# Patient Record
Sex: FEMALE | Race: WHITE | Employment: OTHER | URBAN - METROPOLITAN AREA
[De-identification: names, ages, dates, MRNs, and addresses within clinical notes are randomized per-mention and may not be internally consistent; named-entity substitution may affect disease eponyms.]

---

## 2017-06-03 PROBLEM — R77.8 TROPONIN I ABOVE REFERENCE RANGE: Status: ACTIVE | Noted: 2017-06-03

## 2017-06-03 PROBLEM — D72.829 LEUKOCYTOSIS: Status: ACTIVE | Noted: 2017-06-03

## 2017-06-03 PROBLEM — R55 SYNCOPE: Status: ACTIVE | Noted: 2017-06-03

## 2017-06-03 PROBLEM — R79.89 ELEVATED BRAIN NATRIURETIC PEPTIDE (BNP) LEVEL: Status: ACTIVE | Noted: 2017-06-03

## 2017-06-03 PROBLEM — R79.89 TROPONIN I ABOVE REFERENCE RANGE: Status: ACTIVE | Noted: 2017-06-03

## 2017-06-05 PROBLEM — I44.7 LBBB (LEFT BUNDLE BRANCH BLOCK): Chronic | Status: ACTIVE | Noted: 2017-06-05

## 2017-07-19 PROBLEM — I27.82 CHRONIC PULMONARY EMBOLISM (HCC): Status: ACTIVE | Noted: 2017-07-19

## 2020-10-19 ENCOUNTER — HOSPITAL ENCOUNTER (INPATIENT)
Age: 80
LOS: 3 days | Discharge: HOME | DRG: 690 | End: 2020-10-22
Attending: EMERGENCY MEDICINE | Admitting: INTERNAL MEDICINE
Payer: MEDICARE

## 2020-10-19 PROBLEM — N30.01 ACUTE CYSTITIS WITH HEMATURIA: Status: ACTIVE | Noted: 2020-10-19

## 2020-10-19 LAB
ABO/RH: NORMAL
ANION GAP SERPL CALCULATED.3IONS-SCNC: 17 MMOL/L (ref 7–16)
ANISOCYTOSIS: ABNORMAL
ANTIBODY SCREEN: NORMAL
BACTERIA: ABNORMAL /HPF
BASOPHILS ABSOLUTE: 0 E9/L (ref 0–0.2)
BASOPHILS RELATIVE PERCENT: 0 % (ref 0–2)
BILIRUBIN URINE: NEGATIVE
BLOOD BANK DISPENSE STATUS: NORMAL
BLOOD BANK PRODUCT CODE: NORMAL
BLOOD, URINE: ABNORMAL
BPU ID: NORMAL
BUN BLDV-MCNC: 35 MG/DL (ref 8–23)
CALCIUM SERPL-MCNC: 8.7 MG/DL (ref 8.6–10.2)
CHLORIDE BLD-SCNC: 94 MMOL/L (ref 98–107)
CLARITY: ABNORMAL
CO2: 24 MMOL/L (ref 22–29)
COLOR: ABNORMAL
CREAT SERPL-MCNC: 1.6 MG/DL (ref 0.5–1)
DESCRIPTION BLOOD BANK: NORMAL
EOSINOPHILS ABSOLUTE: 0 E9/L (ref 0.05–0.5)
EOSINOPHILS RELATIVE PERCENT: 0 % (ref 0–6)
EPITHELIAL CELLS, UA: ABNORMAL /HPF
GFR AFRICAN AMERICAN: 37
GFR NON-AFRICAN AMERICAN: 31 ML/MIN/1.73
GLUCOSE BLD-MCNC: 107 MG/DL (ref 74–99)
GLUCOSE URINE: 100 MG/DL
HCT VFR BLD CALC: 39 % (ref 34–48)
HCT VFR BLD CALC: 39.4 % (ref 34–48)
HEMOGLOBIN: 12.2 G/DL (ref 11.5–15.5)
HEMOGLOBIN: 12.3 G/DL (ref 11.5–15.5)
INR BLD: 5.8
KETONES, URINE: 15 MG/DL
LEUKOCYTE ESTERASE, URINE: ABNORMAL
LYMPHOCYTES ABSOLUTE: 7.84 E9/L (ref 1.5–4)
LYMPHOCYTES RELATIVE PERCENT: 9 % (ref 20–42)
MCH RBC QN AUTO: 26.6 PG (ref 26–35)
MCHC RBC AUTO-ENTMCNC: 31.2 % (ref 32–34.5)
MCV RBC AUTO: 85.3 FL (ref 80–99.9)
METAMYELOCYTES RELATIVE PERCENT: 1 % (ref 0–1)
MONOCYTES ABSOLUTE: 2.61 E9/L (ref 0.1–0.95)
MONOCYTES RELATIVE PERCENT: 3 % (ref 2–12)
MYELOCYTE PERCENT: 3 % (ref 0–0)
NEUTROPHILS ABSOLUTE: 76.65 E9/L (ref 1.8–7.3)
NEUTROPHILS RELATIVE PERCENT: 84 % (ref 43–80)
NITRITE, URINE: POSITIVE
OVALOCYTES: ABNORMAL
PDW BLD-RTO: 21.9 FL (ref 11.5–15)
PH UA: 6.5 (ref 5–9)
PLATELET # BLD: 240 E9/L (ref 130–450)
PMV BLD AUTO: 10.5 FL (ref 7–12)
POIKILOCYTES: ABNORMAL
POLYCHROMASIA: ABNORMAL
POTASSIUM SERPL-SCNC: 3.3 MMOL/L (ref 3.5–5)
PROTEIN UA: >=300 MG/DL
PROTHROMBIN TIME: 66.4 SEC (ref 9.3–12.4)
RBC # BLD: 4.62 E12/L (ref 3.5–5.5)
RBC UA: >20 /HPF (ref 0–2)
SODIUM BLD-SCNC: 135 MMOL/L (ref 132–146)
SPECIFIC GRAVITY UA: 1.01 (ref 1–1.03)
TEAR DROP CELLS: ABNORMAL
UROBILINOGEN, URINE: 2 E.U./DL
WBC # BLD: 87.1 E9/L (ref 4.5–11.5)
WBC UA: ABNORMAL /HPF (ref 0–5)

## 2020-10-19 PROCEDURE — G0378 HOSPITAL OBSERVATION PER HR: HCPCS

## 2020-10-19 PROCEDURE — 85018 HEMOGLOBIN: CPT

## 2020-10-19 PROCEDURE — 6360000002 HC RX W HCPCS: Performed by: EMERGENCY MEDICINE

## 2020-10-19 PROCEDURE — 80048 BASIC METABOLIC PNL TOTAL CA: CPT

## 2020-10-19 PROCEDURE — 2580000003 HC RX 258: Performed by: EMERGENCY MEDICINE

## 2020-10-19 PROCEDURE — 96365 THER/PROPH/DIAG IV INF INIT: CPT

## 2020-10-19 PROCEDURE — 36430 TRANSFUSION BLD/BLD COMPNT: CPT

## 2020-10-19 PROCEDURE — 99284 EMERGENCY DEPT VISIT MOD MDM: CPT

## 2020-10-19 PROCEDURE — 6370000000 HC RX 637 (ALT 250 FOR IP): Performed by: INTERNAL MEDICINE

## 2020-10-19 PROCEDURE — 85610 PROTHROMBIN TIME: CPT

## 2020-10-19 PROCEDURE — 87040 BLOOD CULTURE FOR BACTERIA: CPT

## 2020-10-19 PROCEDURE — 81001 URINALYSIS AUTO W/SCOPE: CPT

## 2020-10-19 PROCEDURE — 96367 TX/PROPH/DG ADDL SEQ IV INF: CPT

## 2020-10-19 PROCEDURE — 36415 COLL VENOUS BLD VENIPUNCTURE: CPT

## 2020-10-19 PROCEDURE — 96374 THER/PROPH/DIAG INJ IV PUSH: CPT

## 2020-10-19 PROCEDURE — 85014 HEMATOCRIT: CPT

## 2020-10-19 PROCEDURE — 2580000003 HC RX 258: Performed by: INTERNAL MEDICINE

## 2020-10-19 PROCEDURE — 85025 COMPLETE CBC W/AUTO DIFF WBC: CPT

## 2020-10-19 PROCEDURE — 1200000000 HC SEMI PRIVATE

## 2020-10-19 PROCEDURE — 86900 BLOOD TYPING SEROLOGIC ABO: CPT

## 2020-10-19 PROCEDURE — 86850 RBC ANTIBODY SCREEN: CPT

## 2020-10-19 PROCEDURE — P9059 PLASMA, FRZ BETWEEN 8-24HOUR: HCPCS

## 2020-10-19 PROCEDURE — 86901 BLOOD TYPING SEROLOGIC RH(D): CPT

## 2020-10-19 RX ORDER — LEVOTHYROXINE SODIUM 88 UG/1
88 TABLET ORAL DAILY
Status: DISCONTINUED | OUTPATIENT
Start: 2020-10-20 | End: 2020-10-22 | Stop reason: HOSPADM

## 2020-10-19 RX ORDER — MONTELUKAST SODIUM 10 MG/1
10 TABLET ORAL NIGHTLY
Status: DISCONTINUED | OUTPATIENT
Start: 2020-10-19 | End: 2020-10-19

## 2020-10-19 RX ORDER — ACETAMINOPHEN 325 MG/1
650 TABLET ORAL EVERY 6 HOURS PRN
Status: DISCONTINUED | OUTPATIENT
Start: 2020-10-19 | End: 2020-10-22 | Stop reason: HOSPADM

## 2020-10-19 RX ORDER — 0.9 % SODIUM CHLORIDE 0.9 %
20 INTRAVENOUS SOLUTION INTRAVENOUS ONCE
Status: COMPLETED | OUTPATIENT
Start: 2020-10-19 | End: 2020-10-19

## 2020-10-19 RX ORDER — SODIUM CHLORIDE 9 MG/ML
INJECTION, SOLUTION INTRAVENOUS CONTINUOUS
Status: DISCONTINUED | OUTPATIENT
Start: 2020-10-19 | End: 2020-10-20

## 2020-10-19 RX ORDER — ACETAMINOPHEN 650 MG/1
650 SUPPOSITORY RECTAL EVERY 6 HOURS PRN
Status: DISCONTINUED | OUTPATIENT
Start: 2020-10-19 | End: 2020-10-22 | Stop reason: HOSPADM

## 2020-10-19 RX ORDER — PANTOPRAZOLE SODIUM 40 MG/1
40 TABLET, DELAYED RELEASE ORAL
Status: DISCONTINUED | OUTPATIENT
Start: 2020-10-20 | End: 2020-10-22 | Stop reason: HOSPADM

## 2020-10-19 RX ORDER — FLUTICASONE PROPIONATE 50 MCG
1 SPRAY, SUSPENSION (ML) NASAL DAILY
Status: DISCONTINUED | OUTPATIENT
Start: 2020-10-20 | End: 2020-10-19

## 2020-10-19 RX ORDER — DEXTROSE MONOHYDRATE 25 G/50ML
12.5 INJECTION, SOLUTION INTRAVENOUS PRN
Status: DISCONTINUED | OUTPATIENT
Start: 2020-10-19 | End: 2020-10-22 | Stop reason: HOSPADM

## 2020-10-19 RX ORDER — SODIUM CHLORIDE 0.9 % (FLUSH) 0.9 %
10 SYRINGE (ML) INJECTION PRN
Status: DISCONTINUED | OUTPATIENT
Start: 2020-10-19 | End: 2020-10-22 | Stop reason: HOSPADM

## 2020-10-19 RX ORDER — WARFARIN SODIUM 2 MG/1
2 TABLET ORAL DAILY
COMMUNITY
End: 2021-01-19 | Stop reason: ALTCHOICE

## 2020-10-19 RX ORDER — HYDROXYUREA 500 MG/1
1000 CAPSULE ORAL DAILY
Status: DISCONTINUED | OUTPATIENT
Start: 2020-10-20 | End: 2020-10-21 | Stop reason: CLARIF

## 2020-10-19 RX ORDER — NICOTINE POLACRILEX 4 MG
15 LOZENGE BUCCAL PRN
Status: DISCONTINUED | OUTPATIENT
Start: 2020-10-19 | End: 2020-10-22 | Stop reason: HOSPADM

## 2020-10-19 RX ORDER — LANOLIN ALCOHOL/MO/W.PET/CERES
6 CREAM (GRAM) TOPICAL NIGHTLY PRN
Status: DISCONTINUED | OUTPATIENT
Start: 2020-10-19 | End: 2020-10-22 | Stop reason: HOSPADM

## 2020-10-19 RX ORDER — DEXTROSE MONOHYDRATE 50 MG/ML
100 INJECTION, SOLUTION INTRAVENOUS PRN
Status: DISCONTINUED | OUTPATIENT
Start: 2020-10-19 | End: 2020-10-22 | Stop reason: HOSPADM

## 2020-10-19 RX ORDER — METOPROLOL TARTRATE 50 MG/1
50 TABLET, FILM COATED ORAL 2 TIMES DAILY
Status: DISCONTINUED | OUTPATIENT
Start: 2020-10-19 | End: 2020-10-22 | Stop reason: HOSPADM

## 2020-10-19 RX ORDER — SODIUM CHLORIDE 0.9 % (FLUSH) 0.9 %
10 SYRINGE (ML) INJECTION EVERY 12 HOURS SCHEDULED
Status: DISCONTINUED | OUTPATIENT
Start: 2020-10-19 | End: 2020-10-22 | Stop reason: HOSPADM

## 2020-10-19 RX ORDER — CETIRIZINE HYDROCHLORIDE 10 MG/1
5 TABLET ORAL DAILY
Status: DISCONTINUED | OUTPATIENT
Start: 2020-10-20 | End: 2020-10-19

## 2020-10-19 RX ORDER — LORAZEPAM 0.5 MG/1
0.5 TABLET ORAL ONCE
Status: COMPLETED | OUTPATIENT
Start: 2020-10-19 | End: 2020-10-19

## 2020-10-19 RX ORDER — POLYETHYLENE GLYCOL 3350 17 G/17G
17 POWDER, FOR SOLUTION ORAL DAILY PRN
Status: DISCONTINUED | OUTPATIENT
Start: 2020-10-19 | End: 2020-10-22 | Stop reason: HOSPADM

## 2020-10-19 RX ADMIN — LORAZEPAM 0.5 MG: 0.5 TABLET ORAL at 22:19

## 2020-10-19 RX ADMIN — SODIUM CHLORIDE, PRESERVATIVE FREE 10 ML: 5 INJECTION INTRAVENOUS at 22:19

## 2020-10-19 RX ADMIN — SODIUM CHLORIDE 20 ML: 9 INJECTION, SOLUTION INTRAVENOUS at 23:11

## 2020-10-19 RX ADMIN — SODIUM CHLORIDE: 9 INJECTION, SOLUTION INTRAVENOUS at 22:20

## 2020-10-19 RX ADMIN — WATER 1 G: 1 INJECTION INTRAMUSCULAR; INTRAVENOUS; SUBCUTANEOUS at 18:00

## 2020-10-19 RX ADMIN — Medication 6 MG: at 22:19

## 2020-10-19 RX ADMIN — METOPROLOL TARTRATE 50 MG: 50 TABLET, FILM COATED ORAL at 22:19

## 2020-10-19 RX ADMIN — PHYTONADIONE 5 MG: 10 INJECTION, EMULSION INTRAMUSCULAR; INTRAVENOUS; SUBCUTANEOUS at 18:37

## 2020-10-19 ASSESSMENT — ENCOUNTER SYMPTOMS
VOMITING: 0
SHORTNESS OF BREATH: 0
ABDOMINAL PAIN: 0
BLOOD IN STOOL: 0
NAUSEA: 0

## 2020-10-19 NOTE — ED NOTES
PT straight cathed for urine specimen, Dark Red urine returned, DRs aware specimen sent to lab      Evelyn Landon, SHELLY  10/19/20 [id number]

## 2020-10-19 NOTE — ED PROVIDER NOTES
Patient states gross blood in her urine that began earlier morning 10/18/20. She is on warfarin for history of b/l PE. She states she has not had a good appetite since increasing hydroxyurea last week for polycythemia vera. She states she called Dr Alicja Jones today and was instructed to report to ED for elevated INR. The history is provided by the patient. Hematuria   This is a new problem. The current episode started yesterday. The problem is unchanged. She describes the hematuria as gross hematuria. The hematuria occurs throughout her entire urinary stream. She reports no clotting in her urine stream. She is experiencing no pain. She describes her urine color as dark red. Irritative symptoms do not include frequency. Pertinent negatives include no abdominal pain, dysuria, fever, nausea or vomiting. Review of Systems   Constitutional: Positive for activity change and appetite change. Negative for fever. HENT: Negative. Respiratory: Negative for shortness of breath. Cardiovascular: Negative for chest pain. Gastrointestinal: Negative for abdominal pain, blood in stool, nausea and vomiting. Genitourinary: Positive for hematuria. Negative for decreased urine volume, dysuria, frequency and vaginal bleeding. Musculoskeletal: Negative. Skin: Negative for pallor. Neurological: Negative for weakness and light-headedness. Physical Exam  Vitals signs and nursing note reviewed. Constitutional:       General: She is not in acute distress. Appearance: Normal appearance. She is well-developed and normal weight. She is not ill-appearing or toxic-appearing. HENT:      Head: Normocephalic and atraumatic. Nose: Nose normal.      Mouth/Throat:      Mouth: Mucous membranes are moist.      Pharynx: Oropharynx is clear. Eyes:      Extraocular Movements: Extraocular movements intact.       Conjunctiva/sclera: Conjunctivae normal.      Pupils: Pupils are equal, round, and reactive to light.   Neck:      Musculoskeletal: Normal range of motion and neck supple. Cardiovascular:      Rate and Rhythm: Normal rate and regular rhythm. Heart sounds: Normal heart sounds. No murmur. Pulmonary:      Effort: Pulmonary effort is normal. No respiratory distress. Breath sounds: Normal breath sounds. No wheezing or rales. Abdominal:      General: Bowel sounds are normal.      Palpations: Abdomen is soft. Tenderness: There is no abdominal tenderness. There is no guarding or rebound. Genitourinary:     Comments: Gross hematuria upon straight cath. Musculoskeletal: Normal range of motion. Skin:     General: Skin is warm and dry. Capillary Refill: Capillary refill takes less than 2 seconds. Coloration: Skin is not pale. Neurological:      General: No focal deficit present. Mental Status: She is alert and oriented to person, place, and time. Mental status is at baseline. Cranial Nerves: No cranial nerve deficit. Coordination: Coordination normal.   Psychiatric:         Mood and Affect: Mood normal.         Behavior: Behavior normal.         Thought Content: Thought content normal.         Judgment: Judgment normal.         Procedures    MDM            --------------------------------------------- PAST HISTORY ---------------------------------------------  Past Medical History:  has a past medical history of Back injury, Gallstones, H/O echocardiogram, Hypertension, LBBB (left bundle branch block), Pneumonia, Syncope and collapse, and Thyroid disease. Past Surgical History:  has a past surgical history that includes Tonsillectomy; Appendectomy; Nerve Block (06/03/15); Nerve Block (6 22 15); Nerve Block (N/A, 7/6/15); and Diagnostic Cardiac Cath Lab Procedure (Bilateral, 06/05/2017). Social History:  reports that she has never smoked. She does not have any smokeless tobacco history on file. She reports that she does not drink alcohol or use drugs.     Family Epithelial Cells, UA RARE /HPF    Bacteria, UA FEW (A) None Seen /HPF       Radiology  No orders to display       EKG: This EKG is signed and interpreted by me.          ------------------------- NURSING NOTES AND VITALS REVIEWED ---------------------------  Date / Time Roomed:  10/19/2020  3:48 PM  ED Bed Assignment:  05/05    The nursing notes within the ED encounter and vital signs as below have been reviewed. Patient Vitals for the past 24 hrs:   BP Temp Temp src Pulse Resp SpO2   10/19/20 1549 (!) 137/92 -- -- 106 19 98 %   10/19/20 1535 -- 96.8 °F (36 °C) Temporal -- -- --       Oxygen Saturation Interpretation: Normal      ------------------------------------------ PROGRESS NOTES ------------------------------------------  Re-evaluation(s):  Time: 17:15. Patients symptoms show no change  Repeat physical examination is not changed    Spoke to Dr Dewayne Rivers. She advised giving FFP-4units and Vitamin K 5 mg. I have spoken with the patient and discussed todays results, in addition to providing specific details for the plan of care and counseling regarding the diagnosis and prognosis. Their questions are answered at this time and they are agreeable with the plan.      --------------------------------- ADDITIONAL PROVIDER NOTES ---------------------------------  Consultations:  Spoke with Dr. Dewayne Rivers and Lenin Contreras for Dr Charla Severance (covering for Dr Avery Michelle),  They will admit this patient. This patient's ED course included: a personal history and physicial examination and multiple bedside re-evaluations    This patient has remained hemodynamically stable during their ED course. Please note that the withdrawal or failure to initiate urgent interventions for this patient would likely result in a life threatening deterioration or permanent disability. Accordingly this patient received 30 minutes of critical care time, excluding separately billable procedures. Clinical Impression  1.  Gross hematuria 2. Elevated INR          Disposition  Patient's disposition: Admit to telemetry  Patient's condition is stable.        Jose Elias Esqueda,   10/19/20 1746

## 2020-10-19 NOTE — H&P
Department of Internal Medicine  History and Physical    PCP: Dr. Jonathan López   Admitting Physician: Dr. Wilkerson  Consultants: Dr. Bettie Hoffman:  hematuria    HISTORY OF PRESENT ILLNESS:    Patient is 61-year-old female who presented to the ED from home due to hematuria which started yesterday. Describes a bright red color of urine. Denies previous occurrence. Denies pain with urination. About a week ago she was started on a increased dose of hydroxyurea. Since then she has been feeling well and appetite has decreased. She did admit to right abdominal and flank pain however said that this resolved. Denies any chest pain, shortness of breath, lightheadedness, fever, chills. 6/5/2017 admission   Active Discharge Diagnoses:  Primary Problem  Syncope  Hospital Problems       Active Hospital Problems     Diagnosis Date Noted    Syncope [R55] 06/03/2017    Leukocytosis [D72.829] 06/03/2017    Troponin I above reference range [R79.89] 06/03/2017    Elevated brain natriuretic peptide (BNP) level [R79.89] 06/03/2017 6/4/2017 echocardiogram   1. Left ventricle: The cavity size is normal. Wall thickness is mildly   increased. Systolic function is normal by the biplane method of disks. The   estimated ejection fraction is 69%. There are no regional wall motion   abnormalities. Doppler parameters are consistent with abnormal left   ventricular relaxation (grade 1 diastolic dysfunction). 2. Right ventricle: The cavity size is normal. Wall thickness is normal.   Systolic function is normal. Right ventricular systolic pressure is   mildly   increased. RV systolic pressure (S, est): 40 mm Hg. 3. Mitral valve: There is trivial, less than 1+ regurgitation. 4. Aortic valve: Structurally normal valve. Trileaflet. There is no   regurgitation. 5. Tricuspid valve: There is trivial, less than 1+ regurgitation. 6. Pulmonary arteries: PA peak pressure: 40 mm Hg (S).          PAST MEDICAL Hx:  Past Medical History:   Diagnosis Date    Back injury     Gallstones     H/O echocardiogram 06/04/2017    EF 69%    Hypertension     LBBB (left bundle branch block)     Pneumonia     Syncope and collapse     Thyroid disease        PAST SURGICAL Hx:   Past Surgical History:   Procedure Laterality Date    APPENDECTOMY      DIAGNOSTIC CARDIAC CATH LAB PROCEDURE Bilateral 06/05/2017    NERVE BLOCK  06/03/15    lumbar epidural #1    NERVE BLOCK  6 22 15    lumbar epidural #2    NERVE BLOCK N/A 7/6/15    lumbar epidural #3    TONSILLECTOMY         FAMILY Hx:  History reviewed. No pertinent family history. HOME MEDICATIONS:  Prior to Admission medications    Medication Sig Start Date End Date Taking? Authorizing Provider   warfarin (COUMADIN) 2 MG tablet Take 2 mg by mouth daily   Yes Historical Provider, MD   levothyroxine (SYNTHROID) 88 MCG tablet Take 88 mcg by mouth Daily    Historical Provider, MD   metoprolol tartrate (LOPRESSOR) 50 MG tablet Take 1 tablet by mouth 2 times daily 6/7/17   CRISTIAN Plata CNP   cetirizine (ZYRTEC) 5 MG tablet Take 1 tablet by mouth daily 6/5/17   Edson Vargas MD   montelukast (SINGULAIR) 10 MG tablet Take 10 mg by mouth nightly    Historical Provider, MD   fluticasone (FLONASE) 50 MCG/ACT nasal spray 1 spray by Nasal route daily    Historical Provider, MD   LEVOTHYROXINE SODIUM PO Take 88 mcg by mouth daily     Historical Provider, MD   olmesartan-hydrochlorothiazide (BENICAR HCT) 40-25 MG per tablet Take 1 tablet by mouth daily    Historical Provider, MD   hydroxyurea (HYDREA) 500 MG chemo capsule Take 1,000 mg by mouth daily    Historical Provider, MD   hydroxyurea (HYDREA) 500 MG chemo capsule Take 2 capsules by mouth daily  Patient taking differently: Take 1,000 mg by mouth daily 500 mg qod 10/14/16   Christina Weaver MD   omeprazole (PRILOSEC) 20 MG capsule Take 20 mg by mouth daily.       Historical Provider, MD   celecoxib (CELEBREX) 200 MG capsule Take 200 mg by mouth daily. Historical Provider, MD   Vitamin D (CHOLECALCIFEROL) 1000 UNITS CAPS capsule Take 1,000 Units by mouth daily. Historical Provider, MD       ALLERGIES:  Clonidine derivatives; Atarax [hydroxyzine hcl]; Clonidine derivatives; Medrol [methylprednisolone]; Medrol [methylprednisolone]; Naproxen; Naproxen sodium; Other; Other; and Atarax [hydroxyzine]    SOCIAL Hx:  Social History     Socioeconomic History    Marital status:       Spouse name: Not on file    Number of children: Not on file    Years of education: Not on file    Highest education level: Not on file   Occupational History    Not on file   Social Needs    Financial resource strain: Not on file    Food insecurity     Worry: Not on file     Inability: Not on file    Transportation needs     Medical: Not on file     Non-medical: Not on file   Tobacco Use    Smoking status: Never Smoker   Substance and Sexual Activity    Alcohol use: No    Drug use: No    Sexual activity: Never   Lifestyle    Physical activity     Days per week: Not on file     Minutes per session: Not on file    Stress: Not on file   Relationships    Social connections     Talks on phone: Not on file     Gets together: Not on file     Attends Jain service: Not on file     Active member of club or organization: Not on file     Attends meetings of clubs or organizations: Not on file     Relationship status: Not on file    Intimate partner violence     Fear of current or ex partner: Not on file     Emotionally abused: Not on file     Physically abused: Not on file     Forced sexual activity: Not on file   Other Topics Concern    Not on file   Social History Narrative    ** Merged History Encounter **            ROS: Positive in bold  General:   Denies chills, fatigue, fever, malaise, night sweats or weight loss    Psychological:   Denies anxiety, disorientation or hallucinations    ENT:    Denies epistaxis, headaches, vertigo or visual changes    Cardiovascular:   Denies any chest pain, irregular heartbeats, or palpitations. No paroxysmal nocturnal dyspnea. Respiratory:   Denies shortness of breath, coughing, sputum production, hemoptysis, or wheezing. No orthopnea. Gastrointestinal:   Denies nausea, vomiting, diarrhea, or constipation. Denies any abdominal pain. Denies change in bowel habits or stools. Genito-Urinary:    Denies any urgency, frequency, hematuria. Voiding without difficulty. Musculoskeletal:   Denies joint pain, joint stiffness, joint swelling or muscle pain    Neurology:    Denies any headache or focal neurological deficits. No weakness or paresthesia. Derm:    Denies any rashes, ulcers, or excoriations. Denies bruising. Extremities:   Denies any lower extremity swelling or edema. PHYSICAL EXAM:  VITALS:  Vitals:    10/19/20 1549   BP: (!) 137/92   Pulse: 106   Resp: 19   Temp:    SpO2: 98%         CONSTITUTIONAL:    Awake, alert, cooperative, no apparent distress, and appears stated age    EYES:    PERRL, EOMI, sclera clear, conjunctiva normal    ENT:    Normocephalic, atraumatic, sinuses nontender on palpation. External ears without lesions. Oral pharynx with moist mucus membranes. Tonsils without erythema or exudates. NECK:    Supple, symmetrical, trachea midline, no adenopathy, thyroid symmetric, not enlarged and no tenderness, skin normal, no bruits, no JVD    HEMATOLOGIC/LYMPHATICS:    No cervical lymphadenopathy and no supraclavicular lymphadenopathy    LUNGS:    Symmetric.  No increased work of breathing, good air exchange, clear to auscultation bilaterally, no wheezes, rhonchi, or rales,     CARDIOVASCULAR:    Normal apical impulse, regular rate and rhythm, normal S1 and S2, no S3 or S4, and no murmur noted    ABDOMEN:    No scars, normal bowel sounds, soft, non-distended, non-tender, no masses palpated, no hepatosplenomegaly, no rebound or guarding elicited on palpation MUSCULOSKELETAL:    There is no redness, warmth, or swelling of the joints. Full range of motion noted. Motor strength is 5 out of 5 all extremities bilaterally. Tone is normal.    NEUROLOGIC:    Awake, alert, oriented to name, place and time. Cranial nerves II-XII are grossly intact. Motor is 5 out of 5 bilaterally. SKIN:    No bruising or bleeding. No redness, warmth, or swelling. Pale    EXTREMITIES:    Peripheral pulses present. No edema, cyanosis, or swelling. Cold extremities to palpation    OSTEOPATHIC:    Examined in seated and supine positions. Normal thoracic kyphosis and lumbar lordosis. No acute somatic dysfunction. LABORATORY DATA:  CBC with Differential:    Lab Results   Component Value Date    WBC 87.1 10/19/2020    RBC 4.62 10/19/2020    RBC 4.90 06/04/2017    HGB 12.3 10/19/2020    HCT 39.4 10/19/2020     10/19/2020    MCV 85.3 10/19/2020    MCH 26.6 10/19/2020    MCHC 31.2 10/19/2020    RDW 21.9 10/19/2020    METASPCT 1.0 10/19/2020    LYMPHOPCT 9.0 10/19/2020    LYMPHOPCT 3.3 06/03/2017    MONOPCT 3.0 10/19/2020    MYELOPCT 3.0 10/19/2020    BASOPCT 0.0 10/19/2020    MONOSABS 2.61 10/19/2020    LYMPHSABS 7.84 10/19/2020    EOSABS 0.00 10/19/2020    BASOSABS 0.00 10/19/2020     CMP:    Lab Results   Component Value Date     10/19/2020    K 3.3 10/19/2020    CL 94 10/19/2020    CO2 24 10/19/2020    BUN 35 10/19/2020    CREATININE 1.6 10/19/2020    GFRAA 37 10/19/2020    LABGLOM 31 10/19/2020    GLUCOSE 107 10/19/2020    PROT 6.4 06/03/2017    LABALBU 3.7 06/03/2017    LABALBU 3.7 10/11/2016    CALCIUM 8.7 10/19/2020    BILITOT 1.2 06/03/2017    ALKPHOS 82 06/03/2017    AST 28 06/03/2017    ALT 19 06/03/2017       ASSESSMENT:  1. Hematuria   2. UTI  3. Supratherapeutic INR   4. BRAYAN on CKD stage 3  5. Polycythemia vera  6. Grade 1 diastolic dysfunction  7. Hypothyroidism  8. Hypertension  9.  History of syncope      PLAN:  Hematology and oncology consulted from the

## 2020-10-20 LAB
ALBUMIN SERPL-MCNC: 3.2 G/DL (ref 3.5–5.2)
ALP BLD-CCNC: 105 U/L (ref 35–104)
ALT SERPL-CCNC: 6 U/L (ref 0–32)
ANION GAP SERPL CALCULATED.3IONS-SCNC: 13 MMOL/L (ref 7–16)
ANISOCYTOSIS: ABNORMAL
AST SERPL-CCNC: 16 U/L (ref 0–31)
BASOPHILS ABSOLUTE: 0.55 E9/L (ref 0–0.2)
BASOPHILS RELATIVE PERCENT: 0.9 % (ref 0–2)
BILIRUB SERPL-MCNC: 0.8 MG/DL (ref 0–1.2)
BUN BLDV-MCNC: 33 MG/DL (ref 8–23)
CALCIUM SERPL-MCNC: 8.2 MG/DL (ref 8.6–10.2)
CHLORIDE BLD-SCNC: 98 MMOL/L (ref 98–107)
CO2: 23 MMOL/L (ref 22–29)
CREAT SERPL-MCNC: 1.7 MG/DL (ref 0.5–1)
EOSINOPHILS ABSOLUTE: 0.55 E9/L (ref 0.05–0.5)
EOSINOPHILS RELATIVE PERCENT: 0.9 % (ref 0–6)
GFR AFRICAN AMERICAN: 35
GFR NON-AFRICAN AMERICAN: 29 ML/MIN/1.73
GLUCOSE BLD-MCNC: 109 MG/DL (ref 74–99)
HCT VFR BLD CALC: 33.7 % (ref 34–48)
HCT VFR BLD CALC: 34.3 % (ref 34–48)
HCT VFR BLD CALC: 37.1 % (ref 34–48)
HCT VFR BLD CALC: 37.2 % (ref 34–48)
HEMOGLOBIN: 10.2 G/DL (ref 11.5–15.5)
HEMOGLOBIN: 10.3 G/DL (ref 11.5–15.5)
HEMOGLOBIN: 10.9 G/DL (ref 11.5–15.5)
HEMOGLOBIN: 11.5 G/DL (ref 11.5–15.5)
INR BLD: 1.5
INR BLD: 2
LYMPHOCYTES ABSOLUTE: 6.08 E9/L (ref 1.5–4)
LYMPHOCYTES RELATIVE PERCENT: 9.5 % (ref 20–42)
MAGNESIUM: 1.9 MG/DL (ref 1.6–2.6)
MCH RBC QN AUTO: 26.3 PG (ref 26–35)
MCHC RBC AUTO-ENTMCNC: 30.6 % (ref 32–34.5)
MCV RBC AUTO: 86 FL (ref 80–99.9)
METAMYELOCYTES RELATIVE PERCENT: 0.9 % (ref 0–1)
MONOCYTES ABSOLUTE: 2.43 E9/L (ref 0.1–0.95)
MONOCYTES RELATIVE PERCENT: 4.3 % (ref 2–12)
MYELOCYTE PERCENT: 0.9 % (ref 0–0)
NEUTROPHILS ABSOLUTE: 51.68 E9/L (ref 1.8–7.3)
NEUTROPHILS RELATIVE PERCENT: 82.8 % (ref 43–80)
NUCLEATED RED BLOOD CELLS: 1.7 /100 WBC
OVALOCYTES: ABNORMAL
PDW BLD-RTO: 21.8 FL (ref 11.5–15)
PHOSPHORUS: 3.9 MG/DL (ref 2.5–4.5)
PLATELET # BLD: 157 E9/L (ref 130–450)
PMV BLD AUTO: 10.7 FL (ref 7–12)
POIKILOCYTES: ABNORMAL
POLYCHROMASIA: ABNORMAL
POTASSIUM SERPL-SCNC: 3.3 MMOL/L (ref 3.5–5)
PROTHROMBIN TIME: 17.2 SEC (ref 9.3–12.4)
PROTHROMBIN TIME: 22.2 SEC (ref 9.3–12.4)
RBC # BLD: 3.92 E12/L (ref 3.5–5.5)
SODIUM BLD-SCNC: 134 MMOL/L (ref 132–146)
TEAR DROP CELLS: ABNORMAL
TOTAL PROTEIN: 4.8 G/DL (ref 6.4–8.3)
TSH SERPL DL<=0.05 MIU/L-ACNC: 8.6 UIU/ML (ref 0.27–4.2)
WBC # BLD: 60.8 E9/L (ref 4.5–11.5)

## 2020-10-20 PROCEDURE — 6370000000 HC RX 637 (ALT 250 FOR IP): Performed by: INTERNAL MEDICINE

## 2020-10-20 PROCEDURE — 84443 ASSAY THYROID STIM HORMONE: CPT

## 2020-10-20 PROCEDURE — 84100 ASSAY OF PHOSPHORUS: CPT

## 2020-10-20 PROCEDURE — 36415 COLL VENOUS BLD VENIPUNCTURE: CPT

## 2020-10-20 PROCEDURE — 85025 COMPLETE CBC W/AUTO DIFF WBC: CPT

## 2020-10-20 PROCEDURE — 87449 NOS EACH ORGANISM AG IA: CPT

## 2020-10-20 PROCEDURE — 6360000002 HC RX W HCPCS: Performed by: INTERNAL MEDICINE

## 2020-10-20 PROCEDURE — 96376 TX/PRO/DX INJ SAME DRUG ADON: CPT

## 2020-10-20 PROCEDURE — 85014 HEMATOCRIT: CPT

## 2020-10-20 PROCEDURE — 85610 PROTHROMBIN TIME: CPT

## 2020-10-20 PROCEDURE — 85018 HEMOGLOBIN: CPT

## 2020-10-20 PROCEDURE — 87324 CLOSTRIDIUM AG IA: CPT

## 2020-10-20 PROCEDURE — 83735 ASSAY OF MAGNESIUM: CPT

## 2020-10-20 PROCEDURE — 2580000003 HC RX 258: Performed by: INTERNAL MEDICINE

## 2020-10-20 PROCEDURE — 80053 COMPREHEN METABOLIC PANEL: CPT

## 2020-10-20 PROCEDURE — G0378 HOSPITAL OBSERVATION PER HR: HCPCS

## 2020-10-20 PROCEDURE — 1200000000 HC SEMI PRIVATE

## 2020-10-20 RX ORDER — SODIUM CHLORIDE AND POTASSIUM CHLORIDE .9; .15 G/100ML; G/100ML
SOLUTION INTRAVENOUS CONTINUOUS
Status: DISCONTINUED | OUTPATIENT
Start: 2020-10-20 | End: 2020-10-22 | Stop reason: HOSPADM

## 2020-10-20 RX ORDER — MAGNESIUM SULFATE 1 G/100ML
1 INJECTION INTRAVENOUS PRN
Status: DISCONTINUED | OUTPATIENT
Start: 2020-10-20 | End: 2020-10-22 | Stop reason: HOSPADM

## 2020-10-20 RX ORDER — POTASSIUM CHLORIDE 20 MEQ/1
40 TABLET, EXTENDED RELEASE ORAL PRN
Status: DISCONTINUED | OUTPATIENT
Start: 2020-10-20 | End: 2020-10-22 | Stop reason: HOSPADM

## 2020-10-20 RX ORDER — WARFARIN SODIUM 2 MG/1
2 TABLET ORAL DAILY
Status: DISCONTINUED | OUTPATIENT
Start: 2020-10-20 | End: 2020-10-22 | Stop reason: HOSPADM

## 2020-10-20 RX ORDER — POTASSIUM CHLORIDE 7.45 MG/ML
10 INJECTION INTRAVENOUS PRN
Status: DISCONTINUED | OUTPATIENT
Start: 2020-10-20 | End: 2020-10-22 | Stop reason: HOSPADM

## 2020-10-20 RX ADMIN — SODIUM CHLORIDE: 9 INJECTION, SOLUTION INTRAVENOUS at 10:38

## 2020-10-20 RX ADMIN — WARFARIN SODIUM 2 MG: 2 TABLET ORAL at 10:38

## 2020-10-20 RX ADMIN — METOPROLOL TARTRATE 50 MG: 50 TABLET, FILM COATED ORAL at 10:38

## 2020-10-20 RX ADMIN — SODIUM CHLORIDE, PRESERVATIVE FREE 10 ML: 5 INJECTION INTRAVENOUS at 21:22

## 2020-10-20 RX ADMIN — LEVOTHYROXINE SODIUM 88 MCG: 88 TABLET ORAL at 05:28

## 2020-10-20 RX ADMIN — METOPROLOL TARTRATE 50 MG: 50 TABLET, FILM COATED ORAL at 21:22

## 2020-10-20 RX ADMIN — WATER 1 G: 1 INJECTION INTRAMUSCULAR; INTRAVENOUS; SUBCUTANEOUS at 17:35

## 2020-10-20 RX ADMIN — POTASSIUM CHLORIDE AND SODIUM CHLORIDE: 900; 150 INJECTION, SOLUTION INTRAVENOUS at 14:31

## 2020-10-20 RX ADMIN — PANTOPRAZOLE SODIUM 40 MG: 40 TABLET, DELAYED RELEASE ORAL at 05:28

## 2020-10-20 ASSESSMENT — PAIN SCALES - GENERAL
PAINLEVEL_OUTOF10: 0

## 2020-10-20 NOTE — CARE COORDINATION
10-20-Cm note: spoke to pt for transition of care , pt's son lives with her ,he takes care of her needs. Pt is independent and denies any dc needs. Plan is to return home with no needs.  Electronically signed by Ernesto Cui RN on 10/20/2020 at 10:58 AM

## 2020-10-20 NOTE — PROGRESS NOTES
S: patient states hematuria resolved but did see a little blood after wiping this morning post urination, she had diarrhea with incontinence last night could not make it to the bathroom in time. She has had unexplained yellow diarrhea for about 1 and 1/2 weeks tends to be about once a day. She had poor appetite with it and wasn't eating  and likely led to elevated INR on warfarin. O: Temp 98.3 P-79 R-16 /57  GEn- alert, talking, non distressed  HEENT- mmm  Lungs- clear  Cardiac- RRR  abd- soft/nt/nd +BS  Ext- warm and well perfused    Labs:  Wbc=60.8 hb=10.3 mhz=132 with 83% creatinine 1.7 albumin=3.2  INR=1.5 today  Last night INR=3   A/P: 78year old female well known to me with myleoproliferative disorder orginaly polycythemia very. Her WBC recently increased and hydrea was increased 1500 mg alternating with 1000 mg and is responding nicely. She has been ill with a GI illness with diarrhea and poor appetite and poor po intake the past 10 days and likely caused elevated INR of 6 yesterday and gross hematuria yesterday  UA suspicious for UTI likely from recent diarrhea. INR improved. 1. Antibiotics and f/u urine culture  2. Continue hydrea  3. C. Diff stool  4.  Restart coumadin for history of PE, BERTHA-1 gene mutation and hyperhomocysteimia    Dakota Mclain [facility]

## 2020-10-20 NOTE — PROGRESS NOTES
Internal Medicine Progress Note    [facility]    Balaji Coleman. Clare Dumont., KYLERONIESHA. Franklin Taveras D.O., DIANE. Crystal Meyer D.O. Paige Fernandes, MSN, APRN, NP-C  Junaid Velasquez. Joel Lieberman, MSN, APRN-CNP     Primary Care Physician: Griselda Pate MD   Admitting Physician:  Rickey Ariza DO  Admission date and time: 10/19/2020  3:48 PM    Room:  [facility]  Admitting diagnosis: Acute cystitis with hematuria [N30.01]    Patient Name: Margi Wheeler  MRN: [id number]    Date of Service: 10/20/2020     Subjective:  Mono Vargas is a 78 y.o. female who was seen and examined today,10/20/2020, at the bedside. She is feeling better today with treatment administered. She admits to the sensation of dysuria and frequency suggesting urinary tract infection. States that she has had these in the past without hematuria. States that she has been taking her warfarin 2 mg daily as directed without any change in the dosage. States that her diet has changed however as her appetite has been poor and she has not been eating her diet consistently as previously well maintained on the same dose for the last 2 months. She is urinating without difficulty as far as hesitance or the need to push with urination and she has no obstructive symptoms. No family present during my examination. Review of System:   Constitutional:   Denies fever or chills, weight loss or gain, admits malaise and fatigue. HEENT:   Denies ear pain, sore throat, sinus or eye problems. Cardiovascular:   Denies any chest pain, irregular heartbeats, or palpitations. Respiratory:   Denies shortness of breath, coughing, sputum production, hemoptysis, or wheezing. Gastrointestinal:   Admits to poor appetite and nausea. Denies vomiting, diarrhea, or constipation. Denies any abdominal pain. Genitourinary:    Admits to less dysuria and frequency. Admits to ongoing but improved hematuria.   Denies flank or suprapubic pain.  Extremities:   Denies lower extremity swelling, edema or cyanosis. Neurology:    Denies any headache or focal neurological deficits, to generalized weakness and deconditioning. Psch:   Denies being anxious or depressed. Musculoskeletal:    Denies  myalgias, joint complaints or back pain. Integumentary:   Denies any rashes, ulcers, or excoriations. Denies bruising. Hematologic/Lymphatic:  Denies bruising or bleeding. Physical Exam:  No intake/output data recorded. Intake/Output Summary (Last 24 hours) at 10/20/2020 1047  Last data filed at 10/20/2020 0041  Gross per 24 hour   Intake 240 ml   Output --   Net 240 ml   I/O last 3 completed shifts: In: 240 [P.O.:240]  Out: -   Patient Vitals for the past 96 hrs (Last 3 readings):   Weight   10/19/20 1941 151 lb (68.5 kg)     Vital Signs:   Blood pressure (!) 119/57, pulse 79, temperature 98.3 °F (36.8 °C), temperature source Oral, resp. rate 16, height 5' 5\" (1.651 m), weight 151 lb (68.5 kg), SpO2 97 %. General appearance:  Alert, responsive, oriented to person, place, and time. Only mildly ill and without distress  Head:  Normocephalic. No masses, lesions or tenderness. Eyes:  PERRLA. EOMI. Sclera clear. Buccal mucosa moist.  ENT:  Ears normal. Mucosa normal.  Neck:    Supple. Trachea midline. No thyromegaly. No JVD. No bruits. Heart:    Rhythm regular. Rate controlled. Systolic murmur. Lungs:    Symmetrical. Clear to auscultation bilaterally. Bibasilar air exchange. No wheezes. No rhonchi. No rales. Abdomen:   Soft. Non-tender. Non-distended. Bowel sounds positive. No organomegaly or masses. No pain on palpation. Extremities:    Peripheral pulses present. No peripheral edema. No ulcers. No cyanosis. No clubbing. Neurologic:    Alert x 3. No focal deficit. Cranial nerves grossly intact. General weakness without focal weakness.   Psych:   Behavior is normal. Mood appears normal. Speech is not rapid and/or pressured. Musculoskeletal:   Spine ROM normal. Muscular strength intact. Gait not assessed. Integumentary:  No rashes  Skin normal color and texture. Genitalia/Breast:  Deferred    Medication:  Scheduled Meds:   warfarin  2 mg Oral Daily    pantoprazole  40 mg Oral QAM AC    levothyroxine  88 mcg Oral Daily    sodium chloride flush  10 mL Intravenous 2 times per day    cefTRIAXone (ROCEPHIN) IV  1 g Intravenous Q24H    hydroxyurea  1,000 mg Oral Daily    metoprolol tartrate  50 mg Oral BID     Continuous Infusions:   dextrose      sodium chloride 75 mL/hr at 10/20/20 1038       Objective Data:  CBC with Differential:    Lab Results   Component Value Date    WBC 60.8 10/20/2020    RBC 3.92 10/20/2020    RBC 4.90 06/04/2017    HGB 10.3 10/20/2020    HGB 10.2 10/20/2020    HCT 33.7 10/20/2020    HCT 34.3 10/20/2020     10/20/2020    MCV 86.0 10/20/2020    MCH 26.3 10/20/2020    MCHC 30.6 10/20/2020    RDW 21.8 10/20/2020    NRBC 1.7 10/20/2020    METASPCT 0.9 10/20/2020    LYMPHOPCT 9.5 10/20/2020    LYMPHOPCT 3.3 06/03/2017    MONOPCT 4.3 10/20/2020    MYELOPCT 0.9 10/20/2020    BASOPCT 0.9 10/20/2020    MONOSABS 2.43 10/20/2020    LYMPHSABS 6.08 10/20/2020    EOSABS 0.55 10/20/2020    BASOSABS 0.55 10/20/2020     BMP:    Lab Results   Component Value Date     10/20/2020    K 3.3 10/20/2020    CL 98 10/20/2020    CO2 23 10/20/2020    BUN 33 10/20/2020    LABALBU 3.2 10/20/2020    CREATININE 1.7 10/20/2020    CALCIUM 8.2 10/20/2020    GFRAA 35 10/20/2020    LABGLOM 29 10/20/2020    GLUCOSE 109 10/20/2020     PT/INR:    Lab Results   Component Value Date    PROTIME 17.2 10/20/2020    INR 1.5 10/20/2020       Wound Documentation:        Assessment:  1. Acute cystitis with hematuria   2. Supratherapeutic INR  chronic warfarin 2 mg daily  3. BRAYAN on CKD stage 3  4. Normocytic normochromic anemia due to blood loss   5. Hypokalemia   6. Polycythemia vera followed by the [facility]  7.  Unstated chronic diastolic congestive heart failure  8. Hypothyroidism  9. Hypertension  10. History of syncope    Plan:   Chuck Casillas was admitted due to acute cystitis with gross hematuria. This was complicated further by her supratherapeutic INR. She was given FFP and vitamin K last evening and INR has trended downward, presently 1.5. We will resume chronic anticoagulation once okay by hematology. Blood counts have remained stable with no significant bleeding reported. Renal functions have trended downward as well on present treatment, electrolyte abnormalities will be addressed and fluids will be adjusted. She appears to be compensated from heart failure standpoint we will monitor fluid volume status closely moving forward. Therapy will be consulted as well. In the setting of poor appetite, dietary nutritional supplements will be started also. Continue current therapy. See orders for further plan of care. More than 50% of my  time was spent at the bedside counseling/coordinating care with the patient and/or family with face to face contact. This time was spent reviewing notes and laboratory data as well as instructing and counseling the patient. Time I spent with the family or surrogate(s) is included only if the patient was incapable of providing the necessary information or participating in medical decisions. I also discussed the differential diagnosis and all of the proposed management plans with the patient and individuals accompanying the patient. Gurinder Codding requires this high level of physician care and nursing on the IMC/Telemetry unit due the complexity of decision management and chance of rapid decline or death. Continued cardiac monitoring and higher level of nursing are required. I am readily available for any further decision-making and intervention.      CRISTIAN Owens CNP, APRN-CNP  10/20/2020  10:47 AM

## 2020-10-20 NOTE — PLAN OF CARE
Problem: Skin Integrity:  Goal: Will show no infection signs and symptoms  Description: Will show no infection signs and symptoms  10/20/2020 1222 by Tori Magdaleno RN  Outcome: Met This Shift     Problem: Skin Integrity:  Goal: Absence of new skin breakdown  Description: Absence of new skin breakdown  10/20/2020 1222 by Tori Magdaleno RN  Outcome: Met This Shift

## 2020-10-20 NOTE — PLAN OF CARE
Problem: Skin Integrity:  Goal: Will show no infection signs and symptoms  Description: Will show no infection signs and symptoms  10/20/2020 0649 by Chula Urias RN  Outcome: Met This Shift  58/00/3700 [id number] by Jeanette Rutherford RN  Outcome: Met This Shift  Goal: Absence of new skin breakdown  Description: Absence of new skin breakdown  10/20/2020 0649 by Chula Urias RN  Outcome: Met This Shift  05/72/4422 1310 by Jeanette Rutherford, RN  Outcome: Met This Shift

## 2020-10-21 LAB
ALBUMIN SERPL-MCNC: 3.4 G/DL (ref 3.5–5.2)
ALP BLD-CCNC: 101 U/L (ref 35–104)
ALT SERPL-CCNC: 7 U/L (ref 0–32)
ANION GAP SERPL CALCULATED.3IONS-SCNC: 10 MMOL/L (ref 7–16)
ANISOCYTOSIS: ABNORMAL
AST SERPL-CCNC: 14 U/L (ref 0–31)
BASOPHILIC STIPPLING: ABNORMAL
BASOPHILS ABSOLUTE: 1.96 E9/L (ref 0–0.2)
BASOPHILS RELATIVE PERCENT: 3 % (ref 0–2)
BILIRUB SERPL-MCNC: 0.6 MG/DL (ref 0–1.2)
BUN BLDV-MCNC: 29 MG/DL (ref 8–23)
C DIFF TOXIN/ANTIGEN: NORMAL
CALCIUM SERPL-MCNC: 8.1 MG/DL (ref 8.6–10.2)
CHLORIDE BLD-SCNC: 103 MMOL/L (ref 98–107)
CO2: 24 MMOL/L (ref 22–29)
CREAT SERPL-MCNC: 1.8 MG/DL (ref 0.5–1)
EOSINOPHILS ABSOLUTE: 0 E9/L (ref 0.05–0.5)
EOSINOPHILS RELATIVE PERCENT: 0 % (ref 0–6)
GFR AFRICAN AMERICAN: 33
GFR NON-AFRICAN AMERICAN: 27 ML/MIN/1.73
GLUCOSE BLD-MCNC: 108 MG/DL (ref 74–99)
HCT VFR BLD CALC: 33.5 % (ref 34–48)
HCT VFR BLD CALC: 33.5 % (ref 34–48)
HCT VFR BLD CALC: 35.5 % (ref 34–48)
HEMOGLOBIN: 10.2 G/DL (ref 11.5–15.5)
HEMOGLOBIN: 10.2 G/DL (ref 11.5–15.5)
HEMOGLOBIN: 10.8 G/DL (ref 11.5–15.5)
INR BLD: 1.3
LYMPHOCYTES ABSOLUTE: 5.22 E9/L (ref 1.5–4)
LYMPHOCYTES RELATIVE PERCENT: 8 % (ref 20–42)
MAGNESIUM: 1.7 MG/DL (ref 1.6–2.6)
MCH RBC QN AUTO: 26.4 PG (ref 26–35)
MCHC RBC AUTO-ENTMCNC: 30.4 % (ref 32–34.5)
MCV RBC AUTO: 86.8 FL (ref 80–99.9)
MONOCYTES ABSOLUTE: 2.61 E9/L (ref 0.1–0.95)
MONOCYTES RELATIVE PERCENT: 4 % (ref 2–12)
MYELOCYTE PERCENT: 3 % (ref 0–0)
NEUTROPHILS ABSOLUTE: 55.51 E9/L (ref 1.8–7.3)
NEUTROPHILS RELATIVE PERCENT: 82 % (ref 43–80)
OVALOCYTES: ABNORMAL
PDW BLD-RTO: 22 FL (ref 11.5–15)
PHOSPHORUS: 3.2 MG/DL (ref 2.5–4.5)
PLATELET # BLD: 145 E9/L (ref 130–450)
PMV BLD AUTO: 11 FL (ref 7–12)
POIKILOCYTES: ABNORMAL
POLYCHROMASIA: ABNORMAL
POTASSIUM SERPL-SCNC: 3.7 MMOL/L (ref 3.5–5)
PROTHROMBIN TIME: 14.8 SEC (ref 9.3–12.4)
RBC # BLD: 3.86 E12/L (ref 3.5–5.5)
SODIUM BLD-SCNC: 137 MMOL/L (ref 132–146)
TEAR DROP CELLS: ABNORMAL
TOTAL PROTEIN: 4.8 G/DL (ref 6.4–8.3)
WBC # BLD: 65.3 E9/L (ref 4.5–11.5)

## 2020-10-21 PROCEDURE — 85610 PROTHROMBIN TIME: CPT

## 2020-10-21 PROCEDURE — 83735 ASSAY OF MAGNESIUM: CPT

## 2020-10-21 PROCEDURE — 6370000000 HC RX 637 (ALT 250 FOR IP): Performed by: INTERNAL MEDICINE

## 2020-10-21 PROCEDURE — 80053 COMPREHEN METABOLIC PANEL: CPT

## 2020-10-21 PROCEDURE — 85025 COMPLETE CBC W/AUTO DIFF WBC: CPT

## 2020-10-21 PROCEDURE — 96376 TX/PRO/DX INJ SAME DRUG ADON: CPT

## 2020-10-21 PROCEDURE — 36415 COLL VENOUS BLD VENIPUNCTURE: CPT

## 2020-10-21 PROCEDURE — 2580000003 HC RX 258: Performed by: INTERNAL MEDICINE

## 2020-10-21 PROCEDURE — 81270 JAK2 GENE: CPT

## 2020-10-21 PROCEDURE — 85014 HEMATOCRIT: CPT

## 2020-10-21 PROCEDURE — 1200000000 HC SEMI PRIVATE

## 2020-10-21 PROCEDURE — 85018 HEMOGLOBIN: CPT

## 2020-10-21 PROCEDURE — 6360000002 HC RX W HCPCS: Performed by: INTERNAL MEDICINE

## 2020-10-21 PROCEDURE — 84100 ASSAY OF PHOSPHORUS: CPT

## 2020-10-21 PROCEDURE — G0378 HOSPITAL OBSERVATION PER HR: HCPCS

## 2020-10-21 RX ORDER — HYDROXYUREA 500 MG/1
1500 CAPSULE ORAL EVERY OTHER DAY
Status: DISCONTINUED | OUTPATIENT
Start: 2020-10-21 | End: 2020-10-22

## 2020-10-21 RX ORDER — HYDROXYUREA 500 MG/1
1000 CAPSULE ORAL EVERY OTHER DAY
Status: DISCONTINUED | OUTPATIENT
Start: 2020-10-22 | End: 2020-10-22

## 2020-10-21 RX ORDER — HYDROXYUREA 500 MG/1
500 CAPSULE ORAL EVERY OTHER DAY
Status: DISCONTINUED | OUTPATIENT
Start: 2020-10-21 | End: 2020-10-21 | Stop reason: CLARIF

## 2020-10-21 RX ADMIN — METOPROLOL TARTRATE 50 MG: 50 TABLET, FILM COATED ORAL at 20:55

## 2020-10-21 RX ADMIN — PANTOPRAZOLE SODIUM 40 MG: 40 TABLET, DELAYED RELEASE ORAL at 06:16

## 2020-10-21 RX ADMIN — HYDROXYUREA 1500 MG: 500 CAPSULE ORAL at 09:57

## 2020-10-21 RX ADMIN — LEVOTHYROXINE SODIUM 88 MCG: 88 TABLET ORAL at 06:16

## 2020-10-21 RX ADMIN — WATER 1 G: 1 INJECTION INTRAMUSCULAR; INTRAVENOUS; SUBCUTANEOUS at 18:22

## 2020-10-21 RX ADMIN — WARFARIN SODIUM 2 MG: 2 TABLET ORAL at 18:22

## 2020-10-21 RX ADMIN — METOPROLOL TARTRATE 50 MG: 50 TABLET, FILM COATED ORAL at 10:00

## 2020-10-21 RX ADMIN — POTASSIUM CHLORIDE AND SODIUM CHLORIDE: 900; 150 INJECTION, SOLUTION INTRAVENOUS at 03:02

## 2020-10-21 ASSESSMENT — PAIN SCALES - GENERAL
PAINLEVEL_OUTOF10: 0
PAINLEVEL_OUTOF10: 0

## 2020-10-21 NOTE — PROGRESS NOTES
Subjective: The patient is awake and alert sitting on side of bed. She states her stools have been more formed the last two days. She denies any hematuria and says there has been no blood on her pad today. No problems overnight. No  or GI blood loss. No fevers. Denies chest pain, angina, dyspnea or abdominal discomfort. No nausea or vomiting. Tolerating diet. Objective:    /60   Pulse 84   Temp 98.1 °F (36.7 °C) (Oral)   Resp 16   Ht 5' 5\" (1.651 m)   Wt 153 lb 1.6 oz (69.4 kg)   SpO2 96%   BMI 25.48 kg/m²     General: NAD  HEENT: No thrush or mucositis, EOMI, PERRLA  Heart:  RRR, no murmurs, gallops, or rubs.   Lungs:  CTA bilaterally, no wheeze, rales or rhonchi  Abd: bowel sounds present, nontender, nondistended, no masses  Extrem:  No clubbing, cyanosis, or edema  Lymphatics: No palpable adenopathy in cervical and supraclavicular regions  Skin: Intact, no petechia or purpura    CBC with Differential:    Lab Results   Component Value Date    WBC 65.3 10/21/2020    RBC 3.86 10/21/2020    RBC 4.90 06/04/2017    HGB 10.2 10/21/2020    HCT 33.5 10/21/2020     10/21/2020    MCV 86.8 10/21/2020    MCH 26.4 10/21/2020    MCHC 30.4 10/21/2020    RDW 22.0 10/21/2020    NRBC 1.7 10/20/2020    METASPCT 0.9 10/20/2020    LYMPHOPCT 8.0 10/21/2020    LYMPHOPCT 3.3 06/03/2017    MONOPCT 4.0 10/21/2020    MYELOPCT 3.0 10/21/2020    BASOPCT 3.0 10/21/2020    MONOSABS 2.61 10/21/2020    LYMPHSABS 5.22 10/21/2020    EOSABS 0.00 10/21/2020    BASOSABS 1.96 10/21/2020     CMP:    Lab Results   Component Value Date     10/21/2020    K 3.7 10/21/2020     10/21/2020    CO2 24 10/21/2020    BUN 29 10/21/2020    CREATININE 1.8 10/21/2020    GFRAA 33 10/21/2020    LABGLOM 27 10/21/2020    GLUCOSE 108 10/21/2020    PROT 4.8 10/21/2020    LABALBU 3.4 10/21/2020    CALCIUM 8.1 10/21/2020    BILITOT 0.6 10/21/2020    ALKPHOS 101 10/21/2020    AST 14 10/21/2020    ALT 7 10/21/2020        Current Facility-Administered Medications:     hydroxyurea (HYDREA) chemo capsule 1,500 mg, 1,500 mg, Oral, Every Other Day **AND** [START ON 10/22/2020] hydroxyurea (HYDREA) chemo capsule 1,000 mg, 1,000 mg, Oral, Every Other Day, Екатерина Amor MD    warfarin (COUMADIN) tablet 2 mg, 2 mg, Oral, Daily, Mer Anderson MD, 2 mg at 10/20/20 1038    magnesium sulfate 1 g in dextrose 5% 100 mL IVPB, 1 g, Intravenous, PRN, Michelle Troy, APRN - CNP    sodium phosphate 10.95 mmol in dextrose 5 % 250 mL IVPB, 0.16 mmol/kg, Intravenous, PRN **OR** sodium phosphate 21.93 mmol in dextrose 5 % 500 mL IVPB, 0.32 mmol/kg, Intravenous, PRN, Michelle Troy, APRN - CNP    potassium chloride (KLOR-CON M) extended release tablet 40 mEq, 40 mEq, Oral, PRN **OR** potassium bicarb-citric acid (EFFER-K) effervescent tablet 40 mEq, 40 mEq, Oral, PRN **OR** potassium chloride 10 mEq/100 mL IVPB (Peripheral Line), 10 mEq, Intravenous, PRN, Michelle Simmons, APRN - CNP    0.9% NaCl with KCl 20 mEq infusion, , Intravenous, Continuous, Cinthya Lezama DO, Last Rate: 60 mL/hr at 10/21/20 0302    pantoprazole (PROTONIX) tablet 40 mg, 40 mg, Oral, QAM AC, Ismail U Diana, DO, 40 mg at 10/21/20 6551    levothyroxine (SYNTHROID) tablet 88 mcg, 88 mcg, Oral, Daily, Darcella Spring, DO, 88 mcg at 10/21/20 0616    sodium chloride flush 0.9 % injection 10 mL, 10 mL, Intravenous, 2 times per day, Darcella Spring, DO, 10 mL at 10/20/20 2122    sodium chloride flush 0.9 % injection 10 mL, 10 mL, Intravenous, PRN, Darcella Spring, DO    acetaminophen (TYLENOL) tablet 650 mg, 650 mg, Oral, Q6H PRN **OR** acetaminophen (TYLENOL) suppository 650 mg, 650 mg, Rectal, Q6H PRN, Darcella Spring, DO    polyethylene glycol (GLYCOLAX) packet 17 g, 17 g, Oral, Daily PRN, Darcella Spring, DO    glucose (GLUTOSE) 40 % oral gel 15 g, 15 g, Oral, PRN, Ismail U Diana, DO    dextrose 50 % IV solution, 12.5 g, Intravenous, PRN, Darcella Spring, DO    glucagon (rDNA) injection 1 mg, 1 mg, Intramuscular, PRN, Kezia Car, DO    dextrose 5 % solution, 100 mL/hr, Intravenous, PRN, Kezia Car, DO    cefTRIAXone (ROCEPHIN) 1 g in sterile water 10 mL IV syringe, 1 g, Intravenous, Q24H, Ismail U Diana, DO, 1 g at 10/20/20 1735    melatonin tablet 6 mg, 6 mg, Oral, Nightly PRN, Kezia Car, DO, 6 mg at 10/19/20 2219    metoprolol tartrate (LOPRESSOR) tablet 50 mg, 50 mg, Oral, BID, Kezia Car, DO, 50 mg at 10/20/20 2122    Assessment:    Principal Problem:    Acute cystitis with hematuria  Resolved Problems:    * No resolved hospital problems. *    78 year old female who is a patient of Dr. Jatin Headley at the [facility] with myleoproliferative disorder orginaly polycythemia vera. Her WBC recently increased and hydrea was increased 1500 mg alternating with 1000 mg and is responding nicely. She has been ill with a GI illness with diarrhea and poor appetite and poor po intake the past 10 days and likely caused elevated INR of 6 yesterday and gross hematuria yesterday  UA suspicious for UTI likely from recent diarrhea. Plan:  Monitor CBC daily   F/U urine culture-pending  [id number]  Continue hydrea  Ok to resume coumadin for her hx of PE, BERTHA-1 gene mutation and hyperhomocysteimia   Full supportive care     Electronically signed by CRISTIAN French NP on 10/21/2020 at 9:34 AM     Patient seen , examined and agree with above. Continue hydrea, resume coumadin, patient states diarrhea improved but C. Diff sent f/u results.     Orin Anderson

## 2020-10-21 NOTE — PLAN OF CARE
Problem: Skin Integrity:  Goal: Will show no infection signs and symptoms  Description: Will show no infection signs and symptoms  10/21/2020 0149 by Bre Virgen RN  Outcome: Met This Shift     Problem: Skin Integrity:  Goal: Absence of new skin breakdown  Description: Absence of new skin breakdown  10/21/2020 0149 by Bre Virgen RN  Outcome: Met This Shift

## 2020-10-21 NOTE — CARE COORDINATION
10-21-Cm note: met with pt today at the bedside, her son Leana Navarro was there, he takes care of pt, they decline any needs for dc, Leana Navarro will provide transportation home.  Electronically signed by Jana Hewitt RN on 10/21/2020 at 3:19 PM

## 2020-10-21 NOTE — PROGRESS NOTES
Subjective: The patient is awake and alert. No problems overnight. Denies chest pain, angina, and dyspnea. Denies abdominal pain. Tolerating diet. No nausea or vomiting.  Hematuria improved    Objective:    /61   Pulse 87   Temp 98.3 °F (36.8 °C) (Oral)   Resp 18   Ht 5' 5\" (1.651 m)   Wt 153 lb 1.6 oz (69.4 kg)   SpO2 98%   BMI 25.48 kg/m²   Head and Neck: normal atraumatic, no neck masses, normal thyroid, no jvd  Heart:  regular rate and rhythm, S1, S2 normal, no murmur, click, rub or gallop  Lungs:  chest clear, no wheezing, rales, normal symmetric air entry, pulse oximetry on room air is 98 %, no chest wall deformities or tenderness  Abd: soft, non-tender, without masses or organomegaly  Extrem:  No clubbing, cyanosis, or edema  Neuro:Normal, no focal neurological deficit     CBC with Differential:    Lab Results   Component Value Date    WBC 65.3 10/21/2020    RBC 3.86 10/21/2020    RBC 4.90 06/04/2017    HGB 10.8 10/21/2020    HCT 35.5 10/21/2020     10/21/2020    MCV 86.8 10/21/2020    MCH 26.4 10/21/2020    MCHC 30.4 10/21/2020    RDW 22.0 10/21/2020    NRBC 1.7 10/20/2020    METASPCT 0.9 10/20/2020    LYMPHOPCT 8.0 10/21/2020    LYMPHOPCT 3.3 06/03/2017    MONOPCT 4.0 10/21/2020    MYELOPCT 3.0 10/21/2020    BASOPCT 3.0 10/21/2020    MONOSABS 2.61 10/21/2020    LYMPHSABS 5.22 10/21/2020    EOSABS 0.00 10/21/2020    BASOSABS 1.96 10/21/2020     CMP:    Lab Results   Component Value Date     10/21/2020    K 3.7 10/21/2020     10/21/2020    CO2 24 10/21/2020    BUN 29 10/21/2020    CREATININE 1.8 10/21/2020    GFRAA 33 10/21/2020    LABGLOM 27 10/21/2020    GLUCOSE 108 10/21/2020    PROT 4.8 10/21/2020    LABALBU 3.4 10/21/2020    CALCIUM 8.1 10/21/2020    BILITOT 0.6 10/21/2020    ALKPHOS 101 10/21/2020    AST 14 10/21/2020    ALT 7 10/21/2020     PT/INR:    Lab Results   Component Value Date    PROTIME 14.8 10/21/2020    INR 1.3 10/21/2020        Assessment:    Patient Active Problem List   Diagnosis Code    Syncope R55    Leukocytosis D72.829    Troponin I above reference range R77.8    Elevated brain natriuretic peptide (BNP) level R79.89    Left bundle branch block I44.7    Lumbar radiculopathy M54.16    Low back pain M54.5    Osteoarthritis of lumbar spine M47.816    Facet syndrome, lumbar M47.816    Compression fracture of L1 lumbar vertebra Old S32.010A    DDD (degenerative disc disease), lumbar M51.36    Rotoscoliosis M41.80    Lumbar sprain S33. 5XXA    Lumbar strain S39.012A    Protruded lumbar disc M51.26    Chronic pulmonary embolism ([facility] [address] I27.82    Acute cystitis with hematuria N30.01         Plan:  Resume Coumadin advance activity possible discharge tomorrow    Columba Kenney  6:50 PM  10/21/2020

## 2020-10-22 VITALS
RESPIRATION RATE: 18 BRPM | WEIGHT: 152.9 LBS | DIASTOLIC BLOOD PRESSURE: 68 MMHG | HEART RATE: 89 BPM | HEIGHT: 65 IN | SYSTOLIC BLOOD PRESSURE: 150 MMHG | OXYGEN SATURATION: 93 % | BODY MASS INDEX: 25.47 KG/M2 | TEMPERATURE: 99.9 F

## 2020-10-22 LAB
ALBUMIN SERPL-MCNC: 3.2 G/DL (ref 3.5–5.2)
ALP BLD-CCNC: 106 U/L (ref 35–104)
ALT SERPL-CCNC: 5 U/L (ref 0–32)
ANION GAP SERPL CALCULATED.3IONS-SCNC: 11 MMOL/L (ref 7–16)
ANISOCYTOSIS: ABNORMAL
AST SERPL-CCNC: 13 U/L (ref 0–31)
BASOPHILS ABSOLUTE: 2 E9/L (ref 0–0.2)
BASOPHILS RELATIVE PERCENT: 5 % (ref 0–2)
BILIRUB SERPL-MCNC: 0.4 MG/DL (ref 0–1.2)
BUN BLDV-MCNC: 25 MG/DL (ref 8–23)
CALCIUM SERPL-MCNC: 7.9 MG/DL (ref 8.6–10.2)
CHLORIDE BLD-SCNC: 106 MMOL/L (ref 98–107)
CO2: 21 MMOL/L (ref 22–29)
CREAT SERPL-MCNC: 1.6 MG/DL (ref 0.5–1)
EOSINOPHILS ABSOLUTE: 0 E9/L (ref 0.05–0.5)
EOSINOPHILS RELATIVE PERCENT: 0 % (ref 0–6)
GFR AFRICAN AMERICAN: 37
GFR NON-AFRICAN AMERICAN: 31 ML/MIN/1.73
GLUCOSE BLD-MCNC: 98 MG/DL (ref 74–99)
HCT VFR BLD CALC: 32.6 % (ref 34–48)
HCT VFR BLD CALC: 35.9 % (ref 34–48)
HEMOGLOBIN: 10.5 G/DL (ref 11.5–15.5)
HEMOGLOBIN: 9.5 G/DL (ref 11.5–15.5)
INR BLD: 1.5
LYMPHOCYTES ABSOLUTE: 1.6 E9/L (ref 1.5–4)
LYMPHOCYTES RELATIVE PERCENT: 4 % (ref 20–42)
MAGNESIUM: 1.6 MG/DL (ref 1.6–2.6)
MCH RBC QN AUTO: 26.1 PG (ref 26–35)
MCHC RBC AUTO-ENTMCNC: 29.1 % (ref 32–34.5)
MCV RBC AUTO: 89.6 FL (ref 80–99.9)
MONOCYTES ABSOLUTE: 0.4 E9/L (ref 0.1–0.95)
MONOCYTES RELATIVE PERCENT: 1 % (ref 2–12)
MYELOCYTE PERCENT: 4 % (ref 0–0)
NEUTROPHILS ABSOLUTE: 35.91 E9/L (ref 1.8–7.3)
NEUTROPHILS RELATIVE PERCENT: 86 % (ref 43–80)
OVALOCYTES: ABNORMAL
PDW BLD-RTO: 22.3 FL (ref 11.5–15)
PHOSPHORUS: 3.3 MG/DL (ref 2.5–4.5)
PLATELET # BLD: 112 E9/L (ref 130–450)
PMV BLD AUTO: ABNORMAL FL (ref 7–12)
POIKILOCYTES: ABNORMAL
POLYCHROMASIA: ABNORMAL
POTASSIUM SERPL-SCNC: 3.8 MMOL/L (ref 3.5–5)
PROTHROMBIN TIME: 16.9 SEC (ref 9.3–12.4)
RBC # BLD: 3.64 E12/L (ref 3.5–5.5)
SODIUM BLD-SCNC: 138 MMOL/L (ref 132–146)
TEAR DROP CELLS: ABNORMAL
TOTAL PROTEIN: 4.5 G/DL (ref 6.4–8.3)
WBC # BLD: 39.9 E9/L (ref 4.5–11.5)

## 2020-10-22 PROCEDURE — 85610 PROTHROMBIN TIME: CPT

## 2020-10-22 PROCEDURE — 80053 COMPREHEN METABOLIC PANEL: CPT

## 2020-10-22 PROCEDURE — 6360000002 HC RX W HCPCS: Performed by: INTERNAL MEDICINE

## 2020-10-22 PROCEDURE — 6370000000 HC RX 637 (ALT 250 FOR IP): Performed by: INTERNAL MEDICINE

## 2020-10-22 PROCEDURE — 84100 ASSAY OF PHOSPHORUS: CPT

## 2020-10-22 PROCEDURE — 83735 ASSAY OF MAGNESIUM: CPT

## 2020-10-22 PROCEDURE — 85014 HEMATOCRIT: CPT

## 2020-10-22 PROCEDURE — 85025 COMPLETE CBC W/AUTO DIFF WBC: CPT

## 2020-10-22 PROCEDURE — 85018 HEMOGLOBIN: CPT

## 2020-10-22 PROCEDURE — 36415 COLL VENOUS BLD VENIPUNCTURE: CPT

## 2020-10-22 PROCEDURE — G0378 HOSPITAL OBSERVATION PER HR: HCPCS

## 2020-10-22 PROCEDURE — 96372 THER/PROPH/DIAG INJ SC/IM: CPT

## 2020-10-22 RX ORDER — SULFAMETHOXAZOLE AND TRIMETHOPRIM 800; 160 MG/1; MG/1
1 TABLET ORAL 2 TIMES DAILY
Qty: 14 TABLET | Refills: 0 | Status: SHIPPED | OUTPATIENT
Start: 2020-10-22 | End: 2020-10-29

## 2020-10-22 RX ORDER — LIDOCAINE HYDROCHLORIDE 10 MG/ML
5 INJECTION, SOLUTION INFILTRATION; PERINEURAL ONCE
Status: DISCONTINUED | OUTPATIENT
Start: 2020-10-22 | End: 2020-10-22 | Stop reason: HOSPADM

## 2020-10-22 RX ORDER — HYDROXYUREA 500 MG/1
1000 CAPSULE ORAL DAILY
Status: DISCONTINUED | OUTPATIENT
Start: 2020-10-22 | End: 2020-10-22

## 2020-10-22 RX ORDER — HEPARIN SODIUM 5000 [USP'U]/ML
5000 INJECTION, SOLUTION INTRAVENOUS; SUBCUTANEOUS 2 TIMES DAILY
Status: DISCONTINUED | OUTPATIENT
Start: 2020-10-22 | End: 2020-10-22 | Stop reason: HOSPADM

## 2020-10-22 RX ORDER — HYDROXYUREA 500 MG/1
1000 CAPSULE ORAL DAILY
Status: DISCONTINUED | OUTPATIENT
Start: 2020-10-23 | End: 2020-10-22 | Stop reason: HOSPADM

## 2020-10-22 RX ORDER — SULFAMETHOXAZOLE AND TRIMETHOPRIM 800; 160 MG/1; MG/1
1 TABLET ORAL 2 TIMES DAILY
Qty: 14 TABLET | Refills: 0 | Status: SHIPPED | OUTPATIENT
Start: 2020-10-22 | End: 2020-10-22 | Stop reason: SDUPTHER

## 2020-10-22 RX ADMIN — METOPROLOL TARTRATE 50 MG: 50 TABLET, FILM COATED ORAL at 07:59

## 2020-10-22 RX ADMIN — LEVOTHYROXINE SODIUM 88 MCG: 88 TABLET ORAL at 05:56

## 2020-10-22 RX ADMIN — WARFARIN SODIUM 2 MG: 2 TABLET ORAL at 18:15

## 2020-10-22 RX ADMIN — HEPARIN SODIUM 5000 UNITS: 5000 INJECTION INTRAVENOUS; SUBCUTANEOUS at 07:59

## 2020-10-22 RX ADMIN — POTASSIUM CHLORIDE AND SODIUM CHLORIDE: 900; 150 INJECTION, SOLUTION INTRAVENOUS at 01:00

## 2020-10-22 ASSESSMENT — PAIN SCALES - GENERAL: PAINLEVEL_OUTOF10: 0

## 2020-10-22 NOTE — PLAN OF CARE
Problem: Skin Integrity:  Goal: Will show no infection signs and symptoms  Description: Will show no infection signs and symptoms  10/22/2020 0337 by Ranulfo Freeman RN  Outcome: Met This Shift  10/21/2020 2244 by Kamila Morales RN  Outcome: Met This Shift     Problem: Skin Integrity:  Goal: Absence of new skin breakdown  Description: Absence of new skin breakdown  Outcome: Met This Shift     Problem: Falls - Risk of:  Goal: Will remain free from falls  Description: Will remain free from falls  10/22/2020 0337 by Ranulfo Freeman RN  Outcome: Met This Shift  10/21/2020 2244 by Kamila Morales RN  Outcome: Met This Shift     Problem: Falls - Risk of:  Goal: Absence of physical injury  Description: Absence of physical injury  Outcome: Met This Shift

## 2020-10-22 NOTE — PLAN OF CARE
Problem: Falls - Risk of:  Goal: Will remain free from falls  Description: Will remain free from falls  10/22/2020 1642 by Jacquelin Rizzo RN  Outcome: Met This Shift     Problem: Falls - Risk of:  Goal: Absence of physical injury  Description: Absence of physical injury  10/22/2020 1642 by Jacquelin Rizzo RN  Outcome: Met This Shift

## 2020-10-23 NOTE — DISCHARGE SUMMARY
Physician Discharge Summary     Patient ID:  Hemalatha Mckinley  [id number]  17 y.o.  1940    Admit date: 10/19/2020    Discharge date and time:  10/22/2020    Admission Diagnoses: Principal Problem:    Acute cystitis with hematuria  Resolved Problems:    * No resolved hospital problems. *      Discharge Diagnoses:   for reconciliation    Lumbar radiculopathy    Protruded lumbar disc    Low back pain    Osteoarthritis of lumbar spine    Facet syndrome, lumbar    Compression fracture of L1 lumbar vertebra Old    DDD (degenerative disc disease), lumbar    Rotoscoliosis    Lumbar sprain    Lumbar strain    Left bundle branch block     Syncope     Leukocytosis     Troponin I above reference range     Elevated brain natriuretic peptide (BNP) level     Chronic pulmonary embolism ([facility] [address]     Acute cystitis with hematuria               Hospital Course: patient was admitted with acute cystitis and supratherapeutic INR. Patient is known to have history of CML. She has been doing well from that point of view. Urine cultures were obtained. Antibiotics as started. Coumadin has been held. INR came subtherapeutic. Anticoagulants has continued to be held because of the recent cystitis and hematuria until patient is more stable. Patient with you and IV busulfan. Upon discharge that was switching to oral Bactrim.   And we started the Coumadin at half the dose patient will follow up with the PCP soon to get INR checked    BP (!) 150/68   Pulse 89   Temp 99.9 °F (37.7 °C) (Oral)   Resp 18   Ht 5' 5\" (1.651 m)   Wt 152 lb 14.4 oz (69.4 kg)   SpO2 93%   BMI 25.44 kg/m²   Head and Neck: normal atraumatic, no neck masses, normal thyroid, no jvd  Heart:  regular rate and rhythm, S1, S2 normal, no murmur, click, rub or gallop  Lungs:  chest clear, no wheezing, rales, normal symmetric air entry, pulse oximetry on room air is 93%, no chest wall deformities or tenderness  Abd: soft, non-tender, without masses or organomegaly  Extrem: No clubbing, cyanosis, or edema  Neuro:Normal, no focal neurological deficit     Condition on discharge: stable    Disposition: home    Patient Instructions:   Discharge Medication List as of 10/22/2020  6:32 PM      CONTINUE these medications which have CHANGED    Details   sulfamethoxazole-trimethoprim (BACTRIM DS;SEPTRA DS) 800-160 MG per tablet Take 1 tablet by mouth 2 times daily for 7 days, Disp-14 tablet,R-0Normal         CONTINUE these medications which have NOT CHANGED    Details   warfarin (COUMADIN) 2 MG tablet Take 2 mg by mouth dailyHistorical Med      levothyroxine (SYNTHROID) 88 MCG tablet Take 88 mcg by mouth DailyHistorical Med      metoprolol tartrate (LOPRESSOR) 50 MG tablet Take 1 tablet by mouth 2 times daily, Disp-60 tablet, R-3Normal      olmesartan-hydrochlorothiazide (BENICAR HCT) 40-25 MG per tablet Take 1 tablet by mouth dailyHistorical Med      hydroxyurea (HYDREA) 500 MG chemo capsule Take 2 capsules by mouth daily, Disp-60 capsule, R-0      omeprazole (PRILOSEC) 20 MG capsule Take 20 mg by mouth daily. Vitamin D (CHOLECALCIFEROL) 1000 UNITS CAPS capsule Take 1,000 Units by mouth daily. STOP taking these medications       cetirizine (ZYRTEC) 5 MG tablet Comments:   Reason for Stopping:         montelukast (SINGULAIR) 10 MG tablet Comments:   Reason for Stopping:         fluticasone (FLONASE) 50 MCG/ACT nasal spray Comments:   Reason for Stopping:         celecoxib (CELEBREX) 200 MG capsule Comments:   Reason for Stopping:             Activity: activity as tolerated  Diet: regular diet    Follow-up with in 5-7  day.     Note that over 30 minutes was spent in preparing discharge papers, discussing discharge with patient, medication review, etc.      Electronically signed by Jasmyn Napoles MD on 10/22/2020 at 8:42 PM

## 2020-10-24 LAB — JAK2 GENE MUTATION QUAL: POSITIVE

## 2020-10-25 LAB
BLOOD CULTURE, ROUTINE: NORMAL
CULTURE, BLOOD 2: NORMAL

## 2021-01-19 ENCOUNTER — OFFICE VISIT (OUTPATIENT)
Dept: FAMILY MEDICINE CLINIC | Age: 81
End: 2021-01-19
Payer: MEDICARE

## 2021-01-19 VITALS
HEART RATE: 63 BPM | HEIGHT: 65 IN | DIASTOLIC BLOOD PRESSURE: 65 MMHG | TEMPERATURE: 97.1 F | WEIGHT: 150 LBS | SYSTOLIC BLOOD PRESSURE: 145 MMHG | BODY MASS INDEX: 24.99 KG/M2

## 2021-01-19 DIAGNOSIS — E78.5 HYPERLIPIDEMIA, UNSPECIFIED HYPERLIPIDEMIA TYPE: ICD-10-CM

## 2021-01-19 DIAGNOSIS — I10 HYPERTENSION, UNSPECIFIED TYPE: ICD-10-CM

## 2021-01-19 DIAGNOSIS — Z76.89 ESTABLISHING CARE WITH NEW DOCTOR, ENCOUNTER FOR: Primary | ICD-10-CM

## 2021-01-19 DIAGNOSIS — E03.8 OTHER SPECIFIED HYPOTHYROIDISM: ICD-10-CM

## 2021-01-19 DIAGNOSIS — E55.9 VITAMIN D DEFICIENCY: ICD-10-CM

## 2021-01-19 PROCEDURE — 99204 OFFICE O/P NEW MOD 45 MIN: CPT | Performed by: FAMILY MEDICINE

## 2021-01-19 RX ORDER — RUXOLITINIB 10 MG/1
TABLET ORAL 3 TIMES DAILY
COMMUNITY
Start: 2020-12-28

## 2021-01-19 RX ORDER — WARFARIN SODIUM 3 MG/1
TABLET ORAL
COMMUNITY
Start: 2021-01-11 | End: 2022-07-13

## 2021-01-19 RX ORDER — WARFARIN SODIUM 1 MG/1
TABLET ORAL
COMMUNITY
Start: 2020-12-08 | End: 2022-08-12

## 2021-01-19 RX ORDER — FOLIC ACID 1 MG/1
TABLET ORAL
COMMUNITY
Start: 2020-12-30

## 2021-01-19 ASSESSMENT — ENCOUNTER SYMPTOMS
BACK PAIN: 1
ALLERGIC/IMMUNOLOGIC NEGATIVE: 1

## 2021-01-19 ASSESSMENT — PATIENT HEALTH QUESTIONNAIRE - PHQ9
SUM OF ALL RESPONSES TO PHQ9 QUESTIONS 1 & 2: 0
2. FEELING DOWN, DEPRESSED OR HOPELESS: 0
SUM OF ALL RESPONSES TO PHQ QUESTIONS 1-9: 0
SUM OF ALL RESPONSES TO PHQ QUESTIONS 1-9: 0

## 2021-01-19 NOTE — PROGRESS NOTES
2021    Mari Pierce (:  1940) is a 78 y.o. female, here for evaluation of the following medical concerns:  Chief Complaint   Patient presents with    New Patient     preview pt of dr Pj Giron       Past Medical History:   Diagnosis Date    Back injury     Gallstones     H/O echocardiogram 2017    EF 69%    Hypertension     LBBB (left bundle branch block)     Pneumonia     Polycythemia vera ([address]     Syncope and collapse     Thyroid disease        HPI    Allergies   Allergen Reactions    Clonidine Derivatives Anaphylaxis    Medrol [Methylprednisolone] Other (See Comments)     unknown    Naproxen Nausea Only    Other      oruvall    Atarax [Hydroxyzine] Palpitations       Prior to Visit Medications    Medication Sig Taking? Authorizing Provider   folic acid (FOLVITE) 1 MG tablet take 1 tablet by mouth once daily Yes Historical Provider, MD   JAKAFI 10 MG tablet  Yes Historical Provider, MD   warfarin (COUMADIN) 1 MG tablet take 1 AND 1/2 tablets by mouth once daily for 2 days and 1 table. ..  (REFER TO PRESCRIPTION NOTES). Yes Historical Provider, MD   warfarin (COUMADIN) 3 MG tablet take 1 tablet by mouth daily Yes Historical Provider, MD   levothyroxine (SYNTHROID) 88 MCG tablet Take 88 mcg by mouth Daily Yes Historical Provider, MD   metoprolol tartrate (LOPRESSOR) 50 MG tablet Take 1 tablet by mouth 2 times daily Yes Zuri Bush APRN - CNP   olmesartan-hydrochlorothiazide (BENICAR HCT) 40-25 MG per tablet Take 1 tablet by mouth daily Yes Historical Provider, MD   hydroxyurea (HYDREA) 500 MG chemo capsule Take 2 capsules by mouth daily  Patient taking differently: Take 1,000 mg by mouth daily 500 mg qod Yes Danita Holter, MD   omeprazole (PRILOSEC) 20 MG capsule Take 20 mg by mouth daily.    Yes Historical Provider, MD        Immunization History   Administered Date(s) Administered    Influenza Vaccine, unspecified formulation 2016    Pneumococcal Conjugate Vaccine 01/01/2012        Past Surgical History:   Procedure Laterality Date    APPENDECTOMY      CHOLECYSTECTOMY      DIAGNOSTIC CARDIAC CATH LAB PROCEDURE Bilateral 06/05/2017    HYSTERECTOMY, TOTAL ABDOMINAL      NERVE BLOCK  06/03/15    lumbar epidural #1    NERVE BLOCK  6 22 15    lumbar epidural #2    NERVE BLOCK N/A 7/6/15    lumbar epidural #3    TONSILLECTOMY         Social History     Socioeconomic History    Marital status:      Spouse name: Not on file    Number of children: Not on file    Years of education: Not on file    Highest education level: Not on file   Occupational History    Not on file   Social Needs    Financial resource strain: Not on file    Food insecurity     Worry: Not on file     Inability: Not on file    Transportation needs     Medical: Not on file     Non-medical: Not on file   Tobacco Use    Smoking status: Never Smoker    Smokeless tobacco: Never Used   Substance and Sexual Activity    Alcohol use: No    Drug use: No    Sexual activity: Never   Lifestyle    Physical activity     Days per week: Not on file     Minutes per session: Not on file    Stress: Not on file   Relationships    Social connections     Talks on phone: Not on file     Gets together: Not on file     Attends Shinto service: Not on file     Active member of club or organization: Not on file     Attends meetings of clubs or organizations: Not on file     Relationship status: Not on file    Intimate partner violence     Fear of current or ex partner: Not on file     Emotionally abused: Not on file     Physically abused: Not on file     Forced sexual activity: Not on file   Other Topics Concern    Not on file   Social History Narrative    ** Merged History Encounter **             Family History   Problem Relation Age of Onset    Asthma Father     Hypertension Brother     Cancer Maternal Grandmother        Review of Systems   Constitutional: Negative. HENT: Negative.     Eyes: Cataract surgery 7- by Dr Lisandra Vaca. Respiratory:        Bilateral PE 2017, on warfarin, monitored by Dr Elroy Ansari. Cardiovascular:        LBBB found in 2017. Gastrointestinal:        GB 1982. GERD managed with TUMS. Hiatal hernia. Endocrine: Negative. Genitourinary: Positive for frequency. Musculoskeletal: Positive for back pain. Skin: Negative. Allergic/Immunologic: Negative. Neurological: Negative. Hematological: Bruises/bleeds easily. Psychiatric/Behavioral: Negative. Vitals:    01/19/21 1320   BP: (!) 145/65   Site: Left Upper Arm   Position: Sitting   Cuff Size: Medium Adult   Pulse: 63   Temp: 97.1 °F (36.2 °C)   TempSrc: Temporal   Weight: 150 lb (68 kg)   Height: 5' 5\" (1.651 m)       Estimated body mass index is 24.96 kg/m² as calculated from the following:    Height as of this encounter: 5' 5\" (1.651 m). Weight as of this encounter: 150 lb (68 kg). BP Readings from Last 3 Encounters:   01/19/21 (!) 145/65   10/22/20 (!) 150/68   07/19/17 122/74        Physical Exam  Constitutional:       Appearance: Normal appearance. HENT:      Head: Normocephalic and atraumatic. Right Ear: Tympanic membrane, ear canal and external ear normal.      Left Ear: Tympanic membrane, ear canal and external ear normal.      Nose: Nose normal.      Mouth/Throat:      Mouth: Mucous membranes are moist.      Pharynx: Oropharynx is clear. Eyes:      Extraocular Movements: Extraocular movements intact. Pupils: Pupils are equal, round, and reactive to light. Cardiovascular:      Rate and Rhythm: Normal rate and regular rhythm. Pulses: Normal pulses. Heart sounds: Normal heart sounds. Pulmonary:      Effort: Pulmonary effort is normal.      Breath sounds: Normal breath sounds. Abdominal:      General: Bowel sounds are normal.      Palpations: Abdomen is soft. Musculoskeletal:      Comments: Increased kyphosis.    Skin:     General: Skin is warm and dry.      Capillary Refill: Capillary refill takes less than 2 seconds. Neurological:      Mental Status: She is alert and oriented to person, place, and time. Psychiatric:         Mood and Affect: Mood normal.         Behavior: Behavior normal.         ASSESSMENT/PLAN:  Pepe Martinez was seen today for new patient. Diagnoses and all orders for this visit:    Establishing care with new doctor, encounter for         Return in about 6 months (around 7/19/2021). An  electronic signature was used to authenticate this note.     --Lali Salmeron DO on 1/19/2021 at 3:01 PM

## 2021-01-25 ENCOUNTER — IMMUNIZATION (OUTPATIENT)
Dept: PRIMARY CARE CLINIC | Age: 81
End: 2021-01-25
Payer: MEDICARE

## 2021-01-25 DIAGNOSIS — Z23 NEED FOR VACCINATION: Primary | ICD-10-CM

## 2021-01-25 PROCEDURE — 91301 COVID-19, MODERNA VACCINE 100MCG/0.5ML DOSE: CPT | Performed by: NURSE PRACTITIONER

## 2021-01-25 PROCEDURE — 0011A COVID-19, MODERNA VACCINE 100MCG/0.5ML DOSE: CPT | Performed by: NURSE PRACTITIONER

## 2021-02-03 RX ORDER — METOPROLOL TARTRATE 50 MG/1
50 TABLET, FILM COATED ORAL 2 TIMES DAILY
Qty: 60 TABLET | Refills: 3 | Status: CANCELLED | OUTPATIENT
Start: 2021-02-03

## 2021-02-04 DIAGNOSIS — E03.8 OTHER SPECIFIED HYPOTHYROIDISM: ICD-10-CM

## 2021-02-04 DIAGNOSIS — I10 ESSENTIAL HYPERTENSION: Primary | ICD-10-CM

## 2021-02-04 DIAGNOSIS — E55.9 VITAMIN D DEFICIENCY: ICD-10-CM

## 2021-02-04 DIAGNOSIS — I10 HYPERTENSION, UNSPECIFIED TYPE: ICD-10-CM

## 2021-02-04 DIAGNOSIS — E78.5 HYPERLIPIDEMIA, UNSPECIFIED HYPERLIPIDEMIA TYPE: ICD-10-CM

## 2021-02-04 DIAGNOSIS — Z76.89 ESTABLISHING CARE WITH NEW DOCTOR, ENCOUNTER FOR: ICD-10-CM

## 2021-02-04 LAB
ALBUMIN SERPL-MCNC: 4.5 G/DL (ref 3.5–5.2)
ALP BLD-CCNC: 54 U/L (ref 35–104)
ALT SERPL-CCNC: 12 U/L (ref 0–32)
ANION GAP SERPL CALCULATED.3IONS-SCNC: 10 MMOL/L (ref 7–16)
AST SERPL-CCNC: 29 U/L (ref 0–31)
BILIRUB SERPL-MCNC: 0.8 MG/DL (ref 0–1.2)
BUN BLDV-MCNC: 26 MG/DL (ref 8–23)
CALCIUM SERPL-MCNC: 9.2 MG/DL (ref 8.6–10.2)
CHLORIDE BLD-SCNC: 99 MMOL/L (ref 98–107)
CHOLESTEROL, FASTING: 160 MG/DL (ref 0–199)
CO2: 24 MMOL/L (ref 22–29)
CREAT SERPL-MCNC: 1.1 MG/DL (ref 0.5–1)
GFR AFRICAN AMERICAN: 58
GFR NON-AFRICAN AMERICAN: 48 ML/MIN/1.73
GLUCOSE FASTING: 92 MG/DL (ref 74–99)
HDLC SERPL-MCNC: 33 MG/DL
LDL CHOLESTEROL CALCULATED: 93 MG/DL (ref 0–99)
POTASSIUM SERPL-SCNC: 5 MMOL/L (ref 3.5–5)
SODIUM BLD-SCNC: 133 MMOL/L (ref 132–146)
TOTAL PROTEIN: 5.9 G/DL (ref 6.4–8.3)
TRIGLYCERIDE, FASTING: 170 MG/DL (ref 0–149)
TSH SERPL DL<=0.05 MIU/L-ACNC: 4.22 UIU/ML (ref 0.27–4.2)
VITAMIN D 25-HYDROXY: 10 NG/ML (ref 30–100)
VLDLC SERPL CALC-MCNC: 34 MG/DL

## 2021-02-04 RX ORDER — METOPROLOL TARTRATE 50 MG/1
50 TABLET, FILM COATED ORAL 2 TIMES DAILY
Qty: 60 TABLET | Refills: 3 | Status: SHIPPED
Start: 2021-02-04 | End: 2021-09-17 | Stop reason: SDUPTHER

## 2021-02-10 ENCOUNTER — TELEPHONE (OUTPATIENT)
Dept: FAMILY MEDICINE CLINIC | Age: 81
End: 2021-02-10

## 2021-02-10 NOTE — TELEPHONE ENCOUNTER
Patient called and wanted to know the results of her lab work. Please contact the patient with results.

## 2021-02-15 ENCOUNTER — TELEPHONE (OUTPATIENT)
Dept: FAMILY MEDICINE CLINIC | Age: 81
End: 2021-02-15

## 2021-02-16 ENCOUNTER — TELEPHONE (OUTPATIENT)
Dept: ADMINISTRATIVE | Age: 81
End: 2021-02-16

## 2021-02-16 DIAGNOSIS — Z20.822 COVID-19 VIRUS TEST RESULT UNKNOWN: ICD-10-CM

## 2021-02-16 DIAGNOSIS — E77.8 HYPOPROTEINEMIA (HCC): ICD-10-CM

## 2021-02-16 DIAGNOSIS — R79.89 AZOTEMIA: Primary | ICD-10-CM

## 2021-02-16 DIAGNOSIS — E55.9 VITAMIN D DEFICIENCY: ICD-10-CM

## 2021-02-16 NOTE — TELEPHONE ENCOUNTER
Patient is getting her 2nd Covid Vaccine on 02/22. She stated she was to get labs after this vaccine, She has some questions when she is to get them and if she is to be seen after this Vaccin. Please contact patient.

## 2021-02-22 ENCOUNTER — IMMUNIZATION (OUTPATIENT)
Dept: PRIMARY CARE CLINIC | Age: 81
End: 2021-02-22
Payer: MEDICARE

## 2021-02-22 PROCEDURE — 0012A COVID-19, MODERNA VACCINE 100MCG/0.5ML DOSE: CPT | Performed by: NURSE PRACTITIONER

## 2021-02-22 PROCEDURE — 91301 COVID-19, MODERNA VACCINE 100MCG/0.5ML DOSE: CPT | Performed by: NURSE PRACTITIONER

## 2021-03-03 LAB
BASOPHILS ABSOLUTE: 2 /ΜL
BASOPHILS RELATIVE PERCENT: 414 %
EOSINOPHILS ABSOLUTE: 1 /ΜL
EOSINOPHILS RELATIVE PERCENT: 207 %
HCT VFR BLD CALC: 25.7 % (ref 36–46)
HEMOGLOBIN: 8.3 G/DL (ref 12–16)
LYMPHOCYTES ABSOLUTE: 13 /ΜL
LYMPHOCYTES RELATIVE PERCENT: 2691 %
MCH RBC QN AUTO: 34.4 PG
MCHC RBC AUTO-ENTMCNC: 32.3 G/DL
MCV RBC AUTO: 106.6 FL
MONOCYTES ABSOLUTE: 7 /ΜL
MONOCYTES RELATIVE PERCENT: 1449 %
NEUTROPHILS ABSOLUTE: 60 /ΜL
NEUTROPHILS RELATIVE PERCENT: ABNORMAL %
PDW BLD-RTO: 20.7 %
PLATELET # BLD: 196 K/ΜL
PMV BLD AUTO: 12 FL
RBC # BLD: 2.41 10^6/ΜL
WBC # BLD: 20.7 10^3/ML

## 2021-03-16 DIAGNOSIS — E55.9 VITAMIN D DEFICIENCY: ICD-10-CM

## 2021-03-16 DIAGNOSIS — E77.8 HYPOPROTEINEMIA (HCC): ICD-10-CM

## 2021-03-16 DIAGNOSIS — R79.89 AZOTEMIA: ICD-10-CM

## 2021-03-16 DIAGNOSIS — Z20.822 COVID-19 VIRUS TEST RESULT UNKNOWN: ICD-10-CM

## 2021-03-16 LAB
ALBUMIN SERPL-MCNC: 4.6 G/DL (ref 3.5–5.2)
ALP BLD-CCNC: 47 U/L (ref 35–104)
ALT SERPL-CCNC: 11 U/L (ref 0–32)
ANION GAP SERPL CALCULATED.3IONS-SCNC: 18 MMOL/L (ref 7–16)
AST SERPL-CCNC: 42 U/L (ref 0–31)
BILIRUB SERPL-MCNC: 0.5 MG/DL (ref 0–1.2)
BUN BLDV-MCNC: 30 MG/DL (ref 8–23)
CALCIUM SERPL-MCNC: 9 MG/DL (ref 8.6–10.2)
CHLORIDE BLD-SCNC: 100 MMOL/L (ref 98–107)
CO2: 18 MMOL/L (ref 22–29)
CREAT SERPL-MCNC: 1.2 MG/DL (ref 0.5–1)
GFR AFRICAN AMERICAN: 52
GFR NON-AFRICAN AMERICAN: 43 ML/MIN/1.73
GLUCOSE FASTING: 76 MG/DL (ref 74–99)
POTASSIUM SERPL-SCNC: 4.9 MMOL/L (ref 3.5–5)
SARS-COV-2 ANTIBODY, TOTAL: NORMAL
SODIUM BLD-SCNC: 136 MMOL/L (ref 132–146)
TOTAL PROTEIN: 6 G/DL (ref 6.4–8.3)
VITAMIN D 25-HYDROXY: 17 NG/ML (ref 30–100)

## 2021-03-17 ENCOUNTER — TELEPHONE (OUTPATIENT)
Dept: FAMILY MEDICINE CLINIC | Age: 81
End: 2021-03-17

## 2021-03-17 DIAGNOSIS — R74.01 TRANSAMINITIS: Primary | ICD-10-CM

## 2021-03-17 NOTE — TELEPHONE ENCOUNTER
Patient called for lab results she was concerned about the covid antibodies she had both vaccines the first one in January and the second one in February And it says non-reactive.  Please advise     Last seen 1/19/2021  Next appt 7/20/2021

## 2021-04-07 DIAGNOSIS — Z20.822 COVID-19 VIRUS TEST RESULT UNKNOWN: Primary | ICD-10-CM

## 2021-04-07 NOTE — TELEPHONE ENCOUNTER
Patient called to ask when she is supposed to have further lab work done relating to her COVID Antibodies. Patient stated she thought Dr. Horne Hopes wanted her to have labs done again in one month, but she had not heard back from the ofc. Informed patient the only lab order I see is for Hepatic Function Panel. Patient is asking why this was ordered. Please advise.      Last seen 1/19/2021  Next appt 7/20/2021

## 2021-05-24 RX ORDER — LEVOTHYROXINE SODIUM 88 UG/1
88 TABLET ORAL DAILY
Qty: 90 TABLET | Refills: 1 | Status: SHIPPED
Start: 2021-05-24 | End: 2021-07-20 | Stop reason: SDUPTHER

## 2021-07-01 RX ORDER — OMEPRAZOLE 20 MG/1
20 CAPSULE, DELAYED RELEASE ORAL DAILY
Qty: 90 CAPSULE | Refills: 0 | Status: SHIPPED
Start: 2021-07-01 | End: 2021-07-20 | Stop reason: SDUPTHER

## 2021-07-20 ENCOUNTER — OFFICE VISIT (OUTPATIENT)
Dept: FAMILY MEDICINE CLINIC | Age: 81
End: 2021-07-20
Payer: MEDICARE

## 2021-07-20 VITALS
HEART RATE: 81 BPM | WEIGHT: 168 LBS | BODY MASS INDEX: 27.99 KG/M2 | SYSTOLIC BLOOD PRESSURE: 138 MMHG | HEIGHT: 65 IN | RESPIRATION RATE: 20 BRPM | TEMPERATURE: 96.8 F | OXYGEN SATURATION: 98 % | DIASTOLIC BLOOD PRESSURE: 80 MMHG

## 2021-07-20 DIAGNOSIS — E55.9 VITAMIN D DEFICIENCY: ICD-10-CM

## 2021-07-20 DIAGNOSIS — E03.9 HYPOTHYROIDISM, UNSPECIFIED TYPE: ICD-10-CM

## 2021-07-20 DIAGNOSIS — E78.5 HYPERLIPIDEMIA, UNSPECIFIED HYPERLIPIDEMIA TYPE: ICD-10-CM

## 2021-07-20 DIAGNOSIS — K21.00 GASTROESOPHAGEAL REFLUX DISEASE WITH ESOPHAGITIS WITHOUT HEMORRHAGE: Primary | ICD-10-CM

## 2021-07-20 DIAGNOSIS — R74.01 TRANSAMINITIS: ICD-10-CM

## 2021-07-20 DIAGNOSIS — E03.8 OTHER SPECIFIED HYPOTHYROIDISM: ICD-10-CM

## 2021-07-20 PROCEDURE — 99213 OFFICE O/P EST LOW 20 MIN: CPT | Performed by: FAMILY MEDICINE

## 2021-07-20 RX ORDER — LEVOTHYROXINE SODIUM 88 UG/1
88 TABLET ORAL DAILY
Qty: 90 TABLET | Refills: 1 | Status: SHIPPED
Start: 2021-07-20 | End: 2021-11-04

## 2021-07-20 RX ORDER — OMEPRAZOLE 20 MG/1
20 CAPSULE, DELAYED RELEASE ORAL DAILY
Qty: 90 CAPSULE | Refills: 0 | Status: SHIPPED
Start: 2021-07-20 | End: 2021-12-08

## 2021-07-20 SDOH — ECONOMIC STABILITY: FOOD INSECURITY: WITHIN THE PAST 12 MONTHS, THE FOOD YOU BOUGHT JUST DIDN'T LAST AND YOU DIDN'T HAVE MONEY TO GET MORE.: NEVER TRUE

## 2021-07-20 SDOH — ECONOMIC STABILITY: FOOD INSECURITY: WITHIN THE PAST 12 MONTHS, YOU WORRIED THAT YOUR FOOD WOULD RUN OUT BEFORE YOU GOT MONEY TO BUY MORE.: NEVER TRUE

## 2021-07-20 ASSESSMENT — ENCOUNTER SYMPTOMS
BACK PAIN: 1
ALLERGIC/IMMUNOLOGIC NEGATIVE: 1

## 2021-07-20 ASSESSMENT — SOCIAL DETERMINANTS OF HEALTH (SDOH): HOW HARD IS IT FOR YOU TO PAY FOR THE VERY BASICS LIKE FOOD, HOUSING, MEDICAL CARE, AND HEATING?: NOT HARD AT ALL

## 2021-07-20 NOTE — PROGRESS NOTES
2021    Cezar Ward (:  1940) is a 78 y.o. female, here for evaluation of the following medical concerns:  Chief Complaint   Patient presents with    6 Month Follow-Up       HPI    1. Gastroesophageal reflux disease with esophagitis without hemorrhage  -     omeprazole (PRILOSEC) 20 MG delayed release capsule; Take 1 capsule by mouth daily, Disp-90 capsule, R-0Normal  2. Other specified hypothyroidism  -     levothyroxine (SYNTHROID) 88 MCG tablet; Take 1 tablet by mouth Daily, Disp-90 tablet, R-1Normal        Allergies   Allergen Reactions    Clonidine Derivatives Anaphylaxis    Medrol [Methylprednisolone] Other (See Comments)     unknown    Naproxen Nausea Only    Other      oruvall    Atarax [Hydroxyzine] Palpitations       Prior to Visit Medications    Medication Sig Taking? Authorizing Provider   levothyroxine (SYNTHROID) 88 MCG tablet Take 1 tablet by mouth Daily  Brunilda Thao DO   JAKAFI 10 MG tablet 3 times daily  Yes Historical Provider, MD   omeprazole (PRILOSEC) 20 MG delayed release capsule Take 1 capsule by mouth daily  Milagros Tesfaye DO   metoprolol tartrate (LOPRESSOR) 50 MG tablet Take 1 tablet by mouth 2 times daily  Milagros Tesfaye DO   folic acid (FOLVITE) 1 MG tablet take 1 tablet by mouth once daily  Historical Provider, MD   warfarin (COUMADIN) 1 MG tablet take 1 AND 1/2 tablets by mouth once daily for 2 days and 1 table. ..  (REFER TO PRESCRIPTION NOTES).   Historical Provider, MD   warfarin (COUMADIN) 3 MG tablet take 1 tablet by mouth daily  Historical Provider, MD   olmesartan-hydrochlorothiazide (BENICAR HCT) 40-25 MG per tablet Take 1 tablet by mouth daily  Historical Provider, MD   hydroxyurea (HYDREA) 500 MG chemo capsule Take 2 capsules by mouth daily  Patient taking differently: Take 1,000 mg by mouth daily 500 mg qod  Eliezer Chen MD        Immunization History   Administered Date(s) Administered    COVID-19, Moderna, PF, 100mcg/0.5mL 01/25/2021, 02/22/2021    Influenza Vaccine, unspecified formulation 09/01/2016    Pneumococcal Conjugate Vaccine 01/01/2012        Past Surgical History:   Procedure Laterality Date    APPENDECTOMY      CHOLECYSTECTOMY      DIAGNOSTIC CARDIAC CATH LAB PROCEDURE Bilateral 06/05/2017    HYSTERECTOMY, TOTAL ABDOMINAL      NERVE BLOCK  06/03/15    lumbar epidural #1    NERVE BLOCK  6 22 15    lumbar epidural #2    NERVE BLOCK N/A 7/6/15    lumbar epidural #3    TONSILLECTOMY         Social History     Socioeconomic History    Marital status:      Spouse name: Not on file    Number of children: Not on file    Years of education: Not on file    Highest education level: Not on file   Occupational History    Not on file   Tobacco Use    Smoking status: Never Smoker    Smokeless tobacco: Never Used   Vaping Use    Vaping Use: Never used   Substance and Sexual Activity    Alcohol use: No    Drug use: No    Sexual activity: Never   Other Topics Concern    Not on file   Social History Narrative    ** Merged History Encounter **          Social Determinants of Health     Financial Resource Strain: Low Risk     Difficulty of Paying Living Expenses: Not hard at all   Food Insecurity: No Food Insecurity    Worried About Running Out of Food in the Last Year: Never true    [address] in the Last Year: Never true   Transportation Needs:     Lack of Transportation (Medical):      Lack of Transportation (Non-Medical):    Physical Activity:     Days of Exercise per Week:     Minutes of Exercise per Session:    Stress:     Feeling of Stress :    Social Connections:     Frequency of Communication with Friends and Family:     Frequency of Social Gatherings with Friends and Family:     Attends Mu-ism Services:     Active Member of Clubs or Organizations:     Attends Club or Organization Meetings:     Marital Status:    Intimate Partner Violence:     Fear of Current or Ex-Partner:     Emotionally Abused:     Physically Abused:     Sexually Abused:         Family History   Problem Relation Age of Onset    Asthma Father     Hypertension Brother     Cancer Maternal Grandmother        Review of Systems   Constitutional: Negative. HENT: Positive for hearing loss. Eyes:        Cataract surgery 7- by Dr Jatin Junior. Respiratory:        Bilateral PE 2017, on warfarin, monitored by Dr Nina Yates. Cardiovascular:        LBBB found in 2017. Gastrointestinal:        GB 1982. GERD managed with TUMS. Hiatal hernia. Last colonoscopy: 10 years clear. Endocrine: Negative. Genitourinary: Positive for frequency. Last PAP smear: age 51-62  Last mammo: 2 years ago. Musculoskeletal: Positive for back pain. Skin: Negative. Allergic/Immunologic: Negative. Neurological: Negative. Hematological: Bruises/bleeds easily. Psychiatric/Behavioral: Negative. Vitals:    07/20/21 1230   BP: 138/80   Pulse: 81   Resp: 20   Temp: 96.8 °F (36 °C)   SpO2: 98%   Weight: 168 lb (76.2 kg)   Height: 5' 5\" (1.651 m)       Estimated body mass index is 27.96 kg/m² as calculated from the following:    Height as of this encounter: 5' 5\" (1.651 m). Weight as of this encounter: 168 lb (76.2 kg). BP Readings from Last 3 Encounters:   07/20/21 138/80   01/19/21 (!) 145/65   10/22/20 (!) 150/68        Physical Exam  Vitals and nursing note reviewed. Constitutional:       General: She is not in acute distress. Appearance: Normal appearance. She is obese. She is not ill-appearing, toxic-appearing or diaphoretic. HENT:      Head: Normocephalic and atraumatic. Right Ear: Tympanic membrane, ear canal and external ear normal. There is no impacted cerumen. Left Ear: Tympanic membrane, ear canal and external ear normal. There is no impacted cerumen. Nose: Nose normal. No congestion or rhinorrhea.       Mouth/Throat:      Mouth: Mucous membranes are moist.      Pharynx: Oropharynx is clear. No oropharyngeal exudate or posterior oropharyngeal erythema. Eyes:      General: No scleral icterus. Right eye: No discharge. Left eye: No discharge. Extraocular Movements: Extraocular movements intact. Conjunctiva/sclera: Conjunctivae normal.      Pupils: Pupils are equal, round, and reactive to light. Neck:      Vascular: No carotid bruit. Cardiovascular:      Rate and Rhythm: Normal rate and regular rhythm. Pulses: Normal pulses. Heart sounds: Normal heart sounds. No murmur heard. No friction rub. No gallop. Pulmonary:      Effort: Pulmonary effort is normal. No respiratory distress. Breath sounds: Normal breath sounds. No stridor. No wheezing, rhonchi or rales. Abdominal:      General: Bowel sounds are normal. There is no distension. Palpations: Abdomen is soft. There is no mass. Tenderness: There is no abdominal tenderness. There is no right CVA tenderness, left CVA tenderness, guarding or rebound. Musculoskeletal:         General: No swelling, tenderness, deformity or signs of injury. Normal range of motion. Cervical back: Normal range of motion and neck supple. No rigidity. No muscular tenderness. Right lower leg: No edema. Left lower leg: No edema. Comments: Increased kyphosis. Lymphadenopathy:      Cervical: No cervical adenopathy. Skin:     General: Skin is warm and dry. Capillary Refill: Capillary refill takes less than 2 seconds. Coloration: Skin is not jaundiced. Findings: No lesion or rash. Neurological:      General: No focal deficit present. Mental Status: She is alert and oriented to person, place, and time. Cranial Nerves: No cranial nerve deficit. Sensory: No sensory deficit. Motor: No weakness.       Coordination: Coordination normal.      Deep Tendon Reflexes: Reflexes normal.   Psychiatric:         Mood and Affect: Mood normal.         Behavior: Behavior normal.         ASSESSMENT/PLAN:  Ruddy Perrin was seen today for 6 month follow-up. Diagnoses and all orders for this visit:    Gastroesophageal reflux disease with esophagitis without hemorrhage    Other specified hypothyroidism         Return in about 6 months (around 1/20/2022). An  electronic signature was used to authenticate this note.     --Arlette Nowak DO on 7/20/2021 at 12:52 PM

## 2021-08-18 ENCOUNTER — TELEPHONE (OUTPATIENT)
Dept: FAMILY MEDICINE CLINIC | Age: 81
End: 2021-08-18

## 2021-08-18 NOTE — TELEPHONE ENCOUNTER
----- Message from Karin Mcgowan sent at 8/18/2021 12:09 PM EDT -----  Subject: Message to Provider    QUESTIONS  Information for Provider? Patient called stating that Dr. Francis Masters agrees   that she can have the covid booster shot, Dr. Genet Thakkar did recommend that   she get it   ---------------------------------------------------------------------------  --------------  CALL BACK INFO  What is the best way for the office to contact you? OK to leave message on   voicemail  Preferred Call Back Phone Number? [phone]  ---------------------------------------------------------------------------  --------------  SCRIPT ANSWERS  Relationship to Patient?  Self

## 2021-09-17 DIAGNOSIS — I10 ESSENTIAL HYPERTENSION: ICD-10-CM

## 2021-09-20 RX ORDER — METOPROLOL TARTRATE 50 MG/1
50 TABLET, FILM COATED ORAL 2 TIMES DAILY
Qty: 60 TABLET | Refills: 1 | Status: SHIPPED
Start: 2021-09-20 | End: 2021-10-11 | Stop reason: DRUGHIGH

## 2021-09-20 NOTE — TELEPHONE ENCOUNTER
I will prescribe a 30 day supply of this prescription. Patient needs to schedule an office visit prior to refills for this or any other medications. Visit can be face to face or virtual (video).   Patient may need lab work prior to visit

## 2021-10-02 ENCOUNTER — APPOINTMENT (OUTPATIENT)
Dept: CT IMAGING | Age: 81
End: 2021-10-02
Payer: MEDICARE

## 2021-10-02 ENCOUNTER — HOSPITAL ENCOUNTER (EMERGENCY)
Age: 81
Discharge: HOME | End: 2021-10-02
Attending: EMERGENCY MEDICINE
Payer: MEDICARE

## 2021-10-02 VITALS
WEIGHT: 156 LBS | SYSTOLIC BLOOD PRESSURE: 201 MMHG | TEMPERATURE: 98.4 F | BODY MASS INDEX: 25.96 KG/M2 | OXYGEN SATURATION: 95 % | RESPIRATION RATE: 16 BRPM | HEART RATE: 64 BPM | DIASTOLIC BLOOD PRESSURE: 81 MMHG

## 2021-10-02 DIAGNOSIS — S01.511A LIP LACERATION, INITIAL ENCOUNTER: Primary | ICD-10-CM

## 2021-10-02 DIAGNOSIS — K08.89 LOOSE TOOTH DUE TO TRAUMA: ICD-10-CM

## 2021-10-02 LAB
APTT: 32.7 SEC (ref 24.5–35.1)
INR BLD: 1.5
PROTHROMBIN TIME: 17.5 SEC (ref 9.3–12.4)

## 2021-10-02 PROCEDURE — 85730 THROMBOPLASTIN TIME PARTIAL: CPT

## 2021-10-02 PROCEDURE — 72125 CT NECK SPINE W/O DYE: CPT

## 2021-10-02 PROCEDURE — 90471 IMMUNIZATION ADMIN: CPT | Performed by: EMERGENCY MEDICINE

## 2021-10-02 PROCEDURE — 70450 CT HEAD/BRAIN W/O DYE: CPT

## 2021-10-02 PROCEDURE — 99283 EMERGENCY DEPT VISIT LOW MDM: CPT

## 2021-10-02 PROCEDURE — 85610 PROTHROMBIN TIME: CPT

## 2021-10-02 PROCEDURE — 12013 RPR F/E/E/N/L/M 2.6-5.0 CM: CPT

## 2021-10-02 PROCEDURE — 6360000002 HC RX W HCPCS: Performed by: EMERGENCY MEDICINE

## 2021-10-02 PROCEDURE — 90715 TDAP VACCINE 7 YRS/> IM: CPT | Performed by: EMERGENCY MEDICINE

## 2021-10-02 RX ORDER — CEPHALEXIN 500 MG/1
500 CAPSULE ORAL 4 TIMES DAILY
Qty: 28 CAPSULE | Refills: 0 | Status: SHIPPED | OUTPATIENT
Start: 2021-10-02 | End: 2021-10-09

## 2021-10-02 RX ADMIN — TETANUS TOXOID, REDUCED DIPHTHERIA TOXOID AND ACELLULAR PERTUSSIS VACCINE, ADSORBED 0.5 ML: 5; 2.5; 8; 8; 2.5 SUSPENSION INTRAMUSCULAR at 12:18

## 2021-10-02 ASSESSMENT — PAIN DESCRIPTION - PAIN TYPE: TYPE: ACUTE PAIN

## 2021-10-02 ASSESSMENT — PAIN DESCRIPTION - PROGRESSION: CLINICAL_PROGRESSION: NOT CHANGED

## 2021-10-02 ASSESSMENT — PAIN DESCRIPTION - LOCATION: LOCATION: TEETH;MOUTH

## 2021-10-02 ASSESSMENT — PAIN DESCRIPTION - FREQUENCY: FREQUENCY: CONTINUOUS

## 2021-10-02 ASSESSMENT — PAIN DESCRIPTION - ONSET: ONSET: ON-GOING

## 2021-10-02 ASSESSMENT — PAIN DESCRIPTION - DESCRIPTORS: DESCRIPTORS: BURNING;CONSTANT;DISCOMFORT

## 2021-10-02 ASSESSMENT — PAIN DESCRIPTION - ORIENTATION: ORIENTATION: RIGHT;LEFT;LOWER;UPPER

## 2021-10-02 NOTE — ED NOTES
Up and ambulated to BR with stand by assist. Denied any dizziness, steady gait.       Rohan Guerrier RN  10/02/21 1117

## 2021-10-02 NOTE — ED NOTES
Sutures completed to upper lip/mouth. Discharge instructions with Rx reviewed,patient verbalized understanding.      Ashley Villatoro, RN  10/02/21 7728

## 2021-10-02 NOTE — ED PROVIDER NOTES
HPI:  10/2/21, Time: 10:51 AM EDT         Stiven Raygoza is a 78 y.o. female presenting to the ED for fall, beginning prior to arrival. The complaint has been intermittent, mild in severity, and worsened by nothing. Patient states that she was walking into the bank when she tripped due to a divot in the sidewalk. She states that she fell face forward. No loss of consciousness. Does have a laceration to the upper lip. Patient says that she is on Coumadin. She denies any vision changes, speech changes, numbness, tingling, weakness, chest pain, shortness of breath, abdominal pain. Patient does not know when her last tetanus was. Denies feeling lightheaded prior to the fall. Says that she tripped on the sidewalk. ROS:   Pertinent positives and negatives are stated within HPI, all other systems reviewed and are negative.  --------------------------------------------- PAST HISTORY ---------------------------------------------  Past Medical History:  has a past medical history of Back injury, Cervical spondylolysis, Chronic lymphoid leukemia ([facility] [address] Gallstones, H/O echocardiogram, History of bone density study, Hyperlipidemia, Hypertension, LBBB (left bundle branch block), Lumbar spondylosis with myelopathy, Pneumonia, Polycythemia vera ([facility] [address] Syncope and collapse, and Thyroid disease. Past Surgical History:  has a past surgical history that includes Tonsillectomy; Appendectomy; Nerve Block (06/03/15); Nerve Block (6 22 15); Nerve Block (N/A, 7/6/15); Diagnostic Cardiac Cath Lab Procedure (Bilateral, 06/05/2017); Cholecystectomy; and Hysterectomy, total abdominal.    Social History:  reports that she has never smoked. She has never used smokeless tobacco. She reports that she does not drink alcohol and does not use drugs. Family History: family history includes Asthma in her father; Cancer in her maternal grandmother; Hypertension in her brother.      The patients home medications have been reviewed. Allergies: Clonidine derivatives, Medrol [methylprednisolone], Naproxen, Other, and Atarax [hydroxyzine]    ---------------------------------------------------PHYSICAL EXAM--------------------------------------    Constitutional/General: Alert and oriented x3, well appearing, non toxic in NAD  Head: Normocephalic and atraumatic  Eyes: PERRL, EOMI  Mouth: Poor dentition. Dental caries. Oropharynx clear, handling secretions, no trismus. 2.5cm laceration above the upper lip. 2cm laceration to the top lip intraorally. Teeth number 8 and 9 are loose but intact. Neck: Supple, full ROM, non tender to palpation in the midline, no stridor, no crepitus, no meningeal signs  Pulmonary: Lungs clear to auscultation bilaterally, no wheezes, rales, or rhonchi. Not in respiratory distress  Cardiovascular:  Regular rate. Regular rhythm. No murmurs, gallops, or rubs. 2+ distal pulses  Chest: no chest wall tenderness  Abdomen: Soft. Non tender. Non distended. +BS. No rebound, guarding, or rigidity. No pulsatile masses appreciated. Musculoskeletal: Moves all extremities x 4. Warm and well perfused, no clubbing, cyanosis, or edema. Capillary refill <3 seconds  Skin: warm and dry. No rashes. Neurologic: GCS 15, CN 2-12 grossly intact, no focal deficits, symmetric strength 5/5 in the upper and lower extremities bilaterally  Psych: Normal Affect    -------------------------------------------------- RESULTS -------------------------------------------------  I have personally reviewed all laboratory and imaging results for this patient. Results are listed below. LABS:  Results for orders placed or performed during the hospital encounter of 10/02/21   Protime-INR   Result Value Ref Range    Protime 17.5 (H) 9.3 - 12.4 sec    INR 1.5    APTT   Result Value Ref Range    aPTT 32.7 24.5 - 35.1 sec       RADIOLOGY:  Interpreted by Radiologist.  CT HEAD WO CONTRAST   Final Result   1.  There is no acute intracranial abnormality. Specifically, there is no   intracranial hemorrhage. 2. Atrophy and periventricular leukomalacia,         CT CERVICAL SPINE WO CONTRAST   Final Result   1. There is no acute compression fracture or subluxation of the cervical   spine. 2. Significant multilevel degenerative disc and degenerative joint disease. EKG Interpretation  Interpreted by emergency department physician    Rhythm: normal sinus   Rate: normal  Axis: left  Conduction: left bundle branch block (complete)  ST Segments: nonspecific changes  T Waves: non specific changes    Clinical Impression: non-specific EKG  Comparison to prior EKG: stable as compared to patient's most recent EKG      ------------------------- NURSING NOTES AND VITALS REVIEWED ---------------------------   The nursing notes within the ED encounter and vital signs as below have been reviewed by myself. BP (!) 201/81   Pulse 64   Temp 98.4 °F (36.9 °C)   Resp 16   Wt 156 lb (70.8 kg)   SpO2 95%   BMI 25.96 kg/m²   Oxygen Saturation Interpretation: Normal    The patients available past medical records and past encounters were reviewed. ------------------------------ ED COURSE/MEDICAL DECISION MAKING----------------------  Medications   Tetanus-Diphth-Acell Pertussis (BOOSTRIX) injection 0.5 mL (0.5 mLs IntraMUSCular Given 10/2/21 1218)             Medical Decision Making:   CT of the head and cervical spine were obtained. No acute process identified. Patient's INR is 1.5. Unknown tetanus status. She was given a tetanus injection while in the emergency department. Patient's lacerations were closed. Intraoral laceration was large and gapping. This was closed using absorbable sutures. I started patient on Keflex prophylactically.   I told her to follow-up with a dentist regarding her loose teeth due to the trauma and to follow-up with her primary care physician regarding suture removal.  I instructed the patient to continue taking her medication as directed and to return to emergency department for any worsening symptoms. She is agreeable with plan of care. LACERATION REPAIR  PROCEDURE NOTE:  Unless otherwise indicated, this procedure was done or directly supervised by the ED attending. Laceration #: 1. Location: upper lip  Length: 2.5cm. The wound area was irrigated with sterile saline, cleansend with shur-clens and draped in a sterile fashion. The wound area was anesthetized with Lidocaine 1% without epinephrine. WOUND COMPLEXITY:    Debridement: partial thickness and None. Undermining: partial thickness and None. Wound Margins Revised: yes. Flaps Aligned: yes. The wound was explored with the following results No foreign bodies found, no foreign body or tendon injury seen. The wound was closed with 5-0 Prolene using interrupted sutures. Dressing:  bacitracin was placed. Total number suture: 6      LACERATION REPAIR  PROCEDURE NOTE:  Unless otherwise indicated, this procedure was done or directly supervised by the ED attending. Laceration #: 2. Location: wet mucosal surface of upper lip  Length: 2cm. The wound area was irrigated with sterile saline, cleansend with shur-clens and draped in a sterile fashion. The wound area was anesthetized with Lidocaine 1% without epinephrine. WOUND COMPLEXITY:    Debridement: partial thickness and None. Undermining: partial thickness and None. Wound Margins Revised: no.  Flaps Aligned: yes. The wound was explored with the following results No foreign bodies found, no foreign body or tendon injury seen. The wound was closed with 5-0 absorbable using interrupted sutures. Dressing:  none    Total number suture: 5    Re-Evaluations:             Re-evaluation.   Patients symptoms are improving        This patient's ED course included: a personal history and physicial examination, re-evaluation prior to disposition and a personal history and physicial eaxmination    This patient has remained hemodynamically stable during their ED course. Counseling: The emergency provider has spoken with the patient and discussed todays results, in addition to providing specific details for the plan of care and counseling regarding the diagnosis and prognosis. Questions are answered at this time and they are agreeable with the plan.       --------------------------------- IMPRESSION AND DISPOSITION ---------------------------------    IMPRESSION  1. Lip laceration, initial encounter    2. Loose tooth due to trauma        DISPOSITION  Disposition: Discharge to home  Patient condition is stable        NOTE: This report was transcribed using voice recognition software.  Every effort was made to ensure accuracy; however, inadvertent computerized transcription errors may be present          Luke Frank MD  10/02/21 [id number]

## 2021-10-02 NOTE — ED NOTES
Approx. 45 min ago patient was at bank and tripped on cement sidewalk, witnesses told son who was parking the car that she fell face first and hit head. Noted small laceration to upper lip with minimal amount of bleeding. Offered ice and refused. Did give guaze to put pressure to lip. She denies loosing any teeth but stated \"there loose\". Checked b/l knees arms/elbows and no skin alterations noted. No deformities of extremities, able to move arms and leg w/o difficulty. No hematoma or bruising noted to head. No laceration to head or face. No cervical step-off noted when palpated lightly. No complaints of cervical/spinal pain. Son at bedside.       Javier Delaney RN  10/02/21 [id number]

## 2021-10-05 ENCOUNTER — OFFICE VISIT (OUTPATIENT)
Dept: FAMILY MEDICINE CLINIC | Age: 81
End: 2021-10-05
Payer: MEDICARE

## 2021-10-05 VITALS
RESPIRATION RATE: 16 BRPM | DIASTOLIC BLOOD PRESSURE: 80 MMHG | HEART RATE: 73 BPM | TEMPERATURE: 98 F | SYSTOLIC BLOOD PRESSURE: 164 MMHG | BODY MASS INDEX: 27.32 KG/M2 | HEIGHT: 65 IN | WEIGHT: 164 LBS | OXYGEN SATURATION: 95 %

## 2021-10-05 DIAGNOSIS — I10 ESSENTIAL HYPERTENSION: ICD-10-CM

## 2021-10-05 DIAGNOSIS — S09.93XA DENTAL INJURY, INITIAL ENCOUNTER: ICD-10-CM

## 2021-10-05 DIAGNOSIS — E03.9 HYPOTHYROIDISM, UNSPECIFIED TYPE: ICD-10-CM

## 2021-10-05 DIAGNOSIS — Z23 FLU VACCINE NEED: ICD-10-CM

## 2021-10-05 DIAGNOSIS — S01.511A LACERATION OF FRENUM OF UPPER LIP, INITIAL ENCOUNTER: ICD-10-CM

## 2021-10-05 DIAGNOSIS — E78.5 HYPERLIPIDEMIA, UNSPECIFIED HYPERLIPIDEMIA TYPE: ICD-10-CM

## 2021-10-05 DIAGNOSIS — Z76.89 ENCOUNTER TO ESTABLISH CARE: Primary | ICD-10-CM
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
## 2021-10-05 PROCEDURE — G0008 ADMIN INFLUENZA VIRUS VAC: HCPCS | Performed by: FAMILY MEDICINE

## 2021-10-05 PROCEDURE — 90694 VACC AIIV4 NO PRSRV 0.5ML IM: CPT | Performed by: FAMILY MEDICINE

## 2021-10-05 PROCEDURE — 99213 OFFICE O/P EST LOW 20 MIN: CPT | Performed by: FAMILY MEDICINE

## 2021-10-05 SDOH — ECONOMIC STABILITY: INCOME INSECURITY: IN THE LAST 12 MONTHS, WAS THERE A TIME WHEN YOU WERE NOT ABLE TO PAY THE MORTGAGE OR RENT ON TIME?: NO

## 2021-10-05 SDOH — ECONOMIC STABILITY: HOUSING INSECURITY: IN THE LAST 12 MONTHS, HOW MANY PLACES HAVE YOU LIVED?: 1

## 2021-10-05 SDOH — ECONOMIC STABILITY: HOUSING INSECURITY
IN THE LAST 12 MONTHS, WAS THERE A TIME WHEN YOU DID NOT HAVE A STEADY PLACE TO SLEEP OR SLEPT IN A SHELTER (INCLUDING NOW)?: NO

## 2021-10-05 SDOH — ECONOMIC STABILITY: TRANSPORTATION INSECURITY
IN THE PAST 12 MONTHS, HAS THE LACK OF TRANSPORTATION KEPT YOU FROM MEDICAL APPOINTMENTS OR FROM GETTING MEDICATIONS?: NO

## 2021-10-05 SDOH — ECONOMIC STABILITY: TRANSPORTATION INSECURITY
IN THE PAST 12 MONTHS, HAS LACK OF TRANSPORTATION KEPT YOU FROM MEETINGS, WORK, OR FROM GETTING THINGS NEEDED FOR DAILY LIVING?: NO

## 2021-10-05 ASSESSMENT — LIFESTYLE VARIABLES
HOW OFTEN DO YOU HAVE A DRINK CONTAINING ALCOHOL: NEVER
HOW MANY STANDARD DRINKS CONTAINING ALCOHOL DO YOU HAVE ON A TYPICAL DAY: 1 OR 2

## 2021-10-05 NOTE — PATIENT INSTRUCTIONS
LOW SALT FOR BLOOD PRESSURE CONTROL. TAKE BENICAR/HCT 40/25 1 TAB. DAILY , AND LOPRESSOR 50 MG.1 TAB. 2 TIMES A DAY FOR BLOOD PRESSURE CONTROL. TAKE SYNTHROID 88 MCG. DAILY FOR THYROID DEFICIENCY. TAKE COUMADIN AS PER DR. PATRICIO RECOMMENDATION. REGULAR WALKING ADVISED. ADVISED WEIGHT REDUCTION. SEE ORAL SURGEON AS SCHEDULED. NEXT APPOINTMENT IN 1 WEEK FOR SUTURE REMOVAL.

## 2021-10-05 NOTE — PROGRESS NOTES
OFFICE PROGRESS NOTE      SUBJECTIVE:        Patient ID:   Darshan Johnson is a 78 y.o. female who presents for   Chief Complaint   Patient presents with   Camilo Solano Doctor     due to dr Evelyn Montes and has mouth damage    Dental Problem           HPI:     H/O FALL 3 DAYS AGO. SEEN AT [facility] ER. HAD  UPPER LIP LACERATION SUTURED. SOME TEETH LSO HAVE BECOME LOOSE. NEEDS TO SEE ORAL SURGEON. SEES  [place]  ABNORMAL BLOOD COUNT AND PE IN 2019. SHE MANAGES HER COUMADIN INTAKE. WATCHING DIET GOOD. WALKING SOME IN HOUSE FOR EXERCISE. TAKING MEDICATIONS REGULARLY. Prior to Admission medications    Medication Sig Start Date End Date Taking? Authorizing Provider   cephALEXin (KEFLEX) 500 MG capsule Take 1 capsule by mouth 4 times daily for 7 days 10/2/21 10/9/21 Yes Geneva Krueger MD   metoprolol tartrate (LOPRESSOR) 50 MG tablet Take 1 tablet by mouth 2 times daily 9/20/21 10/20/21 Yes Leonela Henao MD   omeprazole (PRILOSEC) 20 MG delayed release capsule Take 1 capsule by mouth daily 7/20/21  Yes Jarret Schmid DO   levothyroxine (SYNTHROID) 88 MCG tablet Take 1 tablet by mouth Daily 7/20/21  Yes Jarret Schmid DO   folic acid (FOLVITE) 1 MG tablet take 1 tablet by mouth once daily 12/30/20  Yes Historical Provider, MD LIU 10 MG tablet 3 times daily  12/28/20  Yes Historical Provider, MD   warfarin (COUMADIN) 1 MG tablet take 1 AND 1/2 tablets by mouth once daily for 2 days and 1 table. ..  (REFER TO PRESCRIPTION NOTES). 12/8/20  Yes Historical Provider, MD   warfarin (COUMADIN) 3 MG tablet take 1 tablet by mouth daily 1/11/21  Yes Historical Provider, MD   olmesartan-hydrochlorothiazide (BENICAR HCT) 40-25 MG per tablet Take 1 tablet by mouth daily   Yes Historical Provider, MD     Social History     Socioeconomic History    Marital status:       Spouse name: None    Number of children: None    Years of education: None    Highest education level: None   Occupational History    None   Tobacco Use    Smoking status: Never Smoker    Smokeless tobacco: Never Used   Vaping Use    Vaping Use: Never used   Substance and Sexual Activity    Alcohol use: No    Drug use: No    Sexual activity: Never   Other Topics Concern    None   Social History Narrative    ** Merged History Encounter **          Social Determinants of Health     Financial Resource Strain: Low Risk     Difficulty of Paying Living Expenses: Not hard at all   Food Insecurity: No Food Insecurity    Worried About Running Out of Food in the Last Year: Never true    Justin of Food in the Last Year: Never true   Transportation Needs: No Transportation Needs    Lack of Transportation (Medical): No    Lack of Transportation (Non-Medical):  No   Physical Activity:     Days of Exercise per Week:     Minutes of Exercise per Session:    Stress:     Feeling of Stress :    Social Connections:     Frequency of Communication with Friends and Family:     Frequency of Social Gatherings with Friends and Family:     Attends [employer] Services:     Active Member of Clubs or Organizations:     Attends Club or Organization Meetings:     Marital Status:    Intimate Partner Violence:     Fear of Current or Ex-Partner:     Emotionally Abused:     Physically Abused:     Sexually Abused:        I have reviewed Mecca's allergies, medications, problem list, medical, social and family history and have updated as needed in the electronic medical record  Review Of Systems:    Skin: no abnormal pigmentation, rash, scaling, itching, masses, hair or nail changes  Eyes: no blurring, diplopia, or eye pain  Ears/Nose/Throat: no hearing loss, tinnitus, vertigo, nosebleed, nasal congestion, rhinorrhea, sore throat  Respiratory: no cough, pleuritic chest pain, dyspnea, or wheezing  Cardiovascular: no chest pain, angina, dyspnea on exertion, intact. No joint effusion, tenderness, swelling or warmth  Neuro:  No focal motor or sensory deficits        ASSESSMENT     Patient Active Problem List    Diagnosis Date Noted    Protruded lumbar disc 06/03/2015    Lumbar radiculopathy 05/13/2015    Acute cystitis with hematuria 10/19/2020    Chronic pulmonary embolism ([facility] [address] 07/19/2017    Left bundle branch block 06/05/2017    Syncope 06/03/2017    Leukocytosis 06/03/2017    Troponin I above reference range 06/03/2017    Elevated brain natriuretic peptide (BNP) level 06/03/2017    Low back pain 05/13/2015    Facet syndrome, lumbar 05/13/2015    Compression fracture of L1 lumbar vertebra Old 05/13/2015    DDD (degenerative disc disease), lumbar 05/13/2015    Rotoscoliosis 05/13/2015    Lumbar sprain 05/13/2015    Lumbar strain 05/13/2015        Diagnosis:     ICD-10-CM    1. Encounter to establish care  Z76.89    2. Essential hypertension  I10    3. Hyperlipidemia, unspecified hyperlipidemia type  E78.5    4. Hypothyroidism, unspecified type  E03.9    5. Fall, initial encounter  [address] XXXA    6. Dental injury, initial encounter  S09. [address]XA MARY - Eda Ray DDS, Oral and Maxillofacial Surgery, [place]   7. Flu vaccine need  Z23 INFLUENZA, QUADV, ADJUVANTED, 65 YRS =, IM, PF, PREFILL SYR, 0.5ML (FLUAD)   8. Laceration of frenum of upper lip, initial encounter  S01.511A     SUTURED       PLAN:           Patient Instructions   LOW SALT FOR BLOOD PRESSURE CONTROL. TAKE BENICAR/HCT 40/25 1 TAB. DAILY , AND LOPRESSOR 50 MG.1 TAB. 2 TIMES A DAY FOR BLOOD PRESSURE CONTROL. TAKE SYNTHROID 88 MCG. DAILY FOR THYROID DEFICIENCY. TAKE COUMADIN AS PER DR. PATRICIO RECOMMENDATION. REGULAR WALKING ADVISED. ADVISED WEIGHT REDUCTION. SEE ORAL SURGEON AS SCHEDULED. NEXT APPOINTMENT IN 1 WEEK FOR SUTURE REMOVAL. Return in about 1 week (around 10/12/2021) for FOLLOW UP VISIT.          I have reviewed my findings and recommendations with Mecca ARMENTA Rosendo Shoemaker    Electronically signed by Kourtney Butler MD on 10/5/21 at 1:55 PM EDT

## 2021-10-11 ENCOUNTER — OFFICE VISIT (OUTPATIENT)
Dept: FAMILY MEDICINE CLINIC | Age: 81
End: 2021-10-11
Payer: MEDICARE

## 2021-10-11 VITALS
SYSTOLIC BLOOD PRESSURE: 172 MMHG | DIASTOLIC BLOOD PRESSURE: 80 MMHG | TEMPERATURE: 97.7 F | RESPIRATION RATE: 16 BRPM | HEIGHT: 65 IN | HEART RATE: 88 BPM | WEIGHT: 161 LBS | OXYGEN SATURATION: 93 % | BODY MASS INDEX: 26.82 KG/M2

## 2021-10-11 DIAGNOSIS — S01.511D: ICD-10-CM

## 2021-10-11 DIAGNOSIS — E78.5 HYPERLIPIDEMIA, UNSPECIFIED HYPERLIPIDEMIA TYPE: ICD-10-CM

## 2021-10-11 DIAGNOSIS — E03.9 HYPOTHYROIDISM, UNSPECIFIED TYPE: ICD-10-CM

## 2021-10-11 DIAGNOSIS — I10 ESSENTIAL HYPERTENSION: Primary | ICD-10-CM

## 2021-10-11 PROCEDURE — 99214 OFFICE O/P EST MOD 30 MIN: CPT | Performed by: FAMILY MEDICINE

## 2021-10-11 RX ORDER — METOPROLOL TARTRATE 100 MG/1
100 TABLET ORAL 2 TIMES DAILY
Qty: 60 TABLET | Refills: 3 | Status: SHIPPED
Start: 2021-10-11 | End: 2021-10-11 | Stop reason: SDUPTHER

## 2021-10-11 RX ORDER — METOPROLOL TARTRATE 100 MG/1
100 TABLET ORAL 2 TIMES DAILY
Qty: 60 TABLET | Refills: 0 | Status: SHIPPED
Start: 2021-10-11 | End: 2022-02-07 | Stop reason: SDUPTHER

## 2021-10-11 NOTE — PATIENT INSTRUCTIONS
LOW SALT FOR BLOOD PRESSURE CONTROL. TAKE BENICAR/HCT 40/25 1 TAB. DAILY  AND INCREASE LOPRESSOR  MG. TAB. 2 TIMES A DAY FOR BLOOD PRESSURE CONTROL. TAKE SYNTHROID 88 MCG. DAILY FOR THYROID DEFICIENCY. TAKE COUMADIN AS PER DR. PATRICIO RECOMMENDATION. REGULAR WALKING ADVISED. FACIAL SUTURES REMOVED. KEEP AREA DRY AND CLEAN. APPLY TRIPLE ANTIBIOTIC AFTER SHOWER AND NIGHTLY TILL HEALED. FASTING FOR LAB WORK ONE MORNING. NEXT APPOINTMENT IN 2 WEEKS.

## 2021-11-03 ENCOUNTER — OFFICE VISIT (OUTPATIENT)
Dept: FAMILY MEDICINE CLINIC | Age: 81
End: 2021-11-03
Payer: MEDICARE

## 2021-11-03 VITALS
OXYGEN SATURATION: 96 % | TEMPERATURE: 98 F | BODY MASS INDEX: 27.49 KG/M2 | HEART RATE: 84 BPM | HEIGHT: 65 IN | DIASTOLIC BLOOD PRESSURE: 80 MMHG | RESPIRATION RATE: 16 BRPM | WEIGHT: 165 LBS | SYSTOLIC BLOOD PRESSURE: 160 MMHG

## 2021-11-03 DIAGNOSIS — E55.9 VITAMIN D DEFICIENCY: ICD-10-CM

## 2021-11-03 DIAGNOSIS — E78.5 HYPERLIPIDEMIA, UNSPECIFIED HYPERLIPIDEMIA TYPE: ICD-10-CM

## 2021-11-03 DIAGNOSIS — Z00.00 ROUTINE GENERAL MEDICAL EXAMINATION AT A HEALTH CARE FACILITY: Primary | ICD-10-CM

## 2021-11-03 DIAGNOSIS — E03.9 HYPOTHYROIDISM, UNSPECIFIED TYPE: ICD-10-CM

## 2021-11-03 DIAGNOSIS — D75.1 POLYCYTHEMIA: ICD-10-CM

## 2021-11-03 DIAGNOSIS — I10 ESSENTIAL HYPERTENSION: ICD-10-CM

## 2021-11-03 PROCEDURE — G0438 PPPS, INITIAL VISIT: HCPCS | Performed by: FAMILY MEDICINE

## 2021-11-03 RX ORDER — WARFARIN SODIUM 5 MG/1
TABLET ORAL
COMMUNITY
Start: 2021-10-28 | End: 2022-07-13

## 2021-11-03 ASSESSMENT — PATIENT HEALTH QUESTIONNAIRE - PHQ9
SUM OF ALL RESPONSES TO PHQ QUESTIONS 1-9: 2
1. LITTLE INTEREST OR PLEASURE IN DOING THINGS: 1
SUM OF ALL RESPONSES TO PHQ QUESTIONS 1-9: 2
2. FEELING DOWN, DEPRESSED OR HOPELESS: 1
SUM OF ALL RESPONSES TO PHQ9 QUESTIONS 1 & 2: 2
SUM OF ALL RESPONSES TO PHQ QUESTIONS 1-9: 2

## 2021-11-03 ASSESSMENT — LIFESTYLE VARIABLES
AUDIT TOTAL SCORE: INCOMPLETE
AUDIT-C TOTAL SCORE: INCOMPLETE
HOW OFTEN DO YOU HAVE A DRINK CONTAINING ALCOHOL: NEVER
HOW OFTEN DO YOU HAVE A DRINK CONTAINING ALCOHOL: 0

## 2021-11-03 NOTE — PATIENT INSTRUCTIONS
LOW SALT FOR BLOOD PRESSURE CONTROL. TAKE BENICAR/HCT 40/25 1 TAB. DAILY  AND INCREASE LOPRESSOR  MG. TAB. 2 TIMES A DAY FOR BLOOD PRESSURE CONTROL. TAKE SYNTHROID 88 MCG. DAILY FOR THYROID DEFICIENCY. TAKE COUMADIN AS PER DR. PATRICIO RECOMMENDATION. REGULAR WALKING ADVISED. FACIAL SUTURES REMOVED. KEEP AREA DRY AND CLEAN. APPLY TRIPLE ANTIBIOTIC AFTER SHOWER AND NIGHTLY TILL HEALED. FASTING FOR LAB WORK ONE MORNING. NEXT APPOINTMENT IN 2 MONTHS. Personalized Preventive Plan for Marv Delaney - 11/3/2021  Medicare offers a range of preventive health benefits. Some of the tests and screenings are paid in full while other may be subject to a deductible, co-insurance, and/or copay. Some of these benefits include a comprehensive review of your medical history including lifestyle, illnesses that may run in your family, and various assessments and screenings as appropriate. After reviewing your medical record and screening and assessments performed today your provider may have ordered immunizations, labs, imaging, and/or referrals for you. A list of these orders (if applicable) as well as your Preventive Care list are included within your After Visit Summary for your review. Other Preventive Recommendations:    · A preventive eye exam performed by an eye specialist is recommended every 1-2 years to screen for glaucoma; cataracts, macular degeneration, and other eye disorders. · A preventive dental visit is recommended every 6 months. · Try to get at least 150 minutes of exercise per week or 10,000 steps per day on a pedometer . · Order or download the FREE \"Exercise & Physical Activity: Your Everyday Guide\" from [employer]. Call 5-763.788.2845 or search [employer] online. · You need 7393-1919 mg of calcium and 7386-3756 IU of vitamin D per day.  It is possible to meet your calcium requirement with diet alone, but a vitamin D supplement is usually necessary to meet this goal.  · When exposed to the sun, use a sunscreen that protects against both UVA and UVB radiation with an SPF of 30 or greater. Reapply every 2 to 3 hours or after sweating, drying off with a towel, or swimming. · Always wear a seat belt when traveling in a car. Always wear a helmet when riding a bicycle or motorcycle.

## 2021-11-03 NOTE — PROGRESS NOTES
Medicare Annual Wellness Visit  Name: Timmy Wells Date: 11/3/2021   MRN: <[id number]> Sex: Female   Age: 78 y.o. Ethnicity: Non- / Non    : 1940 Race: White (non-)      Marv Delaney is here for Medicare AWV and Health Maintenance (due for bone dexa screening )    Screenings for behavioral, psychosocial and functional/safety risks, and cognitive dysfunction are all negative except as indicated below. These results, as well as other patient data from the [address] form, are documented in Flowsheets linked to this Encounter. Allergies   Allergen Reactions    Clonidine Derivatives Anaphylaxis    Medrol [Methylprednisolone] Other (See Comments)     unknown    Naproxen Nausea Only    Other      oruvall    Atarax [Hydroxyzine] Palpitations         Prior to Visit Medications    Medication Sig Taking? Authorizing Provider   warfarin (COUMADIN) 5 MG tablet take 1 tablet by mouth once daily  Historical Provider, MD   metoprolol (LOPRESSOR) 100 MG tablet Take 1 tablet by mouth 2 times daily  Nicole Claudio MD   omeprazole (PRILOSEC) 20 MG delayed release capsule Take 1 capsule by mouth daily  Alexandre Hicks, DO   levothyroxine (SYNTHROID) 88 MCG tablet Take 1 tablet by mouth Daily  Alexandre Hicks, DO   folic acid (FOLVITE) 1 MG tablet take 1 tablet by mouth once daily  Historical Provider, MD   JAKAFI 10 MG tablet 3 times daily   Historical Provider, MD   warfarin (COUMADIN) 1 MG tablet take 1 AND 1/2 tablets by mouth once daily for 2 days and 1 table. ..  (REFER TO PRESCRIPTION NOTES).   Historical Provider, MD   warfarin (COUMADIN) 3 MG tablet take 1 tablet by mouth daily  Historical Provider, MD   olmesartan-hydrochlorothiazide (BENICAR HCT) 40-25 MG per tablet Take 1 tablet by mouth daily  Historical Provider, MD         Past Medical History:   Diagnosis Date    Back injury     Cervical spondylolysis     [facility] lymphoid leukemia ([facility] [address]     Gallstones  H/O echocardiogram 06/04/2017    EF 69%    History of bone density study     Dr Elida Alexandre [facility], [address]?    Hyperlipidemia     Hypertension     LBBB (left bundle branch block)     Lumbar spondylosis with myelopathy     Pneumonia     Polycythemia vera ([address]     JAK2    Syncope and collapse     Thyroid disease        Past Surgical History:   Procedure Laterality Date    APPENDECTOMY      CHOLECYSTECTOMY      DIAGNOSTIC CARDIAC CATH LAB PROCEDURE Bilateral 06/05/2017    HYSTERECTOMY, TOTAL ABDOMINAL      NERVE BLOCK  06/03/15    lumbar epidural #1    NERVE BLOCK  6 22 15    lumbar epidural #2    NERVE BLOCK N/A 7/6/15    lumbar epidural #3    TONSILLECTOMY           Family History   Problem Relation Age of Onset    Asthma Father     Hypertension Brother     Cancer Maternal Grandmother        CareTeam (Including outside providers/suppliers regularly involved in providing care):   Patient Care Team:  Reyes Leopard, MD as PCP - General (Family Medicine)    Wt Readings from Last 3 Encounters:   11/03/21 165 lb (74.8 kg)   10/11/21 161 lb (73 kg)   10/05/21 164 lb (74.4 kg)     Vitals:    11/03/21 0952   BP: (!) 180/90   Pulse: 84   Resp: 16   Temp: 98 °F (36.7 °C)   SpO2: 96%   Weight: 165 lb (74.8 kg)   Height: 5' 5\" (1.651 m)     Body mass index is 27.46 kg/m². Based upon direct observation of the patient, evaluation of cognition reveals recent and remote memory intact.     General Appearance: alert and oriented to person, place and time, well developed and well- nourished, in no acute distress  Skin: warm and dry, no rash or erythema  Head: normocephalic and atraumatic  Eyes: pupils equal, round, and reactive to light, extraocular eye movements intact, conjunctivae normal  ENT: tympanic membrane, external ear and ear canal normal bilaterally, nose without deformity, nasal mucosa and turbinates normal without polyps  Neck: supple and non-tender without mass, no thyromegaly or thyroid nodules, no cervical lymphadenopathy  Pulmonary/Chest: clear to auscultation bilaterally- no wheezes, rales or rhonchi, normal air movement, no respiratory distress  Cardiovascular: normal rate, regular rhythm, normal S1 and S2, no murmurs, rubs, clicks, or gallops, distal pulses intact, no carotid bruits  Abdomen: soft, non-tender, non-distended, normal bowel sounds, no masses or organomegaly  Extremities: no cyanosis, clubbing or edema  Musculoskeletal: normal range of motion, no joint swelling, deformity or tenderness  Neurologic: reflexes normal and symmetric, no cranial nerve deficit, gait, coordination and speech normal    Patient's complete Health Risk Assessment and screening values have been reviewed and are found in Flowsheets. The following problems were reviewed today and where indicated follow up appointments were made and/or referrals ordered. Positive Risk Factor Screenings with Interventions:     Fall Risk:  2 or more falls in past year?: no  Fall with injury in past year?: (!) yes  Fall Risk Interventions:    · Home safety tips provided  · Home exercises provided to promote strength and balance    Cognitive: Words recalled: 2 Words Recalled  Clock Drawing Test (CDT) Score: (!) Abnormal  Total Score Interpretation: Positive Mini-Cog  Cognitive Impairment Interventions:  · Patient declines any further evaluation/treatment for cognitive impairment         General Health and ACP:  General  In general, how would you say your health is?: (!) Poor  In the past 7 days, have you experienced any of the following?  New or Increased Pain, New or Increased Fatigue, Loneliness, Social Isolation, Stress or Anger?: (!) Stress, Anger  Do you get the social and emotional support that you need?: Yes  Do you have a Living Will?: Yes  Advance Directives     Power of  Living Will ACP-Advance Directive ACP-Power of     Not on File Filed on 09/15/12 Filed Not on [address] [facility] Interventions:  · Pain issues: home exercises provided, patient declines any further evaluation/treatment for this issue  · Stress: regular exercise recommended- 3-5 times per week, 30-45 minutes per session    Health Habits/Nutrition:  Health Habits/Nutrition  Do you exercise for at least 20 minutes 2-3 times per week?: (!) No  Have you lost any weight without trying in the past 3 months?: (!) Yes  Do you eat only one meal per day?: No  Have you seen the dentist within the past year?: Yes  Body mass index: (!) 27.45  Health Habits/Nutrition Interventions:  · Nutritional issues:  NOT EATING WELL AT [address] [place] [address]. SEEING DENTIST TOMORROW. · Dental exam overdue:  patient encouraged to make appointment with his/her dentist    Hearing/Vision:  No exam data present  Hearing/Vision  Do you or your family notice any trouble with your hearing that hasn't been managed with hearing aids?: (!) Yes  Do you have difficulty driving, watching TV, or doing any of your daily activities because of your eyesight?: (!) Yes  Have you had an eye exam within the past year?: (!) No  Hearing/Vision Interventions:  · Hearing concerns:  audiology referral provided    Safety:  Safety  Do you have working smoke detectors?: Yes  Have all throw rugs been removed or fastened?: (!) No  Do you have non-slip mats or surfaces in all bathtubs/showers?: Yes  Do all of your stairways have a railing or banister?: Yes  Are your doorways, halls and stairs free of clutter?: Yes  Do you always fasten your seatbelt when you are in a car?: Yes  Safety Interventions:  · Home safety tips provided    ADL:  ADLs  In the past 7 days, did you need help from others to perform any of the following everyday activities? Eating, dressing, grooming, bathing, toileting, or walking/balance?: (!) Walking/Balance  In the past 7 days, did you need help from others to take care of any of the following?  Laundry, housekeeping, banking/finances, shopping, telephone use, food preparation, transportation, or taking medications?: [employer], Shopping, Food Preparation, Transportation, Taking Medications  ADL Interventions:  · [address] Personalized Preventive Plan   Current Health Maintenance Status  Immunization History   Administered Date(s) Administered    COVID-19, Veronica Coleman, Primary or Immunocompromised, PF, 100mcg/0.5mL 01/25/2021, 02/22/2021    Influenza Vaccine, unspecified formulation 09/01/2016    Influenza Virus Vaccine 10/15/2015, 09/01/2016    Influenza, High Dose (Fluzone 65 yrs and older) 09/07/2017, 11/20/2019    Influenza, Quadv, IM, PF (6 mo and older Fluzone, Flulaval, Fluarix, and 3 yrs and older Afluria) 01/13/2017    Influenza, Quadv, adjuvanted, 65 yrs +, IM, PF (Fluad) 10/12/2020, 10/05/2021    Pneumococcal Conjugate 13-valent ([id number]) 12/09/2014    Pneumococcal Conjugate Vaccine 01/01/2012, 01/01/2012    Pneumococcal Polysaccharide ([id number]) 10/24/2017    Tdap (Boostrix, Adacel) 12/09/2014, 10/02/2021        Health Maintenance   Topic Date Due    DEXA (modify frequency per FRAX score)  Never done    Annual Wellness Visit (AWV)  Never done    Shingles Vaccine (1 of 2) 01/19/2022 (Originally [birth year]18/1990)    Potassium monitoring  03/16/2022    Creatinine monitoring  03/16/2022    DTaP/Tdap/Td vaccine (3 - Td or Tdap) 10/02/2031    Flu vaccine  Completed    Pneumococcal 65+ years Vaccine  Completed    COVID-19 Vaccine  Completed    Hepatitis A vaccine  Aged Out    Hepatitis B vaccine  Aged Out    Hib vaccine  Aged Out    Meningococcal (ACWY) vaccine  Aged Out     Recommendations for [employer] Due: see orders and patient instructions/AVS.  . Recommended screening schedule for the next 5-10 years is provided to the patient in written form: see Patient Instructions/AVS.    Татьяна Tolliver was seen today for medicare awv and health maintenance.     Diagnoses and all orders for this visit:    Routine general medical examination at a health care facility    Hyperlipidemia, unspecified hyperlipidemia type    Essential hypertension    Hypothyroidism, unspecified type    Vitamin D deficiency    Polycythemia

## 2021-11-04 DIAGNOSIS — E03.8 OTHER SPECIFIED HYPOTHYROIDISM: ICD-10-CM

## 2021-11-04 RX ORDER — LEVOTHYROXINE SODIUM 88 UG/1
TABLET ORAL
Qty: 90 TABLET | Refills: 0 | Status: SHIPPED
Start: 2021-11-04 | End: 2022-02-07

## 2021-11-09 ENCOUNTER — IMMUNIZATION (OUTPATIENT)
Dept: PRIMARY CARE CLINIC | Age: 81
End: 2021-11-09
Payer: MEDICARE

## 2021-11-09 PROCEDURE — 91306 COVID-19, MODERNA BOOSTER VACCINE 0.25ML DOSE: CPT | Performed by: NURSE PRACTITIONER

## 2021-11-09 PROCEDURE — 0064A COVID-19, MODERNA BOOSTER VACCINE 0.25ML DOSE: CPT | Performed by: NURSE PRACTITIONER

## 2021-12-07 DIAGNOSIS — K21.00 GASTROESOPHAGEAL REFLUX DISEASE WITH ESOPHAGITIS WITHOUT HEMORRHAGE: ICD-10-CM

## 2021-12-08 RX ORDER — OMEPRAZOLE 20 MG/1
CAPSULE, DELAYED RELEASE ORAL
Qty: 90 CAPSULE | Refills: 0 | Status: SHIPPED
Start: 2021-12-08 | End: 2022-02-18

## 2022-02-02 DIAGNOSIS — E03.8 OTHER SPECIFIED HYPOTHYROIDISM: ICD-10-CM

## 2022-02-07 RX ORDER — METOPROLOL TARTRATE 100 MG/1
100 TABLET ORAL 2 TIMES DAILY
Qty: 60 TABLET | Refills: 0 | Status: SHIPPED
Start: 2022-02-07 | End: 2022-04-04 | Stop reason: SDUPTHER

## 2022-02-07 RX ORDER — LEVOTHYROXINE SODIUM 88 UG/1
TABLET ORAL
Qty: 90 TABLET | Refills: 0 | Status: SHIPPED | OUTPATIENT
Start: 2022-02-07

## 2022-02-07 NOTE — TELEPHONE ENCOUNTER
Patient is requesting refill   Pt was last seen 11/3/2021  Next appt  3/21/2022  Last refill 10/11/2021  (pt has been out of medication for two days.  Last bp reading two days ago 140/70)

## 2022-02-16 ENCOUNTER — HOSPITAL ENCOUNTER (EMERGENCY)
Age: 82
Discharge: HOME | End: 2022-02-16
Attending: EMERGENCY MEDICINE
Payer: MEDICARE

## 2022-02-16 ENCOUNTER — APPOINTMENT (OUTPATIENT)
Dept: CT IMAGING | Age: 82
End: 2022-02-16
Payer: MEDICARE

## 2022-02-16 VITALS
SYSTOLIC BLOOD PRESSURE: 208 MMHG | DIASTOLIC BLOOD PRESSURE: 111 MMHG | HEART RATE: 72 BPM | OXYGEN SATURATION: 98 % | RESPIRATION RATE: 16 BRPM

## 2022-02-16 DIAGNOSIS — S50.01XA CONTUSION OF RIGHT ELBOW, INITIAL ENCOUNTER: ICD-10-CM

## 2022-02-16 DIAGNOSIS — S80.01XA CONTUSION OF RIGHT KNEE, INITIAL ENCOUNTER: ICD-10-CM

## 2022-02-16 DIAGNOSIS — S01.81XA FACIAL LACERATION, INITIAL ENCOUNTER: Primary | ICD-10-CM

## 2022-02-16 PROCEDURE — 72125 CT NECK SPINE W/O DYE: CPT

## 2022-02-16 PROCEDURE — 6370000000 HC RX 637 (ALT 250 FOR IP): Performed by: EMERGENCY MEDICINE

## 2022-02-16 PROCEDURE — 99283 EMERGENCY DEPT VISIT LOW MDM: CPT

## 2022-02-16 PROCEDURE — 12011 RPR F/E/E/N/L/M 2.5 CM/<: CPT

## 2022-02-16 PROCEDURE — 70450 CT HEAD/BRAIN W/O DYE: CPT

## 2022-02-16 RX ORDER — ALLOPURINOL 100 MG/1
100 TABLET ORAL DAILY
COMMUNITY
End: 2022-07-13

## 2022-02-16 RX ORDER — HYDROXYUREA 500 MG/1
CAPSULE ORAL DAILY
COMMUNITY
End: 2022-07-13

## 2022-02-16 RX ORDER — PAROXETINE 10 MG/1
10 TABLET, FILM COATED ORAL EVERY MORNING
COMMUNITY
End: 2022-04-04

## 2022-02-16 RX ORDER — LINEZOLID 600 MG/1
600 TABLET, FILM COATED ORAL 2 TIMES DAILY
COMMUNITY
End: 2022-07-13

## 2022-02-16 RX ADMIN — Medication: at 13:03

## 2022-02-16 ASSESSMENT — ENCOUNTER SYMPTOMS
VOMITING: 0
ABDOMINAL PAIN: 0
NAUSEA: 0
BACK PAIN: 0
SHORTNESS OF BREATH: 0

## 2022-02-16 ASSESSMENT — PAIN DESCRIPTION - DESCRIPTORS: DESCRIPTORS: HEADACHE

## 2022-02-18 DIAGNOSIS — K21.00 GASTROESOPHAGEAL REFLUX DISEASE WITH ESOPHAGITIS WITHOUT HEMORRHAGE: ICD-10-CM

## 2022-02-18 RX ORDER — OMEPRAZOLE 20 MG/1
CAPSULE, DELAYED RELEASE ORAL
Qty: 90 CAPSULE | Refills: 1 | Status: SHIPPED
Start: 2022-02-18 | End: 2022-04-04

## 2022-04-04 ENCOUNTER — OFFICE VISIT (OUTPATIENT)
Dept: FAMILY MEDICINE CLINIC | Age: 82
End: 2022-04-04
Payer: MEDICARE

## 2022-04-04 ENCOUNTER — TELEPHONE (OUTPATIENT)
Dept: FAMILY MEDICINE CLINIC | Age: 82
End: 2022-04-04

## 2022-04-04 VITALS
RESPIRATION RATE: 16 BRPM | DIASTOLIC BLOOD PRESSURE: 86 MMHG | SYSTOLIC BLOOD PRESSURE: 138 MMHG | OXYGEN SATURATION: 96 % | BODY MASS INDEX: 27.29 KG/M2 | HEIGHT: 65 IN | HEART RATE: 79 BPM | WEIGHT: 163.8 LBS | TEMPERATURE: 97.2 F

## 2022-04-04 DIAGNOSIS — H91.13 PRESBYCUSIS, BILATERAL: Primary | ICD-10-CM

## 2022-04-04 DIAGNOSIS — I10 ESSENTIAL HYPERTENSION: ICD-10-CM

## 2022-04-04 PROCEDURE — 99214 OFFICE O/P EST MOD 30 MIN: CPT | Performed by: SURGERY

## 2022-04-04 RX ORDER — PREDNISOLONE ACETATE 10 MG/ML
SUSPENSION/ DROPS OPHTHALMIC
COMMUNITY
Start: 2022-03-23 | End: 2022-06-22 | Stop reason: ALTCHOICE

## 2022-04-04 RX ORDER — METOPROLOL TARTRATE 100 MG/1
100 TABLET ORAL 2 TIMES DAILY
Qty: 180 TABLET | Refills: 1 | Status: SHIPPED | OUTPATIENT
Start: 2022-04-04 | End: 2022-10-01

## 2022-04-04 NOTE — TELEPHONE ENCOUNTER
Patient is requesting to see Dr. Simi Ibrahim as her PCP. Dr. Vitor Live was the PCP and she was seeing Dr. Shraddha Dent the last several visits. Is this okay?

## 2022-04-04 NOTE — PROGRESS NOTES
Diego Lim (:  1940) is a 78 y.o. female,Established patient, here for evaluation of the following chief complaint(s):  Discuss Medications (Med refills)         ASSESSMENT/PLAN:  1. Presbycusis, bilateral  -     BREONNA Prieto, Audiology, [place]  2. Essential hypertension  -     metoprolol (LOPRESSOR) 100 MG tablet; Take 1 tablet by mouth 2 times daily, Disp-180 tablet, R-1Normal    -No change in medication. Toward effects of medication. Refill metoprolol  -Referral to audiology  -Safety tips for in the home provided physical examination today is reassuring against fall risk due to debility this was more likely due to her sudden loss of senses when her IOLs opacified -which should not be a problem any longer that she is fixed as noted in HPI. Return for keep apts with Dr. Leah Khan. .         Subjective   SUBJECTIVE/OBJECTIVE:  HPI:    PCV:  Scheduled q2w for phlebotomy. Follows at [facility] with hematology. Michael Saavedra and franklyn. GERD:   No signs or symptoms. Wants to come off omeprazole. HTN:  Metoprolol 100mg twice per day. No untoward effect. No headache, dizziness, syncope, chest discomfort, chest pain, palpitations, shortness of breath, ALEGRE. Fall:  Had complication from cataract surgery where the lens clouded again, had last weeks laser photocoagulation with INSTANT improvement. She has the next eye done in a week. The fall happened due to inability to see object on ground, caught her toe and fell forward (two months prior).   Counseled on balance training   Preventative:  Health Maintenance   Topic Date Due    Shingles Vaccine (1 of 2) Never done    DEXA (modify frequency per FRAX score)  Never done    TSH testing  2022    Potassium monitoring  2022    Creatinine monitoring  2022    Depression Screen  2022    Annual Wellness Visit (AWV)  2022    DTaP/Tdap/Td vaccine (3 - Td or Tdap) 10/02/2031    Flu vaccine  Completed    Pneumococcal 65+ years Vaccine  Completed    COVID-19 Vaccine  Completed    Hepatitis A vaccine  Aged Out    Hepatitis B vaccine  Aged Out    Hib vaccine  Aged Out    Meningococcal (ACWY) vaccine  Aged Out           ROS:    Denies 10pt ROS other than noted in HPI. Objective       PHYSICAL EXAM:    /86   Pulse 79   Temp 97.2 °F (36.2 °C) (Temporal)   Resp 16   Ht 5' 5\" (1.651 m)   Wt 163 lb 12.8 oz (74.3 kg)   SpO2 96%   BMI 27.26 kg/m²     AVSS    GA: Well-groomed, appears well, no acute distress. HEENT: Atraumatic normocephalic. Extraocular muscles are grossly intact. Pupils are equal round reactive to light. Conjunctiva pink and moist.  Hearing is grossly intact. Nares patent without external drainage. Buccal mucosa pink and moist.  Posterior oropharynx clear without lesion or exudate. NECK: Trachea is midline, supple, nontender, no lymphadenopathy. CARDIO: Regular rate and rhythm without murmur rub or gallop. Cap refill 2+. Radial pulses 2+ bilaterally. RESPIRATORY: Clear to auscultation bilaterally without wheezes rales or rhonchi. Normal inspiratory and expiratory effort. Normoxic on room air    ABD: Rounded, normoactive bowel sounds. Soft, nontender, no organomegaly. MSK: Structurally appropriate for age. No gross deficit. Timed get up and go less than 20 seconds. NEURO: Alert, no gross deficit. Romberg is negative. PSYCH:  Mood is normal and congruent with affect. No signs of psychomotor retardation or agitation. Thought content seems normal, speech is fluent and non-pressured. SKIN: Generally warm pink and dry. An electronic signature was used to authenticate this note.     --Chinmay Guzman DO

## 2022-04-14 ENCOUNTER — TELEPHONE (OUTPATIENT)
Dept: AUDIOLOGY | Age: 82
End: 2022-04-14

## 2022-04-14 NOTE — TELEPHONE ENCOUNTER
Spoke to patient's son. They would like to wait until June/July to schedule appointment. They will call to schedule appointment. Contact for [facility] Audio given to son.

## 2022-06-22 ENCOUNTER — OFFICE VISIT (OUTPATIENT)
Dept: FAMILY MEDICINE CLINIC | Age: 82
End: 2022-06-22
Payer: MEDICARE

## 2022-06-22 VITALS
TEMPERATURE: 97.8 F | HEART RATE: 57 BPM | WEIGHT: 162.5 LBS | RESPIRATION RATE: 18 BRPM | HEIGHT: 65 IN | SYSTOLIC BLOOD PRESSURE: 130 MMHG | DIASTOLIC BLOOD PRESSURE: 84 MMHG | BODY MASS INDEX: 27.07 KG/M2 | OXYGEN SATURATION: 98 %

## 2022-06-22 DIAGNOSIS — Z71.82 EXERCISE COUNSELING: ICD-10-CM

## 2022-06-22 DIAGNOSIS — Z91.81 AT HIGH RISK FOR FALLS: Primary | ICD-10-CM

## 2022-06-22 DIAGNOSIS — S32.010S CLOSED WEDGE COMPRESSION FRACTURE OF L1 VERTEBRA, SEQUELA: ICD-10-CM

## 2022-06-22 DIAGNOSIS — E78.5 HYPERLIPIDEMIA, UNSPECIFIED HYPERLIPIDEMIA TYPE: ICD-10-CM

## 2022-06-22 DIAGNOSIS — M48.061 LUMBAR FORAMINAL STENOSIS: ICD-10-CM

## 2022-06-22 DIAGNOSIS — Z71.3 DIETARY COUNSELING: ICD-10-CM

## 2022-06-22 DIAGNOSIS — M84.58XA PATHOLOGICAL FRACTURE IN NEOPLASTIC DISEASE, OTHER SPECIFIED SITE, INITIAL ENCOUNTER FOR FRACTURE: ICD-10-CM

## 2022-06-22 DIAGNOSIS — I10 ESSENTIAL HYPERTENSION: ICD-10-CM

## 2022-06-22 DIAGNOSIS — D75.1 POLYCYTHEMIA: ICD-10-CM

## 2022-06-22 DIAGNOSIS — E03.9 HYPOTHYROIDISM, UNSPECIFIED TYPE: ICD-10-CM

## 2022-06-22 DIAGNOSIS — G89.29 CHRONIC BILATERAL LOW BACK PAIN WITHOUT SCIATICA: ICD-10-CM

## 2022-06-22 DIAGNOSIS — K21.00 GASTROESOPHAGEAL REFLUX DISEASE WITH ESOPHAGITIS WITHOUT HEMORRHAGE: ICD-10-CM

## 2022-06-22 DIAGNOSIS — I27.82 CHRONIC PULMONARY EMBOLISM, UNSPECIFIED PULMONARY EMBOLISM TYPE, UNSPECIFIED WHETHER ACUTE COR PULMONALE PRESENT (HCC): ICD-10-CM

## 2022-06-22 DIAGNOSIS — M54.50 CHRONIC BILATERAL LOW BACK PAIN WITHOUT SCIATICA: ICD-10-CM

## 2022-06-22 DIAGNOSIS — Z76.89 ESTABLISHING CARE WITH NEW DOCTOR, ENCOUNTER FOR: ICD-10-CM

## 2022-06-22 PROCEDURE — 1123F ACP DISCUSS/DSCN MKR DOCD: CPT | Performed by: SURGERY

## 2022-06-22 PROCEDURE — 99214 OFFICE O/P EST MOD 30 MIN: CPT | Performed by: SURGERY

## 2022-06-22 RX ORDER — OMEPRAZOLE 20 MG/1
CAPSULE, DELAYED RELEASE ORAL
COMMUNITY
Start: 2022-05-20 | End: 2022-06-22

## 2022-06-22 ASSESSMENT — PATIENT HEALTH QUESTIONNAIRE - PHQ9
SUM OF ALL RESPONSES TO PHQ QUESTIONS 1-9: 0
SUM OF ALL RESPONSES TO PHQ9 QUESTIONS 1 & 2: 0
SUM OF ALL RESPONSES TO PHQ QUESTIONS 1-9: 0
2. FEELING DOWN, DEPRESSED OR HOPELESS: 0
1. LITTLE INTEREST OR PLEASURE IN DOING THINGS: 0
SUM OF ALL RESPONSES TO PHQ QUESTIONS 1-9: 0
SUM OF ALL RESPONSES TO PHQ QUESTIONS 1-9: 0

## 2022-06-22 NOTE — PROGRESS NOTES
Nilton Hare (:  1940) is a 78 y.o. female,Established patient, here for evaluation of the following chief complaint(s):  Establish Care (Former Dionicio Bellaks patient), Back Pain, and Health Maintenance (Due for Shingles, Dexa Scan)         ASSESSMENT/PLAN:  1. At high risk for falls  2. Chronic bilateral low back pain without sciatica  -     XR LUMBAR SPINE (2-3 VIEWS); Future  -     [facility], [address] [facility] [place]  3. Chronic pulmonary embolism, unspecified pulmonary embolism type, unspecified whether acute cor pulmonale present (HCC)  Stable  No change  INR in range  Will talk with hemeonc about possible NOAC (unsure if this is okay with PAI1 hyperhomocysteimia. 4. Lumbar foraminal stenosis  -     [facility], [address] [facility]  - Was resistant to revisit PT and pain management  - I see no contraindication to PT. Goal is to improve strength, functional status. 5. Essential hypertension  Stable  Normotensive  No change  6. Gastroesophageal reflux disease with esophagitis without hemorrhage  Through history she says EGD revealed hiatal hernia. Reason to stay on ppi.  7. Hypothyroidism, unspecified type  stable  8. Hyperlipidemia, unspecified hyperlipidemia type  stable  9. Polycythemia  Continue to appreciate hematology's recs  Reviewed recent labs  stable  10. Dietary counseling  Counseled the patient on diet, continue to make positive choices. It is important to focus on meeting food group needs with nutrient dense foods and stay within caloric limits. Nutrient dense foods will provide you with vitamins, minerals, and other health promoting nutrients. You should avoid added sugars, saturated fats, hydrogenated oils, and limit sodium intake (this isn't just table salt. .. turn the package over, read the label, stay under 2000 mg or 2g of total sodium daily).     Track your calories, this will help you get an understanding of what a proper portion size is. Every day you should eat these:  -Veggies of all types  -Fruits  -Grains (strive for at least half of these to be whole-grain)  -Lean protein (poultry, fish, legumes, nuts)    Stick to the plate diet.    -19% of your dinner plate should be leafy green vegetables, dark green, red and orange vegetables. Do not use high-calorie dressing is a ranch or dressings that are filled with added sugars. 25% of your dinner plate should be whole grains. The remaining 25% of your dinner plate should be a lean protein. Limit your red meat intake to once per week and no more than 4 ounces in any serving.  -No need for second helpings. Limit or avoid these foods:  -Added sugars (less than 10% of your total calories in any given day should be from sugars)  -Saturated fats and hydrogenated oils (less than 10% of your total calories in any given day should be from these)  -Sodium (less than 2000 mg/day)  -Alcoholic beverages (even in moderation, alcohol can cause long-term health issues involving hypertension, electrolyte abnormalities, liver disease and even cancers of the mouth and throat)    Stay hydrated. Unless instructed otherwise by your physician, you should strive to drink at least eight 8 ounce glasses of water daily, some of these being fortified with electrolytes (such as a Pedialyte, or low calorie sports drink). Again, avoid added sugars. 11. Exercise counseling  Counseled the patient on importance of cardiovascular and resistance training. Make every attempt to engage in a level of activity, sustained for at least 30 minutes, where you saturate your undergarments with sweat. This should be done, at a minimum, three times per week. 12. Establishing care with new doctor, encounter for  All personal, family, social, surgical, medical history is reviewed and updated with patient. Allergies, medications updated.   List of specialists follows with updated. DM/HM updated. Return in about 5 months (around 11/7/2022) for AWV . Subjective   SUBJECTIVE/OBJECTIVE:  HPI:    Falls:  -On the basis of positive falls risk screening, assessment and plan is as follows: home safety tips provided, referral to physical therapy provided for strength and balance training.  -she did have issue with presbyopia and since had vision taken care of  -admittedly has loss of strength, particularly in lower extremities       Multilevel degenerative disease:  1. There is no acute compression fracture or subluxation of the cervical   spine. 2. Multilevel degenerative disc and degenerative joint disease.   -Dr. Elo Worthington used to follow    MRI (2008):  1. At T11-12, there is mild central canal and mild biforaminal   stenosis. 2. At L3-4, there is mild biforaminal stenosis. 3. At L5-S1, there is mild to moderate biforaminal stenosis. polycythemia vera/Chronic Pulmonary Embolism  - hx of PE, BERTHA-1 gene mutation and hyperhomocysteimia   -Jakafi  -hydrea  -folic acid  -allopurinol  -gets eye exams annual at least      GERD:  -omeprazole 20mg daily  -no EGD on file   -stopped taking omeprazole and is doing well (one or two episodes of heartburn)      HTN:  -benicar  -metoprolol 100mg BID      Hypothyroid:  -synthroid 88mcg daily  -TSH     Preventative:  Health Maintenance   Topic Date Due    Shingles vaccine (1 of 2) Never done    DEXA (modify frequency per FRAX score)  Never done    Depression Screen  11/03/2022    Annual Wellness Visit (AWV)  11/04/2022    DTaP/Tdap/Td vaccine (3 - Td or Tdap) 10/02/2031    Flu vaccine  Completed    Pneumococcal 65+ years Vaccine  Completed    COVID-19 Vaccine  Completed    Hepatitis A vaccine  Aged Out    Hepatitis B vaccine  Aged Out    Hib vaccine  Aged Out    Meningococcal (ACWY) vaccine  Aged Out           ROS:    Denies 10pt ROS other than noted in HPI.          Objective       PHYSICAL EXAM:    /84   Pulse 57 Temp 97.8 °F (36.6 °C) (Temporal)   Resp 18   Ht 5' 5\" (1.651 m)   Wt 162 lb 8 oz (73.7 kg)   SpO2 98%   BMI 27.04 kg/m²     AVSS    GA: Well-groomed, appears well, no acute distress. HEENT: Atraumatic normocephalic. Extraocular muscles are grossly intact. Pupils are equal round reactive to light. Conjunctiva pink and moist.  Hearing is grossly intact. Nares patent without external drainage. Buccal mucosa pink and moist.  Posterior oropharynx clear without lesion or exudate. NECK: Trachea is midline, supple, nontender, no lymphadenopathy. CARDIO: Regular rate and rhythm without murmur rub or gallop. Cap refill 2+. Radial pulses 2+ bilaterally. RESPIRATORY: Clear to auscultation bilaterally without wheezes rales or rhonchi. Normal inspiratory and expiratory effort. Normoxic on room air    ABD: Rounded, normoactive bowel sounds. Soft, nontender, no organomegaly. MSK: Structurally appropriate for age. No gross deficit. NEURO: Alert, no gross deficit. PSYCH:  Mood is normal and congruent with affect. No signs of psychomotor retardation or agitation. Thought content seems normal, speech is fluent and non-pressured. SKIN: Generally warm pink and dry. On this date 6/22/2022 I have spent 32 minutes reviewing previous notes, test results and face to face with the patient discussing the diagnosis and importance of compliance with the treatment plan as well as documenting on the day of the visit. An electronic signature was used to authenticate this note.     --Demi Bryan DO

## 2022-06-22 NOTE — PATIENT INSTRUCTIONS
Ask the hematologist about the latest data regarding NOAC in treatment of chronic PE/DVT in setting of BERTHA-1 and hyperhomocysteinemia.

## 2022-06-24 RX ORDER — CALCITONIN SALMON 200 [IU]/.09ML
1 SPRAY, METERED NASAL DAILY
Qty: 3.7 ML | Refills: 3 | Status: SHIPPED
Start: 2022-06-24 | End: 2022-07-13

## 2022-07-13 ENCOUNTER — OFFICE VISIT (OUTPATIENT)
Dept: PAIN MANAGEMENT | Age: 82
End: 2022-07-13
Payer: MEDICARE

## 2022-07-13 VITALS
WEIGHT: 162 LBS | OXYGEN SATURATION: 95 % | HEART RATE: 56 BPM | BODY MASS INDEX: 26.03 KG/M2 | SYSTOLIC BLOOD PRESSURE: 132 MMHG | RESPIRATION RATE: 16 BRPM | TEMPERATURE: 98.8 F | DIASTOLIC BLOOD PRESSURE: 90 MMHG | HEIGHT: 66 IN

## 2022-07-13 DIAGNOSIS — M51.9 LUMBAR DISC DISORDER: Primary | ICD-10-CM

## 2022-07-13 DIAGNOSIS — S32.010S CLOSED COMPRESSION FRACTURE OF L1 LUMBAR VERTEBRA, SEQUELA: ICD-10-CM

## 2022-07-13 DIAGNOSIS — M47.816 LUMBAR SPONDYLOSIS: ICD-10-CM

## 2022-07-13 DIAGNOSIS — M47.816 LUMBAR FACET ARTHROPATHY: ICD-10-CM

## 2022-07-13 DIAGNOSIS — G89.4 CHRONIC PAIN SYNDROME: ICD-10-CM

## 2022-07-13 PROCEDURE — 99204 OFFICE O/P NEW MOD 45 MIN: CPT | Performed by: PAIN MEDICINE

## 2022-07-13 PROCEDURE — 1123F ACP DISCUSS/DSCN MKR DOCD: CPT | Performed by: PAIN MEDICINE

## 2022-07-13 RX ORDER — ACETAMINOPHEN 500 MG
500 TABLET ORAL EVERY 6 HOURS PRN
COMMUNITY

## 2022-07-13 NOTE — PROGRESS NOTES
Do you currently have any of the following:    Fever: No  Headache:  No  Cough: No  Shortness of breath: No  Exposed to anyone with these symptoms: No                                                                                                                Stiven Raygoza presents to the [address] [address] on 7/13/2022. Dahpne Olsen is complaining of pain in lower back. . The pain is constant. The pain is described as aching, throbbing, shooting, stabbing and sharp. Pain is rated on her best day at a 5, on her worst day at a 10, and on average at a 7 on the VAS scale. She took her last dose of Tylenol . Daphne Olsen does not have issues with constipation. Any procedures since your last visit: No,       She is not on NSAIDS and  is  on anticoagulation medications to include Coumadin and is managed by DO Dr. Abimael Valerio. Pacemaker or defibrillator: No Physician managing device is NA. Medication Contract and Consent for Opioid Use Documents Filed      No documents found                   BP (!) 132/90   Pulse 56   Temp 98.8 °F (37.1 °C) (Infrared)   Resp 16   Ht 5' 5.5\" (1.664 m)   Wt 162 lb (73.5 kg)   SpO2 95%   BMI 26.55 kg/m²      No LMP recorded. Patient has had a hysterectomy.

## 2022-07-13 NOTE — PROGRESS NOTES
[address]        [address] [facility], [address] [facility]      [phone]          Consult Note      Patient:  EMILY Helton 1940    Date of Service:  22    Requesting Physician:  Alex Banks DO    Reason for Consult:      Patient presents with complaints of low back pain that started a long time ago and has been progressively getting worse. Pain is described as stabbing/shooting/aching/intermittent. Pain is aggravated by sitting/standing/walking. Pain is relieved by pain medications. HISTORY OF PRESENT ILLNESS:      Pain does radiate to bilateral buttocks and thighs. She  does not have numbness, tingling and does not have bladder or bowel dysfunction. She has been on anticoagulation medications to include warfarin. The patient  has not been on herbal supplements. The patient is not diabetic. Imaging:   Lumbar spine Xray : Anterior wedging compression injury at L1 is of indeterminate acuity.    There is multilevel degenerative disc disease with moderate findings at L3-4 and  L5-S1.    There is moderate to severe multilevel degenerative facet arthropathy. Previous treatments: Physical Therapy, Epidural Steroid Injection and medications. .      Opioid Agreement:  Renewal date:N/A    Past Medical History:   Diagnosis Date    Back injury     Cervical spondylolysis     Chronic lymphoid leukemia ([facility]     Gallstones     H/O echocardiogram 2017    EF 69%    History of bone density study     Dr [facility], [address]?    Hyperlipidemia     Hypertension     LBBB (left bundle branch block)     Lumbar spondylosis with myelopathy     Pneumonia     Polycythemia vera ([facility]     JAK2    Syncope and collapse     Thyroid disease      Past Surgical History:   Procedure Laterality Date    APPENDECTOMY      CHOLECYSTECTOMY      DIAGNOSTIC CARDIAC CATH LAB PROCEDURE Bilateral 2017    HYSTERECTOMY, TOTAL ABDOMINAL (CERVIX REMOVED)      NERVE BLOCK  06/03/15    lumbar epidural #1    NERVE BLOCK  6 22 15    lumbar epidural #2    NERVE BLOCK N/A 7/6/15    lumbar epidural #3    TONSILLECTOMY       Prior to Admission medications    Medication Sig Start Date End Date Taking? Authorizing Provider   acetaminophen (TYLENOL) 500 MG tablet Take 500 mg by mouth every 6 hours as needed for Pain   Yes Historical Provider, MD   Calcium Carbonate-Vit D-Min (CALCIUM 600+D3 PLUS MINERALS) 600-800 MG-UNIT CHEW Take 1 each by mouth 2 times daily (with meals) 6/24/22  Yes Arlington DO Baudilio   Ferrous Sulfate (IRON PO) Take by mouth   Yes Historical Provider, MD   Ascorbic Acid (VITAMIN C PO) Take by mouth   Yes Historical Provider, MD   metoprolol (LOPRESSOR) 100 MG tablet Take 1 tablet by mouth 2 times daily 4/4/22 10/1/22 Yes Arlington DO Baudilio   levothyroxine (SYNTHROID) 88 MCG tablet TAKE 1 TABLET DAILY 2/7/22  Yes Orlando Stephens MD   folic acid (FOLVITE) 1 MG tablet take 1 tablet by mouth once daily 12/30/20  Yes Historical Provider, MD   JAKAFI 10 MG tablet 3 times daily  12/28/20  Yes Historical Provider, MD   warfarin (COUMADIN) 1 MG tablet take 1 AND 1/2 tablets by mouth once daily for 2 days and 1 table. ..  (REFER TO PRESCRIPTION NOTES). 12/8/20  Yes Historical Provider, MD   olmesartan-hydrochlorothiazide (BENICAR HCT) 40-25 MG per tablet Take 1 tablet by mouth daily   Yes Historical Provider, MD     Allergies   Allergen Reactions    Clonidine Derivatives Anaphylaxis    Medrol [Methylprednisolone] Other (See Comments)     unknown    Naproxen Nausea Only    Other      oruvall    Atarax [Hydroxyzine] Palpitations     Social History     Socioeconomic History    Marital status:       Spouse name: Not on file    Number of children: Not on file    Years of education: Not on file    Highest education level: Not on file   Occupational History    Not on file   Tobacco Use    Smoking status: Never Smoker  Smokeless tobacco: Never Used   Vaping Use    Vaping Use: Never used   Substance and Sexual Activity    Alcohol use: No    Drug use: No    Sexual activity: Never   Other Topics Concern    Not on file   Social History Narrative    ** Merged History Encounter **          Social Determinants of Health     Financial Resource Strain: Low Risk     Difficulty of Paying Living Expenses: Not hard at all   Food Insecurity: No Food Insecurity    Worried About Running Out of Food in the Last Year: Never true    Justin of Food in the Last Year: Never true   Transportation Needs: No Transportation Needs    Lack of Transportation (Medical): No    Lack of Transportation (Non-Medical): No   Physical Activity:     Days of Exercise per Week: Not on file    Minutes of Exercise per Session: Not on file   Stress:     Feeling of Stress : Not on file   Social Connections:     Frequency of Communication with Friends and Family: Not on file    Frequency of Social Gatherings with Friends and Family: Not on file    Attends Orthodox Services: Not on file    Active Member of [address] [place] [address] or Organizations: Not on file    Attends Club or Organization Meetings: Not on file    Marital Status: Not on file   Intimate Partner Violence:     Fear of Current or Ex-Partner: Not on file    Emotionally Abused: Not on file    Physically Abused: Not on file    Sexually Abused: Not on file   Housing Stability: [address] Unable to Pay for Housing in the Last Year: No    Number of Places Lived in the Last Year: 1    Unstable Housing in the Last Year: No     Family History   Problem Relation Age of Onset    Asthma Father     Hypertension Brother     Cancer Maternal Grandmother      REVIEW OF SYSTEMS:     Patient specifically denies fever/chills, chest pain, shortness of breath, new bowel or bladder complaints. All other review of systems was negative.     PHYSICAL EXAMINATION:      BP (!) 132/90   Pulse 56   Temp 98.8 °F (37.1 °C) (Infrared)   Resp 16   Ht 5' 5.5\" (1.664 m)   Wt 162 lb (73.5 kg)   SpO2 95%   BMI 26.55 kg/m²     General:      General appearance:   elderly, pleasant and well-hydrated. , in moderate discomfort and A & O x3  Build:Overweight    HEENT:    Head:normocephalic and atraumatic  Sclera: icterus absent,     Lungs:    Breathing:Breathing Pattern: regular, no distress    Abdomen:    Shape:non-distended and normal  Tenderness:none    Lumbar spine:    Spine inspection:normal /thoracic scoliosis   CVA tenderness:No   Palpation:tenderness paravertebral muscles, facet loading, left, right, positive and tenderness. Range of motion:abnormal moderately Lateral bending, flexion, extension rotation bilateral and is  painful. Musculoskeletal:    Trigger points in Paraveteral:absent bilaterally  SI joint tenderness:negative right, negative left              ALYSIA test:not done right, not done             left  Piriformis tenderness:negative right, negative left  Trochanteric bursa tenderness:negative right, negative left  SLR:negative right, negative left, sitting     Extremities:    Tremors:None bilaterally upper and lower  Range of motion:pain with internal rotation of hips negative. Intact:Yes  Edema:Normal    Neurological:    Sensory:normal to light touch bilateral lower extremities. Motor:                           Right Quadriceps4+/5          Left Quadriceps4+/5           Right Gastrocnemius4+/5    Left Gastrocnemius4+/5  Right Ant Tibialis4+/5  Left Ant Tibialis4+/5  Reflexes:    Right Quadriceps reflex2+  Left Quadriceps reflex2+  Right Achilles reflex1+  Left Achilles reflex1+  Gait:antalgic    Dermatology:    Skin:no unusual rashes and no skin lesions    Impression:  Low back pain  Lumbar spine Xray Anterior wedging compression injury at L1. There is multilevel degenerative disc disease with moderate findings at L3-4 and  L5-S1 with multilevel degenerative facet arthropathy  Plan:  Reviewed imaging studies and discussed with the patient. Will schedule patient for lumbar spine MRI. Will refer patient to physical therapy. Cancel urine screen. OARRS report reviewed 07/2022. Patient encouraged to stay active and to lose weight. Treatment plan discussed with the patient. We discussed with the patient that combining opioids, benzodiazepines, alcohol, illicit drugs or sleep aids increases the risk of respiratory depression including death. We discussed that these medications may cause drowsiness, sedation or dizziness and have counseled the patient not to drive or operate machinery. We have discussed that these medications will be prescribed only by one provider. We have discussed with the patient about age related risk factors and have thoroughly discussed the importance of taking these medications as prescribed. The patient verbalizes understanding. landen Evans M.D.

## 2022-07-18 ENCOUNTER — TELEPHONE (OUTPATIENT)
Dept: PAIN MANAGEMENT | Age: 82
End: 2022-07-18

## 2022-07-18 NOTE — TELEPHONE ENCOUNTER
Called to schedule patient for mri of lumbar spine. Patient's son stated needs open mri and would like to go to [employer] [facility] facility?

## 2022-07-21 ENCOUNTER — TELEPHONE (OUTPATIENT)
Dept: FAMILY MEDICINE CLINIC | Age: 82
End: 2022-07-21

## 2022-07-21 NOTE — TELEPHONE ENCOUNTER
----- Message from Martin Rios sent at 7/21/2022  1:32 PM EDT -----  Subject: Message to Provider    QUESTIONS  Information for Provider? Pts son Rodríguez Grijalva is calling in states they need   orders for [address] and demographics sent to [facility] in [place] in order for them to schedule. Please advise.   ---------------------------------------------------------------------------  --------------  Russell HORN  [phone]; Do not leave any message, patient will call back for answer  ---------------------------------------------------------------------------  --------------  SCRIPT ANSWERS  Relationship to Patient? Other  Representative Name? Debbie Powell  Is the Representative on the appropriate HIPAA document in Epic?  Yes

## 2022-08-11 DIAGNOSIS — M47.816 LUMBAR FACET ARTHROPATHY: ICD-10-CM

## 2022-08-11 DIAGNOSIS — M51.9 LUMBAR DISC DISORDER: ICD-10-CM

## 2022-08-11 DIAGNOSIS — S32.010S CLOSED COMPRESSION FRACTURE OF L1 LUMBAR VERTEBRA, SEQUELA: ICD-10-CM

## 2022-08-12 ENCOUNTER — OFFICE VISIT (OUTPATIENT)
Dept: PAIN MANAGEMENT | Age: 82
End: 2022-08-12
Payer: MEDICARE

## 2022-08-12 VITALS
HEART RATE: 67 BPM | WEIGHT: 162 LBS | HEIGHT: 66 IN | SYSTOLIC BLOOD PRESSURE: 138 MMHG | DIASTOLIC BLOOD PRESSURE: 88 MMHG | BODY MASS INDEX: 26.03 KG/M2 | RESPIRATION RATE: 16 BRPM | TEMPERATURE: 98.5 F | OXYGEN SATURATION: 96 %

## 2022-08-12 DIAGNOSIS — M47.816 LUMBAR SPONDYLOSIS: ICD-10-CM

## 2022-08-12 DIAGNOSIS — S32.010S CLOSED COMPRESSION FRACTURE OF L1 LUMBAR VERTEBRA, SEQUELA: ICD-10-CM

## 2022-08-12 DIAGNOSIS — G89.4 CHRONIC PAIN SYNDROME: Primary | ICD-10-CM

## 2022-08-12 DIAGNOSIS — M51.9 LUMBAR DISC DISORDER: ICD-10-CM

## 2022-08-12 DIAGNOSIS — M47.816 LUMBAR FACET ARTHROPATHY: ICD-10-CM

## 2022-08-12 PROCEDURE — 99213 OFFICE O/P EST LOW 20 MIN: CPT | Performed by: PAIN MEDICINE

## 2022-08-12 PROCEDURE — 99214 OFFICE O/P EST MOD 30 MIN: CPT | Performed by: PAIN MEDICINE

## 2022-08-12 PROCEDURE — 1123F ACP DISCUSS/DSCN MKR DOCD: CPT | Performed by: PAIN MEDICINE

## 2022-08-12 NOTE — PROGRESS NOTES
[facility], [address] [place] [address] Andrew  [phone]    Follow up Note      Paola Coloners     Date of Visit:  8/12/2022    CC:  Patient presents for follow up   Chief Complaint   Patient presents with    Lower Back Pain     Follow up     HPI:    Pain is unchanged. Appropriate analgesia with current medications regimen:N/A. Change in quality of symptoms:no. Medication side effects:not applicable . Recent diagnostic testing:Lumbar spine MRI. Recent interventional procedures:none. .    She has been on anticoagulation medications to include warfarin. The patient  has not been on herbal supplements. The patient is not diabetic. Imaging:   Lumbar spine Xray 2022: Anterior wedging compression injury at L1 is of indeterminate acuity. There is multilevel degenerative disc disease with moderate findings at L3-4 and  L5-S1. There is moderate to severe multilevel degenerative facet arthropathy    Lumbar spine MRI 2022:  Diffusely heterogenous marrow signal intensity may represent a marrow   replacing process, including lymphoproliferative disorder, red marrow   reconversion, neoplasm, smoking, anemia, amongst other etiologies. Laboratory correlation is recommended. 2.  Moderate chronic compression fracture of L1 vertebral body with   bulging of the posterior inferior cortex. 3.  Splenomegaly. Previous treatments: Physical Therapy, Epidural Steroid Injection and medications. .       Potential Aberrant Drug-Related Behavior:    No    Urine Drug Screening:  None     OARRS report:  08/2022 consistent     Opioid Agreement:  Renewal date:N/A    Past Medical History:   Diagnosis Date    Back injury     Cervical spondylolysis     Chronic lymphoid leukemia ([facility] [address]     Gallstones     H/O echocardiogram 06/04/2017    EF 69%    History of bone density study     Dr Vicky Angela [facility], [address]?    Hyperlipidemia     Hypertension     LBBB (left bundle Socioeconomic History    Marital status:      Spouse name: Not on file    Number of children: Not on file    Years of education: Not on file    Highest education level: Not on file   Occupational History    Not on file   Tobacco Use    Smoking status: Never    Smokeless tobacco: Never   Vaping Use    Vaping Use: Never used   Substance and Sexual Activity    Alcohol use: No    Drug use: No    Sexual activity: Never   Other Topics Concern    Not on file   Social History Narrative    ** Merged History Encounter **          Social Determinants of Health     Financial Resource Strain: Not on file   Food Insecurity: Not on file   Transportation Needs: No Transportation Needs    Lack of Transportation (Medical): No    Lack of Transportation (Non-Medical): No   Physical Activity: Not on file   Stress: Not on file   Social Connections: Not on file   Intimate Partner Violence: Not on file   Housing Stability: Low Risk     Unable to Pay for Housing in the Last Year: No    Number of Places Lived in the Last Year: 1    Unstable Housing in the Last Year: No     Family History   Problem Relation Age of Onset    Asthma Father     Hypertension Brother     Cancer Maternal Grandmother      REVIEW OF SYSTEMS:     Deatra Bowels denies fever/chills, chest pain, shortness of breath, new bowel or bladder complaints. All other review of systems was negative. PHYSICAL EXAMINATION:      Resp 16   Ht 5' 5.5\" (1.664 m)   Wt 162 lb (73.5 kg)   BMI 26.55 kg/m²     General:       General appearance:   elderly, pleasant and well-hydrated.   , in moderate discomfort and A & O x3  Build:Overweight     HEENT:     Head:normocephalic and atraumatic  Sclera: icterus absent,     Lungs:     Breathing:Breathing Pattern: regular, no distress     Abdomen:     Shape:non-distended and normal  Tenderness:none     Lumbar spine:     Spine inspection:normal /thoracic scoliosis   CVA tenderness:No   Palpation:tenderness paravertebral muscles, facet loading, left, right, positive and tenderness. Range of motion:abnormal moderately Lateral bending, flexion, extension rotation bilateral and is  painful. Musculoskeletal:     Trigger points in Paraveteral:absent bilaterally  SI joint tenderness:negative right, negative left              ALYSIA test:not done right, not done             left  Piriformis tenderness:negative right, negative left  Trochanteric bursa tenderness:negative right, negative left  SLR:negative right, negative left, sitting     Extremities:     Tremors:None bilaterally upper and lower  Range of motion:pain with internal rotation of hips negative. Intact:Yes  Edema:Normal     Neurological:     Sensory:normal to light touch bilateral lower extremities. Motor:                           Right Quadriceps4+/5          Left Quadriceps4+/5           Right Gastrocnemius4+/5    Left Gastrocnemius4+/5  Right Ant Tibialis4+/5  Left Ant Tibialis4+/5  Reflexes:    Right Quadriceps reflex2+  Left Quadriceps reflex2+  Right Achilles reflex1+  Left Achilles reflex1+  Gait:antalgic     Dermatology:     Skin:no unusual rashes and no skin lesions     Impression:  Low back pain  Lumbar spine Xray Anterior wedging compression injury at L1. There is multilevel degenerative disc disease with moderate findings at L3-4 and  L5-S1 with multilevel degenerative facet arthropathy  Plan:  Follow up on her low back pain with no acute issues. Reviewed lumbar spine and discussed with the patient and her son. Discussed treatment options including lumbar facet MBB and referral to physical therapy. Will refer patient to physical therapy. OARRS report reviewed 08/2022. Patient encouraged to stay active and to lose weight. Treatment plan discussed with the patient. We discussed with the patient that combining opioids, benzodiazepines, alcohol, illicit drugs or sleep aids increases the risk of respiratory depression including death.  We discussed that these medications may cause drowsiness, sedation or dizziness and have counseled the patient not to drive or operate machinery. We have discussed that these medications will be prescribed only by one provider. We have discussed with the patient about age related risk factors and have thoroughly discussed the importance of taking these medications as prescribed. The patient verbalizes understanding. landen Pinzon M.D.

## 2022-08-12 NOTE — PROGRESS NOTES
Do you currently have any of the following:    Fever: No  Headache:  No  Cough: No  Shortness of breath: No  Exposed to anyone with these symptoms: No                                                                                                                Yojana Dao presents to the [address] [address] on 8/12/2022. Kun Thompson is complaining of pain in lower back. . The pain is constant. The pain is described as aching, throbbing, shooting, stabbing, and sharp. Pain is rated on her best day at a 2, on her worst day at a 8, and on average at a 5 on the VAS scale. She took her last dose of Tylenol   . Kun Thompson does not have issues with constipation. Any procedures since your last visit: No,    She is not on NSAIDS and  is  on anticoagulation medications to include Eliquis and is managed by NA. Pacemaker or defibrillator: No Physician managing device is NA. Medication Contract and Consent for Opioid Use Documents Filed        No documents found                       /88   Pulse 67   Temp 98.5 °F (36.9 °C) (Oral)   Resp 16   Ht 5' 5.5\" (1.664 m)   Wt 162 lb (73.5 kg)   SpO2 96%   BMI 26.55 kg/m²      No LMP recorded. Patient has had a hysterectomy.

## 2022-08-17 DIAGNOSIS — K21.00 GASTROESOPHAGEAL REFLUX DISEASE WITH ESOPHAGITIS WITHOUT HEMORRHAGE: ICD-10-CM

## 2022-08-17 RX ORDER — OMEPRAZOLE 20 MG/1
CAPSULE, DELAYED RELEASE ORAL
Qty: 90 CAPSULE | Refills: 3 | OUTPATIENT
Start: 2022-08-17

## 2022-11-22 ENCOUNTER — OFFICE VISIT (OUTPATIENT)
Dept: FAMILY MEDICINE CLINIC | Age: 82
End: 2022-11-22
Payer: MEDICARE

## 2022-11-22 VITALS
WEIGHT: 169.9 LBS | HEART RATE: 67 BPM | DIASTOLIC BLOOD PRESSURE: 78 MMHG | OXYGEN SATURATION: 94 % | TEMPERATURE: 97.9 F | RESPIRATION RATE: 16 BRPM | SYSTOLIC BLOOD PRESSURE: 126 MMHG | BODY MASS INDEX: 27.31 KG/M2 | HEIGHT: 66 IN

## 2022-11-22 DIAGNOSIS — N18.31 STAGE 3A CHRONIC KIDNEY DISEASE (HCC): ICD-10-CM

## 2022-11-22 DIAGNOSIS — M54.50 CHRONIC BILATERAL LOW BACK PAIN WITHOUT SCIATICA: ICD-10-CM

## 2022-11-22 DIAGNOSIS — E03.9 HYPOTHYROIDISM, UNSPECIFIED TYPE: ICD-10-CM

## 2022-11-22 DIAGNOSIS — D75.1 POLYCYTHEMIA: ICD-10-CM

## 2022-11-22 DIAGNOSIS — Z13.6 SCREENING FOR CARDIOVASCULAR CONDITION: ICD-10-CM

## 2022-11-22 DIAGNOSIS — M48.061 LUMBAR FORAMINAL STENOSIS: ICD-10-CM

## 2022-11-22 DIAGNOSIS — Z00.00 MEDICARE ANNUAL WELLNESS VISIT, SUBSEQUENT: Primary | ICD-10-CM

## 2022-11-22 DIAGNOSIS — M84.58XA PATHOLOGICAL FRACTURE IN NEOPLASTIC DISEASE, OTHER SPECIFIED SITE, INITIAL ENCOUNTER FOR FRACTURE: ICD-10-CM

## 2022-11-22 DIAGNOSIS — E03.8 OTHER SPECIFIED HYPOTHYROIDISM: ICD-10-CM

## 2022-11-22 DIAGNOSIS — E78.5 HYPERLIPIDEMIA, UNSPECIFIED HYPERLIPIDEMIA TYPE: ICD-10-CM

## 2022-11-22 DIAGNOSIS — M89.9 DISORDER OF BONE, UNSPECIFIED: ICD-10-CM

## 2022-11-22 DIAGNOSIS — K21.00 GASTROESOPHAGEAL REFLUX DISEASE WITH ESOPHAGITIS WITHOUT HEMORRHAGE: ICD-10-CM

## 2022-11-22 DIAGNOSIS — H91.13 PRESBYCUSIS, BILATERAL: ICD-10-CM

## 2022-11-22 DIAGNOSIS — S32.000S COMPRESSION FRACTURE OF LUMBAR VERTEBRA, UNSPECIFIED LUMBAR VERTEBRAL LEVEL, SEQUELA: ICD-10-CM

## 2022-11-22 DIAGNOSIS — I10 ESSENTIAL HYPERTENSION: ICD-10-CM

## 2022-11-22 DIAGNOSIS — G89.29 CHRONIC BILATERAL LOW BACK PAIN WITHOUT SCIATICA: ICD-10-CM

## 2022-11-22 PROBLEM — N18.30 CHRONIC RENAL DISEASE, STAGE III (HCC): Status: ACTIVE | Noted: 2022-11-22

## 2022-11-22 PROCEDURE — G0439 PPPS, SUBSEQ VISIT: HCPCS | Performed by: SURGERY

## 2022-11-22 PROCEDURE — 1123F ACP DISCUSS/DSCN MKR DOCD: CPT | Performed by: SURGERY

## 2022-11-22 PROCEDURE — 3078F DIAST BP <80 MM HG: CPT | Performed by: SURGERY

## 2022-11-22 PROCEDURE — 3074F SYST BP LT 130 MM HG: CPT | Performed by: SURGERY

## 2022-11-22 RX ORDER — LEVOTHYROXINE SODIUM 88 UG/1
TABLET ORAL
Qty: 90 TABLET | Refills: 0 | Status: SHIPPED | OUTPATIENT
Start: 2022-11-22

## 2022-11-22 RX ORDER — METOPROLOL TARTRATE 100 MG/1
100 TABLET ORAL 2 TIMES DAILY
Qty: 180 TABLET | Refills: 1 | Status: SHIPPED | OUTPATIENT
Start: 2022-11-22 | End: 2023-05-21

## 2022-11-22 SDOH — ECONOMIC STABILITY: FOOD INSECURITY: WITHIN THE PAST 12 MONTHS, THE FOOD YOU BOUGHT JUST DIDN'T LAST AND YOU DIDN'T HAVE MONEY TO GET MORE.: NEVER TRUE

## 2022-11-22 SDOH — ECONOMIC STABILITY: FOOD INSECURITY: WITHIN THE PAST 12 MONTHS, YOU WORRIED THAT YOUR FOOD WOULD RUN OUT BEFORE YOU GOT MONEY TO BUY MORE.: NEVER TRUE

## 2022-11-22 ASSESSMENT — LIFESTYLE VARIABLES
HOW OFTEN DO YOU HAVE A DRINK CONTAINING ALCOHOL: NEVER
HOW MANY STANDARD DRINKS CONTAINING ALCOHOL DO YOU HAVE ON A TYPICAL DAY: PATIENT DOES NOT DRINK

## 2022-11-22 ASSESSMENT — PATIENT HEALTH QUESTIONNAIRE - PHQ9
1. LITTLE INTEREST OR PLEASURE IN DOING THINGS: 1
SUM OF ALL RESPONSES TO PHQ QUESTIONS 1-9: 2
SUM OF ALL RESPONSES TO PHQ QUESTIONS 1-9: 2
2. FEELING DOWN, DEPRESSED OR HOPELESS: 1
SUM OF ALL RESPONSES TO PHQ QUESTIONS 1-9: 2
SUM OF ALL RESPONSES TO PHQ9 QUESTIONS 1 & 2: 2
SUM OF ALL RESPONSES TO PHQ QUESTIONS 1-9: 2

## 2022-11-22 ASSESSMENT — SOCIAL DETERMINANTS OF HEALTH (SDOH): HOW HARD IS IT FOR YOU TO PAY FOR THE VERY BASICS LIKE FOOD, HOUSING, MEDICAL CARE, AND HEATING?: NOT VERY HARD

## 2022-11-22 NOTE — PATIENT INSTRUCTIONS
Advance Directives: Care Instructions  Overview  An advance directive is a legal way to state your wishes at the end of your life. It tells your family and your doctor what to do if you can't say what you want. There are two main types of advance directives. You can change them any time your wishes change. Living will. This form tells your family and your doctor your wishes about life support and other treatment. The form is also called a declaration. Medical power of . This form lets you name a person to make treatment decisions for you when you can't speak for yourself. This person is called a health care agent (health care proxy, health care surrogate). The form is also called a durable power of  for health care. If you do not have an advance directive, decisions about your medical care may be made by a family member, or by a doctor or a  who doesn't know you. It may help to think of an advance directive as a gift to the people who care for you. If you have one, they won't have to make tough decisions by themselves. Follow-up care is a key part of your treatment and safety. Be sure to make and go to all appointments, and call your doctor if you are having problems. It's also a good idea to know your test results and keep a list of the medicines you take. What should you include in an advance directive? Many states have a unique advance directive form. (It may ask you to address specific issues.) Or you might use a universal form that's approved by many states. If your form doesn't tell you what to address, it may be hard to know what to include in your advance directive. Use the questions below to help you get started. Who do you want to make decisions about your medical care if you are not able to? What life-support measures do you want if you have a serious illness that gets worse over time or can't be cured? What are you most afraid of that might happen?  (Maybe you're afraid of having pain, losing your independence, or being kept alive by machines.)  Where would you prefer to die? (Your home? A hospital? A nursing home?)  Do you want to donate your organs when you die? Do you want certain Adventist practices performed before you die? When should you call for help? Be sure to contact your doctor if you have any questions. Where can you learn more? Go to [email or web address] and sign in to your [employer] account. Enter R264 in the [employer] box to learn more about \"Advance Directives: Care Instructions. \"     If you do not have an account, please click on the \"Sign Up Now\" link. Current as of: June 16, 2022               Content Version: 13.4  © 5630-2553 [employer]. Care instructions adapted under license by [facility] ([facility]). If you have questions about a medical condition or this instruction, always ask your healthcare professional. [place] [address] any warranty or liability for your use of this information. Learning About Medical Power of   What is a medical power of ? A medical power of , also called a durable power of  for health care, is one type of the legal forms called advance directives. It lets you name the person you want to make treatment decisions for you if you can't speak or decide for yourself. The person you choose is called your health care agent. This person is also called a health care proxy or health care surrogate. A medical power of  may be called something else in your state. How do you choose a health care agent? Choose your health care agent carefully. This person may or may not be a family member. Talk to the person before you make your final decision. Make sure he or she is comfortable with this responsibility. It's a good idea to choose someone who:  Is at least 25years old.   Knows you well and understands what makes life meaningful for you. Understands your Rastafari and moral values. Will do what you want, not what he or she wants. Will be able to make difficult choices at a stressful time. Will be able to refuse or stop treatment, if that is what you would want, even if you could die. Will be firm and confident with health professionals if needed. Will ask questions to get needed information. Lives near you or agrees to travel to you if needed. Your family may help you make medical decisions while you can still be part of that process. But it's important to choose one person to be your health care agent in case you aren't able to make decisions for yourself. If you don't fill out the legal form and name a health care agent, the decisions your family can make may be limited. A health care agent may be called something else in your state. Who will make decisions for you if you don't have a health care agent? If you don't have a health care agent or a living will, you may not get the care you want. Decisions may be made by family members who disagree about your medical care. Or decisions may be made by a medical professional who doesn't know you well. In some cases, a  makes the decisions. When you name a health care agent, it is very clear who has the power to make health decisions for you. How do you name a health care agent? You name your health care agent on a legal form. This form is usually called a medical power of . Ask your hospital, state bar association, or office on aging where to find these forms. You must sign the form to make it legal. Some states require you to get the form notarized. This means that a person called a  watches you sign the form and then he or she signs the form. Some states also require that two or more witnesses sign the form. Be sure to tell your family members and doctors who your health care agent is. Where can you learn more?   Go to [email or web address] and sign in to your [employer] account. Enter [id number] in the [facility] box to learn more about \"Learning About [address]. \"     If you do not have an account, please click on the \"Sign Up Now\" link. Current as of: October 6, 2021               Content Version: 13.4  © 2006-2022 Healthwise, [employer]. Care instructions adapted under license by [facility] ([facility]). If you have questions about a medical condition or this instruction, always ask your healthcare professional. [address] any warranty or liability for your use of this information. Learning About Living Genny Guillen  What is a living will? A living will, also called a declaration, is a legal form. It tells your family and your doctor your wishes when you can't speak for yourself. It's used by the health professionals who will treat you as you near the end of your life or if you get seriously hurt or ill. If you put your wishes in writing, your loved ones and others will know what kind of care you want. They won't need to guess. This can ease your mind and be helpful to others. And you can change or cancel your living will at any time. A living will is not the same as an estate or property will. An estate will explains what you want to happen with your money and property after you die. How do you use it? Keep these facts in mind about how a living will is used. Your living will is used only if you can't speak or make decisions for yourself. Most often, one or more doctors must certify that you can't speak or decide for yourself before your living will takes effect. If you get better and can speak for yourself again, you can accept or refuse any treatment. It doesn't matter what you said in your living will. Some states may limit your right to refuse treatment in certain cases.  For example, you may need to clearly state in your living will that you don't want artificial hydration and nutrition, such as being fed through a tube. Is a living will a legal document? A living will is a legal document. Each state has its own laws about living [place]. And a living will may be called something else in your state. Here are some things to know about living pre[place]. You don't need an  to complete a living will. But legal advice can be helpful if your state's laws are unclear. It can also help if your health history is complicated or your family can't agree on what should be in your living will. You can change your living will at any time. Some people find that their wishes about end-of-life care change as their health changes. If you make big changes to your living will, complete a new form. If you move to another state, make sure that your living will is legal in the state where you now live. In most cases, doctors will respect your wishes even if you have a form from a different state. You might use a universal form that has been approved by many states. This kind of form can sometimes be filled out and stored online. Your digital copy will then be available wherever you have a connection to the internet. The doctors and nurses who need to treat you can find it right away. Your state may offer an online registry. This is another place where you can store your living will online. It's a good idea to get your living will notarized. This means using a person called a  to watch two people sign, or witness, your living will. What should you know when you create a living will? Here are some questions to ask yourself as you make your living will. Do you know enough about life support methods that might be used? If not, talk to your doctor so you know what might be done if you can't breathe on your own, your heart stops, or you can't swallow. What things would you still want to be able to do after you receive life-support methods?  Would you want to be able to walk? To speak? To eat on your own? To live without the help of machines? Do you want certain [place] practices performed if you become very ill? If you have a choice, where do you want to be cared for? In your home? At a hospital or nursing home? If you have a choice at the end of your life, where would you prefer to die? At home? In a hospital or nursing home? Somewhere else? Would you prefer to be buried or cremated? Do you want your organs to be donated after you die? What should you do with your living will? Make sure that your family members and your health care agent have copies of your living will (also called a declaration). Give your doctor a copy of your living will. Ask to have it kept as part of your medical record. If you have more than one doctor, make sure that each one has a copy. Put a copy of your living will where it can be easily found. For example, some people may put a copy on their refrigerator door. If you are using a digital copy, be sure your doctor, family members, and health care agent know how to find and access it. Where can you learn more? Go to [email or web address] and sign in to your [email or web address] account. Enter P181 in the [employer] box to learn more about \"Learning About Living [place]. \"     If you do not have an account, please click on the \"Sign Up Now\" link. Current as of: June 16, 2022               Content Version: 13.4  © 2006-2022 Healthwise, Incorporated. Care instructions adapted under license by [facility] ([facility]). If you have questions about a medical condition or this instruction, always ask your healthcare professional. [place] [address] any warranty or liability for your use of this information. Personalized Preventive Plan for Madina Martins - 11/22/2022  Medicare offers a range of preventive health benefits.  Some of the tests and screenings are paid in full while other may be subject to a deductible, co-insurance, and/or copay. Some of these benefits include a comprehensive review of your medical history including lifestyle, illnesses that may run in your family, and various assessments and screenings as appropriate. After reviewing your medical record and screening and assessments performed today your provider may have ordered immunizations, labs, imaging, and/or referrals for you. A list of these orders (if applicable) as well as your Preventive Care list are included within your After Visit Summary for your review. Other Preventive Recommendations:    A preventive eye exam performed by an eye specialist is recommended every 1-2 years to screen for glaucoma; cataracts, macular degeneration, and other eye disorders. A preventive dental visit is recommended every 6 months. Try to get at least 150 minutes of exercise per week or 10,000 steps per day on a pedometer . Order or download the FREE \"Exercise & Physical Activity: Your Everyday Guide\" from [employer]. Call 6-716.301.9063 or search [employer] online. You need 1232-7563 mg of calcium and 4396-3478 IU of vitamin D per day. It is possible to meet your calcium requirement with diet alone, but a vitamin D supplement is usually necessary to meet this goal.  When exposed to the sun, use a sunscreen that protects against both UVA and UVB radiation with an SPF of 30 or greater. Reapply every 2 to 3 hours or after sweating, drying off with a towel, or swimming. Always wear a seat belt when traveling in a car. Always wear a helmet when riding a bicycle or motorcycle.

## 2022-11-22 NOTE — PROGRESS NOTES
Medicare Annual Wellness Visit    Marilyn Fuller is here for Medicare AWV and Health Maintenance (Due for Covid Booster, Shingles Vaccine)    Assessment & Plan   Medicare annual wellness visit, subsequent  All personal, family, social, surgical, medical history is reviewed and updated with patient. Allergies, medications updated. List of specialists follows with updated. DM/HM updated. Screening for cardiovascular condition  -     Lipid Panel; Future  ASCVD calc  Hyperlipidemia, unspecified hyperlipidemia type  -     Comprehensive Metabolic Panel; Future  Compression fracture of lumbar vertebra, unspecified lumbar vertebral level, sequela  -     Vitamin D 25 Hydroxy; Future  Stage 3a chronic kidney disease ([facility] [address]  -     Comprehensive Metabolic Panel; Future  No nsaids  Stay hydrated. Unless instructed otherwise by your physician, you should strive to drink at least eight 8 ounce glasses of water daily (64oz total), some of these being fortified with electrolytes (such as a Pedialyte, low calorie sports drink, or at mealtime). Avoid added sugars. Gastroesophageal reflux disease with esophagitis without hemorrhage  Stable no change  Reinforce lifestyle modifications:  -No food or drink within 3 hours of bed  -Elevate the head of the bed by 30 degrees (1/2 heigh cinder blocks from [employer] under the bed posts at the head)  -Track diet, know triggers and avoid them    Chronic bilateral low back pain without sciatica  Lumbar foraminal stenosis  Hypothyroidism, unspecified type  -     TSH; Future  -     T4, Free; Future  Polycythemia  Stable  With specialist, they manage labs  jakify  Presbycusis, bilateral  Pathological fracture  Going to get DEXA done  BID supplement for now  Essential hypertension  -     metoprolol (LOPRESSOR) 100 MG tablet;  Take 1 tablet by mouth 2 times daily, Disp-180 tablet, R-1Normal  Stable  normotensive  Other specified hypothyroidism  -     levothyroxine (SYNTHROID) 88 MCG tablet; TAKE 1 TABLET DAILY, Disp-90 tablet, R-0Normal  Stable  No chnage  Disorder of bone, unspecified   -     Vitamin D 25 Hydroxy; Future  Pathologic vertebral fx  Needs DEXA, ordered previously  PT is important will get in as ordered      Recommendations for Preventive Services Due: see orders and patient instructions/AVS.  Recommended screening schedule for the next 5-10 years is provided to the patient in written form: see Patient Instructions/AVS.     Return in 6 months (on 5/22/2023) for Medicare Annual Wellness Visit in 1 year, chronic conditions 30 min . Subjective       Patient's complete Health Risk Assessment and screening values have been reviewed and are found in Flowsheets. The following problems were reviewed today and where indicated follow up appointments were made and/or referrals ordered. Positive Risk Factor Screenings with Interventions:    Fall Risk:  Do you feel unsteady or are you worried about falling? : (!) yes  2 or more falls in past year?: (!) yes  Fall with injury in past year?: no   Fall Risk Interventions:    Home safety tips provided  Home exercises provided to promote strength and balance  Getting into PT            General Health and ACP:  General  In general, how would you say your health is?: Fair  In the past 7 days, have you experienced any of the following: New or Increased Pain, New or Increased Fatigue, Loneliness, Social Isolation, Stress or Anger?: (!) Yes  Select all that apply: (!) New or Increased Fatigue  Do you get the social and emotional support that you need?: Yes  Do you have a Living Will?: Yes    Advance Directives       Power of  Living Will ACP-Advance Directive ACP-Power of     Not on File Filed on 09/15/12 Filed Not on File          General Health Risk Interventions:  Fatigue: regular exercise recommended- 3-5 times per week, 30-45 minutes per session, jakify causing this. .. getting into PT  No Living Will: Advance Care Planning addressed with patient today    Health Habits/Nutrition:  Physical Activity: Inactive    Days of Exercise per Week: 0 days    Minutes of Exercise per Session: 0 min     Have you lost any weight without trying in the past 3 months?: No  Body mass index: (!) 27.84  Have you seen the dentist within the past year?: Yes  Health Habits/Nutrition Interventions:  Nutritional issues:  educational materials for healthy, well-balanced diet provided  Will get new dentist.    Hearing/Vision:  Do you or your family notice any trouble with your hearing that hasn't been managed with hearing aids?: (!) Yes  Do you have difficulty driving, watching TV, or doing any of your daily activities because of your eyesight?: (!) Yes  Have you had an eye exam within the past year?: Yes  No results found. Hearing/Vision Interventions:  Hearing concerns:  patient declines any further evaluation/treatment for hearing issues  Vision concerns:  patient encouraged to make appointment with his/her eye specialist     ADLs:  In the past 7 days, did you need help from others to perform any of the following everyday activities: Eating, dressing, grooming, bathing, toileting, or walking/balance?: No  In the past 7 days, did you need help from others to take care of any of the following: Laundry, housekeeping, banking/finances, shopping, telephone use, food preparation, transportation, or taking medications?: (!) Yes  Select all that apply: [employer], Housekeeping, Banking/Finances, Shopping, Telephone Use, Food Preparation, Transportation  ADL Interventions:  She has extensive help from her son - easier for her but she is certainly able to handle this. She will do household cleaning chores on her own. Does not feel she needs additional help.            Objective   Vitals:    11/22/22 1258   BP: 126/78   Pulse: 67   Resp: 16   Temp: 97.9 °F (36.6 °C)   TempSrc: Temporal   SpO2: 94%   Weight: 169 lb 14.4 oz (77.1 kg)   Height: 5' 5.5\" (1.664 m)      Body mass index is 27.84 kg/m². Allergies   Allergen Reactions    Clonidine Derivatives Anaphylaxis    Medrol [Methylprednisolone] Other (See Comments)     unknown    Naproxen Nausea Only    Other      oruvall    Atarax [Hydroxyzine] Palpitations     Prior to Visit Medications    Medication Sig Taking? Authorizing Provider   VITAMIN D PO Take by mouth Yes Historical Provider, MD   metoprolol (LOPRESSOR) 100 MG tablet Take 1 tablet by mouth 2 times daily Yes Mac Rock DO   levothyroxine (SYNTHROID) 88 MCG tablet TAKE 1 TABLET DAILY Yes Mac Rock DO   apixaban (ELIQUIS) 5 MG TABS tablet Take by mouth 2 times daily Yes Historical Provider, MD   acetaminophen (TYLENOL) 500 MG tablet Take 500 mg by mouth every 6 hours as needed for Pain Yes Historical Provider, MD   Calcium Carbonate-Vit D-Min (CALCIUM 600+D3 PLUS MINERALS) 600-800 MG-UNIT CHEW Take 1 each by mouth 2 times daily (with meals) Yes Mac Rock DO   Ferrous Sulfate (IRON PO) Take by mouth Yes Historical Provider, MD   Ascorbic Acid (VITAMIN C PO) Take by mouth Yes Historical Provider, MD   folic acid (FOLVITE) 1 MG tablet take 1 tablet by mouth once daily Yes Historical Provider, MD   JAKAFI 10 MG tablet 3 times daily  Yes Historical Provider, MD [facility] (Including outside providers/suppliers regularly involved in providing care):   Patient Care Team:  Mac Rock DO as PCP - General (Family Medicine)  Mac Rock DO [facility] - [facility] Empaneled Provider  Dr. Christiana Méndez ([facility])  Dr. Radha Moreland (pain)  Needs new dentist  Dr. Calos Chaparro (ophthalmology)    Reviewed and updated this visit:  Tobacco  Allergies  Meds  Problems  Med Hx  Surg Hx  Soc Hx  Fam Hx                 Advance Care Planning   Advanced Care Planning: Discussed the patients choices for care and treatment in case of a health event that adversely affects decision-making abilities. Also discussed the patients long-term treatment options.  Reviewed with the patient the appropriate state-specific advance directive documents. Reviewed the process of designating a competent adult as an Agent (or -in-fact) that could take make health care decisions for the patient if incompetent. Patient was asked to complete the declaration forms, either acknowledge the forms by a public notary or an eligible witness and provide a signed copy to the practice office. Time spent (minutes): 5       Cardiovascular Disease Risk Counseling: Assessed the patient's risk to develop cardiovascular disease and reviewed main risk factors. Reviewed steps to reduce disease risk including:   Quitting tobacco use, reducing amount smoked, or not starting the habit  Making healthy food choices  Being physically active and gradualy increasing activity levels   Reduce weight and determine a healthy BMI goal  Monitor blood pressure and treat if higher than 140/90 mmHg  Maintain blood total cholesterol levels under 5 mmol/l or 190 mg/dl  Maintain LDL cholesterol levels under 3.0 mmol/l or 115 mg/dl   Control blood glucose levels  Consider taking aspirin (75 mg daily), once blood pressure is controlled   Provided a follow up plan.     Lipid panel ordered    Time spent (minutes): 10

## 2022-12-21 ENCOUNTER — TELEPHONE (OUTPATIENT)
Dept: FAMILY MEDICINE CLINIC | Age: 82
End: 2022-12-21

## 2022-12-21 ENCOUNTER — HOSPITAL ENCOUNTER (INPATIENT)
Age: 82
LOS: 8 days | Discharge: NURSING, REHAB OR LONG-TERM CARE | DRG: 683 | End: 2022-12-29
Attending: EMERGENCY MEDICINE | Admitting: INTERNAL MEDICINE
Payer: MEDICARE

## 2022-12-21 ENCOUNTER — APPOINTMENT (OUTPATIENT)
Dept: GENERAL RADIOLOGY | Age: 82
DRG: 683 | End: 2022-12-21
Payer: MEDICARE

## 2022-12-21 DIAGNOSIS — N18.31 STAGE 3A CHRONIC KIDNEY DISEASE (HCC): ICD-10-CM

## 2022-12-21 DIAGNOSIS — E87.6 HYPOKALEMIA: ICD-10-CM

## 2022-12-21 DIAGNOSIS — B02.8 HERPES ZOSTER WITH COMPLICATION: ICD-10-CM

## 2022-12-21 DIAGNOSIS — R77.8 ELEVATED TROPONIN: ICD-10-CM

## 2022-12-21 DIAGNOSIS — R62.7 FAILURE TO THRIVE IN ADULT: Primary | ICD-10-CM

## 2022-12-21 PROBLEM — R79.89 ELEVATED TROPONIN: Status: ACTIVE | Noted: 2022-12-21

## 2022-12-21 LAB
ALBUMIN SERPL-MCNC: 4 G/DL (ref 3.5–5.2)
ALP BLD-CCNC: 29 U/L (ref 35–104)
ALT SERPL-CCNC: 9 U/L (ref 0–32)
ANION GAP SERPL CALCULATED.3IONS-SCNC: 11 MMOL/L (ref 7–16)
ANISOCYTOSIS: ABNORMAL
AST SERPL-CCNC: 17 U/L (ref 0–31)
BACTERIA: ABNORMAL /HPF
BASOPHILS ABSOLUTE: 0.36 E9/L (ref 0–0.2)
BASOPHILS RELATIVE PERCENT: 2.6 % (ref 0–2)
BILIRUB SERPL-MCNC: 1.7 MG/DL (ref 0–1.2)
BILIRUBIN DIRECT: 0.4 MG/DL (ref 0–0.3)
BILIRUBIN URINE: NEGATIVE
BILIRUBIN, INDIRECT: 1.3 MG/DL (ref 0–1)
BLOOD, URINE: ABNORMAL
BUN BLDV-MCNC: 14 MG/DL (ref 6–23)
CALCIUM SERPL-MCNC: 9 MG/DL (ref 8.6–10.2)
CHLORIDE BLD-SCNC: 91 MMOL/L (ref 98–107)
CHP ED QC CHECK: YES
CLARITY: ABNORMAL
CO2: 29 MMOL/L (ref 22–29)
COLOR: ABNORMAL
CREAT SERPL-MCNC: 1.2 MG/DL (ref 0.5–1)
EOSINOPHILS ABSOLUTE: 0.13 E9/L (ref 0.05–0.5)
EOSINOPHILS RELATIVE PERCENT: 0.9 % (ref 0–6)
EPITHELIAL CELLS, UA: ABNORMAL /HPF
GFR SERPL CREATININE-BSD FRML MDRD: 45 ML/MIN/1.73
GLUCOSE BLD-MCNC: 103 MG/DL (ref 74–99)
GLUCOSE BLD-MCNC: 108 MG/DL
GLUCOSE URINE: NEGATIVE MG/DL
HCT VFR BLD CALC: 32.3 % (ref 34–48)
HEMOGLOBIN: 10.4 G/DL (ref 11.5–15.5)
INFLUENZA A BY PCR: NOT DETECTED
INFLUENZA B BY PCR: NOT DETECTED
KETONES, URINE: NEGATIVE MG/DL
LACTIC ACID: 1.5 MMOL/L (ref 0.5–2.2)
LEUKOCYTE ESTERASE, URINE: ABNORMAL
LIPASE: 28 U/L (ref 13–60)
LYMPHOCYTES ABSOLUTE: 1.54 E9/L (ref 1.5–4)
LYMPHOCYTES RELATIVE PERCENT: 10.5 % (ref 20–42)
MAGNESIUM: 2.2 MG/DL (ref 1.6–2.6)
MCH RBC QN AUTO: 31.4 PG (ref 26–35)
MCHC RBC AUTO-ENTMCNC: 32.2 % (ref 32–34.5)
MCV RBC AUTO: 97.6 FL (ref 80–99.9)
METER GLUCOSE: 108 MG/DL (ref 74–99)
MONOCYTES ABSOLUTE: 0.28 E9/L (ref 0.1–0.95)
MONOCYTES RELATIVE PERCENT: 1.8 % (ref 2–12)
MYELOCYTE PERCENT: 4.4 % (ref 0–0)
NEUTROPHILS ABSOLUTE: 11.76 E9/L (ref 1.8–7.3)
NEUTROPHILS RELATIVE PERCENT: 79.8 % (ref 43–80)
NITRITE, URINE: NEGATIVE
NUCLEATED RED BLOOD CELLS: 0.9 /100 WBC
OVALOCYTES: ABNORMAL
PDW BLD-RTO: 20.6 FL (ref 11.5–15)
PH UA: 7 (ref 5–9)
PLATELET # BLD: 168 E9/L (ref 130–450)
PMV BLD AUTO: ABNORMAL FL (ref 7–12)
POIKILOCYTES: ABNORMAL
POLYCHROMASIA: ABNORMAL
POTASSIUM SERPL-SCNC: 2.8 MMOL/L (ref 3.5–5)
PROTEIN UA: NEGATIVE MG/DL
RBC # BLD: 3.31 E12/L (ref 3.5–5.5)
RBC UA: ABNORMAL /HPF (ref 0–2)
SARS-COV-2, NAAT: NOT DETECTED
SODIUM BLD-SCNC: 131 MMOL/L (ref 132–146)
SPECIFIC GRAVITY UA: <=1.005 (ref 1–1.03)
TEAR DROP CELLS: ABNORMAL
TOTAL PROTEIN: 6 G/DL (ref 6.4–8.3)
TROPONIN, HIGH SENSITIVITY: 34 NG/L (ref 0–9)
TROPONIN, HIGH SENSITIVITY: 40 NG/L (ref 0–9)
UROBILINOGEN, URINE: 0.2 E.U./DL
WBC # BLD: 14 E9/L (ref 4.5–11.5)
WBC UA: ABNORMAL /HPF (ref 0–5)

## 2022-12-21 PROCEDURE — 83605 ASSAY OF LACTIC ACID: CPT

## 2022-12-21 PROCEDURE — 6370000000 HC RX 637 (ALT 250 FOR IP)

## 2022-12-21 PROCEDURE — 81001 URINALYSIS AUTO W/SCOPE: CPT

## 2022-12-21 PROCEDURE — 99285 EMERGENCY DEPT VISIT HI MDM: CPT

## 2022-12-21 PROCEDURE — 82962 GLUCOSE BLOOD TEST: CPT

## 2022-12-21 PROCEDURE — 99223 1ST HOSP IP/OBS HIGH 75: CPT | Performed by: INTERNAL MEDICINE

## 2022-12-21 PROCEDURE — 1200000000 HC SEMI PRIVATE

## 2022-12-21 PROCEDURE — 96374 THER/PROPH/DIAG INJ IV PUSH: CPT

## 2022-12-21 PROCEDURE — 87635 SARS-COV-2 COVID-19 AMP PRB: CPT

## 2022-12-21 PROCEDURE — 2580000003 HC RX 258

## 2022-12-21 PROCEDURE — 85025 COMPLETE CBC W/AUTO DIFF WBC: CPT

## 2022-12-21 PROCEDURE — 93005 ELECTROCARDIOGRAM TRACING: CPT

## 2022-12-21 PROCEDURE — 71045 X-RAY EXAM CHEST 1 VIEW: CPT

## 2022-12-21 PROCEDURE — 96375 TX/PRO/DX INJ NEW DRUG ADDON: CPT

## 2022-12-21 PROCEDURE — 83735 ASSAY OF MAGNESIUM: CPT

## 2022-12-21 PROCEDURE — 6360000002 HC RX W HCPCS: Performed by: EMERGENCY MEDICINE

## 2022-12-21 PROCEDURE — 84484 ASSAY OF TROPONIN QUANT: CPT

## 2022-12-21 PROCEDURE — 80076 HEPATIC FUNCTION PANEL: CPT

## 2022-12-21 PROCEDURE — 83690 ASSAY OF LIPASE: CPT

## 2022-12-21 PROCEDURE — 87502 INFLUENZA DNA AMP PROBE: CPT

## 2022-12-21 PROCEDURE — 96361 HYDRATE IV INFUSION ADD-ON: CPT

## 2022-12-21 PROCEDURE — 6360000002 HC RX W HCPCS

## 2022-12-21 PROCEDURE — 80048 BASIC METABOLIC PNL TOTAL CA: CPT

## 2022-12-21 PROCEDURE — 6370000000 HC RX 637 (ALT 250 FOR IP): Performed by: EMERGENCY MEDICINE

## 2022-12-21 RX ORDER — 0.9 % SODIUM CHLORIDE 0.9 %
1000 INTRAVENOUS SOLUTION INTRAVENOUS ONCE
Status: COMPLETED | OUTPATIENT
Start: 2022-12-21 | End: 2022-12-21

## 2022-12-21 RX ORDER — ACETAMINOPHEN 500 MG
500 TABLET ORAL EVERY 6 HOURS PRN
Status: DISCONTINUED | OUTPATIENT
Start: 2022-12-21 | End: 2022-12-21 | Stop reason: SDUPTHER

## 2022-12-21 RX ORDER — FOLIC ACID 1 MG/1
1 TABLET ORAL DAILY
Status: DISCONTINUED | OUTPATIENT
Start: 2022-12-22 | End: 2022-12-29 | Stop reason: HOSPADM

## 2022-12-21 RX ORDER — SODIUM CHLORIDE 0.9 % (FLUSH) 0.9 %
5-40 SYRINGE (ML) INJECTION EVERY 12 HOURS SCHEDULED
Status: DISCONTINUED | OUTPATIENT
Start: 2022-12-22 | End: 2022-12-29 | Stop reason: HOSPADM

## 2022-12-21 RX ORDER — METOPROLOL TARTRATE 50 MG/1
100 TABLET, FILM COATED ORAL 2 TIMES DAILY
Status: DISCONTINUED | OUTPATIENT
Start: 2022-12-22 | End: 2022-12-28

## 2022-12-21 RX ORDER — LEVOTHYROXINE SODIUM 88 UG/1
88 TABLET ORAL DAILY
Status: DISCONTINUED | OUTPATIENT
Start: 2022-12-22 | End: 2022-12-29 | Stop reason: HOSPADM

## 2022-12-21 RX ORDER — SODIUM CHLORIDE 9 MG/ML
INJECTION, SOLUTION INTRAVENOUS PRN
Status: DISCONTINUED | OUTPATIENT
Start: 2022-12-21 | End: 2022-12-29 | Stop reason: HOSPADM

## 2022-12-21 RX ORDER — POLYETHYLENE GLYCOL 3350 17 G/17G
17 POWDER, FOR SOLUTION ORAL DAILY PRN
Status: DISCONTINUED | OUTPATIENT
Start: 2022-12-21 | End: 2022-12-29 | Stop reason: HOSPADM

## 2022-12-21 RX ORDER — ONDANSETRON 2 MG/ML
4 INJECTION INTRAMUSCULAR; INTRAVENOUS EVERY 6 HOURS PRN
Status: DISCONTINUED | OUTPATIENT
Start: 2022-12-21 | End: 2022-12-29 | Stop reason: HOSPADM

## 2022-12-21 RX ORDER — ONDANSETRON 4 MG/1
4 TABLET, ORALLY DISINTEGRATING ORAL EVERY 8 HOURS PRN
Status: DISCONTINUED | OUTPATIENT
Start: 2022-12-21 | End: 2022-12-29 | Stop reason: HOSPADM

## 2022-12-21 RX ORDER — ACETAMINOPHEN 325 MG/1
650 TABLET ORAL EVERY 6 HOURS PRN
Status: DISCONTINUED | OUTPATIENT
Start: 2022-12-21 | End: 2022-12-29 | Stop reason: HOSPADM

## 2022-12-21 RX ORDER — ACYCLOVIR 200 MG/1
200 CAPSULE ORAL ONCE
Status: COMPLETED | OUTPATIENT
Start: 2022-12-21 | End: 2022-12-21

## 2022-12-21 RX ORDER — POTASSIUM CHLORIDE 7.45 MG/ML
10 INJECTION INTRAVENOUS
Status: COMPLETED | OUTPATIENT
Start: 2022-12-21 | End: 2022-12-21

## 2022-12-21 RX ORDER — SODIUM CHLORIDE 0.9 % (FLUSH) 0.9 %
5-40 SYRINGE (ML) INJECTION PRN
Status: DISCONTINUED | OUTPATIENT
Start: 2022-12-21 | End: 2022-12-29 | Stop reason: HOSPADM

## 2022-12-21 RX ORDER — ACETAMINOPHEN 650 MG/1
650 SUPPOSITORY RECTAL EVERY 6 HOURS PRN
Status: DISCONTINUED | OUTPATIENT
Start: 2022-12-21 | End: 2022-12-29 | Stop reason: HOSPADM

## 2022-12-21 RX ORDER — POTASSIUM CHLORIDE 20 MEQ/1
40 TABLET, EXTENDED RELEASE ORAL ONCE
Status: COMPLETED | OUTPATIENT
Start: 2022-12-21 | End: 2022-12-21

## 2022-12-21 RX ADMIN — POTASSIUM CHLORIDE 40 MEQ: 1500 TABLET, EXTENDED RELEASE ORAL at 17:27

## 2022-12-21 RX ADMIN — ACYCLOVIR 200 MG: 200 CAPSULE ORAL at 17:12

## 2022-12-21 RX ADMIN — WATER 1000 MG: 1 INJECTION INTRAMUSCULAR; INTRAVENOUS; SUBCUTANEOUS at 20:45

## 2022-12-21 RX ADMIN — POTASSIUM CHLORIDE 10 MEQ: 7.46 INJECTION, SOLUTION INTRAVENOUS at 17:27

## 2022-12-21 RX ADMIN — POTASSIUM CHLORIDE 10 MEQ: 7.46 INJECTION, SOLUTION INTRAVENOUS at 18:30

## 2022-12-21 RX ADMIN — SODIUM CHLORIDE 1000 ML: 9 INJECTION, SOLUTION INTRAVENOUS at 16:42

## 2022-12-21 ASSESSMENT — ENCOUNTER SYMPTOMS
BACK PAIN: 0
EYE PAIN: 0
ABDOMINAL PAIN: 0
PHOTOPHOBIA: 0
SHORTNESS OF BREATH: 0
FACIAL SWELLING: 0
CHOKING: 0
NAUSEA: 0
CONSTIPATION: 0
SORE THROAT: 0
DIARRHEA: 1
VOMITING: 0
COUGH: 0

## 2022-12-21 ASSESSMENT — PAIN - FUNCTIONAL ASSESSMENT
PAIN_FUNCTIONAL_ASSESSMENT: NONE - DENIES PAIN

## 2022-12-21 ASSESSMENT — LIFESTYLE VARIABLES
HOW MANY STANDARD DRINKS CONTAINING ALCOHOL DO YOU HAVE ON A TYPICAL DAY: PATIENT DOES NOT DRINK
HOW OFTEN DO YOU HAVE A DRINK CONTAINING ALCOHOL: NEVER

## 2022-12-21 NOTE — TELEPHONE ENCOUNTER
Son called to inform he is taking patient to ED today, reports she has not been eating well for a couple of weeks and is \"just not feeling well\".  He is having EMT take her to [facility]

## 2022-12-21 NOTE — ED TRIAGE NOTES
Arrived via EMS. ...weakness, fatigue, diarrhea, inability to ambulate. On Chemo for blood disorder.

## 2022-12-21 NOTE — ED PROVIDER NOTES
[facility]  Department of Emergency Medicine     Written by: Kraig Baugh MD  Patient Name: Caitlin Weiner  Attending Provider: Brynn Tiwari MD  Admit Date: 2022  3:43 PM  MRN: [id number]                   : 1940        Chief Complaint   Patient presents with    Fatigue     Decreased mobility; diarrhea    - Chief complaint    78-year-old female with history of hypertension, cholelithiasis, polycythemia vera on Jakafi and Eliquis 5 mg, hyperlipidemia, chronic lymphoid leukemia presents to the ED due to complaints of weakness and diarrhea over the past 4 to 5 days. The patient states that she usually is able to walk, stairs and change her close, however had difficulty for the past 5 days. The patient states that yesterday she had 1 episode of high-volume diarrhea. Family states that she has been weaker as well. Family at bedside states that the symptoms started gradually, they are nonprogressive in nature, nothing makes it better or worse, quality symptoms is malaise and fatigue and loss of appetite with decreased drinking and eating, radiation generalized, severe, to the patient is moderately severe, timing is constant. Review of Systems   Constitutional:  Positive for activity change, appetite change and fatigue. Negative for chills, diaphoresis and fever. HENT:  Negative for ear discharge, ear pain, facial swelling, sneezing and sore throat. Eyes:  Negative for photophobia and pain. Respiratory:  Negative for cough, choking and shortness of breath. Cardiovascular:  Negative for chest pain and palpitations. Gastrointestinal:  Positive for diarrhea. Negative for abdominal pain, constipation, nausea and vomiting. Genitourinary:  Negative for dysuria, enuresis, flank pain, frequency and hematuria. Musculoskeletal:  Negative for arthralgias, back pain, joint swelling, myalgias and neck pain. Skin:  Negative for pallor and rash.    Neurological: Negative for weakness, light-headedness and headaches. Physical Exam  Constitutional:       General: She is not in acute distress. Appearance: Normal appearance. She is not ill-appearing. HENT:      Head: Normocephalic and atraumatic. Nose: Nose normal. No congestion. Mouth/Throat:      Mouth: Mucous membranes are moist.      Pharynx: No posterior oropharyngeal erythema. Eyes:      General: Scleral icterus present. Extraocular Movements: Extraocular movements intact. Pupils: Pupils are equal, round, and reactive to light. Cardiovascular:      Rate and Rhythm: Normal rate and regular rhythm. Pulses: Normal pulses. Heart sounds: Normal heart sounds. Pulmonary:      Effort: Pulmonary effort is normal. No respiratory distress. Breath sounds: Normal breath sounds. No stridor. No wheezing or rhonchi. Chest:      Chest wall: No tenderness. Abdominal:      General: Bowel sounds are normal. There is no distension. Palpations: Abdomen is soft. There is no mass. Tenderness: There is no abdominal tenderness. Musculoskeletal:         General: No swelling, tenderness or deformity. Normal range of motion. Cervical back: Normal range of motion. No rigidity or tenderness. Skin:     Capillary Refill: Capillary refill takes less than 2 seconds. Coloration: Skin is jaundiced (Possible). Skin is not pale. Findings: Rash (As shown in images on this chart) present. No erythema. Neurological:      General: No focal deficit present. Mental Status: She is alert and oriented to person, place, and time. Mental status is at baseline. Cranial Nerves: No cranial nerve deficit. Sensory: No sensory deficit. Motor: No weakness. Procedures       MDM  Number of Diagnoses or Management Options  Elevated troponin  Failure to thrive in adult  Herpes zoster with complication  Hypokalemia  Diagnosis management comments:  This is an 53-year-old female with history of hypertension, cholelithiasis, polycythemia vera on Jakafi and Eliquis 5 mg, hyperlipidemia, chronic lymphoid leukemia presents to the ED due to complaints of weakness and diarrhea over the past 4 to 5 days. The patient states that she usually is able to walk, stairs and change her close, however had difficulty for the past 5 days. The patient states that yesterday she had 1 episode of high-volume diarrhea. Family states that she has been weaker as well. The patient here in the ED is hemodynamically stable, and in no acute distress. They are calm, alert, oriented, and pleasant to speak with. The patient has clear lungs auscultation, no abnormal heart murmurs are heard. The patient no significant abdominal tenderness, however does have mild discomfort to palpation of the epigastrium. The patient has good pulses and capillary refill bilaterally. The patient has an erythematous with some crusting shingles-like rash on the left side of the back extending to the left side of the abdomen, not crossing midline. Patient states is not painful, it does have a crusting texture to it. There are concerns of scleral icterus bilaterally and jaundice possibly. No erythema or drainage noted from the ears bilaterally. The patient is neurovascularly intact, with full cranial nerve function, and no motor or sensory deficits throughout the body. There is no eye discharge. Legs appear mildly swollen bilaterally, however patient states that these appear normal for her. Patient will have CBC, BMP and hepatic function panel to evaluate for any elevations in bilirubin or signs of elevated white count indicating formation of infection or any electrical abnormalities. Patient also have COVID and flu Danitza swabs ordered. Due to epigastric pain, patient had lactic acid and lipase ordered.   Due to weakness, patient will have urinalysis to rule out UTI, and troponin with chest x-ray due to history of chronic lymphoid leukemia polycythemia vera to rule out any chest pathologies which could be the source of symptoms such as pneumonia, CHF. Due to patient's rash which appears to be shingles-like, patient will be given a dose of 20 mg oral acyclovir. Patient's urinalysis reveals concerns for UTI. Patient's troponin is 40, however repeat is 34. The patient does have a white count 14.0, on my interpretation of CBC. The patient also hypokalemic on CMP with a potassium of 2.8. Potassium repletion ordered. Daughter mentions concerned that she lives alone and cannot go back home. My attending spoke to hospitalist, and accepted patient for admission. The patient is given 1 g of Rocephin here in the ED. The patient remains hemodynamically stable. Amount and/or Complexity of Data Reviewed  Clinical lab tests: reviewed  Tests in the radiology section of CPT®: reviewed  Tests in the medicine section of CPT®: reviewed  Decide to obtain previous medical records or to obtain history from someone other than the patient: yes                    --------------------------------------------- PAST HISTORY ---------------------------------------------  Past Medical History:  has a past medical history of Back injury, Cervical spondylolysis, Chronic lymphoid leukemia ([facility] [address] Gallstones, H/O echocardiogram, History of bone density study, Hyperlipidemia, Hypertension, LBBB (left bundle branch block), Lumbar spondylosis with myelopathy, Pneumonia, Polycythemia vera ([facility] [address] Syncope and collapse, and Thyroid disease. Past Surgical History:  has a past surgical history that includes Tonsillectomy; Appendectomy; Nerve Block (06/03/15); Nerve Block (6 22 15); Nerve Block (N/A, 7/6/15); Diagnostic Cardiac Cath Lab Procedure (Bilateral, 06/05/2017); Cholecystectomy; and Hysterectomy, total abdominal.    Social History:  reports that she has never smoked.  She has never used smokeless tobacco. She reports that she does not drink alcohol and does not use drugs. Family History: family history includes Asthma in her father; Cancer in her maternal grandmother; Hypertension in her brother. The patients home medications have been reviewed.     Allergies: Clonidine derivatives, Medrol [methylprednisolone], Naproxen, Other, and Atarax [hydroxyzine]    -------------------------------------------------- RESULTS -------------------------------------------------    LABS:  Results for orders placed or performed during the hospital encounter of 12/21/22   COVID-19, Rapid    Specimen: Nasopharyngeal Swab   Result Value Ref Range    SARS-CoV-2, NAAT Not Detected Not Detected   RAPID INFLUENZA A/B ANTIGENS    Specimen: Nasopharyngeal   Result Value Ref Range    Influenza A by PCR Not Detected Not Detected    Influenza B by PCR Not Detected Not Detected   CBC with Auto Differential   Result Value Ref Range    WBC 14.0 (H) 4.5 - 11.5 E9/L    RBC 3.31 (L) 3.50 - 5.50 E12/L    Hemoglobin 10.4 (L) 11.5 - 15.5 g/dL    Hematocrit 32.3 (L) 34.0 - 48.0 %    MCV 97.6 80.0 - 99.9 fL    MCH 31.4 26.0 - 35.0 pg    MCHC 32.2 32.0 - 34.5 %    RDW 20.6 (H) 11.5 - 15.0 fL    Platelets 141 157 - 057 E9/L    MPV NOT CALC 7.0 - 12.0 fL    Neutrophils % 79.8 43.0 - 80.0 %    Lymphocytes % 10.5 (L) 20.0 - 42.0 %    Monocytes % 1.8 (L) 2.0 - 12.0 %    Eosinophils % 0.9 0.0 - 6.0 %    Basophils % 2.6 (H) 0.0 - 2.0 %    Neutrophils Absolute 11.76 (H) 1.80 - 7.30 E9/L    Lymphocytes Absolute 1.54 1.50 - 4.00 E9/L    Monocytes Absolute 0.28 0.10 - 0.95 E9/L    Eosinophils Absolute 0.13 0.05 - 0.50 E9/L    Basophils Absolute 0.36 (H) 0.00 - 0.20 E9/L    Myelocyte Percent 4.4 0 - 0 %    nRBC 0.9 /100 WBC    Anisocytosis 2+     Polychromasia 1+     Poikilocytes 1+     Ovalocytes 1+     Tear Drop Cells 1+    BMP   Result Value Ref Range    Sodium 131 (L) 132 - 146 mmol/L    Potassium 2.8 (L) 3.5 - 5.0 mmol/L    Chloride 91 (L) 98 - 107 mmol/L    CO2 29 22 - 29 mmol/L    Anion Gap 11 7 - 16 mmol/L    Glucose 103 (H) 74 - 99 mg/dL    BUN 14 6 - 23 mg/dL    Creatinine 1.2 (H) 0.5 - 1.0 mg/dL    Est, Glom Filt Rate 45 >=60 mL/min/1.73    Calcium 9.0 8.6 - 10.2 mg/dL   Hepatic Function Panel   Result Value Ref Range    Total Protein 6.0 (L) 6.4 - 8.3 g/dL    Albumin 4.0 3.5 - 5.2 g/dL    Alkaline Phosphatase 29 (L) 35 - 104 U/L    ALT 9 0 - 32 U/L    AST 17 0 - 31 U/L    Total Bilirubin 1.7 (H) 0.0 - 1.2 mg/dL    Bilirubin, Direct 0.4 (H) 0.0 - 0.3 mg/dL    Bilirubin, Indirect 1.3 (H) 0.0 - 1.0 mg/dL   Lactic Acid   Result Value Ref Range    Lactic Acid 1.5 0.5 - 2.2 mmol/L   Lipase   Result Value Ref Range    Lipase 28 13 - 60 U/L   Urinalysis   Result Value Ref Range    Color, UA Straw Straw/Yellow    Clarity, UA SLCLOUDY Clear    Glucose, Ur Negative Negative mg/dL    Bilirubin Urine Negative Negative    Ketones, Urine Negative Negative mg/dL    Specific Gravity, UA <=1.005 1.005 - 1.030    Blood, Urine TRACE (A) Negative    pH, UA 7.0 5.0 - 9.0    Protein, UA Negative Negative mg/dL    Urobilinogen, Urine 0.2 <2.0 E.U./dL    Nitrite, Urine Negative Negative    Leukocyte Esterase, Urine MODERATE (A) Negative   Troponin   Result Value Ref Range    Troponin, High Sensitivity 40 (H) 0 - 9 ng/L   Magnesium   Result Value Ref Range    Magnesium 2.2 1.6 - 2.6 mg/dL   Troponin   Result Value Ref Range    Troponin, High Sensitivity 34 (H) 0 - 9 ng/L   Microscopic Urinalysis   Result Value Ref Range    WBC, UA 5-10 (A) 0 - 5 /HPF    RBC, UA NONE 0 - 2 /HPF    Epithelial Cells, UA RARE /HPF    Bacteria, UA RARE (A) None Seen /HPF   POCT Glucose   Result Value Ref Range    Glucose 108 mg/dL    QC OK?  yes    POCT Glucose   Result Value Ref Range    Meter Glucose 108 (H) 74 - 99 mg/dL   EKG 12 Lead   Result Value Ref Range    Ventricular Rate 63 BPM    Atrial Rate 63 BPM    P-R Interval 168 ms    QRS Duration 150 ms    Q-T Interval 460 ms    QTc Calculation (Bazett) 470 ms    P Axis -5 degrees    R Axis -45 degrees T Axis 133 degrees       RADIOLOGY:  XR CHEST PORTABLE   Final Result   No acute process. Medications   apixaban (ELIQUIS) tablet 5 mg (5 mg Oral Given 71/37/06 2726)   folic acid (FOLVITE) tablet 1 mg (has no administration in time range)   ruxolitinib phosphate (JAKAFI) 10 MG tablet TABS 10 mg (has no administration in time range)   levothyroxine (SYNTHROID) tablet 88 mcg (has no administration in time range)   metoprolol tartrate (LOPRESSOR) tablet 100 mg (100 mg Oral Given 12/22/22 0022)   sodium chloride flush 0.9 % injection 5-40 mL (has no administration in time range)   sodium chloride flush 0.9 % injection 5-40 mL (has no administration in time range)   0.9 % sodium chloride infusion (has no administration in time range)   ondansetron (ZOFRAN-ODT) disintegrating tablet 4 mg (has no administration in time range)     Or   ondansetron (ZOFRAN) injection 4 mg (has no administration in time range)   polyethylene glycol (GLYCOLAX) packet 17 g (has no administration in time range)   acetaminophen (TYLENOL) tablet 650 mg (has no administration in time range)     Or   acetaminophen (TYLENOL) suppository 650 mg (has no administration in time range)   perflutren lipid microspheres (DEFINITY) injection 1.5 mL (has no administration in time range)   0.9 % sodium chloride bolus (0 mLs IntraVENous Stopped 12/21/22 1910)   acyclovir (ZOVIRAX) capsule 200 mg (200 mg Oral Given 12/21/22 1712)   potassium chloride (KLOR-CON M) extended release tablet 40 mEq (40 mEq Oral Given 12/21/22 1727)   potassium chloride 10 mEq/100 mL IVPB (Peripheral Line) (10 mEq IntraVENous New Bag 12/21/22 1830)   cefTRIAXone (ROCEPHIN) 1,000 mg in sterile water 10 mL IV syringe (1,000 mg IntraVENous Given 12/21/22 2045)         EKG: This EKG is signed and interpreted by me.     Rate: 63  Rhythm: Sinus and frequent PACs  Interpretation:, Sinus rhythm with few premature atrial complexes, left axis deviation with bundle branch block, normal GA interval, prolonged QRS due to BBB, mildly prolonged QT of 470, no acute ST changes  Comparison: Not able to compare to previous EKG due to system error      ------------------------- NURSING NOTES AND VITALS REVIEWED ---------------------------  Date / Time Roomed:  12/21/2022  3:43 PM  ED Bed Assignment:  [id number]    The nursing notes within the ED encounter and vital signs as below have been reviewed. Patient Vitals for the past 24 hrs:   BP Temp Temp src Pulse Resp SpO2 Height Weight   12/22/22 0022 (!) 184/90 -- -- 81 -- -- -- --   12/21/22 2230 (!) 184/93 98.4 °F (36.9 °C) Oral 66 16 97 % -- --   12/21/22 2036 (!) 177/88 -- -- 68 19 98 % -- --   12/21/22 1911 (!) 169/85 -- -- 63 15 97 % -- --   12/21/22 1832 (!) 193/117 -- -- 66 24 98 % -- --   12/21/22 1714 (!) 194/94 -- -- 73 22 97 % -- --   12/21/22 1642 (!) 143/81 -- -- 61 18 96 % -- --   12/21/22 1547 (!) 168/85 98.6 °F (37 °C) Oral 67 20 100 % 5' 5\" (1.651 m) 152 lb (68.9 kg)       Oxygen Saturation Interpretation: Normal    ------------------------------------------ PROGRESS NOTES ------------------------------------------  Re-evaluation(s):  Time: Every 30 minutes. Patients symptoms show no change  Repeat physical examination is not changed    Counseling:  I have spoken with the patient and discussed todays results, in addition to providing specific details for the plan of care and counseling regarding the diagnosis and prognosis. Their questions are answered at this time and they are agreeable with the plan of admission.    --------------------------------- ADDITIONAL PROVIDER NOTES ---------------------------------  Consultations:  Spoke with hospitalist.  Discussed case. They will admit the patient.   This patient's ED course included: a personal history and physicial examination, re-evaluation prior to disposition, multiple bedside re-evaluations, IV medications, cardiac monitoring, and continuous pulse oximetry    This patient has remained hemodynamically stable during their ED course. Diagnosis:  1. Failure to thrive in adult    2. Herpes zoster with complication    3. Hypokalemia    4. Elevated troponin        Disposition:  Patient's disposition: Admit to telemetry  Patient's condition is stable. Patient was seen and evaluated by myself and my attending Marion Og MD. Assessment and Plan discussed with attending provider, please see attestation for final plan of care.      MD Kanika Degroot MD  Resident  12/22/22 8971

## 2022-12-22 LAB
ACANTHOCYTES: ABNORMAL
ANION GAP SERPL CALCULATED.3IONS-SCNC: 10 MMOL/L (ref 7–16)
ANISOCYTOSIS: ABNORMAL
BASOPHILS ABSOLUTE: 0 E9/L (ref 0–0.2)
BASOPHILS RELATIVE PERCENT: 0.8 % (ref 0–2)
BUN BLDV-MCNC: 13 MG/DL (ref 6–23)
C-REACTIVE PROTEIN: <0.3 MG/DL (ref 0–0.4)
CALCIUM SERPL-MCNC: 8.7 MG/DL (ref 8.6–10.2)
CHLORIDE BLD-SCNC: 95 MMOL/L (ref 98–107)
CO2: 26 MMOL/L (ref 22–29)
CREAT SERPL-MCNC: 0.9 MG/DL (ref 0.5–1)
EKG ATRIAL RATE: 63 BPM
EKG P AXIS: -5 DEGREES
EKG P-R INTERVAL: 168 MS
EKG Q-T INTERVAL: 460 MS
EKG QRS DURATION: 150 MS
EKG QTC CALCULATION (BAZETT): 470 MS
EKG R AXIS: -45 DEGREES
EKG T AXIS: 133 DEGREES
EKG VENTRICULAR RATE: 63 BPM
EOSINOPHILS ABSOLUTE: 0 E9/L (ref 0.05–0.5)
EOSINOPHILS RELATIVE PERCENT: 0.4 % (ref 0–6)
GFR SERPL CREATININE-BSD FRML MDRD: >60 ML/MIN/1.73
GLUCOSE BLD-MCNC: 113 MG/DL (ref 74–99)
HCT VFR BLD CALC: 32.5 % (ref 34–48)
HEMOGLOBIN: 10.7 G/DL (ref 11.5–15.5)
HYPOCHROMIA: ABNORMAL
LYMPHOCYTES ABSOLUTE: 1.42 E9/L (ref 1.5–4)
LYMPHOCYTES RELATIVE PERCENT: 9.5 % (ref 20–42)
MAGNESIUM: 2.2 MG/DL (ref 1.6–2.6)
MCH RBC QN AUTO: 31.8 PG (ref 26–35)
MCHC RBC AUTO-ENTMCNC: 32.9 % (ref 32–34.5)
MCV RBC AUTO: 96.7 FL (ref 80–99.9)
MONOCYTES ABSOLUTE: 0.85 E9/L (ref 0.1–0.95)
MONOCYTES RELATIVE PERCENT: 6 % (ref 2–12)
MYELOCYTE PERCENT: 2.6 % (ref 0–0)
NEUTROPHILS ABSOLUTE: 12.07 E9/L (ref 1.8–7.3)
NEUTROPHILS RELATIVE PERCENT: 81.9 % (ref 43–80)
NUCLEATED RED BLOOD CELLS: 0.9 /100 WBC
OVALOCYTES: ABNORMAL
PDW BLD-RTO: 20.4 FL (ref 11.5–15)
PLATELET # BLD: 197 E9/L (ref 130–450)
PMV BLD AUTO: 13.2 FL (ref 7–12)
POIKILOCYTES: ABNORMAL
POLYCHROMASIA: ABNORMAL
POTASSIUM REFLEX MAGNESIUM: 3.4 MMOL/L (ref 3.5–5)
RBC # BLD: 3.36 E12/L (ref 3.5–5.5)
SEDIMENTATION RATE, ERYTHROCYTE: 4 MM/HR (ref 0–20)
SODIUM BLD-SCNC: 131 MMOL/L (ref 132–146)
TARGET CELLS: ABNORMAL
TEAR DROP CELLS: ABNORMAL
TSH SERPL DL<=0.05 MIU/L-ACNC: 16.68 UIU/ML (ref 0.27–4.2)
WBC # BLD: 14.2 E9/L (ref 4.5–11.5)

## 2022-12-22 PROCEDURE — 93010 ELECTROCARDIOGRAM REPORT: CPT | Performed by: INTERNAL MEDICINE

## 2022-12-22 PROCEDURE — 92526 ORAL FUNCTION THERAPY: CPT | Performed by: SPEECH-LANGUAGE PATHOLOGIST

## 2022-12-22 PROCEDURE — 6370000000 HC RX 637 (ALT 250 FOR IP): Performed by: INTERNAL MEDICINE

## 2022-12-22 PROCEDURE — 36415 COLL VENOUS BLD VENIPUNCTURE: CPT

## 2022-12-22 PROCEDURE — 86140 C-REACTIVE PROTEIN: CPT

## 2022-12-22 PROCEDURE — 97161 PT EVAL LOW COMPLEX 20 MIN: CPT

## 2022-12-22 PROCEDURE — 2580000003 HC RX 258: Performed by: INTERNAL MEDICINE

## 2022-12-22 PROCEDURE — 84443 ASSAY THYROID STIM HORMONE: CPT

## 2022-12-22 PROCEDURE — 85651 RBC SED RATE NONAUTOMATED: CPT

## 2022-12-22 PROCEDURE — 1200000000 HC SEMI PRIVATE

## 2022-12-22 PROCEDURE — 85025 COMPLETE CBC W/AUTO DIFF WBC: CPT

## 2022-12-22 PROCEDURE — 92610 EVALUATE SWALLOWING FUNCTION: CPT | Performed by: SPEECH-LANGUAGE PATHOLOGIST

## 2022-12-22 PROCEDURE — 80048 BASIC METABOLIC PNL TOTAL CA: CPT

## 2022-12-22 PROCEDURE — 83735 ASSAY OF MAGNESIUM: CPT

## 2022-12-22 PROCEDURE — 99232 SBSQ HOSP IP/OBS MODERATE 35: CPT | Performed by: INTERNAL MEDICINE

## 2022-12-22 RX ORDER — CIPROFLOXACIN 500 MG/1
500 TABLET, FILM COATED ORAL EVERY 12 HOURS SCHEDULED
Status: COMPLETED | OUTPATIENT
Start: 2022-12-22 | End: 2022-12-26

## 2022-12-22 RX ORDER — HYDRALAZINE HYDROCHLORIDE 20 MG/ML
10 INJECTION INTRAMUSCULAR; INTRAVENOUS EVERY 4 HOURS PRN
Status: DISCONTINUED | OUTPATIENT
Start: 2022-12-22 | End: 2022-12-29 | Stop reason: HOSPADM

## 2022-12-22 RX ADMIN — ACETAMINOPHEN 650 MG: 325 TABLET ORAL at 09:29

## 2022-12-22 RX ADMIN — FOLIC ACID 1 MG: 1 TABLET ORAL at 09:29

## 2022-12-22 RX ADMIN — APIXABAN 5 MG: 5 TABLET, FILM COATED ORAL at 09:29

## 2022-12-22 RX ADMIN — METOPROLOL TARTRATE 100 MG: 50 TABLET, FILM COATED ORAL at 09:29

## 2022-12-22 RX ADMIN — METOPROLOL TARTRATE 100 MG: 50 TABLET, FILM COATED ORAL at 20:36

## 2022-12-22 RX ADMIN — LEVOTHYROXINE SODIUM 88 MCG: 0.09 TABLET ORAL at 09:29

## 2022-12-22 RX ADMIN — Medication 10 ML: at 21:46

## 2022-12-22 RX ADMIN — APIXABAN 5 MG: 5 TABLET, FILM COATED ORAL at 20:36

## 2022-12-22 RX ADMIN — CIPROFLOXACIN 500 MG: 500 TABLET, FILM COATED ORAL at 09:29

## 2022-12-22 RX ADMIN — METOPROLOL TARTRATE 100 MG: 50 TABLET, FILM COATED ORAL at 00:22

## 2022-12-22 RX ADMIN — APIXABAN 5 MG: 5 TABLET, FILM COATED ORAL at 00:23

## 2022-12-22 RX ADMIN — Medication 10 ML: at 09:30

## 2022-12-22 RX ADMIN — CIPROFLOXACIN 500 MG: 500 TABLET, FILM COATED ORAL at 20:36

## 2022-12-22 NOTE — PROGRESS NOTES
Admitting Date and Time: 12/21/2022  3:43 PM  Admit Dx: Hypokalemia [E87.6]  Elevated troponin [R77.8]  Failure to thrive in adult [R62.7]  Herpes zoster with complication [T36.4]    Subjective:    Pt feels chest  pain-free  Per RN: No issues    ROS: denies fever, chills, cp, sob, n/v, HA unless stated above.      ruxolitinib phosphate  10 mg Oral TID    ciprofloxacin  500 mg Oral 2 times per day    apixaban  5 mg Oral BID    folic acid  1 mg Oral Daily    levothyroxine  88 mcg Oral Daily    metoprolol  100 mg Oral BID    sodium chloride flush  5-40 mL IntraVENous 2 times per day     sodium chloride flush, 5-40 mL, PRN  sodium chloride, , PRN  ondansetron, 4 mg, Q8H PRN   Or  ondansetron, 4 mg, Q6H PRN  polyethylene glycol, 17 g, Daily PRN  acetaminophen, 650 mg, Q6H PRN   Or  acetaminophen, 650 mg, Q6H PRN  perflutren lipid microspheres, 1.5 mL, ONCE PRN         Objective:    BP (!) 155/70   Pulse 64   Temp 98.5 °F (36.9 °C) (Oral)   Resp 16   Ht 5' 5\" (1.651 m)   Wt 152 lb (68.9 kg)   SpO2 97%   BMI 25.29 kg/m²   General Appearance: alert and oriented to person, place and time and in no acute distress  Skin: warm and dry  Head: normocephalic and atraumatic  Eyes: pupils equal, round, and reactive to light, extraocular eye movements intact, conjunctivae normal  Neck: neck supple and non tender without mass   Pulmonary/Chest: clear to auscultation bilaterally- no wheezes, rales or rhonchi, normal air movement, no respiratory distress  Cardiovascular: normal rate, normal S1 and S2 and no carotid bruits  Abdomen: soft, non-tender, non-distended, normal bowel sounds, no masses or organomegaly  Extremities: no cyanosis, no clubbing and no  edema  Neurologic: no cranial nerve deficit and speech normal      Recent Labs     12/21/22  1600 12/21/22  1611   *  --    K 2.8*  --    CL 91*  --    CO2 29  --    BUN 14  --    CREATININE 1.2*  --    GLUCOSE 103* 108   CALCIUM 9.0  --        Recent Labs 12/21/22  1600   ALKPHOS 29*   PROT 6.0*   LABALBU 4.0   BILITOT 1.7*   AST 17   ALT 9       Recent Labs     12/21/22  1600   WBC 14.0*   RBC 3.31*   HGB 10.4*   HCT 32.3*   MCV 97.6   MCH 31.4   MCHC 32.2   RDW 20.6*      MPV NOT CALC           Radiology:   XR CHEST PORTABLE   Final Result   No acute process. Assessment:  Principal Problem:    Elevated troponin  Resolved Problems:    * No resolved hospital problems. *      Plan: History of present illness from History and Physical:  This is a 64-year-old female who presents to [facility] with the above-stated complaint. All history was obtained from the patient and the daughter along with review of medical records. Daughter Jaleesa Milan reports that the mother has stopped eating and drinking significantly. Apparently she for quite some time has not had teeth. Apparently of recently she had some full dentures given to her. Reported when she starts to eat the dentures hurt and she stops eating. When she tries to do without the dentures she loses her appetite. Patient also complains of being lightheaded. She complains of being too tired to do her daily activities. She states she wants to do it was unable to do due to fatigue and shortness of breath. Incidentally when she came in her troponins were found to be elevated that the 40 and 36. She had no new EKG changes. Consultation was made to cardiology and they recommended patient be admitted for observation and further evaluation. At this time we will get an echocardiogram to make sure her EF is preserved as her lightheadedness and fatigue could be cardiac in origin. I will have speech see her in regards to his difficulty with eating.     Fatigue and weakness, Loss of appetiteL  multifactorial:  ill fitting and/or new dentures,  posterior hepatic zoster syndrome, intermittent claudication depression:  Crp, esr.  speech eval, PT OT evaluate  Elevated troponin 40 and 34 with lightheadedness / nonspecific symptoms  2017 heart catheter : minimal disease  -Patient does report some lightheadedness when she gets up  -Reports that she is also gets more fatigue when she attempts to do gardening  echo & Cardiology consult     3. UTI: oral ciprofloxacin, Await urine culture sensitivities. Patient and loved 1 is aware that she might be here for a least a couple of days     4. CKD 3: Stable at the moment  -Continue to monitor renal function. Daily labs ordered     5. Hypothyroid: Stable continue Synthroid. TSH sent 12/22:     6. Polycythemia per hx: continue Jakafi and elquis     7. Essential hypertension: Continue metoprolol. Was > 180  sbp during first 10 hours of this visit / stay. Observe closely and add prn hydralazine for sbp > 170     8. Zoster: tyenol 1000 mg as she took at home for pain. Dtr at bedside explained she was not allowed to get vaccinated again this years ago by former dr. Merle Del Valle had other vaccines  Full Code  9. DVT prophylaxis: On Eliquis  10. Dispo: might need placement. PT and OT could be postponed another day due to her presentation of lightheadedness          NOTE: This report was transcribed using voice recognition software. Every effort was made to ensure accuracy; however, inadvertent computerized transcription errors may be present.      Electronically signed by Denia Espinosa MD on 12/22/2022 at 8:22 AM

## 2022-12-22 NOTE — ED NOTES
Report called to tele floor for room 418. Nurse states she was unaware that patient has shingles and she has to go into private room. 435 is being cleaned now and they will call ED when ready.      Laurence Mayfield RN  12/21/22 [id number]

## 2022-12-22 NOTE — PROGRESS NOTES
Ruxolitinib(Jakafi) is non-formulary and will not be dispensed by the pharmacy at this time. Please request supply from caregiver/family. If available, deliver (do not send via pneumatic tube) to pharmacy for identification and barcode. Please notify pharmacy if caregiver unable to provide medication. Thank you.   Venessa Yuan RPh  12/22/2022, 8:46 AM

## 2022-12-22 NOTE — PROGRESS NOTES
Comprehensive Nutrition Assessment    Type and Reason for Visit:  Initial, Positive Nutrition Screen    Nutrition Recommendations/Plan:   Continue current diet   Ordered Ensure Plus BID to optimize nutrition status & monitor     Malnutrition Assessment:  Malnutrition Status: At risk for malnutrition (Comment) (12/22/22 1356)    Context:  Chronic Illness     Findings of the 6 clinical characteristics of malnutrition:  Energy Intake:  Mild decrease in energy intake (Comment)  Weight Loss:  Unable to assess (d/t lack of accurate wt in EMR)     Body Fat Loss:  Unable to assess (d/t iso)     Muscle Mass Loss:  Unable to assess (d/t iso)    Fluid Accumulation:  No significant fluid accumulation     Strength:  Not Performed    Nutrition Assessment:    Pt admits w/ generalized weakness & MAGDALENA, note multifactorial: ill fitting and/or new dentures,  Dx:  Elevated troponin,  Adult FTT, Herpes zoster w/ complication, Hypokalemia Hx: Polycythemia vera, CKD, hypothyroidism, HTN. Per SLP pt on sof/bite sized diet. No intakes noted. Will order Ensure Plus BID to optimize nutrition status & monitor. Nutrition Related Findings:    A/O x4, rounded/soft/nontender abd +BS, diarrhea, no I/O noted, no edema noted, K+ 3.4(L), Na 131(L). Note new denters ill- fitting, was edentulous for several months prior to dentures. Wound Type: None       Current Nutrition Intake & Therapies:    Average Meal Intake: Unable to assess  Average Supplements Intake: None Ordered  ADULT DIET; Dysphagia - Soft and Bite Sized; Low Fat/Low Chol/High Fiber/GAIL  ADULT ORAL NUTRITION SUPPLEMENT; Lunch, Dinner; Standard High Calorie/High Protein Oral Supplement    Anthropometric Measures:  Height: 5' 5\" (165.1 cm)  Ideal Body Weight (IBW): 125 lbs (57 kg)    Admission Body Weight: 152 lb (68.9 kg) (12/20 stated)  Current Body Weight: 152 lb (68.9 kg), 121.6 % IBW.     Current BMI (kg/m2): 25.3  Usual Body Weight:  (163-169# accurate wt range per EMR x 8 mos) Weight Adjustment For: No Adjustment     BMI Categories: Overweight (BMI 25.0-29. 9)    Estimated Daily Nutrient Needs:  Energy Requirements Based On: Formula  Weight Used for Energy Requirements: Admission  Energy (kcal/day): 0220-4829  Weight Used for Protein Requirements: Ideal  Protein (g/day): 70-80 (1.2-1.4gm/kg)     Fluid (ml/day): 0083-0326    Nutrition Diagnosis:   Inadequate oral intake related to partial or complete edentulism as evidenced by poor intake prior to admission    Nutrition Interventions:   Food and/or Nutrient Delivery: Continue Current Diet  Nutrition Education/Counseling: No recommendation at this time  Coordination of Nutrition Care: Continue to monitor while inpatient     Goals:     Goals: PO intake 75% or greater     Nutrition Monitoring and Evaluation:   Behavioral-Environmental Outcomes: None Identified  Food/Nutrient Intake Outcomes: Diet Advancement/Tolerance, Food and Nutrient Intake, Supplement Intake  Physical Signs/Symptoms Outcomes: Biochemical Data, Chewing or Swallowing, Diarrhea, GI Status, Fluid Status or Edema, Weight, Skin, Nutrition Focused Physical Findings    Discharge Planning:     Too soon to determine     Osmar Pak RD  Contact: [phone]

## 2022-12-22 NOTE — PROGRESS NOTES
SPEECH/LANGUAGE PATHOLOGY  CLINICAL ASSESSMENT OF SWALLOWING FUNCTION   and PLAN OF CARE    PATIENT NAME:  Vikki Turner  (female)     MRN:  [id number]    :  1940  (78 y.o.)  STATUS:  Inpatient: Room 0417/0417-01    TODAY'S DATE:  2022  REFERRING PROVIDER:     SLP eval and treat  Start:  22,   End:  22,   ONE TIME,   Standing Count:  1 Occurrences,   R        Order went unreviewed at transfer on Wed Dec 21, 2022 11:50 PM    Alexandra Hernandez MD   REASON FOR REFERRAL: dysphagia    EVALUATING THERAPIST: JEANETTE Riley                 RESULTS:    DYSPHAGIA DIAGNOSIS:   Clinical indicators of moderate oropharyngeal phase dysphagia       DIET RECOMMENDATIONS:  Minced and moist consistency solids (IDDSI level 5) with  thin liquids (IDDSI level 0), MEDICATION ADMINISTRATION, and Administer medication crushed, as able, with pudding/applesauce     FEEDING RECOMMENDATIONS:     Assistance level:  Set-up is required for all oral intake      Compensatory strategies recommended: Small bites/sips and Alternate solids and liquids      Discussed recommendations with nursing and/or faxed report to referring provider: Yes    SPEECH THERAPY  PLAN OF CARE   The dysphagia POC is established based on physician order, dysphagia diagnosis and results of clinical assessment     Skilled SLP intervention for dysphagia management up to 5x per week until goals met, pt plateaus in function and/or discharged from hospital    Conditions Requiring Skilled Therapeutic Intervention for dysphagia:    Patient is performing below functional baseline d/t  current acute condition, Multiple diagnoses, multiple medications, and increased dependency upon caregivers.   Impaired mastication  Globus sensation especially with Pills     Specific dysphagia interventions to include:     Training in positioning for improved integrity of swallow  Compensatory strategy training   Therapeutic exercises  Trials of upgraded diet/liquid Specific instructions for next treatment:  development and training of compensatory swallow strategies to improve airway protection and swallow function  Patient Treatment Goals:    Short Term Goals:  Pt will participate in MBSS to fully assess oropharyngeal swallow function and to assist in determining the least restrictive PO diet to maintain adequate nutrition/hydration   Pt will implement identified compensatory swallowing strategies on 90% of opportunities or greater to improve airway protection and swallow function. Pt will participate in ongoing mealtime assessment to provide diet modification and compensatory strategy implementation to minimize risk of aspiration associated with PO intake  Pt will complete PO trials of upgraded diet textures with SLP only to determine the least restrictive PO diet to maintain adequate nutrition/hydration with no more than 1 overt s/s of pen/asp. Long Term Goals:   Pt will improve oropharyngeal swallow function to ensure airway protection during PO intake to maintain adequate nutrition/hydration and decrease signs/symptoms of aspiration to less than 1 x/day.    OTHER RECOMMENDATIONS:  A Video Swallow Study (MBSS) is recommended and requires a physician order      Patient/family Goal:    To be able to [facility] discussed with Patient and Family   The Patient and Family understand(s) the diagnosis, prognosis and plan of care     Rehabilitation Potential/Prognosis: fair                    ADMITTING DIAGNOSIS: Hypokalemia [E87.6]  Elevated troponin [R77.8]  Failure to thrive in adult [R62.7]  Herpes zoster with complication [Z90.9]    VISIT DIAGNOSIS:   Visit Diagnoses         Codes    Failure to thrive in adult    -  Primary R62.7    Herpes zoster with complication     I28.1    Hypokalemia     E87.6             PATIENT REPORT/COMPLAINT: difficulty chewing  difficulty swallowing pills  RN cleared patient for participation in assessment     yes and meal tray present during evaluation     PRIOR LEVEL OF SWALLOW FUNCTION:    PAST HISTORY OF DYSPHAGIA?: yes    Home diet: Easy to chew consistency solids (IDDSI level 7, transitional) with  thin liquids (IDDSI level 0)  Current Diet Order:  ADULT DIET; Dysphagia - Soft and Bite Sized; Low Fat/Low Chol/High Fiber/GAIL    PROCEDURE:  Consistencies Administered During the Evaluation   Liquids: thin liquid   Solids:  soft solid foods  And pills     Method of Intake:   straw, spoon  Self fed      Position:   Seated, upright    CLINICAL ASSESSMENT:  Oral Stage:       Decreased mastication due to:  poor/missing dentition        Pharyngeal Stage:    No signs of aspiration were noted during this evaluation however, silent aspiration cannot be ruled out at bedside. If silent aspiration is suspected, a Videofluoroscopic Study of Swallowing (MBS) is recommended and requires a physician order. But persistent report of pills sticking     Cognition:   Alert & Oriented x 3    Oral Peripheral Examination   Adequate lingual/labial strength     Current Respiratory Status    room air     Parameters of Speech Production  Respiration:  Adequate for speech production  Quality:   Within functional limits  Intensity: Within functional limits    Volitional Swallow: present     Volitional Cough:   present     Pain: No pain reported. EDUCATION:   The Speech Language Pathologist (SLP) completed education regarding results of evaluation and that intervention is warranted at this time. Learner: Patient  Education: Reviewed results and recommendations of this evaluation  Evaluation of Education:  Tylor Arguelles understanding    This plan may be re-evaluated and revised as warranted.       Evaluation Time includes thorough review of current medical information, gathering information on past medical history/social history and prior level of function, completion of standardized testing/informal observation of tasks, assessment of data and education on plan of care and goals.    [x]The admitting diagnosis and active problem list, have been reviewed prior to initiation of this evaluation.        ACTIVE PROBLEM LIST:   Patient Active Problem List   Diagnosis    Syncope    Leukocytosis    Troponin I above reference range    Elevated brain natriuretic peptide (BNP) level    Left bundle branch block    Lumbar radiculopathy    Low back pain    Facet syndrome, lumbar    Compression fracture of L1 lumbar vertebra Old    DDD (degenerative disc disease), lumbar    Rotoscoliosis    Lumbar sprain    Lumbar strain    Protruded lumbar disc    Chronic pulmonary embolism (HCC)    Acute cystitis with hematuria    Polycythemia    Vitamin D deficiency    Hypothyroidism    Essential hypertension    Hyperlipidemia    Closed compression fracture of L1 lumbar vertebra, sequela    Lumbar spondylosis    Lumbar facet arthropathy    Lumbar disc disorder    Chronic pain syndrome    Chronic renal disease, stage III (HCC) [221377]    Elevated troponin         CPT code:  14088  bedside swallow eval      Patient seen for swallow therapy 10 minutes.  Reviewed current solid/liquid consistency diet recommendation for   Minced and moist consistency solids (IDDSI level 5) with thin liquids (IDDSI level 0), MEDICATION ADMINISTRATION, and Administer medication crushed, as able, with pudding/applesauceand discussed compensatory strategies to ensure safe PO intake. Reviewed aspiration precautions.  Discussed use of Alternate solids and liquids to decrease risk of aspiration with implementation of strengthening exercises to improve swallow function as the long term goal.  Patient was able to return demonstration of compensatory strategies during session..  will continue POC.        89216  dysphagia tx    Laquita Groves MSCCC/SLP  Speech Language Pathologist  [phone]

## 2022-12-22 NOTE — CARE COORDINATION
TSERING spoke with patients son, Baljeet Mosley via phone [phone]. He states patient lives with him in a 2 story home. He states she goes upstairs for her bedroom and bathroom. He states she is independent at home and does not use any DME. He states he usually helps her up and down the stairs or is at least with her, and holds on to her arm when out. Her PCP is Dr Ardian Roland, pharmacy is mail order or [facility] in [place]. No history of HHC or [facility]. PT and OT evals are still pending however, son states he does not want her to go to [facility] for the holidays, will take her home. He states he or his sister will transport. Will follow for DC planning needs pending therapy evals.

## 2022-12-22 NOTE — PROGRESS NOTES
Patient's medication Jakafi was labeled in pharmacy and placed back into the patient's possession. Patient and daughter in room when medication was returned.

## 2022-12-22 NOTE — PROGRESS NOTES
Per patient not familiar with home medication lists, states list \"should be correct in the system\". List last reviewed by patient's PCP Dr. Stephanie Peralta 11/22/22. Per patient, son to bring list in tomorrow.

## 2022-12-22 NOTE — PROGRESS NOTES
Physical Therapy Initial Evaluation/Plan of Care    Room #:  0459/8738-02  Patient Name: Ulysses Breech  YOB: 1940  MRN: [id number]    Date of Service: 12/22/2022     Tentative placement recommendation: Subacute vs Home Health Physical Therapy if patient meets goals  Equipment recommendation: To be determined      Evaluating Physical Therapist: Arlet Panda #[id number]      Specific Provider Orders/Date/Referring Provider :   12/22/22 0000    PT evaluation and treat  Start:  12/22/22 0000,   End:  12/22/22 0000,   ONE TIME,   Standing Count:  1 Occurrences,   Broderick Cruz MD     Admitting Diagnosis:   Hypokalemia [E87.6]  Elevated troponin [R77.8]  Failure to thrive in adult [R62.7]  Herpes zoster with complication [Q70.9]      Surgery: none  Visit Diagnoses         Codes    Failure to thrive in adult    -  Primary R62.7    Herpes zoster with complication     U79.5    Hypokalemia     E87.6            Patient Active Problem List   Diagnosis    Syncope    Leukocytosis    Troponin I above reference range    Elevated brain natriuretic peptide (BNP) level    Left bundle branch block    Lumbar radiculopathy    Low back pain    Facet syndrome, lumbar    Compression fracture of L1 lumbar vertebra Old    DDD (degenerative disc disease), lumbar    Rotoscoliosis    Lumbar sprain    Lumbar strain    Protruded lumbar disc    Chronic pulmonary embolism ([address]    Acute cystitis with hematuria    Polycythemia    Vitamin D deficiency    Hypothyroidism    Essential hypertension    Hyperlipidemia    Closed compression fracture of L1 lumbar vertebra, sequela    Lumbar spondylosis    Lumbar facet arthropathy    Lumbar disc disorder    Chronic pain syndrome    Chronic renal disease, stage III (HCC) [193861]    Elevated troponin        ASSESSMENT of Current Deficits Patient exhibits decreased strength, balance, and endurance impairing functional mobility, transfers, gait distance, and tolerance to activity. Patient is incont of bladder and reports that just started recently. Mod unsteady on feet and difficultly moving Left LE. The patient will benefit from continued skilled therapy to increase strength and improve balance for safe functional mobility, to decrease risk of falls, and to meet goals at discharge. PHYSICAL THERAPY  PLAN OF CARE       Physical therapy plan of care is established based on physician order,  patient diagnosis and clinical assessment    Current Treatment Recommendations:    -Bed Mobility: Lower extremity exercises , Upper extremity exercises , and Trunk control activities   -Sitting Balance: Incorporate reaching activities to activate trunk muscles   -Standing Balance: Perform strengthening exercises in standing to promote motor control with or without upper extremity support   -Transfers: Provide instruction on proper hand and foot position for adequate transfer of weight onto lower extremities and use of gait device if needed and Cues for hand placement, technique and safety. Provide stabilization to prevent fall   -Gait: Gait training and Standing activities to improve: base of support, weight shift, weight bearing    -Endurance: Utilize Supervised activities to increase level of endurance to allow for safe functional mobility including transfers and gait   -Stairs: Stair training with instruction on proper technique and hand placement on rail    PT long term treatment goals are located in below grid    Patient and or family understand(s) diagnosis, prognosis, and plan of care. Frequency of treatments: Patient will be seen  daily.          Prior Level of Function: Patient ambulated independently   Rehab Potential: good  for baseline    Past medical history:   Past Medical History:   Diagnosis Date    Back injury     Cervical spondylolysis     Chronic lymphoid leukemia ([facility] [address]     Gallstones     H/O echocardiogram 06/04/2017    EF 69%    History of bone density study     Dr [facility], [address]?    Hyperlipidemia     Hypertension     LBBB (left bundle branch block)     Lumbar spondylosis with myelopathy     Pneumonia     Polycythemia vera (HCC)     JAK2    Syncope and collapse     Thyroid disease      Past Surgical History:   Procedure Laterality Date    APPENDECTOMY      CHOLECYSTECTOMY      DIAGNOSTIC CARDIAC CATH LAB PROCEDURE Bilateral 06/05/2017    HYSTERECTOMY, TOTAL ABDOMINAL (CERVIX REMOVED)      NERVE BLOCK  06/03/15    lumbar epidural #1    NERVE BLOCK  6 22 15    lumbar epidural #2    NERVE BLOCK N/A 7/6/15    lumbar epidural #3    TONSILLECTOMY         SUBJECTIVE:    Precautions: Up as tolerated, falls , incont of bladder, pure wick     Social history: Patient lives with son in a bilevel home 5 steps up with HR and 5 Steps down without HR    with 2 steps  with HR to enter Tub shower  . Reports her son helps her with stairs and she doesn't go down the one set of stairs to the bottom level    Equipment owned: Grab bars,      AM-PAC Basic Mobility       AM-[facility] Mobility Inpatient   How much difficulty turning over in bed?: A Little  How much difficulty sitting down on / standing up from a chair with arms?: A Little  How much difficulty moving from lying on back to sitting on side of bed?: A Little  How much help from another person moving to and from a bed to a chair?: A Lot  How much help from another person needed to walk in hospital room?: A Lot  How much help from another person for climbing 3-5 steps with a railing?: A Lot  AM-PAC Inpatient Mobility Raw Score : 15  AM-PAC Inpatient T-Scale Score : 39.45  Mobility Inpatient CMS 0-100% Score: 57.7  Mobility Inpatient CMS G-Code Modifier : CK    Nursing cleared patient for PT evaluation. The admitting diagnosis and active problem list as listed above have been reviewed prior to the initiation of this evaluation.     OBJECTIVE;   Initial Evaluation  Date: 12/22/2022 Treatment Date:     Short Term/ Long Term   Goals Was pt agreeable to Eval/treatment? Yes  To be met in 4 days   Pain level   0/10       Bed Mobility    Rolling: Minimal assist of 1    Supine to sit: Minimal assist of 1    Sit to supine: Minimal assist of 1    Scooting: Minimal assist of 1    Rolling: Independent    Supine to sit: Independent    Sit to supine: Independent    Scooting: Independent     Transfers Sit to stand: Minimal assist of 1   Sit to stand: Independent    Ambulation     3 side steps using  wheeled walker with Moderate assist of 1   for walker control, balance, and safety    45 feet using  wheeled walker with Modified Independent    Stair negotiation: ascended and descended   Not assessed       5 steps with HR and min A   ROM Within functional limits    Increase range of motion 10% of affected joints    Strength BUE:  refer to OT eval  RLE:  4-/5  LLE:  3+/5  Increase strength in affected mm groups by 1/3 grade   Balance Sitting EOB:  good -  Dynamic Standing:  fair - wheeled walker    Sitting EOB:  good   Dynamic Standing: good      Patient is Alert & Oriented x person, place, time, and situation and follows directions    Sensation:  Patient  denies numbness/tingling   Edema:  no   Endurance: fair      Vitals: room air   Blood Pressure at rest  Blood Pressure during session    Heart Rate at rest  Heart Rate during session    SPO2 at rest %  SPO2 during session %     Patient education  Patient educated on role of Physical Therapy, risks of immobility, safety and plan of care,  importance of mobility while in hospital , ankle pumps, quad set and glut set for edema control, blood clot prevention, and safety      Patient response to education:   Pt verbalized understanding Pt demonstrated skill Pt requires further education in this area   Yes Partial Yes      Treatment:  Patient practiced and was instructed/facilitated in the following treatment: Patient, donned briefs before movement due to incont of bladder. Pt assisted to EOB.  Sat edge of bed 10 minutes with Supervision  to increase dynamic sitting balance and activity tolerance. Pt stood, took side steps to [facility]. Assisted back to supine. Performed exercises. Therapeutic Exercises:  ankle pumps and glut sets  x 10 reps. At end of session, patient in bed with alarm call light and phone within reach,  all lines and tubes intact, nursing notified. Patient would benefit from continued skilled Physical Therapy to improve functional independence and quality of life. Patient's/ family goals   home    Time in  1600  Time out  620    Total Treatment Time  0 minutes    Evaluation time includes thorough review of current medical information, gathering information on past medical history/social history and prior level of function, completion of standardized testing/informal observation of tasks, assessment of data, and development of Plan of care and goals.      CPT codes:  Low Complexity PT evaluation (87266)  No treatment    Sarina Soria PT

## 2022-12-22 NOTE — H&P
[facility] History and Physical    Perpetual assessment: 68-year-old female past medical history of polycythemia vera, chronic kidney disease stage III, hypothyroidism and hypertension presents with generalized weakness    Assessment and plan: Fatigue and weakness  Loss of appetite  -Etiology unclear  -Could be second from posterior hepatic zoster syndrome  -Also could be secondary patient has had difficulty chewing as she recently had new dentures  -Possibly some depression  -Will have speech eval  -We will have PT OT evaluate    Elevated troponin with lightheadedness  -Patient only has nonspecific symptoms  -Cardiac enzymes were 40 and 34 respectively  -Last heart catheter was 2017 that showed minimal disease  -Patient does report some lightheadedness when she gets up  -Reports that she is also gets more fatigue when she attempts to do gardening  -We will check echocardiogram  -Cardiology follow-up in the morning    UTI  -Mild in nature  -May be contributing to her symptoms  -We will switch to oral ciprofloxacin on  -Await urine culture sensitivities    CKD 3  -Stable at the moment  -Continue to monitor renal function    Hypothyroid  -Stable continue Synthroid    Polycythemia  -Has known history of this  -Continue Jakafi and elquis    Essential hypertension  -Continue metoprolol    Code Status: Full  DVT prophylaxis: On Eliquis      CHIEF COMPLAINT: Weakness and fatigue    History of Present Illness: This is a 68-year-old female who presents to [facility] with the above-stated complaint. All history was obtained from the patient and the daughter along with review of medical records. Daughter Catie Plunkett reports that the mother has stopped eating and drinking significantly. Apparently she for quite some time has not had teeth. Apparently of recently she had some full dentures given to her. Reported when she starts to eat the dentures hurt and she stops eating.   When she tries to do without the dentures she loses her appetite. Patient also complains of being lightheaded. She complains of being too tired to do her daily activities. She states she wants to do it was unable to do due to fatigue and shortness of breath. Incidentally when she came in her troponins were found to be elevated that the 40 and 36. She had no new EKG changes. Consultation was made to cardiology and they recommended patient be admitted for observation and further evaluation. At this time we will get an echocardiogram to make sure her EF is preserved as her lightheadedness and fatigue could be cardiac in origin. I will have speech see her in regards to his difficulty with eating. Informant(s) for H&P:    REVIEW OF SYSTEMS:  A comprehensive review of systems was negative except for: what is in the HPI      PMH:  Past Medical History:   Diagnosis Date    Back injury     Cervical spondylolysis     Chronic lymphoid leukemia ([facility] [address]     Gallstones     H/O echocardiogram 06/04/2017    EF 69%    History of bone density study     Dr [facility], [address]?    Hyperlipidemia     Hypertension     LBBB (left bundle branch block)     Lumbar spondylosis with myelopathy     Pneumonia     Polycythemia vera (HCC)     JAK2    Syncope and collapse     Thyroid disease        Surgical History:  Past Surgical History:   Procedure Laterality Date    APPENDECTOMY      CHOLECYSTECTOMY      DIAGNOSTIC CARDIAC CATH LAB PROCEDURE Bilateral 06/05/2017    HYSTERECTOMY, TOTAL ABDOMINAL (CERVIX REMOVED)      NERVE BLOCK  06/03/15    lumbar epidural #1    NERVE BLOCK  6 22 15    lumbar epidural #2    NERVE BLOCK N/A 7/6/15    lumbar epidural #3    TONSILLECTOMY         Medications Prior to Admission:    Prior to Admission medications    Medication Sig Start Date End Date Taking?  Authorizing Provider   VITAMIN D PO Take by mouth    Historical Provider, MD   metoprolol (LOPRESSOR) 100 MG tablet Take 1 tablet by mouth 2 times daily 11/22/22 5/21/23  Mathew Yip,    levothyroxine (SYNTHROID) 88 MCG tablet TAKE 1 TABLET DAILY 11/22/22   Mathew Yip DO   apixaban (ELIQUIS) 5 MG TABS tablet Take by mouth 2 times daily    Historical Provider, MD   acetaminophen (TYLENOL) 500 MG tablet Take 500 mg by mouth every 6 hours as needed for Pain    Historical Provider, MD   Calcium Carbonate-Vit D-Min (CALCIUM 600+D3 PLUS MINERALS) 600-800 MG-UNIT CHEW Take 1 each by mouth 2 times daily (with meals) 6/24/22   Mathew Yip DO   Ferrous Sulfate (IRON PO) Take by mouth    Historical Provider, MD   Ascorbic Acid (VITAMIN C PO) Take by mouth    Historical Provider, MD   folic acid (FOLVITE) 1 MG tablet take 1 tablet by mouth once daily 12/30/20   Historical Provider, MD   JAKAFTRINITY 10 MG tablet Take 10 mg by mouth 3 times daily 12/28/20   Historical Provider, MD       Allergies:    Clonidine derivatives, Medrol [methylprednisolone], Naproxen, Other, and Atarax [hydroxyzine]    Social History:    reports that she has never smoked. She has never used smokeless tobacco. She reports that she does not drink alcohol and does not use drugs. Family History:   family history includes Asthma in her father; Cancer in her maternal grandmother; Hypertension in her brother.        PHYSICAL EXAM:  Vitals:  BP (!) 184/93   Pulse 66   Temp 98.4 °F (36.9 °C) (Oral)   Resp 16   Ht 5' 5\" (1.651 m)   Wt 152 lb (68.9 kg)   SpO2 97%   BMI 25.29 kg/m²     General Appearance: alert and oriented to person, place and time and in no acute distress  Skin: warm and dry  Head: normocephalic and atraumatic  Eyes: pupils equal, round, and reactive to light, extraocular eye movements intact, conjunctivae normal  Neck: neck supple and non tender without mass   Pulmonary/Chest: clear to auscultation bilaterally- no wheezes, rales or rhonchi, normal air movement, no respiratory distress  Cardiovascular: normal rate, normal S1 and S2 and no carotid bruits  Abdomen: soft, non-tender, non-distended, normal bowel sounds, no masses or organomegaly  Extremities: no cyanosis, no clubbing and no edema  Neurologic: no cranial nerve deficit and speech normal        LABS:  Recent Labs     12/21/22  1600 12/21/22  1611   *  --    K 2.8*  --    CL 91*  --    CO2 29  --    BUN 14  --    CREATININE 1.2*  --    GLUCOSE 103* 108   CALCIUM 9.0  --        Recent Labs     12/21/22  1600   WBC 14.0*   RBC 3.31*   HGB 10.4*   HCT 32.3*   MCV 97.6   MCH 31.4   MCHC 32.2   RDW 20.6*      MPV NOT CALC       No results for input(s): POCGLU in the last 72 hours. Radiology:   XR CHEST PORTABLE   Final Result   No acute process. EKG:       NOTE: This report was transcribed using voice recognition software. Every effort was made to ensure accuracy; however, inadvertent computerized transcription errors may be present.   Electronically signed by Lopez Malagon MD on 12/21/2022 at 10:37 PM

## 2022-12-23 ENCOUNTER — APPOINTMENT (OUTPATIENT)
Dept: GENERAL RADIOLOGY | Age: 82
DRG: 683 | End: 2022-12-23
Payer: MEDICARE

## 2022-12-23 LAB
ANION GAP SERPL CALCULATED.3IONS-SCNC: 11 MMOL/L (ref 7–16)
BUN BLDV-MCNC: 12 MG/DL (ref 6–23)
CALCIUM SERPL-MCNC: 8.7 MG/DL (ref 8.6–10.2)
CHLORIDE BLD-SCNC: 96 MMOL/L (ref 98–107)
CO2: 27 MMOL/L (ref 22–29)
CREAT SERPL-MCNC: 1 MG/DL (ref 0.5–1)
GFR SERPL CREATININE-BSD FRML MDRD: 56 ML/MIN/1.73
GLUCOSE BLD-MCNC: 95 MG/DL (ref 74–99)
HCT VFR BLD CALC: 31.5 % (ref 34–48)
HEMOGLOBIN: 9.9 G/DL (ref 11.5–15.5)
MCH RBC QN AUTO: 31 PG (ref 26–35)
MCHC RBC AUTO-ENTMCNC: 31.4 % (ref 32–34.5)
MCV RBC AUTO: 98.7 FL (ref 80–99.9)
PDW BLD-RTO: 20.2 FL (ref 11.5–15)
PLATELET # BLD: 158 E9/L (ref 130–450)
PMV BLD AUTO: 12.5 FL (ref 7–12)
POTASSIUM SERPL-SCNC: 3.4 MMOL/L (ref 3.5–5)
RBC # BLD: 3.19 E12/L (ref 3.5–5.5)
SODIUM BLD-SCNC: 134 MMOL/L (ref 132–146)
WBC # BLD: 8.3 E9/L (ref 4.5–11.5)

## 2022-12-23 PROCEDURE — 6370000000 HC RX 637 (ALT 250 FOR IP): Performed by: INTERNAL MEDICINE

## 2022-12-23 PROCEDURE — 2580000003 HC RX 258: Performed by: INTERNAL MEDICINE

## 2022-12-23 PROCEDURE — 80048 BASIC METABOLIC PNL TOTAL CA: CPT

## 2022-12-23 PROCEDURE — 74230 X-RAY XM SWLNG FUNCJ C+: CPT

## 2022-12-23 PROCEDURE — 99232 SBSQ HOSP IP/OBS MODERATE 35: CPT | Performed by: INTERNAL MEDICINE

## 2022-12-23 PROCEDURE — 92526 ORAL FUNCTION THERAPY: CPT

## 2022-12-23 PROCEDURE — 92611 MOTION FLUOROSCOPY/SWALLOW: CPT

## 2022-12-23 PROCEDURE — 1200000000 HC SEMI PRIVATE

## 2022-12-23 PROCEDURE — 93306 TTE W/DOPPLER COMPLETE: CPT

## 2022-12-23 PROCEDURE — 2500000003 HC RX 250 WO HCPCS: Performed by: INTERNAL MEDICINE

## 2022-12-23 PROCEDURE — 36415 COLL VENOUS BLD VENIPUNCTURE: CPT

## 2022-12-23 PROCEDURE — 85027 COMPLETE CBC AUTOMATED: CPT

## 2022-12-23 RX ADMIN — BARIUM SULFATE 45 G: 0.81 POWDER, FOR SUSPENSION ORAL at 10:50

## 2022-12-23 RX ADMIN — METOPROLOL TARTRATE 100 MG: 50 TABLET, FILM COATED ORAL at 20:47

## 2022-12-23 RX ADMIN — BARIUM SULFATE 45 ML: 400 SUSPENSION ORAL at 10:50

## 2022-12-23 RX ADMIN — METOPROLOL TARTRATE 100 MG: 50 TABLET, FILM COATED ORAL at 08:10

## 2022-12-23 RX ADMIN — LEVOTHYROXINE SODIUM 88 MCG: 0.09 TABLET ORAL at 08:10

## 2022-12-23 RX ADMIN — FOLIC ACID 1 MG: 1 TABLET ORAL at 08:10

## 2022-12-23 RX ADMIN — CIPROFLOXACIN 500 MG: 500 TABLET, FILM COATED ORAL at 08:10

## 2022-12-23 RX ADMIN — BARIUM SULFATE 45 ML: 400 PASTE ORAL at 10:49

## 2022-12-23 RX ADMIN — APIXABAN 5 MG: 5 TABLET, FILM COATED ORAL at 20:48

## 2022-12-23 RX ADMIN — Medication 10 ML: at 22:16

## 2022-12-23 RX ADMIN — CIPROFLOXACIN 500 MG: 500 TABLET, FILM COATED ORAL at 20:47

## 2022-12-23 RX ADMIN — APIXABAN 5 MG: 5 TABLET, FILM COATED ORAL at 08:10

## 2022-12-23 NOTE — CARE COORDINATION
SS NOTE: COVID NEGATIVE 12/21. PT IS POSITIVE FOR SHINGLES AND IS IN CONTACT [facility] FOR IT. Pt has a peripheral line. She is on IV Apresoline. She has a urine catheter. PT and OT are on and suggest SNF vs HHC- she took 5 side steps with a ww. Speech is on and pt to have a swallow study. Both pt and son, Manasa Talley who she resides with want pt to return home and are resistive to SNF. SW was able to find [facility] for pt if the final plan is for home with Camron Noel. They are following for orders and dch and could begin services next Tuesday or Wednesday. SS to continue. LESIA Dean.12/23/2022.12:46 PM.

## 2022-12-23 NOTE — PROGRESS NOTES
Admitting Date and Time: 12/21/2022  3:43 PM  Admit Dx: Hypokalemia [E87.6]  Elevated troponin [R77.8]  Failure to thrive in adult [R62.7]  Herpes zoster with complication [L29.6]    Subjective:    Pt feels chest  pain-free  Per RN: No issues    ROS: denies fever, chills, cp, sob, n/v, HA unless stated above.      ciprofloxacin  500 mg Oral 2 times per day    ruxolitinib phosphate  20 mg Oral QAM    And    ruxolitinib phosphate  10 mg Oral Nightly    apixaban  5 mg Oral BID    folic acid  1 mg Oral Daily    levothyroxine  88 mcg Oral Daily    metoprolol  100 mg Oral BID    sodium chloride flush  5-40 mL IntraVENous 2 times per day     hydrALAZINE, 10 mg, Q4H PRN  sodium chloride flush, 5-40 mL, PRN  sodium chloride, , PRN  ondansetron, 4 mg, Q8H PRN   Or  ondansetron, 4 mg, Q6H PRN  polyethylene glycol, 17 g, Daily PRN  acetaminophen, 650 mg, Q6H PRN   Or  acetaminophen, 650 mg, Q6H PRN  perflutren lipid microspheres, 1.5 mL, ONCE PRN       Objective:    BP (!) 173/84   Pulse 74   Temp 97.8 °F (36.6 °C) (Oral)   Resp 13   Ht 5' 5\" (1.651 m)   Wt 152 lb (68.9 kg)   SpO2 94%   BMI 25.29 kg/m²   General Appearance: alert and oriented to person, place and time and in no acute distress  Skin: warm and dry  Head: normocephalic and atraumatic  Eyes: pupils equal, round, and reactive to light, extraocular eye movements intact, conjunctivae normal  Neck: neck supple and non tender without mass   Pulmonary/Chest: clear to auscultation bilaterally- no wheezes, rales or rhonchi, normal air movement, no respiratory distress  Cardiovascular: normal rate, normal S1 and S2 and no carotid bruits  Abdomen: soft, non-tender, non-distended, normal bowel sounds, no masses or organomegaly  Extremities: no cyanosis, no clubbing and no  edema  Neurologic: no cranial nerve deficit and speech normal  Left side rash in distribution of a dermatome anterior and posterior red papular intermittently painful per patient    Recent Labs 12/21/22  1600 12/21/22  1611 12/22/22  0828 12/23/22  0530   *  --  131* 134   K 2.8*  --  3.4* 3.4*   CL 91*  --  95* 96*   CO2 29  --  26 27   BUN 14  --  13 12   CREATININE 1.2*  --  0.9 1.0   GLUCOSE 103* 108 113* 95   CALCIUM 9.0  --  8.7 8.7         Recent Labs     12/21/22  1600   ALKPHOS 29*   PROT 6.0*   LABALBU 4.0   BILITOT 1.7*   AST 17   ALT 9         Recent Labs     12/21/22  1600 12/22/22  0829 12/23/22  0530   WBC 14.0* 14.2* 8.3   RBC 3.31* 3.36* 3.19*   HGB 10.4* 10.7* 9.9*   HCT 32.3* 32.5* 31.5*   MCV 97.6 96.7 98.7   MCH 31.4 31.8 31.0   MCHC 32.2 32.9 31.4*   RDW 20.6* 20.4* 20.2*    197 158   MPV NOT CALC 13.2* 12.5*             Radiology:   XR CHEST PORTABLE   Final Result   No acute process. FL MODIFIED BARIUM SWALLOW W VIDEO    (Results Pending)       Assessment:  Principal Problem:    Elevated troponin  Resolved Problems:    * No resolved hospital problems. *      Plan: History of present illness from History and Physical:  This is a 78-year-old female who presents to [facility] with the above-stated complaint. All history was obtained from the patient and the daughter along with review of medical records. Daughter Milinda Boxer reports that the mother has stopped eating and drinking significantly. Apparently she for quite some time has not had teeth. Apparently of recently she had some full dentures given to her. Reported when she starts to eat the dentures hurt and she stops eating. When she tries to do without the dentures she loses her appetite. Patient also complains of being lightheaded. She complains of being too tired to do her daily activities. She states she wants to do it was unable to do due to fatigue and shortness of breath. Incidentally when she came in her troponins were found to be elevated that the 40 and 36. She had no new EKG changes.   Consultation was made to cardiology and they recommended patient be admitted for observation and further evaluation. At this time we will get an echocardiogram to make sure her EF is preserved as her lightheadedness and fatigue could be cardiac in origin. I will have speech see her in regards to his difficulty with eating. Fatigue and weakness, Loss of appetiteL  multifactorial:  ill fitting and/or new dentures,  posterior hepatic zoster syndrome, intermittent claudication depression:  Crp, esr.  speech eval, PT OT evaluate  Elevated troponin 40 and 34 with lightheadedness / nonspecific symptoms  2017 heart catheter : minimal disease  -Patient does report some lightheadedness when she gets up  -Reports that she is also gets more fatigue when she attempts to do gardening  echo pending & Cardiology consult deferred pending this     3. UTI: oral ciprofloxacin empirically for (+) UA.  urine culture not sent. Observe for at least 1 more day to enusre stability     4. CKD 3: Stable at the moment  -Continue to monitor renal function. Daily labs ordered     5. Hypothyroid: Stable continue Synthroid. TSH sent 12/22: 16.6 12/23 spoke to son who suspects that she has been noncompliant to medications. Therefore  recommended that he supervises and verifies med compliance for next 6 weeks then have tsh sent again. Meanwhile continue currently prescribed dose     6. Polycythemia per hx: continue Jakafi and elquis     7. Essential hypertension: Continue metoprolol. Was > 180  sbp during first 10 hours of this visit / stay. Observe closely and add prn hydralazine for sbp > 170     8. Dysphagia: swallow study today. Son gave h/o this on 12/23. He was told that her hernia interfers with her swallowing quit some time ago    9. Zoster: tyenol 1000 mg as she took at home for pain. Dtr at bedside explained she was not allowed to get vaccinated again this years ago by former dr.  Has had other vaccines  Full Code  10. DVT prophylaxis: On Eliquis  11. Dispo: might need placement. PT and OT ordered.   Son says she would never ever ever want to be placed however he understands that it might be a very reasonable recommendation sometime in the future          NOTE: This report was transcribed using voice recognition software. Every effort was made to ensure accuracy; however, inadvertent computerized transcription errors may be present.      Electronically signed by Nigel Bronson MD on 12/23/2022 at 9:12 AM

## 2022-12-23 NOTE — PROGRESS NOTES
SPEECH/LANGUAGE PATHOLOGY  VIDEOFLUOROSCOPIC STUDY OF SWALLOWING (MBS)   and PLAN OF CARE    PATIENT NAME:  Rosangela Mckinley  (female)     MRN:  [id number]    :  1940  (78 y.o.)  STATUS:  Inpatient: Room 0417/0417-01    TODAY'S DATE:  2022  REFERRING PROVIDER:   Getachew Santoyo MD   SPECIFIC PROVIDER ORDER: FL modified barium swallow with video  Date of order:  22   REASON FOR REFERRAL: to further assess oropharyngeal function   EVALUATING THERAPIST: JEANETTE Zee      RESULTS:      DYSPHAGIA DIAGNOSIS:  mild-moderate oropharyngeal phase dysphagia     DIET RECOMMENDATIONS:  Minced and moist consistency solids (IDDSI level 5) with  thin liquids -NO STRAW (IDDSI level 0) and     Administer medication crushed, as able, with pudding/applesauce     Consider GI consult/esophagram as an outpatient if symptoms persist.     FEEDING RECOMMENDATIONS:    Assistance level:  Set-up is required for all oral intake     Compensatory strategies recommended: Double swallow, Small bites/sips, Alternate solids and liquids, slow rate, and No straw     Discussed recommendations with nursing and/or faxed report to referring provider: Yes    Laryngeal Penetration and Aspiration:  Neither penetration nor aspiration was observed in today's study     SPEECH THERAPY  PLAN OF CARE   The dysphagia POC is established based on physician order and dysphagia diagnosis    Skilled SLP intervention for dysphagia management up to 5x per week until goals met, pt plateaus in function and/or discharged from hospital      Conditions Requiring Skilled Therapeutic Intervention for dysphagia:    Patient is performing below functional baseline d/t  current acute condition, Multiple diagnoses, multiple medications, and increased dependency upon caregivers.   Sensation of food sticking in throat     SPECIFIC DYSPHAGIA INTERVENTIONS TO INCLUDE:     Compensatory strategy training   Therapeutic exercises  Trials of upgraded diet/liquid Specific instructions for next treatment:  initiate instruction of therapeutic exercises  and initiate instruction of compensatory strategies  Treatment Goals:    Short Term Goals:  Pt will implement identified compensatory swallowing strategies on 90% of opportunities or greater to improve airway protection and swallow function. Pt will participate in ongoing mealtime assessment to provide diet modification and compensatory strategy implementation to minimize risk of aspiration associated with PO intake  Pt will complete BOTR strength/ ROM exercises to reduce pharyngeal residuals and improve epiglottic inversion minimal verbal prompts  Pt will complete Shaker and/or Chin Tuck Against Resistance (CTAR) to increase UES relaxation diameter and increase anterior laryngeal excursion to reduce pharyngeal residuals and reduce risk of pen/asp with minimal verbal prompts. Pt will complete Effortful Swallow therapeutically to target increased oral and base of tongue pressure, increased pharyngeal constrictor contractions, and increased UES relaxation duration to reduce pharyngeal residue with minimal verbal prompts   Pt will complete Elaine Maneuver therapeutically to target increased pharyngeal constrictor contractions to reduce pharyngeal residue with minimal verbal prompts     Long Term Goals:   Pt will maintain adequate nutrition/hydration via PO intake of the least restrictive oral diet with implementation of safe swallow/ compensatory strategies and decrease signs/symptoms of aspiration to less than 1 x/day. Pt will improve oropharyngeal swallow function to ensure airway protection during PO intake to maintain adequate nutrition/hydration and decrease signs/symptoms of aspiration to less than 1 x/day.    A GI consult is recommended as an outpatient if symptoms persist     Patient/family Goal:    To be able to [facility] discussed with Patient   The Patient understand(s) the diagnosis, prognosis and plan of care     Rehabilitation Potential/Prognosis: fair                      ADMITTING DIAGNOSIS: Hypokalemia [E87.6]  Elevated troponin [R77.8]  Failure to thrive in adult [R62.7]  Herpes zoster with complication [V96.0]     VISIT DIAGNOSIS:   Visit Diagnoses         Codes    Failure to thrive in adult    -  Primary R62.7    Herpes zoster with complication     J32.2    Hypokalemia     E87.6                PATIENT REPORT/COMPLAINT: food sticking in the throat  occasional cough    PRIOR LEVEL OF SWALLOW FUNCTION:    Past History of Dysphagia?:  none reported    Home diet: Regular consistency solids (IDDSI level 7) with  thin liquids (IDDSI level 0)  Current Diet Order:  ADULT ORAL NUTRITION SUPPLEMENT; Lunch, Dinner; Standard High Calorie/High Protein Oral Supplement  ADULT DIET;  Dysphagia - Minced and Moist; Low Fat/Low Chol/High Fiber/GAIL    PROCEDURE:  Consistencies Administered During the Evaluation   Liquids: thin liquid and nectar thick liquid   Solids:  pureed foods      Method of Intake:   cup, spoon  Self fed, Fed by clinician      Position:   Seated, upright    INSTRUMENTAL ASSESSMENT:    ORAL PREP/ ORAL PHASE:    Decreased bolus formation resulting in observed premature pharyngeal spillage     PHARYNGEAL PHASE:     ONSET TIME       Delayed initiation of the pharyngeal swallow was noted with swallow reflex triggered at the level of the tongue base  and vallecula        PHARYNGEAL RESIDUALS        Vallecula/Pharyngeal Wall           Reduced tongue base retraction resulting in residuals in vallecula and/or posterior pharyngeal wall for nectar consistency liquid and pureed foods which minimally cleared with cued double swallow, spontaneous double swallow, and liquid chaser       Pyriform Sinuses      Residuals in the pyriform sinuses were noted due to pharyngeal weakness for nectar consistency liquid and pureed foods  which  minimally cleared with cued double swallow, spontaneous double swallow, and liquid chaser      LARYNGEAL PENETRATION   Laryngeal penetration was not present during this evaluation    ASPIRATION  Aspiration was not present during this evaluation    PENETRATION-ASPIRATION SCALE (PAS):  THIN 1 = Material does not enter the airway  MILDLY THICK 1 = Material does not enter the airway  MODERATELY THICK item not administered  PUREE 1 = Material does not enter the airway  HARD SOLID item not administered       COMPENSATORY STRATEGIES    Compensatory strategies that were beneficial included Multiple swallow, Small bites/sips, Alternate solids and liquids, and No straw      STRUCTURAL/FUNCTIONAL ANOMALIES   No structural/functional anomalies were noted    CERVICAL ESOPHAGEAL STAGE :     The cervical esophagus appeared adequate          ___________    Cognition:   Within functional limits for this exam    Oral Peripheral Examination   Generalized oral weakness    Current Respiratory Status   room air     Parameters of Speech Production  Respiration:  Adequate for speech production  Quality:   Within functional limits  Intensity: Within functional limits    Pain: No pain reported. EDUCATION:   The Speech Language Pathologist (SLP) completed education regarding results of evaluation and that intervention is warranted at this time. Learner: Patient  Education: Reviewed results and recommendations of this evaluation  Evaluation of Education:  Milagros Ortega understanding    This plan may be re-evaluated and revised as warranted. Evaluation Time includes thorough review of current medical information, gathering information on past medical history/social history and prior level of function, completion of standardized testing/informal observation of tasks, assessment of data and education on plan of care and goals. [x]The admitting diagnosis and active problem list, have been reviewed prior to initiation of this evaluation.     CPT Code: 26368  dysphagia study    ACTIVE PROBLEM LIST:   Patient Active Problem List   Diagnosis    Syncope    Leukocytosis    Troponin I above reference range    Elevated brain natriuretic peptide (BNP) level    Left bundle branch block    Lumbar radiculopathy    Low back pain    Facet syndrome, lumbar    Compression fracture of L1 lumbar vertebra Old    DDD (degenerative disc disease), lumbar    Rotoscoliosis    Lumbar sprain    Lumbar strain    Protruded lumbar disc    Chronic pulmonary embolism ([facility] [address]    Acute cystitis with hematuria    Polycythemia    Vitamin D deficiency    Hypothyroidism    Essential hypertension    Hyperlipidemia    Closed compression fracture of L1 lumbar vertebra, sequela    Lumbar spondylosis    Lumbar facet arthropathy    Lumbar disc disorder    Chronic pain syndrome    Chronic renal disease, stage III ([facility]) [400067]    Elevated troponin       Intervention:    [id number]  dysphagia tx: Patient seen for f/u for dysphagia mgmt. Patient educated on results and recommendation of MBSS. Patient educated on minced and moist consistency solids w/ thin liquids via cup-no straw. Patient educated on use of small bites/sips, slow rate, and alt solids/liquids. Patient educated on oropharyngeal exercises to increase overall strength/function. Patient verbalized understanding and agreement. Will cont w/ POC.        Abundio Smith M.S., [address] Pathologist  [place] [phone]

## 2022-12-23 NOTE — PLAN OF CARE
Problem: Safety - Adult  Goal: Free from fall injury  12/23/2022 1353 by Vickie Garcia RN  Outcome: Progressing  12/23/2022 0331 by Ros Marshall RN  Outcome: Progressing     Problem: ABCDS Injury Assessment  Goal: Absence of physical injury  12/23/2022 1353 by Vickie Garcia RN  Outcome: Progressing  12/23/2022 0331 by Ros Marshall RN  Outcome: Progressing     Problem: Nutrition Deficit:  Goal: Optimize nutritional status  Outcome: Progressing

## 2022-12-24 LAB
ANION GAP SERPL CALCULATED.3IONS-SCNC: 10 MMOL/L (ref 7–16)
BUN BLDV-MCNC: 9 MG/DL (ref 6–23)
CALCIUM SERPL-MCNC: 8.7 MG/DL (ref 8.6–10.2)
CHLORIDE BLD-SCNC: 96 MMOL/L (ref 98–107)
CO2: 28 MMOL/L (ref 22–29)
CREAT SERPL-MCNC: 1 MG/DL (ref 0.5–1)
GFR SERPL CREATININE-BSD FRML MDRD: 56 ML/MIN/1.73
GLUCOSE BLD-MCNC: 109 MG/DL (ref 74–99)
HCT VFR BLD CALC: 33.5 % (ref 34–48)
HEMOGLOBIN: 10.6 G/DL (ref 11.5–15.5)
MCH RBC QN AUTO: 31 PG (ref 26–35)
MCHC RBC AUTO-ENTMCNC: 31.6 % (ref 32–34.5)
MCV RBC AUTO: 98 FL (ref 80–99.9)
PDW BLD-RTO: 20.2 FL (ref 11.5–15)
PLATELET # BLD: 219 E9/L (ref 130–450)
PMV BLD AUTO: 11.9 FL (ref 7–12)
POTASSIUM SERPL-SCNC: 3.4 MMOL/L (ref 3.5–5)
RBC # BLD: 3.42 E12/L (ref 3.5–5.5)
SODIUM BLD-SCNC: 134 MMOL/L (ref 132–146)
WBC # BLD: 10.1 E9/L (ref 4.5–11.5)

## 2022-12-24 PROCEDURE — 2580000003 HC RX 258: Performed by: INTERNAL MEDICINE

## 2022-12-24 PROCEDURE — 85027 COMPLETE CBC AUTOMATED: CPT

## 2022-12-24 PROCEDURE — 6370000000 HC RX 637 (ALT 250 FOR IP): Performed by: INTERNAL MEDICINE

## 2022-12-24 PROCEDURE — 80048 BASIC METABOLIC PNL TOTAL CA: CPT

## 2022-12-24 PROCEDURE — 1200000000 HC SEMI PRIVATE

## 2022-12-24 PROCEDURE — 99232 SBSQ HOSP IP/OBS MODERATE 35: CPT | Performed by: INTERNAL MEDICINE

## 2022-12-24 PROCEDURE — 36415 COLL VENOUS BLD VENIPUNCTURE: CPT

## 2022-12-24 RX ORDER — POTASSIUM CHLORIDE 20 MEQ/1
40 TABLET, EXTENDED RELEASE ORAL ONCE
Status: COMPLETED | OUTPATIENT
Start: 2022-12-24 | End: 2022-12-24

## 2022-12-24 RX ADMIN — CIPROFLOXACIN 500 MG: 500 TABLET, FILM COATED ORAL at 09:50

## 2022-12-24 RX ADMIN — METOPROLOL TARTRATE 100 MG: 50 TABLET, FILM COATED ORAL at 09:50

## 2022-12-24 RX ADMIN — CIPROFLOXACIN 500 MG: 500 TABLET, FILM COATED ORAL at 21:34

## 2022-12-24 RX ADMIN — APIXABAN 5 MG: 5 TABLET, FILM COATED ORAL at 09:50

## 2022-12-24 RX ADMIN — METOPROLOL TARTRATE 100 MG: 50 TABLET, FILM COATED ORAL at 21:34

## 2022-12-24 RX ADMIN — FOLIC ACID 1 MG: 1 TABLET ORAL at 09:49

## 2022-12-24 RX ADMIN — POTASSIUM CHLORIDE 40 MEQ: 1500 TABLET, EXTENDED RELEASE ORAL at 13:58

## 2022-12-24 RX ADMIN — Medication 5 ML: at 23:00

## 2022-12-24 RX ADMIN — Medication 10 ML: at 09:55

## 2022-12-24 RX ADMIN — APIXABAN 5 MG: 5 TABLET, FILM COATED ORAL at 21:34

## 2022-12-24 RX ADMIN — LEVOTHYROXINE SODIUM 88 MCG: 0.09 TABLET ORAL at 09:50

## 2022-12-24 NOTE — PROGRESS NOTES
Admitting Date and Time: 12/21/2022  3:43 PM  Admit Dx: Hypokalemia [E87.6]  Elevated troponin [R77.8]  Failure to thrive in adult [R62.7]  Herpes zoster with complication [M87.4]    Subjective:    Weak, and trouble swallowing. She remembers discussion from yesterday (12/24) re: rehab giving 3 hours of PT. Per RN: No issues    ROS: denies fever, chills, cp, sob, n/v, HA unless stated above.      ciprofloxacin  500 mg Oral 2 times per day    ruxolitinib phosphate  20 mg Oral QAM    And    ruxolitinib phosphate  10 mg Oral Nightly    apixaban  5 mg Oral BID    folic acid  1 mg Oral Daily    levothyroxine  88 mcg Oral Daily    metoprolol  100 mg Oral BID    sodium chloride flush  5-40 mL IntraVENous 2 times per day     hydrALAZINE, 10 mg, Q4H PRN  sodium chloride flush, 5-40 mL, PRN  sodium chloride, , PRN  ondansetron, 4 mg, Q8H PRN   Or  ondansetron, 4 mg, Q6H PRN  polyethylene glycol, 17 g, Daily PRN  acetaminophen, 650 mg, Q6H PRN   Or  acetaminophen, 650 mg, Q6H PRN  perflutren lipid microspheres, 1.5 mL, ONCE PRN       Objective:    BP (!) 184/81   Pulse 94   Temp 98.1 °F (36.7 °C) (Oral)   Resp 20   Ht 5' 5\" (1.651 m)   Wt 152 lb (68.9 kg)   SpO2 94%   BMI 25.29 kg/m²   General Appearance: alert and oriented to person, place and time and in no acute distress  Skin: warm and dry  Head: normocephalic and atraumatic  Eyes: pupils equal, round, and reactive to light, extraocular eye movements intact, conjunctivae normal  Neck: neck supple and non tender without mass   Pulmonary/Chest: clear to auscultation bilaterally- no wheezes, rales or rhonchi, normal air movement, no respiratory distress  Cardiovascular: normal rate, normal S1 and S2 and no carotid bruits  Abdomen: soft, non-tender, non-distended, normal bowel sounds, no masses or organomegaly  Extremities: no cyanosis, no clubbing and no  edema  Neurologic: no cranial nerve deficit and speech normal  Left side rash in distribution of a dermatome anterior and posterior red papular intermittently painful per patient    Recent Labs     12/22/22  0828 12/23/22  0530 12/24/22  0527   * 134 134   K 3.4* 3.4* 3.4*   CL 95* 96* 96*   CO2 26 27 28   BUN 13 12 9   CREATININE 0.9 1.0 1.0   GLUCOSE 113* 95 109*   CALCIUM 8.7 8.7 8.7         Recent Labs     12/21/22  1600   ALKPHOS 29*   PROT 6.0*   LABALBU 4.0   BILITOT 1.7*   AST 17   ALT 9         Recent Labs     12/22/22  0829 12/23/22  0530 12/24/22  0527   WBC 14.2* 8.3 10.1   RBC 3.36* 3.19* 3.42*   HGB 10.7* 9.9* 10.6*   HCT 32.5* 31.5* 33.5*   MCV 96.7 98.7 98.0   MCH 31.8 31.0 31.0   MCHC 32.9 31.4* 31.6*   RDW 20.4* 20.2* 20.2*    158 219   MPV 13.2* 12.5* 11.9             Radiology:   FL MODIFIED BARIUM SWALLOW W VIDEO   Final Result   1. There is no evidence of aspiration or penetration   2. Oral delay   3. Retention of the barium within the valleculae and piriform sinuses. Please see separate speech pathology report for full discussion of findings   and recommendations. XR CHEST PORTABLE   Final Result   No acute process. Assessment:  Principal Problem:    Elevated troponin  Resolved Problems:    * No resolved hospital problems. *      Plan: History of present illness from History and Physical:  This is a 26-year-old female who presents to [facility] with the above-stated complaint. All history was obtained from the patient and the daughter along with review of medical records. Daughter Jeannine Durand reports that the mother has stopped eating and drinking significantly. Apparently she for quite some time has not had teeth. Apparently of recently she had some full dentures given to her. Reported when she starts to eat the dentures hurt and she stops eating. When she tries to do without the dentures she loses her appetite. Patient also complains of being lightheaded. She complains of being too tired to do her daily activities.   She states she wants to do it was unable to do due to fatigue and shortness of breath. Incidentally when she came in her troponins were found to be elevated that the 40 and 36. She had no new EKG changes. Consultation was made to cardiology and they recommended patient be admitted for observation and further evaluation. At this time we will get an echocardiogram to make sure her EF is preserved as her lightheadedness and fatigue could be cardiac in origin. I will have speech see her in regards to his difficulty with eating. Echo 12/21/22:  Technically sub-optimal images. Irregular heart rates. Normal left ventricular chamber size. Mild global hypokinesis, abnormal septal motion, LV EF 45-50%. Mild left ventricular concentric hypertrophy noted. Stage I diastolic dysfunction. Left atrium is enlarged. Interatrial septum appears intact. Normal right ventricle size and function. There is trace mitral regurgitation. No mitral valve prolapse. No significant aortic stenosis present. here is trace tricuspid regurgitation, RVSP 30mmHg. Normal aortic root size. No evidence of pericardial effusion. No intra cardiac mass or thrombus. No previous echo for comparison. Fatigue and weakness, Loss of appetiteL  multifactorial:  ill fitting and/or new dentures,  posterior hepatic zoster syndrome, intermittent claudication depression:    Crp <.03; sed rate: 4.  speech eval, PT OT evaluate    Elevated troponin 40 and 34 with lightheadedness when she gets up  2017 heart catheter : minimal disease  Fatigue with activity eg gardening. Cardiology consult deferred     3. UTI: oral ciprofloxacin empirically for (+) UA.  urine culture not sent. Observe for at least 1 more day to enusre stability     4. CKD 3: Stable at the moment  -Continue to monitor renal function. Daily labs ordered     5. Hypothyroid: Stable continue Synthroid.   TSH sent 12/22: 16.6 12/23 spoke to son who suspects that she has been noncompliant to medications. Therefore  recommended that he supervises and verifies med compliance for next 6 weeks then have tsh sent again. Meanwhile continue currently prescribed dose     6. Polycythemia per hx: continue Jakafi and elquis     7. Essential hypertension: Continue metoprolol. Was > 180  sbp during first 10 hours of this visit / stay. Observe closely and add prn hydralazine for sbp > 170     8. Dysphagia: swallow study. 2/23: Son gave h/o that he was told that her hernia interfers with her swallowing quit some time ago    5. Zoster: tyenol 1000 mg as she took at home for pain. Dtr at bedside explained she was not allowed to get vaccinated again this years ago by former dr.  Has had other vaccines    10. Full Code  11. DVT prophylaxis: On Eliquis  12. Dispo: might need placement. PT and OT   12/25: she is more amendable to rehab placement pending further pt assessment on 12/26  Plans for family to bring in teeth to reassess swallowing on 12/26      NOTE: This report was transcribed using voice recognition software. Every effort was made to ensure accuracy; however, inadvertent computerized transcription errors may be present.      Electronically signed by Sammy Vásquez MD on 12/24/2022 at 1:23 PM

## 2022-12-25 LAB
ANION GAP SERPL CALCULATED.3IONS-SCNC: 10 MMOL/L (ref 7–16)
BUN BLDV-MCNC: 9 MG/DL (ref 6–23)
CALCIUM SERPL-MCNC: 9 MG/DL (ref 8.6–10.2)
CHLORIDE BLD-SCNC: 95 MMOL/L (ref 98–107)
CO2: 27 MMOL/L (ref 22–29)
CREAT SERPL-MCNC: 1 MG/DL (ref 0.5–1)
GFR SERPL CREATININE-BSD FRML MDRD: 56 ML/MIN/1.73
GLUCOSE BLD-MCNC: 94 MG/DL (ref 74–99)
HCT VFR BLD CALC: 34.2 % (ref 34–48)
HEMOGLOBIN: 10.8 G/DL (ref 11.5–15.5)
MCH RBC QN AUTO: 31.3 PG (ref 26–35)
MCHC RBC AUTO-ENTMCNC: 31.6 % (ref 32–34.5)
MCV RBC AUTO: 99.1 FL (ref 80–99.9)
PDW BLD-RTO: 19.7 FL (ref 11.5–15)
PLATELET # BLD: 229 E9/L (ref 130–450)
PMV BLD AUTO: 12.6 FL (ref 7–12)
POTASSIUM SERPL-SCNC: 4 MMOL/L (ref 3.5–5)
RBC # BLD: 3.45 E12/L (ref 3.5–5.5)
SODIUM BLD-SCNC: 132 MMOL/L (ref 132–146)
WBC # BLD: 10 E9/L (ref 4.5–11.5)

## 2022-12-25 PROCEDURE — 1200000000 HC SEMI PRIVATE

## 2022-12-25 PROCEDURE — 2580000003 HC RX 258: Performed by: INTERNAL MEDICINE

## 2022-12-25 PROCEDURE — 99232 SBSQ HOSP IP/OBS MODERATE 35: CPT | Performed by: INTERNAL MEDICINE

## 2022-12-25 PROCEDURE — 6370000000 HC RX 637 (ALT 250 FOR IP): Performed by: INTERNAL MEDICINE

## 2022-12-25 PROCEDURE — 36415 COLL VENOUS BLD VENIPUNCTURE: CPT

## 2022-12-25 PROCEDURE — 80048 BASIC METABOLIC PNL TOTAL CA: CPT

## 2022-12-25 PROCEDURE — 85027 COMPLETE CBC AUTOMATED: CPT

## 2022-12-25 RX ADMIN — CIPROFLOXACIN 500 MG: 500 TABLET, FILM COATED ORAL at 20:49

## 2022-12-25 RX ADMIN — Medication 10 ML: at 20:49

## 2022-12-25 RX ADMIN — CIPROFLOXACIN 500 MG: 500 TABLET, FILM COATED ORAL at 08:49

## 2022-12-25 RX ADMIN — APIXABAN 5 MG: 5 TABLET, FILM COATED ORAL at 08:49

## 2022-12-25 RX ADMIN — Medication 10 ML: at 08:48

## 2022-12-25 RX ADMIN — APIXABAN 5 MG: 5 TABLET, FILM COATED ORAL at 20:49

## 2022-12-25 RX ADMIN — METOPROLOL TARTRATE 100 MG: 50 TABLET, FILM COATED ORAL at 20:48

## 2022-12-25 RX ADMIN — METOPROLOL TARTRATE 100 MG: 50 TABLET, FILM COATED ORAL at 08:49

## 2022-12-25 RX ADMIN — FOLIC ACID 1 MG: 1 TABLET ORAL at 08:49

## 2022-12-25 RX ADMIN — LEVOTHYROXINE SODIUM 88 MCG: 0.09 TABLET ORAL at 08:48

## 2022-12-26 PROBLEM — R62.7 FAILURE TO THRIVE IN ADULT: Status: ACTIVE | Noted: 2022-12-26

## 2022-12-26 LAB
ANION GAP SERPL CALCULATED.3IONS-SCNC: 12 MMOL/L (ref 7–16)
BUN BLDV-MCNC: 10 MG/DL (ref 6–23)
CALCIUM SERPL-MCNC: 8.8 MG/DL (ref 8.6–10.2)
CHLORIDE BLD-SCNC: 96 MMOL/L (ref 98–107)
CO2: 25 MMOL/L (ref 22–29)
CREAT SERPL-MCNC: 1.1 MG/DL (ref 0.5–1)
GFR SERPL CREATININE-BSD FRML MDRD: 50 ML/MIN/1.73
GLUCOSE BLD-MCNC: 107 MG/DL (ref 74–99)
HCT VFR BLD CALC: 35.8 % (ref 34–48)
HEMOGLOBIN: 11.7 G/DL (ref 11.5–15.5)
MCH RBC QN AUTO: 31.5 PG (ref 26–35)
MCHC RBC AUTO-ENTMCNC: 32.7 % (ref 32–34.5)
MCV RBC AUTO: 96.2 FL (ref 80–99.9)
PDW BLD-RTO: 19.5 FL (ref 11.5–15)
PLATELET # BLD: 237 E9/L (ref 130–450)
PMV BLD AUTO: 11.8 FL (ref 7–12)
POTASSIUM SERPL-SCNC: 3.6 MMOL/L (ref 3.5–5)
RBC # BLD: 3.72 E12/L (ref 3.5–5.5)
SODIUM BLD-SCNC: 133 MMOL/L (ref 132–146)
WBC # BLD: 11.4 E9/L (ref 4.5–11.5)

## 2022-12-26 PROCEDURE — 99222 1ST HOSP IP/OBS MODERATE 55: CPT | Performed by: INTERNAL MEDICINE

## 2022-12-26 PROCEDURE — 2580000003 HC RX 258: Performed by: INTERNAL MEDICINE

## 2022-12-26 PROCEDURE — 92526 ORAL FUNCTION THERAPY: CPT | Performed by: SPEECH-LANGUAGE PATHOLOGIST

## 2022-12-26 PROCEDURE — 97535 SELF CARE MNGMENT TRAINING: CPT | Performed by: OCCUPATIONAL THERAPIST

## 2022-12-26 PROCEDURE — 36415 COLL VENOUS BLD VENIPUNCTURE: CPT

## 2022-12-26 PROCEDURE — 85027 COMPLETE CBC AUTOMATED: CPT

## 2022-12-26 PROCEDURE — 97530 THERAPEUTIC ACTIVITIES: CPT | Performed by: OCCUPATIONAL THERAPIST

## 2022-12-26 PROCEDURE — 1200000000 HC SEMI PRIVATE

## 2022-12-26 PROCEDURE — 97165 OT EVAL LOW COMPLEX 30 MIN: CPT | Performed by: OCCUPATIONAL THERAPIST

## 2022-12-26 PROCEDURE — 6370000000 HC RX 637 (ALT 250 FOR IP): Performed by: INTERNAL MEDICINE

## 2022-12-26 PROCEDURE — 99232 SBSQ HOSP IP/OBS MODERATE 35: CPT | Performed by: INTERNAL MEDICINE

## 2022-12-26 PROCEDURE — 80048 BASIC METABOLIC PNL TOTAL CA: CPT

## 2022-12-26 PROCEDURE — 97530 THERAPEUTIC ACTIVITIES: CPT

## 2022-12-26 RX ADMIN — LEVOTHYROXINE SODIUM 88 MCG: 0.09 TABLET ORAL at 08:03

## 2022-12-26 RX ADMIN — FOLIC ACID 1 MG: 1 TABLET ORAL at 08:03

## 2022-12-26 RX ADMIN — METOPROLOL TARTRATE 100 MG: 50 TABLET, FILM COATED ORAL at 08:03

## 2022-12-26 RX ADMIN — APIXABAN 5 MG: 5 TABLET, FILM COATED ORAL at 21:04

## 2022-12-26 RX ADMIN — APIXABAN 5 MG: 5 TABLET, FILM COATED ORAL at 08:03

## 2022-12-26 RX ADMIN — METOPROLOL TARTRATE 100 MG: 50 TABLET, FILM COATED ORAL at 21:03

## 2022-12-26 RX ADMIN — Medication 10 ML: at 21:03

## 2022-12-26 RX ADMIN — CIPROFLOXACIN 500 MG: 500 TABLET, FILM COATED ORAL at 08:03

## 2022-12-26 RX ADMIN — CIPROFLOXACIN 500 MG: 500 TABLET, FILM COATED ORAL at 21:03

## 2022-12-26 RX ADMIN — Medication 10 ML: at 08:03

## 2022-12-26 NOTE — PROGRESS NOTES
Speech Language Pathology      NAME:  Alex Alejandro  :  1940  DATE: 2022  ROOM:  [facility]-    Patient seen for dysphagia therapy 20 minutes. Family present and continue to provide encouragement to improve PO intake. Patient does not like diet change of minced and moist.  Discussed changing to Easy to chew consistency solids (IDDSI level 7, transitional) with  thin liquids (IDDSI level 0) Son will assist her in ordering so he can help her pick items that she is able to chew and would have been able to tolerate at home. Family will help cut items into appropriate size bites. Patient is cognitively intact and able to also cut her food into appropriate sized bites. Encouraged her to increase wear time of dentures. Discussed diet upgrade with nursing.   Will continue POC       Hypokalemia [E87.6]  Elevated troponin [R77.8]  Failure to thrive in adult [R62.7]  Herpes zoster with complication [M79.2]    [id number]  dysphagia zahida Dumont MSCCC/SLP  Speech Language Pathologist  [id number]

## 2022-12-26 NOTE — PROGRESS NOTES
Physical Therapy Treatment Note/Plan of Care    Room #:  8163/3462-13  Patient Name: Lety Mosley  YOB: 1940  MRN: [id number]    Date of Service: 12/26/2022     Tentative placement recommendation: Subacute vs Home Health Physical Therapy if patient meets goals  Equipment recommendation: To be determined      Evaluating Physical Therapist: Jackie Truong, [address] [facility] #[id number]      Specific Provider Orders/Date/Referring Provider :   12/22/22 0000    PT evaluation and treat  Start:  12/22/22 0000,   End:  12/22/22 0000,   ONE TIME,   Standing Count:  1 Occurrences,   Marielena Kulkarni MD     Admitting Diagnosis:   Hypokalemia [E87.6]  Elevated troponin [R77.8]  Failure to thrive in adult [R62.7]  Herpes zoster with complication [Z92.4]      Surgery: none  Visit Diagnoses         Codes    Failure to thrive in adult    -  Primary R62.7    Herpes zoster with complication     N32.8    Hypokalemia     E87.6            Patient Active Problem List   Diagnosis    Syncope    Leukocytosis    Troponin I above reference range    Elevated brain natriuretic peptide (BNP) level    Left bundle branch block    Lumbar radiculopathy    Low back pain    Facet syndrome, lumbar    Compression fracture of L1 lumbar vertebra Old    DDD (degenerative disc disease), lumbar    Rotoscoliosis    Lumbar sprain    Lumbar strain    Protruded lumbar disc    Chronic pulmonary embolism ([address]    Acute cystitis with hematuria    Polycythemia    Vitamin D deficiency    Hypothyroidism    Essential hypertension    Hyperlipidemia    Closed compression fracture of L1 lumbar vertebra, sequela    Lumbar spondylosis    Lumbar facet arthropathy    Lumbar disc disorder    Chronic pain syndrome    Chronic renal disease, stage III (HCC) [432414]    Elevated troponin        ASSESSMENT of Current Deficits Patient exhibits decreased strength, balance, and endurance impairing functional mobility, transfers, gait distance, and tolerance to activity.  Patient stated she is fatigued from just working with OT, but will agree to exercise and edge of bed. Cues needed for hand placement during sit to stand. The patient will benefit from continued skilled therapy to increase strength and improve balance for safe functional mobility, to decrease risk of falls, and to meet goals at discharge. PHYSICAL THERAPY  PLAN OF CARE       Physical therapy plan of care is established based on physician order,  patient diagnosis and clinical assessment    Current Treatment Recommendations:    -Bed Mobility: Lower extremity exercises , Upper extremity exercises , and Trunk control activities   -Sitting Balance: Incorporate reaching activities to activate trunk muscles   -Standing Balance: Perform strengthening exercises in standing to promote motor control with or without upper extremity support   -Transfers: Provide instruction on proper hand and foot position for adequate transfer of weight onto lower extremities and use of gait device if needed and Cues for hand placement, technique and safety. Provide stabilization to prevent fall   -Gait: Gait training and Standing activities to improve: base of support, weight shift, weight bearing    -Endurance: Utilize Supervised activities to increase level of endurance to allow for safe functional mobility including transfers and gait   -Stairs: Stair training with instruction on proper technique and hand placement on rail    PT long term treatment goals are located in below grid    Patient and or family understand(s) diagnosis, prognosis, and plan of care. Frequency of treatments: Patient will be seen  daily.          Prior Level of Function: Patient ambulated independently   Rehab Potential: good  for baseline    Past medical history:   Past Medical History:   Diagnosis Date    Back injury     Cervical spondylolysis     Chronic lymphoid leukemia ([facility] [address]     Gallstones     H/O echocardiogram 06/04/2017    EF 69%    History of bone density study     Dr Margarita Serrano [facility], [facility]?    Hyperlipidemia     Hypertension     LBBB (left bundle branch block)     Lumbar spondylosis with myelopathy     Pneumonia     Polycythemia vera (HCC)     JAK2    Syncope and collapse     Thyroid disease      Past Surgical History:   Procedure Laterality Date    APPENDECTOMY      CHOLECYSTECTOMY      DIAGNOSTIC CARDIAC CATH LAB PROCEDURE Bilateral 06/05/2017    HYSTERECTOMY, TOTAL ABDOMINAL (CERVIX REMOVED)      NERVE BLOCK  06/03/15    lumbar epidural #1    NERVE BLOCK  6 22 15    lumbar epidural #2    NERVE BLOCK N/A 7/6/15    lumbar epidural #3    TONSILLECTOMY         SUBJECTIVE:    Precautions: Up as tolerated, falls , incont of bladder, pure wick     Social history: Patient lives with son in a bilevel home 5 steps up with HR and 5 Steps down without HR    with 2 steps  with HR to enter Tub shower  . Reports her son helps her with stairs and she doesn't go down the one set of stairs to the bottom level    Equipment owned: Grab bars,      AM-PAC Basic Mobility       AM-[facility] Mobility Inpatient   How much difficulty turning over in bed?: A Little  How much difficulty sitting down on / standing up from a chair with arms?: A Little  How much difficulty moving from lying on back to sitting on side of bed?: A Little  How much help from another person moving to and from a bed to a chair?: A Little  How much help from another person needed to walk in hospital room?: A Lot  How much help from another person for climbing 3-5 steps with a railing?: A Lot  AM-PAC Inpatient Mobility Raw Score : 16  AM-PAC Inpatient T-Scale Score : 40.78  Mobility Inpatient CMS 0-100% Score: 54.16  Mobility Inpatient CMS G-Code Modifier : CK    Nursing cleared patient for PT treatment. OBJECTIVE;   Initial Evaluation  Date: 12/22/2022 Treatment Date:    12/26/2022   Short Term/ Long Term   Goals   Was pt agreeable to Eval/treatment?  Yes Yes  To be met in 4 days   Pain level   0/10   0/10 Bed Mobility    Rolling: Minimal assist of 1    Supine to sit: Minimal assist of 1    Sit to supine: Minimal assist of 1    Scooting: Minimal assist of 1   Rolling: Not assessed    Supine to sit: Minimal assist of 1   Sit to supine: Minimal assist of 1   Scooting: Minimal assist of 1    Rolling: Independent    Supine to sit:  Independent    Sit to supine: Independent    Scooting: Independent     Transfers Sit to stand: Minimal assist of 1  Sit to stand: Minimal assist of 1 cues for hand placement   Sit to stand: Independent    Ambulation     3 side steps using  wheeled walker with Moderate assist of 1   for walker control, balance, and safety 2-3 side steps using  wheeled walker with Minimal assist of 1   for walker control, balance, and safety     45 feet using  wheeled walker with Modified Independent    Stair negotiation: ascended and descended   Not assessed    Not assessed at this time     5 steps with HR and min A   ROM Within functional limits    Increase range of motion 10% of affected joints    Strength BUE:  refer to OT eval  RLE:  4-/5  LLE:  3+/5  Increase strength in affected mm groups by 1/3 grade   Balance Sitting EOB:  good -  Dynamic Standing:  fair - wheeled walker   Sitting EOB: good minus   Dynamic Standing: fair wheeled walker   Sitting EOB:  good   Dynamic Standing: good      Patient is Alert & Oriented x person, place, time, and situation and follows directions    Sensation:  Patient  denies numbness/tingling   Edema:  no   Endurance: fair      Vitals: room air   Blood Pressure at rest  Blood Pressure during session    Heart Rate at rest  Heart Rate during session    SPO2 at rest %  SPO2 during session %     Patient education  Patient educated on role of Physical Therapy, risks of immobility, safety and plan of care,  importance of mobility while in hospital , ankle pumps, quad set and glut set for edema control, blood clot prevention, and safety      Patient response to education:   Pt verbalized understanding Pt demonstrated skill Pt requires further education in this area   Yes Partial Yes      Treatment:  Patient practiced and was instructed/facilitated in the following treatment:  Pt assisted to EOB. Sat edge of bed 8 minutes with Supervision  to increase dynamic sitting balance and activity tolerance. Seated exercise. Pt stood, took side steps to [facility]. Assisted back to supine. Elevated head of bed. Therapeutic Exercises:  ankle pumps, long arc quad, and seated marching  x 10 reps. At end of session, patient in bed with alarm call light and phone within reach,  all lines and tubes intact, nursing notified. Patient would benefit from continued skilled Physical Therapy to improve functional independence and quality of life.          Patient's/ family goals   home    Time in  65  Time out  235    Total Treatment Time  11 minutes      CPT codes:    Therapeutic activities (86536)   8 minutes  1 unit(s)  Therapeutic exercises (04535)   3 minutes  0 unit(s)    Savanah Carcamo PTA [id number]

## 2022-12-26 NOTE — PLAN OF CARE
Problem: ABCDS Injury Assessment  Goal: Absence of physical injury  12/25/2022 2122 by Anna Frias RN  Outcome: Progressing  12/25/2022 0928 by Hussein Laguerre RN  Outcome: Progressing     Problem: Nutrition Deficit:  Goal: Optimize nutritional status  Outcome: Progressing     Problem: Nutrition Deficit:  Goal: Optimize nutritional status  Outcome: Progressing     Problem: Safety - Adult  Goal: Free from fall injury  12/25/2022 2122 by Anna Frias RN  Outcome: Progressing  12/25/2022 0928 by Hussein Laguerre RN  Outcome: Progressing

## 2022-12-26 NOTE — PROGRESS NOTES
Department of Internal Medicine  General Internal Medicine  Attending Progress Note  Chief Complaint   Patient presents with    Fatigue     Decreased mobility; diarrhea     SUBJECTIVE:    Reports that she is doing okay. Noted that food here doesn't taste good. Aware of plans to get PT/OT and with case management involvement and possible discharge to rehab.     OBJECTIVE      Medications    Current Facility-Administered Medications: ciprofloxacin (CIPRO) tablet 500 mg, 500 mg, Oral, 2 times per day  hydrALAZINE (APRESOLINE) injection 10 mg, 10 mg, IntraVENous, Q4H PRN  ruxolitinib phosphate (JAKAFI) 10 MG tablet TABS 20 mg*Patient supplied medication* (Patient Supplied), 20 mg, Oral, QAM **AND** ruxolitinib phosphate (JAKAFI) 10 MG tablet TABS 10 mg*Patient supplied* (Patient Supplied), 10 mg, Oral, Nightly  apixaban (ELIQUIS) tablet 5 mg, 5 mg, Oral, BID  folic acid (FOLVITE) tablet 1 mg, 1 mg, Oral, Daily  levothyroxine (SYNTHROID) tablet 88 mcg, 88 mcg, Oral, Daily  metoprolol tartrate (LOPRESSOR) tablet 100 mg, 100 mg, Oral, BID  sodium chloride flush 0.9 % injection 5-40 mL, 5-40 mL, IntraVENous, 2 times per day  sodium chloride flush 0.9 % injection 5-40 mL, 5-40 mL, IntraVENous, PRN  0.9 % sodium chloride infusion, , IntraVENous, PRN  ondansetron (ZOFRAN-ODT) disintegrating tablet 4 mg, 4 mg, Oral, Q8H PRN **OR** ondansetron (ZOFRAN) injection 4 mg, 4 mg, IntraVENous, Q6H PRN  polyethylene glycol (GLYCOLAX) packet 17 g, 17 g, Oral, Daily PRN  acetaminophen (TYLENOL) tablet 650 mg, 650 mg, Oral, Q6H PRN **OR** acetaminophen (TYLENOL) suppository 650 mg, 650 mg, Rectal, Q6H PRN  perflutren lipid microspheres (DEFINITY) injection 1.5 mL, 1.5 mL, IntraVENous, ONCE PRN  Physical    VITALS:  BP (!) 131/95   Pulse 68   Temp 97.9 °F (36.6 °C) (Oral)   Resp 16   Ht 5' 5\" (1.651 m)   Wt 152 lb (68.9 kg)   SpO2 99%   BMI 25.29 kg/m²   CONSTITUTIONAL:  awake, cooperative, and no apparent distress  EYES: extra-ocular muscles intact  ENT:  normocepalic, without obvious abnormality  NECK:  supple, symmetrical, trachea midline  BACK:  symmetric  LUNGS:  no increased work of breathing, no retractions, and clear to auscultation  CARDIOVASCULAR:  normal apical pulses and normal S1 and S2  ABDOMEN:  normal bowel sounds, non-distended, and non-tender  MUSCULOSKELETAL:  full range of motion noted  NEUROLOGIC:  Mental Status Exam:  Level of Alertness:   awake  SKIN:  no bruising or bleeding  Data    CBC:   Lab Results   Component Value Date/Time    WBC 11.4 12/26/2022 05:46 AM    RBC 3.72 12/26/2022 05:46 AM    RBC 4.90 06/04/2017 03:00 AM    HGB 11.7 12/26/2022 05:46 AM    HCT 35.8 12/26/2022 05:46 AM    MCV 96.2 12/26/2022 05:46 AM    MCH 31.5 12/26/2022 05:46 AM    MCHC 32.7 12/26/2022 05:46 AM    RDW 19.5 12/26/2022 05:46 AM     12/26/2022 05:46 AM    MPV 11.8 12/26/2022 05:46 AM     BMP:    Lab Results   Component Value Date/Time     12/26/2022 05:46 AM    K 3.6 12/26/2022 05:46 AM    K 3.4 12/22/2022 08:28 AM    CL 96 12/26/2022 05:46 AM    CO2 25 12/26/2022 05:46 AM    BUN 10 12/26/2022 05:46 AM    LABALBU 4.0 12/21/2022 04:00 PM    CREATININE 1.1 12/26/2022 05:46 AM    CALCIUM 8.8 12/26/2022 05:46 AM    GFRAA 52 03/16/2021 12:15 PM    LABGLOM 50 12/26/2022 05:46 AM    GLUCOSE 107 12/26/2022 05:46 AM       ASSESSMENT AND PLAN      Elevated troponin: defer to cardiology  Malnutrition due to Poor oral intake: suspects that this is related to dentures not fitting well. Patient noted that her appetite is improving. UTI: abx as ordered  Acute on chronic CKD: monitor renal function  Fatigue and weakness: continue PT/OT. Hypothyroidism: stable. Continue current synthroid dose. Polycythemia: continue current meds. Hgb remains stable  Hypertension Essential: good control  Debility: appreciate PT/OT.  Discharge when able to place to SNF

## 2022-12-26 NOTE — PROGRESS NOTES
INPATIENT CARDIOLOGY CONSULT    Name: Amanda Alfonso    Age: 78 y.o. Date of Admission: 12/21/2022  3:43 PM    Date of Service: 12/26/2022    Reason for Consultation:   Chief Complaint   Patient presents with    Fatigue     Decreased mobility; diarrhea          Referring Physician: Janice Barrera MD    History of Present Illness: 58-year-old female with history of chronic back pain, chronic lymphoid leukemia, gallstones, hypertension, hyperlipidemia, chronic left bundle branch block, polycythemia vera JAK2, prior history of syncope and thyroid disease who was hospitalized for fatigue and weakness and found to have UTI and cardiology consulted due to mild troponin elevation noted to be 40 and 34 respectively. Chart record shows patient had a heart catheterization in 2017 that showed minimal disease. Cardiology is consulted because echocardiogram revealed EF of 45 to 50%. Apparently patient is on Eliquis for DVT prophylaxis        Transthoracic echocardiogram December 23, 2022  Technically sub-optimal images. Irregular heart rates. Normal left ventricular chamber size. Mild global hypokinesis, abnormal septal motion, LV EF 45-50%. Mild left ventricular concentric hypertrophy noted. Stage I diastolic dysfunction. Left atrium is enlarged. Interatrial septum appears intact. Normal right ventricle size and function. There is trace mitral regurgitation. No mitral valve prolapse. No significant aortic stenosis present. here is trace tricuspid regurgitation, RVSP 30mmHg. Normal aortic root size. No evidence of pericardial effusion. No intra cardiac mass or thrombus. No previous echo for comparison.       Past Medical History:  Past Medical History:   Diagnosis Date    Back injury     Cervical spondylolysis     Chronic lymphoid leukemia ([address]     Gallstones     H/O echocardiogram 06/04/2017    EF 69%    History of bone density study     Dr [facility], [facility]?    Hyperlipidemia Hypertension     LBBB (left bundle branch block)     Lumbar spondylosis with myelopathy     Pneumonia     Polycythemia vera (HCC)     JAK2    Syncope and collapse     Thyroid disease        Past Surgical History:  Past Surgical History:   Procedure Laterality Date    APPENDECTOMY      CHOLECYSTECTOMY      DIAGNOSTIC CARDIAC CATH LAB PROCEDURE Bilateral 06/05/2017    HYSTERECTOMY, TOTAL ABDOMINAL (CERVIX REMOVED)      NERVE BLOCK  06/03/15    lumbar epidural #1    NERVE BLOCK  6 22 15    lumbar epidural #2    NERVE BLOCK N/A 7/6/15    lumbar epidural #3    TONSILLECTOMY         Family History:  Family History   Problem Relation Age of Onset    Asthma Father     Hypertension Brother     Cancer Maternal Grandmother        Social History:  Social History     Socioeconomic History    Marital status:      Spouse name: Not on file    Number of children: Not on file    Years of education: Not on file    Highest education level: Not on file   Occupational History    Not on file   Tobacco Use    Smoking status: Never    Smokeless tobacco: Never   Vaping Use    Vaping Use: Never used   Substance and Sexual Activity    Alcohol use: No    Drug use: No    Sexual activity: Never   Other Topics Concern    Not on file   Social History Narrative    ** Merged History Encounter **          Social Determinants of Health     Financial Resource Strain: Low Risk     Difficulty of Paying Living Expenses: Not very hard   Food Insecurity: No Food Insecurity    Worried About Running Out of Food in the Last Year: Never true    Ran Out of Food in the Last Year: Never true   Transportation Needs: Not on file   Physical Activity: Inactive    Days of Exercise per Week: 0 days    Minutes of Exercise per Session: 0 min   Stress: Not on file   Social Connections: Not on file   Intimate Partner Violence: Not on file   Housing Stability: Not on file       Allergies:   Allergies   Allergen Reactions    Clonidine Derivatives Anaphylaxis    Medrol [Methylprednisolone] Other (See Comments)     unknown    Naproxen Nausea Only    Other      oruvall    Atarax [Hydroxyzine] Palpitations       Home Medications:  Prior to Admission medications    Medication Sig Start Date End Date Taking?  Authorizing Provider   VITAMIN D PO Take by mouth    Historical Provider, MD   metoprolol (LOPRESSOR) 100 MG tablet Take 1 tablet by mouth 2 times daily 11/22/22 5/21/23  Cheryal Marrow, DO   levothyroxine (SYNTHROID) 88 MCG tablet TAKE 1 TABLET DAILY 11/22/22   Cheryal Marrow, DO   apixaban (ELIQUIS) 5 MG TABS tablet Take by mouth 2 times daily    Historical Provider, MD   acetaminophen (TYLENOL) 500 MG tablet Take 500 mg by mouth every 6 hours as needed for Pain    Historical Provider, MD   Calcium Carbonate-Vit D-Min (CALCIUM 600+D3 PLUS MINERALS) 600-800 MG-UNIT CHEW Take 1 each by mouth 2 times daily (with meals) 6/24/22   Cheryal Marrow, DO   Ferrous Sulfate (IRON PO) Take by mouth    Historical Provider, MD   Ascorbic Acid (VITAMIN C PO) Take by mouth    Historical Provider, MD   folic acid (FOLVITE) 1 MG tablet take 1 tablet by mouth once daily 12/30/20   Historical Provider, MD   JAKAFI 10 MG tablet Take 10 mg by mouth 3 times daily 12/28/20   Historical Provider, MD       Current Medications:  Current Facility-Administered Medications   Medication Dose Route Frequency Provider Last Rate Last Admin    ciprofloxacin (CIPRO) tablet 500 mg  500 mg Oral 2 times per day Marino Fabian MD   500 mg at 12/26/22 3513    hydrALAZINE (APRESOLINE) injection 10 mg  10 mg IntraVENous Q4H PRN Rosealee Sandifer, MD        ruxolitinib phosphate (JAKAFI) 10 MG tablet TABS 20 mg*Patient supplied medication* (Patient Supplied)  20 mg Oral QAM Marino Fabian MD   20 mg at 12/26/22 0804    And    ruxolitinib phosphate (JAKAFI) 10 MG tablet TABS 10 mg*Patient supplied* (Patient Supplied)  10 mg Oral Nightly Marino Fabian MD   10 mg at 12/25/22 2051    apixaban (ELIQUIS) tablet 5 mg  5 mg Oral BID Jono Parker MD   5 mg at 35/33/33 6818    folic acid (FOLVITE) tablet 1 mg  1 mg Oral Daily Jono Parker MD   1 mg at 12/26/22 2067    levothyroxine (SYNTHROID) tablet 88 mcg  88 mcg Oral Daily Jono Parker MD   88 mcg at 12/26/22 0803    metoprolol tartrate (LOPRESSOR) tablet 100 mg  100 mg Oral BID Jono Parker MD   100 mg at 12/26/22 0803    sodium chloride flush 0.9 % injection 5-40 mL  5-40 mL IntraVENous 2 times per day Jono Parker MD   10 mL at 12/26/22 0803    sodium chloride flush 0.9 % injection 5-40 mL  5-40 mL IntraVENous PRN Jono Parker MD        0.9 % sodium chloride infusion   IntraVENous PRN Jono Parker MD        ondansetron (ZOFRAN-ODT) disintegrating tablet 4 mg  4 mg Oral Q8H PRN Jono Parker MD        Or    ondansetron [facility]) injection 4 mg  4 mg IntraVENous Q6H PRN Jono Parker MD        polyethylene glycol (GLYCOLAX) packet 17 g  17 g Oral Daily PRN Jono Parker MD        acetaminophen (TYLENOL) tablet 650 mg  650 mg Oral Q6H PRN Jono Parker MD   650 mg at 12/22/22 4349    Or    acetaminophen (TYLENOL) suppository 650 mg  650 mg Rectal Q6H PRN Jono Parker MD        perflutren lipid microspheres (DEFINITY) injection 1.5 mL  1.5 mL IntraVENous ONCE PRN Jono Parker MD          sodium chloride             Review of Systems:   Cardiac: As per HPI  General: Denies fever or chills  Pulmonary: As per HPI  HEENT: Denies runny nose  GI: No complaints  : No complaints  Endocrine: Denies night sweats  Musculoskeletal: No complaints  Skin: Dry skin  Neuro: No complaints  Psych: Denies depression    Physical Exam:  BP (!) 131/95   Pulse 68   Temp 97.9 °F (36.6 °C) (Oral)   Resp 16   Ht 5' 5\" (1.651 m)   Wt 152 lb (68.9 kg)   SpO2 99%   BMI 25.29 kg/m²   Wt Readings from Last 3 Encounters:   12/21/22 152 lb (68.9 kg)   11/22/22 169 lb 14.4 oz (77.1 kg)   08/12/22 162 lb (73.5 kg)       Appearance: Alert and oriented x3 not in acute distress. Skin: Dry skin  Head: Atraumatic  Eyes: Intact extraocular muscles   ENMT: Mucous membranes are moist  Neck: Supple  Lungs: Clear to auscultation  Cardiac: Normal S1 and S2  Abdomen: Protuberant  Extremities: Intact range of motion  Neurologic: No focal neurological deficits  Peripheral Pulses: 2+ peripheral pulses      Intake/Output:    Intake/Output Summary (Last 24 hours) at 12/26/2022 1428  Last data filed at 12/26/2022 1418  Gross per 24 hour   Intake 540 ml   Output --   Net 540 ml     I/O this shift:  In: 360 [P.O.:360]  Out: -       Laboratory Tests:  Recent Labs     12/24/22 0527 12/25/22  0511 12/26/22  0546    132 133   K 3.4* 4.0 3.6   CL 96* 95* 96*   CO2 28 27 25   BUN 9 9 10   CREATININE 1.0 1.0 1.1*   GLUCOSE 109* 94 107*   CALCIUM 8.7 9.0 8.8     Lab Results   Component Value Date/Time    MG 2.2 12/22/2022 08:28 AM    MG 2.2 12/21/2022 04:00 PM    MG 1.6 10/22/2020 05:15 AM     No results for input(s): ALKPHOS, ALT, AST, PROT, BILITOT, BILIDIR, LABALBU in the last 72 hours. Recent Labs     12/24/22 0527 12/25/22  0511 12/26/22  0546   WBC 10.1 10.0 11.4   RBC 3.42* 3.45* 3.72   HGB 10.6* 10.8* 11.7   HCT 33.5* 34.2 35.8   MCV 98.0 99.1 96.2   MCH 31.0 31.3 31.5   MCHC 31.6* 31.6* 32.7   RDW 20.2* 19.7* 19.5*    229 237   MPV 11.9 12.6* 11.8     Lab Results   Component Value Date    TROPONINI 0.159 (H) 06/03/2017    TROPONINI 0.261 (H) 06/03/2017    TROPONINI <0.01 10/10/2016     No results for input(s): CKTOTAL, CKMB, CKMBINDEX, TROPHS in the last 72 hours.   Lab Results   Component Value Date    INR 1.5 10/02/2021    INR 1.5 10/22/2020    INR 1.3 10/21/2020    PROTIME 17.5 (H) 10/02/2021    PROTIME 16.9 (H) 10/22/2020    PROTIME 14.8 (H) 10/21/2020     Lab Results   Component Value Date    TSH 16.680 (H) 12/22/2022    TSH 4.220 (H) 02/04/2021    TSH 8.600 (H) 10/20/2020     No results found for: LABA1C  No results found for: EAG  No results found for: CHOL  No results found for: TRIG  Lab Results   Component Value Date    HDL 33 02/04/2021     Lab Results   Component Value Date    LDLCALC 93 02/04/2021     Lab Results   Component Value Date    LABVLDL 34 02/04/2021     No results found for: CHOLHDLRATIO  No results for input(s): PROBNP in the last 72 hours. Cardiac Tests:        Echocardiogram reviewed:     Transthoracic echocardiogram December 23, 2022  Technically sub-optimal images. Irregular heart rates. Normal left ventricular chamber size. Mild global hypokinesis, abnormal septal motion, LV EF 45-50%. Mild left ventricular concentric hypertrophy noted. Stage I diastolic dysfunction. Left atrium is enlarged. Interatrial septum appears intact. Normal right ventricle size and function. There is trace mitral regurgitation. No mitral valve prolapse. No significant aortic stenosis present. here is trace tricuspid regurgitation, RVSP 30mmHg. Normal aortic root size. No evidence of pericardial effusion. No intra cardiac mass or thrombus. No previous echo for comparison. Stress test reviewed:      Cardiac catheterization reviewed:     CXR reviewed: The ASCVD Risk score (Deya DK, et al., 2019) failed to calculate for the following reasons: The 2019 ASCVD risk score is only valid for ages 36 to 78    ASSESSMENT / PLAN:    1. Fatigue and weakness   Evaluation and management per primary service  Consider PT OT and social work involvement    2.  UTI   Evaluation and management per primary service     3. Acute on chronic kidney injury   Monitor BUN/creatinine closely and adjust nephrotoxic agents     4.  Elevated troponin  Borderline elevated troponin leak in the setting of UTI and acute on chronic kidney injury  It is reported patient had heart catheterization in 2017 that revealed minimal disease  Echocardiogram shows EF of 45 to 50%  Arrange for Lexiscan myocardial perfusion stress test when patient is stable from infectious etiology to guide therapy. 4.  Cardiomyopathy with EF of 45 to 50%  Continue beta-blocker but change from metoprolol to tartrate to metoprolol succinate  Add low-dose Entresto if blood pressure and kidney function can tolerate  If patient is able to tolerate Entresto then add Aldactone if blood pressure and kidney function is stable    5. Hypothyroidism  Management per primary service    6. Hypertension    7. Debility  PT OT     8. Herpes zoster  Management per primary service                    Thank you for allowing me to participate in your patient's care. Please feel free to contact me if you have any questions or concerns.     Huyen Slade MD  [facility]) Cardiology

## 2022-12-26 NOTE — PROGRESS NOTES
Occupational Therapy  OCCUPATIONAL THERAPY INITIAL EVALUATION     [facility]  [address], [address]         Date:2022                                                   Patient Name: Wan Abreu     MRN: [id number]     : 1940     Room: [address] [place] [address]       Evaluating OT: Claire Colbert OTR/L; [id number]       Referring Provider and Orders/Date:   OT eval and treat  Start:  22 1145,   End:  22 1145,   ONE TIME,   Standing Count:  1 Occurrences,   El Garcia MD        Diagnosis:   1. Failure to thrive in adult    2. Herpes zoster with complication    3. Hypokalemia    4. Elevated troponin         Surgery: none      Pertinent Medical History:        Past Medical History:   Diagnosis Date    Back injury     Cervical spondylolysis     Chronic lymphoid leukemia ([facility]     Gallstones     H/O echocardiogram 2017    EF 69%    History of bone density study     Dr Leticia Anthony [facility], [address]?    Hyperlipidemia     Hypertension     LBBB (left bundle branch block)     Lumbar spondylosis with myelopathy     Pneumonia     Polycythemia vera ([facility]     JAK2    Syncope and collapse     Thyroid disease           Past Surgical History:   Procedure Laterality Date    APPENDECTOMY      CHOLECYSTECTOMY      DIAGNOSTIC CARDIAC CATH LAB PROCEDURE Bilateral 2017    HYSTERECTOMY, TOTAL ABDOMINAL (CERVIX REMOVED)      NERVE BLOCK  06/03/15    lumbar epidural #1    NERVE BLOCK  6 22 15    lumbar epidural #2    NERVE BLOCK N/A 7/6/15    lumbar epidural #3    TONSILLECTOMY         Precautions:  Fall Risk, airborne with contact iso-shingles    [facility] dependent on participation, out of bed and physical status at [facility]. Family can provide min A.      Assessment of current deficits     [x] Functional mobility  [x]ADLs  [x] Strength               []Cognition     [x] Functional transfers   [x] IADLs [x] Safety Awareness   [x]Endurance     [] Fine Coordination              [x] Balance      [] Vision/perception   []Sensation      [x]Gross Motor Coordination  [] ROM  [] Delirium                   [] Motor Control     OT PLAN OF CARE   OT POC based on physician orders, patient diagnosis and results of clinical assessment    Frequency/Duration 1-3 days/wk for 2 weeks PRN   Specific OT Treatment Interventions to include:   * Instruction/training on adapted ADL techniques and AE recommendations to increase functional independence within precautions       * Training on energy conservation strategies, correct breathing pattern and techniques to improve independence/tolerance for self-care routine  * Functional transfer/mobility training/DME recommendations for increased independence, safety, and fall prevention  * Patient/Family education to increase follow through with safety techniques and functional independence  * Recommendation of environmental modifications for increased safety with functional transfers/mobility and ADLs  * Therapeutic exercise to improve motor endurance, ROM, and functional strength for ADLs/functional transfers  * Therapeutic activities to facilitate/challenge dynamic balance, stand tolerance for increased safety and independence with ADLs  * Therapeutic activities to facilitate gross/fine motor skills for increased independence with ADLs  * Neuro-muscular re-education: facilitation of righting/equilibrium reactions, midline orientation, scapular stability/mobility, normalization of muscle tone, and facilitation of volitional active controled movement  * Positioning to improve skin integrity, interaction with environment and functional independence     Recommended Adaptive Equipment/DME: TTB     Home Living: Pt lives with daughter in a bilevel home 5 steps up with HR and 5 steps down without HR and 2 steps to enter with rails.  Son does assist with stairs and pt does not go to basement level. Bathroom setup: Tub shower, grab bars, raised toilet seat with grab bars    DME owned: cane, walker     Prior Level of Function: supervision with ADLs , assist with IADLs; ambulated indep   Driving: no   Occupation: retired   Enjoys: reading, quilting, cats    Pain Level: back 3/10; return to bed for comfort  Cognition: A&O: 3/4 some confusion to situation and importance of eating and out of bed; Follows 2 step directions   Memory:  Intact   Sequencing:  fair   Problem solving:  fair   Judgement/safety:  fair-    AM-[facility] Daily Activity Inpatient   How much help for putting on and taking off regular lower body clothing?: A Lot  How much help for Bathing?: A Lot  How much help for Toileting?: A Lot  How much help for putting on and taking off regular upper body clothing?: A Lot  How much help for taking care of personal grooming?: A Little  How much help for eating meals?: A Little  AM-[facility] Inpatient Daily Activity Raw Score: 14  AM-[facility] Inpatient ADL T-Scale Score : 33.39  ADL Inpatient CMS 0-100% Score: 59.67  ADL Inpatient CMS G-Code Modifier : CK    Functional Assessment:     Initial Eval Status  Date: 12/26/2022   Treatment Status  Date: STGs = LTGs  Time frame: 10-14 days   Feeding Minimal Assist with max prompting for intake and education on importance of intake for energy  Indep   Grooming Minimal Assist with face/hand wash and hair comb standing in front of the sink  Supervision    UB Dressing Moderate Assist with gown management from sitting EOB  Minimal Assist    LB Dressing Maximal Assist with socks and brief from sitting/standing  Minimal Assist    Bathing Moderate Assist with sponge bathing from sitting/standing  Minimal Assist    Toileting Maximal Assist for salma care and clothing management after  Stand by Assist    Bed Mobility  Supine to sit: Minimal Assist   Sit to supine: Minimal Assist     Supine to sit:  Independent   Sit to supine: Independent    Functional Transfers Moderate Assist with sit to stand from lower surface of toilet and min A from elevated surface of bed with walker  Stand by Assist    Functional Mobility Minimal Assist with walker management for short distance functional mobility throughout the room to simulate house hold distances. Stand by Assist    Balance Sitting:     Static:  fair+    Dynamic: fair  Standing: fair-  Sitting:     Static:  good    Dynamic:fair+  Standing: fair   Activity Tolerance Vitals with activity:  WFL overall on room air; sitting EOB 10+min and standing 3min  Increase standing tolerance for >5min with stable vital signs for carry over into toileting, functional tranfers and indep in ADLs   Visual/  Perceptual Glasses: present; WFL    Reports change in vision since admission: No     NA     Hand Dominance  [x] Right  [] Left    AROM (PROM) Strength Additional Info:  Goal:   RUE  WFL 4/5 good  and  FMC/dexterity noted during ADL tasks  Opposition [x] Intact [] Impaired  Finger to nose [x] Intact [] Impaired 5/5MMT generally for carry over into self care, functional transfers and functional mobility with AD. LUE WFL 4/5 good  and  FMC/dexterity noted during ADL tasks  Opposition [x] Intact [] Impaired  Finger to nose [x] Intact [] Impaired 5/5MMT generally for carry over into self care, functional transfers and functional mobility with AD. Hearing: WFL   Sensation:  No c/o numbness or tingling   Tone: WFL   Edema: none    Comments: Upon arrival patient supine with family present. Pt required max A for most UB ADLs and max A LB ADLs tasks. Limited with mod A for standing during LB ADLs and functional transfers. The biggest barriers reflect that of functional transfers, functional mobility, UB/LB ADLs, cognition, activity tolerance, balance, safety and strengthening. At end of session, patient supine with call light and phone within reach, all lines and tubes intact.   Overall patient demonstrated decreased independence and safety during completion of ADL/functional transfer/mobility tasks compared to PLOF. Nursing updated on pt position and status following OT eval. Pt would benefit from continued skilled OT to increase safety and independence with completion of ADL/IADL tasks for functional independence and quality of life. Treatment: OT treatment provided this date includes:  Instruction, education and training on safe facilitation and adapted techniques for completion of ADLs. These include neuromuscular reeducation to facilitate balance/righting reactions,safe functional transfer techniques, proper positioning/alignment to improve interaction with environment and overall function and on adapted techniques/work simplification for completion of ADLs. Education provided on hand/feet placement with bed rails, walker, toilet and body mechanics for fall prevention. Cues for energy conservation and safety for in the home at DC, including modifications and DME. Extended time to complete all tasks, including skilled monitoring of patient's response during treatment session and vital signs. Prior to and at the end of session, environmental modifications / line management completed for patients safety and efficiency of treatment session. See above for further details. Rehab Potential: Good for established goals     Patient / Family Goal: Pain management      Patient and/or family were instructed on functional diagnosis, prognosis/goals and OT plan of care. Demonstrated fair understanding. Eval Complexity: Low  History: Brief review of medical records and additional review of physical, cognitive, or psychosocial history related to current functional performance  Exam: 3+ performance deficits  Assistance/Modification: Mod assistance or modifications required to perform tasks. May have comorbidities that affect occupational performance.     Time In: 2248  Time Out: 1405  Total Treatment Time: 28    Min Units   OT Eval Low 97165  x  1   OT Eval Medium 84833 OT Eval High [id number]      OT Re-Eval [id number]       Therapeutic Ex [id number]       Therapeutic Activities 47283  16 1    ADL/Self Care 20861  12 1    Orthotic Management 70760       Manual [id number]     Neuro Re-Ed 14839       Non-Billable Time          Evaluation Time additionally includes thorough review of current medical information, gathering information on past medical history/social history and prior level of function, interpretation of standardized testing/informal observation of tasks, assessment of data and development of plan of care and goals.             Omar Sanders OTR/L; [id number]

## 2022-12-27 LAB
ANION GAP SERPL CALCULATED.3IONS-SCNC: 11 MMOL/L (ref 7–16)
BUN BLDV-MCNC: 19 MG/DL (ref 6–23)
CALCIUM SERPL-MCNC: 8.8 MG/DL (ref 8.6–10.2)
CHLORIDE BLD-SCNC: 96 MMOL/L (ref 98–107)
CO2: 25 MMOL/L (ref 22–29)
CREAT SERPL-MCNC: 1.5 MG/DL (ref 0.5–1)
GFR SERPL CREATININE-BSD FRML MDRD: 34 ML/MIN/1.73
GLUCOSE BLD-MCNC: 101 MG/DL (ref 74–99)
HCT VFR BLD CALC: 34.7 % (ref 34–48)
HEMOGLOBIN: 11.1 G/DL (ref 11.5–15.5)
MCH RBC QN AUTO: 31.1 PG (ref 26–35)
MCHC RBC AUTO-ENTMCNC: 32 % (ref 32–34.5)
MCV RBC AUTO: 97.2 FL (ref 80–99.9)
PDW BLD-RTO: 19.4 FL (ref 11.5–15)
PLATELET # BLD: 219 E9/L (ref 130–450)
PMV BLD AUTO: 12.9 FL (ref 7–12)
POTASSIUM SERPL-SCNC: 3.8 MMOL/L (ref 3.5–5)
RBC # BLD: 3.57 E12/L (ref 3.5–5.5)
SODIUM BLD-SCNC: 132 MMOL/L (ref 132–146)
WBC # BLD: 11 E9/L (ref 4.5–11.5)

## 2022-12-27 PROCEDURE — 2580000003 HC RX 258: Performed by: INTERNAL MEDICINE

## 2022-12-27 PROCEDURE — 1200000000 HC SEMI PRIVATE

## 2022-12-27 PROCEDURE — 85027 COMPLETE CBC AUTOMATED: CPT

## 2022-12-27 PROCEDURE — 6370000000 HC RX 637 (ALT 250 FOR IP): Performed by: INTERNAL MEDICINE

## 2022-12-27 PROCEDURE — 99233 SBSQ HOSP IP/OBS HIGH 50: CPT | Performed by: INTERNAL MEDICINE

## 2022-12-27 PROCEDURE — 36415 COLL VENOUS BLD VENIPUNCTURE: CPT

## 2022-12-27 PROCEDURE — 80048 BASIC METABOLIC PNL TOTAL CA: CPT

## 2022-12-27 RX ORDER — SODIUM CHLORIDE 9 MG/ML
INJECTION, SOLUTION INTRAVENOUS CONTINUOUS
Status: ACTIVE | OUTPATIENT
Start: 2022-12-27 | End: 2022-12-28

## 2022-12-27 RX ORDER — ISOSORBIDE MONONITRATE 30 MG/1
30 TABLET, EXTENDED RELEASE ORAL DAILY
Status: DISCONTINUED | OUTPATIENT
Start: 2022-12-27 | End: 2022-12-29 | Stop reason: HOSPADM

## 2022-12-27 RX ORDER — HYDRALAZINE HYDROCHLORIDE 25 MG/1
25 TABLET, FILM COATED ORAL EVERY 12 HOURS SCHEDULED
Status: DISCONTINUED | OUTPATIENT
Start: 2022-12-27 | End: 2022-12-29 | Stop reason: HOSPADM

## 2022-12-27 RX ADMIN — Medication 10 ML: at 10:01

## 2022-12-27 RX ADMIN — APIXABAN 5 MG: 5 TABLET, FILM COATED ORAL at 10:00

## 2022-12-27 RX ADMIN — LEVOTHYROXINE SODIUM 88 MCG: 0.09 TABLET ORAL at 10:00

## 2022-12-27 RX ADMIN — SODIUM CHLORIDE: 9 INJECTION, SOLUTION INTRAVENOUS at 17:50

## 2022-12-27 RX ADMIN — ISOSORBIDE MONONITRATE 30 MG: 30 TABLET, EXTENDED RELEASE ORAL at 20:26

## 2022-12-27 RX ADMIN — APIXABAN 5 MG: 5 TABLET, FILM COATED ORAL at 20:25

## 2022-12-27 RX ADMIN — METOPROLOL TARTRATE 100 MG: 50 TABLET, FILM COATED ORAL at 10:00

## 2022-12-27 RX ADMIN — METOPROLOL TARTRATE 100 MG: 50 TABLET, FILM COATED ORAL at 20:26

## 2022-12-27 RX ADMIN — FOLIC ACID 1 MG: 1 TABLET ORAL at 10:00

## 2022-12-27 RX ADMIN — HYDRALAZINE HYDROCHLORIDE 25 MG: 25 TABLET, FILM COATED ORAL at 20:26

## 2022-12-27 NOTE — CARE COORDINATION
12/27/2022 1035 CM note: Negative covid 12/21/22. Met with patient and her dtr at bedside regarding [address] vs [facility]. Pt prefers to return home with [address] however she states she will speak with her family regarding possible short term [facility]. SNF list given to review. For SNF, [address] waived however would need updated PT/OT notes, HENS, signed KISHORE. As back up plan [facility]-would need orders. CM will follow with pt/family to finalize transition of care plan. Kevin BRAVO           The Plan for Transition of Care is related to the following treatment goals: SNF    The Patient  was provided with a choice of provider and agrees   with the discharge plan. [x] Yes [] No    Freedom of choice list was provided with basic dialogue that supports the patient's individualized plan of care/goals, treatment preferences and shares the quality data associated with the providers.  [x] Yes [] No

## 2022-12-27 NOTE — PROGRESS NOTES
INPATIENT CARDIOLOGY CONSULT    Name: Radha Diaz    Age: 78 y.o. Date of Admission: 12/21/2022  3:43 PM    Date of Service: 12/27/2022    Reason for Consultation:   Chief Complaint   Patient presents with    Fatigue     Decreased mobility; diarrhea          Referring Physician: Miley Cota MD    No significant event overnight. Hydralazine and Imdur added to beta-blocker for guideline directed medical therapy      Transthoracic echocardiogram December 23, 2022  Technically sub-optimal images. Irregular heart rates. Normal left ventricular chamber size. Mild global hypokinesis, abnormal septal motion, LV EF 45-50%. Mild left ventricular concentric hypertrophy noted. Stage I diastolic dysfunction. Left atrium is enlarged. Interatrial septum appears intact. Normal right ventricle size and function. There is trace mitral regurgitation. No mitral valve prolapse. No significant aortic stenosis present. here is trace tricuspid regurgitation, RVSP 30mmHg. Normal aortic root size. No evidence of pericardial effusion. No intra cardiac mass or thrombus. No previous echo for comparison.       Past Medical History:  Past Medical History:   Diagnosis Date    Back injury     Cervical spondylolysis     Chronic lymphoid leukemia ([facility] [address]     Gallstones     H/O echocardiogram 06/04/2017    EF 69%    History of bone density study     [facility], [address]?    Hyperlipidemia     Hypertension     LBBB (left bundle branch block)     Lumbar spondylosis with myelopathy     Pneumonia     Polycythemia vera ([address]     JAK2    Syncope and collapse     Thyroid disease        Past Surgical History:  Past Surgical History:   Procedure Laterality Date    APPENDECTOMY      CHOLECYSTECTOMY      DIAGNOSTIC CARDIAC CATH LAB PROCEDURE Bilateral 06/05/2017    HYSTERECTOMY, TOTAL ABDOMINAL (CERVIX REMOVED)      NERVE BLOCK  06/03/15    lumbar epidural #1    NERVE BLOCK  6 22 15    lumbar epidural #2    NERVE BLOCK N/A 7/6/15    lumbar epidural #3    TONSILLECTOMY         Family History:  Family History   Problem Relation Age of Onset    Asthma Father     Hypertension Brother     Cancer Maternal Grandmother        Social History:  Social History     Socioeconomic History    Marital status:      Spouse name: Not on file    Number of children: Not on file    Years of education: Not on file    Highest education level: Not on file   Occupational History    Not on file   Tobacco Use    Smoking status: Never    Smokeless tobacco: Never   Vaping Use    Vaping Use: Never used   Substance and Sexual Activity    Alcohol use: No    Drug use: No    Sexual activity: Never   Other Topics Concern    Not on file   Social History Narrative    ** Merged History Encounter **          Social Determinants of Health     Financial Resource Strain: Low Risk     Difficulty of Paying Living Expenses: Not very hard   Food Insecurity: No Food Insecurity    Worried About Running Out of Food in the Last Year: Never true    Ran Out of Food in the Last Year: Never true   Transportation Needs: Not on file   Physical Activity: Inactive    Days of Exercise per Week: 0 days    Minutes of Exercise per Session: 0 min   Stress: Not on file   Social Connections: Not on file   Intimate Partner Violence: Not on file   Housing Stability: Not on file       Allergies: Allergies   Allergen Reactions    Clonidine Derivatives Anaphylaxis    Medrol [Methylprednisolone] Other (See Comments)     unknown    Naproxen Nausea Only    Other      oruvall    Atarax [Hydroxyzine] Palpitations       Home Medications:  Prior to Admission medications    Medication Sig Start Date End Date Taking?  Authorizing Provider   VITAMIN D PO Take by mouth    Historical Provider, MD   metoprolol (LOPRESSOR) 100 MG tablet Take 1 tablet by mouth 2 times daily 11/22/22 5/21/23  Lawerance Canter, DO   levothyroxine (SYNTHROID) 88 MCG tablet TAKE 1 TABLET DAILY 11/22/22   Lawdestiny Canter, DO apixaban (ELIQUIS) 5 MG TABS tablet Take by mouth 2 times daily    Historical Provider, MD   acetaminophen (TYLENOL) 500 MG tablet Take 500 mg by mouth every 6 hours as needed for Pain    Historical Provider, MD   Calcium Carbonate-Vit D-Min (CALCIUM 600+D3 PLUS MINERALS) 600-800 MG-UNIT CHEW Take 1 each by mouth 2 times daily (with meals) 6/24/22   Darleen Laughlin DO   Ferrous Sulfate (IRON PO) Take by mouth    Historical Provider, MD   Ascorbic Acid (VITAMIN C PO) Take by mouth    Historical Provider, MD   folic acid (FOLVITE) 1 MG tablet take 1 tablet by mouth once daily 12/30/20   Historical Provider, MD   JAKAFI 10 MG tablet Take 10 mg by mouth 3 times daily 12/28/20   Historical Provider, MD       Current Medications:  Current Facility-Administered Medications   Medication Dose Route Frequency Provider Last Rate Last Admin    0.9 % sodium chloride infusion   IntraVENous Continuous Frutoso MD Samantha        hydrALAZINE (APRESOLINE) injection 10 mg  10 mg IntraVENous Q4H PRN Josiane Bustillos MD        ruxolitinib phosphate (JAKAFI) 10 MG tablet TABS 20 mg*Patient supplied medication* (Patient Supplied)  20 mg Oral QAM Sylvia Olson MD   20 mg at 12/27/22 1002    And    ruxolitinib phosphate (JAKAFI) 10 MG tablet TABS 10 mg*Patient supplied* (Patient Supplied)  10 mg Oral Nightly Sylvia Olson MD   10 mg at 12/26/22 2107    apixaban (ELIQUIS) tablet 5 mg  5 mg Oral BID Sylvia Olson MD   5 mg at 27/53/22 3789    folic acid (FOLVITE) tablet 1 mg  1 mg Oral Daily Sylvia Olson MD   1 mg at 12/27/22 1000    levothyroxine (SYNTHROID) tablet 88 mcg  88 mcg Oral Daily Sylvia Olson MD   88 mcg at 12/27/22 1000    metoprolol tartrate (LOPRESSOR) tablet 100 mg  100 mg Oral BID Sylvia Olson MD   100 mg at 12/27/22 1000    sodium chloride flush 0.9 % injection 5-40 mL  5-40 mL IntraVENous 2 times per day Sylvia Olson MD   10 mL at 12/27/22 1001    sodium chloride flush 0.9 % injection 5-40 mL  5-40 mL IntraVENous PRN Jose Miguel Méndez MD        0.9 % sodium chloride infusion   IntraVENous PRN Jose Miguel Méndez MD        ondansetron (ZOFRAN-ODT) disintegrating tablet 4 mg  4 mg Oral Q8H PRN Jose Miguel Méndez MD        Or    ondansetron [facility]) injection 4 mg  4 mg IntraVENous Q6H PRN Jose Miguel Méndez MD        polyethylene glycol (GLYCOLAX) packet 17 g  17 g Oral Daily PRN Jose Miguel Méndez MD        acetaminophen (TYLENOL) tablet 650 mg  650 mg Oral Q6H PRN Jose Miguel Méndez MD   650 mg at 12/22/22 4014    Or    acetaminophen (TYLENOL) suppository 650 mg  650 mg Rectal Q6H PRN Jose Miguel Méndez MD        perflutren lipid microspheres (DEFINITY) injection 1.5 mL  1.5 mL IntraVENous ONCE PRN Jose Miguel Méndez MD          sodium chloride      sodium chloride             Review of Systems:   Cardiac: As per HPI  General: Denies fever or chills  Pulmonary: As per HPI  HEENT: Denies runny nose  GI: No complaints  : No complaints  Endocrine: Denies night sweats  Musculoskeletal: No complaints  Skin: Dry skin  Neuro: No complaints  Psych: Denies depression    Physical Exam:  BP (!) 157/60   Pulse 68   Temp 97.9 °F (36.6 °C) (Oral)   Resp 18   Ht 5' 5\" (1.651 m)   Wt 152 lb (68.9 kg)   SpO2 98%   BMI 25.29 kg/m²   Wt Readings from Last 3 Encounters:   12/21/22 152 lb (68.9 kg)   11/22/22 169 lb 14.4 oz (77.1 kg)   08/12/22 162 lb (73.5 kg)       Appearance: Alert and oriented x3 not in acute distress. Skin: Dry skin  Head: Atraumatic  Eyes: Intact extraocular muscles   ENMT: Mucous membranes are moist  Neck: Supple  Lungs: Clear to auscultation  Cardiac: Normal S1 and S2  Abdomen: Protuberant  Extremities: Intact range of motion  Neurologic: No focal neurological deficits  Peripheral Pulses: 2+ peripheral pulses      Intake/Output:    Intake/Output Summary (Last 24 hours) at 12/27/2022 1641  Last data filed at 12/27/2022 1346  Gross per 24 hour   Intake 480 ml   Output 100 ml   Net 380 ml     I/O this shift:   In: 480 [P.O.:480]  Out: 100 [Urine:100]      Laboratory Tests:  Recent Labs     12/25/22  0511 12/26/22  0546 12/27/22  0550    133 132   K 4.0 3.6 3.8   CL 95* 96* 96*   CO2 27 25 25   BUN 9 10 19   CREATININE 1.0 1.1* 1.5*   GLUCOSE 94 107* 101*   CALCIUM 9.0 8.8 8.8     Lab Results   Component Value Date/Time    MG 2.2 12/22/2022 08:28 AM    MG 2.2 12/21/2022 04:00 PM    MG 1.6 10/22/2020 05:15 AM     No results for input(s): ALKPHOS, ALT, AST, PROT, BILITOT, BILIDIR, LABALBU in the last 72 hours. Recent Labs     12/25/22  0511 12/26/22  0546 12/27/22  0550   WBC 10.0 11.4 11.0   RBC 3.45* 3.72 3.57   HGB 10.8* 11.7 11.1*   HCT 34.2 35.8 34.7   MCV 99.1 96.2 97.2   MCH 31.3 31.5 31.1   MCHC 31.6* 32.7 32.0   RDW 19.7* 19.5* 19.4*    237 219   MPV 12.6* 11.8 12.9*     Lab Results   Component Value Date    TROPONINI 0.159 (H) 06/03/2017    TROPONINI 0.261 (H) 06/03/2017    TROPONINI <0.01 10/10/2016     No results for input(s): CKTOTAL, CKMB, CKMBINDEX, TROPHS in the last 72 hours. Lab Results   Component Value Date    INR 1.5 10/02/2021    INR 1.5 10/22/2020    INR 1.3 10/21/2020    PROTIME 17.5 (H) 10/02/2021    PROTIME 16.9 (H) 10/22/2020    PROTIME 14.8 (H) 10/21/2020     Lab Results   Component Value Date    TSH 16.680 (H) 12/22/2022    TSH 4.220 (H) 02/04/2021    TSH 8.600 (H) 10/20/2020     No results found for: LABA1C  No results found for: EAG  No results found for: CHOL  No results found for: TRIG  Lab Results   Component Value Date    HDL 33 02/04/2021     Lab Results   Component Value Date    LDLCALC 93 02/04/2021     Lab Results   Component Value Date    LABVLDL 34 02/04/2021     No results found for: CHOLHDLRATIO  No results for input(s): PROBNP in the last 72 hours. Cardiac Tests:        Echocardiogram reviewed:     Transthoracic echocardiogram December 23, 2022  Technically sub-optimal images. Irregular heart rates. Normal left ventricular chamber size.   Mild global hypokinesis, abnormal septal motion, LV EF 45-50%. Mild left ventricular concentric hypertrophy noted. Stage I diastolic dysfunction. Left atrium is enlarged. Interatrial septum appears intact. Normal right ventricle size and function. There is trace mitral regurgitation. No mitral valve prolapse. No significant aortic stenosis present. here is trace tricuspid regurgitation, RVSP 30mmHg. Normal aortic root size. No evidence of pericardial effusion. No intra cardiac mass or thrombus. No previous echo for comparison. Stress test reviewed:      Cardiac catheterization reviewed:     CXR reviewed: The ASCVD Risk score (Deya LEE, et al., 2019) failed to calculate for the following reasons: The 2019 ASCVD risk score is only valid for ages 36 to 78    ASSESSMENT / PLAN:    1. Fatigue and weakness   Evaluation and management per primary service  Consider PT OT and social work involvement    2.  UTI   Evaluation and management per primary service     3. Acute on chronic kidney injury   Monitor BUN/creatinine closely and adjust nephrotoxic agents     4. Elevated troponin  Borderline elevated troponin leak in the setting of UTI and acute on chronic kidney injury  It is reported patient had heart catheterization in 2017 that revealed minimal disease  Echocardiogram shows EF of 45 to 50%  Arrange for Lexiscan myocardial perfusion stress test when patient is stable from infectious etiology to guide therapy. 4.  Cardiomyopathy with EF of 45 to 50%  Hydralazine and Imdur added to beta-blocker for guideline directed medical therapy  If blood pressure is better tomorrow switch from metoprolol tartrate to metoprolol succinate  If blood pressure remains high tomorrow switch from metoprolol tartrate to carvedilol    5. Hypothyroidism  Management per primary service    6. Hypertension    7. Debility  PT OT     8.   Herpes zoster  Management per primary service                    Thank you for allowing me to participate in your patient's care. Please feel free to contact me if you have any questions or concerns.     Kathleen Mares MD  [facility]) Cardiology

## 2022-12-27 NOTE — PROGRESS NOTES
Department of Internal Medicine  General Internal Medicine  Attending Progress Note  Chief Complaint   Patient presents with    Fatigue     Decreased mobility; diarrhea     SUBJECTIVE:    Reports that she is sleepy. No fever or chills.      OBJECTIVE      Medications    Current Facility-Administered Medications: 0.9 % sodium chloride infusion, , IntraVENous, Continuous  hydrALAZINE (APRESOLINE) injection 10 mg, 10 mg, IntraVENous, Q4H PRN  ruxolitinib phosphate (JAKAFI) 10 MG tablet TABS 20 mg*Patient supplied medication* (Patient Supplied), 20 mg, Oral, QAM **AND** ruxolitinib phosphate (JAKAFI) 10 MG tablet TABS 10 mg*Patient supplied* (Patient Supplied), 10 mg, Oral, Nightly  apixaban (ELIQUIS) tablet 5 mg, 5 mg, Oral, BID  folic acid (FOLVITE) tablet 1 mg, 1 mg, Oral, Daily  levothyroxine (SYNTHROID) tablet 88 mcg, 88 mcg, Oral, Daily  metoprolol tartrate (LOPRESSOR) tablet 100 mg, 100 mg, Oral, BID  sodium chloride flush 0.9 % injection 5-40 mL, 5-40 mL, IntraVENous, 2 times per day  sodium chloride flush 0.9 % injection 5-40 mL, 5-40 mL, IntraVENous, PRN  0.9 % sodium chloride infusion, , IntraVENous, PRN  ondansetron (ZOFRAN-ODT) disintegrating tablet 4 mg, 4 mg, Oral, Q8H PRN **OR** ondansetron (ZOFRAN) injection 4 mg, 4 mg, IntraVENous, Q6H PRN  polyethylene glycol (GLYCOLAX) packet 17 g, 17 g, Oral, Daily PRN  acetaminophen (TYLENOL) tablet 650 mg, 650 mg, Oral, Q6H PRN **OR** acetaminophen (TYLENOL) suppository 650 mg, 650 mg, Rectal, Q6H PRN  perflutren lipid microspheres (DEFINITY) injection 1.5 mL, 1.5 mL, IntraVENous, ONCE PRN  Physical    VITALS:  BP (!) 157/60   Pulse 68   Temp 97.9 °F (36.6 °C) (Oral)   Resp 18   Ht 5' 5\" (1.651 m)   Wt 152 lb (68.9 kg)   SpO2 98%   BMI 25.29 kg/m²   CONSTITUTIONAL:  awake, cooperative, and no apparent distress  EYES:  extra-ocular muscles intact  ENT:  normocepalic, without obvious abnormality  NECK:  supple, symmetrical, trachea midline  LUNGS:  no increased work of breathing, no retractions, and clear to auscultation  CARDIOVASCULAR:  normal apical pulses and normal S1 and S2  ABDOMEN:  normal bowel sounds, non-distended, and non-tender  MUSCULOSKELETAL:  full range of motion noted  NEUROLOGIC:  Mental Status Exam:  Level of Alertness:   awake  Orientation:   person, place, time  SKIN:  no bruising or bleeding  Data    CBC:   Lab Results   Component Value Date/Time    WBC 11.0 12/27/2022 05:50 AM    RBC 3.57 12/27/2022 05:50 AM    RBC 4.90 06/04/2017 03:00 AM    HGB 11.1 12/27/2022 05:50 AM    HCT 34.7 12/27/2022 05:50 AM    MCV 97.2 12/27/2022 05:50 AM    MCH 31.1 12/27/2022 05:50 AM    MCHC 32.0 12/27/2022 05:50 AM    RDW 19.4 12/27/2022 05:50 AM     12/27/2022 05:50 AM    MPV 12.9 12/27/2022 05:50 AM     BMP:    Lab Results   Component Value Date/Time     12/27/2022 05:50 AM    K 3.8 12/27/2022 05:50 AM    K 3.4 12/22/2022 08:28 AM    CL 96 12/27/2022 05:50 AM    CO2 25 12/27/2022 05:50 AM    BUN 19 12/27/2022 05:50 AM    LABALBU 4.0 12/21/2022 04:00 PM    CREATININE 1.5 12/27/2022 05:50 AM    CALCIUM 8.8 12/27/2022 05:50 AM    GFRAA 52 03/16/2021 12:15 PM    LABGLOM 34 12/27/2022 05:50 AM    GLUCOSE 101 12/27/2022 05:50 AM       ASSESSMENT AND PLAN      Elevated troponin: appreciate cardiology recommendation. Urine culture is without growth. Patient also completed 5 days of cipro. Can proceed with cardia work up if needed. Failure to thrive in adult: may be related to poor dentition  Acute Renal Injury: etiology is unknown. Will place her on Normal saline and check BMP in am. Avoid nephrotoxic agents  Hypothyroidism: on synthroid  Hypertension Essential: noted episodes of hypertension. Continue metoprolol. May add low dose of amlodipine  Polycythemia: continue home meds  ? UTI: completed 5 days of cipro  Weakness/fatgue: PT/OT, discharge to SNF

## 2022-12-27 NOTE — PLAN OF CARE
Problem: Safety - Adult  Goal: Free from fall injury  12/26/2022 1929 by Susanne Brasher RN  Outcome: Progressing     Problem: ABCDS Injury Assessment  Goal: Absence of physical injury  12/26/2022 1929 by Susanne Brasher RN  Outcome: Progressing     Problem: Skin/Tissue Integrity  Goal: Absence of new skin breakdown  Description: 1. Monitor for areas of redness and/or skin breakdown  2. Assess vascular access sites hourly  3. Every 4-6 hours minimum:  Change oxygen saturation probe site  4. Every 4-6 hours:  If on nasal continuous positive airway pressure, respiratory therapy assess nares and determine need for appliance change or resting period.   Outcome: Progressing

## 2022-12-27 NOTE — PROGRESS NOTES
Physician Progress Note      PATIENT:               Beauty Mohs  CSN #:                  [id number]  :                       1940  ADMIT DATE:       2022 3:43 PM  [address] [place] DATE:  RESPONDING  PROVIDER #:        Gene Owen MD          QUERY TEXT:    Patient admitted with UTI. Noted documentation of Acute on chronic CKD in IM   PN . In order to support the diagnosis of BRAYAN, please include additional   clinical indicators in your documentation. ? Or please document if the   diagnosis of BRAYAN has been ruled out after further study. The medical record reflects the following:  Risk Factors: UTI, CKD, FTT  Clinical Indicators: Per Cardiology PN  \"Acute on chronic kidney   injury-Monitor BUN/creatinine closely and adjust nephrotoxic agents\", Per IM   PN  \"Acute on chronic CKD: monitor renal function\", Creatinine 0.9-1.2,   Creatinine increased to 1.5 again  with BUN 19  Treatment: 0.9 % sodium chloride infusion    Defined by Kidney Disease Improving Global Outcomes (KDIGO) clinical practice   guideline for acute kidney injury:  -Increase in SCr by greater than or equal to 0.3 mg/dl within 48 hours; or  -Increase or decrease in SCr to greater than or equal to 1.5 times baseline,   which is known or presumed to have occurred within the prior 7 days; or  -Urine volume < 0.5ml/kg/h for 6 hours. Options provided:  -- Acute kidney injury evidenced by, Please document evidence as well as a   numerical baseline creatinine, if known.   -- Acute kidney injury ruled out after study  -- Other - I will add my own diagnosis  -- Disagree - Not applicable / Not valid  -- Disagree - Clinically unable to determine / Unknown  -- Refer to Clinical Documentation Reviewer    PROVIDER RESPONSE TEXT:    This patient has acute kidney injury as evidenced by    Query created by: Deedee Valdez on 2022 8:58 AM      Electronically signed by:  Gene Owen MD 2022 4:28 PM

## 2022-12-27 NOTE — PLAN OF CARE
Problem: Safety - Adult  Goal: Free from fall injury  Outcome: Progressing     Problem: ABCDS Injury Assessment  Goal: Absence of physical injury  Outcome: Progressing     Problem: Nutrition Deficit:  Goal: Optimize nutritional status  Outcome: Progressing     Problem: Skin/Tissue Integrity  Goal: Absence of new skin breakdown  Description: 1. Monitor for areas of redness and/or skin breakdown  2. Assess vascular access sites hourly  3. Every 4-6 hours minimum:  Change oxygen saturation probe site  4. Every 4-6 hours:  If on nasal continuous positive airway pressure, respiratory therapy assess nares and determine need for appliance change or resting period.   Outcome: Progressing

## 2022-12-27 NOTE — DISCHARGE INSTR - COC
Continuity of Care Form    Patient Name: Caitlin Weiner   :  1940  MRN:  [id number]    Admit date:  2022  Discharge date:  2022    Code Status Order: Full Code   Advance Directives:     Admitting Physician:  Servando Magdaleno MD  PCP: Verito Flowers DO    Discharging Nurse: [facility] Unit/Room#: 6437/4778-75  Discharging Unit Phone Number:     Emergency Contact:   Extended Emergency Contact Information  Primary Emergency Contact: KingCarlos Enrique  Address: [address] [place] [address] [address] [facility] Phone: [phone]  Relation: Child  Secondary Emergency Contact: Carmen Heath  Address: [address] [place] [address] Phone: [phone]  Work Phone: [phone]  Mobile Phone: [phone]  Relation: Child    Past Surgical History:  Past Surgical History:   Procedure Laterality Date    APPENDECTOMY      CHOLECYSTECTOMY      DIAGNOSTIC CARDIAC CATH LAB PROCEDURE Bilateral 2017    HYSTERECTOMY, TOTAL ABDOMINAL (CERVIX REMOVED)      NERVE BLOCK  06/03/15    lumbar epidural #1    NERVE BLOCK  6 22 15    lumbar epidural #2    NERVE BLOCK N/A 7/6/15    lumbar epidural #3    TONSILLECTOMY         Immunization History:   Immunization History   Administered Date(s) Administered    COVID-19, MODERNA BLUE border, Primary or Immunocompromised, (age 12y+), IM, 100 mcg/0.5mL 2021, 2021    COVID-19, MODERNA Booster BLUE border, (age 18y+), IM, 50mcg/0.25mL 2021    Influenza Vaccine, unspecified formulation 2016    Influenza Virus Vaccine 10/15/2015, 2016    Influenza, FLUAD, (age 72 y+), Adjuvanted, 0.5mL 10/12/2020, 10/05/2021    Influenza, FLUARIX, FLULAVAL, FLUZONE (age 10 mo+) AND AFLURIA, (age 1 y+), PF, 0.5mL 2017    Influenza, High Dose (Fluzone 65 yrs and older) 2017, 2019    Pneumococcal Conjugate 13-valent (Gary Grullon) 2014 Pneumococcal Conjugate Vaccine 01/01/2012, 01/01/2012    Pneumococcal Polysaccharide (Paipyeazx44) 10/24/2017    Tdap (Boostrix, Adacel) 12/09/2014, 10/02/2021       Active Problems:  Patient Active Problem List   Diagnosis Code    Syncope R55    Leukocytosis D72.829    Troponin I above reference range R77.8    Elevated brain natriuretic peptide (BNP) level R79.89    Left bundle branch block I44.7    Lumbar radiculopathy M54.16    Low back pain M54.50    Facet syndrome, lumbar M47.816    Compression fracture of L1 lumbar vertebra Old S32.010A    DDD (degenerative disc disease), lumbar M51.36    Rotoscoliosis M41.80    Lumbar sprain S33. 5XXA    Lumbar strain S39.012A    Protruded lumbar disc M51.26    Chronic pulmonary embolism (HCC) I27.82    Acute cystitis with hematuria N30.01    Polycythemia D75.1    Vitamin D deficiency E55.9    Hypothyroidism E03.9    Essential hypertension I10    Hyperlipidemia E78.5    Closed compression fracture of L1 lumbar vertebra, sequela S32.010S    Lumbar spondylosis M47.816    Lumbar facet arthropathy M47.816    Lumbar disc disorder M51.9    Chronic pain syndrome G89.4    Chronic renal disease, stage III ([facility] [address] [260134] N18.30    Elevated troponin R77.8    Failure to thrive in adult R62.7       Isolation/Infection:   Isolation            Airborne  Contact          Patient Infection Status       Infection Onset Added Last Indicated Last Indicated By Review Planned Expiration Resolved Resolved By    None active    Resolved    COVID-19 (Rule Out) 12/21/22 12/21/22 12/21/22 COVID-19, Rapid (Ordered)   12/21/22 Rule-Out Test Resulted    COVID-19 (Rule Out) 12/21/22 12/21/22 12/21/22 COVID-19 (Ordered)   12/21/22 Rule-Out Test Canceled    C-diff Rule Out 10/20/20 10/20/20 10/20/20 CLOSTRIDIUM DIFFICILE EIA (Ordered)   10/21/20 Rule-Out Test Resulted            Nurse Assessment:  Last Vital Signs: BP (!) 157/60   Pulse 68   Temp 97.9 °F (36.6 °C) (Oral)   Resp 18   Ht 5' 5\" (1.651 m) Wt 152 lb (68.9 kg)   SpO2 98%   BMI 25.29 kg/m²     Last documented pain score (0-10 scale):    Last Weight:   Wt Readings from Last 1 Encounters:   12/21/22 152 lb (68.9 kg)     Mental Status:  oriented and alert    IV Access:  - None    Nursing Mobility/ADLs:  Walking   Assisted  Transfer  Assisted  Bathing  Assisted  Dressing  Assisted  Toileting  Assisted  Feeding  Independent  Med Admin  Dependent  Med Delivery   whole    Wound Care Documentation and Therapy:        Elimination:  Continence: Bowel: No  Bladder: No  Urinary Catheter: None   Colostomy/Ileostomy/Ileal Conduit: No       Date of Last BM: 12/29/2022    Intake/Output Summary (Last 24 hours) at 12/27/2022 1431  Last data filed at 12/27/2022 1346  Gross per 24 hour   Intake 480 ml   Output 100 ml   Net 380 ml     I/O last 3 completed shifts: In: 5 [P.O.:540]  Out: -     Safety Concerns: At Risk for Falls    Impairments/Disabilities:      None    Nutrition Therapy:  Current Nutrition Therapy:   - Oral Diet:  General    Routes of Feeding: Oral  Liquids: Thin Liquids  Daily Fluid Restriction: no  Last Modified Barium Swallow with Video (Video Swallowing Test): not done    Treatments at the Time of Hospital Discharge:   Respiratory Treatments: ***  Oxygen Therapy:  is not on home oxygen therapy.   Ventilator:    - No ventilator support    Rehab Therapies: Physical Therapy and Occupational Therapy  Weight Bearing Status/Restrictions: No weight bearing restrictions  Other Medical Equipment (for information only, NOT a DME order):  walker  Other Treatments: ***    Patient's personal belongings (please select all that are sent with patient):  None    RN SIGNATURE:  {Esignature:[id number]}    CASE MANAGEMENT/SOCIAL WORK SECTION    Inpatient Status Date: 12/21/2022    Readmission Risk Assessment Score:  Readmission Risk              Risk of Unplanned Readmission:  14           Discharging to Facility/ Agency   Name:   Address:  Phone:  Fax:    Dialysis Facility (if applicable)   Name:  Address:  Dialysis Schedule:  Phone:  Fax:    / signature: {Esignature:[id number]}    PHYSICIAN SECTION    Prognosis: {Prognosis:[id number]}    Condition at Discharge: Stable    Rehab Potential (if transferring to Rehab): Good    Recommended Labs or Other Treatments After Discharge:   PT/OT/ST  Pt has new dentures and may need assistance with meals    Physician Certification: I certify the above information and transfer of Marv Delaney  is necessary for the continuing treatment of the diagnosis listed and that she requires [place] for less 30 days.      Update Admission H&P: No change in H&P    PHYSICIAN SIGNATURE:  Electronically signed by Jennifer Traore MD on 12/29/22 at 1:22 PM EST

## 2022-12-27 NOTE — CARE COORDINATION
12/27/2022 1425 CM note: Negative covid 12/21/22. CM followed up with patient, her son Delio Landeros [facility] vs SNF. Pt agreeable for SNF and prefers 1)[place] [facility]-referral made to liaison Fatimah to review. [address] beds available. 3)[facility]-referral made to liaison Sawyer Jansen and chart information faxed. For SNF, [address] waived however would need updated PT/OT notes, HENS, signed KISHORE. Pt on chemo pill and family relays they can take this medication to the SNF. CM/SW to follow for SNF acceptance and update pt and dtr Vanessa Gifford([phone]) or ([phone]) PILAR Fitzpatrick RN

## 2022-12-28 ENCOUNTER — APPOINTMENT (OUTPATIENT)
Dept: NON INVASIVE DIAGNOSTICS | Age: 82
DRG: 683 | End: 2022-12-28
Payer: MEDICARE

## 2022-12-28 ENCOUNTER — APPOINTMENT (OUTPATIENT)
Dept: NUCLEAR MEDICINE | Age: 82
DRG: 683 | End: 2022-12-28
Payer: MEDICARE

## 2022-12-28 LAB
ANION GAP SERPL CALCULATED.3IONS-SCNC: 7 MMOL/L (ref 7–16)
BUN BLDV-MCNC: 22 MG/DL (ref 6–23)
CALCIUM SERPL-MCNC: 8.5 MG/DL (ref 8.6–10.2)
CHLORIDE BLD-SCNC: 100 MMOL/L (ref 98–107)
CO2: 25 MMOL/L (ref 22–29)
CREAT SERPL-MCNC: 1.5 MG/DL (ref 0.5–1)
GFR SERPL CREATININE-BSD FRML MDRD: 34 ML/MIN/1.73
GLUCOSE BLD-MCNC: 102 MG/DL (ref 74–99)
HCT VFR BLD CALC: 32.9 % (ref 34–48)
HEMOGLOBIN: 10 G/DL (ref 11.5–15.5)
MCH RBC QN AUTO: 30.6 PG (ref 26–35)
MCHC RBC AUTO-ENTMCNC: 30.4 % (ref 32–34.5)
MCV RBC AUTO: 100.6 FL (ref 80–99.9)
PDW BLD-RTO: 19.4 FL (ref 11.5–15)
PLATELET # BLD: 182 E9/L (ref 130–450)
PMV BLD AUTO: 13 FL (ref 7–12)
POTASSIUM SERPL-SCNC: 4.1 MMOL/L (ref 3.5–5)
RBC # BLD: 3.27 E12/L (ref 3.5–5.5)
SODIUM BLD-SCNC: 132 MMOL/L (ref 132–146)
WBC # BLD: 11 E9/L (ref 4.5–11.5)

## 2022-12-28 PROCEDURE — 97530 THERAPEUTIC ACTIVITIES: CPT

## 2022-12-28 PROCEDURE — 85027 COMPLETE CBC AUTOMATED: CPT

## 2022-12-28 PROCEDURE — A9500 TC99M SESTAMIBI: HCPCS | Performed by: RADIOLOGY

## 2022-12-28 PROCEDURE — 6360000002 HC RX W HCPCS: Performed by: INTERNAL MEDICINE

## 2022-12-28 PROCEDURE — 99233 SBSQ HOSP IP/OBS HIGH 50: CPT | Performed by: INTERNAL MEDICINE

## 2022-12-28 PROCEDURE — 2580000003 HC RX 258: Performed by: INTERNAL MEDICINE

## 2022-12-28 PROCEDURE — 97535 SELF CARE MNGMENT TRAINING: CPT

## 2022-12-28 PROCEDURE — 6370000000 HC RX 637 (ALT 250 FOR IP): Performed by: INTERNAL MEDICINE

## 2022-12-28 PROCEDURE — 78452 HT MUSCLE IMAGE SPECT MULT: CPT

## 2022-12-28 PROCEDURE — 3430000000 HC RX DIAGNOSTIC RADIOPHARMACEUTICAL: Performed by: RADIOLOGY

## 2022-12-28 PROCEDURE — 80048 BASIC METABOLIC PNL TOTAL CA: CPT

## 2022-12-28 PROCEDURE — 93018 CV STRESS TEST I&R ONLY: CPT | Performed by: INTERNAL MEDICINE

## 2022-12-28 PROCEDURE — 99232 SBSQ HOSP IP/OBS MODERATE 35: CPT | Performed by: INTERNAL MEDICINE

## 2022-12-28 PROCEDURE — 97110 THERAPEUTIC EXERCISES: CPT

## 2022-12-28 PROCEDURE — 93016 CV STRESS TEST SUPVJ ONLY: CPT | Performed by: INTERNAL MEDICINE

## 2022-12-28 PROCEDURE — 1200000000 HC SEMI PRIVATE

## 2022-12-28 PROCEDURE — 93017 CV STRESS TEST TRACING ONLY: CPT

## 2022-12-28 PROCEDURE — 36415 COLL VENOUS BLD VENIPUNCTURE: CPT

## 2022-12-28 PROCEDURE — 78452 HT MUSCLE IMAGE SPECT MULT: CPT | Performed by: INTERNAL MEDICINE

## 2022-12-28 RX ORDER — CARVEDILOL 25 MG/1
25 TABLET ORAL 2 TIMES DAILY WITH MEALS
Status: DISCONTINUED | OUTPATIENT
Start: 2022-12-28 | End: 2022-12-29 | Stop reason: HOSPADM

## 2022-12-28 RX ORDER — TECHNETIUM TC-99M SESTAMIBI 1 MG/10ML
30 INJECTION INTRAVENOUS
Status: COMPLETED | OUTPATIENT
Start: 2022-12-28 | End: 2022-12-28

## 2022-12-28 RX ORDER — TECHNETIUM TC-99M SESTAMIBI 1 MG/10ML
10 INJECTION INTRAVENOUS
Status: COMPLETED | OUTPATIENT
Start: 2022-12-28 | End: 2022-12-28

## 2022-12-28 RX ADMIN — HYDRALAZINE HYDROCHLORIDE 25 MG: 25 TABLET, FILM COATED ORAL at 20:52

## 2022-12-28 RX ADMIN — HYDRALAZINE HYDROCHLORIDE 25 MG: 25 TABLET, FILM COATED ORAL at 14:29

## 2022-12-28 RX ADMIN — REGADENOSON 0.4 MG: 0.08 INJECTION, SOLUTION INTRAVENOUS at 12:39

## 2022-12-28 RX ADMIN — METOPROLOL TARTRATE 100 MG: 50 TABLET, FILM COATED ORAL at 09:51

## 2022-12-28 RX ADMIN — SODIUM CHLORIDE: 9 INJECTION, SOLUTION INTRAVENOUS at 09:45

## 2022-12-28 RX ADMIN — APIXABAN 5 MG: 5 TABLET, FILM COATED ORAL at 14:29

## 2022-12-28 RX ADMIN — Medication 30 MILLICURIE: at 13:58

## 2022-12-28 RX ADMIN — ISOSORBIDE MONONITRATE 30 MG: 30 TABLET, EXTENDED RELEASE ORAL at 14:29

## 2022-12-28 RX ADMIN — APIXABAN 5 MG: 5 TABLET, FILM COATED ORAL at 20:52

## 2022-12-28 RX ADMIN — CARVEDILOL 25 MG: 25 TABLET, FILM COATED ORAL at 17:19

## 2022-12-28 RX ADMIN — Medication 10 MILLICURIE: at 13:58

## 2022-12-28 RX ADMIN — Medication 10 ML: at 09:44

## 2022-12-28 RX ADMIN — FOLIC ACID 1 MG: 1 TABLET ORAL at 14:29

## 2022-12-28 RX ADMIN — Medication 10 ML: at 01:46

## 2022-12-28 RX ADMIN — LEVOTHYROXINE SODIUM 88 MCG: 0.09 TABLET ORAL at 14:29

## 2022-12-28 NOTE — PROGRESS NOTES
OCCUPATIONAL THERAPY BEDSIDE TREATMENT NOTE   [address] [facility]  [facility] Maryannel Hopper. [place]    Date:2022  Patient Name: Zara Rivera  MRN: [id number]  : 1940  Room: [address] [place] [address]        Evaluating OT: Shannen Vogt OTR/L; [id number]        Referring Provider and Orders/Date:   OT eval and treat  Start:  22 1145,   End:  22 1145,   ONE TIME,   Standing Count:  1 Occurrences,   Willy Zamudio MD         Diagnosis:   1. Failure to thrive in adult    2. Herpes zoster with complication    3. Hypokalemia    4. Elevated troponin          Surgery: none      Pertinent Medical History:        Past Medical History        Past Medical History:   Diagnosis Date    Back injury      Cervical spondylolysis      Chronic lymphoid leukemia ([facility] [address]      Gallstones      H/O echocardiogram 2017     EF 69%    History of bone density study       Dr [facility], [address]?    Hyperlipidemia      Hypertension      LBBB (left bundle branch block)      Lumbar spondylosis with myelopathy      Pneumonia      Polycythemia vera ([facility] [address]       JAK2    Syncope and collapse      Thyroid disease               Past Surgical History         Past Surgical History:   Procedure Laterality Date    APPENDECTOMY        CHOLECYSTECTOMY        DIAGNOSTIC CARDIAC CATH LAB PROCEDURE Bilateral 2017    HYSTERECTOMY, TOTAL ABDOMINAL (CERVIX REMOVED)        NERVE BLOCK   06/03/15     lumbar epidural #1    NERVE BLOCK   6 22 15     lumbar epidural #2    NERVE BLOCK N/A 7/6/15     lumbar epidural #3    TONSILLECTOMY               Precautions:  Fall Risk, airborne with contact iso-shingles    Recommended placement: subacute vs home with [place] dependent on participation, out of bed and physical status at [facility]. Family can provide min A.      Assessment of current deficits     [x] Functional mobility           [x]ADLs           [x] Strength                  []Cognition     [x] Functional transfers         [x] IADLs         [x] Safety Awareness   [x]Endurance     [] Fine Coordination                        [x] Balance      [] Vision/perception   []Sensation       [x]Gross Motor Coordination            [] ROM           [] Delirium                   [] Motor Control      OT PLAN OF CARE   OT POC based on physician orders, patient diagnosis and results of clinical assessment     Frequency/Duration 1-3 days/wk for 2 weeks PRN   Specific OT Treatment Interventions to include:   * Instruction/training on adapted ADL techniques and AE recommendations to increase functional independence within precautions       * Training on energy conservation strategies, correct breathing pattern and techniques to improve independence/tolerance for self-care routine  * Functional transfer/mobility training/DME recommendations for increased independence, safety, and fall prevention  * Patient/Family education to increase follow through with safety techniques and functional independence  * Recommendation of environmental modifications for increased safety with functional transfers/mobility and ADLs  * Therapeutic exercise to improve motor endurance, ROM, and functional strength for ADLs/functional transfers  * Therapeutic activities to facilitate/challenge dynamic balance, stand tolerance for increased safety and independence with ADLs  * Therapeutic activities to facilitate gross/fine motor skills for increased independence with ADLs  * Neuro-muscular re-education: facilitation of righting/equilibrium reactions, midline orientation, scapular stability/mobility, normalization of muscle tone, and facilitation of volitional active controled movement  * Positioning to improve skin integrity, interaction with environment and functional independence      Recommended Adaptive Equipment/DME: tub transfer bench, wheeled walker, continue to assess      Home Living: Pt lives with daughter in a bilevel home 5 steps  up with HR and 5 steps down without HR and 2 steps to enter with rails. Son does assist with stairs and pt does not go to basement level. Bathroom setup: Tub shower, grab bars, raised toilet seat with grab bars    DME owned: cane, walker      Prior Level of Function: supervision with ADLs , assist with IADLs; ambulated indep   Driving: no   Occupation: retired   Enjoys: reading, quilting, cats     Pain Level: back pain, unquantified  Cognition: A&O: 3/4 some confusion to situation ;  Follows 2 step directions              Memory:  Intact              Sequencing:  fair              Problem solving:  fair              Judgement/safety:  fair- (L knee buckling)     AM-PAC Daily Activity Inpatient   How much help for putting on and taking off regular lower body clothing?: A Lot  How much help for Bathing?: A Lot  How much help for Toileting?: A Lot  How much help for putting on and taking off regular upper body clothing?: A Lot  How much help for taking care of personal grooming?: A Little  How much help for eating meals?: A Little  AM-[facility] Inpatient Daily Activity Raw Score: 14  AM-PAC Inpatient ADL T-Scale Score : 33.39  ADL Inpatient CMS 0-100% Score: 59.67  ADL Inpatient CMS G-Code Modifier : CK     Functional Assessment:      Initial Eval Status  Date: 12/26/2022   Treatment Status  Date: 12/28/22 STGs = LTGs  Time frame: 10-14 days   Feeding Minimal Assist with max prompting for intake and education on importance of intake for energy N/T; NPO for testing  Indep   Grooming Minimal Assist with face/hand wash and hair comb standing in front of the sink Min A with FWW and assist for standing balance when washing hands at sinkside after toileting (L knee buckling) Supervision    UB Dressing Moderate Assist with gown management from sitting EOB Mod A to change gowns when seated on low commode; assist to fasten gown around IV, thread monitor lines through gown ; assist to tie/untie back of gown Minimal Assist    LB Dressing Maximal Assist with socks and brief from sitting/standing Mod A for doffing brief over B feet when seated on commode; pt able to don garment over B feet and hips with min A for standing balance; pt able to don/doff socks when seated on low commode with increased time Minimal Assist    Bathing Moderate Assist with sponge bathing from sitting/standing N/T  Minimal Assist    Toileting Maximal Assist for salma care and clothing management after Min A for transfer to low commode; mod A for sit to stand from low commode; use of hand rail and increased assist; use of FWW Stand by Assist    Bed Mobility  Supine to sit: Minimal Assist   Sit to supine: Minimal Assist    Min A for supine to sit with assist to guide UB to sitting; pt able to support LE's to EOB; scooting at min A with assist for balance; sit to supine (on transport cart) at mod A with assist to support B LE's to supine Supine to sit: Independent   Sit to supine: Independent    Functional Transfers Moderate Assist with sit to stand from lower surface of toilet and min A from elevated surface of bed with walker Mod A for sit to stand transfers from EOB; transfer to low commode at Min A; transfer from low commode at mod A ; cuing for hand placement, use of handrail; transfer to transport cart at mod A with FWW; L knee buckling noted  Stand by Assist    Functional Mobility Minimal Assist with walker management for short distance functional mobility throughout the room to simulate house hold distances.      Min A with FWW for household distances in room to/from bathroom and to hallway to leave for stress test Stand by Assist    Balance Sitting:     Static:  fair+    Dynamic: fair  Standing: fair- Sitting:     Static:  fair+    Dynamic: fair  Standing: fair- (L knee buckling noted)  Sitting:     Static:  good    Dynamic:fair+  Standing: fair   Activity Tolerance Vitals with activity:  WFL overall on room air; sitting EOB 10+min and standing 3min Fair/fair minus  Increase standing tolerance for >5min with stable vital signs for carry over into toileting, functional tranfers and indep in ADLs   Visual/  Perceptual Glasses: present; WFL     Reports change in vision since admission: No      NA      Hand Dominance  [x] Right   [] Left     Hearing: [employer] [facility] [employer] [place]   Sensation:  No c/o numbness or tingling   Tone: WFL   Edema: none    Comments: Patient cleared by nursing staff. Upon arrival pt supine in bed with HOB partially elevated. Pt agreeable to OT tx session. Pt educated with regards to bed mobility, hand placement, safety awareness, static sitting balance,  standing balance, transfer training, functional mobility,   grooming tasks, LE/UE dressing, toileting/hygiene, ECT's. At end of session pt seated on transport cart  with all lines and tubes intact, call light within reach. Overall, pt demonstrated decreased independence and safety during completion of ADL/functional transfers/mobility tasks. Pt would benefit from continued skilled OT to increase safety and independence with completion of ADL/IADL tasks for functional independence and quality of life. Pt required cues and education as noted above for safe facilitation and completion of tasks. Therapist provided skilled monitoring of patient's response during treatment session. Prior to and at the end of session, environmental modifications /IV line management completed for patients safety and efficiency of treatment session. Overall, patient demonstrates minimal/moderate difficulties with completion of BADLs and IADLs. Factors contributing to these difficulties include urinary incontinence, decreased endurance, L knee buckling and generalized weakness. As noted above, patient likely to benefit from further OT intervention to increase independence, safety, and overall quality of life.      Treatment:     Bed mobility: Facilitated bed mobility with cues for proper body mechanics and sequencing to prepare for ADL completion. Functional transfers: Facilitated transfers from various surfaces with cues for body alignment, safety and hand placement. ADL completion: Self-care retraining for the above-mentioned ADLs; training on proper hand placement, safety technique, sequencing, and energy conservation techniques. Postural Balance: Sitting/standing balance retraining to improve righting reactions with postural changes during ADLs. Skilled positioning: Proper positioning to improve interaction with environment, overall functioning     Pt has made fair progress towards set goals     OT 1-3 days/wk for 2 weeks PRN     Treatment Time also includes thorough review of current medical information, gathering information on past medical history/social history and prior level of function, informal observation of tasks, assessment of data and education on plan of care and goals.     Treatment Time In: 10:36 AM    Treatment Time Out: 11:00 AM            Treatment Charges: Mins Units   ADL/Home Mgt     29069 12 1   Thera Activities     96030 12 1   Ther Ex                 [id number]       Manual Therapy    89527     Neuro Re-ed         15599     Orthotic manage/training                               32424     Non Billable Time     Total Timed Treatment 24 431 [facility], SAMANIEGO/ #[id number]

## 2022-12-28 NOTE — PROGRESS NOTES
Physical Therapy        [id number]/7751-64    Patient unavailable for physical therapy treatment due to patient being out of room for stress test. Will attempt session at later time/date.      Suarez Signs, PTA  #[phone]

## 2022-12-28 NOTE — PROGRESS NOTES
Speech Language Pathology      NAME:  Kelly Monique  :  1940  DATE: 2022  ROOM:  [facility]4/2244-45    Attempted ongoing Speech-Language Pathology intervention for dysphagia. Pt unavailable at this time due to:  [] HOLD per RN/ medical staff d/t medical status   [x] Off unit for testing/ procedure    [] With medical staff   [] Declined intervention  [] Sleeping/ Lethargic   [] Other:     SLP to continue previously established POC and re-attempt as able.           Hypokalemia [E87.6]  Elevated troponin [R77.8]  Failure to thrive in adult [R62.7]  Herpes zoster with complication [P13.5]        Hardik Bautista MSCCC/SLP  Speech Language Pathologist  [id number]

## 2022-12-28 NOTE — PROGRESS NOTES
Physical Therapy Treatment Note/Plan of Care    Room #:  9844/8166-86  Patient Name: Caleb Mackey  YOB: 1940  MRN: [id number]    Date of Service: 12/28/2022     Tentative placement recommendation: Subacute vs Home Health Physical Therapy if patient meets goals  Equipment recommendation:  To be determined      Evaluating Physical Therapist: Arlet Christian #[id number]      Specific Provider Orders/Date/Referring Provider :   12/22/22 0000    PT evaluation and treat  Start:  12/22/22 0000,   End:  12/22/22 0000,   ONE TIME,   Standing Count:  1 Occurrences,   Luis Daniel Barrera MD     Admitting Diagnosis:   Hypokalemia [E87.6]  Elevated troponin [R77.8]  Failure to thrive in adult [R62.7]  Herpes zoster with complication [Y74.4]      Surgery: none  Visit Diagnoses         Codes    Failure to thrive in adult    -  Primary R62.7    Herpes zoster with complication     O57.9    Hypokalemia     E87.6            Patient Active Problem List   Diagnosis    Syncope    Leukocytosis    Troponin I above reference range    Elevated brain natriuretic peptide (BNP) level    Left bundle branch block    Lumbar radiculopathy    Low back pain    Facet syndrome, lumbar    Compression fracture of L1 lumbar vertebra Old    DDD (degenerative disc disease), lumbar    Rotoscoliosis    Lumbar sprain    Lumbar strain    Protruded lumbar disc    Chronic pulmonary embolism ([address]    Acute cystitis with hematuria    Polycythemia    Vitamin D deficiency    Hypothyroidism    Essential hypertension    Hyperlipidemia    Closed compression fracture of L1 lumbar vertebra, sequela    Lumbar spondylosis    Lumbar facet arthropathy    Lumbar disc disorder    Chronic pain syndrome    Chronic renal disease, stage III (HCC) [524790]    Elevated troponin    Failure to thrive in adult        ASSESSMENT of Current Deficits Patient exhibits decreased strength, balance, and endurance impairing functional mobility, transfers, gait distance, and tolerance to activity. Patient needing minimal assist for all functional mobility. Pt's distance limited by impaired endurance. The patient will benefit from continued skilled therapy to increase strength and improve balance for safe functional mobility, to decrease risk of falls, and to meet goals at discharge. PHYSICAL THERAPY  PLAN OF CARE       Physical therapy plan of care is established based on physician order,  patient diagnosis and clinical assessment    Current Treatment Recommendations:    -Bed Mobility: Lower extremity exercises , Upper extremity exercises , and Trunk control activities   -Sitting Balance: Incorporate reaching activities to activate trunk muscles   -Standing Balance: Perform strengthening exercises in standing to promote motor control with or without upper extremity support   -Transfers: Provide instruction on proper hand and foot position for adequate transfer of weight onto lower extremities and use of gait device if needed and Cues for hand placement, technique and safety. Provide stabilization to prevent fall   -Gait: Gait training and Standing activities to improve: base of support, weight shift, weight bearing    -Endurance: Utilize Supervised activities to increase level of endurance to allow for safe functional mobility including transfers and gait   -Stairs: Stair training with instruction on proper technique and hand placement on rail    PT long term treatment goals are located in below grid    Patient and or family understand(s) diagnosis, prognosis, and plan of care. Frequency of treatments: Patient will be seen  daily.          Prior Level of Function: Patient ambulated independently   Rehab Potential: good  for baseline    Past medical history:   Past Medical History:   Diagnosis Date    Back injury     Cervical spondylolysis     Chronic lymphoid leukemia ([facility] [address]     Gallstones     H/O echocardiogram 06/04/2017    EF 69%    History of bone density study     Dr Leticia [facility], [address]?    Hyperlipidemia     Hypertension     LBBB (left bundle branch block)     Lumbar spondylosis with myelopathy     Pneumonia     Polycythemia vera (HCC)     JAK2    Syncope and collapse     Thyroid disease      Past Surgical History:   Procedure Laterality Date    APPENDECTOMY      CHOLECYSTECTOMY      DIAGNOSTIC CARDIAC CATH LAB PROCEDURE Bilateral 06/05/2017    HYSTERECTOMY, TOTAL ABDOMINAL (CERVIX REMOVED)      NERVE BLOCK  06/03/15    lumbar epidural #1    NERVE BLOCK  6 22 15    lumbar epidural #2    NERVE BLOCK N/A 7/6/15    lumbar epidural #3    TONSILLECTOMY         SUBJECTIVE:    Precautions: Up as tolerated, falls , incont of bladder,     Social history: Patient lives with son in a bilevel home 5 steps up with HR and 5 Steps down without HR    with 2 steps  with HR to enter Tub shower  . Reports her son helps her with stairs and she doesn't go down the one set of stairs to the bottom level    Equipment owned: Grab bars,      AM-PAC Basic Mobility       AM-[facility] Mobility Inpatient   How much difficulty turning over in bed?: A Little  How much difficulty sitting down on / standing up from a chair with arms?: A Little  How much difficulty moving from lying on back to sitting on side of bed?: A Little  How much help from another person moving to and from a bed to a chair?: A Little  How much help from another person needed to walk in hospital room?: A Little  How much help from another person for climbing 3-5 steps with a railing?: A Lot  AM-PAC Inpatient Mobility Raw Score : 17  AM-PAC Inpatient T-Scale Score : 42.13  Mobility Inpatient CMS 0-100% Score: 50.57  Mobility Inpatient CMS G-Code Modifier : CK    Nursing cleared patient for PT treatment. OBJECTIVE;   Initial Evaluation  Date: 12/22/2022 Treatment Date:    12/28/2022   Short Term/ Long Term   Goals   Was pt agreeable to Eval/treatment?  Yes Yes  To be met in 4 days   Pain level   0/10   0/10    Bed Mobility Rolling: Minimal assist of 1    Supine to sit: Minimal assist of 1    Sit to supine: Minimal assist of 1    Scooting: Minimal assist of 1   Rolling: Supervision    Supine to sit: Minimal assist of 1   Sit to supine: Minimal assist of 1   Scooting: Minimal assist of 1    Rolling: Independent    Supine to sit: Independent    Sit to supine: Independent    Scooting: Independent     Transfers Sit to stand: Minimal assist of 1  Sit to stand: Minimal assist of 1 cues for hand placement   Sit to stand: Independent    Ambulation     3 side steps using  wheeled walker with Moderate assist of 1   for walker control, balance, and safety 2 x 30 feet using  wheeled walker with Minimal assist of 1   for walker control, balance, upright, safety, and pacing.      39 feet using  wheeled walker with Modified Independent    Stair negotiation: ascended and descended   Not assessed         5 steps with HR and min A   ROM Within functional limits    Increase range of motion 10% of affected joints    Strength BUE:  refer to OT eval  RLE:  4-/5  LLE:  3+/5  Increase strength in affected mm groups by 1/3 grade   Balance Sitting EOB:  good -  Dynamic Standing:  fair - wheeled walker   Sitting EOB: good   Dynamic Standing: fair wheeled walker   Sitting EOB:  good   Dynamic Standing: good      Patient is Alert & Oriented x person, place, time, and situation and follows directions    Sensation:  Patient  denies numbness/tingling   Edema:  no   Endurance: fair      Vitals: room air   Blood Pressure at rest  Blood Pressure during session    Heart Rate at rest  Heart Rate during session    SPO2 at rest %  SPO2 during session %     Patient education  Patient educated on role of Physical Therapy, risks of immobility, safety and plan of care,  importance of mobility while in hospital , ankle pumps, quad set and glut set for edema control, blood clot prevention, and safety      Patient response to education:   Pt verbalized understanding Pt demonstrated skill Pt requires further education in this area   Yes Partial Yes      Treatment:  Patient practiced and was instructed/facilitated in the following treatment:  Pt assisted to EOB. Sat edge of bed 8 minutes with Supervision  to increase dynamic sitting balance and activity tolerance. Pt stood, ambulated in the room, and back to the edge of the bed. Pt returned to supine and performed supine exercises. Therapeutic Exercises:  ankle pumps, heel slide, hip abduction/adduction, and straight leg raise,  x 10 - 15 reps. At end of session, patient in bed with  .  call light and phone within reach,  all lines and tubes intact, nursing notified. Patient would benefit from continued skilled Physical Therapy to improve functional independence and quality of life. Patient's/ family goals   home    Time in 09:54  Time out  10:17    Total Treatment Time  23 minutes      CPT codes:    Therapeutic activities (16748)   14 minutes  1 unit(s)  Therapeutic exercises (03657)   9 minutes  1 unit(s)    Elieser Mcallister  [facility]  LIC # [id number]

## 2022-12-28 NOTE — PROGRESS NOTES
Comprehensive Nutrition Assessment    Type and Reason for Visit:  Reassess    Nutrition Recommendations/Plan:   Continue NPO & will monitor nutrition progression     Malnutrition Assessment:  Malnutrition Status: At risk for malnutrition (Comment) (12/22/22 1356)    Context:  Chronic Illness     Findings of the 6 clinical characteristics of malnutrition:  Energy Intake:  Mild decrease in energy intake (Comment)  Weight Loss:  Unable to assess (d/t lack of accurate wt in EMR)     Body Fat Loss:  Unable to assess (d/t iso)     Muscle Mass Loss:  Unable to assess (d/t iso)    Fluid Accumulation:  No significant fluid accumulation     Strength:  Not Performed    Nutrition Assessment:    Pt admits w/ generalized weakness & MAGDALENA, note multifactorial: ill fitting and/or new dentures,  Dx: Elevated troponin,  Adult FTT, Herpes zoster w/ complication, Hypokalemia Hx: Polycythemia vera, CKD, hypothyroidism, HTN. Prior to NPO status pt was on modified diet w/ poor meal intake < 25%. Pt currently NPO pending tests. Will monitor nutrition progression. Nutrition Related Findings:    A/O x4, rounded/soft/nontender abd +BS, MAGDALENA, I/O -WDL, no edema noted, Cr 1.5(H). Wound Type: None       Current Nutrition Intake & Therapies:    Average Meal Intake: NPO  Average Supplements Intake: NPO  Diet NPO    Anthropometric Measures:  Height: 5' 5\" (165.1 cm)  Ideal Body Weight (IBW): 125 lbs (57 kg)    Admission Body Weight: 152 lb (68.9 kg) (12/20 stated)  Current Body Weight: 152 lb (68.9 kg), 121.6 % IBW. Current BMI (kg/m2): 25.3  Usual Body Weight:  (163-169# accurate wt range per EMR x 8 mos)     Weight Adjustment For: No Adjustment     BMI Categories: Overweight (BMI 25.0-29. 9)    Estimated Daily Nutrient Needs:  Energy Requirements Based On: Formula  Weight Used for Energy Requirements: Admission  Energy (kcal/day): 8857-8341  Weight Used for Protein Requirements: Ideal  Protein (g/day): 70-80 (1.2-1.4gm/kg)     Fluid (ml/day): 1655-9817    Nutrition Diagnosis:   Inadequate oral intake related to partial or complete edentulism as evidenced by poor intake prior to admission    Nutrition Interventions:   Food and/or Nutrient Delivery: Continue NPO  Nutrition Education/Counseling: No recommendation at this time  Coordination of Nutrition Care: Continue to monitor while inpatient     Goals:     Goals: other (specify)  Specify Other Goals: monitor nutrition progression    Nutrition Monitoring and Evaluation:   Behavioral-Environmental Outcomes: None Identified  Food/Nutrient Intake Outcomes: Diet Advancement/Tolerance (when medically feasible)  Physical Signs/Symptoms Outcomes: Biochemical Data, Chewing or Swallowing, GI Status, Fluid Status or Edema, Weight, Skin, Nutrition Focused Physical Findings    Discharge Planning:     Too soon to determine     Jessica Hodge RD  Contact: [phone]

## 2022-12-28 NOTE — PROGRESS NOTES
Dr Radha Shore made aware of patient worried about having a reaction to the lexiscan medication. Per the patient, the last stress she had, the medication reacted with her chemo medication Jakaf.  Per Dr. Radha Shore, hold stress and will discuss in the AM.

## 2022-12-28 NOTE — CARE COORDINATION
Ss note:12/28/2022 12:12 PM Neg covid on 12-21-22. Will need Neg Rapid Covid prior to discharge as pt has been accepted at [facility] [place]. Per liaison Fatimah bed is available today, [employer] is waived under [employer]. Pt currently in stress test. Dtr Cami Daly present in room and updated on acceptance to [facility] [place]. Per notes pts family to take chemo pill to the SNF. Need Hens and signed KISHORE.  LESIA Abel

## 2022-12-28 NOTE — PROGRESS NOTES
INPATIENT CARDIOLOGY CONSULT    Name: Yojana Dao    Age: 78 y.o. Date of Admission: 12/21/2022  3:43 PM    Date of Service: 12/28/2022    Reason for Consultation:   Chief Complaint   Patient presents with    Fatigue     Decreased mobility; diarrhea          Referring Physician: Salas Campbell MD    No significant event overnight. Hydralazine and Imdur added to beta-blocker for guideline directed medical therapy      Transthoracic echocardiogram December 23, 2022  Technically sub-optimal images. Irregular heart rates. Normal left ventricular chamber size. Mild global hypokinesis, abnormal septal motion, LV EF 45-50%. Mild left ventricular concentric hypertrophy noted. Stage I diastolic dysfunction. Left atrium is enlarged. Interatrial septum appears intact. Normal right ventricle size and function. There is trace mitral regurgitation. No mitral valve prolapse. No significant aortic stenosis present. here is trace tricuspid regurgitation, RVSP 30mmHg. Normal aortic root size. No evidence of pericardial effusion. No intra cardiac mass or thrombus. No previous echo for comparison.       Past Medical History:  Past Medical History:   Diagnosis Date    Back injury     Cervical spondylolysis     Chronic lymphoid leukemia ([address]     Gallstones     H/O cardiovascular stress test 12/28/2022    Quyen Holland    H/O echocardiogram 06/04/2017    EF 69%    History of bone density study     Dr [facility], [address]?    Hyperlipidemia     Hypertension     LBBB (left bundle branch block)     Lumbar spondylosis with myelopathy     Pneumonia     Polycythemia vera ([address]     JAK2    Syncope and collapse     Thyroid disease        Past Surgical History:  Past Surgical History:   Procedure Laterality Date    APPENDECTOMY      CHOLECYSTECTOMY      DIAGNOSTIC CARDIAC CATH LAB PROCEDURE Bilateral 06/05/2017    HYSTERECTOMY, TOTAL ABDOMINAL (CERVIX REMOVED)      NERVE BLOCK  06/03/15    lumbar epidural #1 NERVE BLOCK  6 22 15    lumbar epidural #2    NERVE BLOCK N/A 7/6/15    lumbar epidural #3    TONSILLECTOMY         Family History:  Family History   Problem Relation Age of Onset    Asthma Father     Hypertension Brother     Cancer Maternal Grandmother        Social History:  Social History     Socioeconomic History    Marital status:      Spouse name: Not on file    Number of children: Not on file    Years of education: Not on file    Highest education level: Not on file   Occupational History    Not on file   Tobacco Use    Smoking status: Never    Smokeless tobacco: Never   Vaping Use    Vaping Use: Never used   Substance and Sexual Activity    Alcohol use: No    Drug use: No    Sexual activity: Never   Other Topics Concern    Not on file   Social History Narrative    ** Merged History Encounter **          Social Determinants of Health     Financial Resource Strain: Low Risk     Difficulty of Paying Living Expenses: Not very hard   Food Insecurity: No Food Insecurity    Worried About Running Out of Food in the Last Year: Never true    Ran Out of Food in the Last Year: Never true   Transportation Needs: Not on file   Physical Activity: Inactive    Days of Exercise per Week: 0 days    Minutes of Exercise per Session: 0 min   Stress: Not on file   Social Connections: Not on file   Intimate Partner Violence: Not on file   Housing Stability: Not on file       Allergies: Allergies   Allergen Reactions    Clonidine Derivatives Anaphylaxis    Medrol [Methylprednisolone] Other (See Comments)     unknown    Naproxen Nausea Only    Other      oruvall    Atarax [Hydroxyzine] Palpitations       Home Medications:  Prior to Admission medications    Medication Sig Start Date End Date Taking?  Authorizing Provider   VITAMIN D PO Take by mouth    Historical Provider, MD   metoprolol (LOPRESSOR) 100 MG tablet Take 1 tablet by mouth 2 times daily 11/22/22 5/21/23  Jay Zarate DO   levothyroxine (SYNTHROID) 88 MCG tablet TAKE 1 TABLET DAILY 11/22/22   Kye Pimentel DO   apixaban (ELIQUIS) 5 MG TABS tablet Take by mouth 2 times daily    Historical Provider, MD   acetaminophen (TYLENOL) 500 MG tablet Take 500 mg by mouth every 6 hours as needed for Pain    Historical Provider, MD   Calcium Carbonate-Vit D-Min (CALCIUM 600+D3 PLUS MINERALS) 600-800 MG-UNIT CHEW Take 1 each by mouth 2 times daily (with meals) 6/24/22   Kye Pimentel DO   Ferrous Sulfate (IRON PO) Take by mouth    Historical Provider, MD   Ascorbic Acid (VITAMIN C PO) Take by mouth    Historical Provider, MD   folic acid (FOLVITE) 1 MG tablet take 1 tablet by mouth once daily 12/30/20   Historical Provider, MD   JAKAFI 10 MG tablet Take 10 mg by mouth 3 times daily 12/28/20   Historical Provider, MD       Current Medications:  Current Facility-Administered Medications   Medication Dose Route Frequency Provider Last Rate Last Admin    isosorbide mononitrate (IMDUR) extended release tablet 30 mg  30 mg Oral Daily Chase Houser MD   30 mg at 12/28/22 1429    hydrALAZINE (APRESOLINE) tablet 25 mg  25 mg Oral 2 times per day Alexandria Houser MD   25 mg at 12/28/22 1429    hydrALAZINE (APRESOLINE) injection 10 mg  10 mg IntraVENous Q4H PRN Tonya Bloom MD        ruxolitinib phosphate (JAKAFI) 10 MG tablet TABS 20 mg*Patient supplied medication* (Patient Supplied)  20 mg Oral QAM Nesha Gonzales MD   20 mg at 12/28/22 1430    And    ruxolitinib phosphate (JAKAFI) 10 MG tablet TABS 10 mg*Patient supplied* (Patient Supplied)  10 mg Oral Nightly Nesha Gonzales MD   10 mg at 12/26/22 2107    apixaban (ELIQUIS) tablet 5 mg  5 mg Oral BID Nesha Gonzales MD   5 mg at 78/57/30 5260    folic acid (FOLVITE) tablet 1 mg  1 mg Oral Daily Nesha Gonzales MD   1 mg at 12/28/22 1429    levothyroxine (SYNTHROID) tablet 88 mcg  88 mcg Oral Daily Nesha Gonzaels MD   88 mcg at 12/28/22 1429    metoprolol tartrate (LOPRESSOR) tablet 100 mg  100 mg Oral BID Itzel Leo Estes MD   100 mg at 12/28/22 0951    sodium chloride flush 0.9 % injection 5-40 mL  5-40 mL IntraVENous 2 times per day Jose Luis Alex MD   10 mL at 12/28/22 0944    sodium chloride flush 0.9 % injection 5-40 mL  5-40 mL IntraVENous PRN Jose Luis Alex MD        0.9 % sodium chloride infusion   IntraVENous PRN Jose Luis Alex MD        ondansetron (ZOFRAN-ODT) disintegrating tablet 4 mg  4 mg Oral Q8H PRN Jose Luis Alex MD        Or    ondansetron [facility]) injection 4 mg  4 mg IntraVENous Q6H PRN Jose Luis Alex MD        polyethylene glycol (GLYCOLAX) packet 17 g  17 g Oral Daily PRN Jose Luis Alex MD        acetaminophen (TYLENOL) tablet 650 mg  650 mg Oral Q6H PRN Jose Luis Alex MD   650 mg at 12/22/22 2707    Or    acetaminophen (TYLENOL) suppository 650 mg  650 mg Rectal Q6H PRN Jose Luis Alex MD        perflutren lipid microspheres (DEFINITY) injection 1.5 mL  1.5 mL IntraVENous ONCE PRN Jose Luis Alex MD          sodium chloride             Review of Systems:   Cardiac: As per HPI  General: Denies fever or chills  Pulmonary: As per HPI  HEENT: Denies runny nose  GI: No complaints  : No complaints  Endocrine: Denies night sweats  Musculoskeletal: No complaints  Skin: Dry skin  Neuro: No complaints  Psych: Denies depression    Physical Exam:  BP (!) 178/95   Pulse 51   Temp 97.2 °F (36.2 °C) (Oral)   Resp (!) 8   Ht 5' 5\" (1.651 m)   Wt 152 lb (68.9 kg)   SpO2 96%   BMI 25.29 kg/m²   Wt Readings from Last 3 Encounters:   12/21/22 152 lb (68.9 kg)   11/22/22 169 lb 14.4 oz (77.1 kg)   08/12/22 162 lb (73.5 kg)       Appearance: Alert and oriented x3 not in acute distress.   Skin: Dry skin  Head: Atraumatic  Eyes: Intact extraocular muscles   ENMT: Mucous membranes are moist  Neck: Supple  Lungs: Clear to auscultation  Cardiac: Normal S1 and S2  Abdomen: Protuberant  Extremities: Intact range of motion  Neurologic: No focal neurological deficits  Peripheral Pulses: 2+ peripheral pulses      Intake/Output:  No intake or output data in the 24 hours ending 12/28/22 1652    No intake/output data recorded. Laboratory Tests:  Recent Labs     12/26/22  0546 12/27/22  0550 12/28/22  0731    132 132   K 3.6 3.8 4.1   CL 96* 96* 100   CO2 25 25 25   BUN 10 19 22   CREATININE 1.1* 1.5* 1.5*   GLUCOSE 107* 101* 102*   CALCIUM 8.8 8.8 8.5*     Lab Results   Component Value Date/Time    MG 2.2 12/22/2022 08:28 AM    MG 2.2 12/21/2022 04:00 PM    MG 1.6 10/22/2020 05:15 AM     No results for input(s): ALKPHOS, ALT, AST, PROT, BILITOT, BILIDIR, LABALBU in the last 72 hours. Recent Labs     12/26/22  0546 12/27/22  0550 12/28/22  0731   WBC 11.4 11.0 11.0   RBC 3.72 3.57 3.27*   HGB 11.7 11.1* 10.0*   HCT 35.8 34.7 32.9*   MCV 96.2 97.2 100.6*   MCH 31.5 31.1 30.6   MCHC 32.7 32.0 30.4*   RDW 19.5* 19.4* 19.4*    219 182   MPV 11.8 12.9* 13.0*     Lab Results   Component Value Date    TROPONINI 0.159 (H) 06/03/2017    TROPONINI 0.261 (H) 06/03/2017    TROPONINI <0.01 10/10/2016     No results for input(s): CKTOTAL, CKMB, CKMBINDEX, TROPHS in the last 72 hours. Lab Results   Component Value Date    INR 1.5 10/02/2021    INR 1.5 10/22/2020    INR 1.3 10/21/2020    PROTIME 17.5 (H) 10/02/2021    PROTIME 16.9 (H) 10/22/2020    PROTIME 14.8 (H) 10/21/2020     Lab Results   Component Value Date    TSH 16.680 (H) 12/22/2022    TSH 4.220 (H) 02/04/2021    TSH 8.600 (H) 10/20/2020     No results found for: LABA1C  No results found for: EAG  No results found for: CHOL  No results found for: TRIG  Lab Results   Component Value Date    HDL 33 02/04/2021     Lab Results   Component Value Date    LDLCALC 93 02/04/2021     Lab Results   Component Value Date    LABVLDL 34 02/04/2021     No results found for: CHOLHDLRATIO  No results for input(s): PROBNP in the last 72 hours.     Cardiac Tests:        Echocardiogram reviewed:     Transthoracic echocardiogram December 23, 2022  Technically sub-optimal images. Irregular heart rates. Normal left ventricular chamber size. Mild global hypokinesis, abnormal septal motion, LV EF 45-50%. Mild left ventricular concentric hypertrophy noted. Stage I diastolic dysfunction. Left atrium is enlarged. Interatrial septum appears intact. Normal right ventricle size and function. There is trace mitral regurgitation. No mitral valve prolapse. No significant aortic stenosis present. here is trace tricuspid regurgitation, RVSP 30mmHg. Normal aortic root size. No evidence of pericardial effusion. No intra cardiac mass or thrombus. No previous echo for comparison. Stress test reviewed: Lexiscan myocardial perfusion stress test December 28, 2022:  Gated SPECT left ventricular ejection fraction was calculated to be   47%, with normal myocardial thickening of all segments but abnormal   septal motion consistent with a conduction abnormality. .           Impression       No myocardial ischemia. No myocardial infarction. Mildly decreased LV systolic function   No wall motion abnormalities. Low risk myocardial perfusion scan. Cardiac catheterization reviewed:     CXR reviewed: The ASCVD Risk score (Deya LEE, et al., 2019) failed to calculate for the following reasons: The 2019 ASCVD risk score is only valid for ages 36 to 78    ASSESSMENT / PLAN:    1. Fatigue and weakness   Evaluation and management per primary service  Consider PT OT and social work involvement    2.  UTI   Evaluation and management per primary service     3. Acute on chronic kidney injury   Monitor BUN/creatinine closely and adjust nephrotoxic agents     4.  Elevated troponin  Borderline elevated troponin leak in the setting of UTI and acute on chronic kidney injury  It is reported patient had heart catheterization in 2017 that revealed minimal disease  Echocardiogram shows EF of 45 to 50%  Stress test revealed no ischemia or scar  Continue current medical therapy and follow-up with your cardiology    4. Cardiomyopathy with EF of 45 to 50%  Hydralazine and Imdur added to beta-blocker for guideline directed medical therapy  Blood pressure is not acutely controlled and subsequently switching from metoprolol succinate to carvedilol  Not on ACE, ARB or Entresto due to kidney dysfunction      5. Hypothyroidism  Management per primary service    6. Hypertension  Uncontrolled and subsequently switching from metoprolol succinate to carvedilol      7. Debility  PT OT     8. Herpes zoster  Management per primary service    9. Anticoagulation therapy  Apparently on Eliquis for DVT prophylaxis                Thank you for allowing me to participate in your patient's care. Please feel free to contact me if you have any questions or concerns.     Carlos Shane MD  [facility]) Cardiology

## 2022-12-28 NOTE — PROCEDURES
[facility] Nuclear Stress Test:    Cardiologist: Dr. Curtis Bolanos MD    Date: 12/28/2022    Indications for study: Chest pain    No chest pain  No new arrhythmias  No EKG changes suggestive of stress induced ischemia  Nuclear images pending    Curtis Bolanos MD  [facility]) Cardiology

## 2022-12-29 VITALS
RESPIRATION RATE: 22 BRPM | HEART RATE: 72 BPM | WEIGHT: 152 LBS | SYSTOLIC BLOOD PRESSURE: 144 MMHG | HEIGHT: 65 IN | BODY MASS INDEX: 25.33 KG/M2 | TEMPERATURE: 97.5 F | DIASTOLIC BLOOD PRESSURE: 57 MMHG | OXYGEN SATURATION: 97 %

## 2022-12-29 LAB
ANION GAP SERPL CALCULATED.3IONS-SCNC: 7 MMOL/L (ref 7–16)
BUN BLDV-MCNC: 20 MG/DL (ref 6–23)
CALCIUM SERPL-MCNC: 8.3 MG/DL (ref 8.6–10.2)
CHLORIDE BLD-SCNC: 101 MMOL/L (ref 98–107)
CO2: 25 MMOL/L (ref 22–29)
CREAT SERPL-MCNC: 1.5 MG/DL (ref 0.5–1)
GFR SERPL CREATININE-BSD FRML MDRD: 34 ML/MIN/1.73
GLUCOSE BLD-MCNC: 95 MG/DL (ref 74–99)
HCT VFR BLD CALC: 31.8 % (ref 34–48)
HEMOGLOBIN: 9.6 G/DL (ref 11.5–15.5)
MCH RBC QN AUTO: 30.5 PG (ref 26–35)
MCHC RBC AUTO-ENTMCNC: 30.2 % (ref 32–34.5)
MCV RBC AUTO: 101 FL (ref 80–99.9)
PDW BLD-RTO: 19.2 FL (ref 11.5–15)
PLATELET # BLD: 162 E9/L (ref 130–450)
PMV BLD AUTO: 11.7 FL (ref 7–12)
POTASSIUM SERPL-SCNC: 4 MMOL/L (ref 3.5–5)
RBC # BLD: 3.15 E12/L (ref 3.5–5.5)
SARS-COV-2, NAAT: NOT DETECTED
SODIUM BLD-SCNC: 133 MMOL/L (ref 132–146)
WBC # BLD: 9 E9/L (ref 4.5–11.5)

## 2022-12-29 PROCEDURE — 80048 BASIC METABOLIC PNL TOTAL CA: CPT

## 2022-12-29 PROCEDURE — 85027 COMPLETE CBC AUTOMATED: CPT

## 2022-12-29 PROCEDURE — 6370000000 HC RX 637 (ALT 250 FOR IP): Performed by: INTERNAL MEDICINE

## 2022-12-29 PROCEDURE — 87635 SARS-COV-2 COVID-19 AMP PRB: CPT

## 2022-12-29 PROCEDURE — 99239 HOSP IP/OBS DSCHRG MGMT >30: CPT | Performed by: INTERNAL MEDICINE

## 2022-12-29 PROCEDURE — 36415 COLL VENOUS BLD VENIPUNCTURE: CPT

## 2022-12-29 RX ORDER — HYDRALAZINE HYDROCHLORIDE 25 MG/1
25 TABLET, FILM COATED ORAL EVERY 12 HOURS SCHEDULED
Qty: 90 TABLET | Refills: 3 | DISCHARGE
Start: 2022-12-29

## 2022-12-29 RX ORDER — CARVEDILOL 25 MG/1
25 TABLET ORAL 2 TIMES DAILY WITH MEALS
Qty: 60 TABLET | Refills: 3 | DISCHARGE
Start: 2022-12-29

## 2022-12-29 RX ORDER — ISOSORBIDE MONONITRATE 30 MG/1
30 TABLET, EXTENDED RELEASE ORAL DAILY
Qty: 30 TABLET | Refills: 3 | DISCHARGE
Start: 2022-12-30

## 2022-12-29 RX ADMIN — CARVEDILOL 25 MG: 25 TABLET, FILM COATED ORAL at 09:00

## 2022-12-29 RX ADMIN — HYDRALAZINE HYDROCHLORIDE 25 MG: 25 TABLET, FILM COATED ORAL at 09:00

## 2022-12-29 RX ADMIN — APIXABAN 5 MG: 5 TABLET, FILM COATED ORAL at 09:00

## 2022-12-29 RX ADMIN — ISOSORBIDE MONONITRATE 30 MG: 30 TABLET, EXTENDED RELEASE ORAL at 09:00

## 2022-12-29 RX ADMIN — LEVOTHYROXINE SODIUM 88 MCG: 0.09 TABLET ORAL at 09:00

## 2022-12-29 RX ADMIN — FOLIC ACID 1 MG: 1 TABLET ORAL at 09:00

## 2022-12-29 NOTE — PROGRESS NOTES
Called physicians and left message about patient suppose to be picked up at 430. No one answered. Left message.

## 2022-12-29 NOTE — CARE COORDINATION
12/29/2022 1200 CM note: Negative covid 12/29/22. [facility] [place] accepted patient and bed is available today. Jacqualine Meo is waived under [employer]. Pts family to take chemo pill to the SNF. WILL NEED HENS, signed KISHORE.  Kevin BRAVO

## 2022-12-29 NOTE — PROGRESS NOTES
Department of Internal Medicine  General Internal Medicine  Attending Progress Note  Chief Complaint   Patient presents with    Fatigue     Decreased mobility; diarrhea     SUBJECTIVE:    Reports that he is doing well. No fever or chills. No chest pain.     OBJECTIVE      Medications    Current Facility-Administered Medications: carvedilol (COREG) tablet 25 mg, 25 mg, Oral, BID WC  isosorbide mononitrate (IMDUR) extended release tablet 30 mg, 30 mg, Oral, Daily  hydrALAZINE (APRESOLINE) tablet 25 mg, 25 mg, Oral, 2 times per day  hydrALAZINE (APRESOLINE) injection 10 mg, 10 mg, IntraVENous, Q4H PRN  ruxolitinib phosphate (JAKAFI) 10 MG tablet TABS 20 mg*Patient supplied medication* (Patient Supplied), 20 mg, Oral, QAM **AND** ruxolitinib phosphate (JAKAFI) 10 MG tablet TABS 10 mg*Patient supplied* (Patient Supplied), 10 mg, Oral, Nightly  apixaban (ELIQUIS) tablet 5 mg, 5 mg, Oral, BID  folic acid (FOLVITE) tablet 1 mg, 1 mg, Oral, Daily  levothyroxine (SYNTHROID) tablet 88 mcg, 88 mcg, Oral, Daily  sodium chloride flush 0.9 % injection 5-40 mL, 5-40 mL, IntraVENous, 2 times per day  sodium chloride flush 0.9 % injection 5-40 mL, 5-40 mL, IntraVENous, PRN  0.9 % sodium chloride infusion, , IntraVENous, PRN  ondansetron (ZOFRAN-ODT) disintegrating tablet 4 mg, 4 mg, Oral, Q8H PRN **OR** ondansetron (ZOFRAN) injection 4 mg, 4 mg, IntraVENous, Q6H PRN  polyethylene glycol (GLYCOLAX) packet 17 g, 17 g, Oral, Daily PRN  acetaminophen (TYLENOL) tablet 650 mg, 650 mg, Oral, Q6H PRN **OR** acetaminophen (TYLENOL) suppository 650 mg, 650 mg, Rectal, Q6H PRN  perflutren lipid microspheres (DEFINITY) injection 1.5 mL, 1.5 mL, IntraVENous, ONCE PRN  Physical    VITALS:  BP (!) 156/74   Pulse 60   Temp 97.8 °F (36.6 °C) (Oral)   Resp 14   Ht 5' 5\" (1.651 m)   Wt 152 lb (68.9 kg)   SpO2 95%   BMI 25.29 kg/m²   CONSTITUTIONAL:  awake, cooperative, and no apparent distress  EYES:  extra-ocular muscles intact  ENT: normocepalic, without obvious abnormality  NECK:  supple, symmetrical, trachea midline  LUNGS:  no increased work of breathing, no retractions, and clear to auscultation  CARDIOVASCULAR:  normal apical pulses and normal S1 and S2  ABDOMEN:  normal bowel sounds, non-distended, and non-tender  MUSCULOSKELETAL:  full range of motion noted  NEUROLOGIC:  Mental Status Exam:  Level of Alertness:   awake  Orientation:   person, place, time  SKIN:  no bruising or bleeding  Data    CBC:   Lab Results   Component Value Date/Time    WBC 11.0 12/28/2022 07:31 AM    RBC 3.27 12/28/2022 07:31 AM    RBC 4.90 06/04/2017 03:00 AM    HGB 10.0 12/28/2022 07:31 AM    HCT 32.9 12/28/2022 07:31 AM    .6 12/28/2022 07:31 AM    MCH 30.6 12/28/2022 07:31 AM    MCHC 30.4 12/28/2022 07:31 AM    RDW 19.4 12/28/2022 07:31 AM     12/28/2022 07:31 AM    MPV 13.0 12/28/2022 07:31 AM     BMP:    Lab Results   Component Value Date/Time     12/28/2022 07:31 AM    K 4.1 12/28/2022 07:31 AM    K 3.4 12/22/2022 08:28 AM     12/28/2022 07:31 AM    CO2 25 12/28/2022 07:31 AM    BUN 22 12/28/2022 07:31 AM    LABALBU 4.0 12/21/2022 04:00 PM    CREATININE 1.5 12/28/2022 07:31 AM    CALCIUM 8.5 12/28/2022 07:31 AM    GFRAA 52 03/16/2021 12:15 PM    LABGLOM 34 12/28/2022 07:31 AM    GLUCOSE 102 12/28/2022 07:31 AM       ASSESSMENT AND PLAN      Elevated troponin  Failure to thrive in adult: continue diet  Hypothyroidism: continue synthroid. Hypertension Essential: continue home medication  BRAYAN: continue IV fluid. Check BMP in am  Chronic Pulm Embolism: continue eliquis. May decrease dose if renal function doesn't improve. Polycythemia: continue home meds  UTI: completed abx  Weakness/Fatigue: PT/OT discharge to SNF.

## 2022-12-29 NOTE — DISCHARGE SUMMARY
Physician Discharge Summary     Patient ID:  Carol Ramirez  [id number]  9 y.o.  1940    Admit date: 12/21/2022    Discharge date and time: No discharge date for patient encounter. Admitting Physician: Adri Ko MD     [facility]    Admission Diagnoses: Hypokalemia [E87.6]  Elevated troponin [R77.8]  Failure to thrive in adult [R62.7]  Herpes zoster with complication [D09.0]    Discharge Diagnoses:   UTI  Hypokalemia  Acute on chronic kidney disease  Failure to thrive  Elevated trop  Hypertension Essential  Polycythemia  Weakness and fatigue  Chronic Pulm Embolism    Admission Condition: poor    Discharged Condition: fair    Indication for Admission:     Hospital Course:   Ms. Erica Booker ws admitted with poor oral intake. She was found to have UTI and treated with cipro, which she completed before discharge. She was found to have elevated troponin. Further work up with Echo showed mild decreased EF. This was followed by with stress test. Patient was found to have low risk scan. PT/OT was ordered. Patient discharged to rehab. Her appetite improved slight. She was found to have ramiro on ckd. Creatinine remained fairly stable at the time of discharge. Follow up BMP weekly. Patient's blood pressure medications were monitored and changes to medication at the time of discharge was made. Time spent in discharge of patient is greater than 30 minutes. Consults: cardiology    Significant Diagnostic Studies: labs:     Treatments: IV hydration    Discharge Exam:  BP (!) 144/57   Pulse 72   Temp 97.5 °F (36.4 °C) (Axillary)   Resp 22   Ht 5' 5\" (1.651 m)   Wt 152 lb (68.9 kg)   SpO2 97%   BMI 25.29 kg/m²   General appearance: alert, appears stated age, and cooperative  Head: Normocephalic, without obvious abnormality, atraumatic  Eyes: conjunctivae/corneas clear. PERRL, EOM's intact. Fundi benign.   Ears: normal TM's and external ear canals both ears  Nose: Nares normal. Septum midline. Mucosa normal. No drainage or sinus tenderness. Throat: lips, mucosa, and tongue normal; teeth and gums normal  Lungs: clear to auscultation bilaterally  Heart: regular rate and rhythm, S1, S2 normal, no murmur, click, rub or gallop  Abdomen: soft, non-tender; bowel sounds normal; no masses,  no organomegaly  Extremities: extremities normal, atraumatic, no cyanosis or edema    Disposition: SNF    In process/preliminary results:  Outstanding Order Results       No orders found from 11/22/2022 to 12/22/2022. Patient Instructions:   Current Discharge Medication List        START taking these medications    Details   hydrALAZINE (APRESOLINE) 25 MG tablet Take 1 tablet by mouth every 12 hours  Qty: 90 tablet, Refills: 3      carvedilol (COREG) 25 MG tablet Take 1 tablet by mouth 2 times daily (with meals)  Qty: 60 tablet, Refills: 3      isosorbide mononitrate (IMDUR) 30 MG extended release tablet Take 1 tablet by mouth daily  Qty: 30 tablet, Refills: 3           CONTINUE these medications which have NOT CHANGED    Details   VITAMIN D PO Take by mouth      levothyroxine (SYNTHROID) 88 MCG tablet TAKE 1 TABLET DAILY  Qty: 90 tablet, Refills: 0    Associated Diagnoses: Other specified hypothyroidism      apixaban (ELIQUIS) 5 MG TABS tablet Take by mouth 2 times daily      acetaminophen (TYLENOL) 500 MG tablet Take 500 mg by mouth every 6 hours as needed for Pain      Calcium Carbonate-Vit D-Min (CALCIUM 600+D3 PLUS MINERALS) 600-800 MG-UNIT CHEW Take 1 each by mouth 2 times daily (with meals)  Qty: 60 tablet, Refills: 5    Associated Diagnoses: Closed wedge compression fracture of L1 vertebra, sequela;  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

## 2022-12-29 NOTE — PROGRESS NOTES
NS fluids running when this RN started shift. Per previous nurse, Hospitalist wanted the fluids restarted, but no order as of now. Will alert charge nurse.

## 2022-12-29 NOTE — CARE COORDINATION
12/29/2022 1336 CM note: Negative covid 12/29/22. [facility] at [place] accepted patient and bed is available today. [place] Victor M is waived under [employer]. Pt's dtr to take chemo pill to the SNF. HENS completed, KISHORE is signed. Arrangements made for pt to be transported to [facility] [place] at 4:30pm via [address] wheelchair transport. Pt,dtr kirill Mendez and facility liaison [facility] informed of plan. N-N [phone].  Kevin BRAVO

## 2023-01-03 LAB
BACTERIA: ABNORMAL /HPF
BILIRUBIN URINE: NEGATIVE
BLOOD, URINE: NEGATIVE
CLARITY: CLEAR
COLOR: YELLOW
GLUCOSE URINE: NEGATIVE MG/DL
KETONES, URINE: NEGATIVE MG/DL
LEUKOCYTE ESTERASE, URINE: ABNORMAL
NITRITE, URINE: NEGATIVE
PH UA: 7.5 (ref 5–9)
PROTEIN UA: NEGATIVE MG/DL
RBC UA: ABNORMAL /HPF (ref 0–2)
SPECIFIC GRAVITY UA: <=1.005 (ref 1–1.03)
UROBILINOGEN, URINE: 0.2 E.U./DL
WBC UA: ABNORMAL /HPF (ref 0–5)

## 2023-01-05 ENCOUNTER — APPOINTMENT (OUTPATIENT)
Dept: CT IMAGING | Age: 83
End: 2023-01-05
Payer: MEDICARE

## 2023-01-05 ENCOUNTER — HOSPITAL ENCOUNTER (EMERGENCY)
Age: 83
Discharge: HOME | End: 2023-01-06
Attending: EMERGENCY MEDICINE
Payer: MEDICARE

## 2023-01-05 ENCOUNTER — APPOINTMENT (OUTPATIENT)
Dept: GENERAL RADIOLOGY | Age: 83
End: 2023-01-05
Payer: MEDICARE

## 2023-01-05 VITALS
WEIGHT: 159 LBS | DIASTOLIC BLOOD PRESSURE: 100 MMHG | RESPIRATION RATE: 15 BRPM | BODY MASS INDEX: 26.49 KG/M2 | HEIGHT: 65 IN | TEMPERATURE: 98.6 F | HEART RATE: 77 BPM | SYSTOLIC BLOOD PRESSURE: 147 MMHG | OXYGEN SATURATION: 96 %

## 2023-01-05 DIAGNOSIS — R53.83 OTHER FATIGUE: Primary | ICD-10-CM

## 2023-01-05 DIAGNOSIS — D64.9 CHRONIC ANEMIA: ICD-10-CM

## 2023-01-05 DIAGNOSIS — N18.2 CHRONIC RENAL IMPAIRMENT, STAGE 2 (MILD): ICD-10-CM

## 2023-01-05 LAB
ALBUMIN SERPL-MCNC: 3.8 G/DL (ref 3.5–5.2)
ALP BLD-CCNC: 26 U/L (ref 35–104)
ALT SERPL-CCNC: 11 U/L (ref 0–32)
ANION GAP SERPL CALCULATED.3IONS-SCNC: 11 MMOL/L (ref 7–16)
APTT: 39.2 SEC (ref 24.5–35.1)
AST SERPL-CCNC: 18 U/L (ref 0–31)
BACTERIA: ABNORMAL /HPF
BASOPHILS ABSOLUTE: 0 E9/L (ref 0–0.2)
BASOPHILS RELATIVE PERCENT: 0.5 % (ref 0–2)
BILIRUB SERPL-MCNC: 1.5 MG/DL (ref 0–1.2)
BILIRUBIN URINE: NEGATIVE
BLOOD, URINE: NEGATIVE
BUN BLDV-MCNC: 16 MG/DL (ref 6–23)
BURR CELLS: ABNORMAL
CALCIUM SERPL-MCNC: 8.1 MG/DL (ref 8.6–10.2)
CHLORIDE BLD-SCNC: 106 MMOL/L (ref 98–107)
CLARITY: CLEAR
CO2: 20 MMOL/L (ref 22–29)
COLOR: YELLOW
CREAT SERPL-MCNC: 1.3 MG/DL (ref 0.5–1)
EOSINOPHILS ABSOLUTE: 0 E9/L (ref 0.05–0.5)
EOSINOPHILS RELATIVE PERCENT: 0.3 % (ref 0–6)
EPITHELIAL CELLS, UA: ABNORMAL /HPF
GFR SERPL CREATININE-BSD FRML MDRD: 41 ML/MIN/1.73
GLUCOSE BLD-MCNC: 90 MG/DL (ref 74–99)
GLUCOSE URINE: NEGATIVE MG/DL
HCT VFR BLD CALC: 31.8 % (ref 34–48)
HEMOGLOBIN: 9.6 G/DL (ref 11.5–15.5)
HYALINE CASTS: ABNORMAL /LPF (ref 0–2)
INFLUENZA A BY PCR: NOT DETECTED
INFLUENZA B BY PCR: NOT DETECTED
INR BLD: 2.5
KETONES, URINE: 15 MG/DL
LEUKOCYTE ESTERASE, URINE: NEGATIVE
LYMPHOCYTES ABSOLUTE: 1.39 E9/L (ref 1.5–4)
LYMPHOCYTES RELATIVE PERCENT: 15.7 % (ref 20–42)
MAGNESIUM: 2 MG/DL (ref 1.6–2.6)
MCH RBC QN AUTO: 30.3 PG (ref 26–35)
MCHC RBC AUTO-ENTMCNC: 30.2 % (ref 32–34.5)
MCV RBC AUTO: 100.3 FL (ref 80–99.9)
MONOCYTES ABSOLUTE: 0.26 E9/L (ref 0.1–0.95)
MONOCYTES RELATIVE PERCENT: 2.6 % (ref 2–12)
MYELOCYTE PERCENT: 3.5 % (ref 0–0)
NEUTROPHILS ABSOLUTE: 7.13 E9/L (ref 1.8–7.3)
NEUTROPHILS RELATIVE PERCENT: 78.3 % (ref 43–80)
NITRITE, URINE: NEGATIVE
OVALOCYTES: ABNORMAL
PDW BLD-RTO: 17.9 FL (ref 11.5–15)
PH UA: 7.5 (ref 5–9)
PLATELET # BLD: 105 E9/L (ref 130–450)
PMV BLD AUTO: ABNORMAL FL (ref 7–12)
POIKILOCYTES: ABNORMAL
POLYCHROMASIA: ABNORMAL
POTASSIUM SERPL-SCNC: 4.1 MMOL/L (ref 3.5–5)
PROTEIN UA: ABNORMAL MG/DL
PROTHROMBIN TIME: 29.5 SEC (ref 9.3–12.4)
RBC # BLD: 3.17 E12/L (ref 3.5–5.5)
RBC UA: ABNORMAL /HPF (ref 0–2)
SARS-COV-2, NAAT: NOT DETECTED
SODIUM BLD-SCNC: 137 MMOL/L (ref 132–146)
SPECIFIC GRAVITY UA: 1.01 (ref 1–1.03)
TOTAL PROTEIN: 5.3 G/DL (ref 6.4–8.3)
TROPONIN, HIGH SENSITIVITY: 45 NG/L (ref 0–9)
TROPONIN, HIGH SENSITIVITY: 53 NG/L (ref 0–9)
URINE CULTURE, ROUTINE: NORMAL
UROBILINOGEN, URINE: 0.2 E.U./DL
WBC # BLD: 8.7 E9/L (ref 4.5–11.5)
WBC UA: ABNORMAL /HPF (ref 0–5)

## 2023-01-05 PROCEDURE — 70450 CT HEAD/BRAIN W/O DYE: CPT

## 2023-01-05 PROCEDURE — 85610 PROTHROMBIN TIME: CPT

## 2023-01-05 PROCEDURE — 85730 THROMBOPLASTIN TIME PARTIAL: CPT

## 2023-01-05 PROCEDURE — 83735 ASSAY OF MAGNESIUM: CPT

## 2023-01-05 PROCEDURE — 80053 COMPREHEN METABOLIC PANEL: CPT

## 2023-01-05 PROCEDURE — 71045 X-RAY EXAM CHEST 1 VIEW: CPT

## 2023-01-05 PROCEDURE — 93005 ELECTROCARDIOGRAM TRACING: CPT | Performed by: EMERGENCY MEDICINE

## 2023-01-05 PROCEDURE — 85025 COMPLETE CBC W/AUTO DIFF WBC: CPT

## 2023-01-05 PROCEDURE — 81001 URINALYSIS AUTO W/SCOPE: CPT

## 2023-01-05 PROCEDURE — 87635 SARS-COV-2 COVID-19 AMP PRB: CPT

## 2023-01-05 PROCEDURE — 87502 INFLUENZA DNA AMP PROBE: CPT

## 2023-01-05 PROCEDURE — 84484 ASSAY OF TROPONIN QUANT: CPT

## 2023-01-05 PROCEDURE — 99285 EMERGENCY DEPT VISIT HI MDM: CPT

## 2023-01-05 ASSESSMENT — ENCOUNTER SYMPTOMS
NAUSEA: 0
ABDOMINAL PAIN: 0
CHEST TIGHTNESS: 0
EYE PAIN: 0
SHORTNESS OF BREATH: 0
BACK PAIN: 0

## 2023-01-05 NOTE — ED PROVIDER NOTES
Chief complaint:  Altered mental status    HPI history provided by report  Patient sent in from nursing home for apparent altered mental status or strokelike symptoms. Very limited and poor information being sent with this patient. Apparently she is new to the facility and nobody knows what is going on with her. They are not sure when her symptoms started they are not sure what her baseline is from the report I have given. There is nothing specific symptoms, apparently she is generally declining with fatigue and weakness and some confusion for an unspecified amount of time. Upon arrival patient is lying in the bed with her eyes closed however with loud verbal cues the patient follows all commands and responds verbally appropriately. She denies trouble breathing, denies chest pain, denies abdominal pain, denies headache. States she feels fine. She is not sure why she is here. Patient knows she is at [facility], she knows she is in [place], she knows the year. She is a poor historian must be spoken to very loudly in order to get her to respond which she does slowly. Review of Systems   Constitutional:  Negative for chills and fever. HENT:  Negative for congestion. Eyes:  Negative for pain. Respiratory:  Negative for chest tightness and shortness of breath. Cardiovascular:  Negative for chest pain and palpitations. Gastrointestinal:  Negative for abdominal pain and nausea. Genitourinary:  Negative for dysuria and flank pain. Musculoskeletal:  Negative for back pain and neck pain. Neurological:  Negative for dizziness, numbness and headaches. Physical Exam  Vitals and nursing note reviewed. Constitutional:       General: She is awake. She is not in acute distress. Appearance: She is well-developed. She is not ill-appearing, toxic-appearing or diaphoretic. HENT:      Head: Normocephalic and atraumatic. Mouth/Throat:      Pharynx: Oropharynx is clear. Uvula midline.    Eyes: General: No scleral icterus. Extraocular Movements: Extraocular movements intact. Right eye: No nystagmus. Left eye: No nystagmus. Conjunctiva/sclera: Conjunctivae normal.      Pupils: Pupils are equal, round, and reactive to light. Neck:      Trachea: Trachea normal.   Cardiovascular:      Rate and Rhythm: Normal rate and regular rhythm. Heart sounds: Normal heart sounds. No murmur heard. Pulmonary:      Effort: Pulmonary effort is normal. No respiratory distress. Breath sounds: Normal breath sounds. No stridor, decreased air movement or transmitted upper airway sounds. No decreased breath sounds, wheezing, rhonchi or rales. Chest:      Chest wall: No tenderness. Abdominal:      General: Bowel sounds are normal. There is no distension. Palpations: Abdomen is soft. Tenderness: There is no abdominal tenderness. There is no right CVA tenderness, left CVA tenderness, guarding or rebound. Musculoskeletal:         General: No swelling, tenderness, deformity or signs of injury. Cervical back: Full passive range of motion without pain, normal range of motion and neck supple. No signs of trauma or rigidity. No spinous process tenderness or muscular tenderness. Normal range of motion. Right lower leg: No edema. Left lower leg: No edema. Comments: Arms and legs are neurovascular intact with no pretibial edema or calf pain and no signs of acute bony or joint injuries. She is moving all 4 extremities on her own. Skin:     General: Skin is warm and dry. Coloration: Skin is not cyanotic, jaundiced, mottled or pale. Findings: No bruising, erythema or rash. Neurological:      General: No focal deficit present. Mental Status: She is alert and oriented to person, place, and time. GCS: GCS eye subscore is 3. GCS verbal subscore is 5. GCS motor subscore is 6. Cranial Nerves: No cranial nerve deficit.       Coordination: Coordination normal.   Psychiatric:         Behavior: Behavior is cooperative. NIH Stroke Scale at time of initial evaluation:  1A: Level of Consciousness 0 - alert; keenly responsive   1B: Ask Month and Age 0 - answers both questions correctly   1C: Tell Patient To Open and Close Eyes, then Hand  Squeeze 0 - performs both tasks correctly   2: Test Horizontal Extraocular Movements 0 - normal   3: Test Visual Fields 0 - no visual loss   4: Test Facial Palsy 0 - normal symmetric movement   5A: Test Left Arm Motor Drift 0 - no drift, limb holds 90 (or 45) degrees for full 10 seconds   5B: Test Right Arm Motor Drift 0 - no drift, limb holds 90 (or 45) degrees for full 10 seconds   6A: Test Left Leg Motor Drift 0 - no drift; leg holds 30 degree position for full 5 seconds   6B: Test Right Leg Motor Drift 0 - no drift; leg holds 30 degree position for full 5 seconds   7: Test Limb Ataxia   (FNF/Heel-Shin) 0 - absent   8: Test Sensation 0 - normal; no sensory loss   9: Test Language/Aphasia 0 - no aphasia, normal   10: Test Dysarthria 0 - normal   11: Test Extinction/Inattention 0 - no abnormality   Total 0       Procedures     MDM  Patient here, family ultimately presents, patient here for some fatigue and mild confusional episodes for the last 3 to 4 days at a nursing home that she just got back to after being admitted in the hospital for fatigue and apparently a bladder infection. Has had several medication changes over the same time and is newly at a nursing home that she was not out before. Physical exam is unremarkable, please see above. She has no focal neurologic deficits. Differential dementia, sundowning, facility change, influenza, worsened anemia, renal failure, electrolyte abnormalities, stroke, brain bleed, cardiac disease, dehydration. Work-up includes EKG, CT head, troponin, influenza, COVID testing, UA for UTI check, magnesium. COVID test negative, influenza negative, urinalysis shows no UTI.   CT head shows no acute process. Troponin with a negative delta troponin, no ischemic findings on EKG, chronic mild anemia noted with no particular change. White count normal 8.7. Mildly chronic renal insufficiency noted, creatinine 1.3 with a normal BUN of 16. Sodium normal, potassium normal 4.1. Patient observed in the ER with no particular change in condition, remains alert and conversant during her stay and is actually talking to family as I enter the room. Intermittently mildly confused during conversation but is alert and has no focal neurologic deficits. Likely related to patient's changing of multiple facilities with polypharmacy and medication changes. ED Course as of 01/05/23 1053   Thu Jan 05, 2023   1052 Family present, describe patient with recent UTIs and admissions for fatigue and weakness due to the UTIs. Overall gradual decline in condition for couple of days with fatigue and weakness with no focal neurologic deficits, information from family. [NC]      ED Course User Index  [NC] Loida Ward DO        EKG Interpretation    Interpreted by emergency department physician    Rhythm: normal sinus   Rate: 75  Axis: normal  Ectopy: none  Conduction: left bundle branch block (complete)  ST Segments: no acute change  T Waves: no acute change  Q Waves: none    Clinical Impression: no acute changes and left bundle branch block  Compared to 12- no acute changes  Loida Ward DO    ED Course as of 01/05/23 1622   Thu Jan 05, 2023   1052 Family present, describe patient with recent UTIs and admissions for fatigue and weakness due to the UTIs. Overall gradual decline in condition for couple of days with fatigue and weakness with no focal neurologic deficits, information from family.  [NC]      ED Course User Index  [NC] Loida Ward DO       --------------------------------------------- PAST HISTORY ---------------------------------------------  Past Medical History:  has a past medical history of Back injury, Cervical spondylolysis, Chronic lymphoid leukemia ([facility] [address] Gallstones, H/O cardiovascular stress test, H/O echocardiogram, History of bone density study, Hyperlipidemia, Hypertension, LBBB (left bundle branch block), Lumbar spondylosis with myelopathy, Pneumonia, Polycythemia vera ([facility] [address] Syncope and collapse, and Thyroid disease. Past Surgical History:  has a past surgical history that includes Tonsillectomy; Appendectomy; Nerve Block (06/03/15); Nerve Block (6 22 15); Nerve Block (N/A, 7/6/15); Diagnostic Cardiac Cath Lab Procedure (Bilateral, 06/05/2017); Cholecystectomy; and Hysterectomy, total abdominal.    Social History:  reports that she has never smoked. She has never used smokeless tobacco. She reports that she does not drink alcohol and does not use drugs. Family History: family history includes Asthma in her father; Cancer in her maternal grandmother; Hypertension in her brother. The patients home medications have been reviewed.     Allergies: Clonidine derivatives, Medrol [methylprednisolone], Naproxen, Other, and Atarax [hydroxyzine]    -------------------------------------------------- RESULTS -------------------------------------------------  Labs:  Results for orders placed or performed during the hospital encounter of 01/05/23   Rapid influenza A/B antigens    Specimen: Nares   Result Value Ref Range    Influenza A by PCR Not Detected Not Detected    Influenza B by PCR Not Detected Not Detected   COVID-19, Rapid    Specimen: Nasopharyngeal Swab   Result Value Ref Range    SARS-CoV-2, NAAT Not Detected Not Detected   CBC with Auto Differential   Result Value Ref Range    WBC 8.7 4.5 - 11.5 E9/L    RBC 3.17 (L) 3.50 - 5.50 E12/L    Hemoglobin 9.6 (L) 11.5 - 15.5 g/dL    Hematocrit 31.8 (L) 34.0 - 48.0 %    .3 (H) 80.0 - 99.9 fL    MCH 30.3 26.0 - 35.0 pg    MCHC 30.2 (L) 32.0 - 34.5 %    RDW 17.9 (H) 11.5 - 15.0 fL    Platelets 746 (L) 043 - 450 E9/L MPV NOT CALC 7.0 - 12.0 fL    Neutrophils % 78.3 43.0 - 80.0 %    Lymphocytes % 15.7 (L) 20.0 - 42.0 %    Monocytes % 2.6 2.0 - 12.0 %    Eosinophils % 0.3 0.0 - 6.0 %    Basophils % 0.5 0.0 - 2.0 %    Neutrophils Absolute 7.13 1.80 - 7.30 E9/L    Lymphocytes Absolute 1.39 (L) 1.50 - 4.00 E9/L    Monocytes Absolute 0.26 0.10 - 0.95 E9/L    Eosinophils Absolute 0.00 (L) 0.05 - 0.50 E9/L    Basophils Absolute 0.00 0.00 - 0.20 E9/L    Myelocyte Percent 3.5 0 - 0 %    Polychromasia 1+     Poikilocytes 1+     Sourav Cells 1+     Ovalocytes 1+    Comprehensive Metabolic Panel   Result Value Ref Range    Sodium 137 132 - 146 mmol/L    Potassium 4.1 3.5 - 5.0 mmol/L    Chloride 106 98 - 107 mmol/L    CO2 20 (L) 22 - 29 mmol/L    Anion Gap 11 7 - 16 mmol/L    Glucose 90 74 - 99 mg/dL    BUN 16 6 - 23 mg/dL    Creatinine 1.3 (H) 0.5 - 1.0 mg/dL    Est, Glom Filt Rate 41 >=60 mL/min/1.73    Calcium 8.1 (L) 8.6 - 10.2 mg/dL    Total Protein 5.3 (L) 6.4 - 8.3 g/dL    Albumin 3.8 3.5 - 5.2 g/dL    Total Bilirubin 1.5 (H) 0.0 - 1.2 mg/dL    Alkaline Phosphatase 26 (L) 35 - 104 U/L    ALT 11 0 - 32 U/L    AST 18 0 - 31 U/L   Protime-INR   Result Value Ref Range    Protime 29.5 (H) 9.3 - 12.4 sec    INR 2.5    APTT   Result Value Ref Range    aPTT 39.2 (H) 24.5 - 35.1 sec   Magnesium   Result Value Ref Range    Magnesium 2.0 1.6 - 2.6 mg/dL   Troponin   Result Value Ref Range    Troponin, High Sensitivity 53 (H) 0 - 9 ng/L   Urinalysis   Result Value Ref Range    Color, UA Yellow Straw/Yellow    Clarity, UA Clear Clear    Glucose, Ur Negative Negative mg/dL    Bilirubin Urine Negative Negative    Ketones, Urine 15 (A) Negative mg/dL    Specific Gravity, UA 1.015 1.005 - 1.030    Blood, Urine Negative Negative    pH, UA 7.5 5.0 - 9.0    Protein, UA TRACE Negative mg/dL    Urobilinogen, Urine 0.2 <2.0 E.U./dL    Nitrite, Urine Negative Negative    Leukocyte Esterase, Urine Negative Negative   Microscopic Urinalysis   Result Value Ref Range    Hyaline Casts, UA 0-2 0 - 2 /LPF    WBC, UA 2-5 0 - 5 /HPF    RBC, UA NONE 0 - 2 /HPF    Epithelial Cells, UA RARE /HPF    Bacteria, UA MODERATE (A) None Seen /HPF   Troponin   Result Value Ref Range    Troponin, High Sensitivity 45 (H) 0 - 9 ng/L   EKG 12 Lead   Result Value Ref Range    Ventricular Rate 75 BPM    Atrial Rate 75 BPM    P-R Interval 144 ms    QRS Duration 142 ms    Q-T Interval 436 ms    QTc Calculation (Bazett) 486 ms    P Axis 30 degrees    R Axis -28 degrees    T Axis 143 degrees       Radiology:  XR CHEST PORTABLE   Final Result   No acute cardiopulmonary disease. CT HEAD WO CONTRAST   Final Result   1. No acute intracranial abnormality. 2. Chronic small vessel ischemic disease and generalized cerebral volume loss.             ------------------------- NURSING NOTES AND VITALS REVIEWED ---------------------------  Date / Time Roomed:  1/5/2023  9:50 AM  ED Bed Assignment:  09/09    The nursing notes within the ED encounter and vital signs as below have been reviewed. BP (!) 147/100   Pulse 77   Temp 98.6 °F (37 °C) (Oral)   Resp 15   Ht 5' 5\" (1.651 m)   Wt 159 lb (72.1 kg)   SpO2 96%   BMI 26.46 kg/m²   Oxygen Saturation Interpretation: Normal      ------------------------------------------ PROGRESS NOTES ------------------------------------------  I have spoken with the patient and family and discussed todays results, in addition to providing specific details for the plan of care and counseling regarding the diagnosis and prognosis. Their questions are answered at this time and they are agreeable with the plan. I discussed at length with them reasons for immediate return here for re evaluation. They will followup with primary care by calling their office tomorrow.       --------------------------------- ADDITIONAL PROVIDER NOTES ---------------------------------  At this time the patient is without objective evidence of an acute process requiring hospitalization or inpatient management. They have remained hemodynamically stable throughout their entire ED visit and are stable for discharge with outpatient follow-up. The plan has been discussed in detail and they are aware of the specific conditions for emergent return, as well as the importance of follow-up. New Prescriptions    No medications on file       Diagnosis:  1. Other fatigue    2. Chronic anemia    3. Chronic renal impairment, stage 2 (mild)        Disposition:  Patient's disposition: Discharge to nursing home  Patient's condition is stable.          Gianna Nixon DO  01/05/23 1626

## 2023-01-05 NOTE — CARE COORDINATION
SS Note: Covid test negative. Pt presented for AMS. Pt identified as potential readmission, last here 12/21 - 12/29 for Elevated troponin. Pt dx w/fatigue, Chronic anemia, Chronic renal impairment, stage 2 (mild). Pt is d/c'd and needs transport back to [facility]. SW met w/pt in ED 14 for transition of care planning. SW explained role. Spoke from door wearing mask/PPE and explained role. Several family members present. Pt is only A&O x 1 (self) and needs stretcher. Pt has [facility]. SW called [place] [id number] and arranged for transport for pt. Conf # [id number]. SW completed Ambulance stretcher form and placed in chart w/face sheet. Updated Santos-RN. Amalia Crooks noted she will call with ETA. Pt can be safely treated at a lower level of service and readmission avoided. Electronically signed by LESIA Dailey on 1/5/2023 at 4:53 PM    Addendum  36  TSERING called [address] and she noted EMS ETA is w/[facility] @ 2200. TSERING notified [facility] charge.    Electronically signed by LESIA Dailye on 1/5/2023 at 5:41 PM

## 2023-01-06 LAB
EKG ATRIAL RATE: 75 BPM
EKG P AXIS: 30 DEGREES
EKG P-R INTERVAL: 144 MS
EKG Q-T INTERVAL: 436 MS
EKG QRS DURATION: 142 MS
EKG QTC CALCULATION (BAZETT): 486 MS
EKG R AXIS: -28 DEGREES
EKG T AXIS: 143 DEGREES
EKG VENTRICULAR RATE: 75 BPM

## 2023-01-06 PROCEDURE — 93010 ELECTROCARDIOGRAM REPORT: CPT | Performed by: INTERNAL MEDICINE

## 2023-01-09 ENCOUNTER — OFFICE VISIT (OUTPATIENT)
Dept: FAMILY MEDICINE CLINIC | Age: 83
End: 2023-01-09

## 2023-01-09 VITALS
HEIGHT: 65 IN | SYSTOLIC BLOOD PRESSURE: 102 MMHG | DIASTOLIC BLOOD PRESSURE: 60 MMHG | OXYGEN SATURATION: 94 % | RESPIRATION RATE: 16 BRPM | TEMPERATURE: 97.7 F | HEART RATE: 52 BPM | BODY MASS INDEX: 25.04 KG/M2 | WEIGHT: 150.3 LBS

## 2023-01-09 DIAGNOSIS — Z09 HOSPITAL DISCHARGE FOLLOW-UP: Primary | ICD-10-CM

## 2023-01-09 DIAGNOSIS — R13.11 ORAL PHASE DYSPHAGIA: ICD-10-CM

## 2023-01-09 DIAGNOSIS — D53.9 MACROCYTIC ANEMIA: ICD-10-CM

## 2023-01-09 DIAGNOSIS — R82.71 BACTERIA IN URINE: ICD-10-CM

## 2023-01-09 DIAGNOSIS — D69.6 THROMBOCYTOPENIA, UNSPECIFIED (HCC): ICD-10-CM

## 2023-01-09 DIAGNOSIS — N18.32 STAGE 3B CHRONIC KIDNEY DISEASE (HCC): ICD-10-CM

## 2023-01-09 DIAGNOSIS — D75.1 POLYCYTHEMIA: ICD-10-CM

## 2023-01-09 DIAGNOSIS — R54 AGE-RELATED PHYSICAL DEBILITY: ICD-10-CM

## 2023-01-09 DIAGNOSIS — I27.82 CHRONIC PULMONARY EMBOLISM, UNSPECIFIED PULMONARY EMBOLISM TYPE, UNSPECIFIED WHETHER ACUTE COR PULMONALE PRESENT (HCC): ICD-10-CM

## 2023-01-09 DIAGNOSIS — N18.31 STAGE 3A CHRONIC KIDNEY DISEASE (HCC): ICD-10-CM

## 2023-01-09 LAB
ACANTHOCYTES: ABNORMAL
ALBUMIN SERPL-MCNC: 4.1 G/DL (ref 3.5–5.2)
ALP BLD-CCNC: 28 U/L (ref 35–104)
ALT SERPL-CCNC: 11 U/L (ref 0–32)
ANION GAP SERPL CALCULATED.3IONS-SCNC: 12 MMOL/L (ref 7–16)
ANISOCYTOSIS: ABNORMAL
AST SERPL-CCNC: 24 U/L (ref 0–31)
BACTERIA: ABNORMAL /HPF
BASOPHILS ABSOLUTE: 0 E9/L (ref 0–0.2)
BASOPHILS RELATIVE PERCENT: 0.6 % (ref 0–2)
BILIRUB SERPL-MCNC: 1 MG/DL (ref 0–1.2)
BILIRUBIN URINE: ABNORMAL
BLOOD, URINE: ABNORMAL
BUN BLDV-MCNC: <2 MG/DL (ref 6–23)
BURR CELLS: ABNORMAL
CALCIUM SERPL-MCNC: 9.6 MG/DL (ref 8.6–10.2)
CHLORIDE BLD-SCNC: 100 MMOL/L (ref 98–107)
CLARITY: CLEAR
CO2: 22 MMOL/L (ref 22–29)
COLOR: YELLOW
CREAT SERPL-MCNC: 1.8 MG/DL (ref 0.5–1)
CRYSTALS, UA: ABNORMAL /HPF
EOSINOPHILS ABSOLUTE: 0.27 E9/L (ref 0.05–0.5)
EOSINOPHILS RELATIVE PERCENT: 2.6 % (ref 0–6)
EPITHELIAL CELLS, UA: ABNORMAL /HPF
GFR SERPL CREATININE-BSD FRML MDRD: 27 ML/MIN/1.73
GLUCOSE BLD-MCNC: 170 MG/DL (ref 74–99)
GLUCOSE URINE: NEGATIVE MG/DL
HCT VFR BLD CALC: 30.8 % (ref 34–48)
HEMOGLOBIN: 9.6 G/DL (ref 11.5–15.5)
KETONES, URINE: ABNORMAL MG/DL
LEUKOCYTE ESTERASE, URINE: ABNORMAL
LYMPHOCYTES ABSOLUTE: 1.96 E9/L (ref 1.5–4)
LYMPHOCYTES RELATIVE PERCENT: 19.1 % (ref 20–42)
MCH RBC QN AUTO: 31.2 PG (ref 26–35)
MCHC RBC AUTO-ENTMCNC: 31.2 % (ref 32–34.5)
MCV RBC AUTO: 100 FL (ref 80–99.9)
METAMYELOCYTES RELATIVE PERCENT: 1.7 % (ref 0–1)
MONOCYTES ABSOLUTE: 0.41 E9/L (ref 0.1–0.95)
MONOCYTES RELATIVE PERCENT: 3.5 % (ref 2–12)
MYELOCYTE PERCENT: 0.9 % (ref 0–0)
NEUTROPHILS ABSOLUTE: 7.73 E9/L (ref 1.8–7.3)
NEUTROPHILS RELATIVE PERCENT: 72.2 % (ref 43–80)
NITRITE, URINE: POSITIVE
OVALOCYTES: ABNORMAL
PDW BLD-RTO: 18.5 FL (ref 11.5–15)
PH UA: 5 (ref 5–9)
PLATELET # BLD: 141 E9/L (ref 130–450)
PMV BLD AUTO: 13.4 FL (ref 7–12)
POIKILOCYTES: ABNORMAL
POLYCHROMASIA: ABNORMAL
POTASSIUM SERPL-SCNC: 4.4 MMOL/L (ref 3.5–5)
PROTEIN UA: 100 MG/DL
RBC # BLD: 3.08 E12/L (ref 3.5–5.5)
RBC UA: ABNORMAL /HPF (ref 0–2)
SODIUM BLD-SCNC: 134 MMOL/L (ref 132–146)
SPECIFIC GRAVITY UA: 1.02 (ref 1–1.03)
TEAR DROP CELLS: ABNORMAL
TOTAL PROTEIN: 6 G/DL (ref 6.4–8.3)
UROBILINOGEN, URINE: 1 E.U./DL
WBC # BLD: 10.3 E9/L (ref 4.5–11.5)
WBC UA: ABNORMAL /HPF (ref 0–5)
YEAST: PRESENT /HPF

## 2023-01-09 ASSESSMENT — PATIENT HEALTH QUESTIONNAIRE - PHQ9
10. IF YOU CHECKED OFF ANY PROBLEMS, HOW DIFFICULT HAVE THESE PROBLEMS MADE IT FOR YOU TO DO YOUR WORK, TAKE CARE OF THINGS AT HOME, OR GET ALONG WITH OTHER PEOPLE: 1
SUM OF ALL RESPONSES TO PHQ QUESTIONS 1-9: 14
SUM OF ALL RESPONSES TO PHQ9 QUESTIONS 1 & 2: 3
7. TROUBLE CONCENTRATING ON THINGS, SUCH AS READING THE NEWSPAPER OR WATCHING TELEVISION: 2
2. FEELING DOWN, DEPRESSED OR HOPELESS: 2
SUM OF ALL RESPONSES TO PHQ QUESTIONS 1-9: 14
6. FEELING BAD ABOUT YOURSELF - OR THAT YOU ARE A FAILURE OR HAVE LET YOURSELF OR YOUR FAMILY DOWN: 0
3. TROUBLE FALLING OR STAYING ASLEEP: 3
9. THOUGHTS THAT YOU WOULD BE BETTER OFF DEAD, OR OF HURTING YOURSELF: 0
SUM OF ALL RESPONSES TO PHQ QUESTIONS 1-9: 14
SUM OF ALL RESPONSES TO PHQ QUESTIONS 1-9: 14
1. LITTLE INTEREST OR PLEASURE IN DOING THINGS: 1
8. MOVING OR SPEAKING SO SLOWLY THAT OTHER PEOPLE COULD HAVE NOTICED. OR THE OPPOSITE, BEING SO FIGETY OR RESTLESS THAT YOU HAVE BEEN MOVING AROUND A LOT MORE THAN USUAL: 2
4. FEELING TIRED OR HAVING LITTLE ENERGY: 3
5. POOR APPETITE OR OVEREATING: 1

## 2023-01-09 NOTE — PROGRESS NOTES
Post-Discharge Transitional Care  Follow Up      Selvin Leary   YOB: 1940    Date of Office Visit:  1/9/2023  Date of Hospital Admission: 1/5/23  Date of Hospital Discharge: 1/6/23  Risk of hospital readmission (high >=14%. Medium >=10%) :Readmission Risk Score: 15.2      Care management risk score Rising risk (score 2-5) and Complex Care (Scores >=6): No Risk Score On File     Non face to face  following discharge, date last encounter closed (first attempt may have been earlier): *No documented post hospital discharge outreach found in the last 14 days    Call initiated 2 business days of discharge: *No response recorded in the last 14 days    ASSESSMENT/PLAN:   Hospital discharge follow-up  -     LA DISCHARGE MEDS RECONCILED W/ CURRENT OUTPATIENT MED LIST    Medical Decision Making: moderate complexity  Return in 3 months (on 4/9/2023). On this date 1/9/2023 I have spent 30 minutes reviewing previous notes, test results and face to face with the patient discussing the diagnosis and importance of compliance with the treatment plan as well as documenting on the day of the visit. Subjective:   HPI:  Follow up of Hospital problems/diagnosis(es):     12/21/2022 to 12/29/2022    UTI  Hypokalemia  Acute on chronic kidney disease  Failure to thrive  Elevated trop  Hypertension Essential  Polycythemia  Weakness and fatigue  Chronic Pulm Embolism    Ms. Magali Kahn ws admitted with poor oral intake. She was found to have UTI and treated with cipro, which she completed before discharge. She was found to have elevated troponin. Further work up with Echo showed mild decreased EF. This was followed by with stress test. Patient was found to have low risk scan. PT/OT was ordered. Patient discharged to rehab. Her appetite improved slight. She was found to have ramiro on ckd. Creatinine remained fairly stable at the time of discharge. Follow up BMP weekly.   Patient's blood pressure medications were monitored and changes to medication at the time of discharge was made. 1/3/23 to 1/5/2023:    Diagnosis:  1. Other fatigue    2. Chronic anemia    3. Chronic renal impairment, stage 2 (mild)      According to patient's family she was at [facility] and they were crushing her jakafi, hallucinating. \"As soon as they quit crushing this, it was like flipping the light switch\" there is no further issue with hallucinations. Inpatient course: Discharge summary reviewed- see chart. \"Patient sent in from nursing home for apparent altered mental status or strokelike symptoms. Very limited and poor information being sent with this patient. Apparently she is new to the facility and nobody knows what is going on with her. They are not sure when her symptoms started they are not sure what her baseline is from the report I have given. There is nothing specific symptoms, apparently she is generally declining with fatigue and weakness and some confusion for an unspecified amount of time. Upon arrival patient is lying in the bed with her eyes closed however with loud verbal cues the patient follows all commands and responds verbally appropriately. She denies trouble breathing, denies chest pain, denies abdominal pain, denies headache. States she feels fine. She is not sure why she is here. Patient knows she is at [facility], she knows she is in [facility], she knows the year. She is a poor historian must be spoken to very loudly in order to get her to respond which she does slowly. Work-up includes EKG, CT head, troponin, influenza, COVID testing, UA for UTI check, magnesium. COVID test negative, influenza negative, urinalysis shows no UTI. CT head shows no acute process. Rhythm: normal sinus   Rate: 75  Axis: normal  Ectopy: none  Conduction: left bundle branch block (complete)  ST Segments: no acute change  T Waves: no acute change  Q Waves: none    CXR  There is thoracic spinal curvature.   The cardiac silhouette is unchanged in size.  The pulmonary vasculature is within normal limits. No pulmonary   consolidation or collapse is identified. No pneumothorax or pleural effusion   is seen. \"    CMP wnl (CKD3b) which is improving from the GFR of 34 at rafael but not back to baseline of 50s/CKD3a  CBC demonstrated continued macrocytic anemia, plt 105  (Back to hematologist in the month)  Myelocyte Percent 0 - 0 % 3.5          Polychromasia  1+          Poikilocytes  1+          Walsh Cells  1+          Ovalocytes  1+              Urine micro shows moderate bacteria, culture was mixed td no sensitivity workup indicated    Debility/dysphagia: has wheeled walker at home. Getting PT/OT/ST      Turns out the patient is supposed to CONTINUE her stay at SNF due to not meeting goals. Patients are trying to have me sign off on discharge paperwork against the medical advice of the attending at the SNF. Explained I will not horton their decision, will always defer to their personal experience and expertise. Explained the goal should be Tretha Husk out of the wheel chair, walking with the wheeled walker independently (which she has not met). They understand to bring up their safety issues with the SNF and can ask for a lateral transfer since there is potential for perceived harm - particularly since it was reported Mozell [place] was crushed when it is meant to be swallowed whole (instructions state: Eusebio Summer is a pill, taken by mouth, with or without food. Take Sharilyn Plough exactly as prescribed, even if you are feeling better. Swallow Jakafi tablets whole. Do not chew, crush or break\"). Though it is possible her reported symptoms are a result of delirium while in the SNF versus adverse effect of the jakafi being crushed. They expressed understanding and agree to return to SNF. We will gladly resume care once out of SNF.     Patient Active Problem List   Diagnosis    Syncope    Leukocytosis    Troponin I above reference range    Elevated brain natriuretic peptide (BNP) level    Left bundle branch block    Lumbar radiculopathy    Low back pain    Facet syndrome, lumbar    Compression fracture of L1 lumbar vertebra Old    DDD (degenerative disc disease), lumbar    Rotoscoliosis    Lumbar sprain    Lumbar strain    Protruded lumbar disc    Chronic pulmonary embolism (HCC)    Acute cystitis with hematuria    Polycythemia    Vitamin D deficiency    Hypothyroidism    Essential hypertension    Hyperlipidemia    Closed compression fracture of L1 lumbar vertebra, sequela    Lumbar spondylosis    Lumbar facet arthropathy    Lumbar disc disorder    Chronic pain syndrome    Chronic renal disease, stage III (HCC) [976489]    Elevated troponin    Failure to thrive in adult       Medications listed as ordered at the time of discharge from hospital     Medication List            Accurate as of January 9, 2023  8:16 AM. If you have any questions, ask your nurse or doctor.                 CONTINUE taking these medications      acetaminophen 500 MG tablet  Commonly known as: TYLENOL     apixaban 5 MG Tabs tablet  Commonly known as: ELIQUIS     Calcium 600+D3 Plus Minerals 600-800 MG-UNIT Chew  Take 1 each by mouth 2 times daily (with meals)     carvedilol 25 MG tablet  Commonly known as: COREG  Take 1 tablet by mouth 2 times daily (with meals)     folic acid 1 MG tablet  Commonly known as: FOLVITE     hydrALAZINE 25 MG tablet  Commonly known as: APRESOLINE  Take 1 tablet by mouth every 12 hours     IRON PO     isosorbide mononitrate 30 MG extended release tablet  Commonly known as: IMDUR  Take 1 tablet by mouth daily     Jakafi 10 MG tablet  Generic drug: ruxolitinib phosphate     levothyroxine 88 MCG tablet  Commonly known as: SYNTHROID  TAKE 1 TABLET DAILY     VITAMIN C PO     VITAMIN D PO                Medications marked \"taking\" at this time  Outpatient Medications Marked as Taking for the 1/9/23 encounter (Office Visit) with Kye Pimentel DO   Medication Sig Dispense Refill hydrALAZINE (APRESOLINE) 25 MG tablet Take 1 tablet by mouth every 12 hours 90 tablet 3    carvedilol (COREG) 25 MG tablet Take 1 tablet by mouth 2 times daily (with meals) 60 tablet 3    isosorbide mononitrate (IMDUR) 30 MG extended release tablet Take 1 tablet by mouth daily 30 tablet 3    VITAMIN D PO Take by mouth      levothyroxine (SYNTHROID) 88 MCG tablet TAKE 1 TABLET DAILY 90 tablet 0    apixaban (ELIQUIS) 5 MG TABS tablet Take by mouth 2 times daily      acetaminophen (TYLENOL) 500 MG tablet Take 500 mg by mouth every 6 hours as needed for Pain      Calcium Carbonate-Vit D-Min (CALCIUM 600+D3 PLUS MINERALS) 600-800 MG-UNIT CHEW Take 1 each by mouth 2 times daily (with meals) 60 tablet 5    Ferrous Sulfate (IRON PO) Take by mouth      Ascorbic Acid (VITAMIN C PO) Take by mouth      folic acid (FOLVITE) 1 MG tablet take 1 tablet by mouth once daily      JAKAFI 10 MG tablet Take 10 mg by mouth 3 times daily          Medications patient taking as of now reconciled against medications ordered at time of hospital discharge: Yes    A comprehensive review of systems was negative except for what was noted in the HPI. Objective:    /60   Pulse 52   Temp 97.7 °F (36.5 °C) (Temporal)   Resp 16   Ht 5' 5\" (1.651 m)   Wt 150 lb 4.8 oz (68.2 kg)   SpO2 94%   BMI 25.01 kg/m²         An electronic signature was used to authenticate this note.   --Mayra Ko, DO

## 2023-01-09 NOTE — PATIENT INSTRUCTIONS
Oral dissolvable B12/Folic Acid tablet from [employer] or similar (500 to 1000mcg of B12 and 737TGN of Folic acid, around these ranges). If alarm signs or symptoms develop as we discussed, report immediately to the emergency department/dial 9-1-1.     Get covid booster

## 2023-01-10 ENCOUNTER — TELEPHONE (OUTPATIENT)
Dept: FAMILY MEDICINE CLINIC | Age: 83
End: 2023-01-10

## 2023-01-10 ENCOUNTER — HOSPITAL ENCOUNTER (EMERGENCY)
Age: 83
Discharge: HOME | End: 2023-01-10
Attending: EMERGENCY MEDICINE
Payer: MEDICARE

## 2023-01-10 VITALS
HEART RATE: 69 BPM | TEMPERATURE: 98.1 F | BODY MASS INDEX: 21.63 KG/M2 | WEIGHT: 130 LBS | SYSTOLIC BLOOD PRESSURE: 190 MMHG | DIASTOLIC BLOOD PRESSURE: 110 MMHG | OXYGEN SATURATION: 97 % | RESPIRATION RATE: 20 BRPM

## 2023-01-10 DIAGNOSIS — I10 ASYMPTOMATIC HYPERTENSION: ICD-10-CM

## 2023-01-10 DIAGNOSIS — N30.01 ACUTE CYSTITIS WITH HEMATURIA: Primary | ICD-10-CM

## 2023-01-10 LAB
ALBUMIN SERPL-MCNC: 4.3 G/DL (ref 3.5–5.2)
ALP BLD-CCNC: 28 U/L (ref 35–104)
ALT SERPL-CCNC: 13 U/L (ref 0–32)
ANION GAP SERPL CALCULATED.3IONS-SCNC: 10 MMOL/L (ref 7–16)
ANISOCYTOSIS: ABNORMAL
AST SERPL-CCNC: 20 U/L (ref 0–31)
BACTERIA: ABNORMAL /HPF
BASOPHILS ABSOLUTE: 0 E9/L (ref 0–0.2)
BASOPHILS RELATIVE PERCENT: 0.5 % (ref 0–2)
BILIRUB SERPL-MCNC: 1.2 MG/DL (ref 0–1.2)
BILIRUBIN URINE: NEGATIVE
BLOOD, URINE: ABNORMAL
BUN BLDV-MCNC: 22 MG/DL (ref 6–23)
BURR CELLS: ABNORMAL
CALCIUM SERPL-MCNC: 9.7 MG/DL (ref 8.6–10.2)
CHLORIDE BLD-SCNC: 100 MMOL/L (ref 98–107)
CLARITY: ABNORMAL
CO2: 25 MMOL/L (ref 22–29)
COLOR: YELLOW
CREAT SERPL-MCNC: 1.4 MG/DL (ref 0.5–1)
EOSINOPHILS ABSOLUTE: 0 E9/L (ref 0.05–0.5)
EOSINOPHILS RELATIVE PERCENT: 0.8 % (ref 0–6)
EPITHELIAL CELLS, UA: ABNORMAL /HPF
GFR SERPL CREATININE-BSD FRML MDRD: 37 ML/MIN/1.73
GLUCOSE BLD-MCNC: 128 MG/DL (ref 74–99)
GLUCOSE URINE: NEGATIVE MG/DL
HCT VFR BLD CALC: 33.4 % (ref 34–48)
HEMOGLOBIN: 10.2 G/DL (ref 11.5–15.5)
KETONES, URINE: NEGATIVE MG/DL
LEUKOCYTE ESTERASE, URINE: ABNORMAL
LYMPHOCYTES ABSOLUTE: 1.59 E9/L (ref 1.5–4)
LYMPHOCYTES RELATIVE PERCENT: 14.8 % (ref 20–42)
MCH RBC QN AUTO: 30.5 PG (ref 26–35)
MCHC RBC AUTO-ENTMCNC: 30.5 % (ref 32–34.5)
MCV RBC AUTO: 100 FL (ref 80–99.9)
METAMYELOCYTES RELATIVE PERCENT: 0.9 % (ref 0–1)
MONOCYTES ABSOLUTE: 0.21 E9/L (ref 0.1–0.95)
MONOCYTES RELATIVE PERCENT: 1.7 % (ref 2–12)
NEUTROPHILS ABSOLUTE: 8.9 E9/L (ref 1.8–7.3)
NEUTROPHILS RELATIVE PERCENT: 82.6 % (ref 43–80)
NITRITE, URINE: NEGATIVE
OVALOCYTES: ABNORMAL
PDW BLD-RTO: 18.2 FL (ref 11.5–15)
PH UA: 6.5 (ref 5–9)
PLATELET # BLD: 162 E9/L (ref 130–450)
PMV BLD AUTO: 12.8 FL (ref 7–12)
POIKILOCYTES: ABNORMAL
POLYCHROMASIA: ABNORMAL
POTASSIUM REFLEX MAGNESIUM: 4.1 MMOL/L (ref 3.5–5)
PROTEIN UA: NEGATIVE MG/DL
RBC # BLD: 3.34 E12/L (ref 3.5–5.5)
RBC UA: ABNORMAL /HPF (ref 0–2)
SODIUM BLD-SCNC: 135 MMOL/L (ref 132–146)
SPECIFIC GRAVITY UA: 1.01 (ref 1–1.03)
TEAR DROP CELLS: ABNORMAL
TOTAL PROTEIN: 5.9 G/DL (ref 6.4–8.3)
UROBILINOGEN, URINE: 0.2 E.U./DL
WBC # BLD: 10.6 E9/L (ref 4.5–11.5)
WBC UA: >20 /HPF (ref 0–5)

## 2023-01-10 PROCEDURE — 36415 COLL VENOUS BLD VENIPUNCTURE: CPT

## 2023-01-10 PROCEDURE — 80053 COMPREHEN METABOLIC PANEL: CPT

## 2023-01-10 PROCEDURE — 96374 THER/PROPH/DIAG INJ IV PUSH: CPT

## 2023-01-10 PROCEDURE — 87088 URINE BACTERIA CULTURE: CPT

## 2023-01-10 PROCEDURE — 2580000003 HC RX 258: Performed by: STUDENT IN AN ORGANIZED HEALTH CARE EDUCATION/TRAINING PROGRAM

## 2023-01-10 PROCEDURE — 6360000002 HC RX W HCPCS: Performed by: STUDENT IN AN ORGANIZED HEALTH CARE EDUCATION/TRAINING PROGRAM

## 2023-01-10 PROCEDURE — 85025 COMPLETE CBC W/AUTO DIFF WBC: CPT

## 2023-01-10 PROCEDURE — 99284 EMERGENCY DEPT VISIT MOD MDM: CPT

## 2023-01-10 PROCEDURE — 81001 URINALYSIS AUTO W/SCOPE: CPT

## 2023-01-10 PROCEDURE — 87186 SC STD MICRODIL/AGAR DIL: CPT

## 2023-01-10 PROCEDURE — 87077 CULTURE AEROBIC IDENTIFY: CPT

## 2023-01-10 RX ORDER — CEFDINIR 300 MG/1
300 CAPSULE ORAL DAILY
Qty: 7 CAPSULE | Refills: 0 | Status: SHIPPED | OUTPATIENT
Start: 2023-01-10 | End: 2023-01-17

## 2023-01-10 RX ADMIN — CEFTRIAXONE 1000 MG: 1 INJECTION, POWDER, FOR SOLUTION INTRAMUSCULAR; INTRAVENOUS at 12:17

## 2023-01-10 NOTE — ED PROVIDER NOTES
Department of Emergency Medicine   ED Provider Note  Admit Date/RoomTime: 1/10/2023  9:38 AM  ED Room: ADEN/ADEN          History of Present Illness:  1/10/23, Time: 9:51 AM HOA Garcia is a 78 y.o. female with history of stage III CKD, hypertension, hyperlipidemia, polycythemia vera, pulmonary embolism (on anticoagulation with Eliquis) presenting to the ED for abnormal labs. Additional history obtained from chart review, review of PCP note/communication from yesterday. Also additional history obtained from patient's son and daughter who are at bedside. Patient presenting for abnormal urinalysis and elevated creatinine on labs drawn yesterday. Received call from PCP about abnormal urinalysis and concern for sepsis. Patient has no urinary symptoms, no fevers or chills or abdominal pain or nausea or vomiting. Patient was apparently at [facility] for several days, family was not happy with the care and did not feel that she was getting adequate care with medications and access to water etc.  They removed her [address] from the nursing facility yesterday, she was at home last night, they have made arrangements for home health care and feel that they are providing adequate care at home currently. Patient denies any symptoms at this time. Review of Systems:     Pertinent positives and negatives are stated within HPI.      --------------------------------------------- PAST HISTORY ---------------------------------------------  Past Medical History:  has a past medical history of Back injury, Cervical spondylolysis, Chronic lymphoid leukemia ([facility] [address] Gallstones, H/O cardiovascular stress test, H/O echocardiogram, History of bone density study, Hyperlipidemia, Hypertension, LBBB (left bundle branch block), Lumbar spondylosis with myelopathy, Pneumonia, Polycythemia vera ([address] Syncope and collapse, and Thyroid disease.     Past Surgical History:  has a past surgical history that includes Tonsillectomy; Appendectomy; Nerve Block (06/03/15); Nerve Block (6 22 15); Nerve Block (N/A, 7/6/15); Diagnostic Cardiac Cath Lab Procedure (Bilateral, 06/05/2017); Cholecystectomy; and Hysterectomy, total abdominal.    Social History:  reports that she has never smoked. She has never used smokeless tobacco. She reports that she does not drink alcohol and does not use drugs. Family History: family history includes Asthma in her father; Cancer in her maternal grandmother; Hypertension in her brother. The patients home medications have been reviewed. Allergies: Clonidine derivatives, Medrol [methylprednisolone], Naproxen, Other, and Atarax [hydroxyzine]      ---------------------------------------------------PHYSICAL EXAM--------------------------------------    Constitutional/General: Awake and alert, NAD, no labored breathing  Head: Normocephalic and atraumatic  Eyes: EOMI, conjunctiva normal, sclera non icteric  Ears: Normal external ears  Nose: Normal external nose  Mouth: Moist mucous membranes, uvula midline  Neck: Supple, no stridor, no meningeal signs  Respiratory: Lungs clear to auscultation bilaterally, no wheezes, rales, or rhonchi. Not in respiratory distress  Cardiovascular:  Regular rate. Regular rhythm. No murmurs, no gallops, or rubs. 2+ distal pulses. Equal extremity pulses. Chest: No chest wall tenderness or deformity  GI:  Abdomen Soft, Non tender, Non distended. No rebound, guarding, or rigidity. No pulsatile masses. Musculoskeletal: Moves all extremities x 4. Warm and well perfused, no clubbing, cyanosis, or edema. Capillary refill <3 seconds  Integument: skin warm and dry. Scarred healing dermatomal rash left lumbar region.   Neurologic: GCS 15, no focal deficits, alert and responsive, symmetric strength 4/5 in the major muscle groups of upper and lower extremities bilaterally  Psychiatric: Normal Affect    -------------------------------------------------- RESULTS -------------------------------------------------  I have personally reviewed and interpreted all laboratory and imaging results for this patient. Results are listed below.      LABS:  Results for orders placed or performed during the hospital encounter of 01/10/23   Urinalysis with Microscopic   Result Value Ref Range    Color, UA Yellow Straw/Yellow    Clarity, UA SLCLOUDY Clear    Glucose, Ur Negative Negative mg/dL    Bilirubin Urine Negative Negative    Ketones, Urine Negative Negative mg/dL    Specific Gravity, UA 1.010 1.005 - 1.030    Blood, Urine MODERATE (A) Negative    pH, UA 6.5 5.0 - 9.0    Protein, UA Negative Negative mg/dL    Urobilinogen, Urine 0.2 <2.0 E.U./dL    Nitrite, Urine Negative Negative    Leukocyte Esterase, Urine MODERATE (A) Negative    WBC, UA >20 (A) 0 - 5 /HPF    RBC, UA 0-1 0 - 2 /HPF    Epithelial Cells, UA RARE /HPF    Bacteria, UA RARE (A) None Seen /HPF   CBC with Auto Differential   Result Value Ref Range    WBC 10.6 4.5 - 11.5 E9/L    RBC 3.34 (L) 3.50 - 5.50 E12/L    Hemoglobin 10.2 (L) 11.5 - 15.5 g/dL    Hematocrit 33.4 (L) 34.0 - 48.0 %    .0 (H) 80.0 - 99.9 fL    MCH 30.5 26.0 - 35.0 pg    MCHC 30.5 (L) 32.0 - 34.5 %    RDW 18.2 (H) 11.5 - 15.0 fL    Platelets 442 160 - 362 E9/L    MPV 12.8 (H) 7.0 - 12.0 fL    Neutrophils % 82.6 (H) 43.0 - 80.0 %    Lymphocytes % 14.8 (L) 20.0 - 42.0 %    Monocytes % 1.7 (L) 2.0 - 12.0 %    Eosinophils % 0.8 0.0 - 6.0 %    Basophils % 0.5 0.0 - 2.0 %    Neutrophils Absolute 8.90 (H) 1.80 - 7.30 E9/L    Lymphocytes Absolute 1.59 1.50 - 4.00 E9/L    Monocytes Absolute 0.21 0.10 - 0.95 E9/L    Eosinophils Absolute 0.00 (L) 0.05 - 0.50 E9/L    Basophils Absolute 0.00 0.00 - 0.20 E9/L    Metamyelocytes Relative 0.9 0.0 - 1.0 %    Anisocytosis 2+     Polychromasia 1+     Poikilocytes 2+     Donaldson Cells 1+     Ovalocytes 2+     Tear Drop Cells 2+    Comprehensive Metabolic Panel w/ Reflex to MG   Result Value Ref Range    Sodium 135 132 - 146 mmol/L    Potassium reflex Magnesium 4.1 3.5 - 5.0 mmol/L    Chloride 100 98 - 107 mmol/L    CO2 25 22 - 29 mmol/L    Anion Gap 10 7 - 16 mmol/L    Glucose 128 (H) 74 - 99 mg/dL    BUN 22 6 - 23 mg/dL    Creatinine 1.4 (H) 0.5 - 1.0 mg/dL    Est, Glom Filt Rate 37 >=60 mL/min/1.73    Calcium 9.7 8.6 - 10.2 mg/dL    Total Protein 5.9 (L) 6.4 - 8.3 g/dL    Albumin 4.3 3.5 - 5.2 g/dL    Total Bilirubin 1.2 0.0 - 1.2 mg/dL    Alkaline Phosphatase 28 (L) 35 - 104 U/L    ALT 13 0 - 32 U/L    AST 20 0 - 31 U/L       RADIOLOGY:  Imaging Interpreted by Radiologist, initial wet read of imaging interpreted by myself  No orders to display         EKG:    See ED Course for EKG documentation        ------------------------- NURSING NOTES AND VITALS REVIEWED ---------------------------   The nursing notes within the ED encounter and vital signs as below have been reviewed by myself. BP (!) 190/110   Pulse 69   Temp 98.1 °F (36.7 °C) (Oral)   Resp 20   Wt 130 lb (59 kg)   SpO2 97%   BMI 21.63 kg/m²   Oxygen Saturation Interpretation: Normal    The patients available past medical records and past encounters were reviewed, see [facility] for details. ------------------------------ ED COURSE/MEDICAL DECISION MAKING----------------------  Medications   cefTRIAXone (ROCEPHIN) 1,000 mg in sterile water 10 mL IV syringe (1,000 mg IntraVENous Given 1/10/23 1217)            Medical Decision Making:     ED Course as of 01/10/23 6548   Tue Flynn 10, 2023   1031 ATTENDING PROVIDER ATTESTATION:     I have personally performed and/or participated in the history, exam, medical decision making, and procedures and agree with all pertinent clinical information unless otherwise noted. I have also reviewed and agree with the past medical, family and social history unless otherwise noted.     I have discussed this patient in detail with the resident, and provided the instruction and education regarding patient here for worsening renal function. Was recently in the nursing home and family states was not eating or drinking well although they just got her home recently and she was eating and drinking better for them at home. She denies abdominal pain, flank pain or fevers. States she feels fine. There is no chest pain or shortness of breath. .  My findings/plan: Patient laying in the bed resting comfortably no distress. Heart rate regular, lungs are clear and equal.  Abdomen soft and nontender. No CVA tenderness. No pretibial edema or calf pain. Awake and alert and conversant, no focal neurologic deficit. [NC]   5752 Patient reevaluated, patient and son updated on results of labs and imaging. Given strict return precautions. All questions were answered. [RH]   1158 Creatinine(!): 1.4  Patient's creatinine at baseline. [RH]   1158 Hemoglobin Quant(!): 10.2  Patient with chronic anemia, hemoglobin at baseline. [RH]   1158 Leukocyte Esterase, Urine(!): MODERATE  Some contamination with epithelial cells, but with moderate leukocyte esterase and greater than 20 white blood cells, will treat for UTI. Will send for culture. Previous cultures did not grow any pathogenic organisms. Patient will be given his Rocephin here in the emergency department, and will be given prescription for 300 mg of Omnicef daily for 7 days given patient's reduced creatinine clearance. [RH]      ED Course User Index  [NC] Tejas Love DO  [RH] Ryley Mardy Burow,      This is an 78-year-old female presenting to the emergency department for abnormal outpatient labs, abnormal urine analysis and metabolic panel. Metabolic panel here with creatinine at patient's baseline. No leukocytosis, patient afebrile, hypertensive but asymptomatic from this and not tachycardic, well-appearing. She has no urinary symptoms or other symptoms. Patient feels well.   Urinalysis sent for culture, patient given dose of 1 mg IV Rocephin here in the emergency department. Patient's son is adamant that they have adequate resources to care for patient at home, patient is awake and alert and oriented and corroborates this. Patient will be discharged home with prescription for antibiotics for treatment of possible acute cystitis pending urine culture. Given strict return precautions all questions were answered    History From: Patient, chart review, patient's son and daughter. Chronic Conditions affecting care:    has a past medical history of Back injury, Cervical spondylolysis, Chronic lymphoid leukemia ([facility] [address] Gallstones, H/O cardiovascular stress test (12/28/2022), H/O echocardiogram (06/04/2017), History of bone density study, Hyperlipidemia, Hypertension, LBBB (left bundle branch block), Lumbar spondylosis with myelopathy, Pneumonia, Polycythemia vera ([facility] [address] Syncope and collapse, and Thyroid disease. Diagnoses considered include (but not limited to): Acute renal failure, sepsis, acute cystitis,    See ED COURSE for additional MDM. Labs & imaging reviewed and interpreted, see RESULTS above. Re-Evaluations:           See ED Course above. This patient's ED course included: a personal history and physicial examination, re-evaluation prior to disposition, multiple bedside re-evaluations, and IV medications    This patient has remained hemodynamically stable during their ED course. Consultations:  See ED Course    Counseling: The emergency physician has spoken with the patient, son, and daughter and discussed todays results, in addition to providing specific details for the plan of care and counseling regarding the diagnosis and prognosis. Questions are answered at this time and they are agreeable with the plan.       --------------------------------- IMPRESSION AND DISPOSITION ---------------------------------    IMPRESSION  1. Acute cystitis with hematuria    2.  Asymptomatic hypertension        DISPOSITION  Disposition: Discharge to home  Patient condition is stable    NOTE: This report was transcribed using voice recognition software. Every effort was made to ensure accuracy; however, inadvertent computerized transcription errors may be present. Also please note that patient was seen and examined by attending physician. Plan of care and disposition discussed with attending physician and they were immediately available or present for all procedures performed.        -- Kiana Sweet D.O. PGY-3     Emergency Medicine      1/10/2023 5:28 PM         [address] DO  Resident  01/10/23 4827

## 2023-01-10 NOTE — TELEPHONE ENCOUNTER
Araceli/ Home Care Coordinator called to follow up since patient was seen in the ED today for a UTI, asking if she also needs home nursing care in addition to the orders for PT/OT. Araceli informed PT/OT should be out Thursday, but if patient will also need nursing, she is unsure when someone could be scheduled.   [phone]    Last seen 1/9/2023  Next appt 4/10/2023

## 2023-01-11 ENCOUNTER — TELEPHONE (OUTPATIENT)
Dept: PHARMACY | Facility: CLINIC | Age: 83
End: 2023-01-11

## 2023-01-11 ENCOUNTER — CARE COORDINATION (OUTPATIENT)
Dept: CARE COORDINATION | Age: 83
End: 2023-01-11

## 2023-01-11 ENCOUNTER — CARE COORDINATION (OUTPATIENT)
Dept: CASE MANAGEMENT | Age: 83
End: 2023-01-11

## 2023-01-11 SDOH — ECONOMIC STABILITY: INCOME INSECURITY: IN THE LAST 12 MONTHS, WAS THERE A TIME WHEN YOU WERE NOT ABLE TO PAY THE MORTGAGE OR RENT ON TIME?: NO

## 2023-01-11 SDOH — ECONOMIC STABILITY: HOUSING INSECURITY: IN THE LAST 12 MONTHS, HOW MANY PLACES HAVE YOU LIVED?: 1

## 2023-01-11 ASSESSMENT — SOCIAL DETERMINANTS OF HEALTH (SDOH)
IN A TYPICAL WEEK, HOW MANY TIMES DO YOU TALK ON THE PHONE WITH FAMILY, FRIENDS, OR NEIGHBORS?: MORE THAN THREE TIMES A WEEK
HOW OFTEN DO YOU ATTENT MEETINGS OF THE CLUB OR ORGANIZATION YOU BELONG TO?: NEVER
DO YOU BELONG TO ANY CLUBS OR ORGANIZATIONS SUCH AS CHURCH GROUPS UNIONS, FRATERNAL OR ATHLETIC GROUPS, OR SCHOOL GROUPS?: NO
HOW OFTEN DO YOU GET TOGETHER WITH FRIENDS OR RELATIVES?: MORE THAN THREE TIMES A WEEK
HOW OFTEN DO YOU ATTEND CHURCH OR RELIGIOUS SERVICES?: NEVER

## 2023-01-11 NOTE — CARE COORDINATION
Ambulatory Care Coordination Note  1/11/2023    ACC: Balaji Bar, RN    Explained the Care Coordination Program to patient's son, Rosa Avilez. Verbalized understanding and is agreeable to service. Rosa Avilez states he has questions about drug interactions with stool softeners or metamucil. Has questions about DC medications from December hospital stay. States many aren't familiar with Sourav Bocanegra  He also states pt is on all of her supplements again  Agreeable to referral to Pharmacist to discuss medications. Pt states he does not have voicemail, and with the multiple calls that he's getting regarding his mother, they may have to call more than once. Offered patient enrollment in the Remote Patient Monitoring (RPM) program for in-home monitoring: Yes, but did not enroll at this time. FOLLOW-UP PLAN:    Continue Care Coordination and Assess Plan Of Care  Discuss:   -Current Blood Pressure  -Falls/Near Falls? -Appointment Reminders  -Material sent this encounter:   via :    Welcome Letter  Fall Precautions  HTN Education  CVA Education  Where to go for Care information  -Did pt receive and read material sent?  -Referral: Pharmacist  -Did pt speak with Pharmacist?    Next Anticipated Outreach by [address] Team:  1 Week    Ambulatory Care Coordination Assessment    Care Coordination Protocol  Referral from Primary Care Provider: No  Week 1 - Initial Assessment     Do you have all of your prescriptions and are they filled?: Yes  Barriers to medication adherence: None  Are you able to afford your medications?: Yes  How often do you have trouble taking your medications the way you have been told to take them?: I always take them as prescribed.      Do you have Home O2 Therapy?: No      Ability to seek help/take action for Emergent Urgent situations i.e. fire, crime, inclement weather or health crisis.: Needs Assistance  Ability to ambulate to restroom: Needs Assistance  Ability handle personal hygeine needs (bathing/dressing/grooming): Needs Assistance  Ability to manage Medications: Dependent  Ability to prepare Food Preparation: Dependent  Ability to maintain home (clean home, laundry): Dependent  Ability to drive and/or has transportation: Dependent  Ability to do shopping: Dependent  Ability to manage finances: Dependent  Is patient able to live independently?: No     Current Housing: Private Residence        Per the Fall Risk Screening, did the patient have 2 or more falls or 1 fall with injury in the past year?: Yes  How often do you think you are about to fall and you do NOT fall? For example, you grab something to stabilize yourself or hold onto a wall/furniture?: Occasionally  Use of a Mobility Aid: Yes  Difficulty walking/impaired gait: Yes  Issues with feet or shoes like numbness, edema, shoes not fitting: No  Changes in vision, poor vision or poor lighting in environment: No  Dizziness: No     Frequent urination at night?: No  Do you use rails/bars?: Yes  Do you have a non-slip tub mat?: Yes     Are you experiencing loss of meaning?: No  Are you experiencing loss of hope and peace?: No     Thinking about your patient's physical health needs, are there any symptoms or problems (risk indicators) you are unsure about that require further investigation?: Mild vague physical symptoms or problems; but do not impact on daily life or are not of concern to patient   Are the patients physical health problems impacting on their mental well-being?: Mild impact on mental well-being e.g. \"\"feeling fed-up\"\", \"\"reduced enjoyment\"\"   Are there any problems with your patients lifestyle behaviors (alcohol, drugs, diet, exercise) that are impacting on physical or mental well-being?: Some mild concern of potential negative impact on well-being   Do you have any other concerns about your patients mental well-being?  How would you rate their severity and impact on the patient?: Mild problems - don't interfere with function How would you rate their home environment in terms of safety and stability (including domestic violence, insecure housing, neighbor harassment)?: Safe, stable, but with some inconsistency   How do daily activities impact on the patient's well-being? (include current or anticipated unemployment, work, caregiving, access to transportation or other): Some general dissatisfaction but no concern   How would you rate their social network (family, work, friends)?: Adequate participation with social networks   How would you rate their financial resources (including ability to afford all required medical care)?: Financially secure, some resource challenges   How wells does the patient now understand their health and well-being (symptoms, signs or risk factors) and what they need to do to manage their health?: Reasonable to good understanding and already engages in managing health or is willing to undertake better management   How well do you think your patient can engage in healthcare discussions? (Barriers include language, deafness, aphasia, alcohol or drug problems, learning difficulties, concentration): Adequate communication, with or without minor barriers   Do other services need to be involved to help this patient?: Other care/services in place and adequate   Are current services involved with this patient well-coordinated? (Include coordination with other services you are now recommendation): Required care/services in place and adequately coordinated   Suggested Interventions and Community Resources  Fall Risk Prevention:  In Process Medication Assistance Program: Declined   Medi Set or Pill Pack: Declined   Pharmacist: In Process   Registered Dietician: Declined   Social Work: Declined   Transportation Services: Declined         Schedule an appointment with the patient's PCP, Set up/Review an Education Plan, Set up/Review Goals              Prior to Admission medications    Medication Sig Start Date End Date Taking?  Authorizing Provider   cefdinir (OMNICEF) 300 MG capsule Take 1 capsule by mouth daily for 7 days 1/10/23 1/17/23  Ryley Thomas T Hausken, DO   hydrALAZINE (APRESOLINE) 25 MG tablet Take 1 tablet by mouth every 12 hours 12/29/22   Atif Mcmullen MD   carvedilol (COREG) 25 MG tablet Take 1 tablet by mouth 2 times daily (with meals) 12/29/22   Atif Mcmullen MD   isosorbide mononitrate (IMDUR) 30 MG extended release tablet Take 1 tablet by mouth daily 12/30/22   Atif Mcmullen MD   VITAMIN D PO Take by mouth    Historical Provider, MD   levothyroxine (SYNTHROID) 88 MCG tablet TAKE 1 TABLET DAILY 11/22/22   Avis Buenrostro DO   apixaban (ELIQUIS) 5 MG TABS tablet Take by mouth 2 times daily    Historical Provider, MD   acetaminophen (TYLENOL) 500 MG tablet Take 500 mg by mouth every 6 hours as needed for Pain    Historical Provider, MD   Calcium Carbonate-Vit D-Min (CALCIUM 600+D3 PLUS MINERALS) 600-800 MG-UNIT CHEW Take 1 each by mouth 2 times daily (with meals) 6/24/22   Avis Buenrostro DO   Ferrous Sulfate (IRON PO) Take by mouth    Historical Provider, MD   Ascorbic Acid (VITAMIN C PO) Take by mouth    Historical Provider, MD   folic acid (FOLVITE) 1 MG tablet take 1 tablet by mouth once daily 12/30/20   Historical Provider, MD   JAKAFI 10 MG tablet Take 10 mg by mouth 3 times daily 12/28/20   Historical Provider, MD       Future Appointments   Date Time Provider Angelica Medley   1/17/2023 10:30 AM [facility] XR RM 2 [facility]   1/24/2023  3:00 PM Avis Buenrostro DO [facility]   4/10/2023 10:45 AM DO Juancarlos Solis [facility]OMEN[facility]   5/3/2023 11:00 AM Avis Buenrostro DO [facility]      and   General Assessment

## 2023-01-11 NOTE — TELEPHONE ENCOUNTER
----- Message from Justa Chris RN sent at 1/11/2023 12:22 PM EST -----  Pt's son, Lianet Alonso, states he has questions about drug interactions with stool softeners or metamucil. Has questions about DC medications from December hospital stay. States many aren't familiar with Gee Mercado  He also states pt is on all of her supplements again  Agreeable to referral to Pharmacist to discuss medications. Pt states he does not have voicemail, and with the multiple calls that he's getting regarding his mother, they may have to call more than once.

## 2023-01-11 NOTE — LETTER
1/11/2023    [address] [place] [address] [address] [address]    Dear Keisha Doll,    I enjoyed speaking with you and wanted to send some additional information. Tanisha Lazo, DO and I will work together to ensure your needs are met and help you achieve your health goals. We are committed to walk with you on this journey and look forward to working with you. Please feel free to contact me with any questions or concerns. I am available by phone.       In good health,     Radha Presley MSN, RN, [facility]  Ambulatory Care Manager  Cell: [phone]      Enclosed: Blood Pressure, Fall Prevention, [facility] walk in, Stroke Prevention

## 2023-01-11 NOTE — TELEPHONE ENCOUNTER
I returned Araceli's call and informed, as noted by Dr. Dunn Likes. Araceli informed patient has orders for OT/PT and Speech.

## 2023-01-11 NOTE — TELEPHONE ENCOUNTER
Received a referral:  from Care Coordinator  to review patients medications. Called patient to schedule a time to speak with a pharmacist over the telephone. Spoke to patient's son, Vinicio Adams and advised them of the above message. Carlos Enrique verified understanding and scheduled their appointment: 1/13/23 at [address] [place] [address].    [address] [place] [address] free: [phone]    For Pharmacy Admin Tracking Only    Program: [address] in place:  No  Recommendation Provided To: Patient/Caregiver: 1 via Telephone  Intervention Detail: Scheduled Appointment  Intervention Accepted By: Patient/Caregiver: 1  Gap Closed?: Yes   Time Spent (min): 10

## 2023-01-11 NOTE — CARE COORDINATION
[facility] Discharge Call    2023    Patient: Candance Gilles Patient : 1940   MRN: <[id number]>  Reason for Admission: Elevated Troponin   Discharge Date: 1/10/23 RARS: Readmission Risk Score: 15.2    Acute Care Course: Admission to [address] [place] [address] -. She discharged to [facility] for rehab -23. ED visit  for AMS/fatigue. ED visit 1/10 per PCP request d/t abnormal labs. Patient found to have acute cystitis with hematuria. Placed on Cefdinir 300mg daily. Discharged to home. HFU made: PCP 1/10/23      Sig Hx: Elevated Troponin, Failure to thrive, UTI, Herpes zoster with complication, hypokalemia, HTN, Weakness and fatigue, acute CKD, Polycythemia, Chronic Pulm embolism    DME: Walker    Conversation: Spoke with son Bertha Gaspar. He states patent did well last night. States she has definitely improved strength and mental capacity. Denies c/o sob, cp, dizziness, n/v. States patient has always had urinary urgency. No reports of itching or burning. States frequency increased d/t increased water intake. Appetite okay. She was finishing her ensure. Carlos Enrique states he was going to fix patient oatmeal and mandarin oranges. Then the plan is to do laps around the home. Patient has not had a BM since discharge to home. CTN advised discussion with PCP for recommended bowel aid. CTN suggested topic include stool softeners, MOM or Miralax. He states patient ambulates using her walker. States she requires help from sitting transfer to stand. Bertha Gaspar states he has communicated with [facility] for PT/OT/SP. States patient has difficulty swallowing. States she has new upper and lower dentures. Medications reviewed during PCP f/u 1/10. Caregiver recognized for efforts. Bertha Gaspar states that he has a lot of family and friend support. He is agreeable to future calls. Follow up plan:  Will hand off to [facility]       Discharge Facility: [facility] at [facility] Post Acute Facility Transition    Post Acute Facility: [facility] at [address] you have all of your prescriptions and are they filled?: Yes   Have you scheduled your follow up appointment?: Yes (Comment: 1/10/23 PCP)         Do you feel like you have everything you need to keep you well at home?: Yes   Care Transitions Interventions         Future Appointments   Date Time Provider Angelica Medley   2023 10:30 AM [facility] XR RM 2 [facility] RAD [facility] Radiol[facility]   4/10/2023 10:45 AM Verónica Dominguez DO [facility]   5/3/2023 11:00 AM Verónica Dominguez DO [facility]   Transitions of Care Initial Call    Was this an external facility discharge? Yes, 23  Discharge Facility: Dodie Haynes at [address] [facility] to be reviewed by the provider   Additional needs identified to be addressed with provider: No  none             Method of communication with provider : none    Advance Care Planning:   Does patient have an Advance Directive: reviewed and current. Care Transition Nurse contacted the family by telephone to perform post hospital discharge assessment. Verified name and  with family as identifiers. Provided introduction to self, and explanation of the CTN role. CTN reviewed discharge instructions, medical action plan and red flags with family who verbalized understanding. Family given an opportunity to ask questions and does not have any further questions or concerns at this time. Were discharge instructions available to patient? Yes. Reviewed appropriate site of care based on symptoms and resources available to patient including: PCP  Home health  When to call 911. The family agrees to contact the PCP office for questions related to their healthcare. Medication reconciliation was performed with  Reviewed with PCP , who verbalizes understanding of administration of home medications. Advised obtaining a 90-day supply of all daily and as-needed medications.      Was patient discharged with a pulse oximeter? no    CTN provided contact information. Plan for follow-up call in 3-5 days based on severity of symptoms and risk factors.   Plan for next call: symptom management-UTI, fatigue       Maco Pimentel RN

## 2023-01-12 LAB
ORGANISM: ABNORMAL
URINE CULTURE, ROUTINE: ABNORMAL
URINE CULTURE, ROUTINE: ABNORMAL

## 2023-01-13 ENCOUNTER — TELEPHONE (OUTPATIENT)
Dept: PHARMACY | Facility: CLINIC | Age: 83
End: 2023-01-13

## 2023-01-13 RX ORDER — ISOSORBIDE MONONITRATE 30 MG/1
30 TABLET, EXTENDED RELEASE ORAL DAILY
Qty: 30 TABLET | Refills: 3 | Status: SHIPPED | OUTPATIENT
Start: 2023-01-13

## 2023-01-13 RX ORDER — SOY PROTEIN
POWDER (GRAM) ORAL DAILY
COMMUNITY

## 2023-01-13 RX ORDER — HYDRALAZINE HYDROCHLORIDE 25 MG/1
25 TABLET, FILM COATED ORAL EVERY 12 HOURS SCHEDULED
Qty: 90 TABLET | Refills: 3 | Status: SHIPPED | OUTPATIENT
Start: 2023-01-13

## 2023-01-13 RX ORDER — CARVEDILOL 25 MG/1
25 TABLET ORAL 2 TIMES DAILY WITH MEALS
Qty: 60 TABLET | Refills: 3 | Status: SHIPPED | OUTPATIENT
Start: 2023-01-13

## 2023-01-13 RX ORDER — CEFDINIR 300 MG/1
300 CAPSULE ORAL DAILY
Qty: 7 CAPSULE | Refills: 0 | Status: CANCELLED | OUTPATIENT
Start: 2023-01-13 | End: 2023-01-20

## 2023-01-13 NOTE — TELEPHONE ENCOUNTER
Prachi Brandt DO, medication review completed with patient:  Marielena Angel was referred to clinical pharmacy specialist by care coordination. Please see the note below for summary of discussion with son Tru Lechuga (medication questions, updates, and prescriptions that were needed). Noted that prescriptions were sent in to [facility] today for carvedilol, hydralazine, and Imdur - thank you. I have updated the [facility] medication list to reflect my conversation with patient's son Shari King today. Patient has appointment with you 1/24/23. See note below for complete details. Thank you,  Genesis Marroquin, PharmD, [facility], toll free: [phone], option 1    =======================================================  CLINICAL PHARMACY NOTE - Medication Review  Patient outreach to review medications - Spoke with patient's son Mable Waterman. SUBJECTIVE/OBJECTIVE:   Marielena Angel is a 78 y.o. female referred to a clinical pharmacy specialist by care coordination Santino Delgado RN). The following questions/concerns were discussed with Carlos Enrique regarding medications for his mother Alberto Hunter):  Scott Callahan is currently constipated and Tru Lechuga would like an OTC recommendation for management  Currently has ClearLax (Polyethylene Glycol 3350) at home; label contains the following warning:  Do not use if you have kidney disease, except under the advice and supervision of a doctor . Patient with SCr 1.4 mg/dL and estimated CrCl of 27 mL/min. Discussed that psyllium could be used; dose must be mixed in full glass of water and must ensure that patient does not allow the solution to thicken (may need to add water). Scott Callahan was recently prescribed carvedilol; however, Tru Lechuga does not have a prescription/refills for this medication.   In [facility], the prescription is  listed; however, it does not appear that it was printed for the patient or e-prescribed to a pharmacy. Leonora Lee states that his mother does have a supply of metoprolol tartrate 100 mg twice daily at home and he has been giving her this medication. Leonora Jesus was unaware that this medication had been stopped. Per Carlos Enrique, patient's BP was 120/68 today (148/76 yesterday - no change in medication use between yesterday and today). Provided education that patient will not be taking both metoprolol and carvedilol at the same time. Will clarify correct medication with provider. Reviewed a complete medication list with Carlos Enrique and additional discrepancies were noted (see below). [facility] medication list was updated with the information provided by Carlos Enrique. Geraldine Zaidi was recently prescribed hydralazine and Imdur per the AVS on 1/9/23. Leonora Lee was unaware of these new medications and does not have a prescription or refills available. These prescriptions are listed in [facility]; however, it does not appear that prescriptions for these medications were printed for the patient or e-prescribed to a pharmacy. Patient's apixaban dose is 2.5 mg twice daily (not 5 mg twice daily); this is prescribed by Dr. Raya Ibrahim (per Leonora Lee, patient had a PE in 2017 and used to take warfarin). Patient's Jakafi dose is 20 mg in the morning and 10 mg in the evening (not 10 mg three times a day); this is also prescribed by Dr. Raya Ibrahim. Additional updates to supplements were made to [facility] medication list per [facility] information: patient does not take calcium + vitamin D3 or vitamin C; patient does take vitamin D3 alone and vitamin B12+folate; patient is not taking omeprazole any more (and this medication was not on current [facility] medication list). Patient is finishing a course of cefdinir 300 mg daily for a UTI. Spoke with Chapincito Roa in Dr. Omkar Bolanos office to explain the following:  Medication discrepancies reported as outlined above. Provided dosing updates on medications prescribed by Dr. Raya Ibrahim.    Patient does not have prescriptions for carvedilol (still taking metoprolol), hydralazine, or Imdur at this time. Provided currrent BP readings and asked that Dr. Bonilla Mcfarland is provided this information when determining if/when prescriptions will be sent for the 3 missing medications. Per Anitha Ivan, patient is to be taking the 3 new medications per Dr. Patrica Alicia last note. Per Anitha Ivan, Dr. Bonilla Mcfarland is currently out of the office, but she will provide this information to him to review. I will also route this note to Dr. Bonilla Mcfarland. Spoke with Familia Chaudhari again to update him on current status:  Dr. Patrica Alicia office was provided with the information outlined above regarding the discrepancies and missing medications. Prescriptions may be called into [facility] for the missing medications once Dr. Bonilla Mcfarland reviews. BP was 120/68 today and patient does have a supply of metoprolol at home to continue using until Dr. Bonilla Mcfarland reviews. Carlos Enrique will monitor BP and contact provider's office if necessary for BP elevation  I will continue to follow-up with Carlos Enrique as needed.     - Upcoming appointments:   Future Appointments   Date Time Provider Angelica Medley   1/17/2023 10:30 AM [facility] XR RM 2 [facility] RAD [facility] Radiolo   1/24/2023  3:00 PM [facility]   4/10/2023 10:45 AM [facility]   5/3/2023 11:00 AM [facility]     Nia Marcelino, PharmD, [employer]  Department, toll free: [phone], option 1  =======================================================  For Pharmacy Admin Tracking Only    Program: [address] in place:  No  Recommendation Provided To: Provider: 3 via Note to Provider and Called provider office and Patient/Caregiver: 1 via Telephone  Intervention Detail: New Rx: 3, reason: Needs Additional Therapy  Intervention Accepted By: Provider: 3 and Patient/Caregiver: 1  Gap Closed?: Yes   Time Spent (min): 60

## 2023-01-13 NOTE — TELEPHONE ENCOUNTER
Ritesh Ladd from [address] [place] [address] Review team called. She stated the pt son Tanisha Lombardo was going over pt medications and he stated she will need these Rx and to update Dr. Preston Govea on pt Eliquis pt is taking 2.5 mg BID.    Last seen 1/9/2023  Next appt 1/24/2023  RA/Eliseo Conley

## 2023-01-17 ENCOUNTER — TELEPHONE (OUTPATIENT)
Dept: FAMILY MEDICINE CLINIC | Age: 83
End: 2023-01-17

## 2023-01-17 ENCOUNTER — HOSPITAL ENCOUNTER (OUTPATIENT)
Dept: GENERAL RADIOLOGY | Age: 83
Discharge: HOME | End: 2023-01-19
Payer: MEDICARE

## 2023-01-17 DIAGNOSIS — R13.11 ORAL PHASE DYSPHAGIA: ICD-10-CM

## 2023-01-17 DIAGNOSIS — N18.32 STAGE 3B CHRONIC KIDNEY DISEASE (HCC): Primary | ICD-10-CM

## 2023-01-17 PROCEDURE — 2500000003 HC RX 250 WO HCPCS: Performed by: SURGERY

## 2023-01-17 PROCEDURE — 74230 X-RAY XM SWLNG FUNCJ C+: CPT

## 2023-01-17 PROCEDURE — 92526 ORAL FUNCTION THERAPY: CPT | Performed by: SPEECH-LANGUAGE PATHOLOGIST

## 2023-01-17 PROCEDURE — 92611 MOTION FLUOROSCOPY/SWALLOW: CPT | Performed by: SPEECH-LANGUAGE PATHOLOGIST

## 2023-01-17 RX ADMIN — BARIUM SULFATE 45 ML: 400 PASTE ORAL at 10:15

## 2023-01-17 RX ADMIN — BARIUM SULFATE 45 G: 0.81 POWDER, FOR SUSPENSION ORAL at 10:15

## 2023-01-17 RX ADMIN — BARIUM SULFATE 45 ML: 400 SUSPENSION ORAL at 10:15

## 2023-01-17 NOTE — TELEPHONE ENCOUNTER
----- Message from Lieutenant Wm DO sent at 1/11/2023  5:09 PM EST -----  Reportedly back on b12 supplementation (was not taking since first prescribed)    Needs to have repeat BMP to demonstrate recovery of kidney function. Please have done next week (farrukh up thanks)    ______________    Called patient to inform her to repeat BMP. No answers, phone just continues to right. No voicemail.

## 2023-01-17 NOTE — PROGRESS NOTES
SPEECH/LANGUAGE PATHOLOGY  VIDEOFLUOROSCOPIC STUDY OF SWALLOWING (MBS)   and PLAN OF CARE    PATIENT NAME:  Mecca Braga  (female)     MRN:  [id number]    :  1940  (82 y.o.)  STATUS:  Outpatient    TODAY'S DATE:  2023  REFERRING PROVIDER:  Dr. Olegario Castellanos   SPECIFIC PROVIDER ORDER: SLP video swallow  Date of order:  2023   REASON FOR REFERRAL: dysphagia    EVALUATING THERAPIST: Laquita Groves, SLP      RESULTS:      DYSPHAGIA DIAGNOSIS:  moderate oropharyngeal phase dysphagia     DIET RECOMMENDATIONS:  Easy to chew consistency solids (IDDSI level 7, transitional) with  thin liquids (IDDSI level 0)    FEEDING RECOMMENDATIONS:    Assistance level:  Set-up is required for all oral intake, Supervision is needed during all oral intake     Compensatory strategies recommended: Double swallow, Effortful swallow, Chin neutral to slightly down , Small bites/sips, and Alternate solids and liquids     Discussed recommendations with nursing and/or faxed report to referring provider: Yes    Laryngeal Penetration and Aspiration:  Neither penetration nor aspiration was observed in today's study     SPEECH THERAPY  PLAN OF CARE   The dysphagia POC is established based on physician order and dysphagia diagnosis    Outpatient OR Home Care Skilled SLP intervention for dysphagia management is recommended 1-2 times per week to address the established treatment plan      Conditions Requiring Skilled Therapeutic Intervention for dysphagia:    Reduced pharyngeal clearing of the bolus  Decreased UES opening     SPECIFIC DYSPHAGIA INTERVENTIONS TO INCLUDE:     Training in positioning for improved integrity of swallow  Compensatory strategy training   Therapeutic exercises    Specific instructions for next treatment:  development and training of compensatory swallow strategies to improve airway protection and swallow function  Treatment Goals:    Short Term Goals:  Pt will implement identified compensatory swallowing strategies  on 90% of opportunities or greater to improve airway protection and swallow function. Pt will complete PO trials of upgraded diet textures with SLP only to determine the least restrictive PO diet to maintain adequate nutrition/hydration with no more than 1 overt s/s of pen/asp. Pt will complete BOTR strength/ ROM exercises to reduce pharyngeal residuals and improve epiglottic inversion minimal verbal prompts  Pt will complete laryngeal strength/ ROM therapeutic exercises to improve airway protection for the least restrictive PO diet minimal verbal prompts  Pt will complete Shaker and/or Chin Tuck Against Resistance (CTAR) to increase UES relaxation diameter and increase anterior laryngeal excursion to reduce pharyngeal residuals and reduce risk of pen/asp with minimal verbal prompts. Pt will complete Effortful Swallow therapeutically to target increased oral and base of tongue pressure, increased pharyngeal constrictor contractions, and increased UES relaxation duration to reduce pharyngeal residue with minimal verbal prompts   Pt will complete Elaine Maneuver therapeutically to target increased pharyngeal constrictor contractions to reduce pharyngeal residue with minimal verbal prompts     Long Term Goals:   Pt will improve oropharyngeal swallow function to ensure airway protection during PO intake to maintain adequate nutrition/hydration and decrease signs/symptoms of aspiration to less than 1 x/day.    OTHER RECOMMENDATIONS:    An ENT consult is recommended to determine if there is any tissue irritation impacting swallow function      Patient/family Goal:    To be able to eat/drink better consistency foods/liquids and not continue to loose weight     Plan of care discussed with Patient   The Patient understand(s) the diagnosis, prognosis and plan of care     Rehabilitation Potential/Prognosis: good                      ADMITTING DIAGNOSIS: Oral phase dysphagia [R13.11]     VISIT DIAGNOSIS:   Visit Diagnoses Codes    Oral phase dysphagia     R13.11                PATIENT REPORT/COMPLAINT: difficulty chewing    PRIOR LEVEL OF SWALLOW FUNCTION:    Past History of Dysphagia?:  yes    Home diet: Easy to chew consistency solids (IDDSI level 7, transitional) with  thin liquids (IDDSI level 0)  Current Diet Order:  No diet orders on file    PROCEDURE:  Consistencies Administered During the Evaluation   Liquids: thin liquid and nectar thick liquid   Solids:  pureed foods and soft solid foods      Method of Intake:   cup, straw, spoon  Self fed      Position:   Seated, upright, Lateral plane    INSTRUMENTAL ASSESSMENT:      MBSImP Results:   Lip closure for intraoral bolus containment resulted in no labial escape. Tongue control during bolus hold maintained a cohesive bolus held between tongue to palate seal. Bolus preparation and mastication resulted in slow but efficient chewing and mashing of soft solids still not wearing her dentures. Bolus transport/lingual motion was with delayed tongue motion. Oral residue was not observed. Initiation of the pharyngeal swallow occurred as the bolus head reached the pyriforms. Soft palate elevation resulted in no bolus between the soft palate and the pharyngeal wall. Laryngeal elevation demonstrated complete superior movement of the thyroid cartilage with complete approximation of the arytenoids to the epiglottic petiole. Anterior hyoid excursion demonstrated complete anterior movement. Epiglottic movement resulted in complete inversion. Laryngeal vestibule closure was complete, as indicated by no air or contrast within the laryngeal vestibule at the height of the swallow. Pharyngeal stripping wave was present but with questionable atypical appearance at rest and during swallow. Pharyngeal contraction could not be determined due to logistical reasons not related to physiologic impairment.      Pharyngoesophageal segment opening was partially distended for partial duration with partial obstruction of bolus flow. Tongue base retraction allowed a narrow column of contrast between the retracted tongue base and the posterior pharyngeal wall. Pharyngeal residue was present in the valleculae and the pyriforms. Prominent cricopharyngeus present as well as atypical waveform. Esophageal clearance in the upright position could not be assessed due to logistical reasons not related to physiologic impairment. PENETRATION-ASPIRATION SCALE (PAS):  THIN 1 = Material does not enter the airway  MILDLY THICK 1 = Material does not enter the airway  MODERATELY THICK item not administered  PUREE 1 = Material does not enter the airway  HARD SOLID 1 = Material does not enter the airway       COMPENSATORY STRATEGIES    Compensatory strategies that were beneficial included Double swallow, Effortful swallow, Chin neutral to slightly down , Small bites/sips, and Alternate solids and liquids      STRUCTURAL/FUNCTIONAL ANOMALIES   Atypical waveform during swallow function     CERVICAL ESOPHAGEAL STAGE :     The cervical esophagus appeared adequate          ___________    Cognition:   Alert & Oriented x 3    Oral Peripheral Examination   Adequate lingual/labial strength but still not able to tolerate wearing her dentures. Current Respiratory Status   room air     Parameters of Speech Production  Respiration:  Adequate for speech production  Quality:   Within functional limits  Intensity: Within functional limits    Pain: No pain reported. EDUCATION:   The Speech Language Pathologist (SLP) completed education regarding results of evaluation and that intervention is warranted at this time. Learner: Patient and Family  Education: Reviewed results and recommendations of this evaluation  Evaluation of Education:  Arun Mosley understanding    This plan may be re-evaluated and revised as warranted.         Evaluation Time includes thorough review of current medical information, gathering information on past medical history/social history and prior level of function, completion of standardized testing/informal observation of tasks, assessment of data and education on plan of care and goals. [x]The admitting diagnosis and active problem list, have been reviewed prior to initiation of this evaluation. CPT Code: 17623  dysphagia study    ACTIVE PROBLEM LIST:   Patient Active Problem List   Diagnosis    Syncope    Leukocytosis    Troponin I above reference range    Elevated brain natriuretic peptide (BNP) level    Left bundle branch block    Lumbar radiculopathy    Low back pain    Facet syndrome, lumbar    Compression fracture of L1 lumbar vertebra Old    DDD (degenerative disc disease), lumbar    Rotoscoliosis    Lumbar sprain    Lumbar strain    Protruded lumbar disc    Chronic pulmonary embolism ([facility] [address]    Acute cystitis with hematuria    Polycythemia    Vitamin D deficiency    Hypothyroidism    Essential hypertension    Hyperlipidemia    Closed compression fracture of L1 lumbar vertebra, sequela    Lumbar spondylosis    Lumbar facet arthropathy    Lumbar disc disorder    Chronic pain syndrome    Chronic renal disease, stage III (HCC) [544604]    Elevated troponin    Failure to thrive in adult    Thrombocytopenia, unspecified         Patient seen for swallow therapy 10 minutes. Reviewed current solid/liquid consistency diet recommendation for   Easy to chew consistency solids (IDDSI level 7, transitional) with thin liquids (IDDSI level 0)and discussed compensatory strategies to ensure safe PO intake. Reviewed aspiration precautions. Discussed use of Double swallow and Effortful swallow to decrease risk of aspiration with implementation of strengthening exercises to improve swallow function as the long term goal.  Patient was able to return demonstration of compensatory strategies during session. .  will continue POC.        [address]264  dysphagia tx    Himanshu Lew MSCCC/SLP  Speech Language Pathologist  [phone]

## 2023-01-19 ENCOUNTER — TELEPHONE (OUTPATIENT)
Dept: FAMILY MEDICINE CLINIC | Age: 83
End: 2023-01-19

## 2023-01-19 ENCOUNTER — CARE COORDINATION (OUTPATIENT)
Dept: CARE COORDINATION | Age: 83
End: 2023-01-19

## 2023-01-19 NOTE — TELEPHONE ENCOUNTER
----- Message from Mary Dugan DO sent at 1/11/2023  5:09 PM EST -----  Reportedly back on b12 supplementation (was not taking since first prescribed)    Needs to have repeat BMP to demonstrate recovery of kidney function. Please have done next week (farrukh up thanks)    _______________________    Pheobe Dural and spoke with patient's son. He is agreeable to taking patient to get additional labs.

## 2023-01-19 NOTE — CARE COORDINATION
Ambulatory Care Coordination Note  1/19/2023    ACC: Zachery Garcia, RN    Pt's son states [facility] RN states her BP today was 118/62. After med reconciliation by [facility] Pharmacist, pt is now on all medications. Labs to check INR were done by oncologist when she was on Coumadin, because of the weekly checks, to consolidate draws. The oncologist put her on Eliquis in order to lessen the amount of labs to be done. Pt's son is requesting PCP take over prescribing the Eliquis, as oncologist did it 'for convenience at the time\". Pt's son states Dr Susanne Breaux at the [facility] agrees. [facility] to send to PCP to advise    Pt has had a couple 'good' bowel movements, and has not been a problem since. Pt's son is giving Ensure Plus, 350 calories, three times a day. Son states she still eats meals, but picks at it. States pt has trouble swallowing at times per her report. Pt had swallow study done 1/17/23. Pt's son spoke with the [facility] Pharmacist, and feels good about having medications squared away. Offered patient enrollment in the Remote Patient Monitoring (RPM) program for in-home monitoring: Patient declined. FOLLOW-UP PLAN:    Continue Care Coordination and Assess Plan Of Care  Discuss:   -Choking episodes? Bowels still moving? BMP drawn?   -Current Blood Pressure  -Falls/Near Falls? -Appointment Reminders    Next Anticipated Outreach by [address] Team:  1 Week      Lab Results       None            Care Coordination Interventions    Referral from Primary Care Provider: No  Suggested Interventions and Community Resources  Fall Risk Prevention: In Process (Comment: Fall precautions)  Medication Assistance Program: Declined  Medi Set or Pill Pack: Declined  Pharmacist: In Process (Comment: PHP Pharmacist)  Registered Dietician: Declined  Social Work: Declined  Transportation Support: Declined          Goals Addressed    None         Prior to Admission medications    Medication Sig Start Date End Date Taking? Authorizing Provider   carvedilol (COREG) 25 MG tablet Take 1 tablet by mouth 2 times daily (with meals) 1/13/23   [facility], DO   isosorbide mononitrate (IMDUR) 30 MG extended release tablet Take 1 tablet by mouth daily 1/13/23   [facility], DO   hydrALAZINE (APRESOLINE) 25 MG tablet Take 1 tablet by mouth every 12 hours 1/13/23   [facility], DO   vitamin D 25 MCG (1000 UT) CAPS Take by mouth daily    Historical Provider, MD   Cobalamin Combinations (VITAMIN B12-FOLIC ACID) 338-995 MCG TABS Take by mouth daily    Historical Provider, MD   levothyroxine (SYNTHROID) 88 MCG tablet TAKE 1 TABLET DAILY 11/22/22   [facility], DO   apixaban (ELIQUIS) 2.5 MG TABS tablet Take 2.5 mg by mouth 2 times daily    Historical Provider, MD   acetaminophen (TYLENOL) 500 MG tablet Take 500 mg by mouth every 6 hours as needed for Pain    Historical Provider, MD   Calcium Carbonate-Vit D-Min (CALCIUM 600+D3 PLUS MINERALS) 600-800 MG-UNIT CHEW Take 1 each by mouth 2 times daily (with meals)  Patient not taking: No sig reported 6/24/22   [facility], DO   Ferrous Sulfate (IRON PO) Take 65 mg by mouth    Historical Provider, MD   folic acid (FOLVITE) 1 MG tablet take 1 tablet by mouth once daily 12/30/20   Historical Provider, MD LIU 10 MG tablet Take by mouth Takes 20 mg in the morning and 10 mg in the evening 12/28/20   Historical Provider, MD       Future Appointments   Date Time Provider Angelica Medley   1[facility]24[facility]2023  3[facility]00 [facility]   4/10/2023 10:45 AM [facility]   5/3/2023 11:00 AM [facility]

## 2023-01-19 NOTE — CARE COORDINATION
Unable to leave HIPAA compliant message for patient to return call to [phone]. Re: Follow up: Continue Care Coordination and Assess Plan Of Care  Discuss:   -Current Blood Pressure  -Falls/Near Falls?   -Appointment Reminders  -Material sent previous encounter:   via :    Welcome Letter  Fall Precautions  HTN Education  CVA Education  Where to go for Care information  -Did pt receive and read material sent?  -Noted pt's son spoke with pharmacist, Did pt get BMP done?, Review POC

## 2023-01-20 ENCOUNTER — TELEPHONE (OUTPATIENT)
Dept: FAMILY MEDICINE CLINIC | Age: 83
End: 2023-01-20

## 2023-01-20 PROBLEM — R79.89 ELEVATED TROPONIN: Status: RESOLVED | Noted: 2022-12-21 | Resolved: 2023-01-20

## 2023-01-20 PROBLEM — R77.8 ELEVATED TROPONIN: Status: RESOLVED | Noted: 2022-12-21 | Resolved: 2023-01-20

## 2023-01-20 NOTE — TELEPHONE ENCOUNTER
----- Message from Porsha Mcdowell DO sent at 1/19/2023  4:08 PM EST -----  I put in referral for ENT due to swallow study by speech       _____________________      Called and informed patient's son.

## 2023-01-24 ENCOUNTER — OFFICE VISIT (OUTPATIENT)
Dept: FAMILY MEDICINE CLINIC | Age: 83
End: 2023-01-24
Payer: MEDICARE

## 2023-01-24 VITALS
HEIGHT: 65 IN | DIASTOLIC BLOOD PRESSURE: 70 MMHG | RESPIRATION RATE: 16 BRPM | BODY MASS INDEX: 26.74 KG/M2 | SYSTOLIC BLOOD PRESSURE: 126 MMHG | HEART RATE: 77 BPM | TEMPERATURE: 98.1 F | WEIGHT: 160.5 LBS | OXYGEN SATURATION: 98 %

## 2023-01-24 DIAGNOSIS — R15.9 INCONTINENCE OF FECES WITH FECAL URGENCY: ICD-10-CM

## 2023-01-24 DIAGNOSIS — I10 ESSENTIAL HYPERTENSION: ICD-10-CM

## 2023-01-24 DIAGNOSIS — E03.8 OTHER SPECIFIED HYPOTHYROIDISM: ICD-10-CM

## 2023-01-24 DIAGNOSIS — R53.81 DEBILITY: ICD-10-CM

## 2023-01-24 DIAGNOSIS — S32.010S COMPRESSION FRACTURE OF L1 VERTEBRA, SEQUELA: ICD-10-CM

## 2023-01-24 DIAGNOSIS — R15.2 INCONTINENCE OF FECES WITH FECAL URGENCY: ICD-10-CM

## 2023-01-24 DIAGNOSIS — N18.32 STAGE 3B CHRONIC KIDNEY DISEASE (HCC): ICD-10-CM

## 2023-01-24 DIAGNOSIS — Z15.89 HOMOZYGOUS FOR PAI-1 4G ALLELE: Primary | ICD-10-CM

## 2023-01-24 DIAGNOSIS — Z86.711 HISTORY OF PULMONARY EMBOLISM: ICD-10-CM

## 2023-01-24 LAB
ANION GAP SERPL CALCULATED.3IONS-SCNC: 14 MMOL/L (ref 7–16)
BUN BLDV-MCNC: 39 MG/DL (ref 6–23)
CALCIUM SERPL-MCNC: 9 MG/DL (ref 8.6–10.2)
CHLORIDE BLD-SCNC: 99 MMOL/L (ref 98–107)
CO2: 22 MMOL/L (ref 22–29)
CREAT SERPL-MCNC: 1.2 MG/DL (ref 0.5–1)
GFR SERPL CREATININE-BSD FRML MDRD: 45 ML/MIN/1.73
GLUCOSE BLD-MCNC: 140 MG/DL (ref 74–99)
POTASSIUM SERPL-SCNC: 4.8 MMOL/L (ref 3.5–5)
SODIUM BLD-SCNC: 135 MMOL/L (ref 132–146)

## 2023-01-24 PROCEDURE — G8417 CALC BMI ABV UP PARAM F/U: HCPCS | Performed by: SURGERY

## 2023-01-24 PROCEDURE — 1090F PRES/ABSN URINE INCON ASSESS: CPT | Performed by: SURGERY

## 2023-01-24 PROCEDURE — 1111F DSCHRG MED/CURRENT MED MERGE: CPT | Performed by: SURGERY

## 2023-01-24 PROCEDURE — G8484 FLU IMMUNIZE NO ADMIN: HCPCS | Performed by: SURGERY

## 2023-01-24 PROCEDURE — 1036F TOBACCO NON-USER: CPT | Performed by: SURGERY

## 2023-01-24 PROCEDURE — 99214 OFFICE O/P EST MOD 30 MIN: CPT | Performed by: SURGERY

## 2023-01-24 PROCEDURE — 1123F ACP DISCUSS/DSCN MKR DOCD: CPT | Performed by: SURGERY

## 2023-01-24 PROCEDURE — G8400 PT W/DXA NO RESULTS DOC: HCPCS | Performed by: SURGERY

## 2023-01-24 PROCEDURE — 3074F SYST BP LT 130 MM HG: CPT | Performed by: SURGERY

## 2023-01-24 PROCEDURE — 3078F DIAST BP <80 MM HG: CPT | Performed by: SURGERY

## 2023-01-24 PROCEDURE — G8427 DOCREV CUR MEDS BY ELIG CLIN: HCPCS | Performed by: SURGERY

## 2023-01-24 RX ORDER — UNDERPADS 23" X 36"
1 EACH MISCELLANEOUS PRN
Qty: 66 EACH | Refills: 3 | Status: SHIPPED | OUTPATIENT
Start: 2023-01-24

## 2023-01-24 RX ORDER — HYDRALAZINE HYDROCHLORIDE 25 MG/1
25 TABLET, FILM COATED ORAL EVERY 12 HOURS SCHEDULED
Qty: 90 TABLET | Refills: 3 | Status: SHIPPED | OUTPATIENT
Start: 2023-01-24

## 2023-01-24 RX ORDER — FOLIC ACID 1 MG/1
TABLET ORAL
Qty: 90 TABLET | Refills: 1 | Status: SHIPPED | OUTPATIENT
Start: 2023-01-24

## 2023-01-24 RX ORDER — LEVOTHYROXINE SODIUM 88 UG/1
TABLET ORAL
Qty: 90 TABLET | Refills: 1 | Status: SHIPPED | OUTPATIENT
Start: 2023-01-24

## 2023-01-24 RX ORDER — ISOSORBIDE MONONITRATE 30 MG/1
30 TABLET, EXTENDED RELEASE ORAL DAILY
Qty: 90 TABLET | Refills: 1 | Status: SHIPPED | OUTPATIENT
Start: 2023-01-24

## 2023-01-24 RX ORDER — CYANOCOBALAMIN (VITAMIN B-12) 1000 MCG
TABLET ORAL
COMMUNITY

## 2023-01-24 RX ORDER — CARVEDILOL 25 MG/1
25 TABLET ORAL 2 TIMES DAILY WITH MEALS
Qty: 180 TABLET | Refills: 1 | Status: SHIPPED | OUTPATIENT
Start: 2023-01-24

## 2023-01-24 NOTE — PATIENT INSTRUCTIONS
Stay hydrated. Unless instructed otherwise by your physician, you should strive to drink at least eight 8 ounce glasses of water daily (64oz total), some of these being fortified with electrolytes (such as a Pedialyte, low calorie sports drink, or at mealtime). Avoid added sugars. No NSAIDs      Continue to follow the speech recommendations for swallowing:  Compensatory strategies that were beneficial included Double swallow, Effortful swallow, Chin neutral to slightly down , Small bites/sips, and Alternate solids and liquids. Keep up with exercises and PT with [place].

## 2023-01-24 NOTE — PROGRESS NOTES
Mecca Braga (:  1940) is a 82 y.o. female,Established patient, here for evaluation of the following chief complaint(s):  Follow-up (ED Follow up) and Health Maintenance (Due for Covid Booster)         ASSESSMENT/PLAN:  1. Homozygous for BERTHA-1 4G allele  -     apixaban (ELIQUIS) 2.5 MG TABS tablet; Take 1 tablet by mouth 2 times daily, Disp-180 tablet, R-1Normal  2. Other specified hypothyroidism  -     levothyroxine (SYNTHROID) 88 MCG tablet; TAKE 1 TABLET DAILY, Disp-90 tablet, R-1Normal  Stable  No change  3. History of pulmonary embolism  -     apixaban (ELIQUIS) 2.5 MG TABS tablet; Take 1 tablet by mouth 2 times daily, Disp-180 tablet, R-1Normal  Along with PAI1 mutation  Though this is a hematologic issue along with PCV, I can take over this medication without the implication that I will treat and monitor her myeloproliferative disorder or address her hypercoagulable state beyond giving refills for eliquis  If alarm signs or symptoms develop as we discussed, report immediately to the emergency department/dial 9-1-1.    4. Essential hypertension  -     carvedilol (COREG) 25 MG tablet; Take 1 tablet by mouth 2 times daily (with meals), Disp-180 tablet, R-1Normal  -     hydrALAZINE (APRESOLINE) 25 MG tablet; Take 1 tablet by mouth every 12 hours, Disp-90 tablet, R-3Normal  -     isosorbide mononitrate (IMDUR) 30 MG extended release tablet; Take 1 tablet by mouth daily, Disp-90 tablet, R-1Normal  Stable  No changes  5. Debility  -     DME Order for Walker as OP  -     Handicap Placard MISC; Starting 2023, Disp-1 each, R-0, [id number] Length of need 5 years  6. Compression fracture of L1 vertebra, sequela  -     XR LUMBAR SPINE (2-3 VIEWS); Future  Reevaluate  She is getting DEXA done as well  7. Incontinence of feces with fecal urgency  -     Incontinence Supply Disposable (INCONTINENCE BRIEF MEDIUM) MISC; PRN Starting 2023, Disp-66 each, R-3, Normal  Urgency due to debility  Counseled  to reach out if this nature changes      No follow-ups on file. Subjective   SUBJECTIVE/OBJECTIVE:  HPI:    Meds:  -all reconciled  -son is organizing for mom, they are not relying on technology to help for reminders     HTN:    No metoprolol          Debility:  -no longer in wheel chair, walking with wheeled walker now  -increasing endurance and functional capacity  -no PT notes to review      DVT with PE 2017  -was on coumadin  -had trouble with INR / inconvenience of several blood draws, etc  -placed on eliquis 5mg BID        Dysphagia      COMPENSATORY STRATEGIES                            Compensatory strategies that were beneficial included Double swallow, Effortful swallow, Chin neutral to slightly down , Small bites/sips, and Alternate solids and liquids                              STRUCTURAL/FUNCTIONAL ANOMALIES               Atypical waveform during swallow function      CERVICAL ESOPHAGEAL STAGE :     The cervical esophagus appeared adequate          FL MBS  1. There is no evidence of aspiration or penetration. 2. Pooling within the valleculae and piriform sinuses. 3. Questionable soft tissue density seen superimposed over the larynx. The   etiology may represent superimposed soft tissues. Given the patient's   symptoms dedicated ENT evaluation and possibly direct visualization is   recommended. Please see separate speech pathology report for full discussion of findings   and recommendations.        Preventative:  Health Maintenance   Topic Date Due    DEXA (modify frequency per FRAX score)  Never done    COVID-19 Vaccine (4 - Booster for Moderna series) 01/04/2022    Shingles vaccine (1 of 2) 11/28/2050 (Originally 11/18/1990)    Annual Wellness Visit (AWV)  11/23/2023    Depression Monitoring  01/09/2024    DTaP/Tdap/Td vaccine (3 - Td or Tdap) 10/02/2031    Flu vaccine  Completed    Pneumococcal 65+ years Vaccine  Completed    Hepatitis A vaccine  Aged Out    Hib vaccine  Aged Out Meningococcal (ACWY) vaccine  Aged Out             ROS:    Denies 10pt ROS other than noted in HPI. Objective       PHYSICAL EXAM:    /70   Pulse 77   Temp 98.1 °F (36.7 °C) (Temporal)   Resp 16   Ht 5' 5\" (1.651 m)   Wt 160 lb 8 oz (72.8 kg)   SpO2 98%   BMI 26.71 kg/m²     AVSS    GA: Well-groomed, appears well, no acute distress. HEENT: Atraumatic normocephalic. Extraocular muscles are grossly intact. Pupils are equal round reactive to light. Conjunctiva pink and moist.  Hearing is grossly intact. Nares patent without external drainage. Buccal mucosa pink and moist.  Posterior oropharynx clear without lesion or exudate. NECK: Trachea is midline    CARDIO: Regular rate and rhythm without murmur rub or gallop. RESPIRATORY: Clear to auscultation bilaterally without wheezes rales or rhonchi. Normal inspiratory and expiratory effort. Normoxic on room air    ABD: Rounded, normoactive bowel sounds. Soft, nontender, no organomegaly. MSK: Structurally appropriate for age. No gross deficit. Walking with wheeled walker. NEURO: Alert, no gross deficit. PSYCH:  Mood is normal and congruent with affect. No signs of psychomotor retardation or agitation. Thought content seems normal, speech is fluent and non-pressured. SKIN: Generally warm pink and dry. An electronic signature was used to authenticate this note.     --Minor Leal DO

## 2023-01-25 ENCOUNTER — TELEPHONE (OUTPATIENT)
Dept: FAMILY MEDICINE CLINIC | Age: 83
End: 2023-01-25

## 2023-01-25 DIAGNOSIS — N18.32 STAGE 3B CHRONIC KIDNEY DISEASE (HCC): Primary | ICD-10-CM

## 2023-01-25 NOTE — TELEPHONE ENCOUNTER
----- Message from Elena Parra DO sent at 1/25/2023 10:25 AM EST -----  Please call and let son know to keep up with the hydration. This was a non-fasting lab so no comment with respect to the glucose (confirm it was nonfasting). Please farrukh up serial BMP to be done once every 4 months for Dx CKD 3b  thanks  ________________    Lizbeth Segundo and informed patient son of results. Patient states he will continue to keep up with her hydration. Patient's son agreeable to completing labs in 4 months. Labs pended.

## 2023-01-26 ENCOUNTER — CARE COORDINATION (OUTPATIENT)
Dept: CARE COORDINATION | Age: 83
End: 2023-01-26

## 2023-01-26 NOTE — CARE COORDINATION
Pt's son called. Answered questions about script for Depends  Pt is going to call pharmacy to check.

## 2023-02-01 ENCOUNTER — TELEPHONE (OUTPATIENT)
Dept: ENT CLINIC | Age: 83
End: 2023-02-01

## 2023-02-15 ENCOUNTER — OFFICE VISIT (OUTPATIENT)
Dept: ENT CLINIC | Age: 83
End: 2023-02-15
Payer: MEDICARE

## 2023-02-15 VITALS
HEIGHT: 65 IN | DIASTOLIC BLOOD PRESSURE: 74 MMHG | BODY MASS INDEX: 26.66 KG/M2 | WEIGHT: 160 LBS | SYSTOLIC BLOOD PRESSURE: 125 MMHG | HEART RATE: 73 BPM

## 2023-02-15 DIAGNOSIS — R13.10 DYSPHAGIA, UNSPECIFIED TYPE: Primary | ICD-10-CM

## 2023-02-15 PROCEDURE — G8484 FLU IMMUNIZE NO ADMIN: HCPCS | Performed by: OTOLARYNGOLOGY

## 2023-02-15 PROCEDURE — G8427 DOCREV CUR MEDS BY ELIG CLIN: HCPCS | Performed by: OTOLARYNGOLOGY

## 2023-02-15 PROCEDURE — 1090F PRES/ABSN URINE INCON ASSESS: CPT | Performed by: OTOLARYNGOLOGY

## 2023-02-15 PROCEDURE — G8417 CALC BMI ABV UP PARAM F/U: HCPCS | Performed by: OTOLARYNGOLOGY

## 2023-02-15 PROCEDURE — 31575 DIAGNOSTIC LARYNGOSCOPY: CPT | Performed by: OTOLARYNGOLOGY

## 2023-02-15 PROCEDURE — 1036F TOBACCO NON-USER: CPT | Performed by: OTOLARYNGOLOGY

## 2023-02-15 PROCEDURE — 99204 OFFICE O/P NEW MOD 45 MIN: CPT | Performed by: OTOLARYNGOLOGY

## 2023-02-15 PROCEDURE — G8400 PT W/DXA NO RESULTS DOC: HCPCS | Performed by: OTOLARYNGOLOGY

## 2023-02-15 PROCEDURE — 3074F SYST BP LT 130 MM HG: CPT | Performed by: OTOLARYNGOLOGY

## 2023-02-15 PROCEDURE — 3078F DIAST BP <80 MM HG: CPT | Performed by: OTOLARYNGOLOGY

## 2023-02-15 PROCEDURE — 1123F ACP DISCUSS/DSCN MKR DOCD: CPT | Performed by: OTOLARYNGOLOGY

## 2023-02-15 ASSESSMENT — ENCOUNTER SYMPTOMS
COLOR CHANGE: 0
CHOKING: 0
COUGH: 0
VOICE CHANGE: 0
STRIDOR: 0
SORE THROAT: 0
GASTROINTESTINAL NEGATIVE: 1
RHINORRHEA: 0
WHEEZING: 0
SINUS PAIN: 0
TROUBLE SWALLOWING: 1
SINUS PRESSURE: 0

## 2023-02-15 ASSESSMENT — VISUAL ACUITY: OU: 1

## 2023-02-15 NOTE — PROGRESS NOTES
[facility] Otolaryngology  Dr. Vaughan Boeck. PINA Askew Ms.Ed. New Consult       Patient Name:  Sydney Uriarte  :  1940     CHIEF C/O:    Chief Complaint   Patient presents with    New Patient    Aphasia     Difficult swallowing       HISTORY OBTAINED FROM:  patient    HISTORY OF PRESENT ILLNESS:       Jacky Brooks is a 78y.o. year old female, here today for dysphagia. Pt reports dysphagia mostly with taking pills, feels faustino it get stuck low down next to her stomach. Has had this problem lifelong. Pt underwent MBSS showed possible soft tissue density in larynx so she was referred for further evaluation. Denies voice change, denies ear pain. Has lost 10lbs in last few months 2/2 hospitalization but is slowly gaining the weight back. Denies chest pain or shortness of breath.        Past Medical History:   Diagnosis Date    Back injury     Cervical spondylolysis     Chronic lymphoid leukemia ([facility] [address]     Gallstones     H/O cardiovascular stress test 2022    Lexiscan    H/O echocardiogram 2017    EF 69%    History of bone density study     Dr [facility], [address]?    Hyperlipidemia     Hypertension     LBBB (left bundle branch block)     Lumbar spondylosis with myelopathy     Pneumonia     Polycythemia vera ([facility] [address]     JAK2    Syncope and collapse     Thyroid disease      Past Surgical History:   Procedure Laterality Date    APPENDECTOMY      CHOLECYSTECTOMY      DIAGNOSTIC CARDIAC CATH LAB PROCEDURE Bilateral 2017    HYSTERECTOMY, TOTAL ABDOMINAL (CERVIX REMOVED)      NERVE BLOCK  06/03/15    lumbar epidural #1    NERVE BLOCK  6 22 15    lumbar epidural #2    NERVE BLOCK N/A 7/6/15    lumbar epidural #3    TONSILLECTOMY         Current Outpatient Medications:     Cyanocobalamin (B-12) 1000 MCG TABS, Take by mouth B-12 1000 mcg and Folate 400mcg, Disp: , Rfl:     carvedilol (COREG) 25 MG tablet, Take 1 tablet by mouth 2 times daily (with meals), Disp: 180 tablet, Rfl: 1    hydrALAZINE (APRESOLINE) 25 MG tablet, Take 1 tablet by mouth every 12 hours, Disp: 90 tablet, Rfl: 3    isosorbide mononitrate (IMDUR) 30 MG extended release tablet, Take 1 tablet by mouth daily, Disp: 90 tablet, Rfl: 1    levothyroxine (SYNTHROID) 88 MCG tablet, TAKE 1 TABLET DAILY, Disp: 90 tablet, Rfl: 1    apixaban (ELIQUIS) 2.5 MG TABS tablet, Take 1 tablet by mouth 2 times daily, Disp: 180 tablet, Rfl: 1    folic acid (FOLVITE) 1 MG tablet, take 1 tablet by mouth once daily, Disp: 90 tablet, Rfl: 1    Handicap Placard MISC, by Does not apply route R54 Length of need 5 years, Disp: 1 each, Rfl: 0    Incontinence Supply Disposable (INCONTINENCE BRIEF MEDIUM) MISC, 1 each by Does not apply route as needed (fecal incontinence), Disp: 66 each, Rfl: 3    vitamin D 25 MCG (1000 UT) CAPS, Take by mouth daily, Disp: , Rfl:     Cobalamin Combinations (VITAMIN B12-FOLIC ACID) 761-545 MCG TABS, Take by mouth daily, Disp: , Rfl:     acetaminophen (TYLENOL) 500 MG tablet, Take 500 mg by mouth every 6 hours as needed for Pain, Disp: , Rfl:     Calcium Carbonate-Vit D-Min (CALCIUM 600+D3 PLUS MINERALS) 600-800 MG-UNIT CHEW, Take 1 each by mouth 2 times daily (with meals), Disp: 60 tablet, Rfl: 5    Ferrous Sulfate (IRON PO), Take 65 mg by mouth, Disp: , Rfl:     JAKAFI 10 MG tablet, Take by mouth Takes 20 mg in the morning and 10 mg in the evening, Disp: , Rfl:   Clonidine derivatives, Medrol [methylprednisolone], Naproxen, Other, and Atarax [hydroxyzine]  Social History     Tobacco Use    Smoking status: Never    Smokeless tobacco: Never   Vaping Use    Vaping Use: Never used   Substance Use Topics    Alcohol use: No    Drug use: No     Family History   Problem Relation Age of Onset    Asthma Father     Hypertension Brother     Cancer Maternal Grandmother        Review of Systems   Constitutional:  Negative for activity change, fatigue and fever. HENT:  Positive for trouble swallowing.  Negative for congestion, ear discharge, ear pain, hearing loss, nosebleeds, postnasal drip, rhinorrhea, sinus pressure, sinus pain, sore throat, tinnitus and voice change. Respiratory:  Negative for cough, choking, wheezing and stridor. Cardiovascular:  Negative for chest pain. Gastrointestinal: Negative. Genitourinary: Negative. Skin:  Negative for color change and rash. Neurological:  Negative for speech difficulty, light-headedness, numbness and headaches. Hematological:  Negative for adenopathy. Psychiatric/Behavioral:  Negative for behavioral problems. /74   Pulse 73   Ht 5' 5\" (1.651 m)   Wt 160 lb (72.6 kg)   BMI 26.63 kg/m²   Physical Exam  Constitutional:       Appearance: Normal appearance. She is normal weight. HENT:      Head: Normocephalic and atraumatic. Right Ear: Tympanic membrane, ear canal and external ear normal. No drainage. Left Ear: Tympanic membrane, ear canal and external ear normal. No drainage. Nose: Nose normal. No nasal deformity or septal deviation. Mouth/Throat:      Mouth: Mucous membranes are moist.      Comments: edentulous  Eyes:      General: Lids are normal. Vision grossly intact. Extraocular Movements: Extraocular movements intact. Conjunctiva/sclera: Conjunctivae normal.      Pupils: Pupils are equal, round, and reactive to light. Cardiovascular:      Rate and Rhythm: Normal rate. Pulmonary:      Effort: Pulmonary effort is normal.   Musculoskeletal:         General: Normal range of motion. Cervical back: Normal range of motion. Lymphadenopathy:      Cervical: No cervical adenopathy. Skin:     Capillary Refill: Capillary refill takes less than 2 seconds. Neurological:      Mental Status: She is alert. Psychiatric:         Mood and Affect: Mood normal.     Nasopharyngoscopy  Procedure note- after pt verbally consented, was sprayed nasally with 1:1 neosynephrine and xylocaine. Scope was passed and found nasal cavity with no lesion.  nasopharynx clear, tonsil wnl without asymmetry, tongue mobile and no masses, vocal cords mobile bilaterally with full ab and ad duction. Hypopharynx clear, open and no masses. IMPRESSION/PLAN:  Patient seen and examined, NPL in office WNL. No evidence of lesions or masses. She does have some right sided fullness on scope exam which represents a medialized carotid and could be what the findings were reported on MBSS. Suspect esophageal etiology of dysphagia. No specific ENT recommendations for dysphagia recommend GI evaluation. Follow up PRN. Dr. Alvy Meigs D. Bunevich D.O. Ms. Mayelin Montero.  Otolaryngology Facial Plastic Surgery  :[facility] Otolaryngology/Facial Plastic Surgery Residency  Associate Clinical Professor:  LUCIA Velasquez  [facility]  1940      I have discussed the case, including pertinent history and exam findings with the resident. I have seen and examined the patient and the key elements of the encounter have been performed by me. I agree with the assessment, plan and orders as documented by the resident. Patient here for follow up of medical problems. Remainder of medical problems as per resident note.       [address] [facility], DO  2/22/23

## 2023-02-16 ENCOUNTER — CARE COORDINATION (OUTPATIENT)
Dept: CARE COORDINATION | Age: 83
End: 2023-02-16

## 2023-02-16 NOTE — CARE COORDINATION
Patient graduating from [address] [place] [address].  Has met goals. Education completed. Has the self-management tools needed. Contact information given should patient have any questions/concerns/barriers that she needs assistance with. Letter sent. PCP notified.

## 2023-02-16 NOTE — LETTER
2/16/2023    [address]     Congratulations on the progress you have made improving and taking charge of your health! Your recent follow up with Edita Byrd, DO finds you doing well and are no longer in need of Care Coordination services. I know you will continue to use the knowledge and tools you have gained to continue down a healthy path. As you have demonstrated that you are able to successfully manage your health and wellness, I will no longer contact you on a regular basis. Again, congratulations and please know if there are any changes or you have a need for my services in the future, you may always contact me for questions or concerns.     In good health,     Jessica Reyna MSN, RN, [facility]  Ambulatory Care Manager  Cell: [phone]

## 2023-04-28 NOTE — PROGRESS NOTES
Document from mail order pharmacy. Requesting clarification for metoprolol 100mg ordered (as she is on carvedilol 25mg BID). She is not on metoprolol, it was not ordered from this office. She should not have this duplication of therapy.

## 2023-05-03 ENCOUNTER — OFFICE VISIT (OUTPATIENT)
Dept: FAMILY MEDICINE CLINIC | Age: 83
End: 2023-05-03
Payer: MEDICARE

## 2023-05-03 ENCOUNTER — TELEPHONE (OUTPATIENT)
Dept: FAMILY MEDICINE CLINIC | Age: 83
End: 2023-05-03

## 2023-05-03 VITALS
HEIGHT: 65 IN | TEMPERATURE: 97.9 F | SYSTOLIC BLOOD PRESSURE: 142 MMHG | HEART RATE: 62 BPM | RESPIRATION RATE: 18 BRPM | WEIGHT: 160.6 LBS | OXYGEN SATURATION: 98 % | DIASTOLIC BLOOD PRESSURE: 70 MMHG | BODY MASS INDEX: 26.76 KG/M2

## 2023-05-03 DIAGNOSIS — D64.9 ANEMIA, UNSPECIFIED TYPE: ICD-10-CM

## 2023-05-03 DIAGNOSIS — E44.1 MILD PROTEIN-CALORIE MALNUTRITION (HCC): ICD-10-CM

## 2023-05-03 DIAGNOSIS — I50.22 CHRONIC SYSTOLIC CONGESTIVE HEART FAILURE (HCC): ICD-10-CM

## 2023-05-03 DIAGNOSIS — I89.0 LYMPHEDEMA: Primary | ICD-10-CM

## 2023-05-03 DIAGNOSIS — G89.29 CHRONIC PAIN OF BOTH SHOULDERS: ICD-10-CM

## 2023-05-03 DIAGNOSIS — M25.512 CHRONIC PAIN OF BOTH SHOULDERS: ICD-10-CM

## 2023-05-03 DIAGNOSIS — I26.90 CHRONIC SEPTIC PULMONARY EMBOLISM WITHOUT ACUTE COR PULMONALE (HCC): ICD-10-CM

## 2023-05-03 DIAGNOSIS — R93.1 ABNORMAL ECHOCARDIOGRAM: ICD-10-CM

## 2023-05-03 DIAGNOSIS — M19.90 ARTHRITIS: ICD-10-CM

## 2023-05-03 DIAGNOSIS — I27.82 CHRONIC SEPTIC PULMONARY EMBOLISM WITHOUT ACUTE COR PULMONALE (HCC): ICD-10-CM

## 2023-05-03 DIAGNOSIS — M25.511 CHRONIC PAIN OF BOTH SHOULDERS: ICD-10-CM

## 2023-05-03 PROCEDURE — 3077F SYST BP >= 140 MM HG: CPT | Performed by: SURGERY

## 2023-05-03 PROCEDURE — G8400 PT W/DXA NO RESULTS DOC: HCPCS | Performed by: SURGERY

## 2023-05-03 PROCEDURE — G8417 CALC BMI ABV UP PARAM F/U: HCPCS | Performed by: SURGERY

## 2023-05-03 PROCEDURE — 1036F TOBACCO NON-USER: CPT | Performed by: SURGERY

## 2023-05-03 PROCEDURE — G8427 DOCREV CUR MEDS BY ELIG CLIN: HCPCS | Performed by: SURGERY

## 2023-05-03 PROCEDURE — 1123F ACP DISCUSS/DSCN MKR DOCD: CPT | Performed by: SURGERY

## 2023-05-03 PROCEDURE — 1090F PRES/ABSN URINE INCON ASSESS: CPT | Performed by: SURGERY

## 2023-05-03 PROCEDURE — 99214 OFFICE O/P EST MOD 30 MIN: CPT | Performed by: SURGERY

## 2023-05-03 PROCEDURE — 3078F DIAST BP <80 MM HG: CPT | Performed by: SURGERY

## 2023-05-03 RX ORDER — LISINOPRIL 10 MG/1
10 TABLET ORAL DAILY
Qty: 90 TABLET | Refills: 1 | Status: SHIPPED | OUTPATIENT
Start: 2023-05-03

## 2023-05-03 NOTE — PATIENT INSTRUCTIONS
My Goal for Self-management of Heart Failure Includes 5 steps :     1. Notice a change in symptoms ( weight gain, short of breath, leg swelling, decreased activity level, bloating. ...)     2. Evaluate the change: (use the Heart Failure Zones )      3. Decide to take action: decide what your options are, such as: (call your doctor for an extra visit, take a prescribed medication, such as your water pill if your doctor has given you directions to do so, [address])     4. Come up with a strategy:  (now you call the doctor for advice / appointment. This is where you take action!!! Do not wait, catch the symptom early and treat it before it worsens. 5. Evaluate the response: The next day, check your Heart Failure Zones: are you in the GREEN ZONE (safe zone)? Worsening symptoms of YELLOW ZONE? Or have you moved to the RED ZONE and need to call 911 or go to the Emergency Room for evaluation? Call your doctor's office to update them on your symptoms of heart failure. Learning About Heart Failure Zones  What are heart failure zones? Heart failure zones give you an easy way to see changes in your heart failure symptoms. They also tell you when you need to get help. Check every day to see which zone you are in. Green zone. You are doing well. This is where you want to be. Your weight is stable. It's not going up or down. You breathe easily. You are sleeping well. You are able to lie flat without shortness of breath. You can do your usual activities. Yellow zone. Be careful. Your symptoms are changing. Call your doctor. You have new or increased shortness of breath. You are dizzy or lightheaded, or you feel like you may faint. You have sudden weight gain, such as more than 2 to 3 pounds in a day or 5 pounds in a week. (Your doctor may suggest a different range of weight gain.)  You have increased swelling in your legs, ankles, or feet.   You are so tired or weak that you can't do your usual

## 2023-05-03 NOTE — PROGRESS NOTES
Sugar Lakhani (:  1940) is a 78 y.o. female,Established patient, here for evaluation of the following chief complaint(s):  6 Month Follow-Up (For chronic conditions. Pt c/o bilateral feet and ankle edema and left shoulder pain.)         ASSESSMENT/PLAN:  1. Lymphedema  -     Urinalysis; Future  -     [facility], [facility]  -     TSH; Future  -     PROTEIN, URINE, RANDOM; Future  -     CREATININE, RANDOM URINE; Future  -     Comprehensive Metabolic Panel; Future  Stemmer pos  Likely dx  Consider us of bilateral le if conservative therapy fails  Basic workup  2. Anemia, unspecified type  -     TSH; Future  3. Arthritis  -     XR SHOULDER LEFT (MIN 2 VIEWS); Future  -     XR SHOULDER RIGHT (MIN 2 VIEWS); Future  Get into [facility]  4. Chronic pain of both shoulders  -     XR SHOULDER LEFT (MIN 2 VIEWS); Future  -     XR SHOULDER RIGHT (MIN 2 VIEWS); Future  5. Mild protein-calorie malnutrition ([facility] [address]  -     Comprehensive Metabolic Panel; Future  -     Prealbumin; Future  Check nutrition status  Wt stable now  6. Abnormal echocardiogram  -     External Referral To Cardiology  Stage I dysfunction  Mild HFrEF  7. Chronic systolic congestive heart failure ([facility] [address]  -     External Referral To Cardiology  -     dapagliflozin (FARXIGA) 10 MG tablet; Take 1 tablet by mouth every morning, Disp-90 tablet, R-1Normal  Card referral  No alarm s./s  Well compensated  Extensive discussion about hf goals  Carvedilol 25mg BID  Prescribing ACE today  Farxiga           Stage 1 (Spontaneous)    Reversible  Has pitting edema  Swelling at this stage is soft and may respond to elevation        No follow-ups on file.          Subjective   SUBJECTIVE/OBJECTIVE:  HPI:    Having issue with new mail order pharmacy   They have to call each individual time to get refill unless they put credit card on file to have autorenew     Pt c/o bilateral feet and ankle edema  No erythema tenderness tactile temperature increase, no cp

## 2023-05-03 NOTE — TELEPHONE ENCOUNTER
Called [facility] and spoke to [facility] to cancell eliquis 2.5mg rx for 28 day supply sent in error. Rx cancelled and 28 day supply was [facility].

## 2023-05-04 DIAGNOSIS — I89.0 LYMPHEDEMA: ICD-10-CM

## 2023-05-04 LAB
CREAT UR-MCNC: 10 MG/DL (ref 29–226)
PROT UR-MCNC: 6 MG/DL (ref 0–12)

## 2023-05-17 ENCOUNTER — OFFICE VISIT (OUTPATIENT)
Dept: ORTHOPEDIC SURGERY | Age: 83
End: 2023-05-17
Payer: MEDICARE

## 2023-05-17 VITALS — HEIGHT: 66 IN | BODY MASS INDEX: 25.71 KG/M2 | TEMPERATURE: 98 F | WEIGHT: 160 LBS

## 2023-05-17 DIAGNOSIS — M19.012 BILATERAL SHOULDER REGION ARTHRITIS: Primary | ICD-10-CM

## 2023-05-17 DIAGNOSIS — M19.011 BILATERAL SHOULDER REGION ARTHRITIS: Primary | ICD-10-CM

## 2023-05-17 PROCEDURE — 1090F PRES/ABSN URINE INCON ASSESS: CPT | Performed by: ORTHOPAEDIC SURGERY

## 2023-05-17 PROCEDURE — G8427 DOCREV CUR MEDS BY ELIG CLIN: HCPCS | Performed by: ORTHOPAEDIC SURGERY

## 2023-05-17 PROCEDURE — 99203 OFFICE O/P NEW LOW 30 MIN: CPT | Performed by: ORTHOPAEDIC SURGERY

## 2023-05-17 PROCEDURE — 1123F ACP DISCUSS/DSCN MKR DOCD: CPT | Performed by: ORTHOPAEDIC SURGERY

## 2023-05-17 PROCEDURE — G8400 PT W/DXA NO RESULTS DOC: HCPCS | Performed by: ORTHOPAEDIC SURGERY

## 2023-05-17 PROCEDURE — G8417 CALC BMI ABV UP PARAM F/U: HCPCS | Performed by: ORTHOPAEDIC SURGERY

## 2023-05-17 PROCEDURE — 1036F TOBACCO NON-USER: CPT | Performed by: ORTHOPAEDIC SURGERY

## 2023-05-17 NOTE — PROGRESS NOTES
Alexey Echavarria is a 78 y.o. female, who presents   Chief Complaint   Patient presents with    Shoulder Pain     Left Shoulder x years, no known injury, no previous left shoulder or neck surgery. HPI[de-identified] Left shoulder pains been present for quite some time. Right shoulder pain is also been present but to a lesser extent. There is decrease in range of motion and function overall. Mrs. Drake Kong says that she had an injection in the left shoulder in the past which did help her for some time. She knows that she has arthritis all over her body and that this is moving all continue to bother her. Also has some medical issues which would preclude surgical treatment. Allergies; medications; past medical, surgical, family, and social history; and problem list have been reviewed today and updated as indicated in this encounter - see below following Ortho specifics. Musculoskeletal: Good condition gross neurovascular functions good in both upper extremities. Elbow range of motion is fairly good with crepitus but little discomfort. There is crepitus with wrist range of motion as well. The joints are not grossly unstable. The left shoulder is extremely limited in range of motion with crepitus and discomfort. There is limited active range of motion and passive motion is limited as well. The right shoulder moves a lot better than the left over there is also crepitus and some laxity in the shoulder but less discomfort. Radiologic Studies: Imaging of both shoulders shows on the left bone-on-bone contact with a high riding humeral head and the glenoid. The subacromial space is accordingly decreased with hypertrophic marginal changes inferior glenoid  The right shoulder shows narrowing of the joint space and some hypertrophic marginal changes with minimal subluxation. ASSESSMENT:  Sandra Ceballos was seen today for shoulder pain.     Diagnoses and all orders for this visit:    Bilateral shoulder region arthritis

## 2023-05-24 ENCOUNTER — OFFICE VISIT (OUTPATIENT)
Dept: FAMILY MEDICINE CLINIC | Age: 83
End: 2023-05-24
Payer: MEDICARE

## 2023-05-24 VITALS
SYSTOLIC BLOOD PRESSURE: 134 MMHG | DIASTOLIC BLOOD PRESSURE: 70 MMHG | BODY MASS INDEX: 26.16 KG/M2 | OXYGEN SATURATION: 96 % | HEIGHT: 65 IN | RESPIRATION RATE: 20 BRPM | HEART RATE: 60 BPM | WEIGHT: 157 LBS

## 2023-05-24 DIAGNOSIS — J30.1 SEASONAL ALLERGIC RHINITIS DUE TO POLLEN: Primary | ICD-10-CM

## 2023-05-24 PROCEDURE — G8427 DOCREV CUR MEDS BY ELIG CLIN: HCPCS | Performed by: FAMILY MEDICINE

## 2023-05-24 PROCEDURE — 3078F DIAST BP <80 MM HG: CPT | Performed by: FAMILY MEDICINE

## 2023-05-24 PROCEDURE — 1090F PRES/ABSN URINE INCON ASSESS: CPT | Performed by: FAMILY MEDICINE

## 2023-05-24 PROCEDURE — 99214 OFFICE O/P EST MOD 30 MIN: CPT | Performed by: FAMILY MEDICINE

## 2023-05-24 PROCEDURE — 1123F ACP DISCUSS/DSCN MKR DOCD: CPT | Performed by: FAMILY MEDICINE

## 2023-05-24 PROCEDURE — 3074F SYST BP LT 130 MM HG: CPT | Performed by: FAMILY MEDICINE

## 2023-05-24 PROCEDURE — 1036F TOBACCO NON-USER: CPT | Performed by: FAMILY MEDICINE

## 2023-05-24 PROCEDURE — G8400 PT W/DXA NO RESULTS DOC: HCPCS | Performed by: FAMILY MEDICINE

## 2023-05-24 PROCEDURE — G8417 CALC BMI ABV UP PARAM F/U: HCPCS | Performed by: FAMILY MEDICINE

## 2023-05-24 RX ORDER — FLUTICASONE PROPIONATE 50 MCG
2 SPRAY, SUSPENSION (ML) NASAL DAILY
Qty: 16 G | Refills: 3 | Status: SHIPPED | OUTPATIENT
Start: 2023-05-24

## 2023-05-24 ASSESSMENT — ENCOUNTER SYMPTOMS
RHINORRHEA: 1
WHEEZING: 0
SHORTNESS OF BREATH: 0
SORE THROAT: 1
COUGH: 1

## 2023-05-24 NOTE — PROGRESS NOTES
[address] [facility]   [address] [facility]   Isabel Mccabe MD     Patient: Mauricio Kim Birth: 1940  Visit Date: 5/24/23    Jamshid Lott is a 78y.o. year old female here today for   Chief Complaint   Patient presents with    Cough     X 1week       HPI  Cough  This is a new problem. The current episode started in the past 7 days. The problem has been gradually worsening. The cough is Non-productive. Associated symptoms include postnasal drip, rhinorrhea and a sore throat. Pertinent negatives include no chills, ear congestion, ear pain, fever, nasal congestion, shortness of breath or wheezing. The symptoms are aggravated by pollens. She has tried nothing for the symptoms. Recent lab results reviewed, including CMP, CBC, and TSH which are remarkable for elevated TSH. Review of Systems   Constitutional:  Negative for chills and fever. HENT:  Positive for postnasal drip, rhinorrhea and sore throat. Negative for ear pain. Respiratory:  Positive for cough. Negative for shortness of breath and wheezing. Past medical, surgical, social and/or family historyreviewed, updated as needed, and are non-contributory (unless otherwise stated). Medications, allergies, and problem list also reviewed and updated as needed in patient's record. Current Outpatient Medications   Medication Sig Dispense Refill    fluticasone (FLONASE) 50 MCG/ACT nasal spray 2 sprays by Each Nostril route daily 16 g 3    apixaban (ELIQUIS) 2.5 MG TABS tablet Take 1 tablet by mouth 2 times daily Temp fill until comes in mail.  28 tablet 0    dapagliflozin (FARXIGA) 10 MG tablet Take 1 tablet by mouth every morning 90 tablet 1    lisinopril (PRINIVIL;ZESTRIL) 10 MG tablet Take 1 tablet by mouth daily 90 tablet 1    carvedilol (COREG) 25 MG tablet Take 1 tablet by mouth 2 times daily (with meals) 180 tablet 1    hydrALAZINE (APRESOLINE) 25 MG tablet Take 1

## 2023-07-02 DIAGNOSIS — I10 ESSENTIAL HYPERTENSION: ICD-10-CM

## 2023-07-02 DIAGNOSIS — E03.8 OTHER SPECIFIED HYPOTHYROIDISM: ICD-10-CM

## 2023-07-10 RX ORDER — ISOSORBIDE MONONITRATE 30 MG/1
30 TABLET, EXTENDED RELEASE ORAL DAILY
Qty: 90 TABLET | Refills: 1 | Status: SHIPPED | OUTPATIENT
Start: 2023-07-10

## 2023-07-10 RX ORDER — HYDRALAZINE HYDROCHLORIDE 25 MG/1
25 TABLET, FILM COATED ORAL EVERY 12 HOURS SCHEDULED
Qty: 180 TABLET | Refills: 1 | Status: SHIPPED | OUTPATIENT
Start: 2023-07-10

## 2023-07-10 RX ORDER — LEVOTHYROXINE SODIUM 88 UG/1
TABLET ORAL
Qty: 90 TABLET | Refills: 1 | Status: SHIPPED | OUTPATIENT
Start: 2023-07-10

## 2023-07-10 RX ORDER — CARVEDILOL 25 MG/1
TABLET ORAL
Qty: 180 TABLET | Refills: 1 | Status: SHIPPED | OUTPATIENT
Start: 2023-07-10

## 2023-07-18 ENCOUNTER — TELEPHONE (OUTPATIENT)
Dept: FAMILY MEDICINE CLINIC | Age: 83
End: 2023-07-18

## 2023-07-18 DIAGNOSIS — I89.0 LYMPHEDEMA: Primary | ICD-10-CM

## 2023-07-18 DIAGNOSIS — M19.90 ARTHRITIS: ICD-10-CM

## 2023-07-18 DIAGNOSIS — R54 AGE-RELATED PHYSICAL DEBILITY: ICD-10-CM

## 2023-07-18 DIAGNOSIS — I10 ESSENTIAL HYPERTENSION: ICD-10-CM

## 2023-07-18 NOTE — TELEPHONE ENCOUNTER
Josy Lanier from [facility] called me and said patient is asking if referral can be put in for someone to come to their home to do the Lymphedema treatment at home. Please advise.      Last seen 5/24/2023  Next appt 9/6/2023

## 2023-07-19 NOTE — TELEPHONE ENCOUNTER
Spoke with Andra Gonzalez, patients son. He is the patients full time caretaker. Patient uses a walker and cane, and needs assistance to help her with daily activities. Per son, patient has used [employer] and he would like to use this company again.      Home Health referral pended

## 2023-07-20 NOTE — TELEPHONE ENCOUNTER
[address] [facility] called to ask when patient last saw Dr. Clive Mcclellan and I informed that her last appt was 5/3/23. Kathryn Kowalski stated that patient must be seen within 60 days of the referral in order for it to be accepted and covered by patient's insurance. Please advise regarding where to schedule patient.     Osiel (p) [phone]    Last seen 5/24/2023  Next appt 9/6/2023

## 2023-08-17 DIAGNOSIS — I27.82 CHRONIC SEPTIC PULMONARY EMBOLISM WITHOUT ACUTE COR PULMONALE (HCC): ICD-10-CM

## 2023-08-17 DIAGNOSIS — Z86.711 HISTORY OF PULMONARY EMBOLISM: ICD-10-CM

## 2023-08-17 DIAGNOSIS — Z15.89 HOMOZYGOUS FOR PAI-1 4G ALLELE: ICD-10-CM

## 2023-08-17 DIAGNOSIS — I26.90 CHRONIC SEPTIC PULMONARY EMBOLISM WITHOUT ACUTE COR PULMONALE (HCC): ICD-10-CM

## 2023-08-17 NOTE — TELEPHONE ENCOUNTER
Patient's son, Shanae Sheridan, called for a refill, informing that Eliquis was not sent with patient's other medications she just rec'd in the mail and she's out. Carlos Enrique also requested a short term supply to be sent to her local pharmacy (in a separate encounter).     Last seen 5/24/2023  Next appt 9/6/2023  Shaggy

## 2023-08-17 NOTE — TELEPHONE ENCOUNTER
Patient's son, Ruth Cruz, called for a short term refill til mail order Dominik Andujar comes. Carlos Enrique informed that patient is out of her medication.       Last seen 5/24/2023  Next appt 9/6/2023  [facility]

## 2023-09-06 ENCOUNTER — OFFICE VISIT (OUTPATIENT)
Dept: FAMILY MEDICINE CLINIC | Age: 83
End: 2023-09-06
Payer: MEDICARE

## 2023-09-06 ENCOUNTER — TELEPHONE (OUTPATIENT)
Dept: FAMILY MEDICINE CLINIC | Age: 83
End: 2023-09-06

## 2023-09-06 VITALS
TEMPERATURE: 98 F | WEIGHT: 154 LBS | BODY MASS INDEX: 25.66 KG/M2 | HEIGHT: 65 IN | SYSTOLIC BLOOD PRESSURE: 130 MMHG | DIASTOLIC BLOOD PRESSURE: 62 MMHG | OXYGEN SATURATION: 96 % | HEART RATE: 63 BPM

## 2023-09-06 DIAGNOSIS — G47.10 HYPERSOMNOLENCE: Primary | ICD-10-CM

## 2023-09-06 DIAGNOSIS — J30.1 SEASONAL ALLERGIC RHINITIS DUE TO POLLEN: ICD-10-CM

## 2023-09-06 DIAGNOSIS — I27.20 PULMONARY HYPERTENSION (HCC): ICD-10-CM

## 2023-09-06 DIAGNOSIS — R09.82 POST-NASAL DRIP: ICD-10-CM

## 2023-09-06 DIAGNOSIS — S32.000S COMPRESSION FRACTURE OF LUMBAR VERTEBRA, UNSPECIFIED LUMBAR VERTEBRAL LEVEL, SEQUELA: ICD-10-CM

## 2023-09-06 DIAGNOSIS — I51.7 LEFT ATRIAL ENLARGEMENT: ICD-10-CM

## 2023-09-06 DIAGNOSIS — M85.80 OSTEOPENIA, UNSPECIFIED LOCATION: ICD-10-CM

## 2023-09-06 DIAGNOSIS — M84.68XA PATHOLOGICAL FRACTURE IN OTHER DISEASE, OTHER SITE, INITIAL ENCOUNTER FOR FRACTURE: ICD-10-CM

## 2023-09-06 PROCEDURE — 3078F DIAST BP <80 MM HG: CPT | Performed by: SURGERY

## 2023-09-06 PROCEDURE — 1123F ACP DISCUSS/DSCN MKR DOCD: CPT | Performed by: SURGERY

## 2023-09-06 PROCEDURE — G8400 PT W/DXA NO RESULTS DOC: HCPCS | Performed by: SURGERY

## 2023-09-06 PROCEDURE — 99214 OFFICE O/P EST MOD 30 MIN: CPT | Performed by: SURGERY

## 2023-09-06 PROCEDURE — G8427 DOCREV CUR MEDS BY ELIG CLIN: HCPCS | Performed by: SURGERY

## 2023-09-06 PROCEDURE — 3075F SYST BP GE 130 - 139MM HG: CPT | Performed by: SURGERY

## 2023-09-06 PROCEDURE — 1090F PRES/ABSN URINE INCON ASSESS: CPT | Performed by: SURGERY

## 2023-09-06 PROCEDURE — 1036F TOBACCO NON-USER: CPT | Performed by: SURGERY

## 2023-09-06 PROCEDURE — G8417 CALC BMI ABV UP PARAM F/U: HCPCS | Performed by: SURGERY

## 2023-09-06 NOTE — PATIENT INSTRUCTIONS
Swelling:  -compression stocking (8 to 15mmHg)  -follow diet advice  -horse chestnut seed extract OTC supplement  -walking  -elevation of legs at end of day (unkink the hose)           Chloe Schlatter MD   Cardiologist in [place], [place]   Address: [address] [place] [address] Avinash Fernando, [address] [place] [address]   Phone: [phone]          Silk brand almond milk fortified with vitamin D and calcium - 3 8oz glasses of this per day will help replace the vitamin D and calcium you would ordinarily get from leafy greens. Over the counter calcium and D3 chews: You can take whatever you want so long as there is 600mg calcium (or 500mg at a minimum) along with 400 to 800 international units of Vitamin D3 (10 mcg to 20mcg at a minimum)    For patients eligible for Medicare, routine testing is allowed once every 2 years. The testing frequency can be increased to one year for patients who have rapidly progressing disease, those who are receiving or discontinuing medical therapy to restore bone mass or have additional risk factors. ________________________________________    [facility] proper use:    2 sprays, each nostril daily. Use continuously, not as needed. Left hand right nostril, in, point towards eye away from the opposite nostril, nose towards toes, two puffs and breath in.  Repeat on other side. Nasal sinus (saline) rinses from [employer].

## 2023-09-06 NOTE — PROGRESS NOTES
Itzel Thurman (:  1940) is a 78 y.o. female,Established patient, here for evaluation of the following chief complaint(s):  Health Maintenance (Due(DEXA scan) ), Other (Abnormal echocardiogram request new referral to cardiology ), and Shoulder Pain (Left shoulder pain, request pain management referral )         ASSESSMENT/PLAN:  1. Hypersomnolence  -     Home Sleep Study; Future  2. Left atrial enlargement  -     Home Sleep Study; Future  3. Pulmonary hypertension ([address])  -     Home Sleep Study; Future  Needs psg due to this and LAE along with pulm htn  Consider pulm referral in future  Cardiology referral for the abnormal echocardiogram and reassurance (stress was wnl low risk)   4. Post-nasal drip  5. Seasonal allergic rhinitis due to pollen  Not using flonase as directed used for one week only  2 sprays, each nostril daily. Use continuously, not as needed. Left hand right nostril, in, point towards eye away from the opposite nostril, nose towards toes, two puffs and breath in.  Repeat on other side. Call back in 3 weeks of continuous use if no improvement at all  If alarm signs or symptoms develop as we discussed, report immediately to the emergency department/dial 9-1-1.  6. Severe OA left shoulder  Confirm that she has no true allergy - there was note in comment within epic this is likely erroneous entry, she has prednisone, medrol, and injectable corticosteroids in past without any true allergy. Tachycardia and oral steroids is common - so adverse / unpleasant rxn but not true allergy. She can have shoulder injection safely. She will call Dr. Louise Mendez. No follow-ups on file. Subjective   SUBJECTIVE/OBJECTIVE:  HPI:    Other (Abnormal echocardiogram request new referral to cardiology )   Technically sub-optimal images. Irregular heart rates. Normal left ventricular chamber size. Mild global hypokinesis, abnormal septal motion, LV EF 45-50%.    Mild left ventricular concentric

## 2023-09-07 NOTE — TELEPHONE ENCOUNTER
Per Dr. Flori Franks she should still have this but they reported to me that she ran out of days and no longer could get PT.   Please confirm       [facility]

## 2023-09-08 ENCOUNTER — TELEPHONE (OUTPATIENT)
Dept: FAMILY MEDICINE CLINIC | Age: 83
End: 2023-09-08

## 2023-09-08 DIAGNOSIS — M19.90 ARTHRITIS: ICD-10-CM

## 2023-09-08 DIAGNOSIS — I89.0 LYMPHEDEMA: Primary | ICD-10-CM

## 2023-09-08 DIAGNOSIS — R54 AGE-RELATED PHYSICAL DEBILITY: ICD-10-CM

## 2023-09-08 DIAGNOSIS — I10 ESSENTIAL HYPERTENSION: ICD-10-CM

## 2023-09-08 NOTE — TELEPHONE ENCOUNTER
----- Message from Cassy Bruner DO sent at 9/8/2023 11:47 AM EDT -----  Osiel called wound care about this patient. Wants new home health referral for continued pt.     Please farrukh up for PT and use our last encounter as f2f date please and thanks

## 2023-09-25 ENCOUNTER — TELEPHONE (OUTPATIENT)
Dept: SLEEP CENTER | Age: 83
End: 2023-09-25

## 2023-09-29 ENCOUNTER — HOSPITAL ENCOUNTER (OUTPATIENT)
Dept: SLEEP CENTER | Age: 83
Discharge: HOME | End: 2023-09-29
Payer: MEDICARE

## 2023-09-29 DIAGNOSIS — I51.7 LEFT ATRIAL ENLARGEMENT: ICD-10-CM

## 2023-09-29 DIAGNOSIS — R93.1 ABNORMAL ECHOCARDIOGRAM: ICD-10-CM

## 2023-09-29 DIAGNOSIS — I27.20 PULMONARY HYPERTENSION (HCC): ICD-10-CM

## 2023-09-29 DIAGNOSIS — I50.22 CHRONIC SYSTOLIC CONGESTIVE HEART FAILURE (HCC): ICD-10-CM

## 2023-09-29 DIAGNOSIS — G47.10 HYPERSOMNOLENCE: ICD-10-CM

## 2023-09-29 PROCEDURE — 95800 SLP STDY UNATTENDED: CPT

## 2023-09-29 RX ORDER — EMPAGLIFLOZIN 25 MG/1
25 TABLET, FILM COATED ORAL DAILY
Qty: 90 TABLET | Refills: 3 | Status: SHIPPED | OUTPATIENT
Start: 2023-09-29

## 2023-09-29 RX ORDER — LISINOPRIL 10 MG/1
10 TABLET ORAL DAILY
Qty: 90 TABLET | Refills: 3 | Status: SHIPPED | OUTPATIENT
Start: 2023-09-29

## 2023-10-10 ENCOUNTER — TELEPHONE (OUTPATIENT)
Dept: FAMILY MEDICINE CLINIC | Age: 83
End: 2023-10-10

## 2023-10-10 NOTE — TELEPHONE ENCOUNTER
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 If cpap I will order. If oral appliance, see the dentist.  ENT is not likely to benefit her.

## 2023-10-19 NOTE — TELEPHONE ENCOUNTER
Carlos Enrique notified. Patient discussed with him. Since its mild to moderate DONN, but does not want to do anything at this time, since its not severe. He states they will discuss further at her January appointment.

## 2023-10-27 DIAGNOSIS — I50.22 CHRONIC SYSTOLIC CONGESTIVE HEART FAILURE (HCC): ICD-10-CM

## 2023-10-27 DIAGNOSIS — R93.1 ABNORMAL ECHOCARDIOGRAM: ICD-10-CM

## 2023-10-27 RX ORDER — LISINOPRIL 10 MG/1
10 TABLET ORAL DAILY
Qty: 14 TABLET | Refills: 0 | Status: SHIPPED | OUTPATIENT
Start: 2023-10-27

## 2023-10-27 NOTE — TELEPHONE ENCOUNTER
Patient needs a 2 week supply of medication sent to her local pharmacy. She is waiting on mail order. Please advise.   Last seen 9/6/2023  Next appt 1/10/2024  VINCENT/Angeline Gomez

## 2023-11-04 DIAGNOSIS — I50.22 CHRONIC SYSTOLIC CONGESTIVE HEART FAILURE (HCC): ICD-10-CM

## 2023-11-04 DIAGNOSIS — R93.1 ABNORMAL ECHOCARDIOGRAM: ICD-10-CM

## 2023-11-07 RX ORDER — LISINOPRIL 10 MG/1
10 TABLET ORAL DAILY
Qty: 14 TABLET | Refills: 0 | OUTPATIENT
Start: 2023-11-07

## 2023-11-10 ENCOUNTER — OFFICE VISIT (OUTPATIENT)
Dept: ORTHOPEDIC SURGERY | Age: 83
End: 2023-11-10

## 2023-11-10 VITALS — TEMPERATURE: 98 F | BODY MASS INDEX: 25.66 KG/M2 | HEIGHT: 65 IN | WEIGHT: 154 LBS

## 2023-11-10 DIAGNOSIS — M75.102 ROTATOR CUFF TEAR ARTHROPATHY, LEFT: ICD-10-CM

## 2023-11-10 DIAGNOSIS — M12.812 ROTATOR CUFF TEAR ARTHROPATHY, LEFT: ICD-10-CM

## 2023-11-10 DIAGNOSIS — M19.012 BILATERAL SHOULDER REGION ARTHRITIS: Primary | ICD-10-CM

## 2023-11-10 DIAGNOSIS — M19.011 BILATERAL SHOULDER REGION ARTHRITIS: Primary | ICD-10-CM

## 2023-11-10 RX ORDER — TRIAMCINOLONE ACETONIDE 40 MG/ML
40 INJECTION, SUSPENSION INTRA-ARTICULAR; INTRAMUSCULAR ONCE
Status: COMPLETED | OUTPATIENT
Start: 2023-11-10 | End: 2023-11-10

## 2023-11-10 RX ADMIN — TRIAMCINOLONE ACETONIDE 40 MG: 40 INJECTION, SUSPENSION INTRA-ARTICULAR; INTRAMUSCULAR at 12:48

## 2023-11-10 NOTE — PROGRESS NOTES
Chief Complaint:   Chief Complaint   Patient presents with    Shoulder Pain     Left Shoulder F/U,        Mecca KATHI Braga follows up regarding her left shoulder. She has arthritis in both but the left one hurts her more. She had hoped for an injection with corticosteroids. There was some controversy about an allergy profile on the previous visit. This has been cleared up and she would like the left shoulder injected. Allergies; medications; past medical, surgical, family, and social history; and problem list have been reviewed today and updated as indicated in this encounter seen below. Exam: The left shoulder is extremely limited in range of motion with crepitus and discomfort. There is limited active range of motion and passive motion is limited as well. The right shoulder moves a lot better than the left over there is also crepitus and some laxity in the shoulder but less discomfort    Radiographs: Devious films were reviewed and show advanced degeneration of the left shoulder with high riding humeral head    ASSESSMENT:    Tyra Briones was seen today for shoulder pain. Diagnoses and all orders for this visit:    Bilateral shoulder region arthritis    Rotator cuff tear arthropathy, left  -     DE ARTHROCENTESIS ASPIR&/INJ MAJOR JT/BURSA W/O US  -     triamcinolone acetonide (KENALOG-40) injection 40 mg        PLAN: Injectionof the left shoulder with  4 cc    1/4  % bupivicaine  and Kenalog   (triamcinalone)         40mgwas discussed with the patient. Discussion included but was not limited to potential risks and benefits. No contraindications to the procedure were noted. Understanding and agreement was indicated. The procedure was accomplished without incident using appropriate aseptic technique. follow up as needed    Return if symptoms worsen or fail to improve.        Current Outpatient Medications   Medication Sig Dispense Refill    lisinopril (PRINIVIL;ZESTRIL) 10 MG tablet Take 1 tablet by mouth

## 2023-12-04 ENCOUNTER — OFFICE VISIT (OUTPATIENT)
Dept: FAMILY MEDICINE CLINIC | Age: 83
End: 2023-12-04
Payer: MEDICARE

## 2023-12-04 ENCOUNTER — TELEPHONE (OUTPATIENT)
Dept: FAMILY MEDICINE CLINIC | Age: 83
End: 2023-12-04

## 2023-12-04 VITALS
RESPIRATION RATE: 19 BRPM | DIASTOLIC BLOOD PRESSURE: 78 MMHG | SYSTOLIC BLOOD PRESSURE: 132 MMHG | HEIGHT: 66 IN | WEIGHT: 154 LBS | BODY MASS INDEX: 24.75 KG/M2 | TEMPERATURE: 97.4 F | OXYGEN SATURATION: 93 % | HEART RATE: 60 BPM

## 2023-12-04 DIAGNOSIS — J22 LOWER RESPIRATORY INFECTION: Primary | ICD-10-CM

## 2023-12-04 PROCEDURE — 3075F SYST BP GE 130 - 139MM HG: CPT

## 2023-12-04 PROCEDURE — G8417 CALC BMI ABV UP PARAM F/U: HCPCS

## 2023-12-04 PROCEDURE — G8400 PT W/DXA NO RESULTS DOC: HCPCS

## 2023-12-04 PROCEDURE — 1090F PRES/ABSN URINE INCON ASSESS: CPT

## 2023-12-04 PROCEDURE — 3078F DIAST BP <80 MM HG: CPT

## 2023-12-04 PROCEDURE — 99213 OFFICE O/P EST LOW 20 MIN: CPT

## 2023-12-04 PROCEDURE — G8427 DOCREV CUR MEDS BY ELIG CLIN: HCPCS

## 2023-12-04 PROCEDURE — G8484 FLU IMMUNIZE NO ADMIN: HCPCS

## 2023-12-04 PROCEDURE — 1123F ACP DISCUSS/DSCN MKR DOCD: CPT

## 2023-12-04 PROCEDURE — 1036F TOBACCO NON-USER: CPT

## 2023-12-04 RX ORDER — GUAIFENESIN, PSEUDOEPHEDRINE HYDROCHLORIDE 600; 60 MG/1; MG/1
1 TABLET, EXTENDED RELEASE ORAL EVERY 12 HOURS PRN
Qty: 14 TABLET | Refills: 0 | Status: SHIPPED | OUTPATIENT
Start: 2023-12-04

## 2023-12-04 RX ORDER — AMOXICILLIN AND CLAVULANATE POTASSIUM 875; 125 MG/1; MG/1
1 TABLET, FILM COATED ORAL 2 TIMES DAILY
Qty: 20 TABLET | Refills: 0 | Status: SHIPPED | OUTPATIENT
Start: 2023-12-04 | End: 2023-12-14

## 2023-12-04 RX ORDER — PREDNISONE 20 MG/1
20 TABLET ORAL DAILY
Qty: 5 TABLET | Refills: 0 | Status: SHIPPED | OUTPATIENT
Start: 2023-12-04 | End: 2023-12-09

## 2023-12-04 NOTE — TELEPHONE ENCOUNTER
Patient's Son, Hussein Turner, called for an appt for ongoing cough. I informed that Dr. Tomy Oliveira does not have an appt and advised that he bring patient to the [address] [place] [address]. Hussein Turner was agreeable.     Last seen 9/6/2023  Next appt 1/10/2024

## 2023-12-04 NOTE — PROGRESS NOTES
23  Cecilio Cruz : 1940 Sex: female  Age 78 y.o. Subjective:  Chief Complaint   Patient presents with    Cough     Chest congestion, sinus congestion and drainage, for over a week       HPI:   Cecilio Cruz , 78 y.o. female presents to the clinic for evaluation of cough x 7 days. The patient also reports chest congestion. The patient has taken nothing OTC for symptoms. The patient reports no change in symptoms over time. The patient denies ill exposure. The patient denies hx of COVID-19. The patient denies acute loss of taste and smell, headache, sore throat, rash, and fever. The patient also denies chest pain, abdominal pain, shortness of breath, wheezing, and nausea / vomiting / diarrhea. ROS:   Unless otherwise stated in this report the patient's positive and negative responses for review of systems for constitutional, eyes, ENT, cardiovascular, respiratory, gastrointestinal, neurological, , musculoskeletal, and integument systems and related systems to the presenting problem are either stated in the history of present illness or were not pertinent or were negative for the symptoms and/or complaints related to the presenting medical problem. Positives and pertinent negatives as per HPI. All others reviewed and are negative.       PMH:     Past Medical History:   Diagnosis Date    Back injury     Cervical spondylolysis     Chronic lymphoid leukemia ([address])     Gallstones     H/O cardiovascular stress test 2022    Lexiscan    H/O echocardiogram 2017    EF 69%    History of bone density study     Dr Kiana Haro [facility], [facility]?    Hyperlipidemia     Hypertension     LBBB (left bundle branch block)     Lumbar spondylosis with myelopathy     Pneumonia     Polycythemia vera ([address])     JAK2    Syncope and collapse     Thyroid disease        Past Surgical History:   Procedure Laterality Date    APPENDECTOMY      CHOLECYSTECTOMY      DIAGNOSTIC CARDIAC CATH LAB PROCEDURE

## 2024-01-10 ENCOUNTER — OFFICE VISIT (OUTPATIENT)
Dept: FAMILY MEDICINE CLINIC | Age: 84
End: 2024-01-10
Payer: MEDICARE

## 2024-01-10 VITALS
OXYGEN SATURATION: 98 % | HEIGHT: 66 IN | DIASTOLIC BLOOD PRESSURE: 60 MMHG | SYSTOLIC BLOOD PRESSURE: 120 MMHG | HEART RATE: 72 BPM | BODY MASS INDEX: 26.2 KG/M2 | TEMPERATURE: 98 F | WEIGHT: 163 LBS

## 2024-01-10 DIAGNOSIS — I50.22 CHRONIC SYSTOLIC CONGESTIVE HEART FAILURE (HCC): ICD-10-CM

## 2024-01-10 DIAGNOSIS — R93.1 ABNORMAL ECHOCARDIOGRAM: ICD-10-CM

## 2024-01-10 DIAGNOSIS — I27.20 PULMONARY HYPERTENSION (HCC): ICD-10-CM

## 2024-01-10 DIAGNOSIS — G89.29 OTHER CHRONIC PAIN: ICD-10-CM

## 2024-01-10 DIAGNOSIS — Z71.89 ACP (ADVANCE CARE PLANNING): ICD-10-CM

## 2024-01-10 DIAGNOSIS — Z23 NEEDS FLU SHOT: ICD-10-CM

## 2024-01-10 DIAGNOSIS — Z73.0 BURNOUT OF CAREGIVER: ICD-10-CM

## 2024-01-10 DIAGNOSIS — Z00.00 MEDICARE ANNUAL WELLNESS VISIT, SUBSEQUENT: ICD-10-CM

## 2024-01-10 DIAGNOSIS — Z23 NEED FOR COVID-19 VACCINE: ICD-10-CM

## 2024-01-10 DIAGNOSIS — I27.82 CHRONIC SEPTIC PULMONARY EMBOLISM WITHOUT ACUTE COR PULMONALE (HCC): ICD-10-CM

## 2024-01-10 DIAGNOSIS — H90.3 SENSORINEURAL HEARING LOSS (SNHL) OF BOTH EARS: ICD-10-CM

## 2024-01-10 DIAGNOSIS — Z29.11 NEED FOR PROPHYLACTIC VACCINATION AND INOCULATION AGAINST RESPIRATORY SYNCYTIAL VIRUS (RSV): ICD-10-CM

## 2024-01-10 DIAGNOSIS — I26.90 CHRONIC SEPTIC PULMONARY EMBOLISM WITHOUT ACUTE COR PULMONALE (HCC): ICD-10-CM

## 2024-01-10 PROCEDURE — 3078F DIAST BP <80 MM HG: CPT | Performed by: SURGERY

## 2024-01-10 PROCEDURE — G0439 PPPS, SUBSEQ VISIT: HCPCS | Performed by: SURGERY

## 2024-01-10 PROCEDURE — 1123F ACP DISCUSS/DSCN MKR DOCD: CPT | Performed by: SURGERY

## 2024-01-10 PROCEDURE — 3074F SYST BP LT 130 MM HG: CPT | Performed by: SURGERY

## 2024-01-10 PROCEDURE — G8484 FLU IMMUNIZE NO ADMIN: HCPCS | Performed by: SURGERY

## 2024-01-10 RX ORDER — INFLUENZA A VIRUS A/MICHIGAN/45/2015 X-275 (H1N1) ANTIGEN (FORMALDEHYDE INACTIVATED), INFLUENZA A VIRUS A/SINGAPORE/INFIMH-16-0019/2016 IVR-186 (H3N2) ANTIGEN (FORMALDEHYDE INACTIVATED), AND INFLUENZA B VIRUS B/MARYLAND/15/2016 BX-69A (A B/COLORADO/6/2017-LIKE VIRUS) ANTIGEN (FORMALDEHYDE INACTIVATED) 60; 60; 60 UG/.5ML; UG/.5ML; UG/.5ML
0.5 INJECTION, SUSPENSION INTRAMUSCULAR ONCE
Qty: 0.5 ML | Refills: 0 | Status: SHIPPED | OUTPATIENT
Start: 2024-01-10 | End: 2024-01-10

## 2024-01-10 SDOH — ECONOMIC STABILITY: INCOME INSECURITY: HOW HARD IS IT FOR YOU TO PAY FOR THE VERY BASICS LIKE FOOD, HOUSING, MEDICAL CARE, AND HEATING?: NOT HARD AT ALL

## 2024-01-10 SDOH — ECONOMIC STABILITY: FOOD INSECURITY: WITHIN THE PAST 12 MONTHS, YOU WORRIED THAT YOUR FOOD WOULD RUN OUT BEFORE YOU GOT MONEY TO BUY MORE.: NEVER TRUE

## 2024-01-10 SDOH — ECONOMIC STABILITY: FOOD INSECURITY: WITHIN THE PAST 12 MONTHS, THE FOOD YOU BOUGHT JUST DIDN'T LAST AND YOU DIDN'T HAVE MONEY TO GET MORE.: NEVER TRUE

## 2024-01-10 ASSESSMENT — PATIENT HEALTH QUESTIONNAIRE - PHQ9
3. TROUBLE FALLING OR STAYING ASLEEP: 1
SUM OF ALL RESPONSES TO PHQ QUESTIONS 1-9: 7
8. MOVING OR SPEAKING SO SLOWLY THAT OTHER PEOPLE COULD HAVE NOTICED. OR THE OPPOSITE, BEING SO FIGETY OR RESTLESS THAT YOU HAVE BEEN MOVING AROUND A LOT MORE THAN USUAL: 0
6. FEELING BAD ABOUT YOURSELF - OR THAT YOU ARE A FAILURE OR HAVE LET YOURSELF OR YOUR FAMILY DOWN: 1
7. TROUBLE CONCENTRATING ON THINGS, SUCH AS READING THE NEWSPAPER OR WATCHING TELEVISION: 0
1. LITTLE INTEREST OR PLEASURE IN DOING THINGS: 1
SUM OF ALL RESPONSES TO PHQ QUESTIONS 1-9: 7
10. IF YOU CHECKED OFF ANY PROBLEMS, HOW DIFFICULT HAVE THESE PROBLEMS MADE IT FOR YOU TO DO YOUR WORK, TAKE CARE OF THINGS AT HOME, OR GET ALONG WITH OTHER PEOPLE: 0
5. POOR APPETITE OR OVEREATING: 0
SUM OF ALL RESPONSES TO PHQ QUESTIONS 1-9: 7
9. THOUGHTS THAT YOU WOULD BE BETTER OFF DEAD, OR OF HURTING YOURSELF: 0
SUM OF ALL RESPONSES TO PHQ9 QUESTIONS 1 & 2: 2
4. FEELING TIRED OR HAVING LITTLE ENERGY: 3
2. FEELING DOWN, DEPRESSED OR HOPELESS: 1
SUM OF ALL RESPONSES TO PHQ QUESTIONS 1-9: 7

## 2024-01-10 NOTE — PROGRESS NOTES
MINERALS) 600-800 MG-UNIT CHEW Take 1 each by mouth 2 times daily (with meals)  Patient taking differently: Take 1 each by mouth daily Yes Olegario Castellanos DO   Ferrous Sulfate (IRON PO) Take 65 mg by mouth daily Yes Provider, MD ALEXA Barton 10 MG tablet Take by mouth Takes 20 mg in the morning and 10 mg in the evening Yes Provider, Historical, MD   Handicap Placard MISC by Does not apply route R54  Length of need 5 years  Olegario Castellanos DO   Incontinence Supply Disposable (INCONTINENCE BRIEF MEDIUM) MISC 1 each by Does not apply route as needed (fecal incontinence)  Olegario Castellanos DO       CareTeam (Including outside providers/suppliers regularly involved in providing care):   Patient Care Team:  Olegario Castellanos DO as PCP - General (Family Medicine)  Olegario Castellanos DO as PCP - Empaneled Provider  Dr. Benjamin (ortho)  Dr. Manzano (sleep)  Dr. Askew (orl)  Dr. Bird (cardiology)     Reviewed and updated this visit:  Tobacco  Allergies  Meds  Problems  Med Hx  Surg Hx  Soc Hx  Fam Hx

## 2024-01-10 NOTE — PATIENT INSTRUCTIONS
Too much alcohol can cause health problems.     Manage other health problems such as diabetes, high blood pressure, and high cholesterol. If you think you may have a problem with alcohol or drug use, talk to your doctor.   Medicines    Take your medicines exactly as prescribed. Call your doctor if you think you are having a problem with your medicine.     If your doctor recommends aspirin, take the amount directed each day. Make sure you take aspirin and not another kind of pain reliever, such as acetaminophen (Tylenol).   When should you call for help?   Call 911 if you have symptoms of a heart attack. These may include:    Chest pain or pressure, or a strange feeling in the chest.     Sweating.     Shortness of breath.     Pain, pressure, or a strange feeling in the back, neck, jaw, or upper belly or in one or both shoulders or arms.     Lightheadedness or sudden weakness.     A fast or irregular heartbeat.   After you call 911, the  may tell you to chew 1 adult-strength or 2 to 4 low-dose aspirin. Wait for an ambulance. Do not try to drive yourself.  Watch closely for changes in your health, and be sure to contact your doctor if you have any problems.  Where can you learn more?  Go to https://www.CallYourPrice.net/patientEd and enter F075 to learn more about \"A Healthy Heart: Care Instructions.\"  Current as of: June 25, 2023               Content Version: 13.9  © 5113-7365 [employer].   Care instructions adapted under license by [employer]. If you have questions about a medical condition or this instruction, always ask your healthcare professional. [employer] disclaims any warranty or liability for your use of this information.      Personalized Preventive Plan for Mecca Braga - 1/10/2024  Medicare offers a range of preventive health benefits. Some of the tests and screenings are paid in full while other may be subject to a deductible, co-insurance, and/or copay.    Some of

## 2024-01-16 ENCOUNTER — TELEPHONE (OUTPATIENT)
Dept: FAMILY MEDICINE CLINIC | Age: 84
End: 2024-01-16

## 2024-01-16 NOTE — TELEPHONE ENCOUNTER
LSW initiated first phone call to patient on 1.16.24 at 12:10pm regarding referral to Social Work Department. LSW was unable to make contact with pt but was able to leave a voicemail for pt with reason for call and contact information. LSW will continue to attempt contact with pt.

## 2024-01-25 ENCOUNTER — TELEPHONE (OUTPATIENT)
Dept: FAMILY MEDICINE CLINIC | Age: 84
End: 2024-01-25

## 2024-01-25 NOTE — TELEPHONE ENCOUNTER
LSW initiated second phone call to patient on 1.25.24 at 11:50am regarding referral to Social Work Department. LSW was unable to make contact with pt but was able to leave a voicemail for pt with reason for call and contact information. LSW will continue to attempt contact with pt.

## 2024-01-26 ENCOUNTER — TELEPHONE (OUTPATIENT)
Dept: FAMILY MEDICINE CLINIC | Age: 84
End: 2024-01-26

## 2024-01-26 NOTE — TELEPHONE ENCOUNTER
LSW initiated phone call to patient regarding referral to Social Work Department. Pts son stated he was interested in having assistance in the home. LSW made referral to [facility] at the request of her son (Primary Caregiver) for assessment to be done to see if assistance could be provided in the home.

## 2024-03-25 ENCOUNTER — OFFICE VISIT (OUTPATIENT)
Dept: PULMONOLOGY | Age: 84
End: 2024-03-25
Payer: MEDICARE

## 2024-03-25 VITALS
BODY MASS INDEX: 27.66 KG/M2 | OXYGEN SATURATION: 97 % | HEART RATE: 78 BPM | WEIGHT: 166 LBS | TEMPERATURE: 98.2 F | HEIGHT: 65 IN | DIASTOLIC BLOOD PRESSURE: 81 MMHG | SYSTOLIC BLOOD PRESSURE: 158 MMHG

## 2024-03-25 DIAGNOSIS — G47.33 OSA (OBSTRUCTIVE SLEEP APNEA): ICD-10-CM

## 2024-03-25 DIAGNOSIS — I27.20 PULMONARY HYPERTENSION (HCC): Primary | ICD-10-CM

## 2024-03-25 PROCEDURE — G8417 CALC BMI ABV UP PARAM F/U: HCPCS | Performed by: INTERNAL MEDICINE

## 2024-03-25 PROCEDURE — G8427 DOCREV CUR MEDS BY ELIG CLIN: HCPCS | Performed by: INTERNAL MEDICINE

## 2024-03-25 PROCEDURE — 3079F DIAST BP 80-89 MM HG: CPT | Performed by: INTERNAL MEDICINE

## 2024-03-25 PROCEDURE — G8400 PT W/DXA NO RESULTS DOC: HCPCS | Performed by: INTERNAL MEDICINE

## 2024-03-25 PROCEDURE — G8484 FLU IMMUNIZE NO ADMIN: HCPCS | Performed by: INTERNAL MEDICINE

## 2024-03-25 PROCEDURE — 3077F SYST BP >= 140 MM HG: CPT | Performed by: INTERNAL MEDICINE

## 2024-03-25 PROCEDURE — 1090F PRES/ABSN URINE INCON ASSESS: CPT | Performed by: INTERNAL MEDICINE

## 2024-03-25 PROCEDURE — 1036F TOBACCO NON-USER: CPT | Performed by: INTERNAL MEDICINE

## 2024-03-25 PROCEDURE — 99204 OFFICE O/P NEW MOD 45 MIN: CPT | Performed by: INTERNAL MEDICINE

## 2024-03-25 PROCEDURE — 1123F ACP DISCUSS/DSCN MKR DOCD: CPT | Performed by: INTERNAL MEDICINE

## 2024-03-25 ASSESSMENT — ENCOUNTER SYMPTOMS
RESPIRATORY NEGATIVE: 1
ALLERGIC/IMMUNOLOGIC NEGATIVE: 1
GASTROINTESTINAL NEGATIVE: 1
EYES NEGATIVE: 1

## 2024-03-25 NOTE — PROGRESS NOTES
Patient to follow up with physician in 2 months. PFT/Echo will be scheduled @ [facility]. Orders for Chest xray were given to the patient to have done today.

## 2024-03-25 NOTE — PROGRESS NOTES
Progress Note    Mecca Braga  1940    CC:pulmonary htn       HPI : 83 year old female, she was treated for PE in [place] in 2017 and she has been on Eliquis. 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 She denies sob, no chronic cough or wheezing. She never smoked. She is being followed by an Oncologist, Dr. Anderson. She had ECHO in 2017 and I am unable to read the report. She had HSAT recently and it showed mild DONN, DAMIEN 10 and Marty oxygen saturation 87%.     Past Medical History:   Diagnosis Date    Back injury     Cervical spondylolysis     Chronic lymphoid leukemia (HCC)     Gallstones     H/O cardiovascular stress test 12/28/2022    Lexiscan    H/O echocardiogram 06/04/2017    EF 69%    History of bone density study     Dr Wahl [facility], [facility]?    Hyperlipidemia     Hypertension     LBBB (left bundle branch block)     Lumbar spondylosis with myelopathy     Pneumonia     Polycythemia vera (HCC)     JAK2    Syncope and collapse     Thyroid disease       Past Surgical History:   Procedure Laterality Date    APPENDECTOMY      CHOLECYSTECTOMY      DIAGNOSTIC CARDIAC CATH LAB PROCEDURE Bilateral 06/05/2017    HYSTERECTOMY, TOTAL ABDOMINAL (CERVIX REMOVED)      NERVE BLOCK  06/03/15    lumbar epidural #1    NERVE BLOCK  6 22 15    lumbar epidural #2    NERVE BLOCK N/A 7/6/15    lumbar epidural #3    TONSILLECTOMY        Family History   Problem Relation Age of Onset    Asthma Father     Hypertension Brother     Cancer Maternal Grandmother       Social History     Socioeconomic History    Marital status:      Spouse name: None    Number of children: 4    Years of education: None    Highest education level: 12th grade   Tobacco Use    Smoking status: Never    Smokeless tobacco: Never   Vaping Use    Vaping Use: Never used   Substance and Sexual Activity    Alcohol use: No    Drug use: No    Sexual activity: Not Currently   Social History Narrative    ** Merged History

## 2024-04-03 ENCOUNTER — OFFICE VISIT (OUTPATIENT)
Dept: ORTHOPEDIC SURGERY | Age: 84
End: 2024-04-03
Payer: MEDICARE

## 2024-04-03 VITALS — HEIGHT: 65 IN | TEMPERATURE: 98 F | BODY MASS INDEX: 27.66 KG/M2 | WEIGHT: 166 LBS

## 2024-04-03 DIAGNOSIS — M12.812 ROTATOR CUFF TEAR ARTHROPATHY, LEFT: Primary | ICD-10-CM

## 2024-04-03 DIAGNOSIS — M75.102 ROTATOR CUFF TEAR ARTHROPATHY, LEFT: Primary | ICD-10-CM

## 2024-04-03 PROCEDURE — 99213 OFFICE O/P EST LOW 20 MIN: CPT | Performed by: ORTHOPAEDIC SURGERY

## 2024-04-03 PROCEDURE — G8400 PT W/DXA NO RESULTS DOC: HCPCS | Performed by: ORTHOPAEDIC SURGERY

## 2024-04-03 PROCEDURE — G8427 DOCREV CUR MEDS BY ELIG CLIN: HCPCS | Performed by: ORTHOPAEDIC SURGERY

## 2024-04-03 PROCEDURE — 1090F PRES/ABSN URINE INCON ASSESS: CPT | Performed by: ORTHOPAEDIC SURGERY

## 2024-04-03 PROCEDURE — 1036F TOBACCO NON-USER: CPT | Performed by: ORTHOPAEDIC SURGERY

## 2024-04-03 PROCEDURE — 20610 DRAIN/INJ JOINT/BURSA W/O US: CPT | Performed by: ORTHOPAEDIC SURGERY

## 2024-04-03 PROCEDURE — G8417 CALC BMI ABV UP PARAM F/U: HCPCS | Performed by: ORTHOPAEDIC SURGERY

## 2024-04-03 PROCEDURE — 1123F ACP DISCUSS/DSCN MKR DOCD: CPT | Performed by: ORTHOPAEDIC SURGERY

## 2024-04-03 RX ORDER — TRIAMCINOLONE ACETONIDE 40 MG/ML
40 INJECTION, SUSPENSION INTRA-ARTICULAR; INTRAMUSCULAR ONCE
Status: COMPLETED | OUTPATIENT
Start: 2024-04-03 | End: 2024-04-03

## 2024-04-03 RX ADMIN — TRIAMCINOLONE ACETONIDE 40 MG: 40 INJECTION, SUSPENSION INTRA-ARTICULAR; INTRAMUSCULAR at 14:26

## 2024-04-03 NOTE — PROGRESS NOTES
Chief Complaint:   Chief Complaint   Patient presents with    Shoulder Pain     Left shoulder f/u, constant pain. Uses icy hot every night with relief.        Mecca Braga follows up regarding her left shoulder.  She is having more pain in the shoulder progressively.  She had previous injection which did help a fair amount for a period of time.  Her son says that the shots seem to help her a good bit.  She does not want any surgical treatment.      Allergies; medications; past medical, surgical, family, and social history; and problem list have been reviewed today and updated as indicated in this encounter seen below.    Exam: The left upper extremity has good range of motion in the elbow wrist and hand.  The shoulder joint itself is quite limited in range in all planes.  There is crepitus and pain with no instability of AC or glenohumeral joints.    Radiographs: Previous imaging shows rotator cuff tear arthropathy with no joint space in the shoulder and is some proximal migration of the humeral head on the glenoid.    ASSESSMENT:    Mecca was seen today for shoulder pain.    Diagnoses and all orders for this visit:    Rotator cuff tear arthropathy, left  -     IA ARTHROCENTESIS ASPIR&/INJ MAJOR JT/BURSA W/O US  -     triamcinolone acetonide (KENALOG-40) injection 40 mg        PLAN: Injectionof the left shoulder with  4 cc    1/4  % bupivicaine  and Kenalog   (triamcinalone)         40mgwas discussed with the patient.   Discussion included but was not limited to potential risks and benefits.   No contraindications to the procedure were noted.  Understanding and agreement was indicated.  The procedure was accomplished without incident using appropriate aseptic technique.  follow up as needed    Return if symptoms worsen or fail to improve.       Current Outpatient Medications   Medication Sig Dispense Refill    levothyroxine (SYNTHROID) 88 MCG tablet TAKE 1 TABLET BY MOUTH DAILY 90 tablet 3    isosorbide mononitrate

## 2024-06-02 DIAGNOSIS — Z86.711 HISTORY OF PULMONARY EMBOLISM: ICD-10-CM

## 2024-06-02 DIAGNOSIS — Z15.89 HOMOZYGOUS FOR PAI-1 4G ALLELE: ICD-10-CM

## 2024-06-03 RX ORDER — APIXABAN 2.5 MG/1
2.5 TABLET, FILM COATED ORAL 2 TIMES DAILY
Qty: 180 TABLET | Refills: 3 | Status: SHIPPED | OUTPATIENT
Start: 2024-06-03

## 2024-06-27 ENCOUNTER — APPOINTMENT (OUTPATIENT)
Dept: GENERAL RADIOLOGY | Age: 84
End: 2024-06-27
Payer: MEDICARE

## 2024-06-27 ENCOUNTER — HOSPITAL ENCOUNTER (INPATIENT)
Age: 84
End: 2024-06-27
Attending: EMERGENCY MEDICINE | Admitting: INTERNAL MEDICINE
Payer: MEDICARE

## 2024-06-27 DIAGNOSIS — I50.9 ACUTE ON CHRONIC CONGESTIVE HEART FAILURE, UNSPECIFIED HEART FAILURE TYPE (HCC): Primary | ICD-10-CM

## 2024-06-27 PROBLEM — I50.33 ACUTE ON CHRONIC DIASTOLIC (CONGESTIVE) HEART FAILURE (HCC): Status: ACTIVE | Noted: 2024-06-27

## 2024-06-27 PROBLEM — R26.9 GAIT DISTURBANCE: Status: ACTIVE | Noted: 2024-06-27

## 2024-06-27 LAB
ALBUMIN SERPL-MCNC: 4.2 G/DL (ref 3.5–5.2)
ALP SERPL-CCNC: 41 U/L (ref 35–104)
ALT SERPL-CCNC: 10 U/L (ref 0–32)
ANION GAP SERPL CALCULATED.3IONS-SCNC: 11 MMOL/L (ref 7–16)
AST SERPL-CCNC: 19 U/L (ref 0–31)
BASOPHILS # BLD: 0.04 K/UL (ref 0–0.2)
BASOPHILS NFR BLD: 1 % (ref 0–2)
BILIRUB SERPL-MCNC: 1.1 MG/DL (ref 0–1.2)
BNP SERPL-MCNC: 1363 PG/ML (ref 0–450)
BUN SERPL-MCNC: 28 MG/DL (ref 6–23)
CALCIUM SERPL-MCNC: 9.6 MG/DL (ref 8.6–10.2)
CHLORIDE SERPL-SCNC: 106 MMOL/L (ref 98–107)
CO2 SERPL-SCNC: 23 MMOL/L (ref 22–29)
CREAT SERPL-MCNC: 1.3 MG/DL (ref 0.5–1)
EKG ATRIAL RATE: 64 BPM
EKG P AXIS: 77 DEGREES
EKG P-R INTERVAL: 170 MS
EKG Q-T INTERVAL: 444 MS
EKG QRS DURATION: 138 MS
EKG QTC CALCULATION (BAZETT): 458 MS
EKG R AXIS: -29 DEGREES
EKG T AXIS: 102 DEGREES
EKG VENTRICULAR RATE: 64 BPM
EOSINOPHIL # BLD: 0.04 K/UL (ref 0.05–0.5)
EOSINOPHILS RELATIVE PERCENT: 1 % (ref 0–6)
ERYTHROCYTE [DISTWIDTH] IN BLOOD BY AUTOMATED COUNT: 20.7 % (ref 11.5–15)
GFR, ESTIMATED: 42 ML/MIN/1.73M2
GLUCOSE SERPL-MCNC: 98 MG/DL (ref 74–99)
HCT VFR BLD AUTO: 27.5 % (ref 34–48)
HGB BLD-MCNC: 8.5 G/DL (ref 11.5–15.5)
INR PPP: 1.5
LYMPHOCYTES NFR BLD: 0.6 K/UL (ref 1.5–4)
LYMPHOCYTES RELATIVE PERCENT: 13 % (ref 20–42)
MCH RBC QN AUTO: 27.1 PG (ref 26–35)
MCHC RBC AUTO-ENTMCNC: 30.9 G/DL (ref 32–34.5)
MCV RBC AUTO: 87.6 FL (ref 80–99.9)
METAMYELOCYTES ABSOLUTE COUNT: 0.12 K/UL (ref 0–0.12)
METAMYELOCYTES: 3 % (ref 0–1)
MONOCYTES NFR BLD: 0.2 K/UL (ref 0.1–0.95)
MONOCYTES NFR BLD: 4 % (ref 2–12)
NEUTROPHILS NFR BLD: 78 % (ref 43–80)
NEUTS SEG NFR BLD: 3.6 K/UL (ref 1.8–7.3)
NUCLEATED RED BLOOD CELLS: 1 PER 100 WBC
PLATELET, FLUORESCENCE: 101 K/UL (ref 130–450)
PMV BLD AUTO: ABNORMAL FL (ref 7–12)
POTASSIUM SERPL-SCNC: 4.5 MMOL/L (ref 3.5–5)
PROT SERPL-MCNC: 5.9 G/DL (ref 6.4–8.3)
PROTHROMBIN TIME: 16.9 SEC (ref 9.3–12.4)
RBC # BLD AUTO: 3.14 M/UL (ref 3.5–5.5)
RBC # BLD: ABNORMAL 10*6/UL
SODIUM SERPL-SCNC: 140 MMOL/L (ref 132–146)
TROPONIN I SERPL HS-MCNC: 29 NG/L (ref 0–9)
TROPONIN I SERPL HS-MCNC: 33 NG/L (ref 0–9)
WBC OTHER # BLD: 4.6 K/UL (ref 4.5–11.5)

## 2024-06-27 PROCEDURE — 93005 ELECTROCARDIOGRAM TRACING: CPT | Performed by: EMERGENCY MEDICINE

## 2024-06-27 PROCEDURE — 83880 ASSAY OF NATRIURETIC PEPTIDE: CPT

## 2024-06-27 PROCEDURE — 84484 ASSAY OF TROPONIN QUANT: CPT

## 2024-06-27 PROCEDURE — 85610 PROTHROMBIN TIME: CPT

## 2024-06-27 PROCEDURE — 71046 X-RAY EXAM CHEST 2 VIEWS: CPT

## 2024-06-27 PROCEDURE — 99285 EMERGENCY DEPT VISIT HI MDM: CPT

## 2024-06-27 PROCEDURE — 80053 COMPREHEN METABOLIC PANEL: CPT

## 2024-06-27 PROCEDURE — 1200000000 HC SEMI PRIVATE

## 2024-06-27 PROCEDURE — 93010 ELECTROCARDIOGRAM REPORT: CPT | Performed by: INTERNAL MEDICINE

## 2024-06-27 PROCEDURE — 73502 X-RAY EXAM HIP UNI 2-3 VIEWS: CPT

## 2024-06-27 PROCEDURE — 85025 COMPLETE CBC W/AUTO DIFF WBC: CPT

## 2024-06-27 RX ORDER — FOLIC ACID 1 MG/1
1 TABLET ORAL DAILY
Status: DISCONTINUED | OUTPATIENT
Start: 2024-06-28 | End: 2024-06-29

## 2024-06-27 RX ORDER — ACETAMINOPHEN 650 MG/1
650 SUPPOSITORY RECTAL EVERY 4 HOURS PRN
Status: DISCONTINUED | OUTPATIENT
Start: 2024-06-27 | End: 2024-07-01 | Stop reason: HOSPADM

## 2024-06-27 RX ORDER — ACETAMINOPHEN 325 MG/1
650 TABLET ORAL EVERY 6 HOURS PRN
Status: DISCONTINUED | OUTPATIENT
Start: 2024-06-27 | End: 2024-07-01 | Stop reason: HOSPADM

## 2024-06-27 RX ORDER — FERROUS SULFATE 325(65) MG
325 TABLET ORAL DAILY
Status: DISCONTINUED | OUTPATIENT
Start: 2024-06-27 | End: 2024-07-01 | Stop reason: HOSPADM

## 2024-06-27 RX ORDER — LANOLIN ALCOHOL/MO/W.PET/CERES
1 CREAM (GRAM) TOPICAL DAILY
Status: DISCONTINUED | OUTPATIENT
Start: 2024-06-28 | End: 2024-06-29 | Stop reason: CLARIF

## 2024-06-27 RX ORDER — LANOLIN ALCOHOL/MO/W.PET/CERES
500 CREAM (GRAM) TOPICAL DAILY
Status: DISCONTINUED | OUTPATIENT
Start: 2024-06-28 | End: 2024-06-29 | Stop reason: CLARIF

## 2024-06-27 RX ORDER — ONDANSETRON 2 MG/ML
4 INJECTION INTRAMUSCULAR; INTRAVENOUS EVERY 6 HOURS PRN
Status: DISCONTINUED | OUTPATIENT
Start: 2024-06-27 | End: 2024-07-01 | Stop reason: HOSPADM

## 2024-06-27 RX ORDER — FLUTICASONE PROPIONATE 50 MCG
2 SPRAY, SUSPENSION (ML) NASAL DAILY
Status: DISCONTINUED | OUTPATIENT
Start: 2024-06-28 | End: 2024-07-01 | Stop reason: HOSPADM

## 2024-06-27 RX ORDER — ISOSORBIDE MONONITRATE 30 MG/1
30 TABLET, EXTENDED RELEASE ORAL DAILY
Status: DISCONTINUED | OUTPATIENT
Start: 2024-06-28 | End: 2024-07-01 | Stop reason: HOSPADM

## 2024-06-27 RX ORDER — CARVEDILOL 25 MG/1
25 TABLET ORAL 2 TIMES DAILY WITH MEALS
Status: DISCONTINUED | OUTPATIENT
Start: 2024-06-28 | End: 2024-07-01 | Stop reason: HOSPADM

## 2024-06-27 RX ORDER — PANTOPRAZOLE SODIUM 40 MG/1
40 TABLET, DELAYED RELEASE ORAL
Status: DISCONTINUED | OUTPATIENT
Start: 2024-06-28 | End: 2024-07-01 | Stop reason: HOSPADM

## 2024-06-27 RX ORDER — SOY PROTEIN
1 POWDER (GRAM) ORAL DAILY
Status: DISCONTINUED | OUTPATIENT
Start: 2024-06-28 | End: 2024-06-27 | Stop reason: CLARIF

## 2024-06-27 RX ORDER — LANOLIN ALCOHOL/MO/W.PET/CERES
400 CREAM (GRAM) TOPICAL DAILY
Status: DISCONTINUED | OUTPATIENT
Start: 2024-06-28 | End: 2024-06-29 | Stop reason: CLARIF

## 2024-06-27 RX ORDER — VITAMIN B COMPLEX
1000 TABLET ORAL DAILY
Status: DISCONTINUED | OUTPATIENT
Start: 2024-06-28 | End: 2024-06-29 | Stop reason: CLARIF

## 2024-06-27 RX ORDER — HYDRALAZINE HYDROCHLORIDE 25 MG/1
25 TABLET, FILM COATED ORAL EVERY 12 HOURS SCHEDULED
Status: DISCONTINUED | OUTPATIENT
Start: 2024-06-27 | End: 2024-07-01 | Stop reason: HOSPADM

## 2024-06-27 RX ORDER — PROMETHAZINE HYDROCHLORIDE 25 MG/ML
25 INJECTION, SOLUTION INTRAMUSCULAR; INTRAVENOUS EVERY 6 HOURS PRN
Status: DISCONTINUED | OUTPATIENT
Start: 2024-06-27 | End: 2024-07-01 | Stop reason: HOSPADM

## 2024-06-27 RX ORDER — LISINOPRIL 10 MG/1
10 TABLET ORAL DAILY
Status: DISCONTINUED | OUTPATIENT
Start: 2024-06-28 | End: 2024-07-01 | Stop reason: HOSPADM

## 2024-06-27 RX ORDER — LEVOTHYROXINE SODIUM 88 UG/1
88 TABLET ORAL DAILY
Status: DISCONTINUED | OUTPATIENT
Start: 2024-06-28 | End: 2024-07-01 | Stop reason: HOSPADM

## 2024-06-27 NOTE — ED PROVIDER NOTES
[facility]  EMERGENCY DEPARTMENT ENCOUNTER      Pt Name: Mecca Braga  MRN: [id number]  Birthdate 1940  Date of evaluation: 6/27/2024  Provider: Javier Hi DO  PCP: Olegario Castellanos DO  Note Started: 3:13 PM EDT 6/27/24    CHIEF COMPLAINT       Chief Complaint   Patient presents with    Leg Swelling     Bilateral ongoing x 1 year-right leg weakness-onset 1 1/2 month       HISTORY OF PRESENT ILLNESS: 1 or more Elements   History From: Patient and daughter  Limitations to history : None    Mecca Braga is a 83 y.o. female who presents to the ED for evaluation of leg pain.  Patient states she has been going to physical therapy and stated that she has been doing well. She states taht she felt so comfortable to the point that she felt that she could start doing more. She states that she thinks that she over did it. SHe states that she now has increasing pain to her right hip to the point where she is having a hard time lift the hip pain us. Patient has no falls or weakness. Patient does have some leg swelling as well.    Nursing Notes were all reviewed and agreed with or any disagreements were addressed in the HPI.    REVIEW OF SYSTEMS :    Positives and Pertinent negatives as per HPI.     SURGICAL HISTORY     Past Surgical History:   Procedure Laterality Date    APPENDECTOMY      CHOLECYSTECTOMY      DIAGNOSTIC CARDIAC CATH LAB PROCEDURE Bilateral 06/05/2017    HYSTERECTOMY, TOTAL ABDOMINAL (CERVIX REMOVED)      NERVE BLOCK  06/03/15    lumbar epidural #1    NERVE BLOCK  6 22 15    lumbar epidural #2    NERVE BLOCK N/A 7/6/15    lumbar epidural #3    TONSILLECTOMY         CURRENTMEDICATIONS       Current Discharge Medication List        CONTINUE these medications which have NOT CHANGED    Details   ELIQUIS 2.5 MG TABS tablet TAKE 1 TABLET BY MOUTH TWICE  DAILY  Qty: 180 tablet, Refills: 3    Comments: Please send a replace/new response with 90-Day Supply if appropriate to maximize member benefit.  done/not done, ED Course, Reassessment, disposition considerations/shared decision making with patient, consults, disposition:      ED Course as of 06/30/24 0010   Thu Jun 27, 2024   1636 EKG:  This EKG is signed and interpreted by me.    Rate: 64  Rhythm: Sinus  Interpretation: NSR, LBBB, no st elevation, QTc at 458  Comparison: was normal  [BP]      ED Course User Index  [BP] Javier Hi,         Chronic Conditions:   Past Medical History:   Diagnosis Date    Back injury     Cervical spondylolysis     Chronic lymphoid leukemia (HCC)     Gallstones     H/O cardiovascular stress test 12/28/2022    Lexiscan    H/O echocardiogram 06/04/2017    EF 69%    History of bone density study     Dr Wahl [facility], [facility]?    Hyperlipidemia     Hypertension     LBBB (left bundle branch block)     Lumbar spondylosis with myelopathy     Pneumonia     Polycythemia vera (HCC)     JAK2    Syncope and collapse     Thyroid disease        CONSULTS: (Who and What was discussed)  IP CONSULT TO SOCIAL WORK  IP CONSULT TO HEM/ONC    Discussion with Other Profesionals : Admitting Team Alanis    Social Determinants : None    Records Reviewed : Source Dr. Benjamin note from 4/3    CC/HPI Summary, DDx, ED Course, and Reassessment: EKG ordered to evaluate patient's current cardiac rate, rhythm, and QT interval. CBC was ordered as part of my assessment for possible infection, anemia or thrombocytopenia. Chest x-ray for any possible signs of, but without limitation to, pneumonia, pleural effusions, cardiomegaly, pneumothorax, atelectasis, rib or sternal abnormalities including fractures.      Disposition Considerations (Tests not ordered but considered, Shared Decision Making, Pt Expectation of Test or Tx.): This is an 83-year-old female who presented to the ED for evaluation of difficulty with pain to her right hip patient admitted that she has been having some difficulty after overdoing it with physical therapy now to the  point where she is having pain to her right leg and right hip.  Patient minimal edema pulses were intact making an arterial occlusion seem unlikely.  Imaging revealed no acute fractures.  However given the patient is unable to walk and given that she lives at home I did admit her to the medicine service and I spoke with Alanis who was agreeable with plan    FINAL IMPRESSION      1. Acute on chronic congestive heart failure, unspecified heart failure type (HCC)          DISPOSITION/PLAN     DISPOSITION Admitted 06/27/2024 09:38:36 PM    PATIENT REFERRED TO:  No follow-up provider specified.    DISCHARGE MEDICATIONS:  Current Discharge Medication List          DISCONTINUED MEDICATIONS:  Current Discharge Medication List               (Please note that portions of this note were completed with a voice recognition program.  Efforts were made to edit the dictations but occasionally words are mis-transcribed.)    Javier Hi DO (electronically signed)        Javier Hi DO  06/30/24 0011

## 2024-06-28 ENCOUNTER — APPOINTMENT (OUTPATIENT)
Age: 84
End: 2024-06-28
Payer: MEDICARE

## 2024-06-28 ENCOUNTER — APPOINTMENT (OUTPATIENT)
Dept: INTERVENTIONAL RADIOLOGY/VASCULAR | Age: 84
End: 2024-06-28
Payer: MEDICARE

## 2024-06-28 LAB
25(OH)D3 SERPL-MCNC: 25.3 NG/ML (ref 30–100)
ALBUMIN SERPL-MCNC: 3.8 G/DL (ref 3.5–5.2)
ALP SERPL-CCNC: 41 U/L (ref 35–104)
ALT SERPL-CCNC: 9 U/L (ref 0–32)
ANION GAP SERPL CALCULATED.3IONS-SCNC: 10 MMOL/L (ref 7–16)
AST SERPL-CCNC: 16 U/L (ref 0–31)
BASOPHILS # BLD: 0.01 K/UL (ref 0–0.2)
BASOPHILS NFR BLD: 0 % (ref 0–2)
BILIRUB SERPL-MCNC: 0.9 MG/DL (ref 0–1.2)
BUN SERPL-MCNC: 26 MG/DL (ref 6–23)
CALCIUM SERPL-MCNC: 8.9 MG/DL (ref 8.6–10.2)
CHLORIDE SERPL-SCNC: 109 MMOL/L (ref 98–107)
CHOLEST SERPL-MCNC: 150 MG/DL
CO2 SERPL-SCNC: 23 MMOL/L (ref 22–29)
CREAT SERPL-MCNC: 1.2 MG/DL (ref 0.5–1)
ECHO AO ASC DIAM: 3.2 CM
ECHO AO ASCENDING AORTA INDEX: 1.75 CM/M2
ECHO AO SINUS VALSALVA DIAM: 2.8 CM
ECHO AO SINUS VALSALVA INDEX: 1.53 CM/M2
ECHO AV AREA PEAK VELOCITY: 2.5 CM2
ECHO AV AREA VTI: 2.8 CM2
ECHO AV AREA/BSA PEAK VELOCITY: 1.4 CM2/M2
ECHO AV AREA/BSA VTI: 1.5 CM2/M2
ECHO AV CUSP MM: 1.9 CM
ECHO AV MEAN GRADIENT: 5 MMHG
ECHO AV MEAN VELOCITY: 1.1 M/S
ECHO AV PEAK GRADIENT: 10 MMHG
ECHO AV PEAK VELOCITY: 1.6 M/S
ECHO AV VELOCITY RATIO: 0.81
ECHO AV VTI: 34.4 CM
ECHO BSA: 1.86 M2
ECHO EST RA PRESSURE: 3 MMHG
ECHO LA DIAMETER INDEX: 1.91 CM/M2
ECHO LA DIAMETER: 3.5 CM
ECHO LA VOL A-L A2C: 52 ML (ref 22–52)
ECHO LA VOL A-L A4C: 52 ML (ref 22–52)
ECHO LA VOL BP: 49 ML (ref 22–52)
ECHO LA VOL MOD A2C: 49 ML (ref 22–52)
ECHO LA VOL MOD A4C: 49 ML (ref 22–52)
ECHO LA VOL/BSA BIPLANE: 27 ML/M2 (ref 16–34)
ECHO LA VOLUME AREA LENGTH: 52 ML
ECHO LA VOLUME INDEX A-L A2C: 28 ML/M2 (ref 16–34)
ECHO LA VOLUME INDEX A-L A4C: 28 ML/M2 (ref 16–34)
ECHO LA VOLUME INDEX AREA LENGTH: 28 ML/M2 (ref 16–34)
ECHO LA VOLUME INDEX MOD A2C: 27 ML/M2 (ref 16–34)
ECHO LA VOLUME INDEX MOD A4C: 27 ML/M2 (ref 16–34)
ECHO LV EDV A2C: 65 ML
ECHO LV EDV A4C: 58 ML
ECHO LV EDV BP: 62 ML (ref 56–104)
ECHO LV EDV INDEX A4C: 32 ML/M2
ECHO LV EDV INDEX BP: 34 ML/M2
ECHO LV EDV NDEX A2C: 36 ML/M2
ECHO LV EJECTION FRACTION A2C: 63 %
ECHO LV EJECTION FRACTION A4C: 51 %
ECHO LV EJECTION FRACTION BIPLANE: 57 % (ref 55–100)
ECHO LV ESV A2C: 24 ML
ECHO LV ESV A4C: 28 ML
ECHO LV ESV BP: 27 ML (ref 19–49)
ECHO LV ESV INDEX A2C: 13 ML/M2
ECHO LV ESV INDEX A4C: 15 ML/M2
ECHO LV ESV INDEX BP: 15 ML/M2
ECHO LV FRACTIONAL SHORTENING: 25 % (ref 28–44)
ECHO LV INTERNAL DIMENSION DIASTOLE INDEX: 1.97 CM/M2
ECHO LV INTERNAL DIMENSION DIASTOLIC: 3.6 CM (ref 3.9–5.3)
ECHO LV INTERNAL DIMENSION SYSTOLIC INDEX: 1.48 CM/M2
ECHO LV INTERNAL DIMENSION SYSTOLIC: 2.7 CM
ECHO LV ISOVOLUMETRIC RELAXATION TIME (IVRT): 186.5 MS
ECHO LV IVSD: 1.5 CM (ref 0.6–0.9)
ECHO LV MASS 2D: 169.8 G (ref 67–162)
ECHO LV MASS INDEX 2D: 92.8 G/M2 (ref 43–95)
ECHO LV POSTERIOR WALL DIASTOLIC: 1.2 CM (ref 0.6–0.9)
ECHO LV RELATIVE WALL THICKNESS RATIO: 0.67
ECHO LVOT AREA: 3.1 CM2
ECHO LVOT AV VTI INDEX: 0.9
ECHO LVOT DIAM: 2 CM
ECHO LVOT MEAN GRADIENT: 4 MMHG
ECHO LVOT PEAK GRADIENT: 6 MMHG
ECHO LVOT PEAK VELOCITY: 1.3 M/S
ECHO LVOT STROKE VOLUME INDEX: 53.2 ML/M2
ECHO LVOT SV: 97.3 ML
ECHO LVOT VTI: 31 CM
ECHO MV A VELOCITY: 0.98 M/S
ECHO MV AREA PHT: 2.5 CM2
ECHO MV AREA VTI: 3.2 CM2
ECHO MV E DECELERATION TIME (DT): 253.6 MS
ECHO MV E VELOCITY: 0.59 M/S
ECHO MV E/A RATIO: 0.6
ECHO MV LVOT VTI INDEX: 0.98
ECHO MV MAX VELOCITY: 1 M/S
ECHO MV MEAN GRADIENT: 1 MMHG
ECHO MV MEAN VELOCITY: 0.5 M/S
ECHO MV PEAK GRADIENT: 4 MMHG
ECHO MV PRESSURE HALF TIME (PHT): 86.7 MS
ECHO MV VTI: 30.5 CM
ECHO PULMONARY ARTERY END DIASTOLIC PRESSURE: 4 MMHG
ECHO PV MAX VELOCITY: 0.8 M/S
ECHO PV MEAN GRADIENT: 1 MMHG
ECHO PV MEAN VELOCITY: 0.6 M/S
ECHO PV PEAK GRADIENT: 3 MMHG
ECHO PV REGURGITANT MAX VELOCITY: 1 M/S
ECHO PV VTI: 19.4 CM
ECHO PVEIN A DURATION: 133.2 MS
ECHO PVEIN A VELOCITY: 0.3 M/S
ECHO PVEIN PEAK D VELOCITY: 0.2 M/S
ECHO PVEIN PEAK S VELOCITY: 0.5 M/S
ECHO PVEIN S/D RATIO: 2.5
ECHO RIGHT VENTRICULAR SYSTOLIC PRESSURE (RVSP): 24 MMHG
ECHO RV INTERNAL DIMENSION: 3.6 CM
ECHO RV LONGITUDINAL DIMENSION: 6.4 CM
ECHO RV MID DIMENSION: 1.9 CM
ECHO RV TAPSE: 1.9 CM (ref 1.7–?)
ECHO TV REGURGITANT MAX VELOCITY: 2.29 M/S
ECHO TV REGURGITANT PEAK GRADIENT: 21 MMHG
EKG ATRIAL RATE: 78 BPM
EKG P AXIS: 84 DEGREES
EKG P-R INTERVAL: 192 MS
EKG Q-T INTERVAL: 418 MS
EKG QRS DURATION: 142 MS
EKG QTC CALCULATION (BAZETT): 476 MS
EKG R AXIS: -26 DEGREES
EKG T AXIS: 126 DEGREES
EKG VENTRICULAR RATE: 78 BPM
EOSINOPHIL # BLD: 0.01 K/UL (ref 0.05–0.5)
EOSINOPHILS RELATIVE PERCENT: 0 % (ref 0–6)
ERYTHROCYTE [DISTWIDTH] IN BLOOD BY AUTOMATED COUNT: 20.8 % (ref 11.5–15)
FOLATE SERPL-MCNC: >20 NG/ML (ref 4.8–24.2)
GFR, ESTIMATED: 45 ML/MIN/1.73M2
GLUCOSE SERPL-MCNC: 99 MG/DL (ref 74–99)
HCT VFR BLD AUTO: 26.6 % (ref 34–48)
HDLC SERPL-MCNC: 37 MG/DL
HGB BLD-MCNC: 8 G/DL (ref 11.5–15.5)
IMM GRANULOCYTES # BLD AUTO: 0.08 K/UL (ref 0–0.58)
IMM GRANULOCYTES NFR BLD: 2 % (ref 0–5)
IRON SATN MFR SERPL: 40 % (ref 15–50)
IRON SERPL-MCNC: 78 UG/DL (ref 37–145)
LDLC SERPL CALC-MCNC: 93 MG/DL
LYMPHOCYTES NFR BLD: 0.25 K/UL (ref 1.5–4)
LYMPHOCYTES RELATIVE PERCENT: 7 % (ref 20–42)
MCH RBC QN AUTO: 27 PG (ref 26–35)
MCHC RBC AUTO-ENTMCNC: 30.1 G/DL (ref 32–34.5)
MCV RBC AUTO: 89.9 FL (ref 80–99.9)
MONOCYTES NFR BLD: 0.16 K/UL (ref 0.1–0.95)
MONOCYTES NFR BLD: 4 % (ref 2–12)
NEUTROPHILS NFR BLD: 86 % (ref 43–80)
NEUTS SEG NFR BLD: 3.19 K/UL (ref 1.8–7.3)
PLATELET CONFIRMATION: NORMAL
PLATELET, FLUORESCENCE: 74 K/UL (ref 130–450)
PMV BLD AUTO: ABNORMAL FL (ref 7–12)
POTASSIUM SERPL-SCNC: 3.9 MMOL/L (ref 3.5–5)
PROT SERPL-MCNC: 5.5 G/DL (ref 6.4–8.3)
RBC # BLD AUTO: 2.96 M/UL (ref 3.5–5.5)
SODIUM SERPL-SCNC: 142 MMOL/L (ref 132–146)
T4 FREE SERPL-MCNC: 1.9 NG/DL (ref 0.9–1.7)
TIBC SERPL-MCNC: 195 UG/DL (ref 250–450)
TRIGL SERPL-MCNC: 98 MG/DL
TROPONIN I SERPL HS-MCNC: 37 NG/L (ref 0–9)
TSH SERPL DL<=0.05 MIU/L-ACNC: 0.92 UIU/ML (ref 0.27–4.2)
VIT B12 SERPL-MCNC: 1070 PG/ML (ref 211–946)
VLDLC SERPL CALC-MCNC: 20 MG/DL
WBC OTHER # BLD: 3.7 K/UL (ref 4.5–11.5)

## 2024-06-28 PROCEDURE — 6370000000 HC RX 637 (ALT 250 FOR IP)

## 2024-06-28 PROCEDURE — 97535 SELF CARE MNGMENT TRAINING: CPT

## 2024-06-28 PROCEDURE — 83550 IRON BINDING TEST: CPT

## 2024-06-28 PROCEDURE — 93970 EXTREMITY STUDY: CPT

## 2024-06-28 PROCEDURE — 80061 LIPID PANEL: CPT

## 2024-06-28 PROCEDURE — 93306 TTE W/DOPPLER COMPLETE: CPT

## 2024-06-28 PROCEDURE — 93306 TTE W/DOPPLER COMPLETE: CPT | Performed by: INTERNAL MEDICINE

## 2024-06-28 PROCEDURE — 84443 ASSAY THYROID STIM HORMONE: CPT

## 2024-06-28 PROCEDURE — 82306 VITAMIN D 25 HYDROXY: CPT

## 2024-06-28 PROCEDURE — 83540 ASSAY OF IRON: CPT

## 2024-06-28 PROCEDURE — 85025 COMPLETE CBC W/AUTO DIFF WBC: CPT

## 2024-06-28 PROCEDURE — 84439 ASSAY OF FREE THYROXINE: CPT

## 2024-06-28 PROCEDURE — 93005 ELECTROCARDIOGRAM TRACING: CPT

## 2024-06-28 PROCEDURE — 36415 COLL VENOUS BLD VENIPUNCTURE: CPT

## 2024-06-28 PROCEDURE — 82746 ASSAY OF FOLIC ACID SERUM: CPT

## 2024-06-28 PROCEDURE — 97165 OT EVAL LOW COMPLEX 30 MIN: CPT

## 2024-06-28 PROCEDURE — 80053 COMPREHEN METABOLIC PANEL: CPT

## 2024-06-28 PROCEDURE — 97530 THERAPEUTIC ACTIVITIES: CPT | Performed by: PHYSICAL THERAPIST

## 2024-06-28 PROCEDURE — 84484 ASSAY OF TROPONIN QUANT: CPT

## 2024-06-28 PROCEDURE — 82607 VITAMIN B-12: CPT

## 2024-06-28 PROCEDURE — 97161 PT EVAL LOW COMPLEX 20 MIN: CPT | Performed by: PHYSICAL THERAPIST

## 2024-06-28 PROCEDURE — 93010 ELECTROCARDIOGRAM REPORT: CPT | Performed by: INTERNAL MEDICINE

## 2024-06-28 PROCEDURE — 1200000000 HC SEMI PRIVATE

## 2024-06-28 RX ADMIN — HYDRALAZINE HYDROCHLORIDE 25 MG: 25 TABLET ORAL at 08:39

## 2024-06-28 RX ADMIN — FOLIC ACID TAB 400 MCG 400 MCG: 400 TAB at 12:59

## 2024-06-28 RX ADMIN — FERROUS SULFATE TAB 325 MG (65 MG ELEMENTAL FE) 325 MG: 325 (65 FE) TAB at 21:52

## 2024-06-28 RX ADMIN — FOLIC ACID 1 MG: 1 TABLET ORAL at 08:40

## 2024-06-28 RX ADMIN — EMPAGLIFLOZIN 25 MG: 10 TABLET, FILM COATED ORAL at 08:53

## 2024-06-28 RX ADMIN — CARVEDILOL 25 MG: 25 TABLET, FILM COATED ORAL at 17:35

## 2024-06-28 RX ADMIN — APIXABAN 2.5 MG: 2.5 TABLET, FILM COATED ORAL at 21:51

## 2024-06-28 RX ADMIN — APIXABAN 2.5 MG: 2.5 TABLET, FILM COATED ORAL at 08:40

## 2024-06-28 RX ADMIN — LEVOTHYROXINE SODIUM 88 MCG: 0.09 TABLET ORAL at 06:38

## 2024-06-28 RX ADMIN — HYDRALAZINE HYDROCHLORIDE 25 MG: 25 TABLET ORAL at 21:51

## 2024-06-28 RX ADMIN — CYANOCOBALAMIN TAB 1000 MCG 500 MCG: 1000 TAB at 08:39

## 2024-06-28 RX ADMIN — Medication 1000 UNITS: at 08:39

## 2024-06-28 RX ADMIN — LISINOPRIL 10 MG: 10 TABLET ORAL at 08:39

## 2024-06-28 RX ADMIN — ISOSORBIDE MONONITRATE 30 MG: 30 TABLET, EXTENDED RELEASE ORAL at 08:39

## 2024-06-28 RX ADMIN — CARVEDILOL 25 MG: 25 TABLET, FILM COATED ORAL at 08:40

## 2024-06-28 ASSESSMENT — PAIN - FUNCTIONAL ASSESSMENT: PAIN_FUNCTIONAL_ASSESSMENT: PREVENTS OR INTERFERES SOME ACTIVE ACTIVITIES AND ADLS

## 2024-06-28 ASSESSMENT — PAIN DESCRIPTION - LOCATION
LOCATION: LEG;FOOT
LOCATION: LEG

## 2024-06-28 ASSESSMENT — PAIN SCALES - GENERAL
PAINLEVEL_OUTOF10: 8
PAINLEVEL_OUTOF10: 7

## 2024-06-28 ASSESSMENT — PAIN DESCRIPTION - ONSET: ONSET: ON-GOING

## 2024-06-28 ASSESSMENT — PAIN DESCRIPTION - DESCRIPTORS: DESCRIPTORS: ACHING

## 2024-06-28 ASSESSMENT — PAIN DESCRIPTION - FREQUENCY: FREQUENCY: CONTINUOUS

## 2024-06-28 ASSESSMENT — PAIN DESCRIPTION - PAIN TYPE: TYPE: ACUTE PAIN

## 2024-06-28 NOTE — H&P
Internal Medicine Consult Note    [facility]    Aidan Wall D.O., BIANCAI.                    Dipak Burleson D.O., ZOE Zapata D.O.     Cesilia Brown, MSN, APRN, NP-C  Edvin Estes, MSN, APRN-CNP  Ash Santo, MSN, APRN-CNP  Beverly Dean, MSN, APRN-CNP  Alanis Hurtado, MSN, APRN-CNP     Department of Internal Medicine  History and Physical    PCP: Dr. RIKI Castellanos   Admitting Physician: Dr. Wall  Consultants:  PT/OT/SW      CHIEF COMPLAINT:  leg swelling, pain, fall    HISTORY OF PRESENT ILLNESS:    Patient lives at home with son Carlos Enrique who is her primary caregiver. He states that she has been working with physical therapy for about two months using a walker. The last few days her son has noticed her placing more weight onto the left leg and walking with a limp. Today her legs buckled but he was able to lower her to the ground.  She endorses intermittent right hip pain, dull, worsened with movement, alleviated with rest and elevation, 8/10 at its most severe. She also describes a tightness feeling to her feet and legs and increased leg swelling. Her son informs me that her legs normally are swollen but currently they are the best they have looked for awhile. She does not use nicotine, alcohol, marijuana, or illicit drugs.     PAST MEDICAL Hx:  Past Medical History:   Diagnosis Date    Back injury     Cervical spondylolysis     Chronic lymphoid leukemia (HCC)     Gallstones     H/O cardiovascular stress test 12/28/2022    Lexiscan    H/O echocardiogram 06/04/2017    EF 69%    History of bone density study     Dr Wahl [facility], [facility]?    Hyperlipidemia     Hypertension     LBBB (left bundle branch block)     Lumbar spondylosis with myelopathy     Pneumonia     Polycythemia vera (HCC)     JAK2    Syncope and collapse     Thyroid disease        PAST SURGICAL Hx:   Past Surgical History:   Procedure Laterality Date     (1/10/2024)    Exercise Vital Sign     Days of Exercise per Week: 1 day     Minutes of Exercise per Session: 30 min   Stress: Stress Concern Present (1/11/2023)    [employer] - Occupational Stress Questionnaire     Feeling of Stress : To some extent   Social Connections: Socially Isolated (1/11/2023)    Social Connection and Isolation Panel [NHANES]     Frequency of Communication with Friends and Family: More than three times a week     Frequency of Social Gatherings with Friends and Family: More than three times a week     Attends Buddhism Services: Never     Active Member of Clubs or Organizations: No     Attends Club or Organization Meetings: Never     Marital Status:    Intimate Partner Violence: Not on file   Housing Stability: Unknown (1/10/2024)    Housing Stability Vital Sign     Unable to Pay for Housing in the Last Year: Not on file     Number of Places Lived in the Last Year: Not on file     Unstable Housing in the Last Year: No       ROS:  General:   Denies chills, fatigue, fever, malaise, night sweats or weight loss    Psychological:   Denies anxiety, disorientation or hallucinations    ENT:    Denies epistaxis, headaches, vertigo or visual changes    Cardiovascular:   Denies any chest pain, irregular heartbeats, or palpitations. No paroxysmal nocturnal dyspnea.    Respiratory:   Denies shortness of breath, coughing, sputum production, hemoptysis, or wheezing.  No orthopnea.    Gastrointestinal:   Denies nausea, vomiting, diarrhea, or constipation.  Denies any abdominal pain.  Denies change in bowel habits or stools.      Genito-Urinary:    Denies any urgency, frequency, hematuria.  Voiding without difficulty.    Musculoskeletal:   Endorses right hip pain, dull, worsened with movement, alleviated with rest and elevation, 8/10 severity. Ambulates with walker.     Neurology:    Denies any headache or focal neurological deficits. No weakness or paresthesia.    Derm:   06/27/2024     01/10/2023     06/27/2024    K 4.5 06/27/2024     06/27/2024    CREATININE 1.3 (H) 06/27/2024    BUN 28 (H) 06/27/2024    CO2 23 06/27/2024    GLUCOSE 98 06/27/2024    ALT 10 06/27/2024    AST 19 06/27/2024    INR 1.5 06/27/2024    APTT 39.2 (H) 01/05/2023     Lab Results   Component Value Date    INR 1.5 06/27/2024    INR 2.5 01/05/2023    INR 1.5 10/02/2021    PROTIME 16.9 (H) 06/27/2024    PROTIME 29.5 (H) 01/05/2023    PROTIME 17.5 (H) 10/02/2021      Lab Results   Component Value Date    TSH 16.680 (H) 12/22/2022     No results found for: \"TRIG\"  Lab Results   Component Value Date    HDL 33 02/04/2021     No components found for: \"LDLCALC\"  No results found for: \"LABA1C\"    IMAGING:  XR HIP RIGHT (2-3 VIEWS)    Result Date: 6/27/2024  EXAMINATION: TWO XRAY VIEWS OF THE RIGHT HIP 6/27/2024 3:37 pm COMPARISON: None. HISTORY: ORDERING SYSTEM PROVIDED HISTORY: fall, pain, r/o fx TECHNOLOGIST PROVIDED HISTORY: Reason for exam:->fall, pain, r/o fx FINDINGS: Osteopenia, which decreases the sensitivity for detection of subtle or nondisplaced fractures. Consider short-term follow-up radiographs or further investigation with CT/MRI as clinically indicated.  Small metallic foreign object in the pelvis.  Mild DJD hip joints and SI joints.  Chronic appearing nondisplaced left pubic rami fractures.     No radiographic evidence for fracture. RECOMMENDATION: Short-term follow-up radiographs in 7-10 days or cross-sectional imaging (CT/MRI) if there is persistent concern for fracture or other traumatic process or the symptoms persist.     XR CHEST (2 VW)    Result Date: 6/27/2024  EXAMINATION: TWO XRAY VIEWS OF THE CHEST 6/27/2024 2:41 pm COMPARISON: 02/25/2024 HISTORY: ORDERING SYSTEM PROVIDED HISTORY: leg swelling TECHNOLOGIST PROVIDED HISTORY: Reason for exam:->leg swelling FINDINGS: The lungs are without acute focal process.  There is no effusion or pneumothorax. The cardiomediastinal

## 2024-06-28 NOTE — PROGRESS NOTES
Physical Therapy  Physical Therapy Initial Evaluation/Plan of Care    Room #:  0624/0624-01  Patient Name: Mecca Braga  YOB: 1940  MRN: [id number]    Date of Service: 6/28/2024     Tentative placement recommendation: Subacute Rehab  Equipment recommendation: To be determined      Evaluating Physical Therapist: Mukesh Eckert PT, DPT #[id number]      Specific Provider Orders/Date/Referring Provider :     06/27/24 2145    PT eval and treat  Start:  06/27/24 2145,   End:  06/27/24 2145,   ONE TIME,   Standing Count:  1 Occurrences,   R       Alanis Hurtado, APRN - CNP Acknowledge New    Admitting Diagnosis:   Acute on chronic diastolic (congestive) heart failure (HCC) [I50.33]  Gait disturbance [R26.9]      Surgery: none  Visit Diagnoses         Codes    Acute on chronic congestive heart failure, unspecified heart failure type (HCC)    -  Primary I50.9            Patient Active Problem List   Diagnosis    Syncope    Leukocytosis    Troponin I above reference range    Elevated brain natriuretic peptide (BNP) level    Left bundle branch block    Lumbar radiculopathy    Low back pain    Facet syndrome, lumbar    Compression fracture of L1 lumbar vertebra Old    DDD (degenerative disc disease), lumbar    Rotoscoliosis    Lumbar sprain    Lumbar strain    Protruded lumbar disc    Chronic pulmonary embolism (HCC)    Acute cystitis with hematuria    Polycythemia    Vitamin D deficiency    Hypothyroidism    Essential hypertension    Hyperlipidemia    Closed compression fracture of L1 lumbar vertebra, sequela    Lumbar spondylosis    Lumbar facet arthropathy    Lumbar disc disorder    Chronic pain syndrome    Chronic renal disease, stage III (HCC) [933337]    Failure to thrive in adult    Thrombocytopenia, unspecified    Acute on chronic diastolic (congestive) heart failure (HCC)    Gait disturbance        ASSESSMENT of Current Deficits Patient exhibits decreased strength, balance, and endurance impairing  eval  RLE:  3-/5  LLE :  3+/5  Increase strength in affected mm groups by 1/3 grade   Balance Sitting EOB:  fair   Dynamic Standing:  fair- with WW  Sitting EOB:  fair+  Dynamic Standing: fair with WW     Patient is Alert & Oriented x person, place, time, and situation and follows directions    Sensation:  Patient  denies numbness/tingling   Edema:  no   Endurance: fair -    Vitals: room air   Blood Pressure at rest  Blood Pressure during session    Heart Rate at rest  Heart Rate during session    SPO2 at rest %  SPO2 during session %     Patient education  Patient educated on role of Physical Therapy, risks of immobility, safety and plan of care, energy conservation,  importance of mobility while in hospital , ankle pumps, quad set and glut set for edema control, blood clot prevention, importance and purpose of adaptive device and adjusted to proper height for the patient., safety , stair training , and positioning for skin integrity and comfort     Patient response to education:   Pt verbalized understanding Pt demonstrated skill Pt requires further education in this area   Yes Partial Yes      Treatment:  Patient practiced and was instructed/facilitated in the following treatment: Patient Sat edge of bed 15 minutes with Minimal assist of 1 to increase dynamic sitting balance and activity tolerance. Pt performed bed mobility, transfers, ambulation along the bed.      Therapeutic Exercises:  not performed      At end of session, patient in bed with daughter present call light and phone within reach,  all lines and tubes intact, nursing notified.      Patient would benefit from continued skilled Physical Therapy to improve functional independence and quality of life.         Patient's/ family goals   rehab    Time in    1027  Time out  1125    Total Treatment Time  38 minutes    Evaluation time includes thorough review of current medical information, gathering information on past medical history/social history and

## 2024-06-28 NOTE — PROGRESS NOTES
Internal Medicine Consult Note    [facility]    Aidan Wall D.O., DIANE.                    Dipak Burleson D.O., ZOE Zapata D.O.     Cesilia Brown, MSN, APRN, NP-C  Edvin Estes, MSN, APRN-CNP  Ash Santo, MSN, APRN-CNP  Beverly Dean, MSN APRN-CNP  Alanis Hurtado, MSN. APRN-NP-C     Primary Care Physician: Olegario Castellanos DO   Admitting Physician:  Aidan Wall DO  Admission date and time: 6/27/2024  1:52 PM    Room:  [facility]/0624-01  Admitting diagnosis: Acute on chronic diastolic (congestive) heart failure (HCC) [I50.33]  Gait disturbance [R26.9]    Patient Name: Mecca Braga  MRN: [id number]    Date of Service: 6/28/2024     Subjective:  Mecca is a 83 y.o. female who was seen and examined today,6/28/2024, at the bedside.  Amara is resting comfortably with her family at the bedside.  She continues to complain of lower extremity weakness.  She is also reporting left lower extremity edema.  Will check Doppler studies of lower extremities along with echocardiogram.    family present during my examination.    Review of System:   Constitutional:   Denies fever or chills, weight loss or gain, positive fatigue  HEENT:   Denies ear pain, sore throat, sinus or eye problems.  Cardiovascular:   Denies any chest pain, irregular heartbeats, or palpitations.   Respiratory:   Denies  coughing, sputum production, hemoptysis, or wheezing.  Reports shortness of breath with activity  Gastrointestinal:   Denies nausea, vomiting, diarrhea, or constipation.  Denies any abdominal pain.  Genitourinary:    Denies any urgency, frequency, hematuria. Voiding  without difficulty.  Extremities:   Reports left lower extremity edema  Neurology:    Denies any headache or focal neurological deficits, Denies generalized weakness or memory difficulty.   Psch:   Denies being anxious or depressed.  Musculoskeletal:    Denies  myalgias, joint complaints or back pain.  counseling/coordinating care with the patient and/or family with face to face contact.  This time was spent reviewing notes and laboratory data as well as instructing and counseling the patient. Time I spent with the family or surrogate(s) is included only if the patient was incapable of providing the necessary information or participating in medical decisions. I also discussed the differential diagnosis and all of the proposed management plans with the patient and individuals accompanying the patient.    The patient was seen, examined and then discussed with Dr. Wall.     CRISTIAN Pan CNP  6/28/2024  5:44 PM        I saw and evaluated the patient. I agree with the findings and the plan of care as documented in Ash VALLECILLO note.    Aidan Wall DO, FACOI  6:39 PM  6/28/2024

## 2024-06-28 NOTE — PROGRESS NOTES
OCCUPATIONAL THERAPY INITIAL EVALUATION    [facility]  [address] [place]        Date:2024                                                  Patient Name: Mecca Braga    MRN: [id number]    : 1940    Room: [address] [place] [address]      Evaluating OT: Garo Osorio OTR/L; #[id number]     Referring Provider and Specific Provider Orders/Date:      24  OT eval and treat  Start:  24,   End:  24,   ONE TIME,   Standing Count:  1 Occurrences,   R         Alanis Hurtado, APRN - CNP      Placement Recommendation: Home with  family assistance and occupational therapy vs subacute rehab if patient does not meet OT goals.        Diagnosis:   1. Acute on chronic congestive heart failure, unspecified heart failure type (HCC)         Surgery: None      Pertinent Medical History:       Past Medical History:   Diagnosis Date    Back injury     Cervical spondylolysis     Chronic lymphoid leukemia (HCC)     Gallstones     H/O cardiovascular stress test 2022    Lexiscan    H/O echocardiogram 2017    EF 69%    History of bone density study     [facility], [facility]?    Hyperlipidemia     Hypertension     LBBB (left bundle branch block)     Lumbar spondylosis with myelopathy     Pneumonia     Polycythemia vera (HCC)     JAK2    Syncope and collapse     Thyroid disease          Past Surgical History:   Procedure Laterality Date    APPENDECTOMY      CHOLECYSTECTOMY      DIAGNOSTIC CARDIAC CATH LAB PROCEDURE Bilateral 2017    HYSTERECTOMY, TOTAL ABDOMINAL (CERVIX REMOVED)      NERVE BLOCK  06/03/15    lumbar epidural #1    NERVE BLOCK  6 22 15    lumbar epidural #2    NERVE BLOCK N/A 7/6/15    lumbar epidural #3    TONSILLECTOMY          Precautions:  Fall Risk, Up with Assistance,      Assessment of current deficits:     [x] Functional mobility  [x]ADLs  [x] Strength               []Cognition    [x] Functional

## 2024-06-28 NOTE — PROGRESS NOTES
4 Eyes Skin Assessment     NAME:  Mecca Braga  YOB: 1940  MEDICAL RECORD NUMBER:  [id number]    The patient is being assessed for  Admission    I agree that at least one RN has performed a thorough Head to Toe Skin Assessment on the patient. ALL assessment sites listed below have been assessed.      Areas assessed by both nurses:    Head, Face, Ears, Shoulders, Back, Chest, Arms, Elbows, Hands, Sacrum. Buttock, Coccyx, Ischium, Legs. Feet and Heels, and Under Medical Devices         Does the Patient have a Wound? No noted wound(s)       Raimundo Prevention initiated by RN: Yes  Wound Care Orders initiated by RN: No    Pressure Injury (Stage 3,4, Unstageable, DTI, NWPT, and Complex wounds) if present, place Wound referral order by RN under : No    New Ostomies, if present place, Ostomy referral order under : No     Nurse 1 eSignature: Electronically signed by Perla Bedolla RN on 6/28/24 at 3:00 AM EDT    **SHARE this note so that the co-signing nurse can place an eSignature**    Nurse 2 eSignature: Electronically signed by Elieser Bar RN on 6/28/24 at 3:01 AM EDT

## 2024-06-29 LAB
ALBUMIN SERPL-MCNC: 3.7 G/DL (ref 3.5–5.2)
ALP SERPL-CCNC: 39 U/L (ref 35–104)
ALT SERPL-CCNC: 9 U/L (ref 0–32)
ANION GAP SERPL CALCULATED.3IONS-SCNC: 9 MMOL/L (ref 7–16)
AST SERPL-CCNC: 15 U/L (ref 0–31)
BACTERIA URNS QL MICRO: ABNORMAL
BASOPHILS # BLD: 0 K/UL (ref 0–0.2)
BASOPHILS NFR BLD: 0 % (ref 0–2)
BILIRUB SERPL-MCNC: 0.6 MG/DL (ref 0–1.2)
BILIRUB UR QL STRIP: NEGATIVE
BUN SERPL-MCNC: 24 MG/DL (ref 6–23)
CALCIUM SERPL-MCNC: 8.7 MG/DL (ref 8.6–10.2)
CHLORIDE SERPL-SCNC: 108 MMOL/L (ref 98–107)
CLARITY UR: ABNORMAL
CO2 SERPL-SCNC: 23 MMOL/L (ref 22–29)
COLOR UR: YELLOW
CREAT SERPL-MCNC: 1.3 MG/DL (ref 0.5–1)
EOSINOPHIL # BLD: 0.03 K/UL (ref 0.05–0.5)
EOSINOPHILS RELATIVE PERCENT: 1 % (ref 0–6)
EPI CELLS #/AREA URNS HPF: ABNORMAL /HPF
ERYTHROCYTE [DISTWIDTH] IN BLOOD BY AUTOMATED COUNT: 20.9 % (ref 11.5–15)
GFR, ESTIMATED: 42 ML/MIN/1.73M2
GLUCOSE SERPL-MCNC: 111 MG/DL (ref 74–99)
GLUCOSE UR STRIP-MCNC: >=1000 MG/DL
HCT VFR BLD AUTO: 24.1 % (ref 34–48)
HGB BLD-MCNC: 7.5 G/DL (ref 11.5–15.5)
HGB UR QL STRIP.AUTO: ABNORMAL
KETONES UR STRIP-MCNC: NEGATIVE MG/DL
LEUKOCYTE ESTERASE UR QL STRIP: ABNORMAL
LYMPHOCYTES NFR BLD: 0.22 K/UL (ref 1.5–4)
LYMPHOCYTES RELATIVE PERCENT: 7 % (ref 20–42)
MCH RBC QN AUTO: 27.9 PG (ref 26–35)
MCHC RBC AUTO-ENTMCNC: 31.1 G/DL (ref 32–34.5)
MCV RBC AUTO: 89.6 FL (ref 80–99.9)
MONOCYTES NFR BLD: 0 % (ref 2–12)
MONOCYTES NFR BLD: 0 K/UL (ref 0.1–0.95)
MYELOCYTES ABSOLUTE COUNT: 0.03 K/UL
MYELOCYTES: 1 %
NEUTROPHILS NFR BLD: 91 % (ref 43–80)
NEUTS SEG NFR BLD: 2.92 K/UL (ref 1.8–7.3)
NITRITE UR QL STRIP: POSITIVE
PH UR STRIP: 7 [PH] (ref 5–9)
PLATELET CONFIRMATION: NORMAL
PLATELET, FLUORESCENCE: 76 K/UL (ref 130–450)
PMV BLD AUTO: ABNORMAL FL (ref 7–12)
POTASSIUM SERPL-SCNC: 3.8 MMOL/L (ref 3.5–5)
PROT SERPL-MCNC: 5.2 G/DL (ref 6.4–8.3)
PROT UR STRIP-MCNC: 100 MG/DL
RBC # BLD AUTO: 2.69 M/UL (ref 3.5–5.5)
RBC # BLD: ABNORMAL 10*6/UL
RBC #/AREA URNS HPF: ABNORMAL /HPF
SODIUM SERPL-SCNC: 140 MMOL/L (ref 132–146)
SP GR UR STRIP: 1.02 (ref 1–1.03)
UROBILINOGEN UR STRIP-ACNC: 0.2 EU/DL (ref 0–1)
WBC #/AREA URNS HPF: ABNORMAL /HPF
WBC OTHER # BLD: 3.2 K/UL (ref 4.5–11.5)

## 2024-06-29 PROCEDURE — 87086 URINE CULTURE/COLONY COUNT: CPT

## 2024-06-29 PROCEDURE — 2580000003 HC RX 258

## 2024-06-29 PROCEDURE — 80053 COMPREHEN METABOLIC PANEL: CPT

## 2024-06-29 PROCEDURE — 6370000000 HC RX 637 (ALT 250 FOR IP)

## 2024-06-29 PROCEDURE — 85025 COMPLETE CBC W/AUTO DIFF WBC: CPT

## 2024-06-29 PROCEDURE — 87088 URINE BACTERIA CULTURE: CPT

## 2024-06-29 PROCEDURE — 1200000000 HC SEMI PRIVATE

## 2024-06-29 PROCEDURE — 36415 COLL VENOUS BLD VENIPUNCTURE: CPT

## 2024-06-29 PROCEDURE — 6360000002 HC RX W HCPCS

## 2024-06-29 PROCEDURE — 81001 URINALYSIS AUTO W/SCOPE: CPT

## 2024-06-29 RX ORDER — LANOLIN ALCOHOL/MO/W.PET/CERES
400 CREAM (GRAM) TOPICAL DAILY
Status: DISCONTINUED | OUTPATIENT
Start: 2024-06-29 | End: 2024-07-01 | Stop reason: HOSPADM

## 2024-06-29 RX ORDER — LANOLIN ALCOHOL/MO/W.PET/CERES
500 CREAM (GRAM) TOPICAL DAILY
Status: DISCONTINUED | OUTPATIENT
Start: 2024-06-29 | End: 2024-07-01 | Stop reason: HOSPADM

## 2024-06-29 RX ORDER — FOLIC ACID 1 MG/1
1 TABLET ORAL DAILY
Status: DISCONTINUED | OUTPATIENT
Start: 2024-06-29 | End: 2024-07-01 | Stop reason: HOSPADM

## 2024-06-29 RX ORDER — VITAMIN B COMPLEX
1000 TABLET ORAL DAILY
Status: DISCONTINUED | OUTPATIENT
Start: 2024-06-29 | End: 2024-07-01 | Stop reason: HOSPADM

## 2024-06-29 RX ADMIN — LEVOTHYROXINE SODIUM 88 MCG: 0.09 TABLET ORAL at 06:24

## 2024-06-29 RX ADMIN — APIXABAN 2.5 MG: 2.5 TABLET, FILM COATED ORAL at 08:37

## 2024-06-29 RX ADMIN — Medication 1000 UNITS: at 12:00

## 2024-06-29 RX ADMIN — CYANOCOBALAMIN TAB 1000 MCG 500 MCG: 1000 TAB at 12:00

## 2024-06-29 RX ADMIN — CARVEDILOL 25 MG: 25 TABLET, FILM COATED ORAL at 16:52

## 2024-06-29 RX ADMIN — HYDRALAZINE HYDROCHLORIDE 25 MG: 25 TABLET ORAL at 22:19

## 2024-06-29 RX ADMIN — Medication 1 TABLET: at 12:00

## 2024-06-29 RX ADMIN — CARVEDILOL 25 MG: 25 TABLET, FILM COATED ORAL at 08:37

## 2024-06-29 RX ADMIN — WATER 1000 MG: 1 INJECTION INTRAMUSCULAR; INTRAVENOUS; SUBCUTANEOUS at 22:22

## 2024-06-29 RX ADMIN — FOLIC ACID TAB 400 MCG 400 MCG: 400 TAB at 12:00

## 2024-06-29 RX ADMIN — FOLIC ACID 1 MG: 1 TABLET ORAL at 12:01

## 2024-06-29 RX ADMIN — LISINOPRIL 10 MG: 10 TABLET ORAL at 08:38

## 2024-06-29 RX ADMIN — FERROUS SULFATE TAB 325 MG (65 MG ELEMENTAL FE) 325 MG: 325 (65 FE) TAB at 12:01

## 2024-06-29 RX ADMIN — APIXABAN 2.5 MG: 2.5 TABLET, FILM COATED ORAL at 22:19

## 2024-06-29 RX ADMIN — ISOSORBIDE MONONITRATE 30 MG: 30 TABLET, EXTENDED RELEASE ORAL at 08:39

## 2024-06-29 RX ADMIN — PANTOPRAZOLE SODIUM 40 MG: 40 TABLET, DELAYED RELEASE ORAL at 06:24

## 2024-06-29 RX ADMIN — HYDRALAZINE HYDROCHLORIDE 25 MG: 25 TABLET ORAL at 08:37

## 2024-06-29 RX ADMIN — EMPAGLIFLOZIN 25 MG: 10 TABLET, FILM COATED ORAL at 08:38

## 2024-06-29 NOTE — ACP (ADVANCE CARE PLANNING)
Advance Care Planning   Healthcare Decision Maker:    Primary Decision Maker: Carlos Enrique Braga - Child - [phone]      Today we documented Decision Maker(s) consistent with Legal Next of Kin hierarchy.

## 2024-06-29 NOTE — CARE COORDINATION
Case Management Assessment  Initial Evaluation    Date/Time of Evaluation: 6/29/2024 1:02 PM  Assessment Completed by: LESIA Juares    If patient is discharged prior to next notation, then this note serves as note for discharge by case management.    Patient Name: Mecca Braga                   YOB: 1940  Diagnosis: Acute on chronic diastolic (congestive) heart failure (HCC) [I50.33]  Gait disturbance [R26.9]                   Date / Time: 6/27/2024  1:52 PM    Patient Admission Status: Inpatient   Readmission Risk (Low < 19, Mod (19-27), High > 27): Readmission Risk Score: 15.6    Current PCP: Olegario Castellanos, DO  PCP verified by CM? Yes    Chart Reviewed: Yes      History Provided by: Patient, Child/Family  Patient Orientation: Alert and Oriented    Patient Cognition: Alert    Hospitalization in the last 30 days (Readmission):  No    If yes, Readmission Assessment in CM Navigator will be completed.    Advance Directives:      Code Status: Full Code   Patient's Primary Decision Maker is: Legal Next of Kin    Primary Decision Maker: Carlos Enrique Braga - Child - [phone]    Discharge Planning:    Patient lives with: Children (lives with her son reed) Type of Home: House  Primary Care Giver: Family  Patient Support Systems include: Children   Current Financial resources:    Current community resources:    Current services prior to admission: Other (Comment) ([facility]/physical tyherapy)            Current DME:              Type of Home Care services:  None ([facility]/physical therapy)    ADLS  Prior functional level: Independent in ADLs/IADLs  Current functional level: Assistance with the following:    PT AM-PAC: 12 /24  OT AM-PAC: 17 /24    Family can provide assistance at DC: Other (comment) (TBD)  Would you like Case Management to discuss the discharge plan with any other family members/significant others, and if so, who? Yes  Plans to Return to Present Housing: Unknown at present  Other  Identified Issues/Barriers to RETURNING to current housing: care needs may be greater than what family can provide at home.   Potential Assistance needed at discharge: N/A            Potential DME:    Patient expects to discharge to: House  Plan for transportation at discharge:      Financial    Payor: [facility] MEDICARE / Plan: [facility] MEDICARE COMPLETE / Product Type: *No Product type* /     Does insurance require precert for SNF: Yes    Potential assistance Purchasing Medications:    Meds-to-Beds request:        [id number] - [place], [place] - [address] -  [phone] - F [phone]  [address] [place] [address]  Phone: [phone] Fax: [phone]    [facility] - [place], [place] - [address] -  [phone] - F [phone]  [address]  [facility] [address]  Phone: [phone] Fax: [phone]      Notes:    Factors facilitating achievement of predicted outcomes: Family support, Motivated, Cooperative, and Pleasant    Barriers to discharge: care needs may be greater than what can be provided at home.     Additional Case Management Notes: SW consult noted for dc planning/resources. SW met with pt & her children (Carlos Enrique & Eberjennifer) at bedside to discuss. Pt is A&O. She resides with her son Carlos Enrique in a  level with 6+philip. Pt reports being mostly inde with adl's/iadl's but states Carlos Enrique is there to supervise and assist if needed. Pt has the following DME: ww, w/c, spc, [facility]. Pt was recently discharged from [facility] (met allotted insurance coverage) but states they would like to use [facility] again if needed. Pt has hx of going to [facility] in [place] but states she will NOT go back to that facility. Confirmed pcp is Dr. Castellanos & pharmacy is [facility]. Therapy evaluated pt and recommended [facility]. TSERING discussed with pt and children. Per pt's son Carlos Enrique, he is needing to see how pt does when working with therapy to see if he would be able to  provide the care that pt would need. If he cannot, they would be agreeable to [facility] placement. Their first choice would be [facility] & 2nd choice would be [facility]. SW left message for Jocelyne @ [employer] [employer]. If accepted, pt will NEED precert, hens, 7000 & brigido completed.   Pt currently on a chemo pill that she takes TID. If the pt should go the route of [facility] placement, family is agreeable to bring the chemo pill to the [facility]. SW to cont to follow for dc planning.     Nyla Palumbo, W  Case Management Department

## 2024-06-29 NOTE — PLAN OF CARE
Problem: Discharge Planning  Goal: Discharge to home or other facility with appropriate resources  6/29/2024 0223 by Perla Bedolla RN  Outcome: Progressing  Flowsheets (Taken 6/28/2024 1945)  Discharge to home or other facility with appropriate resources: Identify barriers to discharge with patient and caregiver  6/28/2024 1858 by Alanis Milelr RN  Outcome: Progressing  6/28/2024 1531 by Alanis Miller RN  Outcome: Progressing     Problem: Pain  Goal: Verbalizes/displays adequate comfort level or baseline comfort level  6/29/2024 0223 by Perla Bedolla RN  Outcome: Progressing  6/28/2024 1858 by Alanis Miller RN  Outcome: Progressing  6/28/2024 1531 by Alanis Miller RN  Outcome: Progressing     Problem: ABCDS Injury Assessment  Goal: Absence of physical injury  6/29/2024 0223 by Perla Bedolla RN  Outcome: Progressing  6/28/2024 1858 by Alanis Miller RN  Outcome: Progressing  6/28/2024 1531 by Alanis Miller RN  Outcome: Progressing     Problem: Safety - Adult  Goal: Free from fall injury  6/28/2024 1858 by Alanis Miller RN  Outcome: Progressing  6/28/2024 1531 by Alanis Miller RN  Outcome: Progressing     Problem: Chronic Conditions and Co-morbidities  Goal: Patient's chronic conditions and co-morbidity symptoms are monitored and maintained or improved  6/28/2024 1858 by Alanis Miller RN  Outcome: Progressing  6/28/2024 1531 by Alanis Miller RN  Outcome: Progressing

## 2024-06-29 NOTE — PROGRESS NOTES
Subjective:    The patient is awake and alert, sitting up in bed with family at the bedside.  No problems overnight.  Denies chest pain, angina, dyspnea or abdominal discomfort. She has complaints of pain in her right hip and leg, she has tylenol ordered but has not taken any yet. She states this does help her when she takes it. No nausea or vomiting. Tolerating diet.      Objective:    /74   Pulse 60   Temp 99 °F (37.2 °C) (Oral)   Resp 18   Ht 1.651 m (5' 5\")   Wt 75.3 kg (166 lb)   SpO2 100%   BMI 27.62 kg/m²     General: Alert and oriented, no acute distress  HEENT: No thrush or mucositis, EOMI, PERRLA  Heart:  RRR, no murmurs, gallops, or rubs.  Lungs:  CTA bilaterally, no wheeze, rales or rhonchi  Abd: BS present, nontender, nondistended, no masses  Extrem:  No clubbing or cyanosis. BLE edema  Lymphatics: No palpable adenopathy in cervical and supraclavicular regions  Skin: Intact, no petechia or purpura    CBC with Differential:    Lab Results   Component Value Date/Time    WBC 3.7 06/28/2024 08:02 AM    RBC 2.96 06/28/2024 08:02 AM    RBC 4.90 06/04/2017 03:00 AM    HGB 8.0 06/28/2024 08:02 AM    HCT 26.6 06/28/2024 08:02 AM     01/10/2023 10:53 AM    MCV 89.9 06/28/2024 08:02 AM    MCH 27.0 06/28/2024 08:02 AM    MCHC 30.1 06/28/2024 08:02 AM    RDW 20.8 06/28/2024 08:02 AM    NRBC 1 06/27/2024 04:08 PM    METASPCT 3 06/27/2024 04:08 PM    METASPCT 0.9 01/10/2023 10:53 AM    LYMPHOPCT 7 06/28/2024 08:02 AM    LYMPHOPCT 3.3 06/03/2017 02:10 PM    MONOPCT 4 06/28/2024 08:02 AM    MYELOPCT 0.9 01/09/2023 08:58 AM    EOSPCT 0 06/28/2024 08:02 AM    BASOPCT 0 06/28/2024 08:02 AM    MONOSABS 0.16 06/28/2024 08:02 AM    LYMPHSABS 1.2 06/03/2017 02:10 PM    EOSABS 0.01 06/28/2024 08:02 AM    BASOSABS 0.01 06/28/2024 08:02 AM     CMP:    Lab Results   Component Value Date/Time     06/28/2024 08:02 AM    K 3.9 06/28/2024 08:02 AM    K 4.1 01/10/2023 10:53 AM     06/28/2024 08:02 AM

## 2024-06-29 NOTE — PROGRESS NOTES
Internal Medicine Consult Note    [facility]    Aidan Wall D.O., ZOE Burleson D.O., ZOE Zapata D.O.     Cesilia Brown, MSN, APRN, NP-C  Edvin Estes, MSN, APRN-CNP  Ash Santo, MSN, APRN-CNP  Beverly Dean, MSN APRN-CNP  Alanis Hurtado, MSN. APRN-NP-C     Primary Care Physician: Olegario Castellanos DO   Admitting Physician:  Aidan Wall DO  Admission date and time: 6/27/2024  1:52 PM    Room:  [facility]/0624-01  Admitting diagnosis: Acute on chronic diastolic (congestive) heart failure (HCC) [I50.33]  Gait disturbance [R26.9]    Patient Name: Mecca Braga  MRN: [id number]    Date of Service: 6/29/2024     Subjective:  Mecca is a 83 y.o. female who was seen and examined today,6/29/2024, at the bedside.  Amara is resting comfortably with her family at the bedside.  She states that she is feeling better.  She is working with physical therapy.  Doppler studies was negative for DVT.  family present during my examination.    Review of System:   Constitutional:   Denies fever or chills, weight loss or gain, positive fatigue  HEENT:   Denies ear pain, sore throat, sinus or eye problems.  Cardiovascular:   Denies any chest pain, irregular heartbeats, or palpitations.   Respiratory:   Denies  coughing, sputum production, hemoptysis, or wheezing.  Reports shortness of breath with activity  Gastrointestinal:   Denies nausea, vomiting, diarrhea, or constipation.  Denies any abdominal pain.  Genitourinary:    Denies any urgency, frequency, hematuria. Voiding  without difficulty.  Extremities:   Reports left lower extremity edema  Neurology:    Denies any headache or focal neurological deficits, Denies generalized weakness or memory difficulty.   Psch:   Denies being anxious or depressed.  Musculoskeletal:    Denies  myalgias, joint complaints or back pain.   Integumentary:   Denies any rashes, ulcers, or excoriations.   Denies bruising.  Hematologic/Lymphatic:  Denies bruising or bleeding.    Physical Exam:  No intake/output data recorded.  No intake or output data in the 24 hours ending 06/29/24 1644  I/O last 3 completed shifts:  In: 240 [P.O.:240]  Out: -   Patient Vitals for the past 96 hrs (Last 3 readings):   Weight   06/28/24 0045 75.3 kg (166 lb)   06/27/24 1414 75.3 kg (166 lb)       Vital Signs:   Blood pressure (!) 93/53, pulse 58, temperature 98 °F (36.7 °C), temperature source Oral, resp. rate 13, height 1.651 m (5' 5\"), weight 75.3 kg (166 lb), SpO2 98 %.    General appearance:  Alert, responsive, oriented to person, place, and time.  No acute distress.  Head:  Normocephalic. No masses, lesions or tenderness.  Eyes:  PERRLA.  EOMI.  Sclera clear.  Buccal mucosa moist.  ENT:  Ears normal. Mucosa normal.  Neck:    Supple. Trachea midline. No thyromegaly. No JVD. No bruits.  Heart:    Rhythm regular. Rate controlled.  No murmurs.  Lungs:    Symmetrical.  Diminished bilaterally.  No wheezes. No rhonchi. No rales.  Abdomen:   Soft. Non-tender. Non-distended. Bowel sounds positive. No organomegaly or masses.  No pain on palpation.  Extremities:    Peripheral pulses present.  Left lower extremity edema. no ulcers. No cyanosis. No clubbing.  Neurologic:    Alert x 3.  No focal deficit.  Cranial nerves grossly intact. No focal weakness.  Psych:   Behavior is normal. Mood appears normal. Speech is not rapid and/or pressured.  Musculoskeletal:   Spine ROM normal. Muscular strength intact. Gait not assessed.  Integumentary:  No rashes  Skin normal color and texture.  Genitalia/Breast:  Deferred    Medication:  Scheduled Meds:   Vitamin D  1,000 Units Oral Daily    vitamin B-12  500 mcg Oral Daily    And    folic acid  400 mcg Oral Daily    oyster shell calcium w/D  1 tablet Oral Daily    folic acid  1 mg Oral Daily    pantoprazole  40 mg Oral QAM AC    carvedilol  25 mg Oral BID WC    lisinopril  10 mg Oral Daily    levothyroxine

## 2024-06-30 VITALS
TEMPERATURE: 98.7 F | WEIGHT: 166.23 LBS | HEIGHT: 65 IN | HEART RATE: 72 BPM | DIASTOLIC BLOOD PRESSURE: 65 MMHG | RESPIRATION RATE: 15 BRPM | BODY MASS INDEX: 27.7 KG/M2 | OXYGEN SATURATION: 97 % | SYSTOLIC BLOOD PRESSURE: 122 MMHG

## 2024-06-30 LAB
ALBUMIN SERPL-MCNC: 3.7 G/DL (ref 3.5–5.2)
ALP SERPL-CCNC: 42 U/L (ref 35–104)
ALT SERPL-CCNC: 9 U/L (ref 0–32)
ANION GAP SERPL CALCULATED.3IONS-SCNC: 8 MMOL/L (ref 7–16)
AST SERPL-CCNC: 14 U/L (ref 0–31)
BASOPHILS # BLD: 0.01 K/UL (ref 0–0.2)
BASOPHILS NFR BLD: 0 % (ref 0–2)
BILIRUB SERPL-MCNC: 0.4 MG/DL (ref 0–1.2)
BUN SERPL-MCNC: 24 MG/DL (ref 6–23)
CALCIUM SERPL-MCNC: 8.7 MG/DL (ref 8.6–10.2)
CHLORIDE SERPL-SCNC: 109 MMOL/L (ref 98–107)
CO2 SERPL-SCNC: 23 MMOL/L (ref 22–29)
CREAT SERPL-MCNC: 1.2 MG/DL (ref 0.5–1)
EOSINOPHIL # BLD: 0.02 K/UL (ref 0.05–0.5)
EOSINOPHILS RELATIVE PERCENT: 1 % (ref 0–6)
ERYTHROCYTE [DISTWIDTH] IN BLOOD BY AUTOMATED COUNT: 20.8 % (ref 11.5–15)
GFR, ESTIMATED: 44 ML/MIN/1.73M2
GLUCOSE SERPL-MCNC: 110 MG/DL (ref 74–99)
HCT VFR BLD AUTO: 23.7 % (ref 34–48)
HGB BLD-MCNC: 7.1 G/DL (ref 11.5–15.5)
IMM GRANULOCYTES # BLD AUTO: 0.12 K/UL (ref 0–0.58)
IMM GRANULOCYTES NFR BLD: 4 % (ref 0–5)
LYMPHOCYTES NFR BLD: 0.63 K/UL (ref 1.5–4)
LYMPHOCYTES RELATIVE PERCENT: 20 % (ref 20–42)
MCH RBC QN AUTO: 26.7 PG (ref 26–35)
MCHC RBC AUTO-ENTMCNC: 30 G/DL (ref 32–34.5)
MCV RBC AUTO: 89.1 FL (ref 80–99.9)
MONOCYTES NFR BLD: 0.17 K/UL (ref 0.1–0.95)
MONOCYTES NFR BLD: 5 % (ref 2–12)
NEUTROPHILS NFR BLD: 70 % (ref 43–80)
NEUTS SEG NFR BLD: 2.17 K/UL (ref 1.8–7.3)
PLATELET CONFIRMATION: NORMAL
PLATELET, FLUORESCENCE: 74 K/UL (ref 130–450)
PMV BLD AUTO: ABNORMAL FL (ref 7–12)
POTASSIUM SERPL-SCNC: 3.6 MMOL/L (ref 3.5–5)
PROT SERPL-MCNC: 5.2 G/DL (ref 6.4–8.3)
RBC # BLD AUTO: 2.66 M/UL (ref 3.5–5.5)
RBC # BLD: ABNORMAL 10*6/UL
SODIUM SERPL-SCNC: 140 MMOL/L (ref 132–146)
WBC OTHER # BLD: 3.1 K/UL (ref 4.5–11.5)

## 2024-06-30 PROCEDURE — 2580000003 HC RX 258

## 2024-06-30 PROCEDURE — 6370000000 HC RX 637 (ALT 250 FOR IP)

## 2024-06-30 PROCEDURE — 36415 COLL VENOUS BLD VENIPUNCTURE: CPT

## 2024-06-30 PROCEDURE — 80053 COMPREHEN METABOLIC PANEL: CPT

## 2024-06-30 PROCEDURE — 85025 COMPLETE CBC W/AUTO DIFF WBC: CPT

## 2024-06-30 PROCEDURE — 6360000002 HC RX W HCPCS

## 2024-06-30 PROCEDURE — 1200000000 HC SEMI PRIVATE

## 2024-06-30 RX ORDER — POTASSIUM CHLORIDE 7.45 MG/ML
10 INJECTION INTRAVENOUS PRN
Status: DISCONTINUED | OUTPATIENT
Start: 2024-06-30 | End: 2024-07-01 | Stop reason: HOSPADM

## 2024-06-30 RX ORDER — MAGNESIUM SULFATE 1 G/100ML
1000 INJECTION INTRAVENOUS PRN
Status: DISCONTINUED | OUTPATIENT
Start: 2024-06-30 | End: 2024-07-01 | Stop reason: HOSPADM

## 2024-06-30 RX ORDER — POTASSIUM CHLORIDE 20 MEQ/1
40 TABLET, EXTENDED RELEASE ORAL PRN
Status: DISCONTINUED | OUTPATIENT
Start: 2024-06-30 | End: 2024-07-01 | Stop reason: HOSPADM

## 2024-06-30 RX ADMIN — APIXABAN 2.5 MG: 2.5 TABLET, FILM COATED ORAL at 09:00

## 2024-06-30 RX ADMIN — FOLIC ACID 1 MG: 1 TABLET ORAL at 10:43

## 2024-06-30 RX ADMIN — APIXABAN 2.5 MG: 2.5 TABLET, FILM COATED ORAL at 20:54

## 2024-06-30 RX ADMIN — LEVOTHYROXINE SODIUM 88 MCG: 0.09 TABLET ORAL at 06:21

## 2024-06-30 RX ADMIN — PANTOPRAZOLE SODIUM 40 MG: 40 TABLET, DELAYED RELEASE ORAL at 06:21

## 2024-06-30 RX ADMIN — FLUTICASONE PROPIONATE 2 SPRAY: 50 SPRAY, METERED NASAL at 10:45

## 2024-06-30 RX ADMIN — Medication 1000 UNITS: at 10:41

## 2024-06-30 RX ADMIN — CARVEDILOL 25 MG: 25 TABLET, FILM COATED ORAL at 09:00

## 2024-06-30 RX ADMIN — WATER 1000 MG: 1 INJECTION INTRAMUSCULAR; INTRAVENOUS; SUBCUTANEOUS at 17:56

## 2024-06-30 RX ADMIN — ISOSORBIDE MONONITRATE 30 MG: 30 TABLET, EXTENDED RELEASE ORAL at 10:51

## 2024-06-30 RX ADMIN — HYDRALAZINE HYDROCHLORIDE 25 MG: 25 TABLET ORAL at 20:54

## 2024-06-30 RX ADMIN — FERROUS SULFATE TAB 325 MG (65 MG ELEMENTAL FE) 325 MG: 325 (65 FE) TAB at 12:00

## 2024-06-30 RX ADMIN — HYDRALAZINE HYDROCHLORIDE 25 MG: 25 TABLET ORAL at 10:42

## 2024-06-30 RX ADMIN — CARVEDILOL 25 MG: 25 TABLET, FILM COATED ORAL at 17:55

## 2024-06-30 RX ADMIN — CYANOCOBALAMIN TAB 1000 MCG 500 MCG: 1000 TAB at 10:44

## 2024-06-30 RX ADMIN — LISINOPRIL 10 MG: 10 TABLET ORAL at 10:43

## 2024-06-30 RX ADMIN — EMPAGLIFLOZIN 25 MG: 10 TABLET, FILM COATED ORAL at 10:41

## 2024-06-30 RX ADMIN — Medication 1 TABLET: at 10:43

## 2024-06-30 RX ADMIN — FOLIC ACID TAB 400 MCG 400 MCG: 400 TAB at 12:55

## 2024-06-30 ASSESSMENT — PAIN SCALES - GENERAL: PAINLEVEL_OUTOF10: 0

## 2024-06-30 NOTE — PROGRESS NOTES
Internal Medicine Consult Note    [facility]    Aidan Wall D.O., ZOE Burleson D.O., ZOE Zapata D.O.     Cesilia Brown, MSN, APRN, NP-C  Edvin Estes, MSN, APRN-CNP  Ash Santo, MSN, APRN-CNP  Beverly Dean, MSN APRN-CNP  Alanis Hurtado, MSN. APRN-NP-C     Primary Care Physician: Olegario Castellanos DO   Admitting Physician:  Aidan Wall DO  Admission date and time: 6/27/2024  1:52 PM    Room:  24/0624-01  Admitting diagnosis: Acute on chronic diastolic (congestive) heart failure (HCC) [I50.33]  Gait disturbance [R26.9]    Patient Name: Mecca Braga  MRN: [id number]    Date of Service: 6/30/2024     Subjective:  Mecca is a 83 y.o. female who was seen and examined today,6/30/2024, at the bedside.  Amara seen and examined with her son present at bedside.  We have reviewed the results of the workup and plan of care moving forward.  We discussed the need to increase activity and ambulate as her goal is to return home likely tomorrow.  No new symptoms or concerns.      Review of System:   Constitutional:   Denies fever or chills, weight loss or gain, positive fatigue  HEENT:   Denies ear pain, sore throat, sinus or eye problems.  Cardiovascular:   Denies any chest pain, irregular heartbeats, or palpitations.   Respiratory:   Denies  coughing, sputum production, hemoptysis, or wheezing.  Reports shortness of breath with activity  Gastrointestinal:   Denies nausea, vomiting, diarrhea, or constipation.  Denies any abdominal pain.  Genitourinary:    Denies any urgency, frequency, hematuria. Voiding  without difficulty.  Extremities:   Reports left lower extremity edema  Neurology:    Denies any headache or focal neurological deficits, positive for generalized weakness without focal complaint.    Psch:   Denies being anxious or depressed.  Musculoskeletal:    Denies  myalgias, joint complaints or back pain.  500 mcg Oral Daily    And    folic acid  400 mcg Oral Daily    oyster shell calcium w/D  1 tablet Oral Daily    folic acid  1 mg Oral Daily    cefTRIAXone (ROCEPHIN) IV  1,000 mg IntraVENous Q24H    pantoprazole  40 mg Oral QAM AC    carvedilol  25 mg Oral BID WC    lisinopril  10 mg Oral Daily    levothyroxine  88 mcg Oral Daily    empagliflozin  25 mg Oral Daily    ruxolitinib phosphate  20 mg Oral Nightly    apixaban  2.5 mg Oral BID    ferrous sulfate  325 mg Oral Daily    fluticasone  2 spray Each Nostril Daily    hydrALAZINE  25 mg Oral 2 times per day    isosorbide mononitrate  30 mg Oral Daily    ruxolitinib phosphate  10 mg Oral Daily     Continuous Infusions:    Objective Data:  CBC with Differential:    Lab Results   Component Value Date/Time    WBC 3.1 06/30/2024 05:45 AM    RBC 2.66 06/30/2024 05:45 AM    RBC 4.90 06/04/2017 03:00 AM    HGB 7.1 06/30/2024 05:45 AM    HCT 23.7 06/30/2024 05:45 AM     01/10/2023 10:53 AM    MCV 89.1 06/30/2024 05:45 AM    MCH 26.7 06/30/2024 05:45 AM    MCHC 30.0 06/30/2024 05:45 AM    RDW 20.8 06/30/2024 05:45 AM    NRBC 1 06/27/2024 04:08 PM    METASPCT 3 06/27/2024 04:08 PM    METASPCT 0.9 01/10/2023 10:53 AM    LYMPHOPCT 20 06/30/2024 05:45 AM    LYMPHOPCT 3.3 06/03/2017 02:10 PM    MONOPCT 5 06/30/2024 05:45 AM    MYELOPCT 1 06/29/2024 06:28 AM    MYELOPCT 0.9 01/09/2023 08:58 AM    EOSPCT 1 06/30/2024 05:45 AM    BASOPCT 0 06/30/2024 05:45 AM    MONOSABS 0.17 06/30/2024 05:45 AM    LYMPHSABS 1.2 06/03/2017 02:10 PM    EOSABS 0.02 06/30/2024 05:45 AM    BASOSABS 0.01 06/30/2024 05:45 AM     BMP:    Lab Results   Component Value Date/Time     06/30/2024 05:45 AM    K 3.6 06/30/2024 05:45 AM    K 4.1 01/10/2023 10:53 AM     06/30/2024 05:45 AM    CO2 23 06/30/2024 05:45 AM    BUN 24 06/30/2024 05:45 AM    CREATININE 1.2 06/30/2024 05:45 AM    CALCIUM 8.7 06/30/2024 05:45 AM    GFRAA 52 03/16/2021 12:15 PM    LABGLOM 44 06/30/2024 05:45 AM    LABGLOM

## 2024-06-30 NOTE — PLAN OF CARE
Problem: Discharge Planning  Goal: Discharge to home or other facility with appropriate resources  Outcome: Progressing  Flowsheets (Taken 6/29/2024 1933)  Discharge to home or other facility with appropriate resources: Identify barriers to discharge with patient and caregiver     Problem: Pain  Goal: Verbalizes/displays adequate comfort level or baseline comfort level  Outcome: Progressing  Flowsheets (Taken 6/29/2024 2979)  Verbalizes/displays adequate comfort level or baseline comfort level: Encourage patient to monitor pain and request assistance     Problem: ABCDS Injury Assessment  Goal: Absence of physical injury  Outcome: Progressing

## 2024-06-30 NOTE — PLAN OF CARE
Problem: Discharge Planning  Goal: Discharge to home or other facility with appropriate resources  6/30/2024 1448 by Candice Riojas RN  Outcome: Progressing  Flowsheets (Taken 6/30/2024 0800)  Discharge to home or other facility with appropriate resources: Identify barriers to discharge with patient and caregiver     Problem: Pain  Goal: Verbalizes/displays adequate comfort level or baseline comfort level  6/30/2024 1448 by Candice Riojas RN  Outcome: Progressing  Flowsheets  Taken 6/30/2024 1448  Verbalizes/displays adequate comfort level or baseline comfort level: Encourage patient to monitor pain and request assistance  Taken 6/30/2024 1030  Verbalizes/displays adequate comfort level or baseline comfort level: Encourage patient to monitor pain and request assistance     Problem: ABCDS Injury Assessment  Goal: Absence of physical injury  6/30/2024 1448 by Candice Riojas RN  Outcome: Progressing  Flowsheets (Taken 6/30/2024 1448)  Absence of Physical Injury: Implement safety measures based on patient assessment     Problem: Safety - Adult  Goal: Free from fall injury  Outcome: Progressing  Flowsheets (Taken 6/30/2024 1448)  Free From Fall Injury: Instruct family/caregiver on patient safety     Problem: Chronic Conditions and Co-morbidities  Goal: Patient's chronic conditions and co-morbidity symptoms are monitored and maintained or improved  Outcome: Progressing  Flowsheets (Taken 6/30/2024 0800)  Care Plan - Patient's Chronic Conditions and Co-Morbidity Symptoms are Monitored and Maintained or Improved: Monitor and assess patient's chronic conditions and comorbid symptoms for stability, deterioration, or improvement     Problem: Skin/Tissue Integrity  Goal: Absence of new skin breakdown  Description: 1.  Monitor for areas of redness and/or skin breakdown  2.  Assess vascular access sites hourly  3.  Every 4-6 hours minimum:  Change oxygen saturation probe site  4.  Every 4-6 hours:  If on nasal continuous  positive airway pressure, respiratory therapy assess nares and determine need for appliance change or resting period.  Outcome: Progressing   .care

## 2024-07-01 VITALS
HEART RATE: 65 BPM | WEIGHT: 166.01 LBS | SYSTOLIC BLOOD PRESSURE: 109 MMHG | BODY MASS INDEX: 27.66 KG/M2 | DIASTOLIC BLOOD PRESSURE: 53 MMHG | RESPIRATION RATE: 16 BRPM | TEMPERATURE: 98.4 F | OXYGEN SATURATION: 99 % | HEIGHT: 65 IN

## 2024-07-01 LAB
ALBUMIN SERPL-MCNC: 3.6 G/DL (ref 3.5–5.2)
ALP SERPL-CCNC: 45 U/L (ref 35–104)
ALT SERPL-CCNC: 9 U/L (ref 0–32)
ANION GAP SERPL CALCULATED.3IONS-SCNC: 9 MMOL/L (ref 7–16)
AST SERPL-CCNC: 12 U/L (ref 0–31)
BASOPHILS # BLD: 0 K/UL (ref 0–0.2)
BASOPHILS NFR BLD: 0 % (ref 0–2)
BILIRUB SERPL-MCNC: 0.3 MG/DL (ref 0–1.2)
BUN SERPL-MCNC: 27 MG/DL (ref 6–23)
CALCIUM SERPL-MCNC: 8.8 MG/DL (ref 8.6–10.2)
CHLORIDE SERPL-SCNC: 108 MMOL/L (ref 98–107)
CO2 SERPL-SCNC: 24 MMOL/L (ref 22–29)
CREAT SERPL-MCNC: 1.2 MG/DL (ref 0.5–1)
EOSINOPHIL # BLD: 0 K/UL (ref 0.05–0.5)
EOSINOPHILS RELATIVE PERCENT: 0 % (ref 0–6)
ERYTHROCYTE [DISTWIDTH] IN BLOOD BY AUTOMATED COUNT: 20.6 % (ref 11.5–15)
GFR, ESTIMATED: 44 ML/MIN/1.73M2
GLUCOSE SERPL-MCNC: 105 MG/DL (ref 74–99)
HCT VFR BLD AUTO: 24.8 % (ref 34–48)
HGB BLD-MCNC: 7.7 G/DL (ref 11.5–15.5)
LYMPHOCYTES NFR BLD: 0.45 K/UL (ref 1.5–4)
LYMPHOCYTES RELATIVE PERCENT: 14 % (ref 20–42)
MCH RBC QN AUTO: 27.9 PG (ref 26–35)
MCHC RBC AUTO-ENTMCNC: 31 G/DL (ref 32–34.5)
MCV RBC AUTO: 89.9 FL (ref 80–99.9)
METAMYELOCYTES ABSOLUTE COUNT: 0.03 K/UL (ref 0–0.12)
METAMYELOCYTES: 1 % (ref 0–1)
MONOCYTES NFR BLD: 0.14 K/UL (ref 0.1–0.95)
MONOCYTES NFR BLD: 4 % (ref 2–12)
NEUTROPHILS NFR BLD: 81 % (ref 43–80)
NEUTS SEG NFR BLD: 2.58 K/UL (ref 1.8–7.3)
PLATELET CONFIRMATION: NORMAL
PLATELET, FLUORESCENCE: 73 K/UL (ref 130–450)
PMV BLD AUTO: ABNORMAL FL (ref 7–12)
POTASSIUM SERPL-SCNC: 3.9 MMOL/L (ref 3.5–5)
PROT SERPL-MCNC: 5.3 G/DL (ref 6.4–8.3)
RBC # BLD AUTO: 2.76 M/UL (ref 3.5–5.5)
RBC # BLD: ABNORMAL 10*6/UL
SODIUM SERPL-SCNC: 141 MMOL/L (ref 132–146)
WBC OTHER # BLD: 3.2 K/UL (ref 4.5–11.5)

## 2024-07-01 PROCEDURE — 85025 COMPLETE CBC W/AUTO DIFF WBC: CPT

## 2024-07-01 PROCEDURE — 97530 THERAPEUTIC ACTIVITIES: CPT

## 2024-07-01 PROCEDURE — 36415 COLL VENOUS BLD VENIPUNCTURE: CPT

## 2024-07-01 PROCEDURE — 6370000000 HC RX 637 (ALT 250 FOR IP)

## 2024-07-01 PROCEDURE — 80053 COMPREHEN METABOLIC PANEL: CPT

## 2024-07-01 RX ORDER — CEFDINIR 300 MG/1
300 CAPSULE ORAL 2 TIMES DAILY
Qty: 10 CAPSULE | Refills: 0 | Status: SHIPPED | OUTPATIENT
Start: 2024-07-01 | End: 2024-07-06

## 2024-07-01 RX ORDER — HYDROCODONE BITARTRATE AND ACETAMINOPHEN 5; 325 MG/1; MG/1
1 TABLET ORAL EVERY 6 HOURS PRN
Status: DISCONTINUED | OUTPATIENT
Start: 2024-07-01 | End: 2024-07-01

## 2024-07-01 RX ADMIN — LISINOPRIL 10 MG: 10 TABLET ORAL at 09:14

## 2024-07-01 RX ADMIN — CARVEDILOL 25 MG: 25 TABLET, FILM COATED ORAL at 09:09

## 2024-07-01 RX ADMIN — ACETAMINOPHEN 650 MG: 325 TABLET ORAL at 13:11

## 2024-07-01 RX ADMIN — APIXABAN 2.5 MG: 2.5 TABLET, FILM COATED ORAL at 09:00

## 2024-07-01 RX ADMIN — FERROUS SULFATE TAB 325 MG (65 MG ELEMENTAL FE) 325 MG: 325 (65 FE) TAB at 13:12

## 2024-07-01 RX ADMIN — FLUTICASONE PROPIONATE 2 SPRAY: 50 SPRAY, METERED NASAL at 09:14

## 2024-07-01 RX ADMIN — Medication 1000 UNITS: at 11:09

## 2024-07-01 RX ADMIN — FOLIC ACID 1 MG: 1 TABLET ORAL at 13:11

## 2024-07-01 RX ADMIN — CYANOCOBALAMIN TAB 1000 MCG 500 MCG: 1000 TAB at 11:09

## 2024-07-01 RX ADMIN — Medication 1 TABLET: at 11:08

## 2024-07-01 RX ADMIN — EMPAGLIFLOZIN 25 MG: 10 TABLET, FILM COATED ORAL at 09:13

## 2024-07-01 RX ADMIN — LEVOTHYROXINE SODIUM 88 MCG: 0.09 TABLET ORAL at 06:16

## 2024-07-01 RX ADMIN — ISOSORBIDE MONONITRATE 30 MG: 30 TABLET, EXTENDED RELEASE ORAL at 09:14

## 2024-07-01 RX ADMIN — FOLIC ACID TAB 400 MCG 400 MCG: 400 TAB at 13:12

## 2024-07-01 RX ADMIN — PANTOPRAZOLE SODIUM 40 MG: 40 TABLET, DELAYED RELEASE ORAL at 06:16

## 2024-07-01 RX ADMIN — HYDRALAZINE HYDROCHLORIDE 25 MG: 25 TABLET ORAL at 09:14

## 2024-07-01 NOTE — PROGRESS NOTES
Physical Therapy  Physical Therapy Treatment Note/Plan of Care    Room #:  0624/0624-01  Patient Name: Mecca Braga  YOB: 1940  MRN: [id number]    Date of Service: 7/1/2024     Tentative placement recommendation: Subacute Rehab  Equipment recommendation: To be determined      Evaluating Physical Therapist: Mukesh Eckert PT, DPT #[id number]      Specific Provider Orders/Date/Referring Provider :     06/27/24 2145    PT eval and treat  Start:  06/27/24 2145,   End:  06/27/24 2145,   ONE TIME,   Standing Count:  1 Occurrences,   R       Alanis Hurtado, APRN - CNP Acknowledge New    Admitting Diagnosis:   Acute on chronic diastolic (congestive) heart failure (HCC) [I50.33]  Gait disturbance [R26.9]      Surgery: none  Visit Diagnoses         Codes    Acute on chronic congestive heart failure, unspecified heart failure type (HCC)    -  Primary I50.9            Patient Active Problem List   Diagnosis    Syncope    Leukocytosis    Troponin I above reference range    Elevated brain natriuretic peptide (BNP) level    Left bundle branch block    Lumbar radiculopathy    Low back pain    Facet syndrome, lumbar    Compression fracture of L1 lumbar vertebra Old    DDD (degenerative disc disease), lumbar    Rotoscoliosis    Lumbar sprain    Lumbar strain    Protruded lumbar disc    Chronic pulmonary embolism (HCC)    Acute cystitis with hematuria    Polycythemia    Vitamin D deficiency    Hypothyroidism    Essential hypertension    Hyperlipidemia    Closed compression fracture of L1 lumbar vertebra, sequela    Lumbar spondylosis    Lumbar facet arthropathy    Lumbar disc disorder    Chronic pain syndrome    Chronic renal disease, stage III (HCC) [151548]    Failure to thrive in adult    Thrombocytopenia, unspecified    Acute on chronic diastolic (congestive) heart failure (HCC)    Gait disturbance        ASSESSMENT of Current Deficits Patient exhibits decreased strength, balance, and endurance impairing functional  mobility, transfers, gait , gait distance, and tolerance to activity. Pt with fear of falling during session.  Pt required increased time for all mobility during session with ModA for function. During weight bearing on RLE, Rle buckling noted, cues fro UE support on walker to advance RLE.  Pt encouraged to seek rehab stay and educated on risk of going home in current condition. Educated on safety at home. Education given regarding increasing activity level, maximizing therapy session, importance of exercises and stretching.        PHYSICAL THERAPY  PLAN OF CARE       Physical therapy plan of care is established based on physician order,  patient diagnosis and clinical assessment    Current Treatment Recommendations:    -Bed Mobility: Lower and upper extremity exercises, and trunk control activities  -Sitting Balance: Incorporate reaching activities to activate trunk muscles , Hands on support to maintain midline , Facilitate active trunk muscle engagement , Facilitate postural control in all planes , and Engage in core activities to allow for movement within base of support   -Standing Balance: Perform strengthening exercises in standing to promote motor control with or without upper extremity support , Instruct patient on adequate base of support to maintain balance, and Challenge balance utilizing reaching  activities beyond center of gravity    -Transfers: Provide instruction on proper hand and foot position for adequate transfer of weight onto lower extremities and use of gait device if needed, Cues for hand placement, technique and safety. Provide stabilization to prevent fall , Facilitate weight shift forward on to lower extremities and provide necessary stabilization of bilateral lower extremities , Support transfer of weight on to lower extremities, and Assist with extension of knees trunk and hip to accept weight transfer   -Gait: Gait training, Standing activities to improve: base of support, weight shift,

## 2024-07-01 NOTE — DISCHARGE INSTR - COC
Continuity of Care Form    Patient Name: Mecca Braga   :  1940  MRN:  [id number]    Admit date:  2024  Discharge date:  ***    Code Status Order: Full Code   Advance Directives:     Admitting Physician:  Aidan Wall DO  PCP: Olegario Castellanos DO    Discharging Nurse: ***  Discharging Hospital Unit/Room#: 0624/0624-01  Discharging Unit Phone Number: ***    Emergency Contact:   Extended Emergency Contact Information  Primary Emergency Contact: OmiAníbaln  Address: [address] [place] [address] [facility]  Home Phone: [phone]  Relation: Child  Secondary Emergency Contact: BaltaVanessa  Address: [address] [place] [address] [facility]  Home Phone: [phone]  Work Phone: [phone]  Mobile Phone: [phone]  Relation: Child    Past Surgical History:  Past Surgical History:   Procedure Laterality Date    APPENDECTOMY      CHOLECYSTECTOMY      DIAGNOSTIC CARDIAC CATH LAB PROCEDURE Bilateral 2017    HYSTERECTOMY, TOTAL ABDOMINAL (CERVIX REMOVED)      NERVE BLOCK  06/03/15    lumbar epidural #1    NERVE BLOCK  6 22 15    lumbar epidural #2    NERVE BLOCK N/A 7/6/15    lumbar epidural #3    TONSILLECTOMY         Immunization History:   Immunization History   Administered Date(s) Administered    COVID-19, MODERNA BLUE border, Primary or Immunocompromised, (age 12y+), IM, 100 mcg/0.5mL 2021, 2021    COVID-19, MODERNA Booster BLUE border, (age 18y+), IM, 50mcg/0.25mL 2021    Influenza Vaccine, unspecified formulation 2016    Influenza Virus Vaccine 10/15/2015, 2016, 2017    Influenza, FLUAD, (age 65 y+), Adjuvanted, 0.5mL 10/12/2020, 10/05/2021, 2022    Influenza, FLUARIX, FLULAVAL, FLUZONE (age 6 mo+) AND AFLURIA, (age 3 y+), PF, 0.5mL 2017    Influenza, High Dose (Fluzone 65 yrs and older) 2017, 2019    Pneumococcal Conjugate Vaccine 2012, 2012

## 2024-07-01 NOTE — DISCHARGE SUMMARY
Internal Medicine Progress Note     [facility]     Aidan Wall D.O., KYLEROCecilioI.                         Dipka Burleson D.O., KYLEROCecilioICecilio Zapata D.O.     Cesilia Brown, MSN, APRN, NP-C  Edvin Estes, MSN, APRN-CNP  Ash Santo, MSN, APRN, NP-C  Beverly Dean, MSN, APRN-CNP  Alanis Hurtado, MSN, APRN, NP-C       Internal Medicine  Discharge Summary    NAME: Mecca Braga  :  1940  MRN:  [id number]  PCP:Olegario Castellanos DO  ADMITTED: 2024      DISCHARGED: 24    ADMITTING PHYSICIAN: Aidan Wall DO    CONSULTANT(S):   IP CONSULT TO SOCIAL WORK  IP CONSULT TO HEM/ONC     ADMITTING DIAGNOSIS:   Acute on chronic diastolic (congestive) heart failure (HCC) [I50.33]  Gait disturbance [R26.9]     DISCHARGE DIAGNOSES:   Urinary tract infection with cultures positive for Proteus  Chronic back pain related to arthritis complicating activities of daily living  Chronic kidney disease stage IIIb  Acute on chronic iron deficiency anemia on ferrous sulfate  Hypothyroidism on levothyroxine  Essential hypertension on carvedilol, hydralazine, lisinopril  Vitamin B12 deficiency on cobalamin  Vitamin D deficiency on vitamin D  HFpEF on isosorbide mononitrate, Jardiance, farxiga,   Polycythemia 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 He states that she has been working with physical therapy for about two months using a walker. The last few days her son has noticed her placing more weight onto the left leg and walking with a limp. Today her legs buckled but he was able to lower her to the ground. She endorses intermittent right hip pain, dull, worsened with movement, alleviated with rest and elevation, 8/10 at its most severe. She also describes a tightness feeling to her feet and legs and increased leg swelling. Her son informs me that her legs normally are swollen but  time the patient is without objective evidence of an acute process requiring continuing hospitalization or inpatient management.  They are stable for discharge with outpatient follow-up.    I have spoken with the patient and discussed the results of the current hospitalization, in addition to providing specific details for the plan of care and counseling regarding the diagnosis and prognosis.  The plan has been discussed in detail and they are aware of the specific conditions for emergent return, as well as the importance of follow-up.  Their questions are answered at this time and they are agreeable with the plan for discharge to home with [facility].    DISCHARGE MEDICATIONS:   Current Discharge Medication List             Details   cefdinir (OMNICEF) 300 MG capsule Take 1 capsule by mouth 2 times daily for 5 days  Qty: 10 capsule, Refills: 0                Details   ELIQUIS 2.5 MG TABS tablet TAKE 1 TABLET BY MOUTH TWICE  DAILY  Qty: 180 tablet, Refills: 3    Comments: Please send a replace/new response with 90-Day Supply if appropriate to maximize member benefit. Requesting 1 year supply.  Associated Diagnoses: Homozygous for BERTHA-1 4G allele; History of pulmonary embolism      levothyroxine (SYNTHROID) 88 MCG tablet TAKE 1 TABLET BY MOUTH DAILY  Qty: 90 tablet, Refills: 3    Comments: Please send a replace/new response with 90-Day Supply if appropriate to maximize member benefit. Requesting 1 year supply.  Associated Diagnoses: Other specified hypothyroidism      isosorbide mononitrate (IMDUR) 30 MG extended release tablet TAKE 1 TABLET BY MOUTH DAILY  Qty: 90 tablet, Refills: 3    Comments: Please send a replace/new response with 90-Day Supply if appropriate to maximize member benefit. Requesting 1 year supply.  Associated Diagnoses: Essential hypertension      carvedilol (COREG) 25 MG tablet TAKE 1 TABLET BY MOUTH TWICE  DAILY WITH MEALS  Qty: 180 tablet, Refills: 3    Comments: Please send a replace/new response

## 2024-07-01 NOTE — PLAN OF CARE
Problem: Discharge Planning  Goal: Discharge to home or other facility with appropriate resources  7/1/2024 0343 by Urban Dean RN  Outcome: Progressing  Flowsheets (Taken 6/30/2024 1900)  Discharge to home or other facility with appropriate resources: Identify barriers to discharge with patient and caregiver     Problem: Pain  Goal: Verbalizes/displays adequate comfort level or baseline comfort level  7/1/2024 0343 by Urban Dean RN  Outcome: Progressing     Problem: ABCDS Injury Assessment  Goal: Absence of physical injury  7/1/2024 0343 by Urban Dean RN  Outcome: Progressing     Problem: Safety - Adult  Goal: Free from fall injury  7/1/2024 0343 by Urban Dean RN  Outcome: Progressing     Problem: Chronic Conditions and Co-morbidities  Goal: Patient's chronic conditions and co-morbidity symptoms are monitored and maintained or improved  7/1/2024 0343 by Urban Dean RN  Outcome: Progressing  Flowsheets (Taken 6/30/2024 1900)  Care Plan - Patient's Chronic Conditions and Co-Morbidity Symptoms are Monitored and Maintained or Improved: Monitor and assess patient's chronic conditions and comorbid symptoms for stability, deterioration, or improvement     Problem: Skin/Tissue Integrity  Goal: Absence of new skin breakdown  Description: 1.  Monitor for areas of redness and/or skin breakdown  2.  Assess vascular access sites hourly  3.  Every 4-6 hours minimum:  Change oxygen saturation probe site  4.  Every 4-6 hours:  If on nasal continuous positive airway pressure, respiratory therapy assess nares and determine need for appliance change or resting period.  7/1/2024 0343 by Urban Dean RN  Outcome: Progressing

## 2024-07-01 NOTE — CARE COORDINATION
7/1/2024 1500 CM Note:  Pt is being discharged home today with her [facility].  Pt has needed DME.  Pt's son wanted [facility] as they have had them in the very recent past but per [facility]a liaison they are unable to accept d/t insurance(she met allotted insurance coverage).  Pt 's son states he has an appt with [facility] tomorrow and will see what assistance they offer. Electronically signed by Laquita Bernabe RN on 7/1/2024 at 3:42 PM

## 2024-07-01 NOTE — PROGRESS NOTES
Subjective:    The patient is awake and alert, sitting up in bed.  No problems overnight.  Denies chest pain, angina, dyspnea or abdominal discomfort. No nausea or vomiting. Tolerating diet. States she is having a lot of pain in her leg. She previously was treated by her PCP and pain management with Fentanyl but after a year was told she can't have it anymore. She has been taking Tylenol since. I advised she reach back out to her family doctor or pain management for this once discharged.     Objective:    BP (!) 109/53   Pulse 65   Temp 98.4 °F (36.9 °C) (Oral)   Resp 16   Ht 1.651 m (5' 5\")   Wt 75.3 kg (166 lb 0.1 oz)   SpO2 99%   BMI 27.62 kg/m²     General: Alert and oriented, no acute distress  HEENT: No thrush or mucositis, EOMI, PERRLA  Heart:  RRR, no murmurs, gallops, or rubs.  Lungs:  CTA bilaterally, no wheeze, rales or rhonchi  Abd: BS present, nontender, nondistended, no masses  Extrem:  No clubbing, cyanosis, or edema  Lymphatics: No palpable adenopathy in cervical and supraclavicular regions  Skin: Intact, no petechia or purpura    CBC with Differential:    Lab Results   Component Value Date/Time    WBC 3.1 06/30/2024 05:45 AM    RBC 2.66 06/30/2024 05:45 AM    RBC 4.90 06/04/2017 03:00 AM    HGB 7.1 06/30/2024 05:45 AM    HCT 23.7 06/30/2024 05:45 AM     01/10/2023 10:53 AM    MCV 89.1 06/30/2024 05:45 AM    MCH 26.7 06/30/2024 05:45 AM    MCHC 30.0 06/30/2024 05:45 AM    RDW 20.8 06/30/2024 05:45 AM    NRBC 1 06/27/2024 04:08 PM    METASPCT 3 06/27/2024 04:08 PM    METASPCT 0.9 01/10/2023 10:53 AM    LYMPHOPCT 20 06/30/2024 05:45 AM    LYMPHOPCT 3.3 06/03/2017 02:10 PM    MONOPCT 5 06/30/2024 05:45 AM    MYELOPCT 1 06/29/2024 06:28 AM    MYELOPCT 0.9 01/09/2023 08:58 AM    EOSPCT 1 06/30/2024 05:45 AM    BASOPCT 0 06/30/2024 05:45 AM    MONOSABS 0.17 06/30/2024 05:45 AM    LYMPHSABS 1.2 06/03/2017 02:10 PM    EOSABS 0.02 06/30/2024 05:45 AM    BASOSABS 0.01 06/30/2024 05:45 AM

## 2024-07-01 NOTE — PROGRESS NOTES
07/01/24 1449   Encounter Summary   Encounter Overview/Reason Initial Encounter;Spiritual/Emotional Needs   Service Provided For Patient   Referral/Consult From [facility]   Support System Children   Last Encounter  07/01/24  (CW)   Complexity of Encounter Moderate   Spiritual/Emotional needs   Type Spiritual Support   Assessment/Intervention/Outcome   Assessment Coping   Intervention Active listening;Discussed belief system/Latter day practices/mandi;Discussed illness injury and it’s impact;Discussed relationship with God;Explored/Affirmed feelings, thoughts, concerns;Prayer (assurance of)/Amite;Read/Provided Scripture   Outcome Comfort;Engaged in conversation;Expressed Gratitude;Peace;Receptive        visited patient. Patient was laying in bed. Patient appeared to be coping.  provided a listening presence, empathy and offered prayer. Patient was grateful for visit. Spiritual care is ongoing as needed.      Ted Alaniz [phone]

## 2024-07-02 ENCOUNTER — TELEPHONE (OUTPATIENT)
Dept: FAMILY MEDICINE CLINIC | Age: 84
End: 2024-07-02

## 2024-07-02 LAB
MICROORGANISM SPEC CULT: ABNORMAL
MICROORGANISM SPEC CULT: ABNORMAL
SERVICE CMNT-IMP: ABNORMAL
SPECIMEN DESCRIPTION: ABNORMAL

## 2024-07-02 NOTE — TELEPHONE ENCOUNTER
Spoke to pt's son and he said he'll get back to us if he's interested in scheduling an appointment.

## 2024-07-04 ENCOUNTER — APPOINTMENT (OUTPATIENT)
Dept: RADIOLOGY | Facility: HOSPITAL | Age: 84
End: 2024-07-04
Payer: MEDICARE

## 2024-07-04 ENCOUNTER — HOSPITAL ENCOUNTER (INPATIENT)
Facility: HOSPITAL | Age: 84
Discharge: HOME HEALTH | DRG: 543 | End: 2024-07-04
Attending: STUDENT IN AN ORGANIZED HEALTH CARE EDUCATION/TRAINING PROGRAM | Admitting: FAMILY MEDICINE
Payer: MEDICARE

## 2024-07-04 DIAGNOSIS — M79.604 CHRONIC PAIN OF RIGHT LOWER EXTREMITY: ICD-10-CM

## 2024-07-04 DIAGNOSIS — M25.551 RIGHT HIP PAIN: ICD-10-CM

## 2024-07-04 DIAGNOSIS — N39.0 UTI (URINARY TRACT INFECTION), UNCOMPLICATED: ICD-10-CM

## 2024-07-04 DIAGNOSIS — R26.2 AMBULATORY DYSFUNCTION: ICD-10-CM

## 2024-07-04 DIAGNOSIS — R26.2 UNABLE TO AMBULATE: Primary | ICD-10-CM

## 2024-07-04 DIAGNOSIS — G89.29 CHRONIC PAIN OF RIGHT LOWER EXTREMITY: ICD-10-CM

## 2024-07-04 DIAGNOSIS — L29.3 PERINEAL IRRITATION: ICD-10-CM

## 2024-07-04 DIAGNOSIS — I10 PRIMARY HYPERTENSION: ICD-10-CM

## 2024-07-04 DIAGNOSIS — Z01.89 NEED FOR PHYSICAL THERAPY ASSESSMENT: ICD-10-CM

## 2024-07-04 LAB
ACANTHOCYTES BLD QL SMEAR: NORMAL
ALBUMIN SERPL BCP-MCNC: 4 G/DL (ref 3.4–5)
ALP SERPL-CCNC: 41 U/L (ref 33–136)
ALT SERPL W P-5'-P-CCNC: 10 U/L (ref 7–45)
ANION GAP SERPL CALC-SCNC: 14 MMOL/L (ref 10–20)
APPEARANCE UR: ABNORMAL
AST SERPL W P-5'-P-CCNC: 14 U/L (ref 9–39)
BASOPHILS # BLD MANUAL: 0 X10*3/UL (ref 0–0.1)
BASOPHILS NFR BLD MANUAL: 0 %
BILIRUB SERPL-MCNC: 0.8 MG/DL (ref 0–1.2)
BILIRUB UR STRIP.AUTO-MCNC: NEGATIVE MG/DL
BUN SERPL-MCNC: 22 MG/DL (ref 6–23)
CALCIUM SERPL-MCNC: 9 MG/DL (ref 8.6–10.3)
CHLORIDE SERPL-SCNC: 105 MMOL/L (ref 98–107)
CO2 SERPL-SCNC: 23 MMOL/L (ref 21–32)
COLOR UR: COLORLESS
CREAT SERPL-MCNC: 1.15 MG/DL (ref 0.5–1.05)
DACRYOCYTES BLD QL SMEAR: NORMAL
EGFRCR SERPLBLD CKD-EPI 2021: 47 ML/MIN/1.73M*2
EOSINOPHIL # BLD MANUAL: 0 X10*3/UL (ref 0–0.4)
EOSINOPHIL NFR BLD MANUAL: 0 %
ERYTHROCYTE [DISTWIDTH] IN BLOOD BY AUTOMATED COUNT: 20.8 % (ref 11.5–14.5)
GLUCOSE SERPL-MCNC: 99 MG/DL (ref 74–99)
GLUCOSE UR STRIP.AUTO-MCNC: ABNORMAL MG/DL
HCT VFR BLD AUTO: 27.1 % (ref 36–46)
HGB BLD-MCNC: 8.3 G/DL (ref 12–16)
IMM GRANULOCYTES # BLD AUTO: 0.48 X10*3/UL (ref 0–0.5)
IMM GRANULOCYTES NFR BLD AUTO: 7 % (ref 0–0.9)
INR PPP: 1.5 (ref 0.9–1.1)
KETONES UR STRIP.AUTO-MCNC: NEGATIVE MG/DL
LACTATE SERPL-SCNC: 1.6 MMOL/L (ref 0.4–2)
LEUKOCYTE ESTERASE UR QL STRIP.AUTO: ABNORMAL
LYMPHOCYTES # BLD MANUAL: 1.43 X10*3/UL (ref 0.8–3)
LYMPHOCYTES NFR BLD MANUAL: 21 %
MCH RBC QN AUTO: 26.9 PG (ref 26–34)
MCHC RBC AUTO-ENTMCNC: 30.6 G/DL (ref 32–36)
MCV RBC AUTO: 88 FL (ref 80–100)
MONOCYTES # BLD MANUAL: 0.27 X10*3/UL (ref 0.05–0.8)
MONOCYTES NFR BLD MANUAL: 4 %
MUCOUS THREADS #/AREA URNS AUTO: ABNORMAL /LPF
NEUTROPHILS # BLD MANUAL: 4.96 X10*3/UL (ref 1.6–5.5)
NEUTS BAND # BLD MANUAL: 0.2 X10*3/UL (ref 0–0.5)
NEUTS BAND NFR BLD MANUAL: 3 %
NEUTS SEG # BLD MANUAL: 4.76 X10*3/UL (ref 1.6–5)
NEUTS SEG NFR BLD MANUAL: 70 %
NITRITE UR QL STRIP.AUTO: NEGATIVE
NRBC BLD-RTO: 0.4 /100 WBCS (ref 0–0)
OVALOCYTES BLD QL SMEAR: NORMAL
PH UR STRIP.AUTO: 5.5 [PH]
PLATELET # BLD AUTO: 102 X10*3/UL (ref 150–450)
POTASSIUM SERPL-SCNC: 4.4 MMOL/L (ref 3.5–5.3)
PROT SERPL-MCNC: 5.8 G/DL (ref 6.4–8.2)
PROT UR STRIP.AUTO-MCNC: ABNORMAL MG/DL
PROTHROMBIN TIME: 16.7 SECONDS (ref 9.8–12.8)
RBC # BLD AUTO: 3.08 X10*6/UL (ref 4–5.2)
RBC # UR STRIP.AUTO: ABNORMAL /UL
RBC #/AREA URNS AUTO: ABNORMAL /HPF
RBC MORPH BLD: NORMAL
SODIUM SERPL-SCNC: 138 MMOL/L (ref 136–145)
SP GR UR STRIP.AUTO: 1.01
SQUAMOUS #/AREA URNS AUTO: ABNORMAL /HPF
TOTAL CELLS COUNTED BLD: 100
UROBILINOGEN UR STRIP.AUTO-MCNC: NORMAL MG/DL
VARIANT LYMPHS # BLD MANUAL: 0.14 X10*3/UL (ref 0–0.3)
VARIANT LYMPHS NFR BLD: 2 %
WBC # BLD AUTO: 6.8 X10*3/UL (ref 4.4–11.3)
WBC #/AREA URNS AUTO: >50 /HPF
WBC CLUMPS #/AREA URNS AUTO: ABNORMAL /HPF

## 2024-07-04 PROCEDURE — 99285 EMERGENCY DEPT VISIT HI MDM: CPT | Mod: 25

## 2024-07-04 PROCEDURE — 99222 1ST HOSP IP/OBS MODERATE 55: CPT

## 2024-07-04 PROCEDURE — 85610 PROTHROMBIN TIME: CPT | Performed by: NURSE PRACTITIONER

## 2024-07-04 PROCEDURE — P9612 CATHETERIZE FOR URINE SPEC: HCPCS

## 2024-07-04 PROCEDURE — 2500000005 HC RX 250 GENERAL PHARMACY W/O HCPCS: Performed by: STUDENT IN AN ORGANIZED HEALTH CARE EDUCATION/TRAINING PROGRAM

## 2024-07-04 PROCEDURE — 81001 URINALYSIS AUTO W/SCOPE: CPT | Performed by: STUDENT IN AN ORGANIZED HEALTH CARE EDUCATION/TRAINING PROGRAM

## 2024-07-04 PROCEDURE — 2500000004 HC RX 250 GENERAL PHARMACY W/ HCPCS (ALT 636 FOR OP/ED): Performed by: NURSE PRACTITIONER

## 2024-07-04 PROCEDURE — 83605 ASSAY OF LACTIC ACID: CPT | Performed by: STUDENT IN AN ORGANIZED HEALTH CARE EDUCATION/TRAINING PROGRAM

## 2024-07-04 PROCEDURE — 84075 ASSAY ALKALINE PHOSPHATASE: CPT | Performed by: NURSE PRACTITIONER

## 2024-07-04 PROCEDURE — 73700 CT LOWER EXTREMITY W/O DYE: CPT | Mod: RT

## 2024-07-04 PROCEDURE — 96375 TX/PRO/DX INJ NEW DRUG ADDON: CPT

## 2024-07-04 PROCEDURE — 2500000004 HC RX 250 GENERAL PHARMACY W/ HCPCS (ALT 636 FOR OP/ED): Performed by: STUDENT IN AN ORGANIZED HEALTH CARE EDUCATION/TRAINING PROGRAM

## 2024-07-04 PROCEDURE — 36415 COLL VENOUS BLD VENIPUNCTURE: CPT | Performed by: NURSE PRACTITIONER

## 2024-07-04 PROCEDURE — 87086 URINE CULTURE/COLONY COUNT: CPT | Mod: GEALAB | Performed by: NURSE PRACTITIONER

## 2024-07-04 PROCEDURE — 36415 COLL VENOUS BLD VENIPUNCTURE: CPT | Performed by: STUDENT IN AN ORGANIZED HEALTH CARE EDUCATION/TRAINING PROGRAM

## 2024-07-04 PROCEDURE — 96374 THER/PROPH/DIAG INJ IV PUSH: CPT | Mod: 59

## 2024-07-04 PROCEDURE — 96372 THER/PROPH/DIAG INJ SC/IM: CPT | Performed by: STUDENT IN AN ORGANIZED HEALTH CARE EDUCATION/TRAINING PROGRAM

## 2024-07-04 PROCEDURE — 2500000001 HC RX 250 WO HCPCS SELF ADMINISTERED DRUGS (ALT 637 FOR MEDICARE OP)

## 2024-07-04 PROCEDURE — 85027 COMPLETE CBC AUTOMATED: CPT | Performed by: NURSE PRACTITIONER

## 2024-07-04 PROCEDURE — G0378 HOSPITAL OBSERVATION PER HR: HCPCS

## 2024-07-04 PROCEDURE — 96376 TX/PRO/DX INJ SAME DRUG ADON: CPT

## 2024-07-04 PROCEDURE — 73700 CT LOWER EXTREMITY W/O DYE: CPT | Mod: RIGHT SIDE | Performed by: RADIOLOGY

## 2024-07-04 PROCEDURE — 85007 BL SMEAR W/DIFF WBC COUNT: CPT | Performed by: NURSE PRACTITIONER

## 2024-07-04 PROCEDURE — 2500000002 HC RX 250 W HCPCS SELF ADMINISTERED DRUGS (ALT 637 FOR MEDICARE OP, ALT 636 FOR OP/ED)

## 2024-07-04 RX ORDER — ACETAMINOPHEN 325 MG/1
975 TABLET ORAL EVERY 8 HOURS PRN
Status: DISCONTINUED | OUTPATIENT
Start: 2024-07-04 | End: 2024-07-10 | Stop reason: HOSPADM

## 2024-07-04 RX ORDER — ACETAMINOPHEN 325 MG/1
975 TABLET ORAL ONCE
Status: COMPLETED | OUTPATIENT
Start: 2024-07-04 | End: 2024-07-04

## 2024-07-04 RX ORDER — CEFDINIR 300 MG/1
300 CAPSULE ORAL 2 TIMES DAILY
COMMUNITY
Start: 2024-07-01 | End: 2024-07-10 | Stop reason: HOSPADM

## 2024-07-04 RX ORDER — CEFTRIAXONE 1 G/50ML
1 INJECTION, SOLUTION INTRAVENOUS EVERY 24 HOURS
Status: DISCONTINUED | OUTPATIENT
Start: 2024-07-05 | End: 2024-07-07

## 2024-07-04 RX ORDER — FOLIC ACID 1 MG/1
1 TABLET ORAL DAILY
Status: DISCONTINUED | OUTPATIENT
Start: 2024-07-04 | End: 2024-07-10 | Stop reason: HOSPADM

## 2024-07-04 RX ORDER — CARVEDILOL 25 MG/1
25 TABLET ORAL 2 TIMES DAILY
Status: DISCONTINUED | OUTPATIENT
Start: 2024-07-04 | End: 2024-07-10 | Stop reason: HOSPADM

## 2024-07-04 RX ORDER — LEVOTHYROXINE SODIUM 175 UG/1
87.5 TABLET ORAL DAILY
Status: DISCONTINUED | OUTPATIENT
Start: 2024-07-05 | End: 2024-07-10 | Stop reason: HOSPADM

## 2024-07-04 RX ORDER — LEVOTHYROXINE SODIUM 175 UG/1
87.5 TABLET ORAL DAILY
COMMUNITY

## 2024-07-04 RX ORDER — LANOLIN ALCOHOL/MO/W.PET/CERES
1000 CREAM (GRAM) TOPICAL DAILY
COMMUNITY

## 2024-07-04 RX ORDER — CEFTRIAXONE 2 G/50ML
2 INJECTION, SOLUTION INTRAVENOUS ONCE
Status: COMPLETED | OUTPATIENT
Start: 2024-07-04 | End: 2024-07-04

## 2024-07-04 RX ORDER — LANOLIN ALCOHOL/MO/W.PET/CERES
1000 CREAM (GRAM) TOPICAL DAILY
Status: DISCONTINUED | OUTPATIENT
Start: 2024-07-04 | End: 2024-07-10 | Stop reason: HOSPADM

## 2024-07-04 RX ORDER — LIDOCAINE 560 MG/1
1 PATCH PERCUTANEOUS; TOPICAL; TRANSDERMAL DAILY
Status: DISCONTINUED | OUTPATIENT
Start: 2024-07-04 | End: 2024-07-10 | Stop reason: HOSPADM

## 2024-07-04 RX ORDER — CHOLECALCIFEROL (VITAMIN D3) 25 MCG
1000 TABLET ORAL DAILY
Status: DISCONTINUED | OUTPATIENT
Start: 2024-07-04 | End: 2024-07-10 | Stop reason: HOSPADM

## 2024-07-04 RX ORDER — VIT C/E/ZN/COPPR/LUTEIN/ZEAXAN 250MG-90MG
25 CAPSULE ORAL DAILY
COMMUNITY

## 2024-07-04 RX ORDER — FERROUS SULFATE 325(65) MG
325 TABLET ORAL
COMMUNITY

## 2024-07-04 RX ORDER — OXYCODONE HYDROCHLORIDE 5 MG/1
2.5 TABLET ORAL EVERY 6 HOURS PRN
Status: DISCONTINUED | OUTPATIENT
Start: 2024-07-04 | End: 2024-07-10 | Stop reason: HOSPADM

## 2024-07-04 RX ORDER — FERROUS SULFATE 325(65) MG
1 TABLET ORAL
Status: DISCONTINUED | OUTPATIENT
Start: 2024-07-05 | End: 2024-07-10 | Stop reason: HOSPADM

## 2024-07-04 RX ORDER — HYDRALAZINE HYDROCHLORIDE 20 MG/ML
5 INJECTION INTRAMUSCULAR; INTRAVENOUS ONCE
Status: DISCONTINUED | OUTPATIENT
Start: 2024-07-04 | End: 2024-07-04

## 2024-07-04 RX ORDER — LISINOPRIL 10 MG/1
10 TABLET ORAL DAILY
Status: ON HOLD | COMMUNITY
End: 2024-07-09

## 2024-07-04 RX ORDER — CARVEDILOL 25 MG/1
25 TABLET ORAL 2 TIMES DAILY
COMMUNITY

## 2024-07-04 RX ORDER — ISOSORBIDE MONONITRATE 30 MG/1
30 TABLET, EXTENDED RELEASE ORAL DAILY
COMMUNITY

## 2024-07-04 RX ORDER — ISOSORBIDE MONONITRATE 30 MG/1
30 TABLET, EXTENDED RELEASE ORAL DAILY
Status: DISCONTINUED | OUTPATIENT
Start: 2024-07-04 | End: 2024-07-10 | Stop reason: HOSPADM

## 2024-07-04 RX ORDER — FOLIC ACID 0.4 MG
0.8 TABLET ORAL DAILY
COMMUNITY

## 2024-07-04 RX ORDER — HYDRALAZINE HYDROCHLORIDE 25 MG/1
25 TABLET, FILM COATED ORAL 2 TIMES DAILY
COMMUNITY
End: 2024-07-10 | Stop reason: HOSPADM

## 2024-07-04 RX ORDER — LISINOPRIL 10 MG/1
10 TABLET ORAL DAILY
Status: DISCONTINUED | OUTPATIENT
Start: 2024-07-04 | End: 2024-07-10 | Stop reason: HOSPADM

## 2024-07-04 RX ORDER — CARVEDILOL 25 MG/1
25 TABLET ORAL ONCE
Status: DISCONTINUED | OUTPATIENT
Start: 2024-07-04 | End: 2024-07-04

## 2024-07-04 RX ORDER — HYDRALAZINE HYDROCHLORIDE 25 MG/1
25 TABLET, FILM COATED ORAL 2 TIMES DAILY
Status: DISCONTINUED | OUTPATIENT
Start: 2024-07-04 | End: 2024-07-10 | Stop reason: HOSPADM

## 2024-07-04 RX ORDER — OXYCODONE HYDROCHLORIDE 5 MG/1
5 TABLET ORAL EVERY 6 HOURS PRN
Status: DISCONTINUED | OUTPATIENT
Start: 2024-07-04 | End: 2024-07-10 | Stop reason: HOSPADM

## 2024-07-04 RX ORDER — ORPHENADRINE CITRATE 30 MG/ML
60 INJECTION INTRAMUSCULAR; INTRAVENOUS ONCE
Status: COMPLETED | OUTPATIENT
Start: 2024-07-04 | End: 2024-07-04

## 2024-07-04 SDOH — SOCIAL STABILITY: SOCIAL INSECURITY: ARE THERE ANY APPARENT SIGNS OF INJURIES/BEHAVIORS THAT COULD BE RELATED TO ABUSE/NEGLECT?: NO

## 2024-07-04 SDOH — SOCIAL STABILITY: SOCIAL INSECURITY: HAS ANYONE EVER THREATENED TO HURT YOUR FAMILY OR YOUR PETS?: NO

## 2024-07-04 SDOH — SOCIAL STABILITY: SOCIAL INSECURITY: DO YOU FEEL ANYONE HAS EXPLOITED OR TAKEN ADVANTAGE OF YOU FINANCIALLY OR OF YOUR PERSONAL PROPERTY?: NO

## 2024-07-04 SDOH — SOCIAL STABILITY: SOCIAL INSECURITY: HAVE YOU HAD THOUGHTS OF HARMING ANYONE ELSE?: NO

## 2024-07-04 SDOH — SOCIAL STABILITY: SOCIAL INSECURITY: WERE YOU ABLE TO COMPLETE ALL THE BEHAVIORAL HEALTH SCREENINGS?: YES

## 2024-07-04 SDOH — SOCIAL STABILITY: SOCIAL INSECURITY: DO YOU FEEL UNSAFE GOING BACK TO THE PLACE WHERE YOU ARE LIVING?: NO

## 2024-07-04 SDOH — SOCIAL STABILITY: SOCIAL INSECURITY: DOES ANYONE TRY TO KEEP YOU FROM HAVING/CONTACTING OTHER FRIENDS OR DOING THINGS OUTSIDE YOUR HOME?: NO

## 2024-07-04 SDOH — SOCIAL STABILITY: SOCIAL INSECURITY: HAVE YOU HAD ANY THOUGHTS OF HARMING ANYONE ELSE?: NO

## 2024-07-04 SDOH — SOCIAL STABILITY: SOCIAL INSECURITY: ARE YOU OR HAVE YOU BEEN THREATENED OR ABUSED PHYSICALLY, EMOTIONALLY, OR SEXUALLY BY ANYONE?: NO

## 2024-07-04 ASSESSMENT — COGNITIVE AND FUNCTIONAL STATUS - GENERAL
MOVING FROM LYING ON BACK TO SITTING ON SIDE OF FLAT BED WITH BEDRAILS: A LITTLE
PATIENT BASELINE BEDBOUND: YES
WALKING IN HOSPITAL ROOM: A LOT
DAILY ACTIVITIY SCORE: 19
MOVING TO AND FROM BED TO CHAIR: A LOT
TOILETING: A LITTLE
TURNING FROM BACK TO SIDE WHILE IN FLAT BAD: A LITTLE
STANDING UP FROM CHAIR USING ARMS: A LOT
MOBILITY SCORE: 14
PERSONAL GROOMING: A LITTLE
DRESSING REGULAR LOWER BODY CLOTHING: A LITTLE
DRESSING REGULAR UPPER BODY CLOTHING: A LITTLE
HELP NEEDED FOR BATHING: A LITTLE
CLIMB 3 TO 5 STEPS WITH RAILING: A LOT

## 2024-07-04 ASSESSMENT — LIFESTYLE VARIABLES
HOW OFTEN DO YOU HAVE A DRINK CONTAINING ALCOHOL: NEVER
AUDIT-C TOTAL SCORE: 0
HOW OFTEN DO YOU HAVE 6 OR MORE DRINKS ON ONE OCCASION: NEVER
AUDIT-C TOTAL SCORE: 0
HOW MANY STANDARD DRINKS CONTAINING ALCOHOL DO YOU HAVE ON A TYPICAL DAY: PATIENT DOES NOT DRINK
SKIP TO QUESTIONS 9-10: 1

## 2024-07-04 ASSESSMENT — PATIENT HEALTH QUESTIONNAIRE - PHQ9
1. LITTLE INTEREST OR PLEASURE IN DOING THINGS: NOT AT ALL
2. FEELING DOWN, DEPRESSED OR HOPELESS: NOT AT ALL
SUM OF ALL RESPONSES TO PHQ9 QUESTIONS 1 & 2: 0

## 2024-07-04 ASSESSMENT — ACTIVITIES OF DAILY LIVING (ADL)
LACK_OF_TRANSPORTATION: NO
PATIENT'S MEMORY ADEQUATE TO SAFELY COMPLETE DAILY ACTIVITIES?: YES
HEARING - RIGHT EAR: DIFFICULTY WITH NOISE
TOILETING: NEEDS ASSISTANCE
BATHING: NEEDS ASSISTANCE
ASSISTIVE_DEVICE: EYEGLASSES
ADEQUATE_TO_COMPLETE_ADL: YES
DRESSING YOURSELF: INDEPENDENT
FEEDING YOURSELF: INDEPENDENT
WALKS IN HOME: NEEDS ASSISTANCE
GROOMING: INDEPENDENT
HEARING - LEFT EAR: DIFFICULTY WITH NOISE
JUDGMENT_ADEQUATE_SAFELY_COMPLETE_DAILY_ACTIVITIES: YES

## 2024-07-04 ASSESSMENT — COLUMBIA-SUICIDE SEVERITY RATING SCALE - C-SSRS
1. IN THE PAST MONTH, HAVE YOU WISHED YOU WERE DEAD OR WISHED YOU COULD GO TO SLEEP AND NOT WAKE UP?: NO
6. HAVE YOU EVER DONE ANYTHING, STARTED TO DO ANYTHING, OR PREPARED TO DO ANYTHING TO END YOUR LIFE?: NO
2. HAVE YOU ACTUALLY HAD ANY THOUGHTS OF KILLING YOURSELF?: NO

## 2024-07-04 ASSESSMENT — PAIN SCALES - GENERAL
PAINLEVEL_OUTOF10: 0 - NO PAIN
PAINLEVEL_OUTOF10: 9
PAINLEVEL_OUTOF10: 0 - NO PAIN
PAINLEVEL_OUTOF10: 1
PAINLEVEL_OUTOF10: 9
PAINLEVEL_OUTOF10: 7

## 2024-07-04 ASSESSMENT — ENCOUNTER SYMPTOMS
WEAKNESS: 1
FEVER: 0
DYSURIA: 0
CHILLS: 0

## 2024-07-04 ASSESSMENT — PAIN DESCRIPTION - PAIN TYPE: TYPE: ACUTE PAIN

## 2024-07-04 ASSESSMENT — PAIN DESCRIPTION - ORIENTATION: ORIENTATION: RIGHT

## 2024-07-04 ASSESSMENT — PAIN DESCRIPTION - LOCATION: LOCATION: HIP

## 2024-07-04 ASSESSMENT — PAIN - FUNCTIONAL ASSESSMENT
PAIN_FUNCTIONAL_ASSESSMENT: 0-10

## 2024-07-04 NOTE — ED TRIAGE NOTES
BIB EMS from home for RLE pain. Was admitted to [facility] last week and [facility] this week. Had been in there for the RLE pain. Was dx/treated for UTI - still on abx for UTI. H/o polycythemia, on oral Jacofy chemo. Unable to bear weight on RLE. C/o hip to foot pain. C/o left knee/foot pain as well. Denies injuries. Was walking up until a week ago. Oriented x 4.

## 2024-07-04 NOTE — ED PROVIDER NOTES
HPI   Chief Complaint   Patient presents with    Leg Pain       83-year-old female presents today with acute on chronic right lower quadrant pain.  She has been experiencing this pain for 2 months.  She was admitted to [facility] last Thursday through Monday with a right lower quadrant pain and she indicates that the ultrasound was negative for DVT.  She is denying chest pain.  She is denying fever or chills.  The pain is constant.  She denies nausea or vomiting.  She denies abdominal pain.  She denies any recent fall.  She denies any surgery to her right hip or lower extremity.  She has been experiencing home therapy for her bilateral legs to make them stronger.  Patient was sent home on Monday on cefdinir and isosorbide mononitrate.  She is was told to stop her calcium supplements.  She was to follow-up with her primary care Dr. De Leon and with hematology Dr. Karen kendall.  She was expected to have a transthoracic echocardiogram on July 18 of this month.     She is to take cefdinir 300 mg morning and bedtime, isosorbide mononitrate in the morning 30 mg, acetaminophen 500 mg as needed, carvedilol 25 mg morning and evening, Eliquis 2.5 mg every morning, fluticasone 50 mics nasal spray in the morning, folic acid 400 mics every morning, hydralazine 25 mg every morning and bedtime, iron supplement 9  mg, to The 10 mg tablet every evening at 5 PM, and Jardiance 25 mg every morning, she is also on levothyroxine 88 mcg in the morning, lisinopril 10 mg in the morning, vitamin B12 folic acid in the morning 500-400 mcg, and vitamin D 25 mcg in the morning.  She is to stop her Farxiga.  She was diagnosed with heart failure while she was in the hospital.      History provided by:  Patient   used: No                        [place] Coma Scale Score: 15                     Patient History   No past medical history on file.  No past surgical history on file.  No family history on  file.  Social History     Tobacco Use    Smoking status: Not on file    Smokeless tobacco: Not on file   Substance Use Topics    Alcohol use: Not on file    Drug use: Not on file       Physical Exam   ED Triage Vitals [07/04/24 1122]   Temperature Heart Rate Respirations BP   36.2 °C (97.2 °F) 70 18 149/71      Pulse Ox Temp Source Heart Rate Source Patient Position   100 % Temporal Monitor Lying      BP Location FiO2 (%)     Left arm --       Physical Exam  HENT:      Head: Normocephalic.      Right Ear: Tympanic membrane normal.      Left Ear: Tympanic membrane normal.      Nose: Nose normal.      Mouth/Throat:      Mouth: Mucous membranes are dry.   Eyes:      Extraocular Movements: Extraocular movements intact.      Pupils: Pupils are equal, round, and reactive to light.   Cardiovascular:      Rate and Rhythm: Normal rate and regular rhythm.      Pulses: Normal pulses.      Heart sounds: Normal heart sounds.   Pulmonary:      Effort: Pulmonary effort is normal.      Breath sounds: Normal breath sounds.   Abdominal:      General: Abdomen is flat.      Palpations: Abdomen is soft.   Musculoskeletal:         General: Tenderness present.      Cervical back: Normal range of motion and neck supple.   Skin:     General: Skin is warm.      Capillary Refill: Capillary refill takes less than 2 seconds.   Neurological:      General: No focal deficit present.      Mental Status: She is alert and oriented to person, place, and time.   Psychiatric:         Mood and Affect: Mood normal.         ED Course & MDM   ED Course as of 07/04/24 1815   Thu Jul 04, 2024   1224 Hemoglobin 8.3 on 3/2021 when reviewed on Care Everywhere. [HD]   1301 Patient is an 83-year-old with chronic impaired mobility who attends physical therapy on a daily basis as an outpatient    Approximately 2 months ago she woke up in the middle the night and sided to do more exercise than she has.  Since then she has had severe pain in the right hip.  She was  admitted at [facility] and was not able to participate in PT OT however family declined nursing home placement as patient gets PT OT at home.  However she has been acutely worse.  They states that they could just get the pain under control they think patient could participate in PT OT.  On exam patient has bounding 2+ DP pulse.  Her foot is warm.  There is no edema and she is already on Eliquis.  I have a low suspicion for an arterial occlusion or venous thrombus.  However she has exquisite tenderness to rotation with external and internal rotation.  Concern for potential occult fracture as patient was lowered down to the ground by her son when her legs buckled going to the bathroom approximately 2 months ago.  Will try multimodal pain regiment and reevaluate pending workup in the emergency department. [HD]      ED Course User Index  [HD] Stella Mulligan,          Diagnoses as of 07/04/24 1815   Unable to ambulate   Need for physical therapy assessment   Right hip pain   UTI (urinary tract infection), uncomplicated   Chronic pain of right lower extremity       Medical Decision Making  I did not appreciate any sign of infection.  I ordered a CT with runoff of the right lower extremity and a CT of the hip.  Patient received half milligram of Dilaudid for her pain.  Patient responded well to Dilaudid and lidocaine patch and was now pain-free and comfortable but when I tried to ambulate her she could not ambulate due to the pain.  There is her vital signs were rechecked 3 times and stable.  She received 975 acetaminophen and 60 mg Norflex.  The 0.5 mg Dilaudid was administered twice for pain control.  Her hemoglobin hematocrit was 8.3 and 27.1 with a platelet of 102.  She has a history of chronic anemia.  She was denying melena, hematochezia or hematuria.  Lactate was normal at 1.6.  Her abdomen was soft and nontender in all quadrants.  Metabolic panel had a creatinine of 1.15 and GFR 47.  Her INR was 1.5 and  she is on Eliquis twice daily.  This CT of the hip showed no acute osseous finding.  Urinary bladder stone 1.5 cm.  This would not be responsible for pain.  Seen and staffed with attending.  She would benefit for admission for physical therapy.   excepted by hospital service.  Communicating both through haiku and on the phone.  Patient will go to 2 S.  She received 2 g of ceftriaxone before she went up to the floor for her urinary tract infection and urine was sent for culture.    At 1815 nursing asked if we were going to do anything about her blood pressure.  She was due her carvedilol 25 mg which I ordered, Eliquis 2.5 mg was due this evening, and she was due 25 mg hydralazine oral tablet which we do not in.  I put in for just 5 mg hydralazine IV as needed.    Amount and/or Complexity of Data Reviewed  Labs: ordered.  Radiology: ordered and independent interpretation performed.        Procedure  Procedures     SHELBY Andrews  07/04/24 1806       SHELBY Andrews  07/04/24 1815

## 2024-07-04 NOTE — H&P
[facility] History and Physical    History Of Present Illness  Desirae Gregg is a 83 y.o. female with a past medical history of polycythemia vera, Htn, hypothyroidism, heart failure (EF unknown - records requested) presents to [facility] ED with a chief complaint of leg weakness. Has been on-going for the past 2 months. She has been working with physical therapy at home for over the past 2 years. About 2 months ago, she suspects that she over-exerted herself and maybe pulled a muscle. She has been slowly declining since then with worsening weakness. She does have a history of L1 chronic spine fracture found on MRI in 2022. ED was unable to get her to ambulate earlier today too. Was recently admitted to [facility] for 3 days for similar complaints and discharged on Monday with 5 day course of Keflex.     12 Point ROS negative unless otherwise specified above.      ED COURSE:   Vitals: Temp 97.2, /71 , HR 70 , RR 18 , SpO2 100% on room air     Labs  -CBC - Hgb 8.3,  INR 1.5, remainder unremarkable  -CMP - Cr 1.15 (baseline unknown)  -UA - Color colorless/turbid, Glucose 3+, Blood 3+, Leukocyte Esterase 500, and RBC 11-20, Bacteria >50  -Lactate: 1.6    Imaging:  - CT right hip without IV contrast - No acute osseous findings. Urinary bladder stone.     Interventions:   - Rocephin 2g, 975 Tylenol, Dilaudid 0.5mg x2, Norflex 60mg IM, lidocaine 4% patch     Past Medical History  She has no past medical history on file.    Surgical History  She has no past surgical history on file.     Social History  She has no history on file for tobacco use, alcohol use, and drug use.    Family History  No family history on file.     Allergies  Patient has no known allergies.    Review of Systems   Constitutional:  Negative for chills and fever.   Genitourinary:  Negative for dysuria and urgency.   Neurological:  Positive for weakness.   All other systems reviewed and are negative.       Physical Exam  Vitals reviewed.    HENT:      Head: Normocephalic and atraumatic.   Cardiovascular:      Rate and Rhythm: Normal rate and regular rhythm.      Heart sounds: Normal heart sounds. No murmur heard.     No gallop.   Pulmonary:      Effort: Pulmonary effort is normal. No respiratory distress.      Breath sounds: Normal breath sounds.   Abdominal:      General: Abdomen is flat. There is no distension.      Palpations: Abdomen is soft.      Tenderness: There is no abdominal tenderness.   Musculoskeletal:         General: No tenderness.      Right lower leg: No edema.      Left lower leg: No edema.      Comments: 5/5 left LE strength, 4/5 right hip flexion  No tenderness to palpitations along spine   Skin:     General: Skin is warm and dry.   Neurological:      Mental Status: She is alert. Mental status is at baseline.   Psychiatric:         Mood and Affect: Mood normal.         Behavior: Behavior normal.          Last Recorded Vitals  BP (!) 183/96   Pulse 84   Temp 36.2 °C (97.2 °F) (Temporal)   Resp 16   Wt 70.8 kg (156 lb)   SpO2 97%     Relevant Results    Current Facility-Administered Medications:     acetaminophen (Tylenol) tablet 975 mg, 975 mg, oral, q8h PRN, Kuldip Bradshaw DO    [Held by provider] apixaban (Eliquis) tablet 2.5 mg, 2.5 mg, oral, BID, Kuldip Bradshaw DO    carvedilol (Coreg) tablet 25 mg, 25 mg, oral, BID, Kuldip Bradshaw DO    [START ON 7/5/2024] cefTRIAXone (Rocephin) IVPB 1 g, 1 g, intravenous, q24h, Kuldip Bradshaw DO    cholecalciferol (Vitamin D-3) tablet 1,000 Units, 1,000 Units, oral, Daily, Kuldip Bradshaw DO    cyanocobalamin (Vitamin B-12) tablet 1,000 mcg, 1,000 mcg, oral, Daily, Kuldip Bradshaw DO    [START ON 7/5/2024] empagliflozin (Jardiance) tablet 25 mg, 25 mg, oral, q AM, Kuldip Bradshaw DO    [START ON 7/5/2024] ferrous sulfate (325 mg ferrous sulfate) tablet 1 tablet, 1 tablet, oral, Daily with breakfast, Kuldip Bradshaw DO    folic acid (Folvite) tablet 0.8 mg, 0.8 mg, oral, Daily, Kuldip Bradshaw DO    hydrALAZINE  (Apresoline) tablet 25 mg, 25 mg, oral, BID, Kuldip Bradshaw DO    HYDROmorphone (Dilaudid) injection 0.2 mg, 0.2 mg, intravenous, q4h PRN, Kuldip Bradshaw DO    isosorbide mononitrate ER (Imdur) 24 hr tablet 30 mg, 30 mg, oral, Daily, Kuldip Bradshaw DO    [START ON 7/5/2024] levothyroxine (Synthroid, Levoxyl) tablet 87.5 mcg, 87.5 mcg, oral, Daily, Kuldip Bradshaw DO    lidocaine 4 % patch 1 patch, 1 patch, transdermal, Daily, Kuldip Bradshaw DO, 1 patch at 07/04/24 1311    lisinopril tablet 10 mg, 10 mg, oral, Daily, Kuldip Bradshaw DO    oxyCODONE (Roxicodone) immediate release tablet 2.5 mg, 2.5 mg, oral, q6h PRN, Kuldip Bradshaw DO    oxyCODONE (Roxicodone) immediate release tablet 5 mg, 5 mg, oral, q6h PRN, Kuldip Bradshaw DO    [START ON 7/5/2024] ruxolitinib (Jakafi) tablet 10 mg, 10 mg, oral, q AM, Kuldip Bradshaw DO    ruxolitinib (Jakafi) tablet 20 mg, 20 mg, oral, Nightly, Kuldip Bradshaw DO    Results for orders placed or performed during the hospital encounter of 07/04/24 (from the past 24 hour(s))   CBC and Auto Differential   Result Value Ref Range    WBC 6.8 4.4 - 11.3 x10*3/uL    nRBC 0.4 (H) 0.0 - 0.0 /100 WBCs    RBC 3.08 (L) 4.00 - 5.20 x10*6/uL    Hemoglobin 8.3 (L) 12.0 - 16.0 g/dL    Hematocrit 27.1 (L) 36.0 - 46.0 %    MCV 88 80 - 100 fL    MCH 26.9 26.0 - 34.0 pg    MCHC 30.6 (L) 32.0 - 36.0 g/dL    RDW 20.8 (H) 11.5 - 14.5 %    Platelets 102 (L) 150 - 450 x10*3/uL    Immature Granulocytes %, Automated 7.0 (H) 0.0 - 0.9 %    Immature Granulocytes Absolute, Automated 0.48 0.00 - 0.50 x10*3/uL   Comprehensive metabolic panel   Result Value Ref Range    Glucose 99 74 - 99 mg/dL    Sodium 138 136 - 145 mmol/L    Potassium 4.4 3.5 - 5.3 mmol/L    Chloride 105 98 - 107 mmol/L    Bicarbonate 23 21 - 32 mmol/L    Anion Gap 14 10 - 20 mmol/L    Urea Nitrogen 22 6 - 23 mg/dL    Creatinine 1.15 (H) 0.50 - 1.05 mg/dL    eGFR 47 (L) >60 mL/min/1.73m*2    Calcium 9.0 8.6 - 10.3 mg/dL    Albumin 4.0 3.4 - 5.0 g/dL    Alkaline Phosphatase 41 33  - 136 U/L    Total Protein 5.8 (L) 6.4 - 8.2 g/dL    AST 14 9 - 39 U/L    Bilirubin, Total 0.8 0.0 - 1.2 mg/dL    ALT 10 7 - 45 U/L   Protime-INR   Result Value Ref Range    Protime 16.7 (H) 9.8 - 12.8 seconds    INR 1.5 (H) 0.9 - 1.1   Manual Differential   Result Value Ref Range    Neutrophils %, Manual 70.0 40.0 - 80.0 %    Bands %, Manual 3.0 0.0 - 5.0 %    Lymphocytes %, Manual 21.0 13.0 - 44.0 %    Monocytes %, Manual 4.0 2.0 - 10.0 %    Eosinophils %, Manual 0.0 0.0 - 6.0 %    Basophils %, Manual 0.0 0.0 - 2.0 %    Atypical Lymphocytes %, Manual 2.0 0.0 - 2.0 %    Seg Neutrophils Absolute, Manual 4.76 1.60 - 5.00 x10*3/uL    Bands Absolute, Manual 0.20 0.00 - 0.50 x10*3/uL    Lymphocytes Absolute, Manual 1.43 0.80 - 3.00 x10*3/uL    Monocytes Absolute, Manual 0.27 0.05 - 0.80 x10*3/uL    Eosinophils Absolute, Manual 0.00 0.00 - 0.40 x10*3/uL    Basophils Absolute, Manual 0.00 0.00 - 0.10 x10*3/uL    Atypical Lymphs Absolute, Manual 0.14 0.00 - 0.30 x10*3/uL    Total Cells Counted 100     Neutrophils Absolute, Manual 4.96 1.60 - 5.50 x10*3/uL    RBC Morphology See Below     Ovalocytes Few     Teardrop Cells Few     Acanthocytes Few    Lactate   Result Value Ref Range    Lactate 1.6 0.4 - 2.0 mmol/L   Urinalysis with Reflex Culture and Microscopic   Result Value Ref Range    Color, Urine Colorless (N) Light-Yellow, Yellow, Dark-Yellow    Appearance, Urine Turbid (N) Clear    Specific Gravity, Urine 1.009 1.005 - 1.035    pH, Urine 5.5 5.0, 5.5, 6.0, 6.5, 7.0, 7.5, 8.0    Protein, Urine 10 (TRACE) NEGATIVE, 10 (TRACE), 20 (TRACE) mg/dL    Glucose, Urine 500 (3+) (A) Normal mg/dL    Blood, Urine 1.0 (3+) (A) NEGATIVE    Ketones, Urine NEGATIVE NEGATIVE mg/dL    Bilirubin, Urine NEGATIVE NEGATIVE    Urobilinogen, Urine Normal Normal mg/dL    Nitrite, Urine NEGATIVE NEGATIVE    Leukocyte Esterase, Urine 500 Bartolo/µL (A) NEGATIVE   Microscopic Only, Urine   Result Value Ref Range    WBC, Urine >50 (A) 1-5, NONE /HPF     WBC Clumps, Urine FEW Reference range not established. /HPF    RBC, Urine 11-20 (A) NONE, 1-2, 3-5 /HPF    Squamous Epithelial Cells, Urine 1-9 (SPARSE) Reference range not established. /HPF    Mucus, Urine FEW Reference range not established. /LPF     CT hip right wo IV contrast    Result Date: 7/4/2024  STUDY: CT Extremity; Completed Time:  7/4/2024 2:04 PM INDICATION: Right lower extremity pain. COMPARISON: None Available. ACCESSION NUMBER(S): [id number] ORDERING CLINICIAN: GAYLA STATON TECHNIQUE:  Thin section axial images were obtained through the right hip without intravenous contrast.  Orthogonal reconstructed images were obtained and reviewed.  Automated mA/kV exposure control was utilized and patient examination was performed in strict accordance with principles of ALARA. FINDINGS: No acute fracture nor obstructive osseous lesion is demonstrated. Age related osteopenia appears to be present.  Moderate degenerative changes of the RIGHT hip are demonstrated.  Degenerative changes of the pubic symphysis also noted. Scattered phleboliths are present within the pelvis.  Large stone within urinary bladder is present measuring 1.8 cm.    1. No acute osseous findings. 2. Urinary bladder stone. Signed by Leno Gómez II, MD      Assessment/Plan   Desirae Gregg is a 83 y.o. female with a past medical history of polycythemia vera, Htn, hypothyroidism, heart failure (EF unknown - records requested) presents to [facility] ED with a chief complaint of leg weakness.     Acute medical issues:  Right lower extremity weakness  -hx of chronic L1 compression fracture (2022)   -CT lumbar spine ordered  -PT/OT ordered  -pain control - 975 Tylenol mild, oxy 2.5mg PRN, oxy 5mg PRN, lidocaine patch ordered    Nephrolithiasis, non-obstructive  -discharged from [facility] on 7/1 with a 5 day course of cefdinir 300mg BID, urine culture there showed proteus mirabalis with resistance to only nitrofurantoin  -1.8cm stone found in  bladder on right hip CT imaging  -Rocephin 1g daily  -urology consulted  -NPO at midnight, holding Eliquis  -will check INR/Type and screen in AM    Chronic medical issues:  Polycythemia vera - cont home Jakafi 10mg in AM, 20mg in PM, Eliquis 2.5mg BID, Isolation precaution ordered  HTN - cont home lisinopril 10mg daily, hydralazine 25mg BID  Hypothyroidism - cont home levothyroxine 88mcg daily  Heart failure, recovered EF (EF 55-60%) - cont carvedilol 25mg BID, cont jardiance 25mg daily, cont imdur 30mg daily  Hx of PE on Eliquis - following with heme/onc at [place] (Dr. Resendiz) - follow up on 7/11/24    Fluids: PO intake  Electrolytes: Will replace as needed   Nutrition: Reg diet, NPO at midnight  GI Ppx: None  DVT Ppx: SCD, holding home Eliquis  Code Status: FULL code  Abx: Rocephin (7/4 - current)    Disposition: Admitted to right lower extremity weakness. Also found to have incidental 1.8cm stone in bladder. Urology consulted. PT/OT ordered. Estimated length of stay greater than 2 nights.     Kuldip Bradshaw, DO  Internal Medicine, PGY-II

## 2024-07-05 ENCOUNTER — APPOINTMENT (OUTPATIENT)
Dept: RADIOLOGY | Facility: HOSPITAL | Age: 84
End: 2024-07-05
Payer: MEDICARE

## 2024-07-05 PROBLEM — R26.2 UNABLE TO AMBULATE: Status: ACTIVE | Noted: 2024-07-05

## 2024-07-05 LAB
ABO GROUP (TYPE) IN BLOOD: NORMAL
ALBUMIN SERPL BCP-MCNC: 3.4 G/DL (ref 3.4–5)
ANION GAP SERPL CALC-SCNC: 9 MMOL/L (ref 10–20)
ANTIBODY SCREEN: NORMAL
BUN SERPL-MCNC: 20 MG/DL (ref 6–23)
CALCIUM SERPL-MCNC: 8.4 MG/DL (ref 8.6–10.3)
CHLORIDE SERPL-SCNC: 107 MMOL/L (ref 98–107)
CO2 SERPL-SCNC: 27 MMOL/L (ref 21–32)
CREAT SERPL-MCNC: 1.09 MG/DL (ref 0.5–1.05)
EGFRCR SERPLBLD CKD-EPI 2021: 50 ML/MIN/1.73M*2
ERYTHROCYTE [DISTWIDTH] IN BLOOD BY AUTOMATED COUNT: 20.8 % (ref 11.5–14.5)
FERRITIN SERPL-MCNC: 217 NG/ML (ref 8–150)
FOLATE SERPL-MCNC: >24 NG/ML
GLUCOSE SERPL-MCNC: 97 MG/DL (ref 74–99)
HCT VFR BLD AUTO: 24.4 % (ref 36–46)
HGB BLD-MCNC: 7.3 G/DL (ref 12–16)
HGB RETIC QN: 32 PG (ref 28–38)
HOLD SPECIMEN: NORMAL
IMMATURE RETIC FRACTION: 25.8 %
INR PPP: 1.2 (ref 0.9–1.1)
IRON SATN MFR SERPL: 46 % (ref 25–45)
IRON SERPL-MCNC: 82 UG/DL (ref 35–150)
MAGNESIUM SERPL-MCNC: 2.25 MG/DL (ref 1.6–2.4)
MCH RBC QN AUTO: 26.4 PG (ref 26–34)
MCHC RBC AUTO-ENTMCNC: 29.9 G/DL (ref 32–36)
MCV RBC AUTO: 88 FL (ref 80–100)
NRBC BLD-RTO: 0.5 /100 WBCS (ref 0–0)
PHOSPHATE SERPL-MCNC: 3.7 MG/DL (ref 2.5–4.9)
PLATELET # BLD AUTO: 81 X10*3/UL (ref 150–450)
POTASSIUM SERPL-SCNC: 4 MMOL/L (ref 3.5–5.3)
PROTHROMBIN TIME: 13.8 SECONDS (ref 9.8–12.8)
RBC # BLD AUTO: 2.76 X10*6/UL (ref 4–5.2)
RETICS #: 0.07 X10*6/UL (ref 0.02–0.11)
RETICS/RBC NFR AUTO: 2.4 % (ref 0.5–2)
RH FACTOR (ANTIGEN D): NORMAL
SODIUM SERPL-SCNC: 139 MMOL/L (ref 136–145)
TIBC SERPL-MCNC: 179 UG/DL (ref 240–445)
UIBC SERPL-MCNC: 97 UG/DL (ref 110–370)
WBC # BLD AUTO: 3.7 X10*3/UL (ref 4.4–11.3)

## 2024-07-05 PROCEDURE — 72131 CT LUMBAR SPINE W/O DYE: CPT | Performed by: RADIOLOGY

## 2024-07-05 PROCEDURE — 2500000002 HC RX 250 W HCPCS SELF ADMINISTERED DRUGS (ALT 637 FOR MEDICARE OP, ALT 636 FOR OP/ED)

## 2024-07-05 PROCEDURE — 82746 ASSAY OF FOLIC ACID SERUM: CPT | Mod: GEALAB | Performed by: NURSE PRACTITIONER

## 2024-07-05 PROCEDURE — 2500000005 HC RX 250 GENERAL PHARMACY W/O HCPCS

## 2024-07-05 PROCEDURE — 97165 OT EVAL LOW COMPLEX 30 MIN: CPT | Mod: GO

## 2024-07-05 PROCEDURE — 2500000004 HC RX 250 GENERAL PHARMACY W/ HCPCS (ALT 636 FOR OP/ED)

## 2024-07-05 PROCEDURE — 36415 COLL VENOUS BLD VENIPUNCTURE: CPT | Performed by: NURSE PRACTITIONER

## 2024-07-05 PROCEDURE — 82728 ASSAY OF FERRITIN: CPT | Performed by: NURSE PRACTITIONER

## 2024-07-05 PROCEDURE — 72131 CT LUMBAR SPINE W/O DYE: CPT

## 2024-07-05 PROCEDURE — 99233 SBSQ HOSP IP/OBS HIGH 50: CPT | Performed by: NURSE PRACTITIONER

## 2024-07-05 PROCEDURE — 2500000001 HC RX 250 WO HCPCS SELF ADMINISTERED DRUGS (ALT 637 FOR MEDICARE OP)

## 2024-07-05 PROCEDURE — 36415 COLL VENOUS BLD VENIPUNCTURE: CPT

## 2024-07-05 PROCEDURE — 1100000001 HC PRIVATE ROOM DAILY

## 2024-07-05 PROCEDURE — 80069 RENAL FUNCTION PANEL: CPT

## 2024-07-05 PROCEDURE — 85610 PROTHROMBIN TIME: CPT

## 2024-07-05 PROCEDURE — 85045 AUTOMATED RETICULOCYTE COUNT: CPT | Performed by: NURSE PRACTITIONER

## 2024-07-05 PROCEDURE — 99222 1ST HOSP IP/OBS MODERATE 55: CPT | Performed by: UROLOGY

## 2024-07-05 PROCEDURE — 83735 ASSAY OF MAGNESIUM: CPT

## 2024-07-05 PROCEDURE — 83540 ASSAY OF IRON: CPT | Performed by: NURSE PRACTITIONER

## 2024-07-05 PROCEDURE — 85027 COMPLETE CBC AUTOMATED: CPT

## 2024-07-05 PROCEDURE — 94760 N-INVAS EAR/PLS OXIMETRY 1: CPT

## 2024-07-05 PROCEDURE — 86901 BLOOD TYPING SEROLOGIC RH(D): CPT

## 2024-07-05 ASSESSMENT — COGNITIVE AND FUNCTIONAL STATUS - GENERAL
MOVING FROM LYING ON BACK TO SITTING ON SIDE OF FLAT BED WITH BEDRAILS: A LITTLE
DRESSING REGULAR UPPER BODY CLOTHING: A LITTLE
CLIMB 3 TO 5 STEPS WITH RAILING: A LOT
DAILY ACTIVITIY SCORE: 17
PERSONAL GROOMING: A LITTLE
MOVING TO AND FROM BED TO CHAIR: A LOT
PERSONAL GROOMING: A LITTLE
TOILETING: A LOT
MOBILITY SCORE: 15
PERSONAL GROOMING: A LITTLE
TOILETING: A LOT
DRESSING REGULAR LOWER BODY CLOTHING: A LOT
MOBILITY SCORE: 14
MOVING FROM LYING ON BACK TO SITTING ON SIDE OF FLAT BED WITH BEDRAILS: A LITTLE
DRESSING REGULAR LOWER BODY CLOTHING: A LOT
HELP NEEDED FOR BATHING: A LITTLE
WALKING IN HOSPITAL ROOM: A LITTLE
TOILETING: A LITTLE
STANDING UP FROM CHAIR USING ARMS: A LOT
DAILY ACTIVITIY SCORE: 19
DRESSING REGULAR UPPER BODY CLOTHING: A LITTLE
TURNING FROM BACK TO SIDE WHILE IN FLAT BAD: A LITTLE
DRESSING REGULAR UPPER BODY CLOTHING: A LITTLE
DRESSING REGULAR LOWER BODY CLOTHING: A LITTLE
TURNING FROM BACK TO SIDE WHILE IN FLAT BAD: A LITTLE
HELP NEEDED FOR BATHING: A LOT
MOVING TO AND FROM BED TO CHAIR: A LOT
CLIMB 3 TO 5 STEPS WITH RAILING: A LOT
WALKING IN HOSPITAL ROOM: A LOT
HELP NEEDED FOR BATHING: A LITTLE
STANDING UP FROM CHAIR USING ARMS: A LOT
DAILY ACTIVITIY SCORE: 16

## 2024-07-05 ASSESSMENT — PAIN SCALES - GENERAL
PAINLEVEL_OUTOF10: 0 - NO PAIN
PAINLEVEL_OUTOF10: 7
PAINLEVEL_OUTOF10: 8
PAINLEVEL_OUTOF10: 4
PAINLEVEL_OUTOF10: 0 - NO PAIN
PAINLEVEL_OUTOF10: 7

## 2024-07-05 ASSESSMENT — PAIN - FUNCTIONAL ASSESSMENT
PAIN_FUNCTIONAL_ASSESSMENT: 0-10

## 2024-07-05 ASSESSMENT — PAIN DESCRIPTION - ORIENTATION: ORIENTATION: RIGHT

## 2024-07-05 ASSESSMENT — ACTIVITIES OF DAILY LIVING (ADL)
ADL_ASSISTANCE: INDEPENDENT
LACK_OF_TRANSPORTATION: NO

## 2024-07-05 ASSESSMENT — PAIN DESCRIPTION - LOCATION: LOCATION: HIP

## 2024-07-05 NOTE — CONSULTS
"Reason For Consult  Bladder stone    History Of Present Illness  Desirae Gregg is a 83 y.o. female presenting with leg weakness.  CT scan of the hip identified a 1.8 cm bladder stone.  She denies prior history of bladder stone.  Urine culture is pending.       Past Medical History  Polycythemia vera, HTN, hypothyroidism, heart failure    Surgical History  She has no past surgical history on file.     Social History  She reports that she has never smoked. She has never used smokeless tobacco. No history on file for alcohol use and drug use.    Family History  No family history on file.     Allergies  Patient has no known allergies.    Review of Systems  Leg weakness     Physical Exam  Alert, oriented  Normal resp effort  RRR  Abdomen soft, nontender     Last Recorded Vitals  Blood pressure 139/72, pulse 73, temperature 36.1 °C (97 °F), temperature source Temporal, resp. rate 19, height 1.664 m (5' 5.5\"), weight 71.7 kg (158 lb), SpO2 94%.    Relevant Results      CT and labs reviewed     Assessment/Plan     83 year old has a 1.8 cm bladder stone.  The stone may lie within a right ureterocele.     She may have a UTI, culture pending, on Rocephin    Laser lithotripsy of the stone with incision of a ureterocele if present was recommended.  This may be performed as an outpatient.  If medical clearance is given, she will be scheduled.          Franky Warren MD    "

## 2024-07-05 NOTE — PROGRESS NOTES
07/05/24 0910   Discharge Planning   Living Arrangements Children;Other (Comment)  (home with son Alexis)   Support Systems Children   Assistance Needed A&OX3; independent with ADLs with walker at times; doesn't drive; room air baseline and currently room air; on Eliquis prior to hospitalization   Type of Residence Private residence   Number of Stairs to Enter Residence 3   Number of Stairs Within Residence 7   Do you have animals or pets at home? Yes   Type of Animals or Pets 1 cat   Who is requesting discharge planning? Provider   Home or Post Acute Services In home services   Patient expects to be discharged to: Patient is active with [facility] for PT. Attending physician needs to send external home care referral upon dc   Does the patient need discharge transport arranged? No   Financial Resource Strain   How hard is it for you to pay for the very basics like food, housing, medical care, and heating? Not hard   Housing Stability   In the last 12 months, was there a time when you were not able to pay the mortgage or rent on time? N   In the last 12 months, how many places have you lived? 1   In the last 12 months, was there a time when you did not have a steady place to sleep or slept in a shelter (including now)? N   Transportation Needs   In the past 12 months, has lack of transportation kept you from medical appointments or from getting medications? no   In the past 12 months, has lack of transportation kept you from meetings, work, or from getting things needed for daily living? No        07/05/24 1000   Discharge Planning   Patient expects to be discharged to: Patient NOT active with [facility]-insurance cut 6/17. Asking [facility] if they can accept again in the community. Phone calls to son and daughter unsuccessful to discuss dc plan of home with homecare. Patient denying snf

## 2024-07-05 NOTE — PROGRESS NOTES
Desirae Gregg is a 83 y.o. female on day 0 of admission presenting with Ambulatory dysfunction.      Subjective   The patient is comfortable with family at the bedside.        Objective     Last Recorded Vitals  /62 (BP Location: Right arm, Patient Position: Lying)   Pulse 68   Temp 36.2 °C (97.2 °F) (Temporal)   Resp 16   Wt 71.7 kg (158 lb)   SpO2 93%   Intake/Output last 3 Shifts:    Intake/Output Summary (Last 24 hours) at 7/5/2024 1414  Last data filed at 7/4/2024 2110  Gross per 24 hour   Intake --   Output 210 ml   Net -210 ml       Admission Weight  Weight: 70.8 kg (156 lb) (07/04/24 1122)    Daily Weight  07/04/24 : 71.7 kg (158 lb)    Image Results  CT lumbar spine wo IV contrast  Narrative: Interpreted By:  Ian Yang,   STUDY:  CT LUMBAR SPINE WO IV CONTRAST; ;  7/5/2024 10:33 am      INDICATION:  Signs/Symptoms:chronic right L1 fracture, right back pain / lower  extremity weakness.      COMPARISON:  None.      ACCESSION NUMBER(S):  [id number]      ORDERING CLINICIAN:  ALEXANDRE GAMING      TECHNIQUE:  Routine unenhanced CT of the lumbar spine was performed.      FINDINGS:  This report assumes 5 non-rib bearing lumbar vertebral bodies. The  lowest intervertebral disc will be labeled L5-S1.      There is mild retrolisthesis at L1-L2, L2-L3, and L3-L4 and grade 1  anterolisthesis at L4-L5. There is moderate anterior wedging of the  L1 vertebral body with 50% height loss. There is retropulsion of the  posterior vertebral body into the spinal canal, most pronounced  involving the inferior aspect and there is associated partial  effacement of the ventral thecal sac. There is extension of the  inferior endplate of L1 into the bilateral L1-L2 neural foramina and  there is mild right neural foraminal narrowing but no striking  narrowing of the left neural foramen. Remaining vertebral body  heights are maintained. Bones appear demineralized.      There is mild intervertebral disc height narrowing at  L3-L4 and L5-S1  with chronic degenerative endplate changes including osteophyte  formation and sclerosis. There is vacuum disc phenomenon at L5-S1.      There is marked bilateral facet osteoarthropathy at T11-T12 and L4-L5  and marked right facet osteoarthropathy at L5-S1.      There are disc bulges/disc protrusions with osteophytic spurring at  multiple levels which cause variable degree of ventral thecal sac  effacement but there is no striking osseous spinal canal stenosis.      There is extension disc and osteophyte into the neural foramina at  multiple levels and there is at most mild neural foraminal narrowing  at few levels.      There are atherosclerotic calcification within the abdominal aorta  and its branches. The gallbladder is surgically absent. There is  colonic diverticulosis.      There are osseous degenerative changes involving the bilateral  sacroiliac joints.      Impression: Chronic appearing fracture involving the L1 vertebral body with  moderate height loss and retropulsion into the spinal canal causing  partial effacement of the ventral thecal sac. Multilevel degenerative  changes of the lumbar spine as detailed above.      This study was interpreted at [facility].      MACRO:  None      Signed by: Ian Yang 7/5/2024 11:10 AM  Dictation workstation:   [id number]      Physical Exam  Eyes:      Extraocular Movements: Extraocular movements intact.   Cardiovascular:      Rate and Rhythm: Regular rhythm.   Pulmonary:      Breath sounds: Normal breath sounds.   Abdominal:      Palpations: Abdomen is soft.   Musculoskeletal:         General: Tenderness present.      Comments: Chronic left shoulder discomfort   Neurological:      Mental Status: She is alert and oriented to person, place, and time.   Psychiatric:         Behavior: Behavior normal.         Relevant Results               Assessment/Plan          Results for orders placed or performed during the  hospital encounter of 07/04/24 (from the past 24 hour(s))   Urinalysis with Reflex Culture and Microscopic   Result Value Ref Range    Color, Urine Colorless (N) Light-Yellow, Yellow, Dark-Yellow    Appearance, Urine Turbid (N) Clear    Specific Gravity, Urine 1.009 1.005 - 1.035    pH, Urine 5.5 5.0, 5.5, 6.0, 6.5, 7.0, 7.5, 8.0    Protein, Urine 10 (TRACE) NEGATIVE, 10 (TRACE), 20 (TRACE) mg/dL    Glucose, Urine 500 (3+) (A) Normal mg/dL    Blood, Urine 1.0 (3+) (A) NEGATIVE    Ketones, Urine NEGATIVE NEGATIVE mg/dL    Bilirubin, Urine NEGATIVE NEGATIVE    Urobilinogen, Urine Normal Normal mg/dL    Nitrite, Urine NEGATIVE NEGATIVE    Leukocyte Esterase, Urine 500 Bartolo/µL (A) NEGATIVE   Extra Urine Gray Tube   Result Value Ref Range    Extra Tube Hold for add-ons.    Microscopic Only, Urine   Result Value Ref Range    WBC, Urine >50 (A) 1-5, NONE /HPF    WBC Clumps, Urine FEW Reference range not established. /HPF    RBC, Urine 11-20 (A) NONE, 1-2, 3-5 /HPF    Squamous Epithelial Cells, Urine 1-9 (SPARSE) Reference range not established. /HPF    Mucus, Urine FEW Reference range not established. /LPF   CBC   Result Value Ref Range    WBC 3.7 (L) 4.4 - 11.3 x10*3/uL    nRBC 0.5 (H) 0.0 - 0.0 /100 WBCs    RBC 2.76 (L) 4.00 - 5.20 x10*6/uL    Hemoglobin 7.3 (L) 12.0 - 16.0 g/dL    Hematocrit 24.4 (L) 36.0 - 46.0 %    MCV 88 80 - 100 fL    MCH 26.4 26.0 - 34.0 pg    MCHC 29.9 (L) 32.0 - 36.0 g/dL    RDW 20.8 (H) 11.5 - 14.5 %    Platelets 81 (L) 150 - 450 x10*3/uL   Renal Function Panel   Result Value Ref Range    Glucose 97 74 - 99 mg/dL    Sodium 139 136 - 145 mmol/L    Potassium 4.0 3.5 - 5.3 mmol/L    Chloride 107 98 - 107 mmol/L    Bicarbonate 27 21 - 32 mmol/L    Anion Gap 9 (L) 10 - 20 mmol/L    Urea Nitrogen 20 6 - 23 mg/dL    Creatinine 1.09 (H) 0.50 - 1.05 mg/dL    eGFR 50 (L) >60 mL/min/1.73m*2    Calcium 8.4 (L) 8.6 - 10.3 mg/dL    Phosphorus 3.7 2.5 - 4.9 mg/dL    Albumin 3.4 3.4 - 5.0 g/dL   Magnesium    Result Value Ref Range    Magnesium 2.25 1.60 - 2.40 mg/dL   Protime-INR   Result Value Ref Range    Protime 13.8 (H) 9.8 - 12.8 seconds    INR 1.2 (H) 0.9 - 1.1   Type and screen   Result Value Ref Range    ABO TYPE B     Rh TYPE POS     ANTIBODY SCREEN NEG    Iron and TIBC   Result Value Ref Range    Iron 82 35 - 150 ug/dL    UIBC 97 (L) 110 - 370 ug/dL    TIBC 179 (L) 240 - 445 ug/dL    % Saturation 46 (H) 25 - 45 %   Ferritin   Result Value Ref Range    Ferritin 217 (H) 8 - 150 ng/mL   Reticulocytes   Result Value Ref Range    Retic % 2.4 (H) 0.5 - 2.0 %    Retic Absolute 0.066 0.017 - 0.110 x10*6/uL    Reticulocyte Hemoglobin 32 28 - 38 pg    Immature Retic fraction 25.8 (H) <=16.0 %            Principal Problem:    Ambulatory dysfunction  Active Problems:    Unable to ambulate    Desirae Gregg is a 83 y.o. female with a past medical history of polycythemia vera, Htn, hypothyroidism, heart failure (EF unknown - records requested) presents to [facility] ED with a chief complaint of leg weakness.      #Right lower extremity weakness  -Hx of chronic L1 compression fracture (2022)   -CT lumbar spine results : Chronic appearing fracture involving the L1 vertebral body with moderate height loss and retropulsion into the spinal canal causing partial effacement of the ventral thecal sac. Multilevel degenerative ch...   -PT/OT ordered  -pain control - 975 Tylenol mild, oxy 2.5mg PRN, oxy 5mg PRN, lidocaine patch continued     #Nephrolithiasis, non-obstructive  #UTI  -discharged from [facility] on 7/1 with a 5 day course of cefdinir 300mg BID, urine culture there showed proteus mirabalis with resistance to only nitrofurantoin  -1.8cm stone found in bladder on right hip CT imaging  -Rocephin 1g daily  -Urology provided recommendations to follow up outpatient for likely intervention.    #Acute on chronic anemia due to myeloproliferative disorder   -Will transfuse if the hemoglobin is <7  -Resume Eliquis asap  -Consider  consulting hematology for rec regarding anticoagulation, if the hemoglobin drops.   -Repeat CBC in the am     #Polycythemia vera - cont home Jakafi 10mg in AM, 20mg in PM, Eliquis 2.5mg BID, Isolation precaution ordered    #HTN - cont home lisinopril 10mg daily, hydralazine 25mg BID    #Hypothyroidism - cont home levothyroxine 88mcg daily    #Heart failure, recovered EF (EF 55-60%) - cont carvedilol 25mg BID, cont jardiance 25mg daily, cont imdur 30mg daily    #Hx of PE on Eliquis - following with heme/onc at [facility] (Dr. Resendiz) - follow up on 7/11/24              OLI Devlin-CNP

## 2024-07-05 NOTE — CARE PLAN
Assisted pt in completion of HCPOA.  No LW completed at this time. Original and copies given to pt/family and a copy made for her med record.

## 2024-07-05 NOTE — CARE PLAN
The patient's goals for the shift include      The clinical goals for the shift include Pt will remain safe throughout shift.      Problem: Pain - Adult  Goal: Verbalizes/displays adequate comfort level or baseline comfort level  Outcome: Progressing     Problem: Safety - Adult  Goal: Free from fall injury  Outcome: Progressing     Problem: Discharge Planning  Goal: Discharge to home or other facility with appropriate resources  Outcome: Progressing     Problem: Chronic Conditions and Co-morbidities  Goal: Patient's chronic conditions and co-morbidity symptoms are monitored and maintained or improved  Outcome: Progressing     Problem: Skin  Goal: Participates in plan/prevention/treatment measures  Outcome: Progressing  Goal: Prevent/manage excess moisture  Outcome: Progressing  Goal: Prevent/minimize sheer/friction injuries  Outcome: Progressing  Goal: Promote/optimize nutrition  Outcome: Progressing  Goal: Promote skin healing  Outcome: Progressing     Problem: Pain  Goal: Takes deep breaths with improved pain control throughout the shift  Outcome: Progressing  Goal: Turns in bed with improved pain control throughout the shift  Outcome: Progressing  Goal: Walks with improved pain control throughout the shift  Outcome: Progressing  Goal: Performs ADL's with improved pain control throughout shift  Outcome: Progressing  Goal: Participates in PT with improved pain control throughout the shift  Outcome: Progressing  Goal: Free from opioid side effects throughout the shift  Outcome: Progressing  Goal: Free from acute confusion related to pain meds throughout the shift  Outcome: Progressing

## 2024-07-05 NOTE — PROGRESS NOTES
Occupational Therapy    Evaluation    Patient Name: Desirae Gregg  MRN: [id number]  Today's Date: 7/5/2024  Time Calculation  Start Time: 1255  Stop Time: 1317  Time Calculation (min): 22 min    Assessment  IP OT Assessment  OT Assessment: Pt presents with pain, decreased balance, strength, and endurance. Pt to benefit from skilled OT services to increase independence with ADL's, transfers, and mobility  Prognosis: Good  Barriers to Discharge: Inaccessible home environment  Evaluation/Treatment Tolerance: Patient tolerated treatment well  Medical Staff Made Aware: Yes  End of Session Communication: Bedside nurse  End of Session Patient Position: Up in chair, Alarm off, caregiver present (Communicated with RN about no alarm and aide is going to put one in chair since pt was eating her lunch. Family members present in the room)  Plan:  Treatment Interventions: ADL retraining, Functional transfer training, UE strengthening/ROM, Endurance training, Patient/family training  OT Frequency: 3 times per week  OT Discharge Recommendations: Moderate intensity level of continued care  Equipment Recommended upon Discharge: Wheeled walker (pt owns)  OT Recommended Transfer Status: Minimal assist, Assist of 1  OT - OK to Discharge: Yes (per OT POC)    Subjective   Current Problem:  1. Unable to ambulate        2. Need for physical therapy assessment        3. Right hip pain        4. UTI (urinary tract infection), uncomplicated        5. Chronic pain of right lower extremity          General:  General  Reason for Referral: Pt is a 84 y/o woman who was admitted for chief complaint of leg weakness.  Past Medical History Relevant to Rehab: Past medical history of polycythemia vera, Htn, hypothyroidism, heart failure  Family/Caregiver Present: Yes (son and other family member present)  Prior to Session Communication: Bedside nurse  Patient Position Received: Bed, 3 rail up, Alarm on  General Comment: Pt was pleasant and agreeable to OT  andrey.  Precautions:  Medical Precautions: Fall precautions (pure-wick, protective precautions)     Pain:  Pain Assessment  Pain Assessment: 0-10  0-10 (Numeric) Pain Score: 8  Pain Type: Acute pain  Pain Location: Leg (from R hip to knee)  Pain Orientation: Right  Pain Interventions: Repositioned, Elevated    Objective   Cognition:  Overall Cognitive Status: Within Functional Limits  Orientation Level: Oriented X4     Home Living:  Type of Home: House  Lives With: Adult children (lives with son)  Home Adaptive Equipment: Walker rolling or standard, Cane  Home Layout: Bed/bath upstairs, Stairs to alternate level with rails  Alternate Level Stairs-Rails: Left  Alternate Level Stairs-Number of Steps: 8-9  Home Access: Stairs to enter without rails  Entrance Stairs-Rails: None  Entrance Stairs-Number of Steps: 2  Bathroom Shower/Tub: Tub/shower unit  Bathroom Toilet: Adaptive toilet seating  Bathroom Equipment: Bedside commode, Grab bars in shower, Shower chair with back   Prior Function:  Level of [place]: Independent with ADLs and functional transfers, Needs assistance with homemaking  Receives Help From: Family  ADL Assistance: Independent  Homemaking Assistance: Needs assistance  Ambulatory Assistance: Independent (pt does stairs at home with the cane and her son uses a gait belt around her)  Prior Function Comments: Pt either uses the cane or walker when needed, but sometimes walks around the house with no AD.     ADL:  LE Dressing Assistance: Minimal  LE Dressing Deficit: Steadying, Requires assistive device for steadying, Supervision/safety, Pull up over hips  ADL Comments: Anticipate min-mod A for other LB ADL's  Activity Tolerance:  Endurance: Tolerates less than 10 min exercise, no significant change in vital signs  Bed Mobility/Transfers:   Bed Mobility  Bed Mobility: Yes  Bed Mobility 1  Bed Mobility 1: Supine to sitting  Level of Assistance 1: Contact guard  Bed Mobility Comments 1: use of bedrail and  HOB elevated    Transfers  Transfer: Yes  Transfer 1  Transfer From 1: Sit to, Stand to  Transfer to 1: Sit, Stand  Technique 1: Sit to stand, Stand to sit  Transfer Device 1: Walker  Transfer Level of Assistance 1: Minimum assistance, Minimal verbal cues  Trials/Comments 1: verbal cues for hand placement    Functional Mobility:  Functional Mobility  Functional Mobility Performed: Yes  Functional Mobility 1  Surface 1: Level tile  Device 1: Rolling walker  Assistance 1: Minimum assistance, Minimal verbal cues  Comments 1: Pt took 5-6 steps forward with the FWW and then turned to sit into the recliner chair. Pt required min A for balance and verbal cues for sequencing steps. Pt has hard time advancing the L LE  Sitting Balance:  Static Sitting Balance  Static Sitting-Balance Support: Feet supported, Bilateral upper extremity supported  Static Sitting-Level of Assistance: Distant supervision  Dynamic Sitting Balance  Dynamic Sitting-Balance Support: Feet supported (unilateral UE support)  Dynamic Sitting-Balance: Forward lean  Dynamic Sitting-Comments: SBA  Standing Balance:  Static Standing Balance  Static Standing-Balance Support: Bilateral upper extremity supported  Static Standing-Level of Assistance: Contact guard  Dynamic Standing Balance  Dynamic Standing-Balance Support: Bilateral upper extremity supported  Dynamic Standing-Comments: Min A with FWW    Sensation:  Light Touch: No apparent deficits  Strength:  Strength Comments: Pt presents with UB weakness. Pt reports her L UE is bad     Extremities: RUE   RUE : Within Functional Limits and LUE   LUE: Exceptions to WFL (decreased ROM at shoulder)    Outcome Measures: [facility] Daily Activity  Putting on and taking off regular lower body clothing: A lot  Bathing (including washing, rinsing, drying): A lot  Putting on and taking off regular upper body clothing: A little  Toileting, which includes using toilet, bedpan or urinal: A lot  Taking care of personal grooming  such as brushing teeth: A little  Eating Meals: None  Daily Activity - Total Score: 16      Education Documentation  Body Mechanics, taught by Christy Robledo OT at 7/5/2024  1:56 PM.  Learner: Family, Patient  Readiness: Acceptance  Method: Explanation  Response: Verbalizes Understanding, Needs Reinforcement, Demonstrated Understanding    ADL Training, taught by Christy Robledo OT at 7/5/2024  1:56 PM.  Learner: Family, Patient  Readiness: Acceptance  Method: Explanation  Response: Verbalizes Understanding, Needs Reinforcement, Demonstrated Understanding    Education Comments  No comments found.      Goals:   Encounter Problems       Encounter Problems (Active)       OT Goals       Pt to demonstrate bed mobility at mod I        Start:  07/05/24    Expected End:  07/19/24            Pt to demonstrate transfers with FWW at supervision         Start:  07/05/24    Expected End:  07/19/24            Pt will demo increased functional mobility to tolerate tasks necessary to complete ADL routine with FWW at supervision        Start:  07/05/24    Expected End:  07/19/24            Pt will increase endurance to tolerate 15 min of activity with no more than 1 rest break in order to increase ability to engage in ADL completion.        Start:  07/05/24    Expected End:  07/19/24            Pt will demo GOOD static/dynamic standing balance during ADL and functional activity with FWW >5 minutes for improved independence and participation in ADL        Start:  07/05/24    Expected End:  07/19/24            Pt to demonstrate LB dressing, bathing, and toileting with FWW at supervision        Start:  07/05/24    Expected End:  07/19/24

## 2024-07-06 LAB
ALBUMIN SERPL BCP-MCNC: 3.4 G/DL (ref 3.4–5)
ANION GAP SERPL CALC-SCNC: 10 MMOL/L (ref 10–20)
BACTERIA UR CULT: NO GROWTH
BUN SERPL-MCNC: 19 MG/DL (ref 6–23)
CALCIUM SERPL-MCNC: 8.4 MG/DL (ref 8.6–10.3)
CHLORIDE SERPL-SCNC: 106 MMOL/L (ref 98–107)
CO2 SERPL-SCNC: 27 MMOL/L (ref 21–32)
CREAT SERPL-MCNC: 1.04 MG/DL (ref 0.5–1.05)
EGFRCR SERPLBLD CKD-EPI 2021: 53 ML/MIN/1.73M*2
ERYTHROCYTE [DISTWIDTH] IN BLOOD BY AUTOMATED COUNT: 20.9 % (ref 11.5–14.5)
GLUCOSE SERPL-MCNC: 97 MG/DL (ref 74–99)
HCT VFR BLD AUTO: 24.1 % (ref 36–46)
HGB BLD-MCNC: 7.3 G/DL (ref 12–16)
MAGNESIUM SERPL-MCNC: 2.26 MG/DL (ref 1.6–2.4)
MCH RBC QN AUTO: 26.9 PG (ref 26–34)
MCHC RBC AUTO-ENTMCNC: 30.3 G/DL (ref 32–36)
MCV RBC AUTO: 89 FL (ref 80–100)
NRBC BLD-RTO: 0.7 /100 WBCS (ref 0–0)
PHOSPHATE SERPL-MCNC: 3.7 MG/DL (ref 2.5–4.9)
PLATELET # BLD AUTO: 94 X10*3/UL (ref 150–450)
POTASSIUM SERPL-SCNC: 4.3 MMOL/L (ref 3.5–5.3)
RBC # BLD AUTO: 2.71 X10*6/UL (ref 4–5.2)
SODIUM SERPL-SCNC: 139 MMOL/L (ref 136–145)
WBC # BLD AUTO: 4.4 X10*3/UL (ref 4.4–11.3)

## 2024-07-06 PROCEDURE — 36415 COLL VENOUS BLD VENIPUNCTURE: CPT

## 2024-07-06 PROCEDURE — 2500000005 HC RX 250 GENERAL PHARMACY W/O HCPCS

## 2024-07-06 PROCEDURE — 83735 ASSAY OF MAGNESIUM: CPT

## 2024-07-06 PROCEDURE — 2500000001 HC RX 250 WO HCPCS SELF ADMINISTERED DRUGS (ALT 637 FOR MEDICARE OP): Performed by: NURSE PRACTITIONER

## 2024-07-06 PROCEDURE — 2500000004 HC RX 250 GENERAL PHARMACY W/ HCPCS (ALT 636 FOR OP/ED)

## 2024-07-06 PROCEDURE — 2500000004 HC RX 250 GENERAL PHARMACY W/ HCPCS (ALT 636 FOR OP/ED): Performed by: NURSE PRACTITIONER

## 2024-07-06 PROCEDURE — 85027 COMPLETE CBC AUTOMATED: CPT

## 2024-07-06 PROCEDURE — 99233 SBSQ HOSP IP/OBS HIGH 50: CPT | Performed by: NURSE PRACTITIONER

## 2024-07-06 PROCEDURE — 80069 RENAL FUNCTION PANEL: CPT

## 2024-07-06 PROCEDURE — 2500000004 HC RX 250 GENERAL PHARMACY W/ HCPCS (ALT 636 FOR OP/ED): Performed by: FAMILY MEDICINE

## 2024-07-06 PROCEDURE — 2500000002 HC RX 250 W HCPCS SELF ADMINISTERED DRUGS (ALT 637 FOR MEDICARE OP, ALT 636 FOR OP/ED)

## 2024-07-06 PROCEDURE — 1100000001 HC PRIVATE ROOM DAILY

## 2024-07-06 PROCEDURE — 2500000001 HC RX 250 WO HCPCS SELF ADMINISTERED DRUGS (ALT 637 FOR MEDICARE OP)

## 2024-07-06 RX ORDER — EAR PLUGS
1 EACH OTIC (EAR)
Status: DISCONTINUED | OUTPATIENT
Start: 2024-07-06 | End: 2024-07-10 | Stop reason: HOSPADM

## 2024-07-06 RX ORDER — POLYETHYLENE GLYCOL 3350 17 G/17G
17 POWDER, FOR SOLUTION ORAL DAILY
Status: DISCONTINUED | OUTPATIENT
Start: 2024-07-06 | End: 2024-07-10 | Stop reason: HOSPADM

## 2024-07-06 RX ORDER — METHOCARBAMOL 500 MG/1
500 TABLET, FILM COATED ORAL EVERY 8 HOURS SCHEDULED
Status: DISCONTINUED | OUTPATIENT
Start: 2024-07-06 | End: 2024-07-10 | Stop reason: HOSPADM

## 2024-07-06 RX ORDER — ACETAMINOPHEN 325 MG/1
975 TABLET ORAL EVERY 8 HOURS
Status: DISCONTINUED | OUTPATIENT
Start: 2024-07-06 | End: 2024-07-10 | Stop reason: HOSPADM

## 2024-07-06 ASSESSMENT — COGNITIVE AND FUNCTIONAL STATUS - GENERAL
TOILETING: A LITTLE
PERSONAL GROOMING: A LITTLE
TURNING FROM BACK TO SIDE WHILE IN FLAT BAD: A LITTLE
MOBILITY SCORE: 15
HELP NEEDED FOR BATHING: A LITTLE
DRESSING REGULAR LOWER BODY CLOTHING: A LOT
MOVING FROM LYING ON BACK TO SITTING ON SIDE OF FLAT BED WITH BEDRAILS: A LITTLE
DAILY ACTIVITIY SCORE: 18
CLIMB 3 TO 5 STEPS WITH RAILING: A LOT
MOVING TO AND FROM BED TO CHAIR: A LOT
STANDING UP FROM CHAIR USING ARMS: A LOT
WALKING IN HOSPITAL ROOM: A LITTLE
DRESSING REGULAR UPPER BODY CLOTHING: A LITTLE

## 2024-07-06 ASSESSMENT — PAIN SCALES - GENERAL
PAINLEVEL_OUTOF10: 8
PAINLEVEL_OUTOF10: 7
PAINLEVEL_OUTOF10: 10 - WORST POSSIBLE PAIN
PAINLEVEL_OUTOF10: 0 - NO PAIN

## 2024-07-06 ASSESSMENT — PAIN - FUNCTIONAL ASSESSMENT
PAIN_FUNCTIONAL_ASSESSMENT: 0-10
PAIN_FUNCTIONAL_ASSESSMENT: 0-10

## 2024-07-06 ASSESSMENT — PAIN DESCRIPTION - LOCATION: LOCATION: LEG

## 2024-07-06 ASSESSMENT — PAIN DESCRIPTION - ORIENTATION: ORIENTATION: RIGHT

## 2024-07-06 NOTE — PROGRESS NOTES
Desirae Gregg is a 83 y.o. female on day 1 of admission presenting with Ambulatory dysfunction.      Subjective   The patient is comfortable with family at the bedside.  Patient complains of severe right hip pain last evening.  She adds that she has not slept since 1 AM.       Objective     Last Recorded Vitals  /64   Pulse 76   Temp 36.1 °C (97 °F) (Temporal)   Resp 18   Wt 71.7 kg (158 lb)   SpO2 96%   Intake/Output last 3 Shifts:    Intake/Output Summary (Last 24 hours) at 7/6/2024 1245  Last data filed at 7/6/2024 0859  Gross per 24 hour   Intake 290 ml   Output 800 ml   Net -510 ml       Admission Weight  Weight: 70.8 kg (156 lb) (07/04/24 1122)    Daily Weight  07/04/24 : 71.7 kg (158 lb)    Image Results  CT lumbar spine wo IV contrast  Narrative: Interpreted By:  Ian Yang,   STUDY:  CT LUMBAR SPINE WO IV CONTRAST; ;  7/5/2024 10:33 am      INDICATION:  Signs/Symptoms:chronic right L1 fracture, right back pain / lower  extremity weakness.      COMPARISON:  None.      ACCESSION NUMBER(S):  [id number]      ORDERING CLINICIAN:  ALEXANDRE GAMING      TECHNIQUE:  Routine unenhanced CT of the lumbar spine was performed.      FINDINGS:  This report assumes 5 non-rib bearing lumbar vertebral bodies. The  lowest intervertebral disc will be labeled L5-S1.      There is mild retrolisthesis at L1-L2, L2-L3, and L3-L4 and grade 1  anterolisthesis at L4-L5. There is moderate anterior wedging of the  L1 vertebral body with 50% height loss. There is retropulsion of the  posterior vertebral body into the spinal canal, most pronounced  involving the inferior aspect and there is associated partial  effacement of the ventral thecal sac. There is extension of the  inferior endplate of L1 into the bilateral L1-L2 neural foramina and  there is mild right neural foraminal narrowing but no striking  narrowing of the left neural foramen. Remaining vertebral body  heights are maintained. Bones appear demineralized.       There is mild intervertebral disc height narrowing at L3-L4 and L5-S1  with chronic degenerative endplate changes including osteophyte  formation and sclerosis. There is vacuum disc phenomenon at L5-S1.      There is marked bilateral facet osteoarthropathy at T11-T12 and L4-L5  and marked right facet osteoarthropathy at L5-S1.      There are disc bulges/disc protrusions with osteophytic spurring at  multiple levels which cause variable degree of ventral thecal sac  effacement but there is no striking osseous spinal canal stenosis.      There is extension disc and osteophyte into the neural foramina at  multiple levels and there is at most mild neural foraminal narrowing  at few levels.      There are atherosclerotic calcification within the abdominal aorta  and its branches. The gallbladder is surgically absent. There is  colonic diverticulosis.      There are osseous degenerative changes involving the bilateral  sacroiliac joints.      Impression: Chronic appearing fracture involving the L1 vertebral body with  moderate height loss and retropulsion into the spinal canal causing  partial effacement of the ventral thecal sac. Multilevel degenerative  changes of the lumbar spine as detailed above.      This study was interpreted at [facility].      MACRO:  None      Signed by: Ian Yang 7/5/2024 11:10 AM  Dictation workstation:   [id number]      Physical Exam  Eyes:      Extraocular Movements: Extraocular movements intact.   Cardiovascular:      Rate and Rhythm: Regular rhythm.   Pulmonary:      Breath sounds: Normal breath sounds.   Abdominal:      Palpations: Abdomen is soft.   Musculoskeletal:         General: Tenderness present.      Comments: Chronic left shoulder discomfort   Neurological:      Mental Status: She is alert and oriented to person, place, and time.   Psychiatric:         Behavior: Behavior normal.         Relevant Results               Assessment/Plan           Results for orders placed or performed during the hospital encounter of 07/04/24 (from the past 24 hour(s))   CBC   Result Value Ref Range    WBC 4.4 4.4 - 11.3 x10*3/uL    nRBC 0.7 (H) 0.0 - 0.0 /100 WBCs    RBC 2.71 (L) 4.00 - 5.20 x10*6/uL    Hemoglobin 7.3 (L) 12.0 - 16.0 g/dL    Hematocrit 24.1 (L) 36.0 - 46.0 %    MCV 89 80 - 100 fL    MCH 26.9 26.0 - 34.0 pg    MCHC 30.3 (L) 32.0 - 36.0 g/dL    RDW 20.9 (H) 11.5 - 14.5 %    Platelets 94 (L) 150 - 450 x10*3/uL   Renal Function Panel   Result Value Ref Range    Glucose 97 74 - 99 mg/dL    Sodium 139 136 - 145 mmol/L    Potassium 4.3 3.5 - 5.3 mmol/L    Chloride 106 98 - 107 mmol/L    Bicarbonate 27 21 - 32 mmol/L    Anion Gap 10 10 - 20 mmol/L    Urea Nitrogen 19 6 - 23 mg/dL    Creatinine 1.04 0.50 - 1.05 mg/dL    eGFR 53 (L) >60 mL/min/1.73m*2    Calcium 8.4 (L) 8.6 - 10.3 mg/dL    Phosphorus 3.7 2.5 - 4.9 mg/dL    Albumin 3.4 3.4 - 5.0 g/dL   Magnesium   Result Value Ref Range    Magnesium 2.26 1.60 - 2.40 mg/dL            Principal Problem:    Ambulatory dysfunction  Active Problems:    Unable to ambulate    Desirae Gregg is a 83 y.o. female with a past medical history of polycythemia vera, Htn, hypothyroidism, heart failure (EF unknown - records requested) presents to [facility] ED with a chief complaint of leg weakness.      #Right lower extremity weakness  -Hx of chronic L1 compression fracture (2022)   -CT lumbar spine results : Chronic appearing fracture involving the L1 vertebral body with moderate height loss and retropulsion into the spinal canal causing partial effacement of the ventral thecal sac. Multilevel degenerative ch...   -PT/OT ordered  -pain control - 975 Tylenol mild, oxy 2.5mg PRN, oxy 5mg PRN, lidocaine patch continued     #Nephrolithiasis, non-obstructive  -discharged from [facility] on 7/1 with a 5 day course of cefdinir 300mg BID, urine culture there showed proteus mirabalis with resistance to only nitrofurantoin  -Recent UC  showed no growth  -1.8cm stone found in bladder on right hip CT imaging  -Rocephin 1g discontinued   -Urology provided recommendations to follow up outpatient for likely intervention.    #Acute on chronic anemia due to myeloproliferative disorder   -Will transfuse if the hemoglobin is <7  -Resumed Eliquis   -Consider consulting hematology for rec regarding anticoagulation, if the hemoglobin drops.   -Repeat CBC in the am     #Polycythemia vera - cont home Jakafi 10mg in AM, 20mg in PM, Eliquis 2.5mg BID, Isolation precaution ordered    #HTN - cont home lisinopril 10mg daily, hydralazine 25mg BID    #Hypothyroidism - cont home levothyroxine 88mcg daily    #Heart failure, recovered EF (EF 55-60%) - cont carvedilol 25mg BID, cont jardiance 25mg daily, cont imdur 30mg daily    #Hx of PE on Eliquis - following with heme/onc at [facility] (Dr. Resendiz) - follow up on 7/11/24              OLI Devlin-CNP

## 2024-07-06 NOTE — CARE PLAN
The patient's goals for the shift include      The clinical goals for the shift include Pt will remain safe throughout shift.      Problem: Pain - Adult  Goal: Verbalizes/displays adequate comfort level or baseline comfort level  Outcome: Progressing     Problem: Safety - Adult  Goal: Free from fall injury  Outcome: Progressing     Problem: Discharge Planning  Goal: Discharge to home or other facility with appropriate resources  Outcome: Progressing     Problem: Chronic Conditions and Co-morbidities  Goal: Patient's chronic conditions and co-morbidity symptoms are monitored and maintained or improved  Outcome: Progressing     Problem: Skin  Goal: Participates in plan/prevention/treatment measures  Outcome: Progressing  Flowsheets (Taken 7/5/2024 2207)  Participates in plan/prevention/treatment measures: Elevate heels  Goal: Prevent/manage excess moisture  Outcome: Progressing  Flowsheets (Taken 7/5/2024 2207)  Prevent/manage excess moisture: Moisturize dry skin  Goal: Prevent/minimize sheer/friction injuries  Outcome: Progressing  Flowsheets (Taken 7/5/2024 2207)  Prevent/minimize sheer/friction injuries:   HOB 30 degrees or less   Turn/reposition every 2 hours/use positioning/transfer devices  Goal: Promote/optimize nutrition  Outcome: Progressing  Flowsheets (Taken 7/5/2024 2207)  Promote/optimize nutrition:   Consume > 50% meals/supplements   Offer water/supplements/favorite foods  Goal: Promote skin healing  Outcome: Progressing  Flowsheets (Taken 7/5/2024 2207)  Promote skin healing:   Assess skin/pad under line(s)/device(s)   Turn/reposition every 2 hours/use positioning/transfer devices     Problem: Pain  Goal: Takes deep breaths with improved pain control throughout the shift  Outcome: Progressing  Goal: Turns in bed with improved pain control throughout the shift  Outcome: Progressing  Goal: Walks with improved pain control throughout the shift  Outcome: Progressing  Goal: Performs ADL's with improved pain  control throughout shift  Outcome: Progressing  Goal: Participates in PT with improved pain control throughout the shift  Outcome: Progressing  Goal: Free from opioid side effects throughout the shift  Outcome: Progressing  Goal: Free from acute confusion related to pain meds throughout the shift  Outcome: Progressing

## 2024-07-07 VITALS
HEART RATE: 71 BPM | HEIGHT: 66 IN | OXYGEN SATURATION: 95 % | TEMPERATURE: 97.9 F | RESPIRATION RATE: 20 BRPM | WEIGHT: 158 LBS | DIASTOLIC BLOOD PRESSURE: 71 MMHG | BODY MASS INDEX: 25.39 KG/M2 | SYSTOLIC BLOOD PRESSURE: 128 MMHG

## 2024-07-07 PROBLEM — R26.2 AMBULATORY DYSFUNCTION: Status: RESOLVED | Noted: 2024-07-04 | Resolved: 2024-07-07

## 2024-07-07 PROBLEM — R26.2 UNABLE TO AMBULATE: Status: RESOLVED | Noted: 2024-07-05 | Resolved: 2024-07-07

## 2024-07-07 LAB
ALBUMIN SERPL BCP-MCNC: 3.5 G/DL (ref 3.4–5)
ANION GAP SERPL CALC-SCNC: 12 MMOL/L (ref 10–20)
BUN SERPL-MCNC: 22 MG/DL (ref 6–23)
CALCIUM SERPL-MCNC: 8.5 MG/DL (ref 8.6–10.3)
CHLORIDE SERPL-SCNC: 107 MMOL/L (ref 98–107)
CO2 SERPL-SCNC: 24 MMOL/L (ref 21–32)
CREAT SERPL-MCNC: 1.06 MG/DL (ref 0.5–1.05)
EGFRCR SERPLBLD CKD-EPI 2021: 52 ML/MIN/1.73M*2
ERYTHROCYTE [DISTWIDTH] IN BLOOD BY AUTOMATED COUNT: 20.5 % (ref 11.5–14.5)
GLUCOSE SERPL-MCNC: 144 MG/DL (ref 74–99)
HCT VFR BLD AUTO: 24.3 % (ref 36–46)
HGB BLD-MCNC: 7.2 G/DL (ref 12–16)
MAGNESIUM SERPL-MCNC: 2.26 MG/DL (ref 1.6–2.4)
MCH RBC QN AUTO: 26.4 PG (ref 26–34)
MCHC RBC AUTO-ENTMCNC: 29.6 G/DL (ref 32–36)
MCV RBC AUTO: 89 FL (ref 80–100)
NRBC BLD-RTO: 0.4 /100 WBCS (ref 0–0)
PHOSPHATE SERPL-MCNC: 3.6 MG/DL (ref 2.5–4.9)
PLATELET # BLD AUTO: 87 X10*3/UL (ref 150–450)
POTASSIUM SERPL-SCNC: 4.3 MMOL/L (ref 3.5–5.3)
RBC # BLD AUTO: 2.73 X10*6/UL (ref 4–5.2)
SODIUM SERPL-SCNC: 139 MMOL/L (ref 136–145)
WBC # BLD AUTO: 5.1 X10*3/UL (ref 4.4–11.3)

## 2024-07-07 PROCEDURE — 84100 ASSAY OF PHOSPHORUS: CPT

## 2024-07-07 PROCEDURE — 36415 COLL VENOUS BLD VENIPUNCTURE: CPT

## 2024-07-07 PROCEDURE — 2500000001 HC RX 250 WO HCPCS SELF ADMINISTERED DRUGS (ALT 637 FOR MEDICARE OP)

## 2024-07-07 PROCEDURE — 2500000002 HC RX 250 W HCPCS SELF ADMINISTERED DRUGS (ALT 637 FOR MEDICARE OP, ALT 636 FOR OP/ED)

## 2024-07-07 PROCEDURE — 2500000004 HC RX 250 GENERAL PHARMACY W/ HCPCS (ALT 636 FOR OP/ED): Performed by: NURSE PRACTITIONER

## 2024-07-07 PROCEDURE — 2500000001 HC RX 250 WO HCPCS SELF ADMINISTERED DRUGS (ALT 637 FOR MEDICARE OP): Performed by: NURSE PRACTITIONER

## 2024-07-07 PROCEDURE — 85027 COMPLETE CBC AUTOMATED: CPT

## 2024-07-07 PROCEDURE — 99232 SBSQ HOSP IP/OBS MODERATE 35: CPT | Performed by: UROLOGY

## 2024-07-07 PROCEDURE — 1100000001 HC PRIVATE ROOM DAILY

## 2024-07-07 PROCEDURE — 99233 SBSQ HOSP IP/OBS HIGH 50: CPT | Performed by: NURSE PRACTITIONER

## 2024-07-07 PROCEDURE — 2500000004 HC RX 250 GENERAL PHARMACY W/ HCPCS (ALT 636 FOR OP/ED): Performed by: FAMILY MEDICINE

## 2024-07-07 PROCEDURE — 97162 PT EVAL MOD COMPLEX 30 MIN: CPT | Mod: GP

## 2024-07-07 PROCEDURE — 83735 ASSAY OF MAGNESIUM: CPT

## 2024-07-07 PROCEDURE — 2500000005 HC RX 250 GENERAL PHARMACY W/O HCPCS

## 2024-07-07 RX ORDER — POLYETHYLENE GLYCOL 3350 17 G/17G
17 POWDER, FOR SOLUTION ORAL DAILY
Qty: 3 PACKET | Refills: 0 | Status: SHIPPED | OUTPATIENT
Start: 2024-07-08 | End: 2024-07-11

## 2024-07-07 RX ORDER — GABAPENTIN 100 MG/1
200 CAPSULE ORAL 2 TIMES DAILY
Status: DISCONTINUED | OUTPATIENT
Start: 2024-07-07 | End: 2024-07-10 | Stop reason: HOSPADM

## 2024-07-07 RX ORDER — OXYCODONE HYDROCHLORIDE 5 MG/1
5 TABLET ORAL EVERY 6 HOURS PRN
Qty: 12 TABLET | Refills: 0 | Status: SHIPPED | OUTPATIENT
Start: 2024-07-07 | End: 2024-07-08

## 2024-07-07 RX ORDER — LIDOCAINE 560 MG/1
1 PATCH PERCUTANEOUS; TOPICAL; TRANSDERMAL DAILY
Qty: 30 PATCH | Refills: 0 | Status: SHIPPED | OUTPATIENT
Start: 2024-07-08 | End: 2024-08-07

## 2024-07-07 RX ORDER — EAR PLUGS
1 EACH OTIC (EAR)
Qty: 56 G | Refills: 0 | Status: SHIPPED | OUTPATIENT
Start: 2024-07-07

## 2024-07-07 RX ORDER — TIZANIDINE 2 MG/1
2 TABLET ORAL EVERY 6 HOURS PRN
Qty: 30 TABLET | Refills: 0 | Status: SHIPPED | OUTPATIENT
Start: 2024-07-07

## 2024-07-07 ASSESSMENT — PAIN SCALES - GENERAL
PAINLEVEL_OUTOF10: 0 - NO PAIN
PAINLEVEL_OUTOF10: 7
PAINLEVEL_OUTOF10: 5 - MODERATE PAIN

## 2024-07-07 ASSESSMENT — PAIN - FUNCTIONAL ASSESSMENT
PAIN_FUNCTIONAL_ASSESSMENT: 0-10

## 2024-07-07 ASSESSMENT — PAIN SCALES - PAIN ASSESSMENT IN ADVANCED DEMENTIA (PAINAD)
TOTALSCORE: MEDICATION (SEE MAR)
TOTALSCORE: MEDICATION (SEE MAR)

## 2024-07-07 ASSESSMENT — COGNITIVE AND FUNCTIONAL STATUS - GENERAL
CLIMB 3 TO 5 STEPS WITH RAILING: A LOT
STANDING UP FROM CHAIR USING ARMS: A LITTLE
HELP NEEDED FOR BATHING: A LOT
TURNING FROM BACK TO SIDE WHILE IN FLAT BAD: A LITTLE
WALKING IN HOSPITAL ROOM: A LOT
PERSONAL GROOMING: A LITTLE
CLIMB 3 TO 5 STEPS WITH RAILING: A LOT
TURNING FROM BACK TO SIDE WHILE IN FLAT BAD: A LOT
MOBILITY SCORE: 13
STANDING UP FROM CHAIR USING ARMS: A LOT
DRESSING REGULAR LOWER BODY CLOTHING: A LOT
TOILETING: A LITTLE
MOBILITY SCORE: 17
MOVING FROM LYING ON BACK TO SITTING ON SIDE OF FLAT BED WITH BEDRAILS: A LITTLE
MOVING TO AND FROM BED TO CHAIR: A LOT
DRESSING REGULAR UPPER BODY CLOTHING: A LITTLE
MOVING TO AND FROM BED TO CHAIR: A LITTLE
WALKING IN HOSPITAL ROOM: A LOT
DAILY ACTIVITIY SCORE: 17

## 2024-07-07 NOTE — PROGRESS NOTES
Desirae Gregg is a 83 y.o. female on day 2 of admission presenting with Ambulatory dysfunction.      Subjective   The patient is comfortable with family at the bedside. She complains of less right hip pain.  She was able to get up the side of the bed, but complained of severe pain after washing up.  The patient prepared to go home with home care.    Addendum,: Family has decided to send the patient to SNF for rehab.      Objective     Last Recorded Vitals  /70 (BP Location: Right arm, Patient Position: Lying)   Pulse 68   Temp 37 °C (98.6 °F) (Temporal)   Resp 20   Wt 71.7 kg (158 lb)   SpO2 98%   Intake/Output last 3 Shifts:    Intake/Output Summary (Last 24 hours) at 7/7/2024 1635  Last data filed at 7/7/2024 1259  Gross per 24 hour   Intake 420 ml   Output 1600 ml   Net -1180 ml       Admission Weight  Weight: 70.8 kg (156 lb) (07/04/24 1122)    Daily Weight  07/04/24 : 71.7 kg (158 lb)    Image Results  CT lumbar spine wo IV contrast  Narrative: Interpreted By:  Ian Yang,   STUDY:  CT LUMBAR SPINE WO IV CONTRAST; ;  7/5/2024 10:33 am      INDICATION:  Signs/Symptoms:chronic right L1 fracture, right back pain / lower  extremity weakness.      COMPARISON:  None.      ACCESSION NUMBER(S):  [id number]      ORDERING CLINICIAN:  ALEXANDRE GAMING      TECHNIQUE:  Routine unenhanced CT of the lumbar spine was performed.      FINDINGS:  This report assumes 5 non-rib bearing lumbar vertebral bodies. The  lowest intervertebral disc will be labeled L5-S1.      There is mild retrolisthesis at L1-L2, L2-L3, and L3-L4 and grade 1  anterolisthesis at L4-L5. There is moderate anterior wedging of the  L1 vertebral body with 50% height loss. There is retropulsion of the  posterior vertebral body into the spinal canal, most pronounced  involving the inferior aspect and there is associated partial  effacement of the ventral thecal sac. There is extension of the  inferior endplate of L1 into the bilateral L1-L2 neural  foramina and  there is mild right neural foraminal narrowing but no striking  narrowing of the left neural foramen. Remaining vertebral body  heights are maintained. Bones appear demineralized.      There is mild intervertebral disc height narrowing at L3-L4 and L5-S1  with chronic degenerative endplate changes including osteophyte  formation and sclerosis. There is vacuum disc phenomenon at L5-S1.      There is marked bilateral facet osteoarthropathy at T11-T12 and L4-L5  and marked right facet osteoarthropathy at L5-S1.      There are disc bulges/disc protrusions with osteophytic spurring at  multiple levels which cause variable degree of ventral thecal sac  effacement but there is no striking osseous spinal canal stenosis.      There is extension disc and osteophyte into the neural foramina at  multiple levels and there is at most mild neural foraminal narrowing  at few levels.      There are atherosclerotic calcification within the abdominal aorta  and its branches. The gallbladder is surgically absent. There is  colonic diverticulosis.      There are osseous degenerative changes involving the bilateral  sacroiliac joints.      Impression: Chronic appearing fracture involving the L1 vertebral body with  moderate height loss and retropulsion into the spinal canal causing  partial effacement of the ventral thecal sac. Multilevel degenerative  changes of the lumbar spine as detailed above.      This study was interpreted at [facility].      MACRO:  None      Signed by: Ian Yang 7/5/2024 11:10 AM  Dictation workstation:   [id number]      Physical Exam  Eyes:      Extraocular Movements: Extraocular movements intact.   Cardiovascular:      Rate and Rhythm: Regular rhythm.   Pulmonary:      Breath sounds: Normal breath sounds.   Abdominal:      Palpations: Abdomen is soft.   Musculoskeletal:         General: Tenderness present.      Cervical back: Neck supple.      Right hip:  Tenderness present. No deformity.      Comments: Chronic left shoulder tenderness   Neurological:      Mental Status: She is alert and oriented to person, place, and time.   Psychiatric:         Mood and Affect: Mood is depressed.         Relevant Results               Assessment/Plan          Results for orders placed or performed during the hospital encounter of 07/04/24 (from the past 24 hour(s))   CBC   Result Value Ref Range    WBC 5.1 4.4 - 11.3 x10*3/uL    nRBC 0.4 (H) 0.0 - 0.0 /100 WBCs    RBC 2.73 (L) 4.00 - 5.20 x10*6/uL    Hemoglobin 7.2 (L) 12.0 - 16.0 g/dL    Hematocrit 24.3 (L) 36.0 - 46.0 %    MCV 89 80 - 100 fL    MCH 26.4 26.0 - 34.0 pg    MCHC 29.6 (L) 32.0 - 36.0 g/dL    RDW 20.5 (H) 11.5 - 14.5 %    Platelets 87 (L) 150 - 450 x10*3/uL   Renal Function Panel   Result Value Ref Range    Glucose 144 (H) 74 - 99 mg/dL    Sodium 139 136 - 145 mmol/L    Potassium 4.3 3.5 - 5.3 mmol/L    Chloride 107 98 - 107 mmol/L    Bicarbonate 24 21 - 32 mmol/L    Anion Gap 12 10 - 20 mmol/L    Urea Nitrogen 22 6 - 23 mg/dL    Creatinine 1.06 (H) 0.50 - 1.05 mg/dL    eGFR 52 (L) >60 mL/min/1.73m*2    Calcium 8.5 (L) 8.6 - 10.3 mg/dL    Phosphorus 3.6 2.5 - 4.9 mg/dL    Albumin 3.5 3.4 - 5.0 g/dL   Magnesium   Result Value Ref Range    Magnesium 2.26 1.60 - 2.40 mg/dL      Scheduled medications  acetaminophen, 975 mg, oral, q8h  apixaban, 2.5 mg, oral, BID  carvedilol, 25 mg, oral, BID  cefTRIAXone, 1 g, intravenous, q24h  cholecalciferol, 1,000 Units, oral, Daily  cyanocobalamin, 1,000 mcg, oral, Daily  dexAMETHasone, 8 mg, intravenous, q24h  empagliflozin, 25 mg, oral, q AM  ferrous sulfate (325 mg ferrous sulfate), 1 tablet, oral, Daily with breakfast  folic acid, 1 mg, oral, Daily  hydrALAZINE, 25 mg, oral, BID  isosorbide mononitrate ER, 30 mg, oral, Daily  levothyroxine, 87.5 mcg, oral, Daily  lidocaine, 1 patch, transdermal, Daily  lisinopril, 10 mg, oral, Daily  methocarbamol, 500 mg, oral, q8h  MANI  polyethylene glycol, 17 g, oral, Daily  ruxolitinib, 10 mg, oral, q AM  ruxolitinib, 20 mg, oral, Nightly      Continuous medications     PRN medications  PRN medications: acetaminophen, oxyCODONE, oxyCODONE, zinc oxide       Active Problems:  There are no active Hospital Problems.    Desirae Gregg is a 83 y.o. female with a past medical history of polycythemia vera, Htn, hypothyroidism, heart failure (EF unknown - records requested) presents to [facility] ED with a chief complaint of leg weakness.      #Right lower extremity weakness  -Hx of chronic L1 compression fracture (2022)   -CT lumbar spine results : Chronic appearing fracture involving the L1 vertebral body with moderate height loss and retropulsion into the spinal canal causing partial effacement of the ventral thecal sac. Multilevel degenerative ch...   -PT/OT rec moderate intensity therapy  -pain control - 975 Tylenol mild, oxy 2.5mg PRN, oxy 5mg PRN, lidocaine patch continued, adding gabapentin     #Nephrolithiasis, non-obstructive  -discharged from [facility] on 7/1 with a 5 day course of cefdinir 300mg BID, urine culture there showed proteus mirabalis with resistance to only nitrofurantoin  -Recent UC showed no growth  -1.8cm stone found in bladder on right hip CT imaging  -Rocephin 1g discontinued   -Urology provided recommendations to follow up outpatient for likely intervention.    #Acute on chronic anemia due to myeloproliferative disorder   -Will transfuse if the hemoglobin is <7  -Resumed Eliquis   -Consider consulting hematology for rec regarding anticoagulation, if the hemoglobin drops.   -Repeat CBC in the am     #Polycythemia vera - cont home Jakafi 10mg in AM, 20mg in PM, Eliquis 2.5mg BID, Isolation precaution ordered    #HTN - cont home lisinopril 10mg daily, hydralazine 25mg BID    #Hypothyroidism - cont home levothyroxine 88mcg daily    #Heart failure, recovered EF (EF 55-60%) - cont carvedilol 25mg BID, cont jardiance 25mg daily,  cont imdur 30mg daily    #Hx of PE on Eliquis - following with heme/onc at [facility] (Dr. Resendiz) - follow up on 7/11/24       Dispo: Prepared the patient for discharge this morning.  Family decided to send the patient to SNF for rehab.  Pending pre-CERT.       OLI Devlin-CNP

## 2024-07-07 NOTE — PROGRESS NOTES
07/07/24 1602   Discharge Planning   Patient expects to be discharged to: PT/ OT recommending SNF. Discussed with patient and son. They are agreeable to SNF. Choices provided. They selected [facility] Referral made in [facility]. Await facility response. Will require precert.

## 2024-07-07 NOTE — DISCHARGE SUMMARY
Discharge Diagnosis  Ambulatory dysfunction  Intractable right hip pain    Issues Requiring Follow-Up  Intractable right hip pain  Acute on chronic anemia    Discharge Meds     Your medication list        START taking these medications        Instructions Last Dose Given Next Dose Due   lidocaine 4 % patch  Start taking on: July 8, 2024      Place 1 patch over 12 hours on the skin once daily. Remove & discard patch within 12 hours or as directed by MD.       oxyCODONE 5 mg immediate release tablet  Commonly known as: Roxicodone      Take 1 tablet (5 mg) by mouth every 6 hours if needed for severe pain (7 - 10) for up to 3 days.       polyethylene glycol 17 gram packet  Commonly known as: Glycolax, Miralax  Start taking on: July 8, 2024      Take 17 g by mouth once daily for 3 days.       tiZANidine 2 mg tablet  Commonly known as: Zanaflex      Take 1 tablet (2 mg) by mouth every 6 hours if needed for muscle spasms.       zinc oxide 40 % ointment ointment      Apply 1 Application topically every 1 hour if needed (irritation).              CONTINUE taking these medications        Instructions Last Dose Given Next Dose Due   apixaban 2.5 mg tablet  Commonly known as: Eliquis           carvedilol 25 mg tablet  Commonly known as: Coreg           cholecalciferol 25 MCG (1000 UT) capsule  Commonly known as: Vitamin D-3           cyanocobalamin 1,000 mcg tablet  Commonly known as: Vitamin B-12           ferrous sulfate (325 mg ferrous sulfate) tablet           folic acid 400 mcg tablet  Commonly known as: Folvite           hydrALAZINE 25 mg tablet  Commonly known as: Apresoline           isosorbide mononitrate ER 30 mg 24 hr tablet  Commonly known as: Imdur           Jakafi 10 mg tablet  Generic drug: ruxolitinib           Jakafi 10 mg tablet  Generic drug: ruxolitinib           Jardiance 25 mg  Generic drug: empagliflozin           levothyroxine 175 mcg tablet  Commonly known as: Synthroid, Levoxyl           lisinopril 10  mg tablet                  STOP taking these medications      cefdinir 300 mg capsule  Commonly known as: Omnicef                  Where to Get Your Medications        These medications were sent to [id number] - [place], [place] - [address] [facility]  [address] [place], [place] [address] [address]      Phone: [phone]   lidocaine 4 % patch  oxyCODONE 5 mg immediate release tablet  polyethylene glycol 17 gram packet  tiZANidine 2 mg tablet  zinc oxide 40 % ointment ointment         Test Results Pending At Discharge  Pending Labs       No current pending labs.            Hospital Course   Desirae Gregg is a 83 y.o. female with a past medical history of polycythemia vera, Htn, hypothyroidism, heart failure (EF unknown - records requested) presents to [facility] ED with a chief complaint of leg weakness.      #Right lower extremity weakness  #Nephrolithiasis, non-obstructive  #Acute on chronic anemia with history of myeloproliferative disorder   #Hx of PE on Eliquis -   -Hx of chronic L1 compression fracture (2022)   -CT lumbar spine results : Chronic appearing fracture involving the L1 vertebral body with moderate height loss and retropulsion into the spinal canal causing partial effacement of the ventral thecal sac. Multilevel degenerative ch...   -PT/OT ordered  -pain controlled- 975 Tylenol mild, oxy 2.5mg PRN, oxy 5mg PRN, lidocaine patch   -discharged from [facility] on 7/1 with a 5 day course of cefdinir 300mg BID, urine culture there showed proteus mirabalis with resistance to only nitrofurantoin  -Recent UC showed no growth  -1.8cm stone found in bladder on right hip CT imaging  -Rocephin 1g X2 doses administered   -Recommending that patient return to pain management  -Following with heme/onc at [facility] (Dr. Resendiz) - follow up on 7/11/24  -New HHC/PT/OT with [facility] (referral sent)  -Urology provided recommendations to follow up outpatient for likely intervention.         Pertinent Physical Exam At Time of  Discharge  Physical Exam  HENT:      Mouth/Throat:      Mouth: Mucous membranes are moist.   Eyes:      Extraocular Movements: Extraocular movements intact.   Cardiovascular:      Rate and Rhythm: Regular rhythm.   Pulmonary:      Breath sounds: Normal breath sounds.   Abdominal:      Palpations: Abdomen is soft.   Musculoskeletal:      Right lower leg: Bony tenderness present. No swelling or deformity. No edema.      Left lower leg: No edema.      Comments: Chronic left shoulder tenderness  Right tenderness decreased   Skin:     General: Skin is dry.      Coloration: Skin is not pale.   Neurological:      Mental Status: She is alert.   Psychiatric:         Mood and Affect: Mood is depressed.         Outpatient Follow-Up  No future appointments.      Jovita Mancini, APRN-CNP

## 2024-07-07 NOTE — PROGRESS NOTES
Physical Therapy    Physical Therapy Evaluation    Patient Name: Desirae Gregg  MRN: [id number]  Today's Date: 7/7/2024   Time Calculation  Start Time: 1245  Stop Time: 1315  Time Calculation (min): 30 min    Assessment/Plan   PT Assessment  PT Assessment Results: Decreased strength, Decreased mobility, Obesity (deconditioning)  Rehab Prognosis: Good  Barriers to Discharge:  (none)  Evaluation/Treatment Tolerance: Patient limited by fatigue  Medical Staff Made Aware: Yes  Strengths: Ability to acquire knowledge  Barriers to Participation: Comorbidities  End of Session Communication: Bedside nurse  End of Session Patient Position: Bed, 3 rail up, Alarm on  IP OR SWING BED PT PLAN  Inpatient or Swing Bed: Inpatient  PT Plan  Treatment/Interventions: Bed mobility, Transfer training, Gait training, Strengthening, Therapeutic exercise  PT Plan: Ongoing PT  PT Frequency: 3 times per week  PT Discharge Recommendations: Moderate intensity level of continued care  Equipment Recommended upon Discharge: Wheeled walker  PT Recommended Transfer Status: Assist x1  PT - OK to Discharge: Yes (Per PT POC)      Subjective   General Visit Information:  General  Reason for Referral:  (82 yo female admitted 2' to leg weakness)  Referred By:  (Dr. CASTRO Bradshaw)  Past Medical History Relevant to Rehab: Past medical history of polycythemia vera, HTN, hypothyroidism, heart failure  Prior to Session Communication: Bedside nurse  Patient Position Received: Bed, 3 rail up, Alarm on  General Comment:  (Pt is pleasant and agreeable to work with PT. BRIAN Rockwell)  Home Living:  Home Living  Type of Home: House  Lives With: Adult children (Pt states that her son lives with her)  Home Adaptive Equipment: Walker rolling or standard, Cane  Home Layout: Multi-level, Stairs to alternate level with rails (Bi level)  Alternate Level Stairs-Number of Steps:  (8)  Home Access: Stairs to enter without rails  Entrance Stairs-Rails: None  Entrance Stairs-Number of  Steps:  (2)  Bathroom Shower/Tub: Tub/shower unit  Bathroom Equipment: Grab bars in shower, Shower chair with back, Raised toilet seat with rails  Prior Level of Function:  Prior Function Per Pt/Caregiver Report  Level of [place]: Independent with ADLs and functional transfers, Needs assistance with homemaking  Receives Help From: Family  Ambulatory Assistance: Independent (wheeled walker)  Precautions:     Vital Signs:       Objective   Pain:  Pain Assessment  Pain Assessment: 0-10  0-10 (Numeric) Pain Score: 0 - No pain  Cognition:  Cognition  Overall Cognitive Status: Within Functional Limits  Orientation Level: Oriented X4    General Assessments:  General Observation  General Observation:  (Pt is moving slowly and requires assist for all mobility. Based on her current status recommend MODERATE follow up services)               Activity Tolerance  Endurance: Tolerates less than 10 min exercise, no significant change in vital signs         Strength  Strength Comments:  (B LE are grossly 4 of 5)  Static Sitting Balance  Static Sitting-Balance Support: Bilateral upper extremity supported  Static Sitting-Level of Assistance: Close supervision    Static Standing Balance  Static Standing-Balance Support: Bilateral upper extremity supported (wheeled walker)  Static Standing-Level of Assistance: Minimum assistance  Dynamic Standing Balance  Dynamic Standing-Balance Support: Bilateral upper extremity supported (wheeled walker)  Dynamic Standing-Comments:  (Moderate assist)  Functional Assessments:  Bed Mobility  Bed Mobility: Yes  Bed Mobility 1  Bed Mobility 1: Supine to sitting, Sitting to supine  Level of Assistance 1: Minimum assistance, Moderate assistance  Bed Mobility Comments 1:  (help with trunk and B LE)    Transfers  Transfer: Yes  Transfer 1  Transfer From 1: Bed to  Transfer to 1: Stand  Technique 1: Sit to stand, Stand to sit  Transfer Device 1:  (wheeled walker)  Transfer Level of Assistance 1: Minimum  assistance, Moderate assistance    Ambulation/Gait Training  Ambulation/Gait Training Performed: Yes  Ambulation/Gait Training 1  Surface 1: Level tile  Device 1: Rolling walker  Assistance 1: Moderate assistance  Quality of Gait 1: Decreased step length  Comments/Distance (ft) 1:  (5 side steps up to HOB)    Stairs  Stairs: No  Extremity/Trunk Assessments:  RLE   RLE : Within Functional Limits  LLE   LLE : Within Functional Limits  Outcome Measures:  [facility] Basic Mobility  Turning from your back to your side while in a flat bed without using bedrails: A little  Moving from lying on your back to sitting on the side of a flat bed without using bedrails: A lot  Moving to and from bed to chair (including a wheelchair): A lot  Standing up from a chair using your arms (e.g. wheelchair or bedside chair): A lot  To walk in hospital room: A lot  Climbing 3-5 steps with railing: A lot  Basic Mobility - Total Score: 13    Encounter Problems       Encounter Problems (Active)       Mobility       STG - Patient will ambulate 50-80 feet MOD I with wheeled walker (Progressing)       Start:  07/07/24    Expected End:  07/21/24            STG - Patient will ascend and descend four to six stairs MOD I with L rail (Progressing)       Start:  07/07/24    Expected End:  07/21/24               PT Transfers       STG - Patient to transfer to and from sit to supine independently (Progressing)       Start:  07/07/24    Expected End:  07/21/24            STG - Patient will transfer sit to and from stand MOD I with wheeled walker (Progressing)       Start:  07/07/24    Expected End:  07/21/24               Pain - Adult              Education Documentation  No documentation found.  Education Comments  No comments found.

## 2024-07-07 NOTE — PROGRESS NOTES
"Desirae Gregg is a 83 y.o. female on day 2 of admission presenting with Ambulatory dysfunction.    Subjective   No urinary complaints, discharge pending       Objective     Physical Exam  Alert, oriented  Normal respiratory effort  Abdomen soft nontender nondistended    Last Recorded Vitals  Blood pressure 115/70, pulse 68, temperature 37 °C (98.6 °F), temperature source Temporal, resp. rate 20, height 1.664 m (5' 5.5\"), weight 71.7 kg (158 lb), SpO2 98%.  Intake/Output last 3 Shifts:  I/O last 3 completed shifts:  In: 480 (6.7 mL/kg) [P.O.:480]  Out: 2400 (33.5 mL/kg) [Urine:2400 (0.9 mL/kg/hr)]  Weight: 71.7 kg     Relevant Results  Scheduled medications  acetaminophen, 975 mg, oral, q8h  apixaban, 2.5 mg, oral, BID  carvedilol, 25 mg, oral, BID  cefTRIAXone, 1 g, intravenous, q24h  cholecalciferol, 1,000 Units, oral, Daily  cyanocobalamin, 1,000 mcg, oral, Daily  dexAMETHasone, 8 mg, intravenous, q24h  empagliflozin, 25 mg, oral, q AM  ferrous sulfate (325 mg ferrous sulfate), 1 tablet, oral, Daily with breakfast  folic acid, 1 mg, oral, Daily  hydrALAZINE, 25 mg, oral, BID  isosorbide mononitrate ER, 30 mg, oral, Daily  levothyroxine, 87.5 mcg, oral, Daily  lidocaine, 1 patch, transdermal, Daily  lisinopril, 10 mg, oral, Daily  methocarbamol, 500 mg, oral, q8h MANI  polyethylene glycol, 17 g, oral, Daily  ruxolitinib, 10 mg, oral, q AM  ruxolitinib, 20 mg, oral, Nightly      Continuous medications     PRN medications  PRN medications: acetaminophen, oxyCODONE, oxyCODONE, zinc oxide    Results for orders placed or performed during the hospital encounter of 07/04/24 (from the past 24 hour(s))   CBC   Result Value Ref Range    WBC 5.1 4.4 - 11.3 x10*3/uL    nRBC 0.4 (H) 0.0 - 0.0 /100 WBCs    RBC 2.73 (L) 4.00 - 5.20 x10*6/uL    Hemoglobin 7.2 (L) 12.0 - 16.0 g/dL    Hematocrit 24.3 (L) 36.0 - 46.0 %    MCV 89 80 - 100 fL    MCH 26.4 26.0 - 34.0 pg    MCHC 29.6 (L) 32.0 - 36.0 g/dL    RDW 20.5 (H) 11.5 - 14.5 %    " Platelets 87 (L) 150 - 450 x10*3/uL   Renal Function Panel   Result Value Ref Range    Glucose 144 (H) 74 - 99 mg/dL    Sodium 139 136 - 145 mmol/L    Potassium 4.3 3.5 - 5.3 mmol/L    Chloride 107 98 - 107 mmol/L    Bicarbonate 24 21 - 32 mmol/L    Anion Gap 12 10 - 20 mmol/L    Urea Nitrogen 22 6 - 23 mg/dL    Creatinine 1.06 (H) 0.50 - 1.05 mg/dL    eGFR 52 (L) >60 mL/min/1.73m*2    Calcium 8.5 (L) 8.6 - 10.3 mg/dL    Phosphorus 3.6 2.5 - 4.9 mg/dL    Albumin 3.5 3.4 - 5.0 g/dL   Magnesium   Result Value Ref Range    Magnesium 2.26 1.60 - 2.40 mg/dL        Creatinine 1.06             Assessment/Plan   Active Problems:  There are no active Hospital Problems.    83 year old has a 1.8 cm bladder stone.  The stone may lie within a right ureterocele.      Urine culture negative     Laser lithotripsy of the stone with incision of a ureterocele if present was recommended.  This may be performed as an outpatient.     She reports she is scheduled to follow-up with hematology in 4 days.  She will call our office subsequently to schedule the procedure.             Franky Warren MD

## 2024-07-08 LAB
ALBUMIN SERPL BCP-MCNC: 3.5 G/DL (ref 3.4–5)
ANION GAP SERPL CALC-SCNC: 12 MMOL/L (ref 10–20)
BUN SERPL-MCNC: 27 MG/DL (ref 6–23)
CALCIUM SERPL-MCNC: 8.5 MG/DL (ref 8.6–10.3)
CHLORIDE SERPL-SCNC: 105 MMOL/L (ref 98–107)
CO2 SERPL-SCNC: 24 MMOL/L (ref 21–32)
CREAT SERPL-MCNC: 1.07 MG/DL (ref 0.5–1.05)
EGFRCR SERPLBLD CKD-EPI 2021: 51 ML/MIN/1.73M*2
ERYTHROCYTE [DISTWIDTH] IN BLOOD BY AUTOMATED COUNT: 20.7 % (ref 11.5–14.5)
GLUCOSE SERPL-MCNC: 145 MG/DL (ref 74–99)
HCT VFR BLD AUTO: 23.3 % (ref 36–46)
HCT VFR BLD AUTO: 24.5 % (ref 36–46)
HGB BLD-MCNC: 6.9 G/DL (ref 12–16)
HGB BLD-MCNC: 7.4 G/DL (ref 12–16)
MAGNESIUM SERPL-MCNC: 2.3 MG/DL (ref 1.6–2.4)
MCH RBC QN AUTO: 26.3 PG (ref 26–34)
MCHC RBC AUTO-ENTMCNC: 29.6 G/DL (ref 32–36)
MCV RBC AUTO: 89 FL (ref 80–100)
NRBC BLD-RTO: 0.3 /100 WBCS (ref 0–0)
PHOSPHATE SERPL-MCNC: 3.9 MG/DL (ref 2.5–4.9)
PLATELET # BLD AUTO: 86 X10*3/UL (ref 150–450)
POTASSIUM SERPL-SCNC: 4.3 MMOL/L (ref 3.5–5.3)
RBC # BLD AUTO: 2.62 X10*6/UL (ref 4–5.2)
SODIUM SERPL-SCNC: 137 MMOL/L (ref 136–145)
WBC # BLD AUTO: 5.9 X10*3/UL (ref 4.4–11.3)

## 2024-07-08 PROCEDURE — 94760 N-INVAS EAR/PLS OXIMETRY 1: CPT

## 2024-07-08 PROCEDURE — 36415 COLL VENOUS BLD VENIPUNCTURE: CPT | Performed by: NURSE PRACTITIONER

## 2024-07-08 PROCEDURE — 2500000002 HC RX 250 W HCPCS SELF ADMINISTERED DRUGS (ALT 637 FOR MEDICARE OP, ALT 636 FOR OP/ED)

## 2024-07-08 PROCEDURE — 2500000001 HC RX 250 WO HCPCS SELF ADMINISTERED DRUGS (ALT 637 FOR MEDICARE OP)

## 2024-07-08 PROCEDURE — 85014 HEMATOCRIT: CPT | Performed by: NURSE PRACTITIONER

## 2024-07-08 PROCEDURE — 1100000001 HC PRIVATE ROOM DAILY

## 2024-07-08 PROCEDURE — 2500000005 HC RX 250 GENERAL PHARMACY W/O HCPCS

## 2024-07-08 PROCEDURE — 99233 SBSQ HOSP IP/OBS HIGH 50: CPT | Performed by: NURSE PRACTITIONER

## 2024-07-08 PROCEDURE — 97530 THERAPEUTIC ACTIVITIES: CPT | Mod: GO,CO

## 2024-07-08 PROCEDURE — 36415 COLL VENOUS BLD VENIPUNCTURE: CPT

## 2024-07-08 PROCEDURE — 85027 COMPLETE CBC AUTOMATED: CPT

## 2024-07-08 PROCEDURE — 2500000004 HC RX 250 GENERAL PHARMACY W/ HCPCS (ALT 636 FOR OP/ED): Performed by: NURSE PRACTITIONER

## 2024-07-08 PROCEDURE — 83735 ASSAY OF MAGNESIUM: CPT

## 2024-07-08 PROCEDURE — 80069 RENAL FUNCTION PANEL: CPT

## 2024-07-08 PROCEDURE — 2500000001 HC RX 250 WO HCPCS SELF ADMINISTERED DRUGS (ALT 637 FOR MEDICARE OP): Performed by: NURSE PRACTITIONER

## 2024-07-08 RX ORDER — OXYCODONE HYDROCHLORIDE 5 MG/1
5 TABLET ORAL EVERY 6 HOURS PRN
Qty: 12 TABLET | Refills: 0 | Status: SHIPPED | OUTPATIENT
Start: 2024-07-08

## 2024-07-08 RX ORDER — GABAPENTIN 100 MG/1
200 CAPSULE ORAL 2 TIMES DAILY
Qty: 120 CAPSULE | Refills: 0 | Status: SHIPPED | OUTPATIENT
Start: 2024-07-08 | End: 2024-08-07

## 2024-07-08 ASSESSMENT — COGNITIVE AND FUNCTIONAL STATUS - GENERAL
MOVING FROM LYING ON BACK TO SITTING ON SIDE OF FLAT BED WITH BEDRAILS: A LITTLE
PERSONAL GROOMING: A LITTLE
DRESSING REGULAR LOWER BODY CLOTHING: A LITTLE
PERSONAL GROOMING: A LITTLE
DRESSING REGULAR UPPER BODY CLOTHING: A LITTLE
DAILY ACTIVITIY SCORE: 18
TURNING FROM BACK TO SIDE WHILE IN FLAT BAD: A LITTLE
DRESSING REGULAR LOWER BODY CLOTHING: A LITTLE
MOVING TO AND FROM BED TO CHAIR: A LITTLE
HELP NEEDED FOR BATHING: A LITTLE
STANDING UP FROM CHAIR USING ARMS: A LOT
DAILY ACTIVITIY SCORE: 19
DRESSING REGULAR UPPER BODY CLOTHING: A LITTLE
CLIMB 3 TO 5 STEPS WITH RAILING: A LOT
TOILETING: A LOT
TOILETING: A LITTLE
HELP NEEDED FOR BATHING: A LITTLE
WALKING IN HOSPITAL ROOM: A LOT
MOBILITY SCORE: 15

## 2024-07-08 ASSESSMENT — PAIN SCALES - GENERAL
PAINLEVEL_OUTOF10: 3
PAINLEVEL_OUTOF10: 9
PAINLEVEL_OUTOF10: 8

## 2024-07-08 ASSESSMENT — PAIN - FUNCTIONAL ASSESSMENT
PAIN_FUNCTIONAL_ASSESSMENT: 0-10
PAIN_FUNCTIONAL_ASSESSMENT: 0-10

## 2024-07-08 NOTE — PROGRESS NOTES
Occupational Therapy    Occupational Therapy Treatment    Name: Desirae Gregg  MRN: [id number]  : 1940  Date: 24  Time Calculation  Start Time: 1413  Stop Time: 1430  Time Calculation (min): 17 min    Assessment:  OT Assessment: Pt presents with pain, decreased balance, strength, and endurance. Pt to benefit from skilled OT services to increase independence with ADL's, transfers, and mobility  Prognosis: Good  Barriers to Discharge: Inaccessible home environment  Evaluation/Treatment Tolerance: Patient tolerated treatment well  Medical Staff Made Aware: Yes  End of Session Communication: Bedside nurse  End of Session Patient Position: Bed, 3 rail up, Alarm on  Plan:  Treatment Interventions: ADL retraining, Functional transfer training, UE strengthening/ROM, Endurance training, Patient/family training  OT Frequency: 3 times per week  OT Discharge Recommendations: Moderate intensity level of continued care  Equipment Recommended upon Discharge: Wheeled walker  OT Recommended Transfer Status: Assist of 1  OT - OK to Discharge: Yes (In accord with OT POC)    Subjective   Previous Visit Info:  OT Last Visit  OT Received On: 24  General:  General  Reason for Referral: Pt is a 84 y/o woman who was admitted for chief complaint of leg weakness.  Referred By: Dr. CASTRO Bradshaw  Past Medical History Relevant to Rehab: Past medical history of polycythemia vera, HTN, hypothyroidism, heart failure  Family/Caregiver Present: Yes (Son present over course of session.)  Prior to Session Communication: Bedside nurse  Patient Position Received: Bed, 3 rail up, Alarm on  Preferred Learning Style: verbal  General Comment: Pt pleasant and cooperative and compliant to treatment with encouragement. Session shortened to accomodate fatigue.  Precautions:  Medical Precautions: Fall precautions (Protective precautions, IV.)     Pain Assessment:  Pain Assessment  Pain Assessment: 0-10  0-10 (Numeric) Pain Score: 9  Pain Type: Chronic  pain  Pain Location: Hip  Pain Orientation: Right  Pain Frequency: Constant/continuous  Pain Onset: Ongoing  Clinical Progression: Rapidly improving  Pain Interventions: Repositioned, Ambulation/increased activity  Response to Interventions: pain improved     Objective   Cognition:  Overall Cognitive Status: Within Functional Limits  Orientation Level: Oriented X4  Activities of Daily Living: LE Dressing  LE Dressing: Yes  Sock Level of Assistance: Setup, Minimum assistance  LE Dressing Where Assessed: Edge of bed  LE Dressing Comments: Using figure four technique to doff and don socks.     Bed Mobility/Transfers: Bed Mobility  Bed Mobility: Yes  Bed Mobility 1  Bed Mobility 1: Supine to sitting, Sitting to supine  Level of Assistance 1: Minimum assistance  Bed Mobility Comments 1: Using log roll technique with assist with legs and trunk using bed rail.    Transfers  Transfer: Yes  Transfer 1  Transfer From 1: Bed to  Transfer to 1: Stand  Technique 1: Sit to stand, Stand to sit  Transfer Device 1: Walker  Transfer Level of Assistance 1: Minimum assistance  Trials/Comments 1: Min verbal cues for hand placement to/from device.    Outcome Measures:  [facility] Daily Activity  Putting on and taking off regular lower body clothing: A little  Bathing (including washing, rinsing, drying): A little  Putting on and taking off regular upper body clothing: A little  Toileting, which includes using toilet, bedpan or urinal: A little  Taking care of personal grooming such as brushing teeth: A little  Eating Meals: None  Daily Activity - Total Score: 19    Education Documentation  Body Mechanics, taught by Izaiah Burroughs OT at 7/8/2024  3:09 PM.  Learner: Patient  Readiness: Acceptance  Method: Explanation, Demonstration  Response: Verbalizes Understanding, Demonstrated Understanding, Needs Reinforcement    Precautions, taught by Izaiah Burroughs OT at 7/8/2024  3:09 PM.  Learner: Patient  Readiness: Acceptance  Method: Explanation,  Demonstration  Response: Verbalizes Understanding, Demonstrated Understanding, Needs Reinforcement    ADL Training, taught by Izaiah Burroughs OT at 7/8/2024  3:09 PM.  Learner: Patient  Readiness: Acceptance  Method: Explanation, Demonstration  Response: Verbalizes Understanding, Demonstrated Understanding, Needs Reinforcement    Education Comments  Pt compliant and receptive to education and instruction provided in course of session.    Goals:  Encounter Problems       Encounter Problems (Active)       OT Goals       Pt to demonstrate bed mobility at mod I  (Progressing)       Start:  07/05/24    Expected End:  07/19/24            Pt to demonstrate transfers with FWW at supervision   (Progressing)       Start:  07/05/24    Expected End:  07/19/24            Pt will demo increased functional mobility to tolerate tasks necessary to complete ADL routine with FWW at supervision  (Progressing)       Start:  07/05/24    Expected End:  07/19/24            Pt will increase endurance to tolerate 15 min of activity with no more than 1 rest break in order to increase ability to engage in ADL completion.  (Progressing)       Start:  07/05/24    Expected End:  07/19/24            Pt will demo GOOD static/dynamic standing balance during ADL and functional activity with FWW >5 minutes for improved independence and participation in ADL  (Progressing)       Start:  07/05/24    Expected End:  07/19/24            Pt to demonstrate LB dressing, bathing, and toileting with FWW at supervision  (Progressing)       Start:  07/05/24    Expected End:  07/19/24

## 2024-07-08 NOTE — PROGRESS NOTES
07/08/24 1037   Discharge Planning   Living Arrangements Children  (son, Alexis)   Support Systems Children   Assistance Needed A&Ox3, independent with ADLs with walker, does not drive, room air at baseline, on Eliquis prior to admission.   Type of Residence Private residence   Number of Stairs to Enter Residence 3   Number of Stairs Within Residence 7   Do you have animals or pets at home? Yes   Type of Animals or Pets 1 cat   Home or Post Acute Services Post acute facilities (Rehab/SNF/etc)   Type of Post Acute Facility Services Skilled nursing   Patient expects to be discharged to: [facility] SNF, [facility] made DSC aware to start precert. Precert started 7/8 @ 1000.   Does the patient need discharge transport arranged? Yes   RoundTrip coordination needed? Yes   Has discharge transport been arranged? No   Patient Choice   Provider Choice list and CMS website (https://medicare.gov/care-compare#search) for post-acute Quality and Resource Measure Data were provided and reviewed with: Family;Patient     07/08/2024 1041: Patient and son, Alexis, updated at the bedside about precert pending.

## 2024-07-08 NOTE — PROGRESS NOTES
Desirae Gregg is a 83 y.o. female on day 3 of admission presenting with Ambulatory dysfunction.      Subjective   The patient is very comfortable this morning. She denies pain since receiving the gabapentin last evening. She is motivated to get up to a chair with assistance from staff.         Objective     Last Recorded Vitals  /65 (BP Location: Left arm, Patient Position: Sitting)   Pulse 72   Temp 37.5 °C (99.5 °F) (Temporal)   Resp 18   Wt 71.7 kg (158 lb)   SpO2 96%   Intake/Output last 3 Shifts:    Intake/Output Summary (Last 24 hours) at 7/8/2024 1508  Last data filed at 7/8/2024 1236  Gross per 24 hour   Intake 1480 ml   Output 800 ml   Net 680 ml       Admission Weight  Weight: 70.8 kg (156 lb) (07/04/24 1122)    Daily Weight  07/04/24 : 71.7 kg (158 lb)    Image Results  CT lumbar spine wo IV contrast  Narrative: Interpreted By:  Ian Yang,   STUDY:  CT LUMBAR SPINE WO IV CONTRAST; ;  7/5/2024 10:33 am      INDICATION:  Signs/Symptoms:chronic right L1 fracture, right back pain / lower  extremity weakness.      COMPARISON:  None.      ACCESSION NUMBER(S):  [id number]      ORDERING CLINICIAN:  ALEXANDRE GAMING      TECHNIQUE:  Routine unenhanced CT of the lumbar spine was performed.      FINDINGS:  This report assumes 5 non-rib bearing lumbar vertebral bodies. The  lowest intervertebral disc will be labeled L5-S1.      There is mild retrolisthesis at L1-L2, L2-L3, and L3-L4 and grade 1  anterolisthesis at L4-L5. There is moderate anterior wedging of the  L1 vertebral body with 50% height loss. There is retropulsion of the  posterior vertebral body into the spinal canal, most pronounced  involving the inferior aspect and there is associated partial  effacement of the ventral thecal sac. There is extension of the  inferior endplate of L1 into the bilateral L1-L2 neural foramina and  there is mild right neural foraminal narrowing but no striking  narrowing of the left neural foramen. Remaining  vertebral body  heights are maintained. Bones appear demineralized.      There is mild intervertebral disc height narrowing at L3-L4 and L5-S1  with chronic degenerative endplate changes including osteophyte  formation and sclerosis. There is vacuum disc phenomenon at L5-S1.      There is marked bilateral facet osteoarthropathy at T11-T12 and L4-L5  and marked right facet osteoarthropathy at L5-S1.      There are disc bulges/disc protrusions with osteophytic spurring at  multiple levels which cause variable degree of ventral thecal sac  effacement but there is no striking osseous spinal canal stenosis.      There is extension disc and osteophyte into the neural foramina at  multiple levels and there is at most mild neural foraminal narrowing  at few levels.      There are atherosclerotic calcification within the abdominal aorta  and its branches. The gallbladder is surgically absent. There is  colonic diverticulosis.      There are osseous degenerative changes involving the bilateral  sacroiliac joints.      Impression: Chronic appearing fracture involving the L1 vertebral body with  moderate height loss and retropulsion into the spinal canal causing  partial effacement of the ventral thecal sac. Multilevel degenerative  changes of the lumbar spine as detailed above.      This study was interpreted at [facility].      MACRO:  None      Signed by: Ian Yang 7/5/2024 11:10 AM  Dictation workstation:   [id number]      Physical Exam  Eyes:      Extraocular Movements: Extraocular movements intact.   Cardiovascular:      Rate and Rhythm: Regular rhythm.   Pulmonary:      Breath sounds: Normal breath sounds.   Abdominal:      Palpations: Abdomen is soft.   Musculoskeletal:         General: Tenderness present.      Cervical back: Neck supple.      Right hip: Tenderness present. No deformity.      Comments: Chronic left shoulder tenderness   Neurological:      Mental Status: She is  alert and oriented to person, place, and time.   Psychiatric:         Mood and Affect: Mood normal.         Relevant Results               Assessment/Plan          Results for orders placed or performed during the hospital encounter of 07/04/24 (from the past 24 hour(s))   CBC   Result Value Ref Range    WBC 5.9 4.4 - 11.3 x10*3/uL    nRBC 0.3 (H) 0.0 - 0.0 /100 WBCs    RBC 2.62 (L) 4.00 - 5.20 x10*6/uL    Hemoglobin 6.9 (L) 12.0 - 16.0 g/dL    Hematocrit 23.3 (L) 36.0 - 46.0 %    MCV 89 80 - 100 fL    MCH 26.3 26.0 - 34.0 pg    MCHC 29.6 (L) 32.0 - 36.0 g/dL    RDW 20.7 (H) 11.5 - 14.5 %    Platelets 86 (L) 150 - 450 x10*3/uL   Renal Function Panel   Result Value Ref Range    Glucose 145 (H) 74 - 99 mg/dL    Sodium 137 136 - 145 mmol/L    Potassium 4.3 3.5 - 5.3 mmol/L    Chloride 105 98 - 107 mmol/L    Bicarbonate 24 21 - 32 mmol/L    Anion Gap 12 10 - 20 mmol/L    Urea Nitrogen 27 (H) 6 - 23 mg/dL    Creatinine 1.07 (H) 0.50 - 1.05 mg/dL    eGFR 51 (L) >60 mL/min/1.73m*2    Calcium 8.5 (L) 8.6 - 10.3 mg/dL    Phosphorus 3.9 2.5 - 4.9 mg/dL    Albumin 3.5 3.4 - 5.0 g/dL   Magnesium   Result Value Ref Range    Magnesium 2.30 1.60 - 2.40 mg/dL   Hemoglobin and Hematocrit, Blood   Result Value Ref Range    Hemoglobin 7.4 (L) 12.0 - 16.0 g/dL    Hematocrit 24.5 (L) 36.0 - 46.0 %      Scheduled medications  acetaminophen, 975 mg, oral, q8h  apixaban, 2.5 mg, oral, BID  carvedilol, 25 mg, oral, BID  cholecalciferol, 1,000 Units, oral, Daily  cyanocobalamin, 1,000 mcg, oral, Daily  empagliflozin, 25 mg, oral, q AM  ferrous sulfate (325 mg ferrous sulfate), 1 tablet, oral, Daily with breakfast  folic acid, 1 mg, oral, Daily  gabapentin, 200 mg, oral, BID  hydrALAZINE, 25 mg, oral, BID  isosorbide mononitrate ER, 30 mg, oral, Daily  levothyroxine, 87.5 mcg, oral, Daily  lidocaine, 1 patch, transdermal, Daily  lisinopril, 10 mg, oral, Daily  methocarbamol, 500 mg, oral, q8h MANI  polyethylene glycol, 17 g, oral,  Daily  ruxolitinib, 10 mg, oral, q AM  ruxolitinib, 20 mg, oral, Nightly      Continuous medications     PRN medications  PRN medications: acetaminophen, oxyCODONE, oxyCODONE, zinc oxide       Active Problems:  There are no active Hospital Problems.    Desirae Gregg is a 83 y.o. female with a past medical history of polycythemia vera, Htn, hypothyroidism, heart failure (EF unknown - records requested) presents to [facility] ED with a chief complaint of leg weakness.      #Right lower extremity weakness  -Hx of chronic L1 compression fracture (2022)   -CT lumbar spine results : Chronic appearing fracture involving the L1 vertebral body with moderate height loss and retropulsion into the spinal canal causing partial effacement of the ventral thecal sac. Multilevel degenerative ch...   -PT/OT rec moderate intensity therapy  -pain control - 975 Tylenol mild, oxy 2.5mg PRN, oxy 5mg PRN, lidocaine patch continued, added gabapentin     #Nephrolithiasis, non-obstructive  -discharged from [facility] on 7/1 with a 5 day course of cefdinir 300mg BID, urine culture there showed proteus mirabalis with resistance to only nitrofurantoin  -Recent UC showed no growth  -1.8cm stone found in bladder on right hip CT imaging  -Rocephin 1g discontinued   -Urology provided recommendations to follow up outpatient for likely intervention.    #Acute on chronic anemia   #Myeloproliferative disorder   -H&H 7.4/24.5  -Will transfuse if the hemoglobin is <7  -Resumed Eliquis   -Consider consulting hematology for rec regarding anticoagulation, if the hemoglobin drops.   -Repeat CBC in the am     #Polycythemia vera - cont home Jakafi 10mg in AM, 20mg in PM, Eliquis 2.5mg BID, Isolation precaution ordered    #HTN - cont home lisinopril 10mg daily, hydralazine 25mg BID    #Hypothyroidism - cont home levothyroxine 88mcg daily    #Diastolic HF- EF (EF 55-60%) - cont carvedilol 25mg BID, cont jardiance 25mg daily, cont imdur 30mg daily    #Hx of PE on  Eliquis - following with heme/onc at [facility] (Dr. Resendiz) follow up on 7/11/24. Her son/ caregiver plans to transport her from the facility to Dr. Resendiz's office.        Dispo: The patient is medically ready for discharge. The family decided to send the patient to SNF for rehab.  Pending pre-CERT.       Jovita Mancini, OIL-CNP

## 2024-07-09 LAB
ALBUMIN SERPL BCP-MCNC: 3.5 G/DL (ref 3.4–5)
ANION GAP SERPL CALC-SCNC: 12 MMOL/L (ref 10–20)
BUN SERPL-MCNC: 34 MG/DL (ref 6–23)
CALCIUM SERPL-MCNC: 8.4 MG/DL (ref 8.6–10.3)
CHLORIDE SERPL-SCNC: 107 MMOL/L (ref 98–107)
CO2 SERPL-SCNC: 22 MMOL/L (ref 21–32)
CREAT SERPL-MCNC: 1.07 MG/DL (ref 0.5–1.05)
EGFRCR SERPLBLD CKD-EPI 2021: 51 ML/MIN/1.73M*2
ERYTHROCYTE [DISTWIDTH] IN BLOOD BY AUTOMATED COUNT: 20.9 % (ref 11.5–14.5)
GLUCOSE SERPL-MCNC: 149 MG/DL (ref 74–99)
HCT VFR BLD AUTO: 24.2 % (ref 36–46)
HGB BLD-MCNC: 7 G/DL (ref 12–16)
MAGNESIUM SERPL-MCNC: 2.43 MG/DL (ref 1.6–2.4)
MCH RBC QN AUTO: 26.1 PG (ref 26–34)
MCHC RBC AUTO-ENTMCNC: 28.9 G/DL (ref 32–36)
MCV RBC AUTO: 90 FL (ref 80–100)
NRBC BLD-RTO: 0.4 /100 WBCS (ref 0–0)
PHOSPHATE SERPL-MCNC: 3.5 MG/DL (ref 2.5–4.9)
PLATELET # BLD AUTO: 85 X10*3/UL (ref 150–450)
POTASSIUM SERPL-SCNC: 4.4 MMOL/L (ref 3.5–5.3)
RBC # BLD AUTO: 2.68 X10*6/UL (ref 4–5.2)
SODIUM SERPL-SCNC: 137 MMOL/L (ref 136–145)
WBC # BLD AUTO: 6.8 X10*3/UL (ref 4.4–11.3)

## 2024-07-09 PROCEDURE — 36415 COLL VENOUS BLD VENIPUNCTURE: CPT

## 2024-07-09 PROCEDURE — 85027 COMPLETE CBC AUTOMATED: CPT

## 2024-07-09 PROCEDURE — 2500000005 HC RX 250 GENERAL PHARMACY W/O HCPCS

## 2024-07-09 PROCEDURE — 83735 ASSAY OF MAGNESIUM: CPT

## 2024-07-09 PROCEDURE — 97110 THERAPEUTIC EXERCISES: CPT | Mod: GP,CQ

## 2024-07-09 PROCEDURE — 99232 SBSQ HOSP IP/OBS MODERATE 35: CPT | Performed by: STUDENT IN AN ORGANIZED HEALTH CARE EDUCATION/TRAINING PROGRAM

## 2024-07-09 PROCEDURE — 2500000001 HC RX 250 WO HCPCS SELF ADMINISTERED DRUGS (ALT 637 FOR MEDICARE OP)

## 2024-07-09 PROCEDURE — 1100000001 HC PRIVATE ROOM DAILY

## 2024-07-09 PROCEDURE — 80069 RENAL FUNCTION PANEL: CPT

## 2024-07-09 PROCEDURE — 2500000002 HC RX 250 W HCPCS SELF ADMINISTERED DRUGS (ALT 637 FOR MEDICARE OP, ALT 636 FOR OP/ED)

## 2024-07-09 PROCEDURE — 2500000001 HC RX 250 WO HCPCS SELF ADMINISTERED DRUGS (ALT 637 FOR MEDICARE OP): Performed by: NURSE PRACTITIONER

## 2024-07-09 PROCEDURE — 94760 N-INVAS EAR/PLS OXIMETRY 1: CPT

## 2024-07-09 PROCEDURE — 2500000004 HC RX 250 GENERAL PHARMACY W/ HCPCS (ALT 636 FOR OP/ED): Performed by: FAMILY MEDICINE

## 2024-07-09 RX ORDER — LISINOPRIL 10 MG/1
10 TABLET ORAL DAILY
Start: 2024-07-09 | End: 2024-07-10 | Stop reason: HOSPADM

## 2024-07-09 ASSESSMENT — COGNITIVE AND FUNCTIONAL STATUS - GENERAL
DRESSING REGULAR UPPER BODY CLOTHING: A LITTLE
MOVING TO AND FROM BED TO CHAIR: A LITTLE
MOVING FROM LYING ON BACK TO SITTING ON SIDE OF FLAT BED WITH BEDRAILS: A LITTLE
DAILY ACTIVITIY SCORE: 19
STANDING UP FROM CHAIR USING ARMS: A LOT
MOBILITY SCORE: 14
PERSONAL GROOMING: A LITTLE
MOVING TO AND FROM BED TO CHAIR: A LITTLE
TURNING FROM BACK TO SIDE WHILE IN FLAT BAD: A LITTLE
DRESSING REGULAR LOWER BODY CLOTHING: A LITTLE
CLIMB 3 TO 5 STEPS WITH RAILING: A LOT
STANDING UP FROM CHAIR USING ARMS: A LITTLE
CLIMB 3 TO 5 STEPS WITH RAILING: A LOT
TURNING FROM BACK TO SIDE WHILE IN FLAT BAD: A LOT
TOILETING: A LITTLE
WALKING IN HOSPITAL ROOM: A LOT
MOBILITY SCORE: 15
MOVING FROM LYING ON BACK TO SITTING ON SIDE OF FLAT BED WITH BEDRAILS: A LOT
HELP NEEDED FOR BATHING: A LITTLE
WALKING IN HOSPITAL ROOM: A LOT

## 2024-07-09 ASSESSMENT — PAIN - FUNCTIONAL ASSESSMENT
PAIN_FUNCTIONAL_ASSESSMENT: 0-10

## 2024-07-09 ASSESSMENT — PAIN SCALES - GENERAL
PAINLEVEL_OUTOF10: 7
PAINLEVEL_OUTOF10: 7
PAINLEVEL_OUTOF10: 3
PAINLEVEL_OUTOF10: 7
PAINLEVEL_OUTOF10: 5 - MODERATE PAIN

## 2024-07-09 ASSESSMENT — PAIN DESCRIPTION - ORIENTATION: ORIENTATION: RIGHT;LEFT

## 2024-07-09 NOTE — PROGRESS NOTES
"Physical Therapy    Physical Therapy Treatment    Patient Name: Desirae Gregg  MRN: [id number]  Today's Date: 7/9/2024  Time Calculation  Start Time: 1147  Stop Time: 1157  Time Calculation (min): 10 min    Assessment/Plan   PT Assessment  PT Assessment Results: Decreased strength, Decreased mobility, Obesity  Rehab Prognosis: Good  End of Session Communication: Bedside nurse  Assessment Comment: Continue to recommend Mod intensity skilled PT for return to PLOF and independence.  End of Session Patient Position: Bed, 3 rail up, Alarm off, caregiver present     PT Plan  Treatment/Interventions: Bed mobility, Transfer training, Gait training, Strengthening, Therapeutic exercise  PT Plan: Ongoing PT  PT Frequency: 3 times per week  PT Discharge Recommendations: Moderate intensity level of continued care  Equipment Recommended upon Discharge: Wheeled walker  PT Recommended Transfer Status: Assist x1  PT - OK to Discharge: Yes      General Visit Information:   PT  Visit  PT Received On: 07/09/24  General  Reason for Referral: Pt is a 84 y/o woman who was admitted for chief complaint of leg weakness.  Referred By: Dr. CASTRO Bradshaw  Past Medical History Relevant to Rehab: Past medical history of polycythemia vera, HTN, hypothyroidism, heart failure  Missed Visit: No  Family/Caregiver Present: Yes  Prior to Session Communication: Bedside nurse  Patient Position Received: Bed, 3 rail up, Alarm on  Preferred Learning Style: verbal  General Comment: AXOX3, son at bedside, on reverse isolation.    Subjective   Precautions:     Vital Signs:       Objective   Pain:  Pain Assessment  Pain Assessment: 0-10  0-10 (Numeric) Pain Score:  (RLE \"very little when I'm resting its when I put weight on it\" stated patient)  Cognition:  Cognition  Overall Cognitive Status: Within Functional Limits  Orientation Level: Oriented X4  Coordination:     Postural Control:     Extremity/Trunk Assessments:    Activity Tolerance:     Treatments:  Therapeutic " Exercise  Therapeutic Exercise Performed: Yes  Therapeutic Exercise Activity 1: long sitting bilat LE ankle pumps. quad sets, heel slides x10 reps, bridging x3 reps, lumbar rotation stretch R/L x 10 seconds each side,completed for strenthening, and comfort    Therapeutic Activity  Therapeutic Activity Performed: Yes  Therapeutic Activity 1: demonstrated and instructed on postioning and bed mobility for lumbar care and log rolling. patient and son verb understanding.                                Outcome Measures:  [facility] Basic Mobility  Turning from your back to your side while in a flat bed without using bedrails: A lot  Moving from lying on your back to sitting on the side of a flat bed without using bedrails: A lot  Moving to and from bed to chair (including a wheelchair): A little  Standing up from a chair using your arms (e.g. wheelchair or bedside chair): A little  To walk in hospital room: A lot  Climbing 3-5 steps with railing: A lot  Basic Mobility - Total Score: 14    Education Documentation  Handouts, taught by Kaye Trejo PTA at 7/9/2024 12:49 PM.  Learner: Patient  Readiness: Acceptance  Method: Explanation  Response: Verbalizes Understanding    Body Mechanics, taught by Kaye Trejo PTA at 7/9/2024 12:49 PM.  Learner: Patient  Readiness: Acceptance  Method: Explanation  Response: Verbalizes Understanding    Precautions, taught by Kaye Trejo PTA at 7/9/2024 12:49 PM.  Learner: Patient  Readiness: Acceptance  Method: Explanation  Response: Verbalizes Understanding    Home Exercise Program, taught by Kaye Trejo PTA at 7/9/2024 12:49 PM.  Learner: Patient  Readiness: Acceptance  Method: Explanation  Response: Verbalizes Understanding    ADL Training, taught by Kaye Trejo PTA at 7/9/2024 12:49 PM.  Learner: Patient  Readiness: Acceptance  Method: Explanation  Response: Verbalizes Understanding    Education Comments  No comments found.        OP EDUCATION:       Encounter Problems        Encounter Problems (Active)       Mobility       STG - Patient will ambulate 50-80 feet MOD I with wheeled walker (Progressing)       Start:  07/07/24    Expected End:  07/21/24            STG - Patient will ascend and descend four to six stairs MOD I with L rail (Progressing)       Start:  07/07/24    Expected End:  07/21/24               PT Transfers       STG - Patient to transfer to and from sit to supine independently (Progressing)       Start:  07/07/24    Expected End:  07/21/24            STG - Patient will transfer sit to and from stand MOD I with wheeled walker (Progressing)       Start:  07/07/24    Expected End:  07/21/24               Pain - Adult

## 2024-07-09 NOTE — PROGRESS NOTES
07/09/24 1550   Discharge Planning   Patient expects to be discharged to: [facility], precert obtained 7/9, transport unable to be obtained. TCC requested provider to complete final orders. TCC requested 1000 pickup time for 7/10, confirmed  time of 1145 on 7/10. TCC called patient's son, no answer. Patient updated at the bedside.   Does the patient need discharge transport arranged? Yes   RoundTrip coordination needed? Yes   Has discharge transport been arranged? Yes   What day is the transport expected? 07/10/24   What time is the transport expected? 1145

## 2024-07-09 NOTE — PROGRESS NOTES
07/09/24 1158   Discharge Planning   Patient expects to be discharged to: [facility], precert started 7/8 @ 1000. TCC asked DSC team to escalate precert @ 1156

## 2024-07-09 NOTE — PROGRESS NOTES
"Desirae Gregg is a 83 y.o. female on day 4 of admission presenting with Ambulatory dysfunction.    Subjective   Pt is feeling weak. No overnight events.        Objective     Physical Exam  Vitals and nursing note reviewed.   Constitutional:       General: She is not in acute distress.     Appearance: Normal appearance. She is not ill-appearing or toxic-appearing.   HENT:      Head: Normocephalic and atraumatic.      Nose: Nose normal.      Mouth/Throat:      Mouth: Mucous membranes are moist.   Eyes:      Extraocular Movements: Extraocular movements intact.      Conjunctiva/sclera: Conjunctivae normal.      Pupils: Pupils are equal, round, and reactive to light.   Cardiovascular:      Rate and Rhythm: Normal rate and regular rhythm.      Heart sounds: No murmur heard.     No gallop.   Pulmonary:      Effort: Pulmonary effort is normal. No respiratory distress.      Breath sounds: Normal breath sounds. No wheezing, rhonchi or rales.   Abdominal:      General: Abdomen is flat. Bowel sounds are normal. There is no distension.      Palpations: Abdomen is soft. There is no mass.      Tenderness: There is no abdominal tenderness.   Musculoskeletal:         General: Tenderness present. No swelling. Normal range of motion.      Cervical back: Normal range of motion and neck supple.   Skin:     General: Skin is warm and dry.   Neurological:      Mental Status: She is alert and oriented to person, place, and time.      Motor: Weakness present.   Psychiatric:         Mood and Affect: Mood normal.         Behavior: Behavior normal.         Thought Content: Thought content normal.         Judgment: Judgment normal.         Last Recorded Vitals:  /77 (BP Location: Left arm, Patient Position: Sitting)   Pulse 59   Temp 36.8 °C (98.2 °F) (Temporal)   Resp 16   Ht 1.664 m (5' 5.5\")   Wt 71.7 kg (158 lb)   SpO2 95%   BMI 25.89 kg/m²      Scheduled medications:  acetaminophen, 975 mg, oral, q8h  apixaban, 2.5 mg, oral, " BID  carvedilol, 25 mg, oral, BID  cholecalciferol, 1,000 Units, oral, Daily  cyanocobalamin, 1,000 mcg, oral, Daily  empagliflozin, 25 mg, oral, q AM  ferrous sulfate (325 mg ferrous sulfate), 1 tablet, oral, Daily with breakfast  folic acid, 1 mg, oral, Daily  gabapentin, 200 mg, oral, BID  hydrALAZINE, 25 mg, oral, BID  isosorbide mononitrate ER, 30 mg, oral, Daily  levothyroxine, 87.5 mcg, oral, Daily  lidocaine, 1 patch, transdermal, Daily  lisinopril, 10 mg, oral, Daily  methocarbamol, 500 mg, oral, q8h MANI  polyethylene glycol, 17 g, oral, Daily  ruxolitinib, 10 mg, oral, q AM  ruxolitinib, 20 mg, oral, Nightly      Continuous medications:     PRN medications:  PRN medications: acetaminophen, oxyCODONE, oxyCODONE, zinc oxide     Relevant Results:  Results for orders placed or performed during the hospital encounter of 07/04/24 (from the past 24 hour(s))   CBC   Result Value Ref Range    WBC 6.8 4.4 - 11.3 x10*3/uL    nRBC 0.4 (H) 0.0 - 0.0 /100 WBCs    RBC 2.68 (L) 4.00 - 5.20 x10*6/uL    Hemoglobin 7.0 (L) 12.0 - 16.0 g/dL    Hematocrit 24.2 (L) 36.0 - 46.0 %    MCV 90 80 - 100 fL    MCH 26.1 26.0 - 34.0 pg    MCHC 28.9 (L) 32.0 - 36.0 g/dL    RDW 20.9 (H) 11.5 - 14.5 %    Platelets 85 (L) 150 - 450 x10*3/uL   Renal Function Panel   Result Value Ref Range    Glucose 149 (H) 74 - 99 mg/dL    Sodium 137 136 - 145 mmol/L    Potassium 4.4 3.5 - 5.3 mmol/L    Chloride 107 98 - 107 mmol/L    Bicarbonate 22 21 - 32 mmol/L    Anion Gap 12 10 - 20 mmol/L    Urea Nitrogen 34 (H) 6 - 23 mg/dL    Creatinine 1.07 (H) 0.50 - 1.05 mg/dL    eGFR 51 (L) >60 mL/min/1.73m*2    Calcium 8.4 (L) 8.6 - 10.3 mg/dL    Phosphorus 3.5 2.5 - 4.9 mg/dL    Albumin 3.5 3.4 - 5.0 g/dL   Magnesium   Result Value Ref Range    Magnesium 2.43 (H) 1.60 - 2.40 mg/dL       No results found.          Assessment/Plan   Active Problems:  There are no active Hospital Problems.    Desirae Gregg is a 83 y.o. female with a past medical history of  polycythemia vera, Htn, hypothyroidism, heart failure (EF unknown - records requested) presents to [facility] ED with a chief complaint of leg weakness.      Right lower extremity weakness  - Hx of chronic L1 compression fracture (2022)   - tylenol  - oxycodone PRN  - lidocaine patch  - gabapentin  - robaxin  - PT/OT rec moderate intensity therapy     Nephrolithiasis, non-obstructive  -discharged from [facility] on 7/1 with a 5 day course of cefdinir 300mg BID, urine culture there showed proteus mirabalis with resistance to only nitrofurantoin  -Recent UC showed no growth  -1.8cm stone found in bladder on right hip CT imaging  -Rocephin 1g discontinued   -Urology provided recommendations to follow up outpatient for likely intervention.     Acute on chronic anemia   Myeloproliferative disorder   - Will transfuse if the hemoglobin is <7  - Resumed Eliquis   - Fe supplement  - monitor     Polycythemia vera   - cont home Jakafi 10mg in AM, 20mg in PM, Eliquis 2.5mg BID, Isolation precaution ordered     HTN   BP low  - hold lisinopril   - hydralazine     Hypothyroidism  - synthroid     Diastolic HF- EF (EF 55-60%)   - carvedilol 25mg BID, jardiance 25mg daily, imdur 30mg daily     Hx of PE on Eliquis   - following with heme/onc at [facility] (Dr. Resendiz) follow up on 7/11/24. Her son/ caregiver plans to transport her from the facility to Dr. Resendiz's office.      DVT ppx: eliquis  Dispo: monitor clinically, awaiting placement         Jojo Sood MD  Hospitalist

## 2024-07-09 NOTE — CARE PLAN
The patient's goals for the shift include  manage pain and move    The clinical goals for the shift include Patient will report a decrease in pain      Problem: Pain - Adult  Goal: Verbalizes/displays adequate comfort level or baseline comfort level  Outcome: Progressing     Problem: Safety - Adult  Goal: Free from fall injury  Outcome: Progressing     Problem: Discharge Planning  Goal: Discharge to home or other facility with appropriate resources  Outcome: Progressing     Problem: Chronic Conditions and Co-morbidities  Goal: Patient's chronic conditions and co-morbidity symptoms are monitored and maintained or improved  Outcome: Progressing     Problem: Skin  Goal: Participates in plan/prevention/treatment measures  Outcome: Progressing  Goal: Prevent/manage excess moisture  Outcome: Progressing  Goal: Prevent/minimize sheer/friction injuries  Outcome: Progressing  Goal: Promote/optimize nutrition  Outcome: Progressing  Goal: Promote skin healing  Outcome: Progressing     Problem: Pain  Goal: Takes deep breaths with improved pain control throughout the shift  Outcome: Progressing  Goal: Turns in bed with improved pain control throughout the shift  Outcome: Progressing  Goal: Walks with improved pain control throughout the shift  Outcome: Progressing  Goal: Performs ADL's with improved pain control throughout shift  Outcome: Progressing  Goal: Participates in PT with improved pain control throughout the shift  Outcome: Progressing  Goal: Free from opioid side effects throughout the shift  Outcome: Progressing  Goal: Free from acute confusion related to pain meds throughout the shift  Outcome: Progressing

## 2024-07-10 VITALS
HEIGHT: 66 IN | TEMPERATURE: 97.9 F | WEIGHT: 158 LBS | DIASTOLIC BLOOD PRESSURE: 78 MMHG | RESPIRATION RATE: 16 BRPM | OXYGEN SATURATION: 96 % | HEART RATE: 60 BPM | BODY MASS INDEX: 25.39 KG/M2 | SYSTOLIC BLOOD PRESSURE: 154 MMHG

## 2024-07-10 LAB
ALBUMIN SERPL BCP-MCNC: 3.4 G/DL (ref 3.4–5)
ALP SERPL-CCNC: 37 U/L (ref 33–136)
ALT SERPL W P-5'-P-CCNC: 9 U/L (ref 7–45)
ANION GAP SERPL CALC-SCNC: 10 MMOL/L (ref 10–20)
AST SERPL W P-5'-P-CCNC: 10 U/L (ref 9–39)
BILIRUB SERPL-MCNC: 0.4 MG/DL (ref 0–1.2)
BUN SERPL-MCNC: 32 MG/DL (ref 6–23)
CALCIUM SERPL-MCNC: 8.3 MG/DL (ref 8.6–10.3)
CHLORIDE SERPL-SCNC: 107 MMOL/L (ref 98–107)
CO2 SERPL-SCNC: 26 MMOL/L (ref 21–32)
CREAT SERPL-MCNC: 1.11 MG/DL (ref 0.5–1.05)
EGFRCR SERPLBLD CKD-EPI 2021: 49 ML/MIN/1.73M*2
ERYTHROCYTE [DISTWIDTH] IN BLOOD BY AUTOMATED COUNT: 21 % (ref 11.5–14.5)
GLUCOSE SERPL-MCNC: 97 MG/DL (ref 74–99)
HCT VFR BLD AUTO: 23.3 % (ref 36–46)
HGB BLD-MCNC: 6.9 G/DL (ref 12–16)
HGB BLD-MCNC: 7.3 G/DL (ref 12–16)
MAGNESIUM SERPL-MCNC: 2.28 MG/DL (ref 1.6–2.4)
MCH RBC QN AUTO: 26.8 PG (ref 26–34)
MCHC RBC AUTO-ENTMCNC: 29.6 G/DL (ref 32–36)
MCV RBC AUTO: 91 FL (ref 80–100)
NRBC BLD-RTO: 1.3 /100 WBCS (ref 0–0)
PLATELET # BLD AUTO: 79 X10*3/UL (ref 150–450)
POTASSIUM SERPL-SCNC: 4.1 MMOL/L (ref 3.5–5.3)
PROT SERPL-MCNC: 4.9 G/DL (ref 6.4–8.2)
RBC # BLD AUTO: 2.57 X10*6/UL (ref 4–5.2)
SODIUM SERPL-SCNC: 139 MMOL/L (ref 136–145)
WBC # BLD AUTO: 5.3 X10*3/UL (ref 4.4–11.3)

## 2024-07-10 PROCEDURE — 85018 HEMOGLOBIN: CPT | Performed by: STUDENT IN AN ORGANIZED HEALTH CARE EDUCATION/TRAINING PROGRAM

## 2024-07-10 PROCEDURE — 2500000001 HC RX 250 WO HCPCS SELF ADMINISTERED DRUGS (ALT 637 FOR MEDICARE OP): Performed by: NURSE PRACTITIONER

## 2024-07-10 PROCEDURE — 2500000002 HC RX 250 W HCPCS SELF ADMINISTERED DRUGS (ALT 637 FOR MEDICARE OP, ALT 636 FOR OP/ED)

## 2024-07-10 PROCEDURE — 2500000005 HC RX 250 GENERAL PHARMACY W/O HCPCS

## 2024-07-10 PROCEDURE — 2500000001 HC RX 250 WO HCPCS SELF ADMINISTERED DRUGS (ALT 637 FOR MEDICARE OP)

## 2024-07-10 PROCEDURE — 36415 COLL VENOUS BLD VENIPUNCTURE: CPT | Performed by: STUDENT IN AN ORGANIZED HEALTH CARE EDUCATION/TRAINING PROGRAM

## 2024-07-10 PROCEDURE — 85027 COMPLETE CBC AUTOMATED: CPT

## 2024-07-10 PROCEDURE — 2500000004 HC RX 250 GENERAL PHARMACY W/ HCPCS (ALT 636 FOR OP/ED): Performed by: FAMILY MEDICINE

## 2024-07-10 PROCEDURE — 83735 ASSAY OF MAGNESIUM: CPT

## 2024-07-10 PROCEDURE — 99239 HOSP IP/OBS DSCHRG MGMT >30: CPT | Performed by: STUDENT IN AN ORGANIZED HEALTH CARE EDUCATION/TRAINING PROGRAM

## 2024-07-10 PROCEDURE — 2500000001 HC RX 250 WO HCPCS SELF ADMINISTERED DRUGS (ALT 637 FOR MEDICARE OP): Performed by: STUDENT IN AN ORGANIZED HEALTH CARE EDUCATION/TRAINING PROGRAM

## 2024-07-10 PROCEDURE — 36415 COLL VENOUS BLD VENIPUNCTURE: CPT

## 2024-07-10 PROCEDURE — 84075 ASSAY ALKALINE PHOSPHATASE: CPT | Performed by: STUDENT IN AN ORGANIZED HEALTH CARE EDUCATION/TRAINING PROGRAM

## 2024-07-10 SDOH — ECONOMIC STABILITY: INCOME INSECURITY: IN THE LAST 12 MONTHS, WAS THERE A TIME WHEN YOU WERE NOT ABLE TO PAY THE MORTGAGE OR RENT ON TIME?: NO

## 2024-07-10 SDOH — ECONOMIC STABILITY: HOUSING INSECURITY: IN THE PAST 12 MONTHS, HOW MANY TIMES HAVE YOU MOVED WHERE YOU WERE LIVING?: 1

## 2024-07-10 SDOH — ECONOMIC STABILITY: INCOME INSECURITY: HOW HARD IS IT FOR YOU TO PAY FOR THE VERY BASICS LIKE FOOD, HOUSING, MEDICAL CARE, AND HEATING?: NOT HARD AT ALL

## 2024-07-10 SDOH — ECONOMIC STABILITY: HOUSING INSECURITY: AT ANY TIME IN THE PAST 12 MONTHS, WERE YOU HOMELESS OR LIVING IN A SHELTER (INCLUDING NOW)?: NO

## 2024-07-10 ASSESSMENT — COGNITIVE AND FUNCTIONAL STATUS - GENERAL
STANDING UP FROM CHAIR USING ARMS: A LITTLE
DRESSING REGULAR UPPER BODY CLOTHING: A LITTLE
WALKING IN HOSPITAL ROOM: A LOT
TURNING FROM BACK TO SIDE WHILE IN FLAT BAD: A LOT
CLIMB 3 TO 5 STEPS WITH RAILING: A LOT
PERSONAL GROOMING: A LITTLE
MOBILITY SCORE: 14
DAILY ACTIVITIY SCORE: 19
MOVING FROM LYING ON BACK TO SITTING ON SIDE OF FLAT BED WITH BEDRAILS: A LOT
HELP NEEDED FOR BATHING: A LITTLE
MOVING TO AND FROM BED TO CHAIR: A LITTLE
TOILETING: A LITTLE
DRESSING REGULAR LOWER BODY CLOTHING: A LITTLE

## 2024-07-10 ASSESSMENT — PAIN SCALES - GENERAL
PAINLEVEL_OUTOF10: 0 - NO PAIN
PAINLEVEL_OUTOF10: 7
PAINLEVEL_OUTOF10: 3

## 2024-07-10 ASSESSMENT — PAIN - FUNCTIONAL ASSESSMENT
PAIN_FUNCTIONAL_ASSESSMENT: 0-10
PAIN_FUNCTIONAL_ASSESSMENT: 0-10

## 2024-07-10 NOTE — DISCHARGE SUMMARY
Discharge Diagnosis  Right lower extremity weakness     Issues Requiring Follow-Up  Post hospital follow up    Discharge Meds     Your medication list        START taking these medications        Instructions Last Dose Given Next Dose Due   gabapentin 100 mg capsule  Commonly known as: Neurontin      Take 2 capsules (200 mg) by mouth 2 times a day.       lidocaine 4 % patch      Place 1 patch over 12 hours on the skin once daily. Remove & discard patch within 12 hours or as directed by MD.       oxyCODONE 5 mg immediate release tablet  Commonly known as: Roxicodone      Take 1 tablet (5 mg) by mouth every 6 hours if needed for severe pain (7 - 10).       polyethylene glycol 17 gram packet  Commonly known as: Glycolax, Miralax      Take 17 g by mouth once daily for 3 days.       tiZANidine 2 mg tablet  Commonly known as: Zanaflex      Take 1 tablet (2 mg) by mouth every 6 hours if needed for muscle spasms.       zinc oxide 40 % ointment ointment      Apply 1 Application topically every 1 hour if needed (irritation).              CONTINUE taking these medications        Instructions Last Dose Given Next Dose Due   apixaban 2.5 mg tablet  Commonly known as: Eliquis           carvedilol 25 mg tablet  Commonly known as: Coreg           cholecalciferol 25 MCG (1000 UT) capsule  Commonly known as: Vitamin D-3           cyanocobalamin 1,000 mcg tablet  Commonly known as: Vitamin B-12           ferrous sulfate (325 mg ferrous sulfate) tablet           folic acid 400 mcg tablet  Commonly known as: Folvite           isosorbide mononitrate ER 30 mg 24 hr tablet  Commonly known as: Imdur           Jakafi 10 mg tablet  Generic drug: ruxolitinib           Jakafi 10 mg tablet  Generic drug: ruxolitinib           Jardiance 25 mg  Generic drug: empagliflozin           levothyroxine 175 mcg tablet  Commonly known as: Synthroid, Levoxyl           lisinopril 10 mg tablet      Take 1 tablet (10 mg) by mouth once daily.              STOP  taking these medications      cefdinir 300 mg capsule  Commonly known as: Omnicef        hydrALAZINE 25 mg tablet  Commonly known as: Apresoline                  Where to Get Your Medications        These medications were sent to [id number] - [place], [place] - [address] [place] [address]      Phone: [phone]   gabapentin 100 mg capsule  lidocaine 4 % patch  polyethylene glycol 17 gram packet  tiZANidine 2 mg tablet  zinc oxide 40 % ointment ointment       You can get these medications from any pharmacy    Bring a paper prescription for each of these medications  oxyCODONE 5 mg immediate release tablet       Information about where to get these medications is not yet available    Ask your nurse or doctor about these medications  lisinopril 10 mg tablet         Test Results Pending At Discharge  Pending Labs       No current pending labs.            Hospital Course   Desirae Gregg is a 83 y.o. female with a past medical history of polycythemia vera, Htn, hypothyroidism, heart failure (EF unknown - records requested) presents to [facility] ED with a chief complaint of leg weakness.      Right lower extremity weakness with pain  Pain is better controlled  - Hx of chronic L1 compression fracture (2022)   - tylenol  - oxycodone PRN  - lidocaine patch  - gabapentin  - robaxin  - PT/OT rec moderate intensity therapy     Nephrolithiasis, non-obstructive  -discharged from [facility] on 7/1 with a 5 day course of cefdinir 300mg BID, urine culture there showed proteus mirabalis with resistance to only nitrofurantoin  -Recent UC showed no growth  -1.8cm stone found in bladder on right hip CT imaging  -Rocephin 1g discontinued   -Urology provided recommendations to follow up outpatient for likely intervention.     Acute on chronic anemia   Myeloproliferative disorder   Hg stable  - Resumed Eliquis   - Fe supplement  - monitor     Polycythemia vera   - cont home Jakafi 10mg in AM, 20mg in PM,  Eliquis 2.5mg BID, Isolation precaution ordered     HTN   BP improved off some meds  - dc lisinopril   - dc hydralazine  - metoprolol    Hypothyroidism  - synthroid      Diastolic HF- EF (EF 55-60%)   - carvedilol 25mg BID, jardiance 25mg daily, imdur 30mg daily     Hx of PE on Eliquis   - following with heme/onc at [facility] (Dr. Resendiz) follow up on 7/11/24. Her son/ caregiver plans to transport her from the facility to Dr. Resendiz's office.     Pt is hemodynamically stable for discharge at this time with close outpatient follow ups.     The patient was discharged in satisfactory condition.   Medications and side effect profile reviewed with patient.  More than 30 minutes were spent in coordinating patient discharge     Pertinent Physical Exam At Time of Discharge  Physical Exam  Vitals and nursing note reviewed.   Constitutional:       General: She is not in acute distress.     Appearance: Normal appearance. She is not ill-appearing or toxic-appearing.   HENT:      Head: Normocephalic and atraumatic.      Nose: Nose normal.      Mouth/Throat:      Mouth: Mucous membranes are moist.   Eyes:      Extraocular Movements: Extraocular movements intact.      Conjunctiva/sclera: Conjunctivae normal.      Pupils: Pupils are equal, round, and reactive to light.   Cardiovascular:      Rate and Rhythm: Normal rate and regular rhythm.      Heart sounds: No murmur heard.     No gallop.   Pulmonary:      Effort: Pulmonary effort is normal. No respiratory distress.      Breath sounds: Normal breath sounds. No wheezing, rhonchi or rales.   Abdominal:      General: Abdomen is flat. Bowel sounds are normal. There is no distension.      Palpations: Abdomen is soft. There is no mass.      Tenderness: There is no abdominal tenderness.   Musculoskeletal:         General: Tenderness present. No swelling. Normal range of motion.      Cervical back: Normal range of motion and neck supple.   Skin:     General: Skin is warm and dry.    Neurological:      Mental Status: She is alert and oriented to person, place, and time.      Motor: Weakness present.   Psychiatric:         Mood and Affect: Mood normal.         Behavior: Behavior normal.         Thought Content: Thought content normal.         Judgment: Judgment normal.        Outpatient Follow-Up  Future Appointments   Date Time Provider Department Center   2/13/2025  2:45 PM [facility]         Jojo Sood MD  Hospitalist

## 2024-07-10 NOTE — DOCUMENTATION CLARIFICATION NOTE
"    PATIENT:               ELIE WHITLEY  ACCT #:                  [id number]  MRN:                       [id number]  :                       1940  ADMIT DATE:       2024 11:18 AM  DISCH DATE:  RESPONDING PROVIDER #:        [id number]          PROVIDER RESPONSE TEXT:    Chronic Diastolic Congestive Heart Failure    CDI QUERY TEXT:    Clarification    Instruction:    Based on your assessment of the patient and the clinical information, please provide the requested documentation by clicking on the appropriate radio button and enter any additional information if prompted.    Question: Please further clarify the type and acuity of congestive heart failure    When answering this query, please exercise your independent professional judgment. The fact that a question is being asked, does not imply that any particular answer is desired or expected.    The patient's clinical indicators include:  Clinical Information: 83 y.o. female with a past medical history of polycythemia vera, Htn, hypothyroidism, heart failure (EF unknown - records requested) presents to [facility] ED with a chief complaint of leg weakness.    Clinical Indicators:  ECHO: schedule to have ECHO   PN -noted \"Heart failure, recovered EF (EF 55-60%) - cont carvedilol 25mg BID, cont jardiance 25mg daily, cont imdur 30mg daily\"    Treatment: carvedilol 25mg BID. imdur 30mg daily    Risk Factors: HTN, DM, myeloproliferative disease  Options provided:  -- Chronic Diastolic Congestive Heart Failure  -- Other - I will add my own diagnosis  -- Refer to Clinical Documentation Reviewer    Query created by: Yolanda Chong on 2024 9:02 AM      Electronically signed by:  MALIK MORALES MD 7/10/2024 1:03 PM          "

## 2024-07-10 NOTE — PROGRESS NOTES
07/10/24 0926   Discharge Planning   Living Arrangements Children  (son, Alexis)   Support Systems Children   Assistance Needed A&Ox3, independent with ADLs with walker, does not drive, room air at baseline, on Eliquis prior to admission.   Type of Residence Private residence   Number of Stairs to Enter Residence 3   Number of Stairs Within Residence 7   Do you have animals or pets at home? Yes   Type of Animals or Pets 1 cat   Home or Post Acute Services Post acute facilities (Rehab/SNF/etc)   Type of Post Acute Facility Services Skilled nursing   Expected Discharge Disposition SNF   Does the patient need discharge transport arranged? Yes   RoundTrip coordination needed? Yes   Has discharge transport been arranged? Yes   What day is the transport expected? 07/10/24   What time is the transport expected? 1145   Patient expects to be discharged to: Patient will DC today to [facility] via [facility] at 11:45 AM- previously arranged by off-going TCC.

## 2024-07-12 ENCOUNTER — LAB REQUISITION (OUTPATIENT)
Dept: LAB | Facility: HOSPITAL | Age: 84
End: 2024-07-12

## 2024-07-12 DIAGNOSIS — I10 ESSENTIAL (PRIMARY) HYPERTENSION: ICD-10-CM

## 2024-07-12 LAB
ALBUMIN SERPL BCP-MCNC: 3.5 G/DL (ref 3.4–5)
ALP SERPL-CCNC: 47 U/L (ref 33–136)
ALT SERPL W P-5'-P-CCNC: 12 U/L (ref 7–45)
ANION GAP SERPL CALC-SCNC: 11 MMOL/L (ref 10–20)
AST SERPL W P-5'-P-CCNC: 14 U/L (ref 9–39)
BILIRUB SERPL-MCNC: 0.5 MG/DL (ref 0–1.2)
BUN SERPL-MCNC: 22 MG/DL (ref 6–23)
CALCIUM SERPL-MCNC: 8.5 MG/DL (ref 8.6–10.3)
CHLORIDE SERPL-SCNC: 105 MMOL/L (ref 98–107)
CO2 SERPL-SCNC: 29 MMOL/L (ref 21–32)
CREAT SERPL-MCNC: 0.94 MG/DL (ref 0.5–1.05)
EGFRCR SERPLBLD CKD-EPI 2021: 60 ML/MIN/1.73M*2
ERYTHROCYTE [DISTWIDTH] IN BLOOD BY AUTOMATED COUNT: 21.9 % (ref 11.5–14.5)
GLUCOSE SERPL-MCNC: 81 MG/DL (ref 74–99)
HCT VFR BLD AUTO: 28 % (ref 36–46)
HGB BLD-MCNC: 8.6 G/DL (ref 12–16)
MCH RBC QN AUTO: 27.8 PG (ref 26–34)
MCHC RBC AUTO-ENTMCNC: 30.7 G/DL (ref 32–36)
MCV RBC AUTO: 91 FL (ref 80–100)
NRBC BLD-RTO: 0.5 /100 WBCS (ref 0–0)
PLATELET # BLD AUTO: 107 X10*3/UL (ref 150–450)
POTASSIUM SERPL-SCNC: 4.8 MMOL/L (ref 3.5–5.3)
PROT SERPL-MCNC: 5 G/DL (ref 6.4–8.2)
RBC # BLD AUTO: 3.09 X10*6/UL (ref 4–5.2)
SODIUM SERPL-SCNC: 140 MMOL/L (ref 136–145)
WBC # BLD AUTO: 7.5 X10*3/UL (ref 4.4–11.3)

## 2024-07-12 PROCEDURE — 85027 COMPLETE CBC AUTOMATED: CPT | Mod: OUT | Performed by: INTERNAL MEDICINE

## 2024-07-12 PROCEDURE — 84075 ASSAY ALKALINE PHOSPHATASE: CPT | Mod: OUT | Performed by: INTERNAL MEDICINE

## 2024-07-19 ENCOUNTER — LAB REQUISITION (OUTPATIENT)
Dept: LAB | Facility: HOSPITAL | Age: 84
End: 2024-07-19

## 2024-07-19 DIAGNOSIS — I10 ESSENTIAL (PRIMARY) HYPERTENSION: ICD-10-CM

## 2024-07-19 LAB
ALBUMIN SERPL BCP-MCNC: 3.4 G/DL (ref 3.4–5)
ALP SERPL-CCNC: 58 U/L (ref 33–136)
ALT SERPL W P-5'-P-CCNC: 12 U/L (ref 7–45)
ANION GAP SERPL CALC-SCNC: 12 MMOL/L (ref 10–20)
AST SERPL W P-5'-P-CCNC: 19 U/L (ref 9–39)
BILIRUB SERPL-MCNC: 0.5 MG/DL (ref 0–1.2)
BUN SERPL-MCNC: 22 MG/DL (ref 6–23)
CALCIUM SERPL-MCNC: 8.4 MG/DL (ref 8.6–10.3)
CHLORIDE SERPL-SCNC: 106 MMOL/L (ref 98–107)
CO2 SERPL-SCNC: 27 MMOL/L (ref 21–32)
CREAT SERPL-MCNC: 1.02 MG/DL (ref 0.5–1.05)
EGFRCR SERPLBLD CKD-EPI 2021: 54 ML/MIN/1.73M*2
ERYTHROCYTE [DISTWIDTH] IN BLOOD BY AUTOMATED COUNT: 21.5 % (ref 11.5–14.5)
GLUCOSE SERPL-MCNC: 97 MG/DL (ref 74–99)
HCT VFR BLD AUTO: 25.6 % (ref 36–46)
HGB BLD-MCNC: 7.7 G/DL (ref 12–16)
MCH RBC QN AUTO: 27.5 PG (ref 26–34)
MCHC RBC AUTO-ENTMCNC: 30.1 G/DL (ref 32–36)
MCV RBC AUTO: 91 FL (ref 80–100)
NRBC BLD-RTO: 0 /100 WBCS (ref 0–0)
PLATELET # BLD AUTO: 74 X10*3/UL (ref 150–450)
POTASSIUM SERPL-SCNC: 4.4 MMOL/L (ref 3.5–5.3)
PROT SERPL-MCNC: 5.1 G/DL (ref 6.4–8.2)
RBC # BLD AUTO: 2.8 X10*6/UL (ref 4–5.2)
SODIUM SERPL-SCNC: 141 MMOL/L (ref 136–145)
WBC # BLD AUTO: 3.7 X10*3/UL (ref 4.4–11.3)

## 2024-07-19 PROCEDURE — 80053 COMPREHEN METABOLIC PANEL: CPT | Mod: OUT | Performed by: INTERNAL MEDICINE

## 2024-07-19 PROCEDURE — 85027 COMPLETE CBC AUTOMATED: CPT | Mod: OUT | Performed by: INTERNAL MEDICINE

## 2024-07-24 ENCOUNTER — LAB REQUISITION (OUTPATIENT)
Dept: LAB | Facility: HOSPITAL | Age: 84
End: 2024-07-24

## 2024-07-24 DIAGNOSIS — C80.1 MALIGNANT (PRIMARY) NEOPLASM, UNSPECIFIED (MULTI): ICD-10-CM

## 2024-07-24 DIAGNOSIS — D64.9 ANEMIA, UNSPECIFIED: ICD-10-CM

## 2024-07-24 LAB
ACANTHOCYTES BLD QL SMEAR: NORMAL
BASOPHILS # BLD AUTO: 0.01 X10*3/UL (ref 0–0.1)
BASOPHILS NFR BLD AUTO: 0.3 %
DACRYOCYTES BLD QL SMEAR: NORMAL
EOSINOPHIL # BLD AUTO: 0.03 X10*3/UL (ref 0–0.4)
EOSINOPHIL NFR BLD AUTO: 0.9 %
ERYTHROCYTE [DISTWIDTH] IN BLOOD BY AUTOMATED COUNT: 21.2 % (ref 11.5–14.5)
HCT VFR BLD AUTO: 25.3 % (ref 36–46)
HGB BLD-MCNC: 7.6 G/DL (ref 12–16)
IMM GRANULOCYTES # BLD AUTO: 0.17 X10*3/UL (ref 0–0.5)
IMM GRANULOCYTES NFR BLD AUTO: 4.9 % (ref 0–0.9)
LYMPHOCYTES # BLD AUTO: 0.56 X10*3/UL (ref 0.8–3)
LYMPHOCYTES NFR BLD AUTO: 16.2 %
MCH RBC QN AUTO: 27.7 PG (ref 26–34)
MCHC RBC AUTO-ENTMCNC: 30 G/DL (ref 32–36)
MCV RBC AUTO: 92 FL (ref 80–100)
MONOCYTES # BLD AUTO: 0.2 X10*3/UL (ref 0.05–0.8)
MONOCYTES NFR BLD AUTO: 5.8 %
NEUTROPHILS # BLD AUTO: 2.48 X10*3/UL (ref 1.6–5.5)
NEUTROPHILS NFR BLD AUTO: 71.9 %
NRBC BLD-RTO: 0.6 /100 WBCS (ref 0–0)
OVALOCYTES BLD QL SMEAR: NORMAL
PLATELET # BLD AUTO: 67 X10*3/UL (ref 150–450)
RBC # BLD AUTO: 2.74 X10*6/UL (ref 4–5.2)
RBC MORPH BLD: NORMAL
SCHISTOCYTES BLD QL SMEAR: NORMAL
WBC # BLD AUTO: 3.5 X10*3/UL (ref 4.4–11.3)

## 2024-07-24 PROCEDURE — 85025 COMPLETE CBC W/AUTO DIFF WBC: CPT | Mod: OUT | Performed by: INTERNAL MEDICINE

## 2024-07-30 ENCOUNTER — LAB REQUISITION (OUTPATIENT)
Dept: LAB | Facility: HOSPITAL | Age: 84
End: 2024-07-30
Payer: MEDICARE

## 2024-07-30 DIAGNOSIS — R31.9 HEMATURIA, UNSPECIFIED: ICD-10-CM

## 2024-07-30 LAB
APPEARANCE UR: CLEAR
BACTERIA #/AREA URNS AUTO: ABNORMAL /HPF
BILIRUB UR STRIP.AUTO-MCNC: NEGATIVE MG/DL
COLOR UR: ABNORMAL
GLUCOSE UR STRIP.AUTO-MCNC: ABNORMAL MG/DL
KETONES UR STRIP.AUTO-MCNC: NEGATIVE MG/DL
LEUKOCYTE ESTERASE UR QL STRIP.AUTO: ABNORMAL
NITRITE UR QL STRIP.AUTO: NEGATIVE
PH UR STRIP.AUTO: 5.5 [PH]
PROT UR STRIP.AUTO-MCNC: NEGATIVE MG/DL
RBC # UR STRIP.AUTO: NEGATIVE /UL
RBC #/AREA URNS AUTO: ABNORMAL /HPF
SP GR UR STRIP.AUTO: 1
SQUAMOUS #/AREA URNS AUTO: ABNORMAL /HPF
UROBILINOGEN UR STRIP.AUTO-MCNC: NORMAL MG/DL
WBC #/AREA URNS AUTO: ABNORMAL /HPF

## 2024-07-30 PROCEDURE — 81001 URINALYSIS AUTO W/SCOPE: CPT | Mod: OUT | Performed by: INTERNAL MEDICINE

## 2024-07-30 PROCEDURE — 87086 URINE CULTURE/COLONY COUNT: CPT | Mod: OUT,GEALAB | Performed by: INTERNAL MEDICINE

## 2024-07-31 ENCOUNTER — TELEPHONE (OUTPATIENT)
Dept: FAMILY MEDICINE CLINIC | Age: 84
End: 2024-07-31

## 2024-07-31 ENCOUNTER — LAB REQUISITION (OUTPATIENT)
Dept: LAB | Facility: HOSPITAL | Age: 84
End: 2024-07-31
Payer: MEDICARE

## 2024-07-31 DIAGNOSIS — D64.9 ANEMIA, UNSPECIFIED: ICD-10-CM

## 2024-07-31 LAB
ACANTHOCYTES BLD QL SMEAR: NORMAL
BACTERIA UR CULT: ABNORMAL
BASOPHILS # BLD MANUAL: 0.06 X10*3/UL (ref 0–0.1)
BASOPHILS NFR BLD MANUAL: 1 %
DACRYOCYTES BLD QL SMEAR: NORMAL
EOSINOPHIL # BLD MANUAL: 0 X10*3/UL (ref 0–0.4)
EOSINOPHIL NFR BLD MANUAL: 0 %
ERYTHROCYTE [DISTWIDTH] IN BLOOD BY AUTOMATED COUNT: 21.9 % (ref 11.5–14.5)
HCT VFR BLD AUTO: 23.6 % (ref 36–46)
HGB BLD-MCNC: 7 G/DL (ref 12–16)
IMM GRANULOCYTES # BLD AUTO: 0.33 X10*3/UL (ref 0–0.5)
IMM GRANULOCYTES NFR BLD AUTO: 6 % (ref 0–0.9)
LYMPHOCYTES # BLD MANUAL: 1.21 X10*3/UL (ref 0.8–3)
LYMPHOCYTES NFR BLD MANUAL: 22 %
MCH RBC QN AUTO: 27.1 PG (ref 26–34)
MCHC RBC AUTO-ENTMCNC: 29.7 G/DL (ref 32–36)
MCV RBC AUTO: 92 FL (ref 80–100)
MONOCYTES # BLD MANUAL: 0.06 X10*3/UL (ref 0.05–0.8)
MONOCYTES NFR BLD MANUAL: 1 %
NEUTROPHILS # BLD MANUAL: 4.13 X10*3/UL (ref 1.6–5.5)
NEUTS BAND # BLD MANUAL: 0.22 X10*3/UL (ref 0–0.5)
NEUTS BAND NFR BLD MANUAL: 4 %
NEUTS SEG # BLD MANUAL: 3.91 X10*3/UL (ref 1.6–5)
NEUTS SEG NFR BLD MANUAL: 71 %
NRBC BLD-RTO: 0.5 /100 WBCS (ref 0–0)
OVALOCYTES BLD QL SMEAR: NORMAL
PLATELET # BLD AUTO: 113 X10*3/UL (ref 150–450)
RBC # BLD AUTO: 2.58 X10*6/UL (ref 4–5.2)
RBC MORPH BLD: NORMAL
SCHISTOCYTES BLD QL SMEAR: NORMAL
TOTAL CELLS COUNTED BLD: 100
VARIANT LYMPHS # BLD MANUAL: 0.06 X10*3/UL (ref 0–0.3)
VARIANT LYMPHS NFR BLD: 1 %
WBC # BLD AUTO: 5.5 X10*3/UL (ref 4.4–11.3)

## 2024-07-31 PROCEDURE — 85007 BL SMEAR W/DIFF WBC COUNT: CPT | Mod: OUT | Performed by: INTERNAL MEDICINE

## 2024-07-31 PROCEDURE — 85027 COMPLETE CBC AUTOMATED: CPT | Mod: OUT | Performed by: INTERNAL MEDICINE

## 2024-07-31 NOTE — TELEPHONE ENCOUNTER
Per Dr. Castellanos -     yes       I returned Belem's call and informed her, as noted by Dr. Castellanos.

## 2024-07-31 NOTE — TELEPHONE ENCOUNTER
Belem/[facility] called to ask if Dr. Castellanos will follow for home care orders, skilled and PT, once patient discharges from rehab on 8/3/24.    Last seen 1/10/2024  Next appt Visit date not found    Requested Prescriptions      No prescriptions requested or ordered in this encounter

## 2024-08-02 ENCOUNTER — LAB REQUISITION (OUTPATIENT)
Dept: LAB | Facility: HOSPITAL | Age: 84
End: 2024-08-02
Payer: MEDICARE

## 2024-08-02 DIAGNOSIS — N39.0 URINARY TRACT INFECTION, SITE NOT SPECIFIED: ICD-10-CM

## 2024-08-02 LAB
APPEARANCE UR: ABNORMAL
BACTERIA #/AREA URNS AUTO: ABNORMAL /HPF
BILIRUB UR STRIP.AUTO-MCNC: NEGATIVE MG/DL
COLOR UR: COLORLESS
GLUCOSE UR STRIP.AUTO-MCNC: ABNORMAL MG/DL
KETONES UR STRIP.AUTO-MCNC: NEGATIVE MG/DL
LEUKOCYTE ESTERASE UR QL STRIP.AUTO: ABNORMAL
MUCOUS THREADS #/AREA URNS AUTO: ABNORMAL /LPF
NITRITE UR QL STRIP.AUTO: NEGATIVE
PH UR STRIP.AUTO: 5.5 [PH]
PROT UR STRIP.AUTO-MCNC: NEGATIVE MG/DL
RBC # UR STRIP.AUTO: ABNORMAL /UL
RBC #/AREA URNS AUTO: ABNORMAL /HPF
SP GR UR STRIP.AUTO: 1
SQUAMOUS #/AREA URNS AUTO: ABNORMAL /HPF
UROBILINOGEN UR STRIP.AUTO-MCNC: NORMAL MG/DL
WBC #/AREA URNS AUTO: >50 /HPF
WBC CLUMPS #/AREA URNS AUTO: ABNORMAL /HPF

## 2024-08-02 PROCEDURE — 81001 URINALYSIS AUTO W/SCOPE: CPT | Mod: OUT | Performed by: INTERNAL MEDICINE

## 2024-08-02 PROCEDURE — 87086 URINE CULTURE/COLONY COUNT: CPT | Mod: OUT,GEALAB | Performed by: INTERNAL MEDICINE

## 2024-08-04 LAB — BACTERIA UR CULT: ABNORMAL

## 2024-08-08 ENCOUNTER — HOSPITAL ENCOUNTER (INPATIENT)
Facility: HOSPITAL | Age: 84
LOS: 2 days | Discharge: HOME | End: 2024-08-11
Attending: STUDENT IN AN ORGANIZED HEALTH CARE EDUCATION/TRAINING PROGRAM | Admitting: INTERNAL MEDICINE
Payer: MEDICARE

## 2024-08-08 ENCOUNTER — APPOINTMENT (OUTPATIENT)
Dept: RADIOLOGY | Facility: HOSPITAL | Age: 84
DRG: 809 | End: 2024-08-08
Payer: MEDICARE
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
## 2024-08-08 DIAGNOSIS — R51.9 NONINTRACTABLE HEADACHE, UNSPECIFIED CHRONICITY PATTERN, UNSPECIFIED HEADACHE TYPE: ICD-10-CM

## 2024-08-08 DIAGNOSIS — Z79.01 ANTICOAGULATED: ICD-10-CM

## 2024-08-08 DIAGNOSIS — M54.2 CERVICAL SPINE PAIN: ICD-10-CM

## 2024-08-08 PROBLEM — D64.9 ANEMIA: Status: ACTIVE | Noted: 2024-08-08

## 2024-08-08 LAB
ABO GROUP (TYPE) IN BLOOD: NORMAL
ALBUMIN SERPL BCP-MCNC: 3.5 G/DL (ref 3.4–5)
ALP SERPL-CCNC: 45 U/L (ref 33–136)
ALT SERPL W P-5'-P-CCNC: 8 U/L (ref 7–45)
ANION GAP SERPL CALC-SCNC: 11 MMOL/L (ref 10–20)
ANTIBODY SCREEN: NORMAL
APTT PPP: 36 SECONDS (ref 27–38)
AST SERPL W P-5'-P-CCNC: 12 U/L (ref 9–39)
BASOPHILS # BLD AUTO: 0.01 X10*3/UL (ref 0–0.1)
BASOPHILS NFR BLD AUTO: 0.2 %
BILIRUB SERPL-MCNC: 0.6 MG/DL (ref 0–1.2)
BLOOD EXPIRATION DATE: NORMAL
BUN SERPL-MCNC: 17 MG/DL (ref 6–23)
CALCIUM SERPL-MCNC: 8.4 MG/DL (ref 8.6–10.3)
CHLORIDE SERPL-SCNC: 108 MMOL/L (ref 98–107)
CO2 SERPL-SCNC: 26 MMOL/L (ref 21–32)
CREAT SERPL-MCNC: 1.29 MG/DL (ref 0.5–1.05)
DACRYOCYTES BLD QL SMEAR: NORMAL
DISPENSE STATUS: NORMAL
EGFRCR SERPLBLD CKD-EPI 2021: 41 ML/MIN/1.73M*2
EOSINOPHIL # BLD AUTO: 0.02 X10*3/UL (ref 0–0.4)
EOSINOPHIL NFR BLD AUTO: 0.4 %
ERYTHROCYTE [DISTWIDTH] IN BLOOD BY AUTOMATED COUNT: 20.5 % (ref 11.5–14.5)
ERYTHROCYTE [DISTWIDTH] IN BLOOD BY AUTOMATED COUNT: 22.6 % (ref 11.5–14.5)
ERYTHROCYTE [DISTWIDTH] IN BLOOD BY AUTOMATED COUNT: 22.7 % (ref 11.5–14.5)
GLUCOSE SERPL-MCNC: 89 MG/DL (ref 74–99)
HCT VFR BLD AUTO: 20.4 % (ref 36–46)
HCT VFR BLD AUTO: 20.9 % (ref 36–46)
HCT VFR BLD AUTO: 24.5 % (ref 36–46)
HEMOCCULT SP1 STL QL: NEGATIVE
HGB BLD-MCNC: 6 G/DL (ref 12–16)
HGB BLD-MCNC: 6.2 G/DL (ref 12–16)
HGB BLD-MCNC: 7.5 G/DL (ref 12–16)
HOLD SPECIMEN: NORMAL
HOLD SPECIMEN: NORMAL
IMM GRANULOCYTES # BLD AUTO: 0.22 X10*3/UL (ref 0–0.5)
IMM GRANULOCYTES NFR BLD AUTO: 4.7 % (ref 0–0.9)
INR PPP: 1.3 (ref 0.9–1.1)
LYMPHOCYTES # BLD AUTO: 0.7 X10*3/UL (ref 0.8–3)
LYMPHOCYTES NFR BLD AUTO: 15 %
MAGNESIUM SERPL-MCNC: 2.37 MG/DL (ref 1.6–2.4)
MCH RBC QN AUTO: 27.3 PG (ref 26–34)
MCH RBC QN AUTO: 27.6 PG (ref 26–34)
MCH RBC QN AUTO: 28.2 PG (ref 26–34)
MCHC RBC AUTO-ENTMCNC: 29.4 G/DL (ref 32–36)
MCHC RBC AUTO-ENTMCNC: 29.7 G/DL (ref 32–36)
MCHC RBC AUTO-ENTMCNC: 30.6 G/DL (ref 32–36)
MCV RBC AUTO: 92 FL (ref 80–100)
MCV RBC AUTO: 93 FL (ref 80–100)
MCV RBC AUTO: 93 FL (ref 80–100)
MONOCYTES # BLD AUTO: 0.25 X10*3/UL (ref 0.05–0.8)
MONOCYTES NFR BLD AUTO: 5.3 %
NEUTROPHILS # BLD AUTO: 3.48 X10*3/UL (ref 1.6–5.5)
NEUTROPHILS NFR BLD AUTO: 74.4 %
NRBC BLD-RTO: 0.6 /100 WBCS (ref 0–0)
NRBC BLD-RTO: 1.1 /100 WBCS (ref 0–0)
NRBC BLD-RTO: 1.1 /100 WBCS (ref 0–0)
OVALOCYTES BLD QL SMEAR: NORMAL
PLATELET # BLD AUTO: 106 X10*3/UL (ref 150–450)
PLATELET # BLD AUTO: 113 X10*3/UL (ref 150–450)
PLATELET # BLD AUTO: 78 X10*3/UL (ref 150–450)
POLYCHROMASIA BLD QL SMEAR: NORMAL
POTASSIUM SERPL-SCNC: 4.4 MMOL/L (ref 3.5–5.3)
PRODUCT BLOOD TYPE: 7300
PRODUCT CODE: NORMAL
PROT SERPL-MCNC: 5.3 G/DL (ref 6.4–8.2)
PROTHROMBIN TIME: 15.1 SECONDS (ref 9.8–12.8)
RBC # BLD AUTO: 2.2 X10*6/UL (ref 4–5.2)
RBC # BLD AUTO: 2.25 X10*6/UL (ref 4–5.2)
RBC # BLD AUTO: 2.66 X10*6/UL (ref 4–5.2)
RBC MORPH BLD: NORMAL
RH FACTOR (ANTIGEN D): NORMAL
SODIUM SERPL-SCNC: 141 MMOL/L (ref 136–145)
UNIT ABO: NORMAL
UNIT NUMBER: NORMAL
UNIT RH: NORMAL
UNIT VOLUME: 350
WBC # BLD AUTO: 3.7 X10*3/UL (ref 4.4–11.3)
WBC # BLD AUTO: 4.7 X10*3/UL (ref 4.4–11.3)
WBC # BLD AUTO: 5.3 X10*3/UL (ref 4.4–11.3)
XM INTEP: NORMAL

## 2024-08-08 PROCEDURE — 70450 CT HEAD/BRAIN W/O DYE: CPT | Performed by: RADIOLOGY

## 2024-08-08 PROCEDURE — G0378 HOSPITAL OBSERVATION PER HR: HCPCS

## 2024-08-08 PROCEDURE — 85730 THROMBOPLASTIN TIME PARTIAL: CPT | Performed by: STUDENT IN AN ORGANIZED HEALTH CARE EDUCATION/TRAINING PROGRAM

## 2024-08-08 PROCEDURE — 85027 COMPLETE CBC AUTOMATED: CPT | Performed by: NURSE PRACTITIONER

## 2024-08-08 PROCEDURE — 85025 COMPLETE CBC W/AUTO DIFF WBC: CPT | Performed by: NURSE PRACTITIONER

## 2024-08-08 PROCEDURE — 85610 PROTHROMBIN TIME: CPT | Performed by: STUDENT IN AN ORGANIZED HEALTH CARE EDUCATION/TRAINING PROGRAM

## 2024-08-08 PROCEDURE — 2500000001 HC RX 250 WO HCPCS SELF ADMINISTERED DRUGS (ALT 637 FOR MEDICARE OP)

## 2024-08-08 PROCEDURE — 85027 COMPLETE CBC AUTOMATED: CPT

## 2024-08-08 PROCEDURE — 82270 OCCULT BLOOD FECES: CPT | Performed by: NURSE PRACTITIONER

## 2024-08-08 PROCEDURE — 36415 COLL VENOUS BLD VENIPUNCTURE: CPT | Performed by: NURSE PRACTITIONER

## 2024-08-08 PROCEDURE — 86901 BLOOD TYPING SEROLOGIC RH(D): CPT | Performed by: NURSE PRACTITIONER

## 2024-08-08 PROCEDURE — 86920 COMPATIBILITY TEST SPIN: CPT

## 2024-08-08 PROCEDURE — 70450 CT HEAD/BRAIN W/O DYE: CPT

## 2024-08-08 PROCEDURE — 99285 EMERGENCY DEPT VISIT HI MDM: CPT | Mod: 25

## 2024-08-08 PROCEDURE — 80053 COMPREHEN METABOLIC PANEL: CPT | Performed by: NURSE PRACTITIONER

## 2024-08-08 PROCEDURE — 2500000002 HC RX 250 W HCPCS SELF ADMINISTERED DRUGS (ALT 637 FOR MEDICARE OP, ALT 636 FOR OP/ED): Performed by: INTERNAL MEDICINE

## 2024-08-08 PROCEDURE — 72125 CT NECK SPINE W/O DYE: CPT

## 2024-08-08 PROCEDURE — 72125 CT NECK SPINE W/O DYE: CPT | Performed by: RADIOLOGY

## 2024-08-08 PROCEDURE — 36430 TRANSFUSION BLD/BLD COMPNT: CPT

## 2024-08-08 PROCEDURE — 83735 ASSAY OF MAGNESIUM: CPT

## 2024-08-08 PROCEDURE — P9016 RBC LEUKOCYTES REDUCED: HCPCS

## 2024-08-08 PROCEDURE — 94760 N-INVAS EAR/PLS OXIMETRY 1: CPT

## 2024-08-08 RX ORDER — ACETAMINOPHEN 500 MG
1000 TABLET ORAL EVERY 6 HOURS PRN
COMMUNITY

## 2024-08-08 RX ORDER — LISINOPRIL 10 MG/1
TABLET ORAL
COMMUNITY
Start: 2024-08-02

## 2024-08-08 RX ORDER — CARVEDILOL 25 MG/1
25 TABLET ORAL 2 TIMES DAILY
Status: DISCONTINUED | OUTPATIENT
Start: 2024-08-08 | End: 2024-08-11 | Stop reason: HOSPADM

## 2024-08-08 RX ORDER — LISINOPRIL 10 MG/1
10 TABLET ORAL DAILY
Status: DISCONTINUED | OUTPATIENT
Start: 2024-08-09 | End: 2024-08-11 | Stop reason: HOSPADM

## 2024-08-08 RX ORDER — ACETAMINOPHEN 160 MG/5ML
650 SOLUTION ORAL EVERY 4 HOURS PRN
Status: DISCONTINUED | OUTPATIENT
Start: 2024-08-08 | End: 2024-08-11 | Stop reason: HOSPADM

## 2024-08-08 RX ORDER — FERROUS SULFATE 325(65) MG
1 TABLET ORAL
Status: DISCONTINUED | OUTPATIENT
Start: 2024-08-09 | End: 2024-08-11 | Stop reason: HOSPADM

## 2024-08-08 RX ORDER — POLYETHYLENE GLYCOL 3350 17 G/17G
17 POWDER, FOR SOLUTION ORAL DAILY PRN
COMMUNITY

## 2024-08-08 RX ORDER — LEVOTHYROXINE SODIUM 175 UG/1
87.5 TABLET ORAL DAILY
Status: DISCONTINUED | OUTPATIENT
Start: 2024-08-09 | End: 2024-08-11 | Stop reason: HOSPADM

## 2024-08-08 RX ORDER — OXYCODONE HYDROCHLORIDE 5 MG/1
5 TABLET ORAL EVERY 6 HOURS PRN
Status: DISCONTINUED | OUTPATIENT
Start: 2024-08-08 | End: 2024-08-11 | Stop reason: HOSPADM

## 2024-08-08 RX ORDER — ACETAMINOPHEN 325 MG/1
650 TABLET ORAL EVERY 4 HOURS PRN
Status: DISCONTINUED | OUTPATIENT
Start: 2024-08-08 | End: 2024-08-11 | Stop reason: HOSPADM

## 2024-08-08 RX ORDER — DEXTROSE 50 % IN WATER (D50W) INTRAVENOUS SYRINGE
25
Status: DISCONTINUED | OUTPATIENT
Start: 2024-08-08 | End: 2024-08-11 | Stop reason: HOSPADM

## 2024-08-08 RX ORDER — DEXTROSE 50 % IN WATER (D50W) INTRAVENOUS SYRINGE
12.5
Status: DISCONTINUED | OUTPATIENT
Start: 2024-08-08 | End: 2024-08-11 | Stop reason: HOSPADM

## 2024-08-08 RX ORDER — GABAPENTIN 100 MG/1
200 CAPSULE ORAL 2 TIMES DAILY
Status: DISCONTINUED | OUTPATIENT
Start: 2024-08-08 | End: 2024-08-11 | Stop reason: HOSPADM

## 2024-08-08 RX ORDER — POLYETHYLENE GLYCOL 3350 17 G/17G
17 POWDER, FOR SOLUTION ORAL DAILY
Status: DISCONTINUED | OUTPATIENT
Start: 2024-08-09 | End: 2024-08-11 | Stop reason: HOSPADM

## 2024-08-08 RX ORDER — EAR PLUGS
1 EACH OTIC (EAR)
Status: DISCONTINUED | OUTPATIENT
Start: 2024-08-08 | End: 2024-08-11 | Stop reason: HOSPADM

## 2024-08-08 RX ORDER — ISOSORBIDE MONONITRATE 30 MG/1
30 TABLET, EXTENDED RELEASE ORAL DAILY
Status: DISCONTINUED | OUTPATIENT
Start: 2024-08-09 | End: 2024-08-11 | Stop reason: HOSPADM

## 2024-08-08 RX ORDER — LANOLIN ALCOHOL/MO/W.PET/CERES
1000 CREAM (GRAM) TOPICAL DAILY
Status: DISCONTINUED | OUTPATIENT
Start: 2024-08-09 | End: 2024-08-11 | Stop reason: HOSPADM

## 2024-08-08 RX ADMIN — ACETAMINOPHEN 650 MG: 650 SOLUTION ORAL at 22:32

## 2024-08-08 RX ADMIN — RUXOLITINIB 20 MG: 10 TABLET ORAL at 22:30

## 2024-08-08 RX ADMIN — GABAPENTIN 200 MG: 100 CAPSULE ORAL at 22:29

## 2024-08-08 RX ADMIN — OXYCODONE HYDROCHLORIDE 5 MG: 5 TABLET ORAL at 22:32

## 2024-08-08 RX ADMIN — CARVEDILOL 25 MG: 25 TABLET, FILM COATED ORAL at 22:28

## 2024-08-08 SDOH — SOCIAL STABILITY: SOCIAL INSECURITY: ABUSE: ADULT

## 2024-08-08 SDOH — SOCIAL STABILITY: SOCIAL INSECURITY: HAVE YOU HAD THOUGHTS OF HARMING ANYONE ELSE?: NO

## 2024-08-08 SDOH — SOCIAL STABILITY: SOCIAL INSECURITY: HAVE YOU HAD ANY THOUGHTS OF HARMING ANYONE ELSE?: NO

## 2024-08-08 SDOH — SOCIAL STABILITY: SOCIAL INSECURITY: HAS ANYONE EVER THREATENED TO HURT YOUR FAMILY OR YOUR PETS?: NO

## 2024-08-08 SDOH — SOCIAL STABILITY: SOCIAL INSECURITY: DOES ANYONE TRY TO KEEP YOU FROM HAVING/CONTACTING OTHER FRIENDS OR DOING THINGS OUTSIDE YOUR HOME?: NO

## 2024-08-08 SDOH — SOCIAL STABILITY: SOCIAL INSECURITY: ARE THERE ANY APPARENT SIGNS OF INJURIES/BEHAVIORS THAT COULD BE RELATED TO ABUSE/NEGLECT?: NO

## 2024-08-08 SDOH — SOCIAL STABILITY: SOCIAL INSECURITY: ARE YOU OR HAVE YOU BEEN THREATENED OR ABUSED PHYSICALLY, EMOTIONALLY, OR SEXUALLY BY ANYONE?: NO

## 2024-08-08 SDOH — SOCIAL STABILITY: SOCIAL INSECURITY: WERE YOU ABLE TO COMPLETE ALL THE BEHAVIORAL HEALTH SCREENINGS?: YES

## 2024-08-08 SDOH — SOCIAL STABILITY: SOCIAL INSECURITY: DO YOU FEEL ANYONE HAS EXPLOITED OR TAKEN ADVANTAGE OF YOU FINANCIALLY OR OF YOUR PERSONAL PROPERTY?: NO

## 2024-08-08 SDOH — SOCIAL STABILITY: SOCIAL INSECURITY: DO YOU FEEL UNSAFE GOING BACK TO THE PLACE WHERE YOU ARE LIVING?: NO

## 2024-08-08 ASSESSMENT — COGNITIVE AND FUNCTIONAL STATUS - GENERAL
PERSONAL GROOMING: A LITTLE
DAILY ACTIVITIY SCORE: 20
DRESSING REGULAR LOWER BODY CLOTHING: A LITTLE
CLIMB 3 TO 5 STEPS WITH RAILING: A LOT
TOILETING: A LITTLE
PATIENT BASELINE BEDBOUND: YES
HELP NEEDED FOR BATHING: A LITTLE
WALKING IN HOSPITAL ROOM: A LITTLE
MOVING TO AND FROM BED TO CHAIR: A LITTLE
STANDING UP FROM CHAIR USING ARMS: A LITTLE
MOBILITY SCORE: 19

## 2024-08-08 ASSESSMENT — ACTIVITIES OF DAILY LIVING (ADL)
HEARING - RIGHT EAR: DIFFICULTY WITH NOISE
WALKS IN HOME: DEPENDENT
PATIENT'S MEMORY ADEQUATE TO SAFELY COMPLETE DAILY ACTIVITIES?: YES
ADEQUATE_TO_COMPLETE_ADL: YES
DRESSING YOURSELF: NEEDS ASSISTANCE
JUDGMENT_ADEQUATE_SAFELY_COMPLETE_DAILY_ACTIVITIES: YES
GROOMING: NEEDS ASSISTANCE
BATHING: NEEDS ASSISTANCE
LACK_OF_TRANSPORTATION: NO
TOILETING: NEEDS ASSISTANCE
HEARING - LEFT EAR: DIFFICULTY WITH NOISE
FEEDING YOURSELF: INDEPENDENT

## 2024-08-08 ASSESSMENT — LIFESTYLE VARIABLES
HAVE PEOPLE ANNOYED YOU BY CRITICIZING YOUR DRINKING: NO
HOW OFTEN DO YOU HAVE A DRINK CONTAINING ALCOHOL: NEVER
EVER FELT BAD OR GUILTY ABOUT YOUR DRINKING: NO
AUDIT-C TOTAL SCORE: 0
TOTAL SCORE: 0
AUDIT-C TOTAL SCORE: 0
HOW MANY STANDARD DRINKS CONTAINING ALCOHOL DO YOU HAVE ON A TYPICAL DAY: PATIENT DOES NOT DRINK
HAVE YOU EVER FELT YOU SHOULD CUT DOWN ON YOUR DRINKING: NO
EVER HAD A DRINK FIRST THING IN THE MORNING TO STEADY YOUR NERVES TO GET RID OF A HANGOVER: NO
SKIP TO QUESTIONS 9-10: 1
HOW OFTEN DO YOU HAVE 6 OR MORE DRINKS ON ONE OCCASION: NEVER

## 2024-08-08 ASSESSMENT — PATIENT HEALTH QUESTIONNAIRE - PHQ9
1. LITTLE INTEREST OR PLEASURE IN DOING THINGS: NOT AT ALL
2. FEELING DOWN, DEPRESSED OR HOPELESS: SEVERAL DAYS
SUM OF ALL RESPONSES TO PHQ9 QUESTIONS 1 & 2: 1

## 2024-08-08 ASSESSMENT — PAIN DESCRIPTION - DESCRIPTORS: DESCRIPTORS: HEADACHE

## 2024-08-08 ASSESSMENT — PAIN - FUNCTIONAL ASSESSMENT
PAIN_FUNCTIONAL_ASSESSMENT: 0-10
PAIN_FUNCTIONAL_ASSESSMENT: UNABLE TO SELF-REPORT

## 2024-08-08 ASSESSMENT — PAIN SCALES - GENERAL
PAINLEVEL_OUTOF10: 3
PAINLEVEL_OUTOF10: 0 - NO PAIN

## 2024-08-08 NOTE — ED PROVIDER NOTES
[facility]  [facility]  ED  Encounter Note  Admit Date/RoomTime: 2024  2:31 PM  ED Room: 230/230-A  NAME: Desirae Gregg  : 1940  MRN: [id number]     Chief Complaint:  Fall    HISTORY OF PRESENT ILLNESS        Desirae Gregg is a 83 y.o. female who presents to the ED for evaluation of HIA, trauma alert.    Pt is on eliquis for pulmonary embolism.    Son at bedside stated that his mom was just discharged from rehab last Saturday after being hospitalized for weakness. He stated that he and another family member were bringing pt down stairs to a doctor's appt when she fell backwards and hit the back of her head. Denied any loc but does have small goose egg to back of head. No abrasion or bleeding. No numbness weakness. Mild neck pain. Son feels that she is not at her baseline since discharge. She seems more weak. Pt stated that she has been weak but cannot state if she has cp sob back pain or abd pain.     ROS   Pertinent positives and negatives are stated within HPI, all other systems reviewed and are negative.    Past Medical History:  has no past medical history on file.    Surgical History:  has no past surgical history on file.    Social History:  reports that she has never smoked. She has never used smokeless tobacco.    Family History: family history is not on file.     Allergies: Clonidine and structural analogs, Naproxen, and Hydroxyzine    PHYSICAL EXAM   Oxygen Saturation Interpretation: Normal.       Physical Exam  Constitutional/General: Alert and oriented x3, well appearing, non toxic  HEENT:  NC/NT. PERRLA.  Airway patent.  Neck: Supple, full ROM. No midline vertebral tenderness or crepitus.   Respiratory: Lung sounds clear to auscultation bilaterally. No wheezes, rhonchi or stridor. Not in respiratory distress.  CV:  Regular rate. Regular rhythm. No murmurs or rubs. 2+ distal pulses.  GI:  Abdomen soft, non-tender, non-distended. +BS. No rebound, guarding, or rigidity. No  pulsatile masses.  Musculoskeletal: Moves all extremities x 4. Warm and well perfused. Capillary refill <3 seconds  Integument: Skin warm and dry. No rashes.   Neurologic: Alert and oriented with no focal deficits, symmetric strength 5/5 in the upper and lower extremities bilaterally.  Psychiatric: Normal affect.//small goose egg back of head  No numbness weakness in upper or lower extremities     Lab / Imaging Results   (All laboratory and radiology results have been personally reviewed by myself)  Labs:  Results for orders placed or performed during the hospital encounter of 08/08/24   Occult Blood, Stool    Specimen: Stool   Result Value Ref Range    Occult Blood, Stool X1 Negative Negative   CBC and Auto Differential   Result Value Ref Range    WBC 4.7 4.4 - 11.3 x10*3/uL    nRBC 1.1 (H) 0.0 - 0.0 /100 WBCs    RBC 2.25 (L) 4.00 - 5.20 x10*6/uL    Hemoglobin 6.2 (LL) 12.0 - 16.0 g/dL    Hematocrit 20.9 (L) 36.0 - 46.0 %    MCV 93 80 - 100 fL    MCH 27.6 26.0 - 34.0 pg    MCHC 29.7 (L) 32.0 - 36.0 g/dL    RDW 22.6 (H) 11.5 - 14.5 %    Platelets 106 (L) 150 - 450 x10*3/uL    Neutrophils % 74.4 40.0 - 80.0 %    Immature Granulocytes %, Automated 4.7 (H) 0.0 - 0.9 %    Lymphocytes % 15.0 13.0 - 44.0 %    Monocytes % 5.3 2.0 - 10.0 %    Eosinophils % 0.4 0.0 - 6.0 %    Basophils % 0.2 0.0 - 2.0 %    Neutrophils Absolute 3.48 1.60 - 5.50 x10*3/uL    Immature Granulocytes Absolute, Automated 0.22 0.00 - 0.50 x10*3/uL    Lymphocytes Absolute 0.70 (L) 0.80 - 3.00 x10*3/uL    Monocytes Absolute 0.25 0.05 - 0.80 x10*3/uL    Eosinophils Absolute 0.02 0.00 - 0.40 x10*3/uL    Basophils Absolute 0.01 0.00 - 0.10 x10*3/uL   Comprehensive metabolic panel   Result Value Ref Range    Glucose 89 74 - 99 mg/dL    Sodium 141 136 - 145 mmol/L    Potassium 4.4 3.5 - 5.3 mmol/L    Chloride 108 (H) 98 - 107 mmol/L    Bicarbonate 26 21 - 32 mmol/L    Anion Gap 11 10 - 20 mmol/L    Urea Nitrogen 17 6 - 23 mg/dL    Creatinine 1.29 (H) 0.50 -  1.05 mg/dL    eGFR 41 (L) >60 mL/min/1.73m*2    Calcium 8.4 (L) 8.6 - 10.3 mg/dL    Albumin 3.5 3.4 - 5.0 g/dL    Alkaline Phosphatase 45 33 - 136 U/L    Total Protein 5.3 (L) 6.4 - 8.2 g/dL    AST 12 9 - 39 U/L    Bilirubin, Total 0.6 0.0 - 1.2 mg/dL    ALT 8 7 - 45 U/L   Light Blue Top   Result Value Ref Range    Extra Tube Hold for add-ons.    Lavender Top   Result Value Ref Range    Extra Tube Hold for add-ons.    aPTT   Result Value Ref Range    aPTT 36 27 - 38 seconds   Protime-INR   Result Value Ref Range    Protime 15.1 (H) 9.8 - 12.8 seconds    INR 1.3 (H) 0.9 - 1.1   CBC   Result Value Ref Range    WBC 5.3 4.4 - 11.3 x10*3/uL    nRBC 0.6 (H) 0.0 - 0.0 /100 WBCs    RBC 2.20 (L) 4.00 - 5.20 x10*6/uL    Hemoglobin 6.0 (LL) 12.0 - 16.0 g/dL    Hematocrit 20.4 (L) 36.0 - 46.0 %    MCV 93 80 - 100 fL    MCH 27.3 26.0 - 34.0 pg    MCHC 29.4 (L) 32.0 - 36.0 g/dL    RDW 22.7 (H) 11.5 - 14.5 %    Platelets 113 (L) 150 - 450 x10*3/uL   Type and Screen   Result Value Ref Range    ABO TYPE B     Rh TYPE POS     ANTIBODY SCREEN NEG    Comprehensive Metabolic Panel   Result Value Ref Range    Glucose 81 74 - 99 mg/dL    Sodium 141 136 - 145 mmol/L    Potassium 4.4 3.5 - 5.3 mmol/L    Chloride 110 (H) 98 - 107 mmol/L    Bicarbonate 25 21 - 32 mmol/L    Anion Gap 10 10 - 20 mmol/L    Urea Nitrogen 16 6 - 23 mg/dL    Creatinine 1.19 (H) 0.50 - 1.05 mg/dL    eGFR 45 (L) >60 mL/min/1.73m*2    Calcium 7.7 (L) 8.6 - 10.3 mg/dL    Albumin 3.1 (L) 3.4 - 5.0 g/dL    Alkaline Phosphatase 35 33 - 136 U/L    Total Protein 4.3 (L) 6.4 - 8.2 g/dL    AST 10 9 - 39 U/L    Bilirubin, Total 0.5 0.0 - 1.2 mg/dL    ALT 7 7 - 45 U/L   Magnesium   Result Value Ref Range    Magnesium 2.37 1.60 - 2.40 mg/dL   CBC   Result Value Ref Range    WBC 3.7 (L) 4.4 - 11.3 x10*3/uL    nRBC 1.1 (H) 0.0 - 0.0 /100 WBCs    RBC 2.66 (L) 4.00 - 5.20 x10*6/uL    Hemoglobin 7.5 (L) 12.0 - 16.0 g/dL    Hematocrit 24.5 (L) 36.0 - 46.0 %    MCV 92 80 - 100 fL     MCH 28.2 26.0 - 34.0 pg    MCHC 30.6 (L) 32.0 - 36.0 g/dL    RDW 20.5 (H) 11.5 - 14.5 %    Platelets 78 (L) 150 - 450 x10*3/uL   CBC and Auto Differential   Result Value Ref Range    WBC 2.5 (L) 4.4 - 11.3 x10*3/uL    nRBC 1.2 (H) 0.0 - 0.0 /100 WBCs    RBC 2.28 (L) 4.00 - 5.20 x10*6/uL    Hemoglobin 6.4 (LL) 12.0 - 16.0 g/dL    Hematocrit 21.0 (L) 36.0 - 46.0 %    MCV 92 80 - 100 fL    MCH 28.1 26.0 - 34.0 pg    MCHC 30.5 (L) 32.0 - 36.0 g/dL    RDW 20.5 (H) 11.5 - 14.5 %    Platelets 67 (L) 150 - 450 x10*3/uL    Neutrophils % 69.2 40.0 - 80.0 %    Immature Granulocytes %, Automated 4.9 (H) 0.0 - 0.9 %    Lymphocytes % 19.0 13.0 - 44.0 %    Monocytes % 6.1 2.0 - 10.0 %    Eosinophils % 0.8 0.0 - 6.0 %    Basophils % 0.0 0.0 - 2.0 %    Neutrophils Absolute 1.71 1.60 - 5.50 x10*3/uL    Immature Granulocytes Absolute, Automated 0.12 0.00 - 0.50 x10*3/uL    Lymphocytes Absolute 0.47 (L) 0.80 - 3.00 x10*3/uL    Monocytes Absolute 0.15 0.05 - 0.80 x10*3/uL    Eosinophils Absolute 0.02 0.00 - 0.40 x10*3/uL    Basophils Absolute 0.00 0.00 - 0.10 x10*3/uL   Magnesium   Result Value Ref Range    Magnesium 2.19 1.60 - 2.40 mg/dL   CBC   Result Value Ref Range    WBC 3.1 (L) 4.4 - 11.3 x10*3/uL    nRBC 1.0 (H) 0.0 - 0.0 /100 WBCs    RBC 2.64 (L) 4.00 - 5.20 x10*6/uL    Hemoglobin 7.5 (L) 12.0 - 16.0 g/dL    Hematocrit 24.0 (L) 36.0 - 46.0 %    MCV 91 80 - 100 fL    MCH 28.4 26.0 - 34.0 pg    MCHC 31.3 (L) 32.0 - 36.0 g/dL    RDW 19.7 (H) 11.5 - 14.5 %    Platelets 78 (L) 150 - 450 x10*3/uL   Magnesium   Result Value Ref Range    Magnesium 2.27 1.60 - 2.40 mg/dL   Renal Function Panel   Result Value Ref Range    Glucose 95 74 - 99 mg/dL    Sodium 140 136 - 145 mmol/L    Potassium 4.2 3.5 - 5.3 mmol/L    Chloride 111 (H) 98 - 107 mmol/L    Bicarbonate 26 21 - 32 mmol/L    Anion Gap 7 (L) 10 - 20 mmol/L    Urea Nitrogen 13 6 - 23 mg/dL    Creatinine 1.08 (H) 0.50 - 1.05 mg/dL    eGFR 50 (L) >60 mL/min/1.73m*2    Calcium 8.0 (L)  8.6 - 10.3 mg/dL    Phosphorus 3.1 2.5 - 4.9 mg/dL    Albumin 3.4 3.4 - 5.0 g/dL   CBC   Result Value Ref Range    WBC 3.2 (L) 4.4 - 11.3 x10*3/uL    nRBC 0.0 0.0 - 0.0 /100 WBCs    RBC 2.85 (L) 4.00 - 5.20 x10*6/uL    Hemoglobin 8.1 (L) 12.0 - 16.0 g/dL    Hematocrit 25.9 (L) 36.0 - 46.0 %    MCV 91 80 - 100 fL    MCH 28.4 26.0 - 34.0 pg    MCHC 31.3 (L) 32.0 - 36.0 g/dL    RDW 19.6 (H) 11.5 - 14.5 %    Platelets 69 (L) 150 - 450 x10*3/uL   CBC   Result Value Ref Range    WBC 3.3 (L) 4.4 - 11.3 x10*3/uL    nRBC 0.6 (H) 0.0 - 0.0 /100 WBCs    RBC 2.75 (L) 4.00 - 5.20 x10*6/uL    Hemoglobin 7.9 (L) 12.0 - 16.0 g/dL    Hematocrit 25.1 (L) 36.0 - 46.0 %    MCV 91 80 - 100 fL    MCH 28.7 26.0 - 34.0 pg    MCHC 31.5 (L) 32.0 - 36.0 g/dL    RDW 19.8 (H) 11.5 - 14.5 %    Platelets 68 (L) 150 - 450 x10*3/uL   Magnesium   Result Value Ref Range    Magnesium 2.13 1.60 - 2.40 mg/dL   Renal Function Panel   Result Value Ref Range    Glucose 104 (H) 74 - 99 mg/dL    Sodium 140 136 - 145 mmol/L    Potassium 4.2 3.5 - 5.3 mmol/L    Chloride 110 (H) 98 - 107 mmol/L    Bicarbonate 25 21 - 32 mmol/L    Anion Gap 9 (L) 10 - 20 mmol/L    Urea Nitrogen 13 6 - 23 mg/dL    Creatinine 1.02 0.50 - 1.05 mg/dL    eGFR 54 (L) >60 mL/min/1.73m*2    Calcium 8.1 (L) 8.6 - 10.3 mg/dL    Phosphorus 2.6 2.5 - 4.9 mg/dL    Albumin 3.4 3.4 - 5.0 g/dL   CBC   Result Value Ref Range    WBC 3.3 (L) 4.4 - 11.3 x10*3/uL    nRBC 0.6 (H) 0.0 - 0.0 /100 WBCs    RBC 2.89 (L) 4.00 - 5.20 x10*6/uL    Hemoglobin 8.3 (L) 12.0 - 16.0 g/dL    Hematocrit 26.6 (L) 36.0 - 46.0 %    MCV 92 80 - 100 fL    MCH 28.7 26.0 - 34.0 pg    MCHC 31.2 (L) 32.0 - 36.0 g/dL    RDW 20.0 (H) 11.5 - 14.5 %    Platelets 53 (L) 150 - 450 x10*3/uL   CBC   Result Value Ref Range    WBC 3.9 (L) 4.4 - 11.3 x10*3/uL    nRBC 0.8 (H) 0.0 - 0.0 /100 WBCs    RBC 2.99 (L) 4.00 - 5.20 x10*6/uL    Hemoglobin 8.5 (L) 12.0 - 16.0 g/dL    Hematocrit 27.2 (L) 36.0 - 46.0 %    MCV 91 80 - 100 fL    MCH  28.4 26.0 - 34.0 pg    MCHC 31.3 (L) 32.0 - 36.0 g/dL    RDW 19.9 (H) 11.5 - 14.5 %    Platelets 56 (L) 150 - 450 x10*3/uL   Prepare RBC: 1 Units   Result Value Ref Range    PRODUCT CODE T8714G17     Unit Number [id number]     Unit ABO B     Unit RH POS     XM INTEP COMP     Dispense Status TR     Blood Expiration Date 8/26/2024 11:59:00 PM EDT     PRODUCT BLOOD TYPE 7300     UNIT VOLUME 350    Prepare RBC: 1 Units   Result Value Ref Range    PRODUCT CODE Y6594A37     Unit Number [id number]     Unit ABO B     Unit RH POS     XM INTEP COMP     Dispense Status TR     Blood Expiration Date 8/27/2024 11:59:00 PM EDT     PRODUCT BLOOD TYPE 7300     UNIT VOLUME 350    Morphology   Result Value Ref Range    RBC Morphology See Below     Polychromasia Mild     Ovalocytes Few     Teardrop Cells Few    Morphology   Result Value Ref Range    RBC Morphology See Below     Polychromasia Mild     RBC Fragments Few     Ovalocytes Few     Teardrop Cells Few      Imaging:  All Radiology results interpreted by Radiologist unless otherwise noted.  CT head wo IV contrast   Final Result   No CT evidence for acute intracranial pathology.             Signed by: Domenic Shaver 8/8/2024 3:12 PM   Dictation workstation:   [id number]      CT cervical spine wo IV contrast   Final Result   1. No acute fracture or spondylolisthesis.   2. Degenerative disc disease and spondylosis as described in the body   of the report.             Signed by: Domenic Shaver 8/8/2024 3:24 PM   Dictation workstation:   [id number]          ED Course / Medical Decision Making     Medications   ruxolitinib (Jakafi) tablet 20 mg ( oral Dose Auto Held 8/16/24 2100)   ruxolitinib (Jakafi) tablet 10 mg ( oral Dose Auto Held 8/17/24 0900)   apixaban (Eliquis) tablet 2.5 mg ( oral Dose Auto Held 8/16/24 2100)   carvedilol (Coreg) tablet 25 mg (25 mg oral Given 8/11/24 0858)   cyanocobalamin (Vitamin B-12) tablet 1,000 mcg (1,000 mcg oral Given 8/11/24 0858)    ferrous sulfate (325 mg ferrous sulfate) tablet 1 tablet (1 tablet oral Given 8/11/24 0901)   gabapentin (Neurontin) capsule 200 mg (200 mg oral Given 8/11/24 0858)   isosorbide mononitrate ER (Imdur) 24 hr tablet 30 mg (30 mg oral Given 8/11/24 0858)   levothyroxine (Synthroid, Levoxyl) tablet 87.5 mcg (87.5 mcg oral Given 8/11/24 0551)   lisinopril tablet 10 mg ( oral Dose Auto Held 8/17/24 0900)   oxyCODONE (Roxicodone) immediate release tablet 5 mg (5 mg oral Given 8/10/24 0343)   polyethylene glycol (Glycolax, Miralax) packet 17 g (17 g oral Given 8/11/24 0901)   zinc oxide 40 % ointment 1 Application (has no administration in time range)   acetaminophen (Tylenol) oral liquid 650 mg (650 mg oral Given 8/8/24 2232)     Or   acetaminophen (Tylenol) tablet 650 mg ( oral See Alternative 8/8/24 2232)   glucagon (Glucagen) injection 1 mg (has no administration in time range)   dextrose 50 % injection 25 g (has no administration in time range)   glucagon (Glucagen) injection 1 mg (has no administration in time range)   dextrose 50 % injection 12.5 g (has no administration in time range)   oxygen (O2) therapy (21 percent inhalation Start 8/11/24 0822)   folic acid (Folvite) tablet 1 mg (1 mg oral Given 8/11/24 0901)     ED Course as of 08/11/24 1708   Thu Aug 08, 2024   1625 Pt and family were on their way to [facility] in [place], [place] for treatment of her blood disorder. She is due for her shot. Office is closed but left message [phone]. Discussed cbc will be redrawn to confirm anemia and need for blood transfusion.Son and daughter at bedside. [PK]   1632 Rectal exam light brown stool. Pt declined blood until [facility] gives okay due to her blood disorder. Awaiting call. [PK]      ED Course User Index  [PK] Rebecca Thompson, APRN-CNP         Diagnoses as of 08/11/24 1708   Fall, initial encounter   Anticoagulated   Nonintractable headache, unspecified chronicity pattern, unspecified headache type   Cervical  spine pain     Re-examination:    Patient’s condition stable    Consult(s):   IP CONSULT TO SOCIAL WORK  IP CONSULT TO HEMATOLOGY-ONCOLOGY  WOUND OSTOMY NURSING CONSULT    Procedure(s):   Blood      MDM:       Desirae Gregg is a 83 y.o. female who presents to the ED for evaluation of HIA, trauma alert.    Pt is on eliquis for pulmonary embolism.    Son at bedside stated that his mom was just discharged from rehab last Saturday after being hospitalized for weakness. He stated that he and another family member were bringing pt down stairs to a doctor's appt when she fell backwards and hit the back of her head. Denied any loc but does have small goose egg to back of head. No abrasion or bleeding. No numbness weakness. Mild neck pain. Son feels that she is not at her baseline since discharge. She seems more weak. Pt stated that she has been weak but cannot state if she has cp sob back pain or abd pain.     ED course stable  Was HIA, trauma alert. Negative head ct scan and c spine  Hemoglobin was 6.0.  Discussed with [facility] physician on call who recommended transfusion as well.  They will see her after she discharged  Rest of labs at baseline. Hx of CKD.  Admitted to hospitalist  Ddx: anemia fall g weakness    Plan of Care/Counseling:  I reviewed today's visit with the patient and family son daughter and other extended family in addition to providing specific details for the plan of care and counseling regarding the diagnosis and prognosis.  Questions are answered at this time and are agreeable with the plan.    ASSESSMENT     1. Fall, initial encounter    2. Anticoagulated    3. Nonintractable headache, unspecified chronicity pattern, unspecified headache type    4. Cervical spine pain      PLAN   Admit to hospitalist     New Medications     New Medications Ordered This Visit   Medications    lisinopril 10 mg tablet     Sig: GIVE 1 TABLET BY MOUTH ONE TIME A DAY FOR HIGH BLOOD PRESS    polyethylene glycol (Glycolax,  Miralax) 17 gram packet     Sig: Take 17 g by mouth once daily as needed.    acetaminophen (Tylenol) 500 mg tablet     Sig: Take 2 tablets (1,000 mg) by mouth every 6 hours if needed for mild pain (1 - 3).    ruxolitinib (Jakafi) tablet 20 mg    ruxolitinib (Jakafi) tablet 10 mg    apixaban (Eliquis) tablet 2.5 mg    carvedilol (Coreg) tablet 25 mg    cyanocobalamin (Vitamin B-12) tablet 1,000 mcg    ferrous sulfate (325 mg ferrous sulfate) tablet 1 tablet    gabapentin (Neurontin) capsule 200 mg    isosorbide mononitrate ER (Imdur) 24 hr tablet 30 mg    levothyroxine (Synthroid, Levoxyl) tablet 87.5 mcg    lisinopril tablet 10 mg    oxyCODONE (Roxicodone) immediate release tablet 5 mg     Order Specific Question:   If ordered PRN for pain, nurse is permitted to administer this medication for higher pain scores based on patient preference?     Answer:   Yes    polyethylene glycol (Glycolax, Miralax) packet 17 g    zinc oxide 40 % ointment 1 Application    OR Linked Order Group     acetaminophen (Tylenol) oral liquid 650 mg     acetaminophen (Tylenol) tablet 650 mg      Order Specific Question:   If ordered PRN for pain, nurse is permitted to administer this medication for higher pain scores based on patient preference?      Answer:   Yes    glucagon (Glucagen) injection 1 mg    dextrose 50 % injection 25 g    glucagon (Glucagen) injection 1 mg    dextrose 50 % injection 12.5 g    oxygen (O2) therapy     Order Specific Question:   Device     Answer:   Nasal Cannula     Order Specific Question:   Rate in liters per minute:     Answer:   2 LPM     Order Specific Question:   Keep O2 Sat Above:     Answer:   92%    folic acid (Folvite) tablet 1 mg     Electronically signed by Rebecca Thompson, APRN-CNP     **This report was transcribed using voice recognition software. Every effort was made to ensure accuracy; however, inadvertent computerized transcription errors may be present.  END OF ED PROVIDER NOTE     Rebecca OSBORN  Donna, OLI-CNP  08/11/24 1845

## 2024-08-08 NOTE — PROGRESS NOTES
Pharmacy Medication History Review    Desirae Gregg is a 83 y.o. female admitted for Anemia. Pharmacy reviewed the patient's [id number] medications and allergies for accuracy.    The list below reflectives the updated PTA list. Please review each medication in order reconciliation for additional clarification and justification.  Prior to Admission Medications   Prescriptions Last Dose Informant Patient Reported? Taking?   acetaminophen (Tylenol) 500 mg tablet Unknown Child Yes Yes   Sig: Take 2 tablets (1,000 mg) by mouth every 6 hours if needed for mild pain (1 - 3).   apixaban (Eliquis) 2.5 mg tablet 8/8/2024 at am Child Yes Yes   Sig: Take 1 tablet (2.5 mg) by mouth 2 times a day.   carvedilol (Coreg) 25 mg tablet 8/8/2024 at am Child Yes Yes   Sig: Take 1 tablet (25 mg) by mouth 2 times a day.   cholecalciferol (Vitamin D-3) 25 MCG (1000 UT) capsule 8/8/2024 at am Child Yes Yes   Sig: Take 1 capsule (25 mcg) by mouth once daily.   cyanocobalamin (Vitamin B-12) 1,000 mcg tablet 8/8/2024 at am Child Yes Yes   Sig: Take 1 tablet (1,000 mcg) by mouth once daily.   empagliflozin (Jardiance) 25 mg 8/8/2024 at am Child Yes Yes   Sig: Take 1 tablet (25 mg) by mouth once daily in the morning.   ferrous sulfate, 325 mg ferrous sulfate, tablet 8/8/2024 at am Child Yes Yes   Sig: Take 1 tablet by mouth once daily with breakfast.   folic acid (Folvite) 400 mcg tablet 8/8/2024 at am Child Yes Yes   Sig: Take 2 tablets (0.8 mg) by mouth once daily.   gabapentin (Neurontin) 100 mg capsule 8/8/2024 at am Child No Yes   Sig: Take 2 capsules (200 mg) by mouth 2 times a day.   isosorbide mononitrate ER (Imdur) 30 mg 24 hr tablet 8/8/2024 at am Child Yes Yes   Sig: Take 1 tablet (30 mg) by mouth once daily. Do not crush or chew.   levothyroxine (Synthroid, Levoxyl) 175 mcg tablet 8/8/2024 at am Child Yes Yes   Sig: Take 0.5 tablets (87.5 mcg) by mouth early in the morning.. Take on an empty stomach at the same time each day,  either 30 to 60 minutes prior to breakfast   lidocaine 4 % patch Not Taking  No No   Sig: Place 1 patch over 12 hours on the skin once daily. Remove & discard patch within 12 hours or as directed by MD.   Patient not taking: Reported on 8/8/2024   lisinopril 10 mg tablet 8/8/2024 at am Child Yes Yes   Sig: GIVE 1 TABLET BY MOUTH ONE TIME A DAY FOR HIGH BLOOD PRESS   oxyCODONE (Roxicodone) 5 mg immediate release tablet 8/8/2024 at 07:30 Child No Yes   Sig: Take 1 tablet (5 mg) by mouth every 6 hours if needed for severe pain (7 - 10).   polyethylene glycol (Glycolax, Miralax) 17 gram packet Unknown Child Yes Yes   Sig: Take 17 g by mouth once daily as needed.   ruxolitinib (Jakafi) 10 mg tablet 8/8/2024 at am Child Yes Yes   Sig: Take 1 tablet (10 mg total) by mouth once daily in the morning.  Take at about the same time each day.  Take with or without food.   ruxolitinib (Jakafi) 10 mg tablet 8/7/2024 at pm Child Yes Yes   Sig: Take 2 tablets (20 mg total) by mouth once daily at bedtime.  Take at about the same time each day.  Take with or without food.   tiZANidine (Zanaflex) 2 mg tablet Unknown Child No Yes   Sig: Take 1 tablet (2 mg) by mouth every 6 hours if needed for muscle spasms.   zinc oxide 40 % ointment ointment Unknown Child No Yes   Sig: Apply 1 Application topically every 1 hour if needed (irritation).      Facility-Administered Medications: None           The list below reflectives the updated allergy list. Please review each documented allergy for additional clarification and justification.  Allergies  Reviewed by Rebecca Thompson, APRN-CNP on 8/8/2024        Severity Reactions Comments    Clonidine And Structural Analogs High Anaphylaxis     Naproxen Not Specified Nausea Only     Hydroxyzine Low Palpitations             Below are additional concerns with the patient's PTA list.      Kaye Grey
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
## 2024-08-09 LAB
ALBUMIN SERPL BCP-MCNC: 3.1 G/DL (ref 3.4–5)
ALP SERPL-CCNC: 35 U/L (ref 33–136)
ALT SERPL W P-5'-P-CCNC: 7 U/L (ref 7–45)
ANION GAP SERPL CALC-SCNC: 10 MMOL/L (ref 10–20)
AST SERPL W P-5'-P-CCNC: 10 U/L (ref 9–39)
BASOPHILS # BLD AUTO: 0 X10*3/UL (ref 0–0.1)
BASOPHILS NFR BLD AUTO: 0 %
BILIRUB SERPL-MCNC: 0.5 MG/DL (ref 0–1.2)
BLOOD EXPIRATION DATE: NORMAL
BUN SERPL-MCNC: 16 MG/DL (ref 6–23)
CALCIUM SERPL-MCNC: 7.7 MG/DL (ref 8.6–10.3)
CHLORIDE SERPL-SCNC: 110 MMOL/L (ref 98–107)
CO2 SERPL-SCNC: 25 MMOL/L (ref 21–32)
CREAT SERPL-MCNC: 1.19 MG/DL (ref 0.5–1.05)
DACRYOCYTES BLD QL SMEAR: NORMAL
DISPENSE STATUS: NORMAL
EGFRCR SERPLBLD CKD-EPI 2021: 45 ML/MIN/1.73M*2
EOSINOPHIL # BLD AUTO: 0.02 X10*3/UL (ref 0–0.4)
EOSINOPHIL NFR BLD AUTO: 0.8 %
ERYTHROCYTE [DISTWIDTH] IN BLOOD BY AUTOMATED COUNT: 19.7 % (ref 11.5–14.5)
ERYTHROCYTE [DISTWIDTH] IN BLOOD BY AUTOMATED COUNT: 20.5 % (ref 11.5–14.5)
GLUCOSE SERPL-MCNC: 81 MG/DL (ref 74–99)
HCT VFR BLD AUTO: 21 % (ref 36–46)
HCT VFR BLD AUTO: 24 % (ref 36–46)
HGB BLD-MCNC: 6.4 G/DL (ref 12–16)
HGB BLD-MCNC: 7.5 G/DL (ref 12–16)
IMM GRANULOCYTES # BLD AUTO: 0.12 X10*3/UL (ref 0–0.5)
IMM GRANULOCYTES NFR BLD AUTO: 4.9 % (ref 0–0.9)
LYMPHOCYTES # BLD AUTO: 0.47 X10*3/UL (ref 0.8–3)
LYMPHOCYTES NFR BLD AUTO: 19 %
MAGNESIUM SERPL-MCNC: 2.19 MG/DL (ref 1.6–2.4)
MCH RBC QN AUTO: 28.1 PG (ref 26–34)
MCH RBC QN AUTO: 28.4 PG (ref 26–34)
MCHC RBC AUTO-ENTMCNC: 30.5 G/DL (ref 32–36)
MCHC RBC AUTO-ENTMCNC: 31.3 G/DL (ref 32–36)
MCV RBC AUTO: 91 FL (ref 80–100)
MCV RBC AUTO: 92 FL (ref 80–100)
MONOCYTES # BLD AUTO: 0.15 X10*3/UL (ref 0.05–0.8)
MONOCYTES NFR BLD AUTO: 6.1 %
NEUTROPHILS # BLD AUTO: 1.71 X10*3/UL (ref 1.6–5.5)
NEUTROPHILS NFR BLD AUTO: 69.2 %
NRBC BLD-RTO: 1 /100 WBCS (ref 0–0)
NRBC BLD-RTO: 1.2 /100 WBCS (ref 0–0)
OVALOCYTES BLD QL SMEAR: NORMAL
PLATELET # BLD AUTO: 67 X10*3/UL (ref 150–450)
PLATELET # BLD AUTO: 78 X10*3/UL (ref 150–450)
POLYCHROMASIA BLD QL SMEAR: NORMAL
POTASSIUM SERPL-SCNC: 4.4 MMOL/L (ref 3.5–5.3)
PRODUCT BLOOD TYPE: 7300
PRODUCT CODE: NORMAL
PROT SERPL-MCNC: 4.3 G/DL (ref 6.4–8.2)
RBC # BLD AUTO: 2.28 X10*6/UL (ref 4–5.2)
RBC # BLD AUTO: 2.64 X10*6/UL (ref 4–5.2)
RBC MORPH BLD: NORMAL
SCHISTOCYTES BLD QL SMEAR: NORMAL
SODIUM SERPL-SCNC: 141 MMOL/L (ref 136–145)
UNIT ABO: NORMAL
UNIT NUMBER: NORMAL
UNIT RH: NORMAL
UNIT VOLUME: 350
WBC # BLD AUTO: 2.5 X10*3/UL (ref 4.4–11.3)
WBC # BLD AUTO: 3.1 X10*3/UL (ref 4.4–11.3)
XM INTEP: NORMAL

## 2024-08-09 PROCEDURE — 99223 1ST HOSP IP/OBS HIGH 75: CPT | Performed by: INTERNAL MEDICINE

## 2024-08-09 PROCEDURE — P9016 RBC LEUKOCYTES REDUCED: HCPCS

## 2024-08-09 PROCEDURE — 36415 COLL VENOUS BLD VENIPUNCTURE: CPT

## 2024-08-09 PROCEDURE — 2500000001 HC RX 250 WO HCPCS SELF ADMINISTERED DRUGS (ALT 637 FOR MEDICARE OP)

## 2024-08-09 PROCEDURE — 85025 COMPLETE CBC W/AUTO DIFF WBC: CPT

## 2024-08-09 PROCEDURE — 80053 COMPREHEN METABOLIC PANEL: CPT

## 2024-08-09 PROCEDURE — 1200000002 HC GENERAL ROOM WITH TELEMETRY DAILY

## 2024-08-09 PROCEDURE — 85027 COMPLETE CBC AUTOMATED: CPT

## 2024-08-09 PROCEDURE — 83735 ASSAY OF MAGNESIUM: CPT | Performed by: INTERNAL MEDICINE

## 2024-08-09 PROCEDURE — 2500000002 HC RX 250 W HCPCS SELF ADMINISTERED DRUGS (ALT 637 FOR MEDICARE OP, ALT 636 FOR OP/ED): Performed by: INTERNAL MEDICINE

## 2024-08-09 PROCEDURE — 36430 TRANSFUSION BLD/BLD COMPNT: CPT

## 2024-08-09 PROCEDURE — 99221 1ST HOSP IP/OBS SF/LOW 40: CPT | Performed by: INTERNAL MEDICINE

## 2024-08-09 RX ORDER — FOLIC ACID 1 MG/1
1 TABLET ORAL DAILY
Status: DISCONTINUED | OUTPATIENT
Start: 2024-08-09 | End: 2024-08-11 | Stop reason: HOSPADM

## 2024-08-09 RX ADMIN — CARVEDILOL 25 MG: 25 TABLET, FILM COATED ORAL at 20:12

## 2024-08-09 RX ADMIN — FOLIC ACID 1 MG: 1 TABLET ORAL at 09:05

## 2024-08-09 RX ADMIN — RUXOLITINIB 10 MG: 10 TABLET ORAL at 09:06

## 2024-08-09 RX ADMIN — FERROUS SULFATE TAB 325 MG (65 MG ELEMENTAL FE) 1 TABLET: 325 (65 FE) TAB at 09:05

## 2024-08-09 RX ADMIN — Medication 1000 MCG: at 09:05

## 2024-08-09 RX ADMIN — GABAPENTIN 200 MG: 100 CAPSULE ORAL at 09:05

## 2024-08-09 RX ADMIN — CARVEDILOL 25 MG: 25 TABLET, FILM COATED ORAL at 09:05

## 2024-08-09 RX ADMIN — GABAPENTIN 200 MG: 100 CAPSULE ORAL at 20:12

## 2024-08-09 RX ADMIN — ISOSORBIDE MONONITRATE 30 MG: 30 TABLET, EXTENDED RELEASE ORAL at 09:05

## 2024-08-09 RX ADMIN — LEVOTHYROXINE SODIUM 87.5 MCG: 0.17 TABLET ORAL at 06:08

## 2024-08-09 ASSESSMENT — COGNITIVE AND FUNCTIONAL STATUS - GENERAL
TOILETING: A LITTLE
DAILY ACTIVITIY SCORE: 20
WALKING IN HOSPITAL ROOM: A LOT
MOVING TO AND FROM BED TO CHAIR: A LITTLE
CLIMB 3 TO 5 STEPS WITH RAILING: A LOT
DRESSING REGULAR LOWER BODY CLOTHING: A LITTLE
TOILETING: A LITTLE
CLIMB 3 TO 5 STEPS WITH RAILING: A LOT
WALKING IN HOSPITAL ROOM: A LITTLE
HELP NEEDED FOR BATHING: A LITTLE
HELP NEEDED FOR BATHING: A LITTLE
PERSONAL GROOMING: A LITTLE
MOBILITY SCORE: 18
STANDING UP FROM CHAIR USING ARMS: A LITTLE
STANDING UP FROM CHAIR USING ARMS: A LITTLE
DRESSING REGULAR LOWER BODY CLOTHING: A LITTLE
MOBILITY SCORE: 19
PERSONAL GROOMING: A LITTLE
MOVING TO AND FROM BED TO CHAIR: A LITTLE
DAILY ACTIVITIY SCORE: 20

## 2024-08-09 ASSESSMENT — PAIN SCALES - GENERAL
PAINLEVEL_OUTOF10: 0 - NO PAIN
PAINLEVEL_OUTOF10: 0 - NO PAIN

## 2024-08-09 ASSESSMENT — PAIN - FUNCTIONAL ASSESSMENT: PAIN_FUNCTIONAL_ASSESSMENT: 0-10

## 2024-08-09 ASSESSMENT — ACTIVITIES OF DAILY LIVING (ADL): LACK_OF_TRANSPORTATION: NO

## 2024-08-09 NOTE — PROGRESS NOTES
"  Subjective    Pt was placed on supplemental O2 overnight, this morning pt was on RA. Pt was seen and examined at bedside this AM. Pt explained while being carried in wheelchair by her son and neighbor, neighbor missed  step causing pt to hit head against wall.  Pt denies feeling any symptoms yesterday and reports to be feeling well this AM. Pt denied any bleeding, feeling lightheaded, fever/chills, HA, n/v, chest pain, SOB, palpitations, abdominal pain, changes to urination or BM. Pt was transferred to inpatient floor.   Objective    Vitals  Visit Vitals  /73   Pulse 63   Temp 37.1 °C (98.8 °F) (Temporal)   Resp 18   Ht 1.651 m (5' 5\")   Wt 75.5 kg (166 lb 7.2 oz)   SpO2 97%   BMI 27.70 kg/m²   Smoking Status Never   BSA 1.86 m²       Physical Exam   Constitutional:       General: She is not in acute distress.  Cardiovascular:      Rate and Rhythm: Normal rate.   Pulmonary:      Effort: Pulmonary effort is normal. No respiratory distress.   Abdominal:      Palpations: Abdomen is soft.   Musculoskeletal:         General: No swelling.   Skin:     General: Skin is warm.   Neurological:      General: No focal deficit present.      Mental Status: She is alert and oriented to person, place, and time.   Psychiatric:         Mood and Affect: Mood normal.         Behavior: Behavior normal.          IOs    Intake/Output Summary (Last 24 hours) at 8/9/2024 1036  Last data filed at 8/8/2024 2135  Gross per 24 hour   Intake 327.08 ml   Output --   Net 327.08 ml       Labs:   Results from last 72 hours   Lab Units 08/09/24  0716 08/08/24  1443   SODIUM mmol/L 141 141   POTASSIUM mmol/L 4.4 4.4   CHLORIDE mmol/L 110* 108*   CO2 mmol/L 25 26   BUN mg/dL 16 17   CREATININE mg/dL 1.19* 1.29*   GLUCOSE mg/dL 81 89   CALCIUM mg/dL 7.7* 8.4*   ANION GAP mmol/L 10 11   EGFR mL/min/1.73m*2 45* 41*      Results from last 72 hours   Lab Units 08/09/24  0716 08/08/24  2243 08/08/24  1637 08/08/24  1443   WBC AUTO x10*3/uL 2.5* 3.7* " "5.3 4.7   HEMOGLOBIN g/dL 6.4* 7.5* 6.0* 6.2*   HEMATOCRIT % 21.0* 24.5* 20.4* 20.9*   PLATELETS AUTO x10*3/uL 67* 78* 113* 106*   NEUTROS PCT AUTO % 69.2  --   --  74.4   LYMPHS PCT AUTO % 19.0  --   --  15.0   MONOS PCT AUTO % 6.1  --   --  5.3   EOS PCT AUTO % 0.8  --   --  0.4      Lab Results   Component Value Date    CALCIUM 7.7 (L) 08/09/2024    PHOS 3.5 07/09/2024      No results found for: \"CRP\"   [unfilled]     Micro/ID:   Susceptibility data from last 90 days.  Collected Specimen Info Organism Ampicillin Ciprofloxacin Daptomycin Levofloxacin Linezolid Nitrofurantoin Penicillin Trimethoprim/Sulfamethoxazole Vancomycin   07/15/24 Urine from Clean Catch/Voided Enterococcus faecalis  S  R  S  R  S  S  S  R  R                    No lab exists for component: \"AGALPCRNB\"   .ID  Lab Results   Component Value Date    URINECULTURE (A) 08/02/2024     Multiple organisms present, probable contamination. Repeat culture if clinically indicated.       Images  CT cervical spine wo IV contrast  Narrative: Interpreted By:  Domenic Shaver,   STUDY:  CT CERVICAL SPINE WO IV CONTRAST; 8/8/2024 2:57 pm      INDICATION:  Signs/Symptoms:fell backwards hit back of head on eliquis head and  neck pain;      COMPARISON:  None      ACCESSION NUMBER(S):  [id number]      ORDERING CLINICIAN:  BRIDGETTE SESAY      TECHNIQUE:  Contiguous axial images were acquired from the skull base to the lung  apices. Coronal and sagittal reformatted images were obtained. All CT  examinations are performed with 1 or more of the following dose  reduction techniques: Automated exposure control, adjustment of mA  and/or kv according to patient's size, or use of iterative  reconstruction techniques.      FINDINGS:  There is straightening of the normal cervical lordosis.  No acute  fracture or traumatic malalignment. Facet degenerative changes and  uncovertebral joint degenerative changes are present. There is trace  degenerative anterolisthesis of " C2 on C3 and C3 on C4, otherwise  adequate alignment.          The occipital condyles, arch of C1, and the odontoid processes are  intact. The atlantoaxial relationship is well maintained.      There is mild-moderate disc space narrowing at C3-4. Moderate disc  space narrowing at C4-5. Moderate-severe disc space narrowing at C5-6  and C6-7. Moderate disc space narrowing at C7-T1. Multilevel diffuse  disc bulging, endplate degenerative changes, and endplate osteophyte  formation. No evidence for high-grade bony spinal canal stenosis  however there is multilevel high-grade bilateral neural foramina  stenosis.          Impression: 1. No acute fracture or spondylolisthesis.  2. Degenerative disc disease and spondylosis as described in the body  of the report.          Signed by: Domenic Shaver 8/8/2024 3:24 PM  Dictation workstation:   [id number]  CT head wo IV contrast  Narrative: Interpreted By:  Domenic Shaver,   STUDY:  BRIDGETTE SESAY; 8/8/2024 2:57 pm      INDICATION:  Signs/Symptoms:fell hit back of head pain and neck pain on eliquis;      COMPARISON:  None      ACCESSION NUMBER(S):  [id number]      ORDERING CLINICIAN:  BRIDGETTE SESAY      TECHNIQUE:  Contiguous axial images were acquired from the vertex through the  posterior fossa without IV contrast. All CT examinations are  performed with 1 or more of the following dose reduction techniques:  Automated exposure control, adjustment of mA and/or kv according to  patient's size, or use of iterative reconstruction techniques.      FINDINGS:  There is a moderate to moderate-severe degree of nonspecific white  matter hypodensity, most consistent with chronic small-vessel  ischemic disease. No focal mass effect or midline shift is  identified. The ventricles and sulci are symmetric and appropriate  for the patient's age.      The gray white matter differentiation is preserved.      No acute intracranial hemorrhage is seen. No intra-axial or  extra-axial  fluid collection is seen.      The visualized paranasal sinuses show mild-moderate mucoperiosteal  thickening of the maxillary sinuses with possible air-fluid levels  only partially visualized. The remainder of the paranasal sinuses are  clear. Mastoid air cells and middle ear cavities are clear  bilaterally.      Impression: No CT evidence for acute intracranial pathology.          Signed by: Domenic Shaver 8/8/2024 3:12 PM  Dictation workstation:   [id number]      Meds  Scheduled medications  [Held by provider] apixaban, 2.5 mg, oral, BID  carvedilol, 25 mg, oral, BID  cyanocobalamin, 1,000 mcg, oral, Daily  ferrous sulfate (325 mg ferrous sulfate), 1 tablet, oral, Daily with breakfast  folic acid, 1 mg, oral, Daily  gabapentin, 200 mg, oral, BID  isosorbide mononitrate ER, 30 mg, oral, Daily  levothyroxine, 87.5 mcg, oral, Daily  [Held by provider] lisinopril, 10 mg, oral, Daily  polyethylene glycol, 17 g, oral, Daily  ruxolitinib, 10 mg, oral, q AM  ruxolitinib, 20 mg, oral, Nightly      Continuous medications     PRN medications  PRN medications: acetaminophen **OR** acetaminophen, dextrose, dextrose, glucagon, glucagon, oxyCODONE, oxygen, zinc oxide     Problem List    Problem list:   Patient Active Problem List   Diagnosis   • Anemia        Assessment and Plan    Desirae Gregg, an 83-year-old female with past medical history of hypertension, hypothyroidism, history of PE on Eliquis, diastolic heart failure (EF 55 to 60%), acute on chronic anemia, polycythemia vera/Myeloproliferative disorder on Jakafi and Eliquis, nonobstructive nephrolithiasis, and right lower extremity weakness/pain/sciatica presented to the emergency department for concern of mechanical fall on Eliquis.       Acute Medical Issues:  #Mechanical fall on eliquis   - Pt hit her head but no concern for any neurological worsening status at time. She was carried down the stairs by her son and friend who slipped. No LOC or any symptoms initially    - CT head showed no evidence of acute intracranial pathology   - CT cervical spine showed no acute fracture or spondylolisthesis.  Noted degenerative disc disease and spondylosis are described in the body of the report  Plan:  - Tele, continuous pulse Ox, and neurochecks q 4hrs   - CTM for any worsening mentation or neurological status  - Obtain CT head stat if mentation worsens  - PT/OT     #Acute on chronic anemia   #Myeloproliferative disorder   #Polycythemia vera   - Hgb in the ED 6.2 (baseline 7-8~) s/p 1 unit of pRBC   - Follows with heme/onc at [facility] (Dr. Resendiz)  - Attempt to contact [facility] in [facility] was made twice. Nurse was unable to provide information regarding patients history and use of Eliquis. Attempt to contact Dr. Madeleine Resendiz was made with no answer to phone call 08/09/24.    Plan:  - Pt s/p 1 unit of pRBC on floor    - Repeat cbc and transfuse for Hg<7  - C/t holding Eliquis 2.2 to concern for bleeding. Patient denies any blood in stools, worsening bruising (except arms), or hematuria.   - Continue Jakafi (ruxolitinib)  - Continue B12 and Iron supplements   - Follows with heme/onc at [facility] (Dr. Resendiz)   - CTM      #LEVY? Non oliguric   -Cr on admission 1.29 (baseline ~1)  Plan  - Strict I&Os  - Avoid nephrotoxic meds,    - RFP in the am; consider workup if worsened     Chronic Medical Issues:   #hypothyroidism: Continue home synthroid      #Right LE weakness and pain/Sciatica?Neuropathy?/ Hx of chronic L1 compression fracture/Chronic pain: Continue home oxycodone prn and gabapentin. Consider adding lidocaine and robaxin      #Hx of PE on eliquis: Follows with Dr. Resendiz. Holding Eliquis for tonight.      #Diastolic HF- EF (EF 55-60%)   -Continue carvedilol 25mg BID and Imdur 30mg daily. Holding home Jardiance while hospitalized.      #HTN: Continue home coreg and Imdur (BP meds discontinued last hospital discharge including lisinorpil and hydralazine).         Fluids:PO  Electrolytes: replete as needed  Nutrition: regular  GI Prophylaxis: none  DVT Prophylaxis: SCDs     Dispo: Patient admitted for observation after a mechanical fall on eliquis. CT head and spine did not show acute findings. Hold eliquis tonight. No neuro deficits concern. S/p 1 unit of pRBC for acute on chronic anemia.      Iggy Daniels DO  Internal Medicine, PGY-1

## 2024-08-09 NOTE — PROGRESS NOTES
Physical Therapy                 Therapy Communication Note    Patient Name: Desirae Gregg  MRN: [id number]  Today's Date: 8/9/2024     Discipline: Physical Therapy    Missed Visit Reason: Missed Visit Reason: Patient placed on medical hold (Spoke with RN.  Pt. CNA - low H:H (6.4/21.0) - Hold therapy evals this date.)    Missed Time: Attempt    Comment:

## 2024-08-09 NOTE — H&P
Patient: Desirae Gregg   Age: 83 y.o.   Gender: female   Room/Bed: 226/226-A     Attending: Leno Calix DO  Code Status:  Full Code    Chief Complaint:  Patient: Desirae Gregg is a 83 y.o. female who presented to the hospital for mechanical fall on DOAC.     History of Presenting Illness  Desirae Gregg, an 83-year-old female with past medical history of hypertension, hypothyroidism, history of PE on Eliquis, diastolic heart failure ejection fraction 55 to 60%, acute on chronic anemia, polycythemia vera/Myeloproliferative disorder on Jakafi and Eliquis, nonobstructive nephrolithiasis, and right lower extremity weakness/pain/sciatica presented to the emergency department for concern of mechanical fall on Eliquis.  Per patient around noon today while getting carried down the stairs by her son and his friend to be taken to see her hematologist.  She hit her head due to her son's friend slipped but did not lose consciousness or had any neurological symptoms after or during including headaches confusion blurry vision worsening weakness in the arms and the legs. Denies any bleeding anywhere including hematemesis, melena, BRBPR, hematuria, bruising.     When she saw her rheumatologist for her blood disorder, she reported that she had a mechanical fall and hit her head.  That raise concern and recommended to seek immediate medical attention given the patient is on Eliquis and hit her head for concern of bleed.    12 point Review of Systems negative unless stated above.     In the ED  Vitals show temperature 36.1 pulse 60 respiration 16 /55 O2 98%  CBC showed WBC 4.7 hemoglobin 6.2 hematocrit 20.9 MCHC 29.7 22.6 RBC distribution platelets 106  CMP showed glucose 89 sodium 141 potassium 4.4 chloride 108 bicarb 26 BUN 17 creatinine 1.29 calcium 8.4 albumin 3.5 ALP 45 ALT 8 AST 12 total bili 0.6 total protein 5.3 mag 2.37  INR 1.3 PT 15.1 APTT 36  FOBT Negative     CT head showed no evidence of acute intracranial pathology    CT cervical spine showed no acute fracture or spondylolisthesis.  Noted degenerative disc disease and spondylosis are described in the body of the report      ED and intervention include blood transfusion 1 unit of packed RBCs    Past Medical History   No past medical history on file.   Past Surgical History:   No past surgical history on file.  Family History:   No family history on file.  Social History:   Tobacco Use: Low Risk  (7/4/2024)    Patient History     Smoking Tobacco Use: Never     Smokeless Tobacco Use: Never     Passive Exposure: Not on file      Social History     Substance and Sexual Activity   Alcohol Use None       Outpatient Medications:  Current Outpatient Medications   Medication Instructions    acetaminophen (TYLENOL) 1,000 mg, oral, Every 6 hours PRN    apixaban (ELIQUIS) 2.5 mg, oral, 2 times daily    carvedilol (COREG) 25 mg, oral, 2 times daily    cholecalciferol (VITAMIN D-3) 25 mcg, oral, Daily    cyanocobalamin (VITAMIN B-12) 1,000 mcg, oral, Daily    empagliflozin (JARDIANCE) 25 mg, oral, Every morning    ferrous sulfate (325 mg ferrous sulfate) 325 mg, oral, Daily with breakfast    folic acid (FOLVITE) 0.8 mg, oral, Daily    gabapentin (NEURONTIN) 200 mg, oral, 2 times daily    isosorbide mononitrate ER (IMDUR) 30 mg, oral, Daily, Do not crush or chew.    levothyroxine (SYNTHROID, LEVOXYL) 87.5 mcg, oral, Daily, Take on an empty stomach at the same time each day, either 30 to 60 minutes prior to breakfast    lisinopril 10 mg tablet GIVE 1 TABLET BY MOUTH ONE TIME A DAY FOR HIGH BLOOD PRESS    oxyCODONE (ROXICODONE) 5 mg, oral, Every 6 hours PRN    polyethylene glycol (GLYCOLAX, MIRALAX) 17 g, oral, Daily PRN    ruxolitinib (JAKAFI) 10 mg, oral, Every morning, Take at about the same time each day.  Take with or without food.    ruxolitinib (JAKAFI) 20 mg, oral, Nightly, Take at about the same time each day.  Take with or without food.    tiZANidine (ZANAFLEX) 2 mg, oral, Every 6  "hours PRN    zinc oxide 40 % ointment ointment 1 Application, Topical, Every 1 hour PRN        ED Course   Vitals - /77 (BP Location: Right arm, Patient Position: Lying)   Pulse 68   Temp 36.2 °C (97.2 °F) (Temporal)   Resp 18   Wt 75.5 kg (166 lb 7.2 oz)   SpO2 95%     Labs:   CBC:  Recent Labs     08/08/24  1637 08/08/24  1443 07/31/24  0945   WBC 5.3 4.7 5.5   HGB 6.0* 6.2* 7.0*   HCT 20.4* 20.9* 23.6*   * 106* 113*   MCV 93 93 92     CMP:  Recent Labs     08/08/24  1443 07/19/24  0850 07/12/24  0708    141 140   K 4.4 4.4 4.8   * 106 105   CO2 26 27 29   ANIONGAP 11 12 11   BUN 17 22 22   CREATININE 1.29* 1.02 0.94   EGFR 41* 54* 60*   GLUCOSE 89 97 81     Recent Labs     08/08/24  1443 07/19/24  0850 07/12/24  0708   ALBUMIN 3.5 3.4 3.5   ALKPHOS 45 58 47   ALT 8 12 12   AST 12 19 14   BILITOT 0.6 0.5 0.5     Calcium/Phos:   Lab Results   Component Value Date    CALCIUM 8.4 (L) 08/08/2024    PHOS 3.5 07/09/2024      COAG:   Recent Labs     08/08/24  1444 07/05/24  0615 07/04/24  1202   INR 1.3* 1.2* 1.5*     CRP: No results found for: \"CRP\"   [unfilled]   ENDO:No results for input(s): \"TSH\", \"HGBA1C\" in the last 47159 hours.   CARDIAC: No results for input(s): \"LDH\", \"CKMB\", \"TROPHS\", \"BNP\" in the last 23858 hours.    No lab exists for component: \"CK\", \"CKMBP\"No results for input(s): \"CHOL\", \"LDLF\", \"LDL\", \"LDLCALC\", \"HDL\", \"TRIG\" in the last 02631 hours.  No data recorded    Micro/ID:   Susceptibility data from last 90 days.  Collected Specimen Info Organism Ampicillin Ciprofloxacin Daptomycin Levofloxacin Linezolid Nitrofurantoin Penicillin Trimethoprim/Sulfamethoxazole Vancomycin   07/15/24 Urine from Clean Catch/Voided Enterococcus faecalis  S  R  S  R  S  S  S  R  R                    No lab exists for component: \"AGALPCRNB\"   .ID  Lab Results   Component Value Date    URINECULTURE (A) 08/02/2024     Multiple organisms present, probable contamination. Repeat culture if " clinically indicated.       Imaging:   No orders to display   No results found for this or any previous visit (from the past 4464 hour(s)).  No echocardiogram results found for the past 12 months  CT cervical spine wo IV contrast    Result Date: 8/8/2024  Interpreted By:  Domenic Shaver, STUDY: CT CERVICAL SPINE WO IV CONTRAST; 8/8/2024 2:57 pm   INDICATION: Signs/Symptoms:fell backwards hit back of head on eliquis head and neck pain;   COMPARISON: None   ACCESSION NUMBER(S): [id number]   ORDERING CLINICIAN: BRIDGETTE SESAY   TECHNIQUE: Contiguous axial images were acquired from the skull base to the lung apices. Coronal and sagittal reformatted images were obtained. All CT examinations are performed with 1 or more of the following dose reduction techniques: Automated exposure control, adjustment of mA and/or kv according to patient's size, or use of iterative reconstruction techniques.   FINDINGS: There is straightening of the normal cervical lordosis.  No acute fracture or traumatic malalignment. Facet degenerative changes and uncovertebral joint degenerative changes are present. There is trace degenerative anterolisthesis of C2 on C3 and C3 on C4, otherwise adequate alignment.     The occipital condyles, arch of C1, and the odontoid processes are intact. The atlantoaxial relationship is well maintained.   There is mild-moderate disc space narrowing at C3-4. Moderate disc space narrowing at C4-5. Moderate-severe disc space narrowing at C5-6 and C6-7. Moderate disc space narrowing at C7-T1. Multilevel diffuse disc bulging, endplate degenerative changes, and endplate osteophyte formation. No evidence for high-grade bony spinal canal stenosis however there is multilevel high-grade bilateral neural foramina stenosis.         1. No acute fracture or spondylolisthesis. 2. Degenerative disc disease and spondylosis as described in the body of the report.     Signed by: Domenic Shaver 8/8/2024 3:24 PM Dictation  workstation:   [id number]    CT head wo IV contrast    Result Date: 8/8/2024  Interpreted By:  Domenic Shaver, STUDY: BRIDGETTE SESAY; 8/8/2024 2:57 pm   INDICATION: Signs/Symptoms:fell hit back of head pain and neck pain on eliquis;   COMPARISON: None   ACCESSION NUMBER(S): [id number]   ORDERING CLINICIAN: BRIDGETTE SESAY   TECHNIQUE: Contiguous axial images were acquired from the vertex through the posterior fossa without IV contrast. All CT examinations are performed with 1 or more of the following dose reduction techniques: Automated exposure control, adjustment of mA and/or kv according to patient's size, or use of iterative reconstruction techniques.   FINDINGS: There is a moderate to moderate-severe degree of nonspecific white matter hypodensity, most consistent with chronic small-vessel ischemic disease. No focal mass effect or midline shift is identified. The ventricles and sulci are symmetric and appropriate for the patient's age.   The gray white matter differentiation is preserved.   No acute intracranial hemorrhage is seen. No intra-axial or extra-axial fluid collection is seen.   The visualized paranasal sinuses show mild-moderate mucoperiosteal thickening of the maxillary sinuses with possible air-fluid levels only partially visualized. The remainder of the paranasal sinuses are clear. Mastoid air cells and middle ear cavities are clear bilaterally.       No CT evidence for acute intracranial pathology.     Signed by: Domenic Shaver 8/8/2024 3:12 PM Dictation workstation:   [id number]     Interventions:  Medications   ruxolitinib (Jakafi) tablet 20 mg (has no administration in time range)   ruxolitinib (Jakafi) tablet 10 mg (has no administration in time range)   apixaban (Eliquis) tablet 2.5 mg ( oral Dose Auto Held 8/12/24 2100)   carvedilol (Coreg) tablet 25 mg (has no administration in time range)   cyanocobalamin (Vitamin B-12) tablet 1,000 mcg (has no administration in time range)    ferrous sulfate (325 mg ferrous sulfate) tablet 1 tablet (has no administration in time range)   gabapentin (Neurontin) capsule 200 mg (has no administration in time range)   isosorbide mononitrate ER (Imdur) 24 hr tablet 30 mg ( oral Dose Auto Held 8/13/24 0900)   levothyroxine (Synthroid, Levoxyl) tablet 87.5 mcg (has no administration in time range)   lisinopril tablet 10 mg ( oral Dose Auto Held 8/13/24 0900)   oxyCODONE (Roxicodone) immediate release tablet 5 mg (has no administration in time range)   polyethylene glycol (Glycolax, Miralax) packet 17 g (has no administration in time range)   zinc oxide 40 % ointment 1 Application (has no administration in time range)   acetaminophen (Tylenol) oral liquid 650 mg (has no administration in time range)     Or   acetaminophen (Tylenol) tablet 650 mg (has no administration in time range)       Objective Data  Physical Exam  Constitutional:       General: She is not in acute distress.  Cardiovascular:      Rate and Rhythm: Normal rate.   Pulmonary:      Effort: Pulmonary effort is normal. No respiratory distress.   Abdominal:      Palpations: Abdomen is soft.   Musculoskeletal:         General: No swelling.   Skin:     General: Skin is warm.   Neurological:      General: No focal deficit present.      Mental Status: She is alert and oriented to person, place, and time.   Psychiatric:         Mood and Affect: Mood normal.         Behavior: Behavior normal.         Thought Content: Thought content normal.         Judgment: Judgment normal.              Assessment and Plan   Desirae Gregg, an 83-year-old female with past medical history of hypertension, hypothyroidism, history of PE on Eliquis, diastolic heart failure (EF 55 to 60%), acute on chronic anemia, polycythemia vera/Myeloproliferative disorder on Jakafi and Eliquis, nonobstructive nephrolithiasis, and right lower extremity weakness/pain/sciatica presented to the emergency department for concern of mechanical  fall on Eliquis.      Acute Medical Issues:  #Mechanical fall on eliquis   -Patient hit her head but no concern for any neurological worsening status at time. She was carried down the stairs by her son and friend who slipped. No LOC or any symptoms initially   -CT head showed no evidence of acute intracranial pathology   -CT cervical spine showed no acute fracture or spondylolisthesis.  Noted degenerative disc disease and spondylosis are described in the body of the report  -Tele, continuous pulse Ox, and neurochecks [id number] for now   -CTM for any worsening mentation or neurological status, Obtain CT head stat if worsened   -PT/OT    #Acute on chronic anemia   #Myeloproliferative disorder   #Polycythemia vera   -Hgb in the ED 6.2 (baseline 7-8~) s/p 1 unit of pRBC   -repeat cbc and transfuse for Hg<7  -Holding Eliquis for tonight given mechanical fall; no signs of acute blood loss noted. Patient denies any blood in stools, worsening bruising (except arms), or hematuria.   -Continue Jakafi (ruxolitinib)  -Continue B12 and Iron supplements   -Follows with heme/onc at [facility] (Dr. Resendiz)   -CTM     #LEVY? Non oliguric   -Cr on admission 1.29 (baseline ~1)  -Strict IsOs, avoid nephrotoxic meds,    -RFP in the am; consider workup if worsened    Chronic Medical Issues:   #hypothyroidism: Continue home synthroid     #Right LE weakness and pain/Sciatica?Neuropathy?/ Hx of chronic L1 compression fracture/Chronic pain: Continue home oxycodone prn and gabapentin. Consider adding lidocaine and robaxin     #Hx of PE on eliquis: Follows with Dr. Resendiz. Holding Eliquis for tonight.     #Diastolic HF- EF (EF 55-60%)   -Continue carvedilol 25mg BID and Imdur 30mg daily. Holding home Jardiance while hospitalized.     #HTN: Continue home coreg and Imdur (BP meds discontinued last hospital discharge including lisinorpil and hydralazine).      Fluids:PO  Electrolytes: replete as needed  Nutrition: regular  GI Prophylaxis: none  DVT  Prophylaxis: SCDs        Dispo: Patient admitted for observation after a mechanical fall on eliquis. CT head and spine did not show acute findings. Hold eliquis tonight. No neuro deficits concern. S/p 1 unit of pRBC for acute on chronic anemia.

## 2024-08-09 NOTE — PROGRESS NOTES
08/09/24 1144   Discharge Planning   Living Arrangements Children   Support Systems Children   Assistance Needed Alert and oriented x 3, Requires assistance with all ADL's which is provided by the patient's son, Son is 24/7 caregiver for the patient, Walker, Step cane, Wheel chair, BSC, Shower chair, Grab bars, Raised toilet, Patient was just discharged from [facility] a week ago, Currently active with [facility] for SN/PT/OT, patient was only seen by OT in the home prior to this admission to [facility].   Type of Residence Private residence   Number of Stairs to Enter Residence   (Ramp)   Number of Stairs Within Residence 8   Do you have animals or pets at home? Yes   Type of Animals or Pets 1 cat   Who is requesting discharge planning? Provider   Home or Post Acute Services Post acute facilities (Rehab/SNF/etc);In home services   Type of Post Acute Facility Services Skilled nursing   Type of Home Care Services Home nursing visits;Home OT;Home PT   Expected Discharge Disposition SNF  (Patient is willing to go to skilled rehab again if recommended. Was recently discharged from [facility]. If not recommended will resume HHC with [facility])   Does the patient need discharge transport arranged? No   Financial Resource Strain   How hard is it for you to pay for the very basics like food, housing, medical care, and heating? Not hard   Housing Stability   In the last 12 months, was there a time when you were not able to pay the mortgage or rent on time? N   In the past 12 months, how many times have you moved where you were living? 0   At any time in the past 12 months, were you homeless or living in a shelter (including now)? N   Transportation Needs   In the past 12 months, has lack of transportation kept you from medical appointments or from getting medications? no   In the past 12 months, has lack of transportation kept you from meetings, work, or from getting things needed for daily living? No    Patient Choice   Provider Choice list and CMS website (https://medicare.gov/care-compare#search) for post-acute Quality and Resource Measure Data were provided and reviewed with: Family;Patient   Patient / Family choosing to utilize agency / facility established prior to hospitalization Yes

## 2024-08-09 NOTE — CARE PLAN
Problem: Fall/Injury  Goal: Not fall by end of shift  Outcome: Progressing     Problem: Pain - Adult  Goal: Verbalizes/displays adequate comfort level or baseline comfort level  Outcome: Progressing     Problem: Chronic Conditions and Co-morbidities  Goal: Patient's chronic conditions and co-morbidity symptoms are monitored and maintained or improved  Outcome: Progressing   The patient's goals for the shift include      The clinical goals for the shift include Pt will have an improvement in H&H during shift

## 2024-08-09 NOTE — PROGRESS NOTES
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

## 2024-08-09 NOTE — CARE PLAN
Problem: Fall/Injury  Goal: Not fall by end of shift  Outcome: Progressing     Problem: Fall/Injury  Goal: Pace activities to prevent fatigue by end of the shift  Outcome: Progressing   The patient's goals for the shift include to get stronger     The clinical goals for the shift include Improved h/h

## 2024-08-09 NOTE — HOSPITAL COURSE
Desirae Gregg, an 83-year-old female with past medical history of hypertension, hypothyroidism, history of PE on Eliquis, diastolic heart failure ejection fraction 55 to 60%, acute on chronic anemia, polycythemia vera/Myeloproliferative disorder on Jakafi and Eliquis, nonobstructive nephrolithiasis, and right lower extremity weakness/pain/sciatica presented to the emergency department for concern of mechanical fall on Eliquis.  Per patient around noon today while getting carried down the stairs by her son and his friend to be taken to see her hematologist.  She hit her head due to her son's friend slipped but did not lose consciousness or had any neurological symptoms after or during including headaches confusion blurry vision worsening weakness in the arms and the legs.      When she saw her rheumatologist for her blood disorder, she reported that she had a mechanical fall and hit her head.  That raise concern and recommended to seek immediate medical attention given the patient is on Eliquis and hit her head for concern of bleed.     In the ED  Vitals show temperature 36.1 pulse 60 respiration 16 /55 O2 98%  CBC showed WBC 4.7 hemoglobin 6.2 hematocrit 20.9 MCHC 29.7 22.6 RBC distribution platelets 106  CMP showed glucose 89 sodium 141 potassium 4.4 chloride 108 bicarb 26 BUN 17 creatinine 1.29 calcium 8.4 albumin 3.5 ALP 45 ALT 8 AST 12 total bili 0.6 total protein 5.3 mag 2.37  INR 1.3 PT 15.1 APTT 36  FOBT Negative      CT head showed no evidence of acute intracranial pathology   CT cervical spine showed no acute fracture or spondylolisthesis.  Noted degenerative disc disease and spondylosis are described in the body of the report        ED and intervention include blood transfusion 1 unit of packed RBCs    Hospital stay  - During patient's hospital course, pt was admitted for anemia requiring transfusions. Hematology/oncology was placed on consult and recommended transfusing pt if Hgb was less than 7g/dl   or if platelet count less than 50,000. During admission, pt received two blood transfusions. Per hematology/oncology, during hospital stay, Jakafi was discontinued and pt is asked not to take after discharge. Additionally, pt's Eliquis was held secondary for concern of bleeding after fall.  An attempt to contact [facility] in [facility] was made twice on 08/09/24. Nurse was unable to provide information regarding patients history and use of Eliquis. Attempt to contact Dr. Madeleine Resendiz was made with no answer to phone call on 08/09/24. For pt's LEVY, pt followed strict I&O's and nephrotoxic meds were avoided. Pt was medically stable and cleared for discharge.

## 2024-08-09 NOTE — CONSULTS
"Reason For Consult  Pancytopenia.  Anemia requiring PRBC transfusions.    History Of Present Illness  Desirae Gregg is a 83 y.o. female presenting with past medical history of hypertension, hypothyroidism, history of PE on Eliquis, diastolic heart failure ejection fraction 55 to 60%, acute on chronic anemia, polycythemia vera/Myeloproliferative disorder on Jakafi and Eliquis, nonobstructive nephrolithiasis, and right lower extremity weakness/pain/sciatica presented to the emergency department for concern of mechanical fall on Eliquis.      Past Medical History  She has no past medical history on file.    Surgical History  She has no past surgical history on file.     Social History  She reports that she has never smoked. She has never used smokeless tobacco. No history on file for alcohol use and drug use.    Family History  No family history on file.     Allergies  Clonidine and structural analogs, Naproxen, and Hydroxyzine    Review of Systems  As per primary physician     Physical Exam  As per primary physician     Last Recorded Vitals  Blood pressure 120/64, pulse 73, temperature 36.4 °C (97.5 °F), temperature source Temporal, resp. rate 18, height 1.651 m (5' 5\"), weight 75.5 kg (166 lb 7.2 oz), SpO2 94%.    Relevant Results  CBC reviewed     Assessment/Plan     This is a patient with a history of a myeloproliferative disorder.  Patient is 83 years of age and this also very well could be a patient who has underlying myelodysplastic syndrome.  These conditions can cause cytopenias of all cell lines.  The cytopenias can be refractory also to various interventions.    My recommendations are to transfuse patient for hemoglobin less than 7 g/dL to transfuse the patient with platelets single donor for platelet count less than 50,000.  May transfuse platelets also in the presence of significant ecchymosis and purpura.  May hold all anti-JAK2 agents may hold immunosuppressive agents such as hydroxyurea.    Patient's " primary oncologist is an outside hospital oncologist and on discharge patient to follow-up with that oncologist in regard to possible bone marrow biopsy and other investigative procedures as well as other treatments which cannot be performed in the inpatient setting.    I spent 30 minutes in the professional and overall care of this patient.  Review of this patient's chart was by external chart review.      Radha Montes MD

## 2024-08-10 LAB
ALBUMIN SERPL BCP-MCNC: 3.4 G/DL (ref 3.4–5)
ANION GAP SERPL CALC-SCNC: 7 MMOL/L (ref 10–20)
BUN SERPL-MCNC: 13 MG/DL (ref 6–23)
CALCIUM SERPL-MCNC: 8 MG/DL (ref 8.6–10.3)
CHLORIDE SERPL-SCNC: 111 MMOL/L (ref 98–107)
CO2 SERPL-SCNC: 26 MMOL/L (ref 21–32)
CREAT SERPL-MCNC: 1.08 MG/DL (ref 0.5–1.05)
EGFRCR SERPLBLD CKD-EPI 2021: 50 ML/MIN/1.73M*2
ERYTHROCYTE [DISTWIDTH] IN BLOOD BY AUTOMATED COUNT: 19.6 % (ref 11.5–14.5)
ERYTHROCYTE [DISTWIDTH] IN BLOOD BY AUTOMATED COUNT: 19.8 % (ref 11.5–14.5)
GLUCOSE SERPL-MCNC: 95 MG/DL (ref 74–99)
HCT VFR BLD AUTO: 25.1 % (ref 36–46)
HCT VFR BLD AUTO: 25.9 % (ref 36–46)
HGB BLD-MCNC: 7.9 G/DL (ref 12–16)
HGB BLD-MCNC: 8.1 G/DL (ref 12–16)
MAGNESIUM SERPL-MCNC: 2.27 MG/DL (ref 1.6–2.4)
MCH RBC QN AUTO: 28.4 PG (ref 26–34)
MCH RBC QN AUTO: 28.7 PG (ref 26–34)
MCHC RBC AUTO-ENTMCNC: 31.3 G/DL (ref 32–36)
MCHC RBC AUTO-ENTMCNC: 31.5 G/DL (ref 32–36)
MCV RBC AUTO: 91 FL (ref 80–100)
MCV RBC AUTO: 91 FL (ref 80–100)
NRBC BLD-RTO: 0 /100 WBCS (ref 0–0)
NRBC BLD-RTO: 0.6 /100 WBCS (ref 0–0)
PHOSPHATE SERPL-MCNC: 3.1 MG/DL (ref 2.5–4.9)
PLATELET # BLD AUTO: 68 X10*3/UL (ref 150–450)
PLATELET # BLD AUTO: 69 X10*3/UL (ref 150–450)
POTASSIUM SERPL-SCNC: 4.2 MMOL/L (ref 3.5–5.3)
RBC # BLD AUTO: 2.75 X10*6/UL (ref 4–5.2)
RBC # BLD AUTO: 2.85 X10*6/UL (ref 4–5.2)
SODIUM SERPL-SCNC: 140 MMOL/L (ref 136–145)
WBC # BLD AUTO: 3.2 X10*3/UL (ref 4.4–11.3)
WBC # BLD AUTO: 3.3 X10*3/UL (ref 4.4–11.3)

## 2024-08-10 PROCEDURE — 85027 COMPLETE CBC AUTOMATED: CPT

## 2024-08-10 PROCEDURE — 2500000001 HC RX 250 WO HCPCS SELF ADMINISTERED DRUGS (ALT 637 FOR MEDICARE OP)

## 2024-08-10 PROCEDURE — 97116 GAIT TRAINING THERAPY: CPT | Mod: GP | Performed by: PHYSICAL THERAPIST

## 2024-08-10 PROCEDURE — 36415 COLL VENOUS BLD VENIPUNCTURE: CPT

## 2024-08-10 PROCEDURE — 97165 OT EVAL LOW COMPLEX 30 MIN: CPT | Mod: GO

## 2024-08-10 PROCEDURE — 97161 PT EVAL LOW COMPLEX 20 MIN: CPT | Mod: GP | Performed by: PHYSICAL THERAPIST

## 2024-08-10 PROCEDURE — 97535 SELF CARE MNGMENT TRAINING: CPT | Mod: GO

## 2024-08-10 PROCEDURE — 83735 ASSAY OF MAGNESIUM: CPT

## 2024-08-10 PROCEDURE — 1200000002 HC GENERAL ROOM WITH TELEMETRY DAILY

## 2024-08-10 PROCEDURE — 80069 RENAL FUNCTION PANEL: CPT

## 2024-08-10 PROCEDURE — 2500000005 HC RX 250 GENERAL PHARMACY W/O HCPCS

## 2024-08-10 PROCEDURE — 94760 N-INVAS EAR/PLS OXIMETRY 1: CPT

## 2024-08-10 PROCEDURE — 99231 SBSQ HOSP IP/OBS SF/LOW 25: CPT

## 2024-08-10 RX ADMIN — CARVEDILOL 25 MG: 25 TABLET, FILM COATED ORAL at 08:03

## 2024-08-10 RX ADMIN — Medication 1000 MCG: at 08:03

## 2024-08-10 RX ADMIN — CARVEDILOL 25 MG: 25 TABLET, FILM COATED ORAL at 20:34

## 2024-08-10 RX ADMIN — Medication 21 PERCENT: at 10:13

## 2024-08-10 RX ADMIN — OXYCODONE HYDROCHLORIDE 5 MG: 5 TABLET ORAL at 03:43

## 2024-08-10 RX ADMIN — FOLIC ACID 1 MG: 1 TABLET ORAL at 08:02

## 2024-08-10 RX ADMIN — FERROUS SULFATE TAB 325 MG (65 MG ELEMENTAL FE) 1 TABLET: 325 (65 FE) TAB at 08:02

## 2024-08-10 RX ADMIN — ISOSORBIDE MONONITRATE 30 MG: 30 TABLET, EXTENDED RELEASE ORAL at 08:03

## 2024-08-10 RX ADMIN — GABAPENTIN 200 MG: 100 CAPSULE ORAL at 20:34

## 2024-08-10 RX ADMIN — GABAPENTIN 200 MG: 100 CAPSULE ORAL at 08:03

## 2024-08-10 RX ADMIN — LEVOTHYROXINE SODIUM 87.5 MCG: 0.17 TABLET ORAL at 05:58

## 2024-08-10 ASSESSMENT — COGNITIVE AND FUNCTIONAL STATUS - GENERAL
PERSONAL GROOMING: A LITTLE
MOBILITY SCORE: 17
TOILETING: A LOT
WALKING IN HOSPITAL ROOM: A LITTLE
TOILETING: A LOT
WALKING IN HOSPITAL ROOM: A LOT
DRESSING REGULAR LOWER BODY CLOTHING: A LOT
TOILETING: A LITTLE
STANDING UP FROM CHAIR USING ARMS: A LITTLE
MOVING TO AND FROM BED TO CHAIR: A LITTLE
HELP NEEDED FOR BATHING: A LOT
PERSONAL GROOMING: A LITTLE
DRESSING REGULAR UPPER BODY CLOTHING: A LITTLE
CLIMB 3 TO 5 STEPS WITH RAILING: TOTAL
DAILY ACTIVITIY SCORE: 20
DRESSING REGULAR LOWER BODY CLOTHING: A LOT
STANDING UP FROM CHAIR USING ARMS: A LITTLE
DAILY ACTIVITIY SCORE: 16
MOBILITY SCORE: 18
TURNING FROM BACK TO SIDE WHILE IN FLAT BAD: A LITTLE
WALKING IN HOSPITAL ROOM: A LITTLE
MOVING TO AND FROM BED TO CHAIR: A LITTLE
TURNING FROM BACK TO SIDE WHILE IN FLAT BAD: A LITTLE
CLIMB 3 TO 5 STEPS WITH RAILING: TOTAL
DRESSING REGULAR UPPER BODY CLOTHING: A LITTLE
HELP NEEDED FOR BATHING: A LOT
CLIMB 3 TO 5 STEPS WITH RAILING: A LOT
STANDING UP FROM CHAIR USING ARMS: A LITTLE
HELP NEEDED FOR BATHING: A LITTLE
PERSONAL GROOMING: A LITTLE
DRESSING REGULAR LOWER BODY CLOTHING: A LITTLE
MOBILITY SCORE: 17
DAILY ACTIVITIY SCORE: 16
MOVING TO AND FROM BED TO CHAIR: A LITTLE

## 2024-08-10 ASSESSMENT — PAIN SCALES - GENERAL
PAINLEVEL_OUTOF10: 0 - NO PAIN
PAINLEVEL_OUTOF10: 0 - NO PAIN
PAINLEVEL_OUTOF10: 7
PAINLEVEL_OUTOF10: 0 - NO PAIN

## 2024-08-10 ASSESSMENT — ACTIVITIES OF DAILY LIVING (ADL)
BATHING_ASSISTANCE: MODERATE
HOME_MANAGEMENT_TIME_ENTRY: 15
ADL_ASSISTANCE: NEEDS ASSISTANCE

## 2024-08-10 ASSESSMENT — PAIN - FUNCTIONAL ASSESSMENT
PAIN_FUNCTIONAL_ASSESSMENT: 0-10

## 2024-08-10 ASSESSMENT — PAIN DESCRIPTION - LOCATION: LOCATION: BACK

## 2024-08-10 NOTE — CARE PLAN
Problem: Fall/Injury  Goal: Not fall by end of shift  Outcome: Progressing     Problem: Fall/Injury  Goal: Be free from injury by end of the shift  Outcome: Progressing   The patient's goals for the shift include  Patient will have improved bloodwork by end of shift. Hemoglobin and platelets will increase.    The clinical goals for the shift include Patient will have pain well controlled this shift, below 5    Over the shift, the patient did not meet goal. Hemoglobin has improved however platelets have  not.

## 2024-08-10 NOTE — PROGRESS NOTES
"  Subjective    Patient was seen and examined today.  Denied any complaints.  No acute events overnight    Objective    Vitals  Visit Vitals  /74   Pulse 72   Temp 36.7 °C (98.1 °F) (Temporal)   Resp 18   Ht 1.651 m (5' 5\")   Wt 75.5 kg (166 lb 7.2 oz)   SpO2 91%   BMI 27.70 kg/m²   Smoking Status Never   BSA 1.86 m²       Physical Exam   Constitutional:       General: She is not in acute distress.  Cardiovascular:      Rate and Rhythm: Normal rate.   Pulmonary:      Effort: Pulmonary effort is normal. No respiratory distress.   Abdominal:      Palpations: Abdomen is soft.   Musculoskeletal:         General: No swelling.   Skin:     General: Skin is warm.  No active bleeding from anywhere.  Was complaining a scratch in her left dorsal hand, check the wound, in the process of healing.   Neurological:      General: No focal deficit present.      Mental Status: She is alert and oriented to person, place, and time.   Psychiatric:         Mood and Affect: Mood normal.         Behavior: Behavior normal.          IOs    Intake/Output Summary (Last 24 hours) at 8/10/2024 1741  Last data filed at 8/9/2024 1959  Gross per 24 hour   Intake 60 ml   Output --   Net 60 ml       Labs:   Results from last 72 hours   Lab Units 08/10/24  0738 08/09/24  0716 08/08/24  1443   SODIUM mmol/L 140 141 141   POTASSIUM mmol/L 4.2 4.4 4.4   CHLORIDE mmol/L 111* 110* 108*   CO2 mmol/L 26 25 26   BUN mg/dL 13 16 17   CREATININE mg/dL 1.08* 1.19* 1.29*   GLUCOSE mg/dL 95 81 89   CALCIUM mg/dL 8.0* 7.7* 8.4*   ANION GAP mmol/L 7* 10 11   EGFR mL/min/1.73m*2 50* 45* 41*   PHOSPHORUS mg/dL 3.1  --   --       Results from last 72 hours   Lab Units 08/10/24  1152 08/10/24  0738 08/09/24  1625 08/09/24  0716 08/08/24  1637 08/08/24  1443   WBC AUTO x10*3/uL 3.3* 3.2* 3.1* 2.5*   < > 4.7   HEMOGLOBIN g/dL 7.9* 8.1* 7.5* 6.4*   < > 6.2*   HEMATOCRIT % 25.1* 25.9* 24.0* 21.0*   < > 20.9*   PLATELETS AUTO x10*3/uL 68* 69* 78* 67*   < > 106* " "  NEUTROS PCT AUTO %  --   --   --  69.2  --  74.4   LYMPHS PCT AUTO %  --   --   --  19.0  --  15.0   MONOS PCT AUTO %  --   --   --  6.1  --  5.3   EOS PCT AUTO %  --   --   --  0.8  --  0.4    < > = values in this interval not displayed.      Lab Results   Component Value Date    CALCIUM 8.0 (L) 08/10/2024    PHOS 3.1 08/10/2024      No results found for: \"CRP\"   [unfilled]     Micro/ID:   Susceptibility data from last 90 days.  Collected Specimen Info Organism Ampicillin Ciprofloxacin Daptomycin Levofloxacin Linezolid Nitrofurantoin Penicillin Trimethoprim/Sulfamethoxazole Vancomycin   07/15/24 Urine from Clean Catch/Voided Enterococcus faecalis  S  R  S  R  S  S  S  R  R                    No lab exists for component: \"AGALPCRNB\"   .ID  Lab Results   Component Value Date    URINECULTURE (A) 08/02/2024     Multiple organisms present, probable contamination. Repeat culture if clinically indicated.       Images  CT cervical spine wo IV contrast  Narrative: Interpreted By:  Domenic Shaver,   STUDY:  CT CERVICAL SPINE WO IV CONTRAST; 8/8/2024 2:57 pm      INDICATION:  Signs/Symptoms:fell backwards hit back of head on eliquis head and  neck pain;      COMPARISON:  None      ACCESSION NUMBER(S):  [id number]      ORDERING CLINICIAN:  BRIDGETTE SESAY      TECHNIQUE:  Contiguous axial images were acquired from the skull base to the lung  apices. Coronal and sagittal reformatted images were obtained. All CT  examinations are performed with 1 or more of the following dose  reduction techniques: Automated exposure control, adjustment of mA  and/or kv according to patient's size, or use of iterative  reconstruction techniques.      FINDINGS:  There is straightening of the normal cervical lordosis.  No acute  fracture or traumatic malalignment. Facet degenerative changes and  uncovertebral joint degenerative changes are present. There is trace  degenerative anterolisthesis of C2 on C3 and C3 on C4, " otherwise  adequate alignment.          The occipital condyles, arch of C1, and the odontoid processes are  intact. The atlantoaxial relationship is well maintained.      There is mild-moderate disc space narrowing at C3-4. Moderate disc  space narrowing at C4-5. Moderate-severe disc space narrowing at C5-6  and C6-7. Moderate disc space narrowing at C7-T1. Multilevel diffuse  disc bulging, endplate degenerative changes, and endplate osteophyte  formation. No evidence for high-grade bony spinal canal stenosis  however there is multilevel high-grade bilateral neural foramina  stenosis.          Impression: 1. No acute fracture or spondylolisthesis.  2. Degenerative disc disease and spondylosis as described in the body  of the report.          Signed by: Domenic Shaver 8/8/2024 3:24 PM  Dictation workstation:   [id number]  CT head wo IV contrast  Narrative: Interpreted By:  Domenic Shaver,   STUDY:  BRIDGETTE SESAY; 8/8/2024 2:57 pm      INDICATION:  Signs/Symptoms:fell hit back of head pain and neck pain on eliquis;      COMPARISON:  None      ACCESSION NUMBER(S):  [id number]      ORDERING CLINICIAN:  BRIDGETTE SESAY      TECHNIQUE:  Contiguous axial images were acquired from the vertex through the  posterior fossa without IV contrast. All CT examinations are  performed with 1 or more of the following dose reduction techniques:  Automated exposure control, adjustment of mA and/or kv according to  patient's size, or use of iterative reconstruction techniques.      FINDINGS:  There is a moderate to moderate-severe degree of nonspecific white  matter hypodensity, most consistent with chronic small-vessel  ischemic disease. No focal mass effect or midline shift is  identified. The ventricles and sulci are symmetric and appropriate  for the patient's age.      The gray white matter differentiation is preserved.      No acute intracranial hemorrhage is seen. No intra-axial or  extra-axial fluid collection is  seen.      The visualized paranasal sinuses show mild-moderate mucoperiosteal  thickening of the maxillary sinuses with possible air-fluid levels  only partially visualized. The remainder of the paranasal sinuses are  clear. Mastoid air cells and middle ear cavities are clear  bilaterally.      Impression: No CT evidence for acute intracranial pathology.          Signed by: Domenic Shaver 8/8/2024 3:12 PM  Dictation workstation:   [id number]      Meds  Scheduled medications  [Held by provider] apixaban, 2.5 mg, oral, BID  carvedilol, 25 mg, oral, BID  cyanocobalamin, 1,000 mcg, oral, Daily  ferrous sulfate (325 mg ferrous sulfate), 1 tablet, oral, Daily with breakfast  folic acid, 1 mg, oral, Daily  gabapentin, 200 mg, oral, BID  isosorbide mononitrate ER, 30 mg, oral, Daily  levothyroxine, 87.5 mcg, oral, Daily  [Held by provider] lisinopril, 10 mg, oral, Daily  polyethylene glycol, 17 g, oral, Daily  [Held by provider] ruxolitinib, 10 mg, oral, q AM  [Held by provider] ruxolitinib, 20 mg, oral, Nightly      Continuous medications     PRN medications  PRN medications: acetaminophen **OR** acetaminophen, dextrose, dextrose, glucagon, glucagon, oxyCODONE, oxygen, zinc oxide     Problem List    Problem list:   Patient Active Problem List   Diagnosis   • Anemia   • Fall, initial encounter        Assessment and Plan    Desirae Gregg, an 83-year-old female with past medical history of hypertension, hypothyroidism, history of PE on Eliquis, diastolic heart failure (EF 55 to 60%), acute on chronic anemia, polycythemia vera/Myeloproliferative disorder on Jakafi and Eliquis, nonobstructive nephrolithiasis, and right lower extremity weakness/pain/sciatica presented to the emergency department for concern of mechanical fall on Eliquis.       Acute Medical Issues:  #Mechanical fall on eliquis   - Pt hit her head but no concern for any neurological worsening status at time. She was carried down the stairs by her son and friend  who slipped. No LOC or any symptoms initially   - CT head showed no evidence of acute intracranial pathology   - CT cervical spine showed no acute fracture or spondylolisthesis.  Noted degenerative disc disease and spondylosis are described in the body of the report  Plan:  - Tele, continuous pulse Ox, and neurochecks q 4hrs   - CTM for any worsening mentation or neurological status  - Obtain CT head stat if mentation worsens  - PT/OT     #Acute on chronic anemia   #Myeloproliferative disorder   #Polycythemia vera   - Hgb in the ED 6.2 (baseline 7-8~) s/p 1 unit of pRBC   - Follows with heme/onc at [facility] (Dr. Resendiz)  - Attempt to contact [facility] in [facility] was made twice. Nurse was unable to provide information regarding patients history and use of Eliquis. Attempt to contact Dr. Madeleine Resendiz was made with no answer to phone call 08/09/24.    Plan:  - Pt s/p 1 unit of pRBC on floor    - Repeat cbc and transfuse for Hg<7  -Morning CBC showed platelet count 69, repeat at noon showed 68.   -Patient denied any active bleeding from anywhere  - C/t holding Eliquis 2.2 to concern for bleeding. Patient denies any blood in stools, worsening bruising (except arms), or hematuria.   - Continue holding Jakafi (ruxolitinib) and Eliquis  - Continue B12 and Iron supplements   - Follows with heme/onc at [facility] (Dr. Resendiz)   -Consulted Dr Montes, he recommended transfuse patient for hemoglobin less than 7 g/dL to transfuse the patient with platelets single donor for platelet count less than 50,000. May transfuse platelets also in the presence of significant ecchymosis and purpura. May hold all anti-JAK2 agents may hold immunosuppressive agents such as hydroxyurea.   - CTM      #LEVY? Non oliguric   -Cr on admission 1.29 (baseline ~1)  Plan  - Strict I&Os  - Avoid nephrotoxic meds, creatinine trending down  - RFP in the am; will monitor    # And on left dorsal hand  -Mild redness around the area of the wound, no active  discharge, in the process of healing  -Wound care nurse consulted     Chronic Medical Issues:   #hypothyroidism: Continue home synthroid      #Right LE weakness and pain/Sciatica?Neuropathy?/ Hx of chronic L1 compression fracture/Chronic pain: Continue home oxycodone prn and gabapentin. Consider adding lidocaine and robaxin      #Hx of PE on eliquis: Follows with Dr. Resendiz. Holding Eliquis for tonight.      #Diastolic HF- EF (EF 55-60%)   -Continue carvedilol 25mg BID and Imdur 30mg daily. Holding home Jardiance while hospitalized.      #HTN: Continue home coreg and Imdur (BP meds discontinued last hospital discharge including lisinorpil and hydralazine).        Fluids:PO  Electrolytes: replete as needed  Nutrition: regular  GI Prophylaxis: none  DVT Prophylaxis: SCDs     Dispo: Patient admitted for observation after a mechanical fall on eliquis. CT head and spine did not show acute findings. Hold eliquis . No neuro deficits concern. S/p 1 unit of pRBC for acute on chronic anemia.  Thrombocytopenia.  Possible length of stay less than 24 hours.      Dayna Kong MD  Internal Medicine, PGY-3

## 2024-08-10 NOTE — PROGRESS NOTES
Physical Therapy    Physical Therapy Evaluation    Patient Name: Desirae Gregg  MRN: [id number]  Today's Date: 8/10/2024   Time Calculation  Start Time: 1203  Stop Time: 1230  Time Calculation (min): 27 min    Assessment/Plan   PT Assessment  PT Assessment Results: Decreased strength, Decreased mobility  Rehab Prognosis: Good  Evaluation/Treatment Tolerance: Patient tolerated treatment well  Medical Staff Made Aware: Yes  Strengths: Attitude of self, Support of Caregivers, Rehab experience  Barriers to Participation: Housing layout  End of Session Communication: Bedside nurse  Assessment Comment: Pt would benefit from low level PT services upon discharge home to help improve her strength and safety with all functional transfers and mobility including gait and stairs.  End of Session Patient Position: Up in chair, Alarm on  IP OR SWING BED PT PLAN  Inpatient or Swing Bed: Inpatient  PT Plan  Treatment/Interventions: Bed mobility, Transfer training, Stair training, Gait training, Strengthening  PT Plan: Ongoing PT  PT Frequency: 2 times per week  PT Discharge Recommendations: Low intensity level of continued care  Equipment Recommended upon Discharge: Wheeled walker  PT Recommended Transfer Status: Assist x1  PT - OK to Discharge: Yes      Subjective   General Visit Information:  General  Reason for Referral: Pt is 82 yo female admitted for fall out of wheelchair on Eliquis, PT ordered for impaired mobility.  Referred By: Dr Calix  Past Medical History Relevant to Rehab: PMH: HTN, hypothyroidism, PE on Eliquis, Diastolic HF 55-60% EF, anemia, polycythemia vera/myeloproliferative disorder.  Family/Caregiver Present: Yes  Caregiver Feedback: Keith  Co-Treatment: OT  Co-Treatment Reason: to increase patient outcomes  Prior to Session Communication: Bedside nurse  Patient Position Received: Bed, 3 rail up, Alarm on  General Comment: IV, telemetry, Spo2 monitor  Home Living:  Home Living  Type of Home: House  Lives With:  Adult children  Home Adaptive Equipment: Walker rolling or standard, Cane, Wheelchair-manual  Home Layout: Two level  Home Access: Stairs to enter with rails (8 stairs to 2nd level)  Entrance Stairs-Number of Steps: 3  Bathroom Toilet: Adaptive toilet seating, Handicapped height  Bathroom Equipment: Shower chair with back, Grab bars in shower  Prior Level of Function:  Prior Function Per Pt/Caregiver Report  Level of [place]: Independent with ADLs and functional transfers (son is with pt anytime she's walking, uses gait belt for safety. Needs to be brought down the stairs while in wheelchair.)  Receives Help From: Family, Neighbor  ADL Assistance:  (needs supervision and assistance for showering, toileting.)  Ambulatory Assistance: Needs assistance  Transfers:  (gait belt and Yeimi/CGA for transfers)  Gait:  (only walking with son closeby using FWW and gait belt)  Stairs:  (being transferred down stairs while in a wheelchair by 2 people, unable to go up/down stairs at this time due to pain in back/legs in the last 1.5 months)  Prior Function Comments: increased assistance has been required the last 1.5 months per sons report due to pain in back/LE's limited indpendence.  Precautions:     Vital Signs:       Objective   Pain:  Pain Assessment  Pain Assessment: 0-10  0-10 (Numeric) Pain Score: 0 - No pain  Cognition:  Cognition  Overall Cognitive Status: Within Functional Limits    General Assessments:     Activity Tolerance  Endurance: Tolerates 10 - 20 min exercise with multiple rests     Functional Assessments:  Bed Mobility  Bed Mobility: Yes  Bed Mobility 1  Bed Mobility 1: Supine to sitting  Level of Assistance 1: Close supervision  Bed Mobility Comments 1: HOB Elevated 45 degrees    Transfers  Transfer: Yes  Transfer 1  Transfer From 1: Sit to  Transfer to 1: Stand  Technique 1: Sit to stand, Stand to sit  Transfer Device 1: Gait belt, Walker  Transfer Level of Assistance 1: Close  supervision  Trials/Comments 1: toilet transfer: lower toilet seat, required Yeimi to complete sit to stand from toilet    Ambulation/Gait Training  Ambulation/Gait Training Performed: Yes  Ambulation/Gait Training 1  Surface 1: Level tile  Device 1: Rolling walker  Assistance 1: Contact guard  Comments/Distance (ft) 1: x50ft into hallway and x15 feet bathroom to chair.  Extremity/Trunk Assessments:  RLE   RLE :  (LAQ WFL, hip flexion 3+/5 limited by back pain)  LLE   LLE :  (WFL LAQ, Hip flexion 4/5)  Outcome Measures:  [facility] Basic Mobility  Turning from your back to your side while in a flat bed without using bedrails: None  Moving from lying on your back to sitting on the side of a flat bed without using bedrails: A little  Moving to and from bed to chair (including a wheelchair): A little  Standing up from a chair using your arms (e.g. wheelchair or bedside chair): A little  To walk in hospital room: A little  Climbing 3-5 steps with railing: Total  Basic Mobility - Total Score: 17    Encounter Problems       Encounter Problems (Active)       Mobility       STG - Patient will ambulate x100ft with FWW and close supervision.       Start:  08/10/24    Expected End:  08/24/24               PT Transfers       STG - Patient to transfer to and from sit to supine modified independnet.       Start:  08/10/24    Expected End:  08/24/24            STG - Patient will transfer sit to and from stand FWW modified independent.       Start:  08/10/24    Expected End:  08/24/24               Pain - Adult              Education Documentation  Handouts, taught by Farhana Hurtado PT at 8/10/2024  1:15 PM.  Learner: Patient  Readiness: Acceptance  Method: Explanation  Response: Verbalizes Understanding    Precautions, taught by Farhana Hurtado PT at 8/10/2024  1:15 PM.  Learner: Patient  Readiness: Acceptance  Method: Explanation  Response: Verbalizes Understanding    Body Mechanics, taught by Farhana Hurtado PT at 8/10/2024  1:15  PM.  Learner: Patient  Readiness: Acceptance  Method: Explanation  Response: Verbalizes Understanding    Home Exercise Program, taught by Farhana Hurtado, PT at 8/10/2024  1:15 PM.  Learner: Patient  Readiness: Acceptance  Method: Explanation  Response: Verbalizes Understanding    Mobility Training, taught by Farhana Hurtado, PT at 8/10/2024  1:15 PM.  Learner: Patient  Readiness: Acceptance  Method: Explanation  Response: Verbalizes Understanding    Education Comments  No comments found.

## 2024-08-10 NOTE — PROGRESS NOTES
Occupational Therapy    Evaluation/Treatment    Patient Name: Desirae Gregg  MRN: [id number]  : 1940  Today's Date: 08/10/24  Time Calculation  Start Time: 1203  Stop Time: 1230  Time Calculation (min): 27 min       Assessment:  OT Assessment: Pt presents with decreased endurance and safety. Continued skilled OT recommended to maximize pt independence with ADLs and functional mobility.  Prognosis: Good  Barriers to Discharge: None  Evaluation/Treatment Tolerance: Patient tolerated treatment well  Medical Staff Made Aware: Yes  End of Session Communication: Bedside nurse  End of Session Patient Position: Up in chair, Alarm on  OT Assessment Results: Decreased ADL status, Decreased functional mobility, Decreased endurance  Prognosis: Good  Barriers to Discharge: None  Evaluation/Treatment Tolerance: Patient tolerated treatment well  Medical Staff Made Aware: Yes  Strengths: Support of Caregivers, Ability to acquire knowledge, Access to adaptive/assistive products  Barriers to Participation: Comorbidities  Plan:  Treatment Interventions: ADL retraining, Functional transfer training, Endurance training, UE strengthening/ROM  OT Frequency: 2 times per week  OT Discharge Recommendations: Low intensity level of continued care  Equipment Recommended upon Discharge: Wheeled walker  OT Recommended Transfer Status: Assist of 1  OT - OK to Discharge: Yes (per OT POC)  Treatment Interventions: ADL retraining, Functional transfer training, Endurance training, UE strengthening/ROM    Subjective   Current Problem:  1. Fall, initial encounter        2. Anticoagulated        3. Nonintractable headache, unspecified chronicity pattern, unspecified headache type        4. Cervical spine pain          General:   OT Received On: 08/10/24  General  Reason for Referral: 82 yo female referred to OT for fall, impaired ADL/mobility  Referred By: Dr Calix  Past Medical History Relevant to Rehab: HTN, hypothyroidism, PE on Eliquis,  Diastolic HF 55-60% EF, anemia, polycythemia vera/myeloproliferative disorder.  Family/Caregiver Present: Yes  Caregiver Feedback: Son present, able to participate in session and discuss POC  Co-Treatment: PT  Co-Treatment Reason: to increase patient outcomes  Prior to Session Communication: Bedside nurse  Patient Position Received: Bed, 3 rail up, Alarm on  General Comment: Pt pleasant, cooperative with therapy evaluations  Precautions:  Medical Precautions: Fall precautions (IV, telemetry, Spo2 monitor)    Pain:  Pain Assessment  Pain Assessment: 0-10  0-10 (Numeric) Pain Score: 0 - No pain    Objective   Cognition:  Overall Cognitive Status: Within Functional Limits  Orientation Level: Oriented X4           Home Living:  Type of Home: House  Lives With: Adult children (son)  Home Adaptive Equipment: Walker rolling or standard, Cane, Wheelchair-manual (gait belt)  Home Layout: Two level, Stairs to alternate level with rails  Alternate Level Stairs-Number of Steps: 8  Home Access: Stairs to enter with rails  Entrance Stairs-Number of Steps: 3  Bathroom Shower/Tub: Walk-in shower  Bathroom Toilet: Adaptive toilet seating, Handicapped height  Bathroom Equipment: Grab bars in shower, Shower chair with back, Raised toilet seat with rails, Bedside commode  Prior Function:  Level of [place]: Independent with ADLs and functional transfers (son is with pt anytime she's walking, uses gait belt for safety. Needs to be brought down the stairs while in wheelchair.)  Receives Help From: Family, Neighbor  ADL Assistance: Needs assistance (needs supervision and assistance for showering, toileting.)  Homemaking Assistance: Needs assistance  Ambulatory Assistance: Needs assistance  Transfers:  (gait belt and Yeimi/CGA for transfers)  Gait:  (only walking with son closeby using FWW and gait belt)  Stairs:  (being transferred down stairs while in a wheelchair by 2 people, unable to go up/down stairs at this time due to pain in  back/legs in the last 1.5 months)  Prior Function Comments: increased assistance has been required the last 1.5 months per sons report due to pain in back/LE's limited indpendence.    ADL:  Eating Assistance: Independent  Grooming Assistance: Stand by  Bathing Assistance: Moderate  UE Dressing Assistance: Independent  LE Dressing Assistance: Moderate  Toileting Assistance with Device: Moderate  Functional Assistance: Stand by  ADL Comments: Unassessed ADL performance anticipated d/t current clinical presentation  Activities of Daily Living: LE Dressing  LE Dressing: Yes  Sock Level of Assistance: Independent  Adult Briefs Level of Assistance: Moderate assistance  LE Dressing Where Assessed: Toilet  LE Dressing Comments: Assist to arrange briefs over backside    Toileting  Toileting Level of Assistance: Moderate assistance  Where Assessed: Toilet  Toileting Comments: Assist for thoroughness  Activity Tolerance:  Endurance: Tolerates less than 10 min exercise, no significant change in vital signs    Bed Mobility/Transfers: Bed Mobility  Bed Mobility: Yes  Bed Mobility 1  Bed Mobility 1: Supine to sitting  Level of Assistance 1: Close supervision  Bed Mobility Comments 1: HOB Elevated 45 degrees    Transfers  Transfer: Yes  Transfer 1  Transfer From 1: Bed to  Transfer to 1: Stand  Technique 1: Sit to stand, Stand to sit  Transfer Device 1: Walker, Gait belt  Transfer Level of Assistance 1: Close supervision    Toilet Transfers  Toilet Transfer From: Rolling walker  Toilet Transfer Type: To and from  Toilet Transfer to: Standard toilet (R side rail)  Toilet Transfer Technique: Stand pivot  Toilet Transfers: Minimal assistance  Toilet Transfers Comments: Assist for safe controlled descent    Functional Mobility:  Functional Mobility  Functional Mobility Performed: Yes  Functional Mobility 1  Surface 1: Level tile  Device 1: Rolling walker  Functional Mobility Support Devices: Gait belt  Assistance 1: Contact  guard  Comments 1: Ambulated household distances in hallway and to bathroom with good balance, good endurance.  Sitting Balance:  Static Sitting Balance  Static Sitting-Balance Support: Feet supported  Static Sitting-Level of Assistance: Close supervision  Standing Balance:  Static Standing Balance  Static Standing-Balance Support: Bilateral upper extremity supported  Static Standing-Level of Assistance: Contact guard    Strength:  Strength Comments: RUE WFL, LUE 2-/5    Perception:  Inattention/Neglect: Appears intact  Coordination:  Movements are Fluid and Coordinated: Yes   Hand Function:  Hand Function  Gross Grasp: Functional  Coordination: Functional  Extremities: RUE   RUE : Within Functional Limits and LUE   LUE: Exceptions to WFL (Shoulder flexion, IR/ER maximally impaired. Elbow to distal WFL)    Outcome Measures: [facility] Daily Activity  Putting on and taking off regular lower body clothing: A lot  Bathing (including washing, rinsing, drying): A lot  Putting on and taking off regular upper body clothing: A little  Toileting, which includes using toilet, bedpan or urinal: A lot  Taking care of personal grooming such as brushing teeth: A little  Eating Meals: None  Daily Activity - Total Score: 16        Education Documentation  Body Mechanics, taught by Matthew Bush OT at 8/10/2024  1:31 PM.  Learner: Patient  Readiness: Acceptance  Method: Explanation  Response: Verbalizes Understanding    Precautions, taught by Matthew Bush OT at 8/10/2024  1:31 PM.  Learner: Patient  Readiness: Acceptance  Method: Explanation  Response: Verbalizes Understanding    ADL Training, taught by Matthew Bush OT at 8/10/2024  1:31 PM.  Learner: Patient  Readiness: Acceptance  Method: Explanation  Response: Verbalizes Understanding    Education Comments  No comments found.        OP EDUCATION:       Goals:  Encounter Problems       Encounter Problems (Active)       OT Goals       Pt will complete [id number]  transfers using LRD in preparation for ADLs with supervision  (Progressing)       Start:  08/10/24    Expected End:  08/24/24            Pt will tolerate 10min stand during functional task completion with no more than 1 rest break in order to increase endurance for functional task completion.  (Progressing)       Start:  08/10/24    Expected End:  08/24/24            Pt will demo LE ADL completion with modified independence, using AE if needed.  (Progressing)       Start:  08/10/24    Expected End:  08/24/24

## 2024-08-10 NOTE — CARE PLAN
Problem: Fall/Injury  Goal: Not fall by end of shift  Outcome: Progressing  Goal: Be free from injury by end of the shift  Outcome: Progressing  Goal: Verbalize understanding of personal risk factors for fall in the hospital  Outcome: Progressing  Goal: Verbalize understanding of risk factor reduction measures to prevent injury from fall in the home  Outcome: Progressing  Goal: Use assistive devices by end of the shift  Outcome: Progressing  Goal: Pace activities to prevent fatigue by end of the shift  Outcome: Progressing     Problem: Pain - Adult  Goal: Verbalizes/displays adequate comfort level or baseline comfort level  Outcome: Progressing     Problem: Safety - Adult  Goal: Free from fall injury  Outcome: Progressing     Problem: Discharge Planning  Goal: Discharge to home or other facility with appropriate resources  Outcome: Progressing     Problem: Chronic Conditions and Co-morbidities  Goal: Patient's chronic conditions and co-morbidity symptoms are monitored and maintained or improved  Outcome: Progressing   The patient's goals for the shift include  sleep    The clinical goals for the shift include Patient will have pain well controlled this shift, below 5    Over the shift, the patient did not make progress toward the following goals. Barriers to progression include pain. Recommendations to address these barriers include pain meds, repositioning, and rest. Patient tolerated Q2 turns this shift. Patient was able to sleep at least 6 hours this shift, on and off.

## 2024-08-11 VITALS
HEIGHT: 65 IN | HEART RATE: 81 BPM | TEMPERATURE: 98.4 F | SYSTOLIC BLOOD PRESSURE: 123 MMHG | RESPIRATION RATE: 18 BRPM | BODY MASS INDEX: 27.73 KG/M2 | DIASTOLIC BLOOD PRESSURE: 68 MMHG | OXYGEN SATURATION: 95 % | WEIGHT: 166.45 LBS

## 2024-08-11 LAB
ALBUMIN SERPL BCP-MCNC: 3.4 G/DL (ref 3.4–5)
ANION GAP SERPL CALC-SCNC: 9 MMOL/L (ref 10–20)
BUN SERPL-MCNC: 13 MG/DL (ref 6–23)
CALCIUM SERPL-MCNC: 8.1 MG/DL (ref 8.6–10.3)
CHLORIDE SERPL-SCNC: 110 MMOL/L (ref 98–107)
CO2 SERPL-SCNC: 25 MMOL/L (ref 21–32)
CREAT SERPL-MCNC: 1.02 MG/DL (ref 0.5–1.05)
EGFRCR SERPLBLD CKD-EPI 2021: 54 ML/MIN/1.73M*2
ERYTHROCYTE [DISTWIDTH] IN BLOOD BY AUTOMATED COUNT: 19.9 % (ref 11.5–14.5)
ERYTHROCYTE [DISTWIDTH] IN BLOOD BY AUTOMATED COUNT: 20 % (ref 11.5–14.5)
GLUCOSE SERPL-MCNC: 104 MG/DL (ref 74–99)
HCT VFR BLD AUTO: 26.6 % (ref 36–46)
HCT VFR BLD AUTO: 27.2 % (ref 36–46)
HGB BLD-MCNC: 8.3 G/DL (ref 12–16)
HGB BLD-MCNC: 8.5 G/DL (ref 12–16)
MAGNESIUM SERPL-MCNC: 2.13 MG/DL (ref 1.6–2.4)
MCH RBC QN AUTO: 28.4 PG (ref 26–34)
MCH RBC QN AUTO: 28.7 PG (ref 26–34)
MCHC RBC AUTO-ENTMCNC: 31.2 G/DL (ref 32–36)
MCHC RBC AUTO-ENTMCNC: 31.3 G/DL (ref 32–36)
MCV RBC AUTO: 91 FL (ref 80–100)
MCV RBC AUTO: 92 FL (ref 80–100)
NRBC BLD-RTO: 0.6 /100 WBCS (ref 0–0)
NRBC BLD-RTO: 0.8 /100 WBCS (ref 0–0)
PHOSPHATE SERPL-MCNC: 2.6 MG/DL (ref 2.5–4.9)
PLATELET # BLD AUTO: 53 X10*3/UL (ref 150–450)
PLATELET # BLD AUTO: 56 X10*3/UL (ref 150–450)
POTASSIUM SERPL-SCNC: 4.2 MMOL/L (ref 3.5–5.3)
RBC # BLD AUTO: 2.89 X10*6/UL (ref 4–5.2)
RBC # BLD AUTO: 2.99 X10*6/UL (ref 4–5.2)
SODIUM SERPL-SCNC: 140 MMOL/L (ref 136–145)
WBC # BLD AUTO: 3.3 X10*3/UL (ref 4.4–11.3)
WBC # BLD AUTO: 3.9 X10*3/UL (ref 4.4–11.3)

## 2024-08-11 PROCEDURE — 80069 RENAL FUNCTION PANEL: CPT

## 2024-08-11 PROCEDURE — 2500000001 HC RX 250 WO HCPCS SELF ADMINISTERED DRUGS (ALT 637 FOR MEDICARE OP)

## 2024-08-11 PROCEDURE — 94760 N-INVAS EAR/PLS OXIMETRY 1: CPT

## 2024-08-11 PROCEDURE — 36415 COLL VENOUS BLD VENIPUNCTURE: CPT

## 2024-08-11 PROCEDURE — 83735 ASSAY OF MAGNESIUM: CPT

## 2024-08-11 PROCEDURE — 85027 COMPLETE CBC AUTOMATED: CPT

## 2024-08-11 PROCEDURE — 2500000004 HC RX 250 GENERAL PHARMACY W/ HCPCS (ALT 636 FOR OP/ED)

## 2024-08-11 PROCEDURE — 99239 HOSP IP/OBS DSCHRG MGMT >30: CPT | Performed by: INTERNAL MEDICINE

## 2024-08-11 PROCEDURE — 2500000005 HC RX 250 GENERAL PHARMACY W/O HCPCS

## 2024-08-11 RX ADMIN — FERROUS SULFATE TAB 325 MG (65 MG ELEMENTAL FE) 1 TABLET: 325 (65 FE) TAB at 09:01

## 2024-08-11 RX ADMIN — ISOSORBIDE MONONITRATE 30 MG: 30 TABLET, EXTENDED RELEASE ORAL at 08:58

## 2024-08-11 RX ADMIN — POLYETHYLENE GLYCOL 3350 17 G: 17 POWDER, FOR SOLUTION ORAL at 09:01

## 2024-08-11 RX ADMIN — GABAPENTIN 200 MG: 100 CAPSULE ORAL at 08:58

## 2024-08-11 RX ADMIN — FOLIC ACID 1 MG: 1 TABLET ORAL at 09:01

## 2024-08-11 RX ADMIN — Medication 21 PERCENT: at 08:22

## 2024-08-11 RX ADMIN — Medication 1000 MCG: at 08:58

## 2024-08-11 RX ADMIN — LEVOTHYROXINE SODIUM 87.5 MCG: 0.17 TABLET ORAL at 05:51

## 2024-08-11 RX ADMIN — CARVEDILOL 25 MG: 25 TABLET, FILM COATED ORAL at 08:58

## 2024-08-11 ASSESSMENT — COGNITIVE AND FUNCTIONAL STATUS - GENERAL
DRESSING REGULAR UPPER BODY CLOTHING: A LITTLE
DRESSING REGULAR LOWER BODY CLOTHING: A LOT
STANDING UP FROM CHAIR USING ARMS: A LITTLE
TURNING FROM BACK TO SIDE WHILE IN FLAT BAD: A LITTLE
TOILETING: A LOT
DAILY ACTIVITIY SCORE: 16
HELP NEEDED FOR BATHING: A LOT
MOBILITY SCORE: 17
WALKING IN HOSPITAL ROOM: A LITTLE
MOVING TO AND FROM BED TO CHAIR: A LITTLE
CLIMB 3 TO 5 STEPS WITH RAILING: TOTAL
PERSONAL GROOMING: A LITTLE

## 2024-08-11 ASSESSMENT — PAIN SCALES - GENERAL: PAINLEVEL_OUTOF10: 0 - NO PAIN

## 2024-08-11 NOTE — DISCHARGE INSTRUCTIONS
- The following recommendations are made for you at time of hospital discharge:   - Please remember to follow up with your heme/oncology within 1 week of discharge and clarify if you should be taking Eliquis long term   - Please remember to discontinue taking Jakafi upon discharge.   - Please take your home medications as instructed prior to hospitalization.   - No changes to your home medications have been made during your hospital stay.   - Please follow-up with your primary care provider within 5-7 days from time of discharge. Please call your PCP's office to schedule an appointment. Likely will need to bring photo ID and insurance card to your appointment.   - If you experience any worsening symptoms or any new acute concerns arise, please contact your primary care provider to discuss and possibly arrange an appointment. If you cannot get in touch with provider or severe symptoms are present, please return to nearest emergency room/urgent care for evaluation and treatment.

## 2024-08-11 NOTE — DISCHARGE SUMMARY
Discharge Diagnosis  Pancytopenia  Fall  Anticoagulated state    Issues Requiring Follow-Up  - The following recommendations are made for you at time of hospital discharge:   - Please remember to follow up with your heme/oncology within 1 week of discharge.   Recommend repeat bloodwork once seen by your hematologist/oncologist  - Please take your home medications as instructed prior to hospitalization with note holding Jakafi  - No changes to your home medications have been made during your hospital stay.   - Please follow-up with your primary care provider within 5-7 days from time of discharge. Please call your PCP's office to schedule an appointment. Likely will need to bring photo ID and insurance card to your appointment.   - If you experience any worsening symptoms or any new acute concerns arise, please contact your primary care provider to discuss and possibly arrange an appointment. If you cannot get in touch with provider or severe symptoms are present, please return to nearest emergency room/urgent care for evaluation and treatmen    Discharge Meds     Your medication list        CONTINUE taking these medications        Instructions Last Dose Given Next Dose Due   acetaminophen 500 mg tablet  Commonly known as: Tylenol           apixaban 2.5 mg tablet  Commonly known as: Eliquis           carvedilol 25 mg tablet  Commonly known as: Coreg           cholecalciferol 25 MCG (1000 UT) capsule  Commonly known as: Vitamin D-3           cyanocobalamin 1,000 mcg tablet  Commonly known as: Vitamin B-12           ferrous sulfate (325 mg ferrous sulfate) tablet           folic acid 400 mcg tablet  Commonly known as: Folvite           gabapentin 100 mg capsule  Commonly known as: Neurontin      Take 2 capsules (200 mg) by mouth 2 times a day.       isosorbide mononitrate ER 30 mg 24 hr tablet  Commonly known as: Imdur           Jakafi 10 mg tablet  Generic drug: ruxolitinib           Jakafi 10 mg tablet  Generic drug:  ruxolitinib           Jardiance 25 mg  Generic drug: empagliflozin           levothyroxine 175 mcg tablet  Commonly known as: Synthroid, Levoxyl           lisinopril 10 mg tablet           oxyCODONE 5 mg immediate release tablet  Commonly known as: Roxicodone      Take 1 tablet (5 mg) by mouth every 6 hours if needed for severe pain (7 - 10).       polyethylene glycol 17 gram packet  Commonly known as: Glycolax, Miralax           tiZANidine 2 mg tablet  Commonly known as: Zanaflex      Take 1 tablet (2 mg) by mouth every 6 hours if needed for muscle spasms.       zinc oxide 40 % ointment ointment      Apply 1 Application topically every 1 hour if needed (irritation).              STOP taking these medications      lidocaine 4 % patch                 Test Results Pending At Discharge  Pending Labs       No current pending labs.            Hospital Course  Desirae Gregg, an 83-year-old female with past medical history of hypertension, hypothyroidism, history of PE on Eliquis, diastolic heart failure ejection fraction 55 to 60%, acute on chronic anemia, polycythemia vera/Myeloproliferative disorder on Jakafi and Eliquis, nonobstructive nephrolithiasis, and right lower extremity weakness/pain/sciatica presented to the emergency department for concern of mechanical fall on Eliquis.  Per patient around noon today while getting carried down the stairs by her son and his friend to be taken to see her hematologist.  She hit her head due to her son's friend slipped but did not lose consciousness or had any neurological symptoms after or during including headaches confusion blurry vision worsening weakness in the arms and the legs.      When she saw her rheumatologist for her blood disorder, she reported that she had a mechanical fall and hit her head.  That raise concern and recommended to seek immediate medical attention given the patient is on Eliquis and hit her head for concern of bleed.     In the ED  Vitals show  temperature 36.1 pulse 60 respiration 16 /55 O2 98%  CBC showed WBC 4.7 hemoglobin 6.2 hematocrit 20.9 MCHC 29.7 22.6 RBC distribution platelets 106  CMP showed glucose 89 sodium 141 potassium 4.4 chloride 108 bicarb 26 BUN 17 creatinine 1.29 calcium 8.4 albumin 3.5 ALP 45 ALT 8 AST 12 total bili 0.6 total protein 5.3 mag 2.37  INR 1.3 PT 15.1 APTT 36  FOBT Negative      CT head showed no evidence of acute intracranial pathology   CT cervical spine showed no acute fracture or spondylolisthesis.  Noted degenerative disc disease and spondylosis are described in the body of the report        ED and intervention include blood transfusion 1 unit of packed RBCs    Hospital stay  - During patient's hospital course, pt was admitted for anemia requiring transfusions. Hematology/oncology was placed on consult and recommended transfusing pt if Hgb was less than 7g/dl  or if platelet count less than 50,000. During admission, pt received two blood transfusions. Per hematology/oncology, during hospital stay, Jakafi was discontinued and pt is asked not to take after discharge. Additionally, pt's Eliquis was held secondary for concern of bleeding after fall.  An attempt to contact [facility] in [facility] was made twice on 08/09/24. Provider Nurse was unable to provide information regarding patients history and use of Eliquis. Attempt to contact Dr. Madeleine Resendiz was made with no answer to phone call on 08/09/24. For pt's LEVY, pt followed strict I&O's and nephrotoxic meds were avoided. This imrpoved. On 8/11/24, the WBC, Hb and platelets estuardo slightly from prior. Pt was medically stable and cleared for discharge. Recommend holding the Jakafi at discharge as could be contributing to pancytopenia. She voiced understanding. Aware to followup with her provider in next week and to check labs at that appointment to monitor lines.    Pt discharged home today.      Pertinent Physical Exam At Time of Discharge  Constitutional:        General: She is not in acute distress.  Cardiovascular:      Rate and Rhythm: Normal rate.   Pulmonary:      Effort: Pulmonary effort is normal. No respiratory distress.   Abdominal:      Palpations: Abdomen is soft.   Musculoskeletal:         General: No swelling.   Skin:     General: Skin is warm, dry.   Neurological:      General: No focal deficit present.      Mental Status: She is alert and oriented to person, place, and time.   Psychiatric:         Mood and Affect: Mood normal.         Behavior: Behavior normal.       Outpatient Follow-Up  Future Appointments   Date Time Provider Department Walker   8/27/2024  2:10 PM Franky Warren MD [email or web address] [facility]   2/13/2025  2:45 PM [facility]         Iggy Daniels   Internal Medicine, PGY-1

## 2024-08-11 NOTE — CARE PLAN
The patient's goals for the shift include  Patient will be discharged safely home    The clinical goals for the shift include Patient will have pain well controlled this shift, below 5    Over the shift, the patient met goal patient remained stable and was discharged home.

## 2024-08-11 NOTE — PROGRESS NOTES
08/11/24 1430   Discharge Planning   Home or Post Acute Services In home services   Type of Home Care Services Home nursing visits;Home OT;Home PT   Expected Discharge Disposition  Services  (resumed [facility] (SN/PT/OT), referral sent in [facility], AVS and HHC order attached)

## 2024-08-11 NOTE — CARE PLAN
Problem: Fall/Injury  Goal: Not fall by end of shift  Outcome: Progressing  Goal: Be free from injury by end of the shift  Outcome: Progressing  Goal: Verbalize understanding of risk factor reduction measures to prevent injury from fall in the home  Outcome: Progressing  Goal: Use assistive devices by end of the shift  Outcome: Progressing      The clinical goals for the shift include pt will remain safe and stable through the shift.    Over the shift, the patient did make progress toward the following goals. Recommendations to address these barriers include call light in reach, bed alarm activated during the shift.

## 2024-08-11 NOTE — DISCHARGE SUMMARY
Discharge Diagnosis  Anemia    Issues Requiring Follow-Up  - The following recommendations are made for you at time of hospital discharge:   - Please remember to follow up with your heme/oncology within 1 week of discharge and clarify if you should be taking Eliquis long term   - Please take your home medications as instructed prior to hospitalization.   - No changes to your home medications have been made during your hospital stay.   - Please follow-up with your primary care provider within 5-7 days from time of discharge. Please call your PCP's office to schedule an appointment. Likely will need to bring photo ID and insurance card to your appointment.   - If you experience any worsening symptoms or any new acute concerns arise, please contact your primary care provider to discuss and possibly arrange an appointment. If you cannot get in touch with provider or severe symptoms are present, please return to nearest emergency room/urgent care for evaluation and treatmen    Discharge Meds     Your medication list        CONTINUE taking these medications        Instructions Last Dose Given Next Dose Due   acetaminophen 500 mg tablet  Commonly known as: Tylenol           apixaban 2.5 mg tablet  Commonly known as: Eliquis           carvedilol 25 mg tablet  Commonly known as: Coreg           cholecalciferol 25 MCG (1000 UT) capsule  Commonly known as: Vitamin D-3           cyanocobalamin 1,000 mcg tablet  Commonly known as: Vitamin B-12           ferrous sulfate (325 mg ferrous sulfate) tablet           folic acid 400 mcg tablet  Commonly known as: Folvite           gabapentin 100 mg capsule  Commonly known as: Neurontin      Take 2 capsules (200 mg) by mouth 2 times a day.       isosorbide mononitrate ER 30 mg 24 hr tablet  Commonly known as: Imdur           Jakafi 10 mg tablet  Generic drug: ruxolitinib           Jakafi 10 mg tablet  Generic drug: ruxolitinib           Jardiance 25 mg  Generic drug: empagliflozin            levothyroxine 175 mcg tablet  Commonly known as: Synthroid, Levoxyl           lisinopril 10 mg tablet           oxyCODONE 5 mg immediate release tablet  Commonly known as: Roxicodone      Take 1 tablet (5 mg) by mouth every 6 hours if needed for severe pain (7 - 10).       polyethylene glycol 17 gram packet  Commonly known as: Glycolax, Miralax           tiZANidine 2 mg tablet  Commonly known as: Zanaflex      Take 1 tablet (2 mg) by mouth every 6 hours if needed for muscle spasms.       zinc oxide 40 % ointment ointment      Apply 1 Application topically every 1 hour if needed (irritation).              STOP taking these medications      lidocaine 4 % patch                 Test Results Pending At Discharge  Pending Labs       No current pending labs.            Hospital Course  Desirae Gregg, an 83-year-old female with past medical history of hypertension, hypothyroidism, history of PE on Eliquis, diastolic heart failure ejection fraction 55 to 60%, acute on chronic anemia, polycythemia vera/Myeloproliferative disorder on Jakafi and Eliquis, nonobstructive nephrolithiasis, and right lower extremity weakness/pain/sciatica presented to the emergency department for concern of mechanical fall on Eliquis.  Per patient around noon today while getting carried down the stairs by her son and his friend to be taken to see her hematologist.  She hit her head due to her son's friend slipped but did not lose consciousness or had any neurological symptoms after or during including headaches confusion blurry vision worsening weakness in the arms and the legs.      When she saw her rheumatologist for her blood disorder, she reported that she had a mechanical fall and hit her head.  That raise concern and recommended to seek immediate medical attention given the patient is on Eliquis and hit her head for concern of bleed.     In the ED  Vitals show temperature 36.1 pulse 60 respiration 16 /55 O2 98%  CBC showed WBC 4.7  hemoglobin 6.2 hematocrit 20.9 MCHC 29.7 22.6 RBC distribution platelets 106  CMP showed glucose 89 sodium 141 potassium 4.4 chloride 108 bicarb 26 BUN 17 creatinine 1.29 calcium 8.4 albumin 3.5 ALP 45 ALT 8 AST 12 total bili 0.6 total protein 5.3 mag 2.37  INR 1.3 PT 15.1 APTT 36  FOBT Negative      CT head showed no evidence of acute intracranial pathology   CT cervical spine showed no acute fracture or spondylolisthesis.  Noted degenerative disc disease and spondylosis are described in the body of the report        ED and intervention include blood transfusion 1 unit of packed RBCs    Hospital stay  - During patient's hospital course, pt was admitted for anemia requiring transfusions. Hematology/oncology was placed on consult and recommended transfusing pt if Hgb was less than 7g/dl  or if platelet count less than 50,000. During admission, pt received two blood transfusions. Pt's Eliquis was held secondary for concern of bleeding after fall.  An attempt to contact [facility] in [facility] was made twice on 08/09/24. Nurse was unable to provide information regarding patients history and use of Eliquis. Attempt to contact Dr. Madeleine Resendiz was made with no answer to phone call on 08/09/24. For pt's LEVY, pt followed strict I&O's and nephrotoxic meds were avoided. Pt was medically stable and cleared for discharge.     Pertinent Physical Exam At Time of Discharge  Constitutional:       General: She is not in acute distress.  Cardiovascular:      Rate and Rhythm: Normal rate.   Pulmonary:      Effort: Pulmonary effort is normal. No respiratory distress.   Abdominal:      Palpations: Abdomen is soft.   Musculoskeletal:         General: No swelling.   Skin:     General: Skin is warm.  No active bleeding from anywhere.   Neurological:      General: No focal deficit present.      Mental Status: She is alert and oriented to person, place, and time.   Psychiatric:         Mood and Affect: Mood normal.         Behavior:  Behavior normal.    Outpatient Follow-Up  Future Appointments   Date Time Provider Department Center   8/27/2024  2:10 PM Franky Warren MD [address]   2/13/2025  2:45 PM Jackie Cedillo MD [address]   Internal Medicine, PGY-1

## 2024-08-12 ENCOUNTER — TELEPHONE (OUTPATIENT)
Dept: INTERNAL MEDICINE | Facility: HOSPITAL | Age: 84
End: 2024-08-12
Payer: MEDICARE

## 2024-08-12 NOTE — TELEPHONE ENCOUNTER
Patient discharged from hospital yesterday.    Patient called and her son (Alexis) answered the phone.  He reports patient is doing okay so far just a little tired. No complaints or issues noted.  He informs me the [facility] where she gets her heme/onc care reportedly requested information from  regarding her stay but hasn't received a call yet. Says that they arrange for a blood draw at home next it seems.   Recommended he call either today or tomorrow to stay on top of things to ensure that they followup with her as we recommended to further evaluate her hematologic issue. He voiced understanding.  No questions at this time to address.  Wished him as well as patient (his mother) well and to take care.     Leno Calix DO  Hospitalist, [facility]

## 2024-08-13 ENCOUNTER — APPOINTMENT (OUTPATIENT)
Dept: UROLOGY | Facility: CLINIC | Age: 84
End: 2024-08-13
Payer: MEDICARE

## 2024-08-13 ENCOUNTER — TELEPHONE (OUTPATIENT)
Dept: FAMILY MEDICINE CLINIC | Age: 84
End: 2024-08-13

## 2024-08-13 NOTE — TELEPHONE ENCOUNTER
[facility] nurse called to inform that he was out to see patient today who has a new wound Lt wrist which he treated by cleaning with a sterile saline, applied Abbey and covered with a Border Foam dressing.      Last seen 1/10/2024  Next appt Visit date not found    Requested Prescriptions      No prescriptions requested or ordered in this encounter

## 2024-08-13 NOTE — TELEPHONE ENCOUNTER
I called patient's home, spoke w/her Son, Carlos Enrique, informing him of Dr. Castellanos's request.  Carlos Enrique stated patient's wound is not that bad and she currently has a home health nurse and a nurse practitioner coming out to their home.  Carlos Enrique informed me that it's a real hardship to get patient out of their home at this time.  I provided him with the direct phone number to [facility] to schedule, when able.  Msg routed, for informational purposes.        Per Dr. Castellanos -  Thanks  I'd like her to come to [facility] to see me

## 2024-08-14 NOTE — SIGNIFICANT EVENT
Follow Up Phone Call    Outgoing phone call    Spoke to: Desirae Gregg Relationship:richa   Phone number: [phone]      Outcome: contacted patient/ family   Chief Complaint   Patient presents with   • Fall          Diagnosis:Not applicable    States she is feeling better but tired. He is having trouble with medical records transfer to the [facility]. This nurse left messages and followed through to ensure the transfer. The [facility] now has the records. Dr Resendiz is in today and should review the records. Her son is aware of the afore mentioned. No further questions or concerns.        \

## 2024-08-19 ENCOUNTER — TELEPHONE (OUTPATIENT)
Dept: FAMILY MEDICINE CLINIC | Age: 84
End: 2024-08-19

## 2024-08-19 NOTE — TELEPHONE ENCOUNTER
Brittny from [facility] asking for orders for would care.  After speaking to the pt son Carlos Enrique.  He stated they do not need any supplies.

## 2024-08-26 DIAGNOSIS — I50.22 CHRONIC SYSTOLIC CONGESTIVE HEART FAILURE (HCC): ICD-10-CM

## 2024-08-26 DIAGNOSIS — R93.1 ABNORMAL ECHOCARDIOGRAM: ICD-10-CM

## 2024-08-27 ENCOUNTER — APPOINTMENT (OUTPATIENT)
Dept: UROLOGY | Facility: CLINIC | Age: 84
End: 2024-08-27
Payer: MEDICARE

## 2024-08-27 NOTE — TELEPHONE ENCOUNTER
Last seen 1/10/2024  Next appt Visit date not found    Requested Prescriptions     Pending Prescriptions Disp Refills    lisinopril (PRINIVIL;ZESTRIL) 10 MG tablet [Pharmacy Med Name: Lisinopril 10 MG Oral Tablet] 30 tablet 0     Sig: TAKE 1 TABLET BY MOUTH DAILY    JARDIANCE 25 MG tablet [Pharmacy Med Name: Jardiance 25 MG Oral Tablet] 30 tablet 0     Sig: TAKE 1 TABLET BY MOUTH DAILY

## 2024-08-28 RX ORDER — EMPAGLIFLOZIN 25 MG/1
25 TABLET, FILM COATED ORAL DAILY
Qty: 30 TABLET | Refills: 0 | Status: SHIPPED | OUTPATIENT
Start: 2024-08-28

## 2024-08-28 RX ORDER — LISINOPRIL 10 MG/1
10 TABLET ORAL DAILY
Qty: 30 TABLET | Refills: 0 | Status: SHIPPED | OUTPATIENT
Start: 2024-08-28

## 2024-09-09 ENCOUNTER — HOSPITAL ENCOUNTER (OUTPATIENT)
Dept: PREADMISSION TESTING | Age: 84
Discharge: HOME | End: 2024-09-09

## 2024-09-10 ENCOUNTER — HOSPITAL ENCOUNTER (OUTPATIENT)
Dept: CT IMAGING | Age: 84
Discharge: HOME | End: 2024-09-10
Payer: MEDICARE

## 2024-09-10 VITALS
RESPIRATION RATE: 15 BRPM | HEART RATE: 75 BPM | TEMPERATURE: 97.5 F | BODY MASS INDEX: 26.76 KG/M2 | WEIGHT: 160.6 LBS | HEIGHT: 65 IN | DIASTOLIC BLOOD PRESSURE: 63 MMHG | OXYGEN SATURATION: 96 % | SYSTOLIC BLOOD PRESSURE: 136 MMHG

## 2024-09-10 DIAGNOSIS — D75.81 AGNOGENIC MYELOID METAPLASIA (HCC): ICD-10-CM

## 2024-09-10 DIAGNOSIS — D72.829 HYPERLEUKOCYTOSIS: ICD-10-CM

## 2024-09-10 DIAGNOSIS — D64.81 ANEMIA DUE TO CHEMOTHERAPY: ICD-10-CM

## 2024-09-10 DIAGNOSIS — T45.1X5A ANEMIA DUE TO CHEMOTHERAPY: ICD-10-CM

## 2024-09-10 LAB
BASOPHILS # BLD: 0.02 K/UL (ref 0–0.2)
BASOPHILS NFR BLD: 0 % (ref 0–2)
EOSINOPHIL # BLD: 0.12 K/UL (ref 0.05–0.5)
EOSINOPHILS RELATIVE PERCENT: 2 % (ref 0–6)
ERYTHROCYTE [DISTWIDTH] IN BLOOD BY AUTOMATED COUNT: 21.7 % (ref 11.5–15)
HCT VFR BLD AUTO: 34.9 % (ref 34–48)
HGB BLD-MCNC: 10.3 G/DL (ref 11.5–15.5)
IMM GRANULOCYTES # BLD AUTO: 0.15 K/UL (ref 0–0.58)
IMM GRANULOCYTES NFR BLD: 2 % (ref 0–5)
INR PPP: 1.4
LYMPHOCYTES NFR BLD: 0.72 K/UL (ref 1.5–4)
LYMPHOCYTES RELATIVE PERCENT: 11 % (ref 20–42)
MCH RBC QN AUTO: 29.4 PG (ref 26–35)
MCHC RBC AUTO-ENTMCNC: 29.5 G/DL (ref 32–34.5)
MCV RBC AUTO: 99.7 FL (ref 80–99.9)
MONOCYTES NFR BLD: 0.23 K/UL (ref 0.1–0.95)
MONOCYTES NFR BLD: 3 % (ref 2–12)
NEUTROPHILS NFR BLD: 82 % (ref 43–80)
NEUTS SEG NFR BLD: 5.63 K/UL (ref 1.8–7.3)
PARTIAL THROMBOPLASTIN TIME: 32.9 SEC (ref 24.5–35.1)
PLATELET # BLD AUTO: 142 K/UL (ref 130–450)
PMV BLD AUTO: 12.3 FL (ref 7–12)
PROTHROMBIN TIME: 14.8 SEC (ref 9.3–12.4)
RBC # BLD AUTO: 3.5 M/UL (ref 3.5–5.5)
RBC # BLD: ABNORMAL 10*6/UL
WBC OTHER # BLD: 6.9 K/UL (ref 4.5–11.5)

## 2024-09-10 PROCEDURE — 85025 COMPLETE CBC W/AUTO DIFF WBC: CPT

## 2024-09-10 PROCEDURE — 38222 DX BONE MARROW BX & ASPIR: CPT

## 2024-09-10 PROCEDURE — 85730 THROMBOPLASTIN TIME PARTIAL: CPT

## 2024-09-10 PROCEDURE — 85610 PROTHROMBIN TIME: CPT

## 2024-09-10 PROCEDURE — 7100000010 HC PHASE II RECOVERY - FIRST 15 MIN: Performed by: RADIOLOGY

## 2024-09-10 PROCEDURE — 6360000002 HC RX W HCPCS: Performed by: RADIOLOGY

## 2024-09-10 PROCEDURE — 77012 CT SCAN FOR NEEDLE BIOPSY: CPT

## 2024-09-10 RX ORDER — SODIUM CHLORIDE 0.9 % (FLUSH) 0.9 %
5-40 SYRINGE (ML) INJECTION PRN
Status: DISCONTINUED | OUTPATIENT
Start: 2024-09-10 | End: 2024-09-11 | Stop reason: HOSPADM

## 2024-09-10 RX ORDER — MIDAZOLAM HYDROCHLORIDE 1 MG/ML
INJECTION INTRAMUSCULAR; INTRAVENOUS PRN
Status: COMPLETED | OUTPATIENT
Start: 2024-09-10 | End: 2024-09-10

## 2024-09-10 RX ORDER — FENTANYL CITRATE 0.05 MG/ML
INJECTION, SOLUTION INTRAMUSCULAR; INTRAVENOUS PRN
Status: COMPLETED | OUTPATIENT
Start: 2024-09-10 | End: 2024-09-10

## 2024-09-10 RX ADMIN — FENTANYL CITRATE 50 MCG: 50 INJECTION INTRAMUSCULAR; INTRAVENOUS at 09:37

## 2024-09-10 RX ADMIN — MIDAZOLAM 1 MG: 1 INJECTION INTRAMUSCULAR; INTRAVENOUS at 09:37

## 2024-09-10 RX ADMIN — MIDAZOLAM 1 MG: 1 INJECTION INTRAMUSCULAR; INTRAVENOUS at 09:39

## 2024-09-10 ASSESSMENT — PAIN - FUNCTIONAL ASSESSMENT
PAIN_FUNCTIONAL_ASSESSMENT: NONE - DENIES PAIN
PAIN_FUNCTIONAL_ASSESSMENT: 0-10

## 2024-09-17 DIAGNOSIS — R93.1 ABNORMAL ECHOCARDIOGRAM: ICD-10-CM

## 2024-09-17 DIAGNOSIS — I50.22 CHRONIC SYSTOLIC CONGESTIVE HEART FAILURE (HCC): ICD-10-CM

## 2024-09-18 RX ORDER — EMPAGLIFLOZIN 25 MG/1
25 TABLET, FILM COATED ORAL DAILY
Qty: 30 TABLET | Refills: 11 | Status: SHIPPED | OUTPATIENT
Start: 2024-09-18

## 2024-09-18 RX ORDER — LISINOPRIL 10 MG/1
10 TABLET ORAL DAILY
Qty: 30 TABLET | Refills: 11 | Status: SHIPPED | OUTPATIENT
Start: 2024-09-18

## 2024-09-19 LAB — BONE MARROW REPORT: NORMAL

## 2024-10-22 ENCOUNTER — TELEPHONE (OUTPATIENT)
Dept: FAMILY MEDICINE CLINIC | Age: 84
End: 2024-10-22

## 2024-10-22 NOTE — TELEPHONE ENCOUNTER
Patient's son, Carlos Enrique, called on behalf of patient for a refill of Gabapentin which he stated was ordered when she was hospitalized.  I was unable to find RX in her current Medication List and located it in  Medication List as of 8/8/24  gabapentin (Neurontin) capsule 200 mg  200 mg, oral, 2 times daily, First dose on Thu 8/8/24 at 2230,      Carlos Enrique informed me that patient is almost out of this medication.  I informed him that patient has not been seen since 1/10/24 and should make an appt to go over meds and for follow up.  Carlos Enrique was agreeable and stated patient has been in and out of the hospital, then to rehab and is home.  He informed me that getting her out of the house is difficult and he is meeting with Palliative Care this week.     Last seen 1/10/2024  Next appt Visit date not found  [facility]

## 2024-10-23 RX ORDER — GABAPENTIN 100 MG/1
200 CAPSULE ORAL 2 TIMES DAILY
Qty: 120 CAPSULE | Refills: 2 | Status: SHIPPED | OUTPATIENT
Start: 2024-10-23 | End: 2025-10-23

## 2024-10-29 ENCOUNTER — TELEPHONE (OUTPATIENT)
Dept: PRIMARY CARE | Facility: CLINIC | Age: 84
End: 2024-10-29

## 2024-10-29 ENCOUNTER — APPOINTMENT (OUTPATIENT)
Dept: PRIMARY CARE | Facility: CLINIC | Age: 84
End: 2024-10-29
Payer: MEDICARE

## 2024-10-29 VITALS
DIASTOLIC BLOOD PRESSURE: 80 MMHG | HEART RATE: 62 BPM | HEIGHT: 65 IN | OXYGEN SATURATION: 98 % | BODY MASS INDEX: 26.99 KG/M2 | SYSTOLIC BLOOD PRESSURE: 132 MMHG | WEIGHT: 162 LBS

## 2024-10-29 DIAGNOSIS — L98.9 PERINEAL IRRITATION IN FEMALE: ICD-10-CM

## 2024-10-29 DIAGNOSIS — R63.0 DECREASED APPETITE: ICD-10-CM

## 2024-10-29 DIAGNOSIS — I50.22 CHRONIC SYSTOLIC (CONGESTIVE) HEART FAILURE: Primary | ICD-10-CM

## 2024-10-29 DIAGNOSIS — N21.0 BLADDER STONE: ICD-10-CM

## 2024-10-29 DIAGNOSIS — M54.31 CHRONIC SCIATICA, RIGHT: ICD-10-CM

## 2024-10-29 DIAGNOSIS — I10 ESSENTIAL HYPERTENSION: ICD-10-CM

## 2024-10-29 DIAGNOSIS — Z86.711 HISTORY OF PULMONARY EMBOLISM: ICD-10-CM

## 2024-10-29 DIAGNOSIS — M53.9 MULTILEVEL DEGENERATIVE DISC DISEASE: ICD-10-CM

## 2024-10-29 DIAGNOSIS — D50.9 IRON DEFICIENCY ANEMIA, UNSPECIFIED IRON DEFICIENCY ANEMIA TYPE: ICD-10-CM

## 2024-10-29 DIAGNOSIS — F41.9 ANXIETY: ICD-10-CM

## 2024-10-29 DIAGNOSIS — D75.81 MYELOFIBROSIS (MULTI): ICD-10-CM

## 2024-10-29 DIAGNOSIS — Z13.220 LIPID SCREENING: ICD-10-CM

## 2024-10-29 DIAGNOSIS — Z23 ENCOUNTER FOR IMMUNIZATION: ICD-10-CM

## 2024-10-29 DIAGNOSIS — L29.9 GENERALIZED PRURITUS: ICD-10-CM

## 2024-10-29 DIAGNOSIS — E03.8 OTHER SPECIFIED HYPOTHYROIDISM: ICD-10-CM

## 2024-10-29 DIAGNOSIS — E53.8 VITAMIN B12 DEFICIENCY: ICD-10-CM

## 2024-10-29 DIAGNOSIS — K21.9 GASTROESOPHAGEAL REFLUX DISEASE WITHOUT ESOPHAGITIS: ICD-10-CM

## 2024-10-29 DIAGNOSIS — F32.1 MODERATE MAJOR DEPRESSION (MULTI): ICD-10-CM

## 2024-10-29 DIAGNOSIS — E66.3 OVERWEIGHT WITH BODY MASS INDEX (BMI) OF 26 TO 26.9 IN ADULT: ICD-10-CM

## 2024-10-29 DIAGNOSIS — E03.9 ADULT HYPOTHYROIDISM: ICD-10-CM

## 2024-10-29 DIAGNOSIS — N18.31 CKD STAGE 3A, GFR 45-59 ML/MIN (MULTI): ICD-10-CM

## 2024-10-29 DIAGNOSIS — R60.0 PEDAL EDEMA: ICD-10-CM

## 2024-10-29 DIAGNOSIS — E55.9 VITAMIN D DEFICIENCY: ICD-10-CM

## 2024-10-29 PROBLEM — Z90.710 S/P HYSTERECTOMY: Status: ACTIVE | Noted: 2024-10-29

## 2024-10-29 PROBLEM — N18.32 CHRONIC KIDNEY DISEASE, STAGE 3B (MULTI): Status: ACTIVE | Noted: 2024-10-29

## 2024-10-29 PROBLEM — Z86.12 HISTORY OF POLIOMYELITIS: Status: ACTIVE | Noted: 2024-10-29

## 2024-10-29 PROCEDURE — 3075F SYST BP GE 130 - 139MM HG: CPT | Performed by: FAMILY MEDICINE

## 2024-10-29 PROCEDURE — G2211 COMPLEX E/M VISIT ADD ON: HCPCS | Performed by: FAMILY MEDICINE

## 2024-10-29 PROCEDURE — 3078F DIAST BP <80 MM HG: CPT | Performed by: FAMILY MEDICINE

## 2024-10-29 PROCEDURE — 1160F RVW MEDS BY RX/DR IN RCRD: CPT | Performed by: FAMILY MEDICINE

## 2024-10-29 PROCEDURE — 1159F MED LIST DOCD IN RCRD: CPT | Performed by: FAMILY MEDICINE

## 2024-10-29 PROCEDURE — 99204 OFFICE O/P NEW MOD 45 MIN: CPT | Performed by: FAMILY MEDICINE

## 2024-10-29 PROCEDURE — 1157F ADVNC CARE PLAN IN RCRD: CPT | Performed by: FAMILY MEDICINE

## 2024-10-29 PROCEDURE — 1036F TOBACCO NON-USER: CPT | Performed by: FAMILY MEDICINE

## 2024-10-29 RX ORDER — ISOSORBIDE MONONITRATE 30 MG/1
30 TABLET, EXTENDED RELEASE ORAL DAILY
Qty: 90 TABLET | Refills: 3 | Status: SHIPPED | OUTPATIENT
Start: 2024-10-29

## 2024-10-29 RX ORDER — SERTRALINE HYDROCHLORIDE 25 MG/1
TABLET, FILM COATED ORAL
COMMUNITY
Start: 2024-10-17 | End: 2024-10-29 | Stop reason: SDUPTHER

## 2024-10-29 RX ORDER — CARVEDILOL 25 MG/1
25 TABLET ORAL 2 TIMES DAILY
Qty: 180 TABLET | Refills: 1 | Status: ON HOLD | OUTPATIENT
Start: 2024-10-29 | End: 2025-04-27

## 2024-10-29 RX ORDER — CARVEDILOL 25 MG/1
TABLET ORAL
Qty: 180 TABLET | Refills: 3 | Status: SHIPPED | OUTPATIENT
Start: 2024-10-29

## 2024-10-29 RX ORDER — LEVOTHYROXINE SODIUM 88 UG/1
TABLET ORAL
Qty: 90 TABLET | Refills: 3 | Status: SHIPPED | OUTPATIENT
Start: 2024-10-29

## 2024-10-29 RX ORDER — GABAPENTIN 100 MG/1
200 CAPSULE ORAL 2 TIMES DAILY
Qty: 360 CAPSULE | Refills: 1 | Status: ON HOLD | OUTPATIENT
Start: 2024-10-29 | End: 2025-04-27

## 2024-10-29 RX ORDER — ISOSORBIDE MONONITRATE 30 MG/1
30 TABLET, EXTENDED RELEASE ORAL DAILY
Qty: 90 TABLET | Refills: 1 | Status: ON HOLD | OUTPATIENT
Start: 2024-10-29 | End: 2025-04-27

## 2024-10-29 RX ORDER — HYDRALAZINE HYDROCHLORIDE 25 MG/1
25 TABLET, FILM COATED ORAL EVERY 12 HOURS SCHEDULED
Qty: 180 TABLET | Refills: 3 | Status: SHIPPED | OUTPATIENT
Start: 2024-10-29

## 2024-10-29 RX ORDER — CETIRIZINE HYDROCHLORIDE 10 MG/1
10 TABLET ORAL DAILY
Qty: 90 TABLET | Refills: 1 | Status: ON HOLD | OUTPATIENT
Start: 2024-10-29 | End: 2025-04-27

## 2024-10-29 RX ORDER — LISINOPRIL 10 MG/1
10 TABLET ORAL DAILY
Qty: 90 TABLET | Refills: 1 | Status: ON HOLD | OUTPATIENT
Start: 2024-10-29 | End: 2025-04-27

## 2024-10-29 RX ORDER — OXYCODONE HYDROCHLORIDE 5 MG/1
5 TABLET ORAL EVERY 6 HOURS PRN
Qty: 24 TABLET | Refills: 0 | Status: ON HOLD | OUTPATIENT
Start: 2024-10-29 | End: 2024-11-04

## 2024-10-29 RX ORDER — BUSPIRONE HYDROCHLORIDE 7.5 MG/1
7.5 TABLET ORAL 2 TIMES DAILY
Qty: 180 TABLET | Refills: 1 | Status: ON HOLD | OUTPATIENT
Start: 2024-10-29 | End: 2025-04-27

## 2024-10-29 RX ORDER — SERTRALINE HYDROCHLORIDE 25 MG/1
25 TABLET, FILM COATED ORAL DAILY
Qty: 90 TABLET | Refills: 1 | Status: ON HOLD | OUTPATIENT
Start: 2024-10-29 | End: 2025-04-27

## 2024-10-29 RX ORDER — BUSPIRONE HYDROCHLORIDE 7.5 MG/1
1 TABLET ORAL 2 TIMES DAILY PRN
COMMUNITY
Start: 2024-10-22 | End: 2024-10-29 | Stop reason: SDUPTHER

## 2024-10-29 RX ORDER — LANOLIN ALCOHOL/MO/W.PET/CERES
1000 CREAM (GRAM) TOPICAL DAILY
Qty: 90 TABLET | Refills: 3 | Status: ON HOLD | OUTPATIENT
Start: 2024-10-29 | End: 2025-10-29

## 2024-10-29 RX ORDER — LEVOTHYROXINE SODIUM 88 UG/1
88 TABLET ORAL DAILY
Qty: 90 TABLET | Refills: 1 | Status: ON HOLD | OUTPATIENT
Start: 2024-10-29 | End: 2025-04-27

## 2024-10-29 RX ORDER — FUROSEMIDE 20 MG/1
20 TABLET ORAL DAILY
Qty: 90 TABLET | Refills: 1 | Status: ON HOLD | OUTPATIENT
Start: 2024-10-29 | End: 2025-04-27

## 2024-10-29 RX ORDER — VIT C/E/ZN/COPPR/LUTEIN/ZEAXAN 250MG-90MG
25 CAPSULE ORAL DAILY
Qty: 90 CAPSULE | Refills: 3 | Status: SHIPPED | OUTPATIENT
Start: 2024-10-29 | End: 2024-11-01 | Stop reason: SDUPTHER

## 2024-10-29 RX ORDER — FERROUS SULFATE 325(65) MG
325 TABLET ORAL
Qty: 90 TABLET | Refills: 1 | Status: ON HOLD | OUTPATIENT
Start: 2024-10-29 | End: 2025-04-27

## 2024-10-29 RX ORDER — PANTOPRAZOLE SODIUM 40 MG/1
40 TABLET, DELAYED RELEASE ORAL
Qty: 90 TABLET | Refills: 1 | Status: ON HOLD | OUTPATIENT
Start: 2024-10-29 | End: 2025-04-27

## 2024-10-29 RX ORDER — PANTOPRAZOLE SODIUM 40 MG/1
TABLET, DELAYED RELEASE ORAL
COMMUNITY
Start: 2024-10-17 | End: 2024-10-29 | Stop reason: SDUPTHER

## 2024-10-29 ASSESSMENT — ENCOUNTER SYMPTOMS
CHEST TIGHTNESS: 0
NUMBNESS: 0
LIGHT-HEADEDNESS: 0
SINUS PRESSURE: 0
WHEEZING: 0
SINUS PAIN: 0
DIZZINESS: 0
HEMATURIA: 0
FREQUENCY: 0
PALPITATIONS: 0
ABDOMINAL PAIN: 0
POLYPHAGIA: 0
SORE THROAT: 0
VOMITING: 0
CONSTIPATION: 0
COUGH: 0
UNEXPECTED WEIGHT CHANGE: 0
ADENOPATHY: 0
POLYDIPSIA: 0
CHILLS: 0
FEVER: 0
NAUSEA: 0
NERVOUS/ANXIOUS: 0
SHORTNESS OF BREATH: 0
CONFUSION: 0
DYSURIA: 0
DYSPHORIC MOOD: 0
HEADACHES: 0
DIARRHEA: 0
APPETITE CHANGE: 1
DIAPHORESIS: 0

## 2024-10-29 ASSESSMENT — PATIENT HEALTH QUESTIONNAIRE - PHQ9
2. FEELING DOWN, DEPRESSED OR HOPELESS: NOT AT ALL
SUM OF ALL RESPONSES TO PHQ9 QUESTIONS 1 AND 2: 0
1. LITTLE INTEREST OR PLEASURE IN DOING THINGS: NOT AT ALL

## 2024-10-29 NOTE — TELEPHONE ENCOUNTER
Name of Medication(s) Requested:  Requested Prescriptions     Pending Prescriptions Disp Refills    levothyroxine (SYNTHROID) 88 MCG tablet [Pharmacy Med Name: Levothyroxine Sodium 88 MCG Oral Tablet] 90 tablet 3     Sig: TAKE 1 TABLET BY MOUTH DAILY    isosorbide mononitrate (IMDUR) 30 MG extended release tablet [Pharmacy Med Name: Isosorbide Mononitrate ER 30 MG Oral Tablet Extended Release 24 Hour] 90 tablet 3     Sig: TAKE 1 TABLET BY MOUTH DAILY    carvedilol (COREG) 25 MG tablet [Pharmacy Med Name: Carvedilol 25 MG Oral Tablet] 180 tablet 3     Sig: TAKE 1 TABLET BY MOUTH TWICE  DAILY WITH MEALS     Refused Prescriptions Disp Refills    hydrALAZINE (APRESOLINE) 25 MG tablet [Pharmacy Med Name: hydrALAZINE HCl 25 MG Oral Tablet] 180 tablet 3     Sig: TAKE 1 TABLET BY MOUTH EVERY 12  HOURS       Medication is on current medication list Yes    Dosage and directions were verified? Yes    Quantity verified: 90 day supply     Pharmacy Verified?  Yes    Last Appointment:  1/10/2024    Future appts:  No future appointments.     (If no appt send self scheduling link. .REFILLAPPT)  Scheduling request sent?     [x] Yes  [] No    Does patient need updated?  [] Yes  [x] No

## 2024-10-30 ENCOUNTER — TELEPHONE (OUTPATIENT)
Dept: PRIMARY CARE | Facility: CLINIC | Age: 84
End: 2024-10-30
Payer: MEDICARE

## 2024-10-30 DIAGNOSIS — E03.9 ADULT HYPOTHYROIDISM: Primary | ICD-10-CM

## 2024-10-30 DIAGNOSIS — D72.829 LEUKOCYTOSIS, UNSPECIFIED TYPE: ICD-10-CM

## 2024-10-30 DIAGNOSIS — E55.9 VITAMIN D DEFICIENCY: ICD-10-CM

## 2024-10-30 LAB
CHOLESTEROL (MG/DL) IN SER/PLAS EXTERNAL: 86 MG/DL
CHOLESTEROL IN HDL (MG/DL) IN SER/PLAS EXTERNAL: 31 MG/DL
CHOLESTEROL IN LDL (MG/DL) IN SERUM OR PLASMA BY CALCULATION EXTERNAL: 39 MG/DL
THYROTROPIN (MIU/L) IN SER/PLAS BY DETECTION LIMIT <= 0.05 MIU/L EXTERNAL: 2.09 MIU/L
TRIGLYCERIDE (MG/DL) IN SER/PLAS EXTERNAL: 82 MG/DL

## 2024-10-31 PROBLEM — D45: Status: ACTIVE | Noted: 2024-10-31

## 2024-11-01 RX ORDER — ACETAMINOPHEN 500 MG
50 TABLET ORAL DAILY
Qty: 90 CAPSULE | Refills: 3 | Status: ON HOLD | OUTPATIENT
Start: 2024-11-01 | End: 2025-11-01

## 2024-11-03 ENCOUNTER — APPOINTMENT (OUTPATIENT)
Dept: CARDIOLOGY | Facility: HOSPITAL | Age: 84
DRG: 871 | End: 2024-11-03
Payer: MEDICARE

## 2024-11-03 ENCOUNTER — APPOINTMENT (OUTPATIENT)
Dept: RADIOLOGY | Facility: HOSPITAL | Age: 84
End: 2024-11-03
Payer: MEDICARE

## 2024-11-03 ENCOUNTER — APPOINTMENT (OUTPATIENT)
Dept: RADIOLOGY | Facility: HOSPITAL | Age: 84
DRG: 871 | End: 2024-11-03
Payer: MEDICARE

## 2024-11-03 ENCOUNTER — HOSPITAL ENCOUNTER (INPATIENT)
Facility: HOSPITAL | Age: 84
End: 2024-11-03
Attending: EMERGENCY MEDICINE | Admitting: INTERNAL MEDICINE
Payer: MEDICARE

## 2024-11-03 VITALS
DIASTOLIC BLOOD PRESSURE: 52 MMHG | HEART RATE: 99 BPM | OXYGEN SATURATION: 93 % | TEMPERATURE: 98.1 F | HEIGHT: 65 IN | WEIGHT: 160 LBS | RESPIRATION RATE: 18 BRPM | BODY MASS INDEX: 26.66 KG/M2 | SYSTOLIC BLOOD PRESSURE: 105 MMHG

## 2024-11-03 DIAGNOSIS — N21.0 BLADDER STONE: ICD-10-CM

## 2024-11-03 DIAGNOSIS — M54.31 CHRONIC SCIATICA, RIGHT: ICD-10-CM

## 2024-11-03 DIAGNOSIS — K59.1 FUNCTIONAL DIARRHEA: ICD-10-CM

## 2024-11-03 DIAGNOSIS — Z74.09 IMPAIRED MOBILITY AND ADLS: ICD-10-CM

## 2024-11-03 DIAGNOSIS — K64.9 BLEEDING HEMORRHOIDS: ICD-10-CM

## 2024-11-03 DIAGNOSIS — B37.2 SKIN CANDIDIASIS: ICD-10-CM

## 2024-11-03 DIAGNOSIS — I82.4Y9 ACUTE DEEP VEIN THROMBOSIS (DVT) OF PROXIMAL VEIN OF LOWER EXTREMITY, UNSPECIFIED LATERALITY (MULTI): ICD-10-CM

## 2024-11-03 DIAGNOSIS — I50.22 CHRONIC SYSTOLIC (CONGESTIVE) HEART FAILURE: ICD-10-CM

## 2024-11-03 DIAGNOSIS — I10 ESSENTIAL HYPERTENSION: ICD-10-CM

## 2024-11-03 DIAGNOSIS — E83.42 HYPOMAGNESEMIA: ICD-10-CM

## 2024-11-03 DIAGNOSIS — K62.5 BRIGHT RED BLOOD PER RECTUM: ICD-10-CM

## 2024-11-03 DIAGNOSIS — M53.9 MULTILEVEL DEGENERATIVE DISC DISEASE: ICD-10-CM

## 2024-11-03 DIAGNOSIS — C91.10 CLL (CHRONIC LYMPHOCYTIC LEUKEMIA) (MULTI): ICD-10-CM

## 2024-11-03 DIAGNOSIS — I87.1 EDEMA DUE TO OBSTRUCTED VENOUS RETURN: ICD-10-CM

## 2024-11-03 DIAGNOSIS — R60.9 EDEMA DUE TO OBSTRUCTED VENOUS RETURN: ICD-10-CM

## 2024-11-03 DIAGNOSIS — I95.9 HYPOTENSION, UNSPECIFIED HYPOTENSION TYPE: ICD-10-CM

## 2024-11-03 DIAGNOSIS — L03.90 WOUND CELLULITIS: ICD-10-CM

## 2024-11-03 DIAGNOSIS — N18.31 CKD STAGE 3A, GFR 45-59 ML/MIN (MULTI): ICD-10-CM

## 2024-11-03 DIAGNOSIS — Z78.9 IMPAIRED MOBILITY AND ADLS: ICD-10-CM

## 2024-11-03 DIAGNOSIS — J18.9 PNEUMONIA OF RIGHT LOWER LOBE DUE TO INFECTIOUS ORGANISM: Primary | ICD-10-CM

## 2024-11-03 DIAGNOSIS — Q20.8 ABNORMALITY OF RIGHT VENTRICLE OF HEART: ICD-10-CM

## 2024-11-03 PROBLEM — D72.829 LEUKOCYTOSIS: Status: ACTIVE | Noted: 2024-11-03

## 2024-11-03 PROBLEM — N17.9 ACUTE KIDNEY INJURY SUPERIMPOSED ON STAGE 3A CHRONIC KIDNEY DISEASE: Status: ACTIVE | Noted: 2024-11-03

## 2024-11-03 PROBLEM — R73.9 HYPERGLYCEMIA: Status: ACTIVE | Noted: 2024-11-03

## 2024-11-03 PROBLEM — E80.6 HYPERBILIRUBINEMIA: Status: ACTIVE | Noted: 2024-11-03

## 2024-11-03 PROBLEM — R79.89 ELEVATED TROPONIN: Status: ACTIVE | Noted: 2024-11-03

## 2024-11-03 PROBLEM — A41.9 SEPSIS WITH ACUTE HYPOXIC RESPIRATORY FAILURE WITHOUT SEPTIC SHOCK (MULTI): Status: ACTIVE | Noted: 2024-11-03

## 2024-11-03 PROBLEM — R65.20 SEPSIS WITH ACUTE HYPOXIC RESPIRATORY FAILURE WITHOUT SEPTIC SHOCK (MULTI): Status: ACTIVE | Noted: 2024-11-03

## 2024-11-03 PROBLEM — J96.01 SEPSIS WITH ACUTE HYPOXIC RESPIRATORY FAILURE WITHOUT SEPTIC SHOCK (MULTI): Status: ACTIVE | Noted: 2024-11-03

## 2024-11-03 LAB
ALBUMIN SERPL BCP-MCNC: 2.6 G/DL (ref 3.4–5)
ALP SERPL-CCNC: 48 U/L (ref 33–136)
ALT SERPL W P-5'-P-CCNC: 15 U/L (ref 7–45)
ANION GAP SERPL CALC-SCNC: 10 MMOL/L (ref 10–20)
APPEARANCE UR: ABNORMAL
AST SERPL W P-5'-P-CCNC: 12 U/L (ref 9–39)
BACTERIA BLD CULT: NORMAL
BACTERIA BLD CULT: NORMAL
BASOPHILS # BLD AUTO: 0.08 X10*3/UL (ref 0–0.1)
BASOPHILS NFR BLD AUTO: 0.4 %
BILIRUB DIRECT SERPL-MCNC: 0.6 MG/DL (ref 0–0.3)
BILIRUB SERPL-MCNC: 2.2 MG/DL (ref 0–1.2)
BILIRUB UR STRIP.AUTO-MCNC: NEGATIVE MG/DL
BNP SERPL-MCNC: 867 PG/ML (ref 0–99)
BUN SERPL-MCNC: 15 MG/DL (ref 6–23)
CALCIUM SERPL-MCNC: 7.8 MG/DL (ref 8.6–10.3)
CARDIAC TROPONIN I PNL SERPL HS: 62 NG/L (ref 0–13)
CARDIAC TROPONIN I PNL SERPL HS: 76 NG/L (ref 0–13)
CARDIAC TROPONIN I PNL SERPL HS: 76 NG/L (ref 0–13)
CHLORIDE SERPL-SCNC: 103 MMOL/L (ref 98–107)
CO2 SERPL-SCNC: 25 MMOL/L (ref 21–32)
COLOR UR: ABNORMAL
CREAT SERPL-MCNC: 1.21 MG/DL (ref 0.5–1.05)
DACRYOCYTES BLD QL SMEAR: NORMAL
EGFRCR SERPLBLD CKD-EPI 2021: 44 ML/MIN/1.73M*2
EOSINOPHIL # BLD AUTO: 0.14 X10*3/UL (ref 0–0.4)
EOSINOPHIL NFR BLD AUTO: 0.7 %
ERYTHROCYTE [DISTWIDTH] IN BLOOD BY AUTOMATED COUNT: 15.6 % (ref 11.5–14.5)
EST. AVERAGE GLUCOSE BLD GHB EST-MCNC: 88 MG/DL
FLUAV RNA RESP QL NAA+PROBE: NOT DETECTED
FLUBV RNA RESP QL NAA+PROBE: NOT DETECTED
GLUCOSE SERPL-MCNC: 119 MG/DL (ref 74–99)
GLUCOSE UR STRIP.AUTO-MCNC: ABNORMAL MG/DL
HBA1C MFR BLD: 4.7 %
HCT VFR BLD AUTO: 47.2 % (ref 36–46)
HGB BLD-MCNC: 14.4 G/DL (ref 12–16)
HOLD SPECIMEN: NORMAL
IMM GRANULOCYTES # BLD AUTO: 0.29 X10*3/UL (ref 0–0.5)
IMM GRANULOCYTES NFR BLD AUTO: 1.4 % (ref 0–0.9)
KETONES UR STRIP.AUTO-MCNC: ABNORMAL MG/DL
LACTATE SERPL-SCNC: 1.3 MMOL/L (ref 0.4–2)
LEUKOCYTE ESTERASE UR QL STRIP.AUTO: ABNORMAL
LYMPHOCYTES # BLD AUTO: 0.16 X10*3/UL (ref 0.8–3)
LYMPHOCYTES NFR BLD AUTO: 0.8 %
MCH RBC QN AUTO: 27.3 PG (ref 26–34)
MCHC RBC AUTO-ENTMCNC: 30.5 G/DL (ref 32–36)
MCV RBC AUTO: 90 FL (ref 80–100)
MONOCYTES # BLD AUTO: 0.41 X10*3/UL (ref 0.05–0.8)
MONOCYTES NFR BLD AUTO: 2 %
MUCOUS THREADS #/AREA URNS AUTO: ABNORMAL /LPF
NEUTROPHILS # BLD AUTO: 19.32 X10*3/UL (ref 1.6–5.5)
NEUTROPHILS NFR BLD AUTO: 94.7 %
NITRITE UR QL STRIP.AUTO: ABNORMAL
NRBC BLD-RTO: 0 /100 WBCS (ref 0–0)
OVALOCYTES BLD QL SMEAR: NORMAL
PH UR STRIP.AUTO: 5.5 [PH]
PLATELET # BLD AUTO: 201 X10*3/UL (ref 150–450)
POLYCHROMASIA BLD QL SMEAR: NORMAL
POTASSIUM SERPL-SCNC: 4.3 MMOL/L (ref 3.5–5.3)
PROT SERPL-MCNC: 4 G/DL (ref 6.4–8.2)
PROT UR STRIP.AUTO-MCNC: ABNORMAL MG/DL
RBC # BLD AUTO: 5.27 X10*6/UL (ref 4–5.2)
RBC # UR STRIP.AUTO: ABNORMAL /UL
RBC #/AREA URNS AUTO: >20 /HPF
RBC MORPH BLD: NORMAL
RSV RNA RESP QL NAA+PROBE: NOT DETECTED
SARS-COV-2 RNA RESP QL NAA+PROBE: NOT DETECTED
SODIUM SERPL-SCNC: 134 MMOL/L (ref 136–145)
SP GR UR STRIP.AUTO: 1.02
SQUAMOUS #/AREA URNS AUTO: ABNORMAL /HPF
UFH PPP CHRO-ACNC: 1.1 IU/ML
UROBILINOGEN UR STRIP.AUTO-MCNC: NORMAL MG/DL
WBC # BLD AUTO: 20.4 X10*3/UL (ref 4.4–11.3)
WBC #/AREA URNS AUTO: >50 /HPF
WBC CLUMPS #/AREA URNS AUTO: ABNORMAL /HPF

## 2024-11-03 PROCEDURE — 93005 ELECTROCARDIOGRAM TRACING: CPT

## 2024-11-03 PROCEDURE — 87637 SARSCOV2&INF A&B&RSV AMP PRB: CPT | Performed by: PHYSICIAN ASSISTANT

## 2024-11-03 PROCEDURE — 85520 HEPARIN ASSAY: CPT | Performed by: INTERNAL MEDICINE

## 2024-11-03 PROCEDURE — 87040 BLOOD CULTURE FOR BACTERIA: CPT | Mod: GEALAB | Performed by: PHYSICIAN ASSISTANT

## 2024-11-03 PROCEDURE — 85025 COMPLETE CBC W/AUTO DIFF WBC: CPT | Performed by: PHYSICIAN ASSISTANT

## 2024-11-03 PROCEDURE — 2500000001 HC RX 250 WO HCPCS SELF ADMINISTERED DRUGS (ALT 637 FOR MEDICARE OP): Performed by: PHYSICIAN ASSISTANT

## 2024-11-03 PROCEDURE — 80053 COMPREHEN METABOLIC PANEL: CPT | Performed by: PHYSICIAN ASSISTANT

## 2024-11-03 PROCEDURE — 96367 TX/PROPH/DG ADDL SEQ IV INF: CPT

## 2024-11-03 PROCEDURE — 84484 ASSAY OF TROPONIN QUANT: CPT | Performed by: PHYSICIAN ASSISTANT

## 2024-11-03 PROCEDURE — 2500000001 HC RX 250 WO HCPCS SELF ADMINISTERED DRUGS (ALT 637 FOR MEDICARE OP): Performed by: INTERNAL MEDICINE

## 2024-11-03 PROCEDURE — 99291 CRITICAL CARE FIRST HOUR: CPT | Performed by: EMERGENCY MEDICINE

## 2024-11-03 PROCEDURE — 84484 ASSAY OF TROPONIN QUANT: CPT | Performed by: INTERNAL MEDICINE

## 2024-11-03 PROCEDURE — 71045 X-RAY EXAM CHEST 1 VIEW: CPT

## 2024-11-03 PROCEDURE — 71250 CT THORAX DX C-: CPT | Performed by: RADIOLOGY

## 2024-11-03 PROCEDURE — 81001 URINALYSIS AUTO W/SCOPE: CPT | Performed by: PHYSICIAN ASSISTANT

## 2024-11-03 PROCEDURE — 1200000002 HC GENERAL ROOM WITH TELEMETRY DAILY

## 2024-11-03 PROCEDURE — 71045 X-RAY EXAM CHEST 1 VIEW: CPT | Performed by: RADIOLOGY

## 2024-11-03 PROCEDURE — 83036 HEMOGLOBIN GLYCOSYLATED A1C: CPT | Mod: GEALAB | Performed by: INTERNAL MEDICINE

## 2024-11-03 PROCEDURE — 99223 1ST HOSP IP/OBS HIGH 75: CPT | Performed by: INTERNAL MEDICINE

## 2024-11-03 PROCEDURE — 36415 COLL VENOUS BLD VENIPUNCTURE: CPT | Performed by: PHYSICIAN ASSISTANT

## 2024-11-03 PROCEDURE — 83605 ASSAY OF LACTIC ACID: CPT | Performed by: PHYSICIAN ASSISTANT

## 2024-11-03 PROCEDURE — 2500000004 HC RX 250 GENERAL PHARMACY W/ HCPCS (ALT 636 FOR OP/ED): Performed by: INTERNAL MEDICINE

## 2024-11-03 PROCEDURE — 83880 ASSAY OF NATRIURETIC PEPTIDE: CPT | Performed by: PHYSICIAN ASSISTANT

## 2024-11-03 PROCEDURE — 84484 ASSAY OF TROPONIN QUANT: CPT | Performed by: EMERGENCY MEDICINE

## 2024-11-03 PROCEDURE — 71250 CT THORAX DX C-: CPT

## 2024-11-03 PROCEDURE — 2500000004 HC RX 250 GENERAL PHARMACY W/ HCPCS (ALT 636 FOR OP/ED): Performed by: PHYSICIAN ASSISTANT

## 2024-11-03 PROCEDURE — 96365 THER/PROPH/DIAG IV INF INIT: CPT

## 2024-11-03 PROCEDURE — 99285 EMERGENCY DEPT VISIT HI MDM: CPT

## 2024-11-03 PROCEDURE — 87086 URINE CULTURE/COLONY COUNT: CPT | Mod: GEALAB | Performed by: PHYSICIAN ASSISTANT

## 2024-11-03 PROCEDURE — P9612 CATHETERIZE FOR URINE SPEC: HCPCS

## 2024-11-03 PROCEDURE — 82248 BILIRUBIN DIRECT: CPT | Performed by: INTERNAL MEDICINE

## 2024-11-03 RX ORDER — LANOLIN ALCOHOL/MO/W.PET/CERES
1000 CREAM (GRAM) TOPICAL DAILY
Status: DISCONTINUED | OUTPATIENT
Start: 2024-11-04 | End: 2024-11-19 | Stop reason: HOSPADM

## 2024-11-03 RX ORDER — LEVOTHYROXINE SODIUM 88 UG/1
88 TABLET ORAL DAILY
Status: DISCONTINUED | OUTPATIENT
Start: 2024-11-04 | End: 2024-11-19 | Stop reason: HOSPADM

## 2024-11-03 RX ORDER — BUSPIRONE HYDROCHLORIDE 15 MG/1
7.5 TABLET ORAL 2 TIMES DAILY
Status: DISCONTINUED | OUTPATIENT
Start: 2024-11-03 | End: 2024-11-19 | Stop reason: HOSPADM

## 2024-11-03 RX ORDER — CEFTRIAXONE 2 G/50ML
2 INJECTION, SOLUTION INTRAVENOUS ONCE
Status: COMPLETED | OUTPATIENT
Start: 2024-11-03 | End: 2024-11-03

## 2024-11-03 RX ORDER — OXYCODONE HYDROCHLORIDE 5 MG/1
5 TABLET ORAL EVERY 6 HOURS PRN
Status: DISCONTINUED | OUTPATIENT
Start: 2024-11-03 | End: 2024-11-19 | Stop reason: HOSPADM

## 2024-11-03 RX ORDER — FERROUS SULFATE 325(65) MG
65 TABLET ORAL
Status: DISCONTINUED | OUTPATIENT
Start: 2024-11-04 | End: 2024-11-19 | Stop reason: HOSPADM

## 2024-11-03 RX ORDER — ACETAMINOPHEN 160 MG/5ML
650 SOLUTION ORAL EVERY 4 HOURS PRN
Status: DISCONTINUED | OUTPATIENT
Start: 2024-11-03 | End: 2024-11-19 | Stop reason: HOSPADM

## 2024-11-03 RX ORDER — ISOSORBIDE MONONITRATE 30 MG/1
30 TABLET, EXTENDED RELEASE ORAL DAILY
Status: DISCONTINUED | OUTPATIENT
Start: 2024-11-04 | End: 2024-11-13

## 2024-11-03 RX ORDER — AZITHROMYCIN 500 MG/1
500 TABLET, FILM COATED ORAL
Status: DISCONTINUED | OUTPATIENT
Start: 2024-11-04 | End: 2024-11-05

## 2024-11-03 RX ORDER — SERTRALINE HYDROCHLORIDE 50 MG/1
25 TABLET, FILM COATED ORAL DAILY
Status: DISCONTINUED | OUTPATIENT
Start: 2024-11-04 | End: 2024-11-19 | Stop reason: HOSPADM

## 2024-11-03 RX ORDER — ACETAMINOPHEN 325 MG/1
975 TABLET ORAL ONCE
Status: COMPLETED | OUTPATIENT
Start: 2024-11-03 | End: 2024-11-03

## 2024-11-03 RX ORDER — HEPARIN SODIUM 10000 [USP'U]/100ML
0-4000 INJECTION, SOLUTION INTRAVENOUS CONTINUOUS
Status: DISCONTINUED | OUTPATIENT
Start: 2024-11-03 | End: 2024-11-05

## 2024-11-03 RX ORDER — PANTOPRAZOLE SODIUM 40 MG/1
40 TABLET, DELAYED RELEASE ORAL
Status: DISCONTINUED | OUTPATIENT
Start: 2024-11-04 | End: 2024-11-04

## 2024-11-03 RX ORDER — ONDANSETRON 4 MG/1
4 TABLET, ORALLY DISINTEGRATING ORAL EVERY 8 HOURS PRN
Status: DISCONTINUED | OUTPATIENT
Start: 2024-11-03 | End: 2024-11-19 | Stop reason: HOSPADM

## 2024-11-03 RX ORDER — SODIUM CHLORIDE 9 MG/ML
100 INJECTION, SOLUTION INTRAVENOUS CONTINUOUS
Status: DISCONTINUED | OUTPATIENT
Start: 2024-11-03 | End: 2024-11-03

## 2024-11-03 RX ORDER — SODIUM CHLORIDE 9 MG/ML
100 INJECTION, SOLUTION INTRAVENOUS CONTINUOUS
Status: ACTIVE | OUTPATIENT
Start: 2024-11-03 | End: 2024-11-04

## 2024-11-03 RX ORDER — FOLIC ACID 1 MG/1
1 TABLET ORAL DAILY
Status: DISCONTINUED | OUTPATIENT
Start: 2024-11-04 | End: 2024-11-19 | Stop reason: HOSPADM

## 2024-11-03 RX ORDER — ACETAMINOPHEN 325 MG/1
650 TABLET ORAL EVERY 4 HOURS PRN
Status: DISCONTINUED | OUTPATIENT
Start: 2024-11-03 | End: 2024-11-19 | Stop reason: HOSPADM

## 2024-11-03 RX ORDER — POLYETHYLENE GLYCOL 3350 17 G/17G
17 POWDER, FOR SOLUTION ORAL DAILY PRN
Status: DISCONTINUED | OUTPATIENT
Start: 2024-11-03 | End: 2024-11-16

## 2024-11-03 RX ORDER — LOPERAMIDE HCL 2 MG
2 TABLET ORAL 4 TIMES DAILY PRN
COMMUNITY

## 2024-11-03 RX ORDER — EAR PLUGS
1 EACH OTIC (EAR) 2 TIMES DAILY
Status: DISCONTINUED | OUTPATIENT
Start: 2024-11-03 | End: 2024-11-15

## 2024-11-03 RX ORDER — ONDANSETRON HYDROCHLORIDE 2 MG/ML
4 INJECTION, SOLUTION INTRAVENOUS EVERY 8 HOURS PRN
Status: DISCONTINUED | OUTPATIENT
Start: 2024-11-03 | End: 2024-11-19 | Stop reason: HOSPADM

## 2024-11-03 RX ORDER — ACETAMINOPHEN 650 MG/1
650 SUPPOSITORY RECTAL EVERY 4 HOURS PRN
Status: DISCONTINUED | OUTPATIENT
Start: 2024-11-03 | End: 2024-11-12

## 2024-11-03 RX ORDER — LOPERAMIDE HYDROCHLORIDE 2 MG/1
2 CAPSULE ORAL 4 TIMES DAILY PRN
Status: DISCONTINUED | OUTPATIENT
Start: 2024-11-03 | End: 2024-11-16

## 2024-11-03 RX ORDER — CHOLECALCIFEROL (VITAMIN D3) 25 MCG
2000 TABLET ORAL DAILY
Status: DISCONTINUED | OUTPATIENT
Start: 2024-11-04 | End: 2024-11-19 | Stop reason: HOSPADM

## 2024-11-03 RX ORDER — NYSTATIN 100000 U/G
CREAM TOPICAL 2 TIMES DAILY
Status: DISCONTINUED | OUTPATIENT
Start: 2024-11-03 | End: 2024-11-19 | Stop reason: HOSPADM

## 2024-11-03 RX ORDER — FUROSEMIDE 10 MG/ML
40 INJECTION INTRAMUSCULAR; INTRAVENOUS ONCE
Status: DISCONTINUED | OUTPATIENT
Start: 2024-11-03 | End: 2024-11-03

## 2024-11-03 RX ORDER — CARVEDILOL 25 MG/1
25 TABLET ORAL 2 TIMES DAILY
Status: DISCONTINUED | OUTPATIENT
Start: 2024-11-03 | End: 2024-11-07

## 2024-11-03 RX ORDER — GABAPENTIN 100 MG/1
200 CAPSULE ORAL 2 TIMES DAILY
Status: DISCONTINUED | OUTPATIENT
Start: 2024-11-03 | End: 2024-11-19 | Stop reason: HOSPADM

## 2024-11-03 RX ORDER — CETIRIZINE HYDROCHLORIDE 10 MG/1
10 TABLET ORAL DAILY
Status: DISCONTINUED | OUTPATIENT
Start: 2024-11-04 | End: 2024-11-19 | Stop reason: HOSPADM

## 2024-11-03 RX ORDER — TALC
3 POWDER (GRAM) TOPICAL NIGHTLY PRN
Status: DISCONTINUED | OUTPATIENT
Start: 2024-11-03 | End: 2024-11-19 | Stop reason: HOSPADM

## 2024-11-03 RX ADMIN — BUSPIRONE HYDROCHLORIDE 7.5 MG: 15 TABLET ORAL at 20:13

## 2024-11-03 RX ADMIN — CARVEDILOL 25 MG: 25 TABLET, FILM COATED ORAL at 20:13

## 2024-11-03 RX ADMIN — LOPERAMIDE HYDROCHLORIDE 2 MG: 2 CAPSULE ORAL at 14:33

## 2024-11-03 RX ADMIN — SODIUM CHLORIDE 500 ML: 9 INJECTION, SOLUTION INTRAVENOUS at 15:27

## 2024-11-03 RX ADMIN — Medication 1 APPLICATION: at 14:33

## 2024-11-03 RX ADMIN — GABAPENTIN 200 MG: 100 CAPSULE ORAL at 20:13

## 2024-11-03 RX ADMIN — AZITHROMYCIN MONOHYDRATE 500 MG: 500 INJECTION, POWDER, LYOPHILIZED, FOR SOLUTION INTRAVENOUS at 10:20

## 2024-11-03 RX ADMIN — PIPERACILLIN SODIUM AND TAZOBACTAM SODIUM 3.38 G: 3; .375 INJECTION, SOLUTION INTRAVENOUS at 20:13

## 2024-11-03 RX ADMIN — SODIUM CHLORIDE 100 ML/HR: 9 INJECTION, SOLUTION INTRAVENOUS at 16:30

## 2024-11-03 RX ADMIN — CEFTRIAXONE SODIUM 2 G: 2 INJECTION, SOLUTION INTRAVENOUS at 09:40

## 2024-11-03 RX ADMIN — NYSTATIN: 100000 CREAM TOPICAL at 20:13

## 2024-11-03 RX ADMIN — SODIUM CHLORIDE 500 ML: 9 INJECTION, SOLUTION INTRAVENOUS at 12:45

## 2024-11-03 RX ADMIN — SODIUM CHLORIDE 500 ML: 9 INJECTION, SOLUTION INTRAVENOUS at 10:30

## 2024-11-03 RX ADMIN — ACETAMINOPHEN 975 MG: 325 TABLET ORAL at 10:31

## 2024-11-03 RX ADMIN — HEPARIN SODIUM 860 UNITS/HR: 10000 INJECTION, SOLUTION INTRAVENOUS at 14:30

## 2024-11-03 RX ADMIN — PIPERACILLIN SODIUM AND TAZOBACTAM SODIUM 3.38 G: 3; .375 INJECTION, SOLUTION INTRAVENOUS at 14:34

## 2024-11-03 SDOH — SOCIAL STABILITY: SOCIAL INSECURITY: HAVE YOU HAD THOUGHTS OF HARMING ANYONE ELSE?: NO

## 2024-11-03 SDOH — ECONOMIC STABILITY: FOOD INSECURITY: WITHIN THE PAST 12 MONTHS, THE FOOD YOU BOUGHT JUST DIDN'T LAST AND YOU DIDN'T HAVE MONEY TO GET MORE.: NEVER TRUE

## 2024-11-03 SDOH — SOCIAL STABILITY: SOCIAL INSECURITY: WITHIN THE LAST YEAR, HAVE YOU BEEN AFRAID OF YOUR PARTNER OR EX-PARTNER?: NO

## 2024-11-03 SDOH — ECONOMIC STABILITY: INCOME INSECURITY: IN THE PAST 12 MONTHS HAS THE ELECTRIC, GAS, OIL, OR WATER COMPANY THREATENED TO SHUT OFF SERVICES IN YOUR HOME?: NO

## 2024-11-03 SDOH — SOCIAL STABILITY: SOCIAL INSECURITY: DO YOU FEEL UNSAFE GOING BACK TO THE PLACE WHERE YOU ARE LIVING?: NO

## 2024-11-03 SDOH — SOCIAL STABILITY: SOCIAL INSECURITY
WITHIN THE LAST YEAR, HAVE YOU BEEN KICKED, HIT, SLAPPED, OR OTHERWISE PHYSICALLY HURT BY YOUR PARTNER OR EX-PARTNER?: NO

## 2024-11-03 SDOH — ECONOMIC STABILITY: HOUSING INSECURITY: IN THE LAST 12 MONTHS, WAS THERE A TIME WHEN YOU WERE NOT ABLE TO PAY THE MORTGAGE OR RENT ON TIME?: NO

## 2024-11-03 SDOH — ECONOMIC STABILITY: FOOD INSECURITY: WITHIN THE PAST 12 MONTHS, YOU WORRIED THAT YOUR FOOD WOULD RUN OUT BEFORE YOU GOT THE MONEY TO BUY MORE.: NEVER TRUE

## 2024-11-03 SDOH — SOCIAL STABILITY: SOCIAL INSECURITY: DOES ANYONE TRY TO KEEP YOU FROM HAVING/CONTACTING OTHER FRIENDS OR DOING THINGS OUTSIDE YOUR HOME?: NO

## 2024-11-03 SDOH — SOCIAL STABILITY: SOCIAL INSECURITY: WITHIN THE LAST YEAR, HAVE YOU BEEN HUMILIATED OR EMOTIONALLY ABUSED IN OTHER WAYS BY YOUR PARTNER OR EX-PARTNER?: NO

## 2024-11-03 SDOH — SOCIAL STABILITY: SOCIAL INSECURITY
WITHIN THE LAST YEAR, HAVE YOU BEEN RAPED OR FORCED TO HAVE ANY KIND OF SEXUAL ACTIVITY BY YOUR PARTNER OR EX-PARTNER?: NO

## 2024-11-03 SDOH — ECONOMIC STABILITY: HOUSING INSECURITY: AT ANY TIME IN THE PAST 12 MONTHS, WERE YOU HOMELESS OR LIVING IN A SHELTER (INCLUDING NOW)?: NO

## 2024-11-03 SDOH — ECONOMIC STABILITY: FOOD INSECURITY: HOW HARD IS IT FOR YOU TO PAY FOR THE VERY BASICS LIKE FOOD, HOUSING, MEDICAL CARE, AND HEATING?: NOT HARD AT ALL

## 2024-11-03 SDOH — ECONOMIC STABILITY: TRANSPORTATION INSECURITY: IN THE PAST 12 MONTHS, HAS LACK OF TRANSPORTATION KEPT YOU FROM MEDICAL APPOINTMENTS OR FROM GETTING MEDICATIONS?: NO

## 2024-11-03 SDOH — SOCIAL STABILITY: SOCIAL INSECURITY: ABUSE: ADULT

## 2024-11-03 SDOH — SOCIAL STABILITY: SOCIAL INSECURITY: ARE THERE ANY APPARENT SIGNS OF INJURIES/BEHAVIORS THAT COULD BE RELATED TO ABUSE/NEGLECT?: NO

## 2024-11-03 SDOH — SOCIAL STABILITY: SOCIAL INSECURITY: HAS ANYONE EVER THREATENED TO HURT YOUR FAMILY OR YOUR PETS?: NO

## 2024-11-03 SDOH — ECONOMIC STABILITY: HOUSING INSECURITY: IN THE PAST 12 MONTHS, HOW MANY TIMES HAVE YOU MOVED WHERE YOU WERE LIVING?: 1

## 2024-11-03 SDOH — SOCIAL STABILITY: SOCIAL INSECURITY: ARE YOU OR HAVE YOU BEEN THREATENED OR ABUSED PHYSICALLY, EMOTIONALLY, OR SEXUALLY BY ANYONE?: NO

## 2024-11-03 SDOH — SOCIAL STABILITY: SOCIAL INSECURITY: DO YOU FEEL ANYONE HAS EXPLOITED OR TAKEN ADVANTAGE OF YOU FINANCIALLY OR OF YOUR PERSONAL PROPERTY?: NO

## 2024-11-03 SDOH — SOCIAL STABILITY: SOCIAL INSECURITY: HAVE YOU HAD ANY THOUGHTS OF HARMING ANYONE ELSE?: NO

## 2024-11-03 ASSESSMENT — PATIENT HEALTH QUESTIONNAIRE - PHQ9
2. FEELING DOWN, DEPRESSED OR HOPELESS: NOT AT ALL
1. LITTLE INTEREST OR PLEASURE IN DOING THINGS: NOT AT ALL
SUM OF ALL RESPONSES TO PHQ9 QUESTIONS 1 & 2: 0

## 2024-11-03 ASSESSMENT — COGNITIVE AND FUNCTIONAL STATUS - GENERAL
MOVING TO AND FROM BED TO CHAIR: A LITTLE
PATIENT BASELINE BEDBOUND: NO
TURNING FROM BACK TO SIDE WHILE IN FLAT BAD: A LITTLE
TOILETING: A LITTLE
PERSONAL GROOMING: A LITTLE
DRESSING REGULAR UPPER BODY CLOTHING: A LITTLE
CLIMB 3 TO 5 STEPS WITH RAILING: A LITTLE
STANDING UP FROM CHAIR USING ARMS: A LITTLE
DRESSING REGULAR LOWER BODY CLOTHING: A LITTLE
HELP NEEDED FOR BATHING: A LITTLE
DRESSING REGULAR UPPER BODY CLOTHING: A LITTLE
MOBILITY SCORE: 17
MOVING FROM LYING ON BACK TO SITTING ON SIDE OF FLAT BED WITH BEDRAILS: A LITTLE
MOBILITY SCORE: 18
MOVING FROM LYING ON BACK TO SITTING ON SIDE OF FLAT BED WITH BEDRAILS: A LITTLE
PERSONAL GROOMING: A LITTLE
CLIMB 3 TO 5 STEPS WITH RAILING: A LOT
HELP NEEDED FOR BATHING: A LITTLE
DAILY ACTIVITIY SCORE: 18
TURNING FROM BACK TO SIDE WHILE IN FLAT BAD: A LITTLE
DAILY ACTIVITIY SCORE: 19
EATING MEALS: A LITTLE
MOVING TO AND FROM BED TO CHAIR: A LITTLE
WALKING IN HOSPITAL ROOM: A LITTLE
DRESSING REGULAR LOWER BODY CLOTHING: A LITTLE
STANDING UP FROM CHAIR USING ARMS: A LITTLE
TOILETING: A LITTLE
WALKING IN HOSPITAL ROOM: A LITTLE

## 2024-11-03 ASSESSMENT — ACTIVITIES OF DAILY LIVING (ADL)
BATHING: NEEDS ASSISTANCE
JUDGMENT_ADEQUATE_SAFELY_COMPLETE_DAILY_ACTIVITIES: YES
HEARING - RIGHT EAR: DIFFICULTY WITH NOISE
GROOMING: NEEDS ASSISTANCE
LACK_OF_TRANSPORTATION: NO
LACK_OF_TRANSPORTATION: NO
FEEDING YOURSELF: INDEPENDENT
TOILETING: NEEDS ASSISTANCE
LACK_OF_TRANSPORTATION: NO
PATIENT'S MEMORY ADEQUATE TO SAFELY COMPLETE DAILY ACTIVITIES?: YES
ADEQUATE_TO_COMPLETE_ADL: YES
WALKS IN HOME: DEPENDENT
HEARING - LEFT EAR: DIFFICULTY WITH NOISE
DRESSING YOURSELF: NEEDS ASSISTANCE
LACK_OF_TRANSPORTATION: NO

## 2024-11-03 ASSESSMENT — ENCOUNTER SYMPTOMS
WEAKNESS: 1
ABDOMINAL PAIN: 0
VOMITING: 1
COUGH: 0
CONFUSION: 1
FEVER: 1
SHORTNESS OF BREATH: 0
PALPITATIONS: 0
NAUSEA: 1

## 2024-11-03 ASSESSMENT — LIFESTYLE VARIABLES
AUDIT-C TOTAL SCORE: 0
EVER FELT BAD OR GUILTY ABOUT YOUR DRINKING: NO
HOW MANY STANDARD DRINKS CONTAINING ALCOHOL DO YOU HAVE ON A TYPICAL DAY: PATIENT DOES NOT DRINK
SKIP TO QUESTIONS 9-10: 1
TOTAL SCORE: 0
HOW OFTEN DO YOU HAVE A DRINK CONTAINING ALCOHOL: NEVER
HAVE PEOPLE ANNOYED YOU BY CRITICIZING YOUR DRINKING: NO
EVER HAD A DRINK FIRST THING IN THE MORNING TO STEADY YOUR NERVES TO GET RID OF A HANGOVER: NO
HOW OFTEN DO YOU HAVE 6 OR MORE DRINKS ON ONE OCCASION: NEVER
HAVE YOU EVER FELT YOU SHOULD CUT DOWN ON YOUR DRINKING: NO
AUDIT-C TOTAL SCORE: 0

## 2024-11-03 ASSESSMENT — PAIN DESCRIPTION - ORIENTATION: ORIENTATION: LEFT

## 2024-11-03 ASSESSMENT — PAIN DESCRIPTION - LOCATION: LOCATION: FOOT

## 2024-11-03 ASSESSMENT — PAIN SCALES - GENERAL
PAINLEVEL_OUTOF10: 0 - NO PAIN
PAINLEVEL_OUTOF10: 10 - WORST POSSIBLE PAIN

## 2024-11-03 ASSESSMENT — PAIN - FUNCTIONAL ASSESSMENT: PAIN_FUNCTIONAL_ASSESSMENT: 0-10

## 2024-11-03 ASSESSMENT — PAIN DESCRIPTION - PAIN TYPE: TYPE: ACUTE PAIN

## 2024-11-03 NOTE — CARE PLAN
Problem: Skin  Goal: Prevent/minimize sheer/friction injuries  Outcome: Progressing     Problem: Pain  Goal: Turns in bed with improved pain control throughout the shift  Outcome: Progressing   The patient's goals for the shift include      The clinical goals for the shift include to eat lunch    Patient had lunch and dinner, patient reports feeling better

## 2024-11-03 NOTE — CONSULTS
"Inpatient consult to Pulmonology  Consult performed by: Yeimy Santa MD  Consult ordered by: Eloy Hua MD        Department of Medicine  Division of Pulmonary, Critical Care, and Sleep Medicine      History Of Present Illness  Desirae Gregg is a 83 y.o. female with history of myeloproliferative disorder, PAI1G mutation, hyperhomocysteinemia, PE on Eliquis admitted with UTI, pleural effusion and suspected pneumonia.  Patient presented to the ED with fever, generalized weakness for about a week.  Denied any respiratory complaints, no dyspnea, no productive cough, no chest pain or hemoptysis.  She does have chronic diarrhea and take as needed Imodium.  All other systems reviewed and negative otherwise.  Small    Never smoked.       Past Medical History:   Diagnosis Date    Heart failure     Sciatica        Past Surgical History:   Procedure Laterality Date    APPENDECTOMY      CHOLECYSTECTOMY      HYSTERECTOMY      TONSILLECTOMY          Social History     Tobacco Use    Smoking status: Never    Smokeless tobacco: Never   Substance Use Topics    Alcohol use: Never    Drug use: Never       Family History   Problem Relation Name Age of Onset    Stroke Mother      Asthma Father      Cancer Paternal Grandmother             Allergies  Clonidine and structural analogs, Naproxen, Tizanidine, Hydroxyzine, and Methylprednisolone    Review of Systems       Physical exam  Constitutional: Normal appearance.  HEENT: Normocephalic and atraumatic.  Cardiovascular: Normal rate and regular rhythm.  Pulmonary: Clear breath sounds bilaterally, no wheezing or rhonchi.  Musculoskeletal: No edema, no cyanosis.  Neurological: Awake, alert and oriented x2-3.  Psychiatric: Normal behavior, mood and affect.     Vital Signs  Visit Vitals  /67   Pulse 80   Temp 36.6 °C (97.9 °F)   Resp 18   Ht 1.651 m (5' 5\")   Wt 72.6 kg (160 lb)   SpO2 93%   BMI 26.63 kg/m²   Smoking Status Never   BSA 1.82 m²      Lab Results   Component Value Date "    WBC 20.4 (H) 11/03/2024    HGB 14.4 11/03/2024    HCT 47.2 (H) 11/03/2024    MCV 90 11/03/2024     11/03/2024      Lab Results   Component Value Date    GLUCOSE 119 (H) 11/03/2024    CALCIUM 7.8 (L) 11/03/2024     (L) 11/03/2024    K 4.3 11/03/2024    CO2 25 11/03/2024     11/03/2024    BUN 15 11/03/2024    CREATININE 1.21 (H) 11/03/2024      Lab Results   Component Value Date    ALT 15 11/03/2024    AST 12 11/03/2024    ALKPHOS 48 11/03/2024    BILITOT 2.2 (H) 11/03/2024        Oxygen Therapy  SpO2: 93 %  Medical Gas Therapy: Supplemental oxygen  Medical Gas Delivery Method: Nasal cannula (2L)       Medications   Scheduled medications  [START ON 11/4/2024] azithromycin, 500 mg, oral, q24h MANI  busPIRone, 7.5 mg, oral, BID  carvedilol, 25 mg, oral, BID  [START ON 11/4/2024] cetirizine, 10 mg, oral, Daily  [START ON 11/4/2024] cholecalciferol, 2,000 Units, oral, Daily  [START ON 11/4/2024] cyanocobalamin, 1,000 mcg, oral, Daily  [START ON 11/4/2024] ferrous sulfate (325 mg ferrous sulfate), 65 mg of iron, oral, Daily with breakfast  [START ON 11/4/2024] folic acid, 1 mg, oral, Daily  gabapentin, 200 mg, oral, BID  [START ON 11/4/2024] isosorbide mononitrate ER, 30 mg, oral, Daily  [START ON 11/4/2024] levothyroxine, 88 mcg, oral, Daily  nystatin, , Topical, BID  [START ON 11/4/2024] pantoprazole, 40 mg, oral, Daily before breakfast  piperacillin-tazobactam, 3.375 g, intravenous, q6h  [START ON 11/4/2024] sertraline, 25 mg, oral, Daily  zinc oxide, 1 Application, Topical, BID      Continuous medications  heparin, 0-4,000 Units/hr, Last Rate: 860 Units/hr (11/03/24 1430)  sodium chloride 0.9%, 100 mL/hr, Last Rate: 100 mL/hr (11/03/24 1630)      PRN medications  PRN medications: acetaminophen **OR** acetaminophen **OR** acetaminophen, loperamide, melatonin, ondansetron ODT **OR** ondansetron, oxyCODONE, polyethylene glycol     Chest Radiograph   CT chest wo IV contrast  11/03/2024    Narrative  Interpreted By:  Matthias Kolb,  STUDY:  CT CHEST WO IV CONTRAST;  11/3/2024 11:16 am    INDICATION:  Signs/Symptoms:sepsis, ? atelectasis versus PNA on CXR without  classic symptoms.      COMPARISON:  Portable chest earlier same day 3 November 2024 at 0957 hours; CT  lumbar spine without contrast 5 July 2024    ACCESSION NUMBER(S):  [id number]    ORDERING CLINICIAN:  SHERITA GOYAL    TECHNIQUE:  Helical CT chest from thoracic inlet through the hemidiaphragms  without intravenous contrast    FINDINGS:  LUNGS / AIRSPACES / AIRWAYS:    LARGE AIRWAYS  Filling defect: Negative  Wall thickening: Negative  Bronchiectasis: Negative  Other: N/A    AIRSPACES  Fibrosis: Negative  Emphysema: Negative  Consolidation: Negative  Ground glass airspace disease: Small region of ground-glass airspace  change in the middle lobe and potentially in portions of the inflated  right lower lobe. None in the left lung noting one segment of left  lower lobe collapse Edema: Negative  Nodule / Mass: Negative  Other: One segment of left lower lobe collapse. Multiple segments of  right lower lobe collapse    PLEURA:  Effusion:  Moderate size mostly if not entirely free flowing right;  small free-flowing left Pneumothorax:  Both sides negative  Other:  n/a    CARDIOVASCULAR:  Heart size:  Mildly enlarged  Pericardial effusion:  Perhaps trace  Thoracic aortic aneurysm:  Negative  Pulmonary arteries:  Static  Heart failure change:  Negative.  No sign of interstitial or alveolar  edema. Other:  Unusual hypodensities in the right ventricular free  wall for example soft tissue windows axial series 202, image 167 and  the surrounding few images    NONVASCULAR MEDIASTINUM:  Esophagus:  Grossly normal by CT  Mediastinal Mass:  Negative  Hiatal hernia:  None  Other:  n/a    LYMPH NODES:  No thoracic adenopathy    CHEST WALL:  Soft tissues of the chest wall are unremarkable    SKELETON:  No acute or contributory  abnormality    UPPER ABDOMEN:  Small volume ascites pooling about the included liver and spleen.  Spleen probably mildly enlarged but not completely included in the  field of view. Liver not overtly cirrhotic but at least borderline  fatty. The only pertinent comparison exam for the upper abdomen would  be CT lumbar spine without contrast 5 July 2024 at which time there  was not any ascites in the field of view, but the field of view did  not include the portions of the abdomen where they ascites is  presently located. It is unlikely there was ascites in July, 2024 as  more of the lower abdomen was included on that exam and it did not  appear that there is any ascites tracking down into (or up from) the  pelvis    Impression  Abnormalities at the right lung base on portable frontal chest  radiograph earlier today are due to a combination of moderate-sized,  mostly if not entirely free-flowing right pleural effusion with  associated multisegmental right lower lobe collapse adjacent to it    Small area of ground-glass airspace disease in the middle lobe  confirmed on CT may not have been expected on the prior radiograph    Possibility of mild ground-glass airspace disease in the right lower  lobe as well    No ground-glass airspace disease in the left lung    No consolidative pneumonia in any of the inflated lungs on either side    Small free-flowing left pleural effusion    Unusual hypodensities in the right ventricular free wall of uncertain  etiology and significance    Perhaps trace (smallest detectable quantity) pericardial effusion    Incidental abnormalities in the included upper abdomen as detailed  above    MACRO:  None    Signed by: Matthias Kolb 11/3/2024 11:35 AM  Dictation workstation:   [id number]      XR chest 1 view 11/03/2024    Narrative  Interpreted By:  Kary Cardoso,  STUDY:  XR CHEST 1 VIEW 11/3/2024 10:06 am    INDICATION:  Signs/Symptoms:cough and fever    COMPARISON:  None    ACCESSION  "NUMBER(S):  [id number]    ORDERING CLINICIAN:  ESTHER ROBBINS    TECHNIQUE:  AP view    FINDINGS:  There is right lower lobe airspace consolidation and a right pleural  effusion. There is an enlarged cardiac silhouette. Pulmonary  vascularity is normal. Degenerative changes are noted in both  shoulders. No sign of pneumothorax    Impression  1. Right lower lobe airspace consolidation either pneumonia or  atelectasis with right pleural effusion. Follow-up to complete  resolution is needed  2. Enlarged cardiac silhouette      Signed by: Kary Cardoso 11/3/2024 10:27 AM  Dictation workstation:   [id number]         Pulmonary Function Tests   Pulmonary Functions Testing Results:  No results found for: \"FEV1\", \"FVC\", \"RDZ5UBU\", \"TLC\", \"DLCO\"         Assessment and Plan / Recommendations   Assessment/Plan   83 y.o. female with history of myeloproliferative disorder, PAI1G mutation, hyperhomocysteinemia, PE on Eliquis admitted with UTI, pleural effusion and suspected pneumonia    1.  Suspected pneumonia  2.  Bilateral pleural effusion R>L -parapneumonic versus cardiac with volume overload/lower extremity edema clinically  3.  PE with risk factors for thrombophilia as above    -Continue Zosyn and azithromycin, also for UTI  -Recommend diuresis when renal function improved/stable  -Hold Eliquis for possible thoracentesis if no improvement with the treatment above, start heparin or Lovenox  -Encourage incentive spirometry         Yeimy Santa MD    "

## 2024-11-03 NOTE — ED TRIAGE NOTES
"Patient presents with fever, confusion (per son), and weakness for the past week. Patient states she \"normally throws up 3 times per day.\" Son stated vomiting is new. Denies feeling feverish, denies chills/SOB/CP. No known exposure to COVID. Reports pain in left foot because she was kicking the bed trying to get her son's attention a few days ago. History of 2 UTIs this past summer, and blood clots in lungs. Takes eliquis. Started Buspar and Sertraline this past month per son. 88% on room air, patient states this is baseline. Son states patient's baseline SpO2 is 97%.   "

## 2024-11-03 NOTE — PROGRESS NOTES
11/03/24 1546   Discharge Planning   Living Arrangements Children  (Son)   Support Systems Children   Assistance Needed Patient has a walker and can ambulate around the house, WC (for when leaving the home), transfers with a gait belt and assistance of son, uses a shower chair and raised toilet. Son is her caretaker and transports patient to all appointments. Patient's son also does the cooking and cleaning. Per son patient is active with [facility] (palliative care) and [facility] (PT/OT/SN).   Type of Residence Private residence   Number of Stairs to Enter Residence 0  (ramp)   Number of Stairs Within Residence 12  (lift chair)   Do you have animals or pets at home? Yes   Type of Animals or Pets 1 cat   Who is requesting discharge planning? Provider   Home or Post Acute Services In home services  (Active with [facility] (palliative care) and [facility] (PT/OT/SN))   Type of Home Care Services Home nursing visits;Home OT;Home PT   Expected Discharge Disposition Home Health   Does the patient need discharge transport arranged? No   Financial Resource Strain   How hard is it for you to pay for the very basics like food, housing, medical care, and heating? Not hard   Housing Stability   In the last 12 months, was there a time when you were not able to pay the mortgage or rent on time? N   At any time in the past 12 months, were you homeless or living in a shelter (including now)? N   Transportation Needs   In the past 12 months, has lack of transportation kept you from medical appointments or from getting medications? no   In the past 12 months, has lack of transportation kept you from meetings, work, or from getting things needed for daily living? No   Patient Choice   Provider Choice list and CMS website (https://medicare.gov/care-compare#search) for post-acute Quality and Resource Measure Data were provided and reviewed with: Family   Patient / Family choosing to utilize agency / facility established  prior to hospitalization Yes

## 2024-11-03 NOTE — ED PROVIDER NOTES
HPI   Chief Complaint   Patient presents with    Fever       History of present illness:  83-year-old female presents the emergency room for complaints of possible UTI.  The patient was brought by EMS and she states that for the past couple days she has been feeling unwell and has been feeling just very tired and having a fever.  She states that she believes her son has been telling her that she has a UTI.  She states that she wears depends and that she voids a couple times a day and states that she has not noticed any burning urination or suprapubic cramping or lower back pain.  She has past medical history of heart failure sciatica as well as pulmonary embolisms and is currently on Eliquis.  She states she has not noticed any other illnesses at this time and is denies any other symptoms.  She does not recall any sick contacts.    Social history: Negative for alcohol and drug use.    Review of systems:   Gen.: No weight loss  Eyes: No vision loss, double vision  ENT: No pharyngitis, neck pain, headache  Cardiac: No chest pain, palpitations, syncope  Pulmonary: No shortness of breath, cough, hemoptysis.   Heme/lymph: No swollen glands,  bleeding.   GI: No abdominal pain, change in bowel habits, melena, hematemesis, hematochezia, nausea, vomiting, diarrhea.   : No discharge, dysuria, frequency, urgency, hematuria.   Musculoskeletal: No limb pain, joint pain, joint swelling.   Skin: No rashes.   Review of systems is otherwise negative unless stated above or in history of present illness.      Physical exam:  General: Vitals noted, febrile at 100.8, tachycardic at 103  EENT: No lymphadenopathy appreciated  Cardiac: no murmur.   Pulmonary: Lungs clear bilaterally with good aeration. No adventitious breath sounds.   Abdomen: Soft, nonsurgical. Nontender. No peritoneal signs. Normoactive bowel sounds.   Extremities: No peripheral edema.   Skin: No rash.   Neuro: No focal neurologic deficits            Medical decision  making:   Testing: CBC shows white count 20.4 with left-sided shift, CMP shows creatinine 1.2 wide GFR 44 COVID flu and RSV was negative chest x-ray shows opacity in the right lower lobe concerning for pneumonia  Treatment: Sepsis alert called and sepsis reperfusion exam performed 1 hour afterwards which showed appropriate mentation and improvement in tachycardia.  Antibiotics have been started and blood cultures have been gathered  Reevaluation:   Plan: 83-year-old female presents the emergency room for complaints of possible UTI.  The patient was brought by EMS and she states that for the past couple days she has been feeling unwell and has been feeling just very tired and having a fever.  She states that she believes her son has been telling her that she has a UTI.  She states that she wears depends and that she voids a couple times a day and states that she has not noticed any burning urination or suprapubic cramping or lower back pain.  She has past medical history of heart failure sciatica as well as pulmonary embolisms and is currently on Eliquis.  She states she has not noticed any other illnesses at this time and is denies any other symptoms.  She does not recall any sick contacts.General: Vitals noted, febrile at 100.8, tachycardic at 103  EENT: No lymphadenopathy appreciated  Cardiac: no murmur.   Pulmonary: Lungs clear bilaterally with good aeration. No adventitious breath sounds.  I explained to the patient the test results as well as to her family at bedside.  Explained to the patient would need to be admitted for further care and treatment this time for pneumonia with concern for sepsis.  They were agreeable to this plan.  The patient be admitted to the floor for further care and treatment.    EKG taken on November 3, 2024 at 920 shows sinus tachycardia 104, left axis deviation, left bundle branch block, no STEMI      Impression:   1.  Pneumonia  2.  Sepsis                Patient History   Past  Medical History:   Diagnosis Date    Heart failure     Sciatica      Past Surgical History:   Procedure Laterality Date    APPENDECTOMY      CHOLECYSTECTOMY      HYSTERECTOMY      TONSILLECTOMY       Family History   Problem Relation Name Age of Onset    Stroke Mother      Asthma Father      Cancer Paternal Grandmother       Social History     Tobacco Use    Smoking status: Never    Smokeless tobacco: Never   Substance Use Topics    Alcohol use: Never    Drug use: Never       Physical Exam   ED Triage Vitals [11/03/24 0923]   Temperature Heart Rate Respirations BP   (!) 38.2 °C (100.8 °F) (!) 103 (!) 21 117/77      Pulse Ox Temp Source Heart Rate Source Patient Position   (!) 88 % Temporal Monitor Lying      BP Location FiO2 (%)     Right arm --       Physical Exam      ED Course & MDM   ED Course as of 11/03/24 1040   Sun Nov 03, 2024   0929 EKG interpreted by me shows a heart rate of 104 sinus tachycardia left axis deviation left bundle branch block normal intervals otherwise [TP]      ED Course User Index  [TP] Trixie Helm, DO         Diagnoses as of 11/03/24 1040   Pneumonia of right lower lobe due to infectious organism                 No data recorded     Marbin Coma Scale Score: 14 (11/03/24 0956 : Jojo Smalls RN)                           Medical Decision Making      Procedure  Procedures     Matthew Fernandez PA-C  11/03/24 1044       Matthew Fernandez PA-C  11/03/24 1900

## 2024-11-03 NOTE — H&P
Chief Complaint  Fever, weakness, altered mental status, sepsis    History Of Present Illness  Desirae Gregg is a 83 y.o. female with a history of stage III chronic kidney disease, chronic systolic congestive heart failure, myelofibrosis, sciatica, remote poliomyelitis, hypertension, pulmonary embolism, anxiety disorder, vitamin D deficiency, vitamin B12 deficiency, hypothyroidism, GERD, depression, and JAK2 positive polycythemia vera.  She presented to the emergency department on 11/3 accompanied by her son.  She complained of fever and generalized weakness for the week prior to presentation.  Her son also noted confusion.  She has been having episodes of emesis roughly 3 times per day that son reported was new.  She also complained of pain in her left foot reportedly related to kicking the bed trying to get her son's attention a few days prior to presentation.  She had a history of pulmonary embolic disease in the summer 2024 as well as recurrent urinary infections.  She is on Eliquis for her history of thromboembolic disease.  On arrival to the ED, patient was noted to have an oxygen saturation of 88% on room air.  She was placed on supplemental oxygen.  Temperature was 100.8 °F and heart rate was .  Respiratory rate was mildly increased at 18-23.  Patient ultimately required 4 L of supplemental oxygen per nasal cannula to maintain saturations.  Blood pressure systolic was 108-117.    Laboratory evaluation in the emergency department was notable for white blood cell count 20.4, glucose 119, creatinine 1.21, and total bilirubin 2.2.  Electrolytes, transaminases, lactic acid, hemoglobin, and platelet count were unremarkable.  COVID, influenza, and RSV testing was negative.  Blood cultures x 2 were obtained.  Portable chest x-ray showed right lower lobe airspace consolidation, pneumonia or atelectasis with right pleural effusion.  Enlarged cardiac silhouette was also noted.  Therapeutic interventions in the  emergency department included ceftriaxone 2 g IV x 1, azithromycin 500 mg IV x 1, 500 mL liter bolus of 0.9% normal saline, and acetaminophen 975 mg p.o. x 1.  Patient was admitted for further evaluation and treatment.     Past Medical History  She has a past medical history of Heart failure and Sciatica.    Surgical History  She has a past surgical history that includes Hysterectomy; Cholecystectomy; Tonsillectomy; and Appendectomy.     Social History  She reports that she has never smoked. She has never used smokeless tobacco. She reports that she does not drink alcohol and does not use drugs.    Family History  Family History   Problem Relation Name Age of Onset    Stroke Mother      Asthma Father      Cancer Paternal Grandmother          Allergies  Clonidine and structural analogs, Naproxen, Tizanidine, Hydroxyzine, and Methylprednisolone    Review of Systems   Unable to perform ROS: Other (Full ROS unreliable from patient due to altered mental status, responses combination of patient responses and son's observations)   Constitutional:  Positive for fever.   Respiratory:  Negative for cough and shortness of breath.    Cardiovascular:  Negative for chest pain, palpitations and leg swelling.   Gastrointestinal:  Positive for nausea and vomiting. Negative for abdominal pain.   Neurological:  Positive for weakness (Generalized).   Psychiatric/Behavioral:  Positive for confusion.         Physical Exam  Vitals and nursing note reviewed.   Constitutional:       General: She is not in acute distress.     Appearance: She is ill-appearing.   HENT:      Head: Normocephalic and atraumatic.      Right Ear: External ear normal.      Left Ear: External ear normal.      Nose: Nose normal. No rhinorrhea.      Mouth/Throat:      Mouth: Mucous membranes are moist.      Pharynx: Oropharynx is clear. No oropharyngeal exudate.   Eyes:      General: No scleral icterus.        Right eye: No discharge.         Left eye: No discharge.       Conjunctiva/sclera: Conjunctivae normal.   Cardiovascular:      Rate and Rhythm: Normal rate and regular rhythm.      Pulses: Normal pulses.      Heart sounds: Normal heart sounds. No murmur heard.  Pulmonary:      Effort: Pulmonary effort is normal. No respiratory distress.      Breath sounds: Normal breath sounds. No wheezing or rales.   Abdominal:      General: Abdomen is flat. There is no distension.      Palpations: Abdomen is soft.      Tenderness: There is no abdominal tenderness.   Musculoskeletal:         General: No tenderness.      Right lower leg: No edema.      Left lower leg: No edema.   Skin:     General: Skin is warm and dry.      Capillary Refill: Capillary refill takes less than 2 seconds.      Findings: No rash.   Neurological:      General: No focal deficit present.      Mental Status: She is alert and oriented to person, place, and time. Mental status is at baseline.   Psychiatric:         Mood and Affect: Mood normal.         Behavior: Behavior normal.         Thought Content: Thought content normal.         Judgment: Judgment normal.        Last Recorded Vitals  /70   Pulse 99   Temp (!) 38.2 °C (100.8 °F) (Temporal)   Resp (!) 23   Wt 72.6 kg (160 lb)   SpO2 97%     Relevant Results        Results for orders placed or performed during the hospital encounter of 11/03/24 (from the past 24 hours)   Sars-CoV-2 PCR   Result Value Ref Range    Coronavirus 2019, PCR Not Detected Not Detected   Influenza A, and B PCR   Result Value Ref Range    Flu A Result Not Detected Not Detected    Flu B Result Not Detected Not Detected   RSV PCR   Result Value Ref Range    RSV PCR Not Detected Not Detected   CBC and Auto Differential   Result Value Ref Range    WBC 20.4 (H) 4.4 - 11.3 x10*3/uL    nRBC 0.0 0.0 - 0.0 /100 WBCs    RBC 5.27 (H) 4.00 - 5.20 x10*6/uL    Hemoglobin 14.4 12.0 - 16.0 g/dL    Hematocrit 47.2 (H) 36.0 - 46.0 %    MCV 90 80 - 100 fL    MCH 27.3 26.0 - 34.0 pg    MCHC 30.5 (L)  32.0 - 36.0 g/dL    RDW 15.6 (H) 11.5 - 14.5 %    Platelets 201 150 - 450 x10*3/uL    Neutrophils % 94.7 40.0 - 80.0 %    Immature Granulocytes %, Automated 1.4 (H) 0.0 - 0.9 %    Lymphocytes % 0.8 13.0 - 44.0 %    Monocytes % 2.0 2.0 - 10.0 %    Eosinophils % 0.7 0.0 - 6.0 %    Basophils % 0.4 0.0 - 2.0 %    Neutrophils Absolute 19.32 (H) 1.60 - 5.50 x10*3/uL    Immature Granulocytes Absolute, Automated 0.29 0.00 - 0.50 x10*3/uL    Lymphocytes Absolute 0.16 (L) 0.80 - 3.00 x10*3/uL    Monocytes Absolute 0.41 0.05 - 0.80 x10*3/uL    Eosinophils Absolute 0.14 0.00 - 0.40 x10*3/uL    Basophils Absolute 0.08 0.00 - 0.10 x10*3/uL   Comprehensive Metabolic Panel   Result Value Ref Range    Glucose 119 (H) 74 - 99 mg/dL    Sodium 134 (L) 136 - 145 mmol/L    Potassium 4.3 3.5 - 5.3 mmol/L    Chloride 103 98 - 107 mmol/L    Bicarbonate 25 21 - 32 mmol/L    Anion Gap 10 10 - 20 mmol/L    Urea Nitrogen 15 6 - 23 mg/dL    Creatinine 1.21 (H) 0.50 - 1.05 mg/dL    eGFR 44 (L) >60 mL/min/1.73m*2    Calcium 7.8 (L) 8.6 - 10.3 mg/dL    Albumin 2.6 (L) 3.4 - 5.0 g/dL    Alkaline Phosphatase 48 33 - 136 U/L    Total Protein 4.0 (L) 6.4 - 8.2 g/dL    AST 12 9 - 39 U/L    Bilirubin, Total 2.2 (H) 0.0 - 1.2 mg/dL    ALT 15 7 - 45 U/L   Lactate   Result Value Ref Range    Lactate 1.3 0.4 - 2.0 mmol/L   Morphology   Result Value Ref Range    RBC Morphology See Below     Polychromasia Mild     Ovalocytes Few     Teardrop Cells Few    Troponin I, High Sensitivity   Result Value Ref Range    Troponin I, High Sensitivity 76 (HH) 0 - 13 ng/L       XR chest 1 view    Result Date: 11/3/2024  Interpreted By:  Kary Cardoso, STUDY: XR CHEST 1 VIEW 11/3/2024 10:06 am   INDICATION: Signs/Symptoms:cough and fever   COMPARISON: None   ACCESSION NUMBER(S): [id number]   ORDERING CLINICIAN: ESTHER ROBBINS   TECHNIQUE: AP view   FINDINGS: There is right lower lobe airspace consolidation and a right pleural effusion. There is an enlarged cardiac  silhouette. Pulmonary vascularity is normal. Degenerative changes are noted in both shoulders. No sign of pneumothorax       1. Right lower lobe airspace consolidation either pneumonia or atelectasis with right pleural effusion. Follow-up to complete resolution is needed 2. Enlarged cardiac silhouette     Signed by: Kary Cardoso 11/3/2024 10:27 AM Dictation workstation:   [id number]        Assessment/Plan       Active Acute Problems    Sepsis  Acute hypoxemic respiratory failure  Leukocytosis  -Sepsis diagnosis based on fever, leukocytosis, tachycardia, and tachypnea at time of presentation  -Most likely source is pneumonia and right lower lobe based on hypoxemia, though patient does have atypical presentation otherwise.  See further discussion below.  -Check urinalysis with reflex to rule out alternative cause  -Blood cultures x 2 obtained in ED, follow  -Monitor on telemetry with continuous pulse oximetry  -Address individual causes as outlined  -Continue supplemental oxygen and wean as tolerated, keeping oxygen saturation greater than 88%  -Holding off on 30 cc/kg bolus of IV fluids given history of systolic congestive heart failure.  Received 500 mL IV fluid in ED and a bolus.  Will give additional 500 mL at 100 mL/h with close monitoring of oxygenation then reevaluate hemodynamic parameters.  -Trend white blood cell count with treatment.  20.4 on presentation.    Right lower lobe pneumonia  -Suggested by chest x-ray and hypoxemia  -Check noncontrast CT chest to better evaluate infiltrate and rule out alternative pathology  -Continue empiric azithromycin. Given hx multiple episodes of emesis along with RLL infiltrate, concern for aspiration. Will change ceftriaxone to zosyn and monitor.  -Check sputum culture, urine Legionella antigen, and urine pneumococcal antigen  -Follow blood cultures    Acute kidney injury on chronic kidney disease  -Creatinine 1.21 on presentation.  Baseline appears to be around 1 which  would be consistent with stage IIIa chronic kidney disease  -Received 500 mL of 0.9% normal saline in the ED.  Give additional 500 mL over 5 hours with close monitoring for decompensation  -Check urinalysis with reflex, urine protein, urine creatinine, urine electrolytes, and urine eosinophils  -Consider expanding evaluation if renal function not closer to baseline tomorrow  -Hold lisinopril temporarily given LEVY    Elevated troponin  -Most consistent with nonischemic cardiac injury related to sepsis as well as poor renal clearance  -Readings adynamic at 76-76  -Repeat troponin at 1700 today and tomorrow a.m. to ensure no dramatic rise  -Monitor on telemetry as noted    Hyperbilirubinemia  -Isolated LFT finding  -Add on direct bilirubin  -Trend levels and consider expanding evaluation if worsening or additional LFT abnormalities seen    Hyperglycemia  -Check A1c to ensure no evidence of diabetes      Stable Chronic Problems    History of pulmonary embolism  -Continue apixaban at 2.5 mg twice daily    Chronic systolic congestive heart failure  -BNP elevated at 867 but difficult to interpret in setting of acute kidney injury.  Patient does not clinically appear significantly fluid overloaded.  -Monitor for decompensation closely with gentle hydration noted  -Continue home Imdur and carvedilol  -Hold furosemide temporarily given suggestive dehydration on presentation  -Hold lisinopril and empagliflozin given LEVY as noted    Depression/Anxiety  -Continue home buspirone and sertraline    Vitamin D Deficiency  -Continue home cholecalciferol    Vitamin B12 Deficiency  -Continue home cyanocobalamin    Anemia  -By history.  Hemoglobin here actually normal but likely related to hemoconcentration  -Continue home ferrous sulfate and folic acid  -Trend levels with resuscitation    Hypothyroidism  -Continue home levothyroxine    GERD  -Continue home PPI           Eloy Hua MD

## 2024-11-04 ENCOUNTER — APPOINTMENT (OUTPATIENT)
Dept: RADIOLOGY | Facility: HOSPITAL | Age: 84
End: 2024-11-04
Payer: MEDICARE

## 2024-11-04 ENCOUNTER — APPOINTMENT (OUTPATIENT)
Dept: RADIOLOGY | Facility: HOSPITAL | Age: 84
DRG: 871 | End: 2024-11-04
Payer: MEDICARE

## 2024-11-04 PROBLEM — E83.51 HYPOCALCEMIA: Status: ACTIVE | Noted: 2024-11-04

## 2024-11-04 PROBLEM — E87.6 HYPOKALEMIA: Status: ACTIVE | Noted: 2024-11-04

## 2024-11-04 PROBLEM — E83.42 HYPOMAGNESEMIA: Status: ACTIVE | Noted: 2024-11-04

## 2024-11-04 PROBLEM — Q20.8 ABNORMALITY OF RIGHT VENTRICLE OF HEART: Status: ACTIVE | Noted: 2024-11-04

## 2024-11-04 LAB
ALBUMIN SERPL BCP-MCNC: 2 G/DL (ref 3.4–5)
ALBUMIN SERPL BCP-MCNC: 2.2 G/DL (ref 3.4–5)
ALP SERPL-CCNC: 39 U/L (ref 33–136)
ALT SERPL W P-5'-P-CCNC: 13 U/L (ref 7–45)
ANION GAP SERPL CALC-SCNC: 13 MMOL/L (ref 10–20)
ANION GAP SERPL CALC-SCNC: 9 MMOL/L (ref 10–20)
AST SERPL W P-5'-P-CCNC: 10 U/L (ref 9–39)
ATRIAL RATE: 104 BPM
BASOPHILS # BLD AUTO: 0.05 X10*3/UL (ref 0–0.1)
BASOPHILS NFR BLD AUTO: 0.3 %
BILIRUB DIRECT SERPL-MCNC: 0.3 MG/DL (ref 0–0.3)
BILIRUB SERPL-MCNC: 0.9 MG/DL (ref 0–1.2)
BUN SERPL-MCNC: 19 MG/DL (ref 6–23)
BUN SERPL-MCNC: 21 MG/DL (ref 6–23)
CALCIUM SERPL-MCNC: 6.7 MG/DL (ref 8.6–10.3)
CALCIUM SERPL-MCNC: 7.2 MG/DL (ref 8.6–10.3)
CARDIAC TROPONIN I PNL SERPL HS: 46 NG/L (ref 0–13)
CHLORIDE SERPL-SCNC: 105 MMOL/L (ref 98–107)
CHLORIDE SERPL-SCNC: 106 MMOL/L (ref 98–107)
CHLORIDE UR-SCNC: 15 MMOL/L
CHLORIDE/CREATININE (MMOL/G) IN URINE: 12 MMOL/G CREAT (ref 38–318)
CO2 SERPL-SCNC: 22 MMOL/L (ref 21–32)
CO2 SERPL-SCNC: 24 MMOL/L (ref 21–32)
CREAT SERPL-MCNC: 1.25 MG/DL (ref 0.5–1.05)
CREAT SERPL-MCNC: 1.31 MG/DL (ref 0.5–1.05)
CREAT UR-MCNC: 122.1 MG/DL (ref 20–320)
CREAT UR-MCNC: 123.1 MG/DL (ref 20–320)
EGFRCR SERPLBLD CKD-EPI 2021: 40 ML/MIN/1.73M*2
EGFRCR SERPLBLD CKD-EPI 2021: 42 ML/MIN/1.73M*2
EOSINOPHIL # BLD AUTO: 0.03 X10*3/UL (ref 0–0.4)
EOSINOPHIL NFR BLD AUTO: 0.2 %
ERYTHROCYTE [DISTWIDTH] IN BLOOD BY AUTOMATED COUNT: 15.6 % (ref 11.5–14.5)
GLUCOSE SERPL-MCNC: 122 MG/DL (ref 74–99)
GLUCOSE SERPL-MCNC: 133 MG/DL (ref 74–99)
HCT VFR BLD AUTO: 43.8 % (ref 36–46)
HGB BLD-MCNC: 13.1 G/DL (ref 12–16)
IMM GRANULOCYTES # BLD AUTO: 0.2 X10*3/UL (ref 0–0.5)
IMM GRANULOCYTES NFR BLD AUTO: 1.2 % (ref 0–0.9)
LYMPHOCYTES # BLD AUTO: 0.25 X10*3/UL (ref 0.8–3)
LYMPHOCYTES NFR BLD AUTO: 1.5 %
MAGNESIUM SERPL-MCNC: 1.53 MG/DL (ref 1.6–2.4)
MAGNESIUM SERPL-MCNC: 2.01 MG/DL (ref 1.6–2.4)
MCH RBC QN AUTO: 27.4 PG (ref 26–34)
MCHC RBC AUTO-ENTMCNC: 29.9 G/DL (ref 32–36)
MCV RBC AUTO: 92 FL (ref 80–100)
MONOCYTES # BLD AUTO: 0.42 X10*3/UL (ref 0.05–0.8)
MONOCYTES NFR BLD AUTO: 2.5 %
NEUTROPHILS # BLD AUTO: 15.55 X10*3/UL (ref 1.6–5.5)
NEUTROPHILS NFR BLD AUTO: 94.3 %
NRBC BLD-RTO: 0 /100 WBCS (ref 0–0)
P AXIS: 54 DEGREES
P OFFSET: 188 MS
P ONSET: 138 MS
PHOSPHATE SERPL-MCNC: 3.3 MG/DL (ref 2.5–4.9)
PLATELET # BLD AUTO: 153 X10*3/UL (ref 150–450)
POTASSIUM SERPL-SCNC: 3.4 MMOL/L (ref 3.5–5.3)
POTASSIUM SERPL-SCNC: 3.5 MMOL/L (ref 3.5–5.3)
POTASSIUM UR-SCNC: 111 MMOL/L
POTASSIUM/CREAT UR-RTO: 90 MMOL/G CREAT
PR INTERVAL: 162 MS
PROT SERPL-MCNC: 3.4 G/DL (ref 6.4–8.2)
PROT UR-ACNC: 68 MG/DL (ref 5–24)
PROT/CREAT UR: 0.56 MG/MG CREAT (ref 0–0.17)
Q ONSET: 219 MS
QRS COUNT: 18 BEATS
QRS DURATION: 124 MS
QT INTERVAL: 368 MS
QTC CALCULATION(BAZETT): 483 MS
QTC FREDERICIA: 442 MS
R AXIS: -52 DEGREES
RBC # BLD AUTO: 4.78 X10*6/UL (ref 4–5.2)
SODIUM SERPL-SCNC: 134 MMOL/L (ref 136–145)
SODIUM SERPL-SCNC: 138 MMOL/L (ref 136–145)
SODIUM UR-SCNC: 17 MMOL/L
SODIUM/CREAT UR-RTO: 14 MMOL/G CREAT
T AXIS: 169 DEGREES
T OFFSET: 403 MS
UFH PPP CHRO-ACNC: 0.4 IU/ML
UFH PPP CHRO-ACNC: 0.5 IU/ML
VENTRICULAR RATE: 104 BPM
WBC # BLD AUTO: 16.5 X10*3/UL (ref 4.4–11.3)

## 2024-11-04 PROCEDURE — 84145 PROCALCITONIN (PCT): CPT | Mod: GEALAB

## 2024-11-04 PROCEDURE — 99233 SBSQ HOSP IP/OBS HIGH 50: CPT | Performed by: INTERNAL MEDICINE

## 2024-11-04 PROCEDURE — 83735 ASSAY OF MAGNESIUM: CPT | Performed by: INTERNAL MEDICINE

## 2024-11-04 PROCEDURE — 36415 COLL VENOUS BLD VENIPUNCTURE: CPT | Performed by: INTERNAL MEDICINE

## 2024-11-04 PROCEDURE — 74176 CT ABD & PELVIS W/O CONTRAST: CPT

## 2024-11-04 PROCEDURE — 87899 AGENT NOS ASSAY W/OPTIC: CPT | Mod: GEALAB | Performed by: INTERNAL MEDICINE

## 2024-11-04 PROCEDURE — 82248 BILIRUBIN DIRECT: CPT | Performed by: INTERNAL MEDICINE

## 2024-11-04 PROCEDURE — 85520 HEPARIN ASSAY: CPT | Performed by: INTERNAL MEDICINE

## 2024-11-04 PROCEDURE — 51701 INSERT BLADDER CATHETER: CPT

## 2024-11-04 PROCEDURE — 84484 ASSAY OF TROPONIN QUANT: CPT | Performed by: INTERNAL MEDICINE

## 2024-11-04 PROCEDURE — 76770 US EXAM ABDO BACK WALL COMP: CPT

## 2024-11-04 PROCEDURE — 85025 COMPLETE CBC W/AUTO DIFF WBC: CPT | Performed by: INTERNAL MEDICINE

## 2024-11-04 PROCEDURE — 84075 ASSAY ALKALINE PHOSPHATASE: CPT | Performed by: INTERNAL MEDICINE

## 2024-11-04 PROCEDURE — 2500000002 HC RX 250 W HCPCS SELF ADMINISTERED DRUGS (ALT 637 FOR MEDICARE OP, ALT 636 FOR OP/ED): Performed by: INTERNAL MEDICINE

## 2024-11-04 PROCEDURE — 2500000001 HC RX 250 WO HCPCS SELF ADMINISTERED DRUGS (ALT 637 FOR MEDICARE OP): Performed by: INTERNAL MEDICINE

## 2024-11-04 PROCEDURE — 74176 CT ABD & PELVIS W/O CONTRAST: CPT | Performed by: RADIOLOGY

## 2024-11-04 PROCEDURE — 89190 NASAL SMEAR FOR EOSINOPHILS: CPT | Mod: GEALAB | Performed by: INTERNAL MEDICINE

## 2024-11-04 PROCEDURE — 76770 US EXAM ABDO BACK WALL COMP: CPT | Performed by: STUDENT IN AN ORGANIZED HEALTH CARE EDUCATION/TRAINING PROGRAM

## 2024-11-04 PROCEDURE — 1200000002 HC GENERAL ROOM WITH TELEMETRY DAILY

## 2024-11-04 PROCEDURE — 80069 RENAL FUNCTION PANEL: CPT | Mod: CCI | Performed by: INTERNAL MEDICINE

## 2024-11-04 PROCEDURE — 2500000004 HC RX 250 GENERAL PHARMACY W/ HCPCS (ALT 636 FOR OP/ED): Performed by: INTERNAL MEDICINE

## 2024-11-04 PROCEDURE — 87449 NOS EACH ORGANISM AG IA: CPT | Mod: GEALAB | Performed by: INTERNAL MEDICINE

## 2024-11-04 PROCEDURE — 82570 ASSAY OF URINE CREATININE: CPT | Performed by: INTERNAL MEDICINE

## 2024-11-04 PROCEDURE — 82436 ASSAY OF URINE CHLORIDE: CPT | Mod: GEALAB | Performed by: INTERNAL MEDICINE

## 2024-11-04 PROCEDURE — 51702 INSERT TEMP BLADDER CATH: CPT

## 2024-11-04 RX ORDER — MAGNESIUM SULFATE HEPTAHYDRATE 40 MG/ML
2 INJECTION, SOLUTION INTRAVENOUS ONCE
Status: COMPLETED | OUTPATIENT
Start: 2024-11-04 | End: 2024-11-04

## 2024-11-04 RX ORDER — CALCIUM GLUCONATE 20 MG/ML
1 INJECTION, SOLUTION INTRAVENOUS ONCE
Status: COMPLETED | OUTPATIENT
Start: 2024-11-04 | End: 2024-11-04

## 2024-11-04 RX ORDER — SODIUM CHLORIDE 9 MG/ML
100 INJECTION, SOLUTION INTRAVENOUS CONTINUOUS
Status: DISCONTINUED | OUTPATIENT
Start: 2024-11-04 | End: 2024-11-05

## 2024-11-04 RX ORDER — POTASSIUM CHLORIDE 20 MEQ/1
40 TABLET, EXTENDED RELEASE ORAL ONCE
Status: COMPLETED | OUTPATIENT
Start: 2024-11-04 | End: 2024-11-04

## 2024-11-04 RX ORDER — SODIUM CHLORIDE 9 MG/ML
100 INJECTION, SOLUTION INTRAVENOUS CONTINUOUS
Status: DISCONTINUED | OUTPATIENT
Start: 2024-11-04 | End: 2024-11-04

## 2024-11-04 RX ORDER — PANTOPRAZOLE SODIUM 40 MG/1
40 TABLET, DELAYED RELEASE ORAL
Status: DISCONTINUED | OUTPATIENT
Start: 2024-11-05 | End: 2024-11-19 | Stop reason: HOSPADM

## 2024-11-04 RX ADMIN — PIPERACILLIN SODIUM AND TAZOBACTAM SODIUM 3.38 G: 3; .375 INJECTION, SOLUTION INTRAVENOUS at 22:22

## 2024-11-04 RX ADMIN — Medication 1 APPLICATION: at 11:18

## 2024-11-04 RX ADMIN — PANTOPRAZOLE SODIUM 40 MG: 40 TABLET, DELAYED RELEASE ORAL at 06:06

## 2024-11-04 RX ADMIN — AZITHROMYCIN DIHYDRATE 500 MG: 500 TABLET ORAL at 10:59

## 2024-11-04 RX ADMIN — GABAPENTIN 200 MG: 100 CAPSULE ORAL at 10:59

## 2024-11-04 RX ADMIN — PIPERACILLIN SODIUM AND TAZOBACTAM SODIUM 3.38 G: 3; .375 INJECTION, SOLUTION INTRAVENOUS at 10:58

## 2024-11-04 RX ADMIN — BUSPIRONE HYDROCHLORIDE 7.5 MG: 15 TABLET ORAL at 10:58

## 2024-11-04 RX ADMIN — FOLIC ACID 1 MG: 1 TABLET ORAL at 10:58

## 2024-11-04 RX ADMIN — FERROUS SULFATE TAB 325 MG (65 MG ELEMENTAL FE) 325 MG: 325 (65 FE) TAB at 10:59

## 2024-11-04 RX ADMIN — POTASSIUM CHLORIDE 40 MEQ: 1500 TABLET, EXTENDED RELEASE ORAL at 16:48

## 2024-11-04 RX ADMIN — GABAPENTIN 200 MG: 100 CAPSULE ORAL at 20:24

## 2024-11-04 RX ADMIN — SODIUM CHLORIDE 100 ML/HR: 9 INJECTION, SOLUTION INTRAVENOUS at 12:56

## 2024-11-04 RX ADMIN — NYSTATIN: 100000 CREAM TOPICAL at 20:24

## 2024-11-04 RX ADMIN — PIPERACILLIN SODIUM AND TAZOBACTAM SODIUM 3.38 G: 3; .375 INJECTION, SOLUTION INTRAVENOUS at 01:53

## 2024-11-04 RX ADMIN — CALCIUM GLUCONATE 1 G: 20 INJECTION, SOLUTION INTRAVENOUS at 12:16

## 2024-11-04 RX ADMIN — PIPERACILLIN SODIUM AND TAZOBACTAM SODIUM 3.38 G: 3; .375 INJECTION, SOLUTION INTRAVENOUS at 16:48

## 2024-11-04 RX ADMIN — NYSTATIN: 100000 CREAM TOPICAL at 10:58

## 2024-11-04 RX ADMIN — BUSPIRONE HYDROCHLORIDE 7.5 MG: 15 TABLET ORAL at 20:24

## 2024-11-04 RX ADMIN — OXYCODONE HYDROCHLORIDE 5 MG: 5 TABLET ORAL at 17:54

## 2024-11-04 RX ADMIN — SODIUM CHLORIDE 100 ML/HR: 9 INJECTION, SOLUTION INTRAVENOUS at 10:58

## 2024-11-04 RX ADMIN — Medication 1 APPLICATION: at 20:24

## 2024-11-04 RX ADMIN — ONDANSETRON 4 MG: 2 INJECTION, SOLUTION INTRAMUSCULAR; INTRAVENOUS at 12:24

## 2024-11-04 RX ADMIN — HEPARIN SODIUM 660 UNITS/HR: 10000 INJECTION, SOLUTION INTRAVENOUS at 18:42

## 2024-11-04 RX ADMIN — SODIUM CHLORIDE 100 ML/HR: 9 INJECTION, SOLUTION INTRAVENOUS at 17:54

## 2024-11-04 RX ADMIN — CYANOCOBALAMIN TAB 1000 MCG 1000 MCG: 1000 TAB at 10:59

## 2024-11-04 RX ADMIN — POTASSIUM CHLORIDE 40 MEQ: 1500 TABLET, EXTENDED RELEASE ORAL at 10:59

## 2024-11-04 RX ADMIN — LEVOTHYROXINE SODIUM 88 MCG: 0.09 TABLET ORAL at 06:05

## 2024-11-04 RX ADMIN — CETIRIZINE HYDROCHLORIDE 10 MG: 10 TABLET, FILM COATED ORAL at 10:58

## 2024-11-04 RX ADMIN — LOPERAMIDE HYDROCHLORIDE 2 MG: 2 CAPSULE ORAL at 10:59

## 2024-11-04 RX ADMIN — Medication 2000 UNITS: at 10:58

## 2024-11-04 RX ADMIN — SERTRALINE HYDROCHLORIDE 25 MG: 50 TABLET ORAL at 10:59

## 2024-11-04 RX ADMIN — MAGNESIUM SULFATE HEPTAHYDRATE 2 G: 2 INJECTION, SOLUTION INTRAVENOUS at 13:34

## 2024-11-04 RX ADMIN — SODIUM CHLORIDE 100 ML/HR: 9 INJECTION, SOLUTION INTRAVENOUS at 01:53

## 2024-11-04 ASSESSMENT — PAIN DESCRIPTION - LOCATION: LOCATION: PERINEUM

## 2024-11-04 ASSESSMENT — PAIN SCALES - GENERAL
PAINLEVEL_OUTOF10: 0 - NO PAIN
PAINLEVEL_OUTOF10: 0 - NO PAIN
PAINLEVEL_OUTOF10: 7

## 2024-11-04 ASSESSMENT — COGNITIVE AND FUNCTIONAL STATUS - GENERAL
EATING MEALS: A LITTLE
MOVING TO AND FROM BED TO CHAIR: A LITTLE
MOBILITY SCORE: 16
DAILY ACTIVITIY SCORE: 12
STANDING UP FROM CHAIR USING ARMS: A LITTLE
TURNING FROM BACK TO SIDE WHILE IN FLAT BAD: A LITTLE
DRESSING REGULAR LOWER BODY CLOTHING: A LOT
CLIMB 3 TO 5 STEPS WITH RAILING: A LOT
DRESSING REGULAR UPPER BODY CLOTHING: A LOT
MOVING FROM LYING ON BACK TO SITTING ON SIDE OF FLAT BED WITH BEDRAILS: A LITTLE
WALKING IN HOSPITAL ROOM: A LOT
PERSONAL GROOMING: A LOT
TOILETING: TOTAL
HELP NEEDED FOR BATHING: A LOT

## 2024-11-04 ASSESSMENT — ENCOUNTER SYMPTOMS
SHORTNESS OF BREATH: 0
FEVER: 1
DIZZINESS: 0
ABDOMINAL PAIN: 0
WEAKNESS: 1
CHILLS: 0
ABDOMINAL DISTENTION: 0
VOMITING: 1
PALPITATIONS: 0
APPETITE CHANGE: 1
COUGH: 0

## 2024-11-04 ASSESSMENT — PAIN - FUNCTIONAL ASSESSMENT: PAIN_FUNCTIONAL_ASSESSMENT: 0-10

## 2024-11-04 NOTE — CONSULTS
Inpatient consult to Cardiology  Consult performed by: OLI Jules-CNP  Consult ordered by: Eloy Hua MD  Reason for consult: abnormality on RV on CT          History Of Present Illness  Desirae Gregg is a 83 y.o. female presenting with complaints of generalized weakness, vomiting, and a fever. She denies any chest pain, shortness of breath, palpitations, or dizziness. She reports chronic BLE edema which is at her baseline.     Lab work in the ER showed glucose 119, sodium 134, potassium 4.3, BUN/Cr 15/1.21, lactate 1.3, , troponin 76, WBC 20.4, H&H 14.4/47.2, UA showed UTI, CXR showed RLL airspace consolidation, enlarged cardiac silhouette. CT chest showed a moderate sized, mostly if not entirely free-flowing right pleural effusion with associated multisegmental RLL collapse, small free flowing left pleural effusion, trace pericardial effusion, small area of ground glass airspace disease RML, possible mild ground glass airspace disease in RLL.     EKG showed SR, LBBB.       Past Medical History  Past Medical History:   Diagnosis Date    Heart failure     Sciatica    chronic back pain, chronic lymphoid leukemia, gallstones, hypertension, hyperlipidemia, chronic left bundle branch block, polycythemia vera JAK2, prior history of syncope and thyroid disease     Surgical History  Past Surgical History:   Procedure Laterality Date    APPENDECTOMY      CHOLECYSTECTOMY      HYSTERECTOMY      TONSILLECTOMY          Social History  She reports that she has never smoked. She has never used smokeless tobacco. She reports that she does not drink alcohol and does not use drugs.    Family History  Family History   Problem Relation Name Age of Onset    Stroke Mother      Asthma Father      Cancer Paternal Grandmother          Allergies  Clonidine and structural analogs, Naproxen, Tizanidine, Hydroxyzine, and Methylprednisolone    Review of Systems  Review of Systems   Constitutional:  Positive for appetite  "change and fever. Negative for chills.   Respiratory:  Negative for cough and shortness of breath.    Cardiovascular:  Positive for leg swelling. Negative for chest pain and palpitations.   Gastrointestinal:  Positive for vomiting. Negative for abdominal distention and abdominal pain.   Musculoskeletal:  Negative for gait problem.   Neurological:  Positive for weakness. Negative for dizziness.   All other systems reviewed and are negative.         Physical Exam  Constitutional: Well developed, awake/alert/oriented x3, no distress, alert and cooperative  Eyes: PERRL, EOMI, clear sclera  ENMT: mucous membranes moist, no apparent injury, no lesions seen  Head/Neck: Neck supple, no apparent injury, thyroid without mass or tenderness, No JVD, trachea midline, no bruits  Respiratory/Thorax: Patent airways, diminished bilat bases with good chest expansion, thorax symmetric  Cardiovascular: Regular, rate and rhythm, no murmurs, 2+ equal pulses of the extremities, normal S 1and S 2  Gastrointestinal: Nondistended, soft, non-tender, no rebound tenderness or guarding, no masses palpable, no organomegaly, +BS, no bruits  Musculoskeletal: ROM intact, no joint swelling, normal strength  Extremities: +2 pitting edema BLE  Neurological: alert and oriented x3, intact senses, motor, response and reflexes, normal strength  Lymphatic: No significant lymphadenopathy  Psychological: Appropriate mood and behavior  Skin: Warm and dry, no lesions, no rashes       Last Recorded Vitals  Blood pressure 95/62, pulse 101, temperature 36.6 °C (97.9 °F), temperature source Temporal, resp. rate 16, height 1.651 m (5' 5\"), weight 72.6 kg (160 lb), SpO2 94%.    Medications  Scheduled medications  azithromycin, 500 mg, oral, q24h MANI  busPIRone, 7.5 mg, oral, BID  carvedilol, 25 mg, oral, BID  cetirizine, 10 mg, oral, Daily  cholecalciferol, 2,000 Units, oral, Daily  cyanocobalamin, 1,000 mcg, oral, Daily  ferrous sulfate (325 mg ferrous sulfate), 65 " mg of iron, oral, Daily with breakfast  folic acid, 1 mg, oral, Daily  gabapentin, 200 mg, oral, BID  isosorbide mononitrate ER, 30 mg, oral, Daily  levothyroxine, 88 mcg, oral, Daily  magnesium sulfate, 2 g, intravenous, Once  nystatin, , Topical, BID  pantoprazole, 40 mg, oral, Daily before breakfast  piperacillin-tazobactam, 3.375 g, intravenous, q6h  sertraline, 25 mg, oral, Daily  zinc oxide, 1 Application, Topical, BID      Continuous medications  heparin, 0-4,000 Units/hr, Last Rate: 660 Units/hr (11/04/24 6416)  sodium chloride 0.9%, 100 mL/hr, Last Rate: 100 mL/hr (11/04/24 8006)      PRN medications  PRN medications: acetaminophen **OR** acetaminophen **OR** acetaminophen, loperamide, melatonin, ondansetron ODT **OR** ondansetron, oxyCODONE, polyethylene glycol    Relevant Results  Results for orders placed or performed during the hospital encounter of 11/03/24 (from the past 24 hours)   Troponin I, High Sensitivity   Result Value Ref Range    Troponin I, High Sensitivity 62 (HH) 0 - 13 ng/L   Heparin Assay, UFH   Result Value Ref Range    Heparin Unfractionated 1.1 (HH) See Comment Below for Therapeutic Ranges IU/mL   Heparin Assay, UFH   Result Value Ref Range    Heparin Unfractionated 0.5 See Comment Below for Therapeutic Ranges IU/mL   Heparin Assay, UFH   Result Value Ref Range    Heparin Unfractionated 0.4 See Comment Below for Therapeutic Ranges IU/mL   CBC and Auto Differential   Result Value Ref Range    WBC 16.5 (H) 4.4 - 11.3 x10*3/uL    nRBC 0.0 0.0 - 0.0 /100 WBCs    RBC 4.78 4.00 - 5.20 x10*6/uL    Hemoglobin 13.1 12.0 - 16.0 g/dL    Hematocrit 43.8 36.0 - 46.0 %    MCV 92 80 - 100 fL    MCH 27.4 26.0 - 34.0 pg    MCHC 29.9 (L) 32.0 - 36.0 g/dL    RDW 15.6 (H) 11.5 - 14.5 %    Platelets 153 150 - 450 x10*3/uL    Neutrophils % 94.3 40.0 - 80.0 %    Immature Granulocytes %, Automated 1.2 (H) 0.0 - 0.9 %    Lymphocytes % 1.5 13.0 - 44.0 %    Monocytes % 2.5 2.0 - 10.0 %    Eosinophils % 0.2 0.0  - 6.0 %    Basophils % 0.3 0.0 - 2.0 %    Neutrophils Absolute 15.55 (H) 1.60 - 5.50 x10*3/uL    Immature Granulocytes Absolute, Automated 0.20 0.00 - 0.50 x10*3/uL    Lymphocytes Absolute 0.25 (L) 0.80 - 3.00 x10*3/uL    Monocytes Absolute 0.42 0.05 - 0.80 x10*3/uL    Eosinophils Absolute 0.03 0.00 - 0.40 x10*3/uL    Basophils Absolute 0.05 0.00 - 0.10 x10*3/uL   Comprehensive Metabolic Panel   Result Value Ref Range    Glucose 133 (H) 74 - 99 mg/dL    Sodium 134 (L) 136 - 145 mmol/L    Potassium 3.4 (L) 3.5 - 5.3 mmol/L    Chloride 106 98 - 107 mmol/L    Bicarbonate 22 21 - 32 mmol/L    Anion Gap 9 (L) 10 - 20 mmol/L    Urea Nitrogen 19 6 - 23 mg/dL    Creatinine 1.25 (H) 0.50 - 1.05 mg/dL    eGFR 42 (L) >60 mL/min/1.73m*2    Calcium 6.7 (L) 8.6 - 10.3 mg/dL    Albumin 2.0 (L) 3.4 - 5.0 g/dL    Alkaline Phosphatase 39 33 - 136 U/L    Total Protein 3.4 (L) 6.4 - 8.2 g/dL    AST 10 9 - 39 U/L    Bilirubin, Total 0.9 0.0 - 1.2 mg/dL    ALT 13 7 - 45 U/L   Magnesium   Result Value Ref Range    Magnesium 1.53 (L) 1.60 - 2.40 mg/dL   Troponin I, High Sensitivity   Result Value Ref Range    Troponin I, High Sensitivity 46 (H) 0 - 13 ng/L   Bilirubin, Direct   Result Value Ref Range    Bilirubin, Direct 0.3 0.0 - 0.3 mg/dL       US renal complete   Final Result   No hydronephrosis. Decompressed bladder.        MACRO:   None        Signed by: Zani Mcknight 11/4/2024 11:08 AM   Dictation workstation:   [id number]      CT chest wo IV contrast   Final Result   Abnormalities at the right lung base on portable frontal chest   radiograph earlier today are due to a combination of moderate-sized,   mostly if not entirely free-flowing right pleural effusion with   associated multisegmental right lower lobe collapse adjacent to it        Small area of ground-glass airspace disease in the middle lobe   confirmed on CT may not have been expected on the prior radiograph        Possibility of mild ground-glass airspace disease in the  right lower   lobe as well        No ground-glass airspace disease in the left lung        No consolidative pneumonia in any of the inflated lungs on either side        Small free-flowing left pleural effusion        Unusual hypodensities in the right ventricular free wall of uncertain   etiology and significance        Perhaps trace (smallest detectable quantity) pericardial effusion        Incidental abnormalities in the included upper abdomen as detailed   above        MACRO:   None        Signed by: Matthias Kolb 11/3/2024 11:35 AM   Dictation workstation:   [id number]      XR chest 1 view   Final Result   1. Right lower lobe airspace consolidation either pneumonia or   atelectasis with right pleural effusion. Follow-up to complete   resolution is needed   2. Enlarged cardiac silhouette             Signed by: Kary Cardoso 11/3/2024 10:27 AM   Dictation workstation:   [id number]      Transthoracic Echo (TTE) Limited    (Results Pending)              Assessment/Plan   Echocardiogram from June 2024:    Left Ventricle: Low normal left ventricular systolic function with a   visually estimated EF of 50 - 55%. Left ventricle size is normal. Normal   wall thickness. Ventricular mass is normal. Findings consistent with   concentric remodeling. Normal wall motion. Grade I diastolic dysfunction   with normal LAP.     Right Ventricle: Right ventricle size is normal. Normal systolic   function. TAPSE is 1.9 cm.     Aortic Valve: No stenosis.     Mitral Valve: Mild regurgitation.     Tricuspid Valve: Normal RVSP. The estimated RVSP is 24 mmHg.     Pericardium: No pericardial effusion.       Pneumonia  -CT chest reviewed  -antibiotics  -bronchodilators  -oxygen support  -sputum culture  -blood cultures  -Pulmonary following, note reviewed    2. Abnormal CT finding of RV  -Unusual hypodensities in the right ventricular free wall  -Echo as above  -Repeat limited echo to assess    3. Acute on chronic diastolic CHF with mildly  reduced EF  -CT showed pleural effusion, possible parapneumonic vs cardiac  -  -I reviewed the Echocardiogram as above  -Strict I & Os  -Daily weights  -2gm na diet  -BP low most likely from infection  -Will give Bumex when BP and creatinine stable  -Eliquis on hold for possible thoracentesis  -Hold imdur for low BP  -Hold coreg today    4. UTI  -WBC elevated  -Urine culture  -antibiotics    5. Elevated troponins  -Troponin 76, now downtrending  -EKG showed SR LBBB->chronic LBBB  -Denies ACS symptoms  -repeat limited echo    6. Hx of DVT  -On eliquis->now on hold for possible thoracentesis  -Heparin gtt    7. Hypokalemia/hypomagnesemia  -supplement    8. Hx of HTN, now with hypotension  -Hold imdur  -Hold coreg for today  -IVF given  -Monitor    Sherron Jones, OLI-CNP

## 2024-11-04 NOTE — PROGRESS NOTES
"Desirae Gregg is a 83 y.o. female on day 1 of admission presenting with Sepsis with acute hypoxic respiratory failure without septic shock (Multi).    Subjective   Pt was found sitting up in bed with son at bedside. A&Ox2. Patient states that her breathing is feeling alright today. Has no new complaints or concerns today.       Objective     Physical Exam  Constitutional:       General: She is awake.      Appearance: She is overweight.      Interventions: Nasal cannula in place.   HENT:      Head: Normocephalic and atraumatic.      Mouth/Throat:      Mouth: Mucous membranes are moist.      Pharynx: Oropharynx is clear.   Cardiovascular:      Rate and Rhythm: Normal rate.      Pulses: Normal pulses.      Heart sounds: Normal heart sounds.   Pulmonary:      Breath sounds: Examination of the right-middle field reveals rales. Examination of the left-middle field reveals rales. Examination of the right-lower field reveals decreased breath sounds and rales. Examination of the left-lower field reveals decreased breath sounds and rales. Decreased breath sounds and rales present.   Musculoskeletal:      Right lower leg: Edema present.      Left lower leg: Edema present.   Skin:     General: Skin is warm and dry.      Capillary Refill: Capillary refill takes less than 2 seconds.      Coloration: Skin is pale.   Neurological:      Mental Status: She is alert. She is confused.   Psychiatric:         Attention and Perception: She is inattentive.         Mood and Affect: Affect is blunt.         Speech: Speech is delayed.         Behavior: Behavior is slowed. Behavior is cooperative.         Thought Content: Thought content normal.         Cognition and Memory: Cognition normal. Memory is impaired.         Judgment: Judgment normal.         Last Recorded Vitals  Blood pressure 89/59, pulse 56, temperature 36.4 °C (97.5 °F), temperature source Temporal, resp. rate 18, height 1.651 m (5' 5\"), weight 72.6 kg (160 lb), SpO2 " 92%.  Intake/Output last 3 Shifts:  I/O last 3 completed shifts:  In: 2309.5 (31.8 mL/kg) [P.O.:360; IV Piggyback:1949.5]  Out: - (0 mL/kg)   Weight: 72.6 kg     Relevant Results      Results for orders placed or performed during the hospital encounter of 11/03/24 (from the past 24 hours)   Troponin I, High Sensitivity   Result Value Ref Range    Troponin I, High Sensitivity 62 (HH) 0 - 13 ng/L   Heparin Assay, UFH   Result Value Ref Range    Heparin Unfractionated 1.1 (HH) See Comment Below for Therapeutic Ranges IU/mL   Heparin Assay, UFH   Result Value Ref Range    Heparin Unfractionated 0.5 See Comment Below for Therapeutic Ranges IU/mL   Heparin Assay, UFH   Result Value Ref Range    Heparin Unfractionated 0.4 See Comment Below for Therapeutic Ranges IU/mL   CBC and Auto Differential   Result Value Ref Range    WBC 16.5 (H) 4.4 - 11.3 x10*3/uL    nRBC 0.0 0.0 - 0.0 /100 WBCs    RBC 4.78 4.00 - 5.20 x10*6/uL    Hemoglobin 13.1 12.0 - 16.0 g/dL    Hematocrit 43.8 36.0 - 46.0 %    MCV 92 80 - 100 fL    MCH 27.4 26.0 - 34.0 pg    MCHC 29.9 (L) 32.0 - 36.0 g/dL    RDW 15.6 (H) 11.5 - 14.5 %    Platelets 153 150 - 450 x10*3/uL    Neutrophils % 94.3 40.0 - 80.0 %    Immature Granulocytes %, Automated 1.2 (H) 0.0 - 0.9 %    Lymphocytes % 1.5 13.0 - 44.0 %    Monocytes % 2.5 2.0 - 10.0 %    Eosinophils % 0.2 0.0 - 6.0 %    Basophils % 0.3 0.0 - 2.0 %    Neutrophils Absolute 15.55 (H) 1.60 - 5.50 x10*3/uL    Immature Granulocytes Absolute, Automated 0.20 0.00 - 0.50 x10*3/uL    Lymphocytes Absolute 0.25 (L) 0.80 - 3.00 x10*3/uL    Monocytes Absolute 0.42 0.05 - 0.80 x10*3/uL    Eosinophils Absolute 0.03 0.00 - 0.40 x10*3/uL    Basophils Absolute 0.05 0.00 - 0.10 x10*3/uL   Comprehensive Metabolic Panel   Result Value Ref Range    Glucose 133 (H) 74 - 99 mg/dL    Sodium 134 (L) 136 - 145 mmol/L    Potassium 3.4 (L) 3.5 - 5.3 mmol/L    Chloride 106 98 - 107 mmol/L    Bicarbonate 22 21 - 32 mmol/L    Anion Gap 9 (L) 10 - 20  mmol/L    Urea Nitrogen 19 6 - 23 mg/dL    Creatinine 1.25 (H) 0.50 - 1.05 mg/dL    eGFR 42 (L) >60 mL/min/1.73m*2    Calcium 6.7 (L) 8.6 - 10.3 mg/dL    Albumin 2.0 (L) 3.4 - 5.0 g/dL    Alkaline Phosphatase 39 33 - 136 U/L    Total Protein 3.4 (L) 6.4 - 8.2 g/dL    AST 10 9 - 39 U/L    Bilirubin, Total 0.9 0.0 - 1.2 mg/dL    ALT 13 7 - 45 U/L   Magnesium   Result Value Ref Range    Magnesium 1.53 (L) 1.60 - 2.40 mg/dL   Troponin I, High Sensitivity   Result Value Ref Range    Troponin I, High Sensitivity 46 (H) 0 - 13 ng/L   Bilirubin, Direct   Result Value Ref Range    Bilirubin, Direct 0.3 0.0 - 0.3 mg/dL   Protein, Urine Random   Result Value Ref Range    Total Protein, Urine Random 68 (H) 5 - 24 mg/dL    Creatinine, Urine Random 122.1 20.0 - 320.0 mg/dL    T. Protein/Creatinine Ratio 0.56 (H) 0.00 - 0.17 mg/mg Creat      CT abdomen pelvis wo IV contrast    Result Date: 11/4/2024  Interpreted By:  Jayshree Dias, STUDY: CT ABDOMEN PELVIS WO IV CONTRAST;  11/4/2024 3:04 pm   INDICATION: Signs/Symptoms:recurrent urosepsis, eval for retained stone urinary tract.   COMPARISON: None.   ACCESSION NUMBER(S): [id number]   ORDERING CLINICIAN: SHERITA GOYAL   TECHNIQUE: CT of the abdomen and pelvis was performed without intravenous contrast. Sagittal and coronal reconstructions were generated.   FINDINGS: LOWER CHEST: There are bilateral pleural effusions. There is a small pericardial effusion. There is bilateral basilar atelectasis or infiltrate.   ABDOMEN:   LIVER: Unremarkable   BILE DUCTS: Unremarkable   GALLBLADDER: Status post cholecystectomy   PANCREAS: Unremarkable   SPLEEN: The spleen is enlarged.   ADRENAL GLANDS: There are no adrenal masses.   KIDNEYS AND URETERS: Kidneys are normal size and not obstructed.   There are no renal calculi.   Ureters are normal caliber.   PELVIS:   BLADDER: The bladder is empty. There is a 2 centimeters bladder stone.   REPRODUCTIVE ORGANS: Patient is status post hysterectomy.  There appear to be surgical clips in the pelvis.   BOWEL: There is no significant bowel distention. There is motion artifact. There are sigmoid diverticula.   VESSELS: There are atherosclerotic changes of the aorta. The cava is unremarkable   PERITONEUM/RETROPERITONEUM/LYMPH NODES: There is a moderate amount of ascites. There is presacral edema. There is no obvious free air.   There is no significant lymphadenopathy.   BONES AND ABDOMINAL WALL: There is anasarca.   There are old left pelvic fractures peer there are degenerative changes of the spine. There is loss of height of L1.   COMPARISON OF FINDINGS:       2 centimeters stone in the urinary bladder.   Bilateral pleural effusions. Pericardial effusion.   Ascites.   Anasarca.   Presacral edema.   Enlarged spleen.   The exam is limited by motion artifact.   MACRO: none   Signed by: Jayshree Dias 11/4/2024 3:28 PM Dictation workstation:   [id number]    US renal complete    Result Date: 11/4/2024  Interpreted By:  Zain Mcknight, STUDY: US RENAL COMPLETE; 11/4/2024 9:03 am   INDICATION: Signs/Symptoms:LEVY on CKD, eval postrenal causes.   COMPARISON: None.   ACCESSION NUMBER(S): [id number]   ORDERING CLINICIAN: SHERITA GOYAL   TECHNIQUE: Sonography of the kidneys and urinary bladder was performed.   FINDINGS: Right Kidney: *Renal length: 10 cm *Parenchyma: Normal parenchymal echogenicity. Normal parenchymal thickness. *Collecting system: No hydronephrosis. *Calculus: No echogenic, shadowing calculus. *Lesion: None.   Left Kidney: *Renal length: 10 cm *Parenchyma: Normal parenchymal echogenicity. Normal parenchymal thickness. *Collecting system: No hydronephrosis. *Calculus: Nonobstructing calculus in the upper pole measuring 4 mm. *Lesion: None.   Bladder: Decompressed.   Other: Trace perihepatic, perisplenic and pelvic free fluid/ascites.       No hydronephrosis. Decompressed bladder.   MACRO: None   Signed by: Zain Mcknight 11/4/2024 11:08 AM Dictation  workstation:   [id number]    ECG 12 lead    Result Date: 11/4/2024  Sinus tachycardia Left axis deviation Left bundle branch block Abnormal ECG No previous ECGs available    CT chest wo IV contrast    Result Date: 11/3/2024  Interpreted By:  Matthias Kolb, STUDY: CT CHEST WO IV CONTRAST;  11/3/2024 11:16 am   INDICATION: Signs/Symptoms:sepsis, ? atelectasis versus PNA on CXR without classic symptoms.     COMPARISON: Portable chest earlier same day 3 November 2024 at 0957 hours; CT lumbar spine without contrast 5 July 2024   ACCESSION NUMBER(S): [id number]   ORDERING CLINICIAN: SHERITA GOYAL   TECHNIQUE: Helical CT chest from thoracic inlet through the hemidiaphragms without intravenous contrast   FINDINGS: LUNGS / AIRSPACES / AIRWAYS:   LARGE AIRWAYS Filling defect: Negative Wall thickening: Negative Bronchiectasis: Negative Other: N/A   AIRSPACES Fibrosis: Negative Emphysema: Negative Consolidation: Negative Ground glass airspace disease: Small region of ground-glass airspace change in the middle lobe and potentially in portions of the inflated right lower lobe. None in the left lung noting one segment of left lower lobe collapse Edema: Negative Nodule / Mass: Negative Other: One segment of left lower lobe collapse. Multiple segments of right lower lobe collapse   PLEURA: Effusion:  Moderate size mostly if not entirely free flowing right; small free-flowing left Pneumothorax:  Both sides negative Other:  n/a   CARDIOVASCULAR: Heart size:  Mildly enlarged Pericardial effusion:  Perhaps trace Thoracic aortic aneurysm:  Negative Pulmonary arteries:  Static Heart failure change:  Negative.  No sign of interstitial or alveolar edema. Other:  Unusual hypodensities in the right ventricular free wall for example soft tissue windows axial series 202, image 167 and the surrounding few images   NONVASCULAR MEDIASTINUM: Esophagus:  Grossly normal by CT Mediastinal Mass:  Negative Hiatal hernia:  None Other:  n/a   LYMPH  NODES: No thoracic adenopathy   CHEST WALL: Soft tissues of the chest wall are unremarkable   SKELETON: No acute or contributory abnormality   UPPER ABDOMEN: Small volume ascites pooling about the included liver and spleen. Spleen probably mildly enlarged but not completely included in the field of view. Liver not overtly cirrhotic but at least borderline fatty. The only pertinent comparison exam for the upper abdomen would be CT lumbar spine without contrast 5 July 2024 at which time there was not any ascites in the field of view, but the field of view did not include the portions of the abdomen where they ascites is presently located. It is unlikely there was ascites in July, 2024 as more of the lower abdomen was included on that exam and it did not appear that there is any ascites tracking down into (or up from) the pelvis       Abnormalities at the right lung base on portable frontal chest radiograph earlier today are due to a combination of moderate-sized, mostly if not entirely free-flowing right pleural effusion with associated multisegmental right lower lobe collapse adjacent to it   Small area of ground-glass airspace disease in the middle lobe confirmed on CT may not have been expected on the prior radiograph   Possibility of mild ground-glass airspace disease in the right lower lobe as well   No ground-glass airspace disease in the left lung   No consolidative pneumonia in any of the inflated lungs on either side   Small free-flowing left pleural effusion   Unusual hypodensities in the right ventricular free wall of uncertain etiology and significance   Perhaps trace (smallest detectable quantity) pericardial effusion   Incidental abnormalities in the included upper abdomen as detailed above   MACRO: None   Signed by: Matthias Kolb 11/3/2024 11:35 AM Dictation workstation:   [id number]    XR chest 1 view    Result Date: 11/3/2024  Interpreted By:  Kary Cardoso, STUDY: XR CHEST 1 VIEW 11/3/2024 10:06 am    INDICATION: Signs/Symptoms:cough and fever   COMPARISON: None   ACCESSION NUMBER(S): [id number]   ORDERING CLINICIAN: ESTHER ROBBINS   TECHNIQUE: AP view   FINDINGS: There is right lower lobe airspace consolidation and a right pleural effusion. There is an enlarged cardiac silhouette. Pulmonary vascularity is normal. Degenerative changes are noted in both shoulders. No sign of pneumothorax       1. Right lower lobe airspace consolidation either pneumonia or atelectasis with right pleural effusion. Follow-up to complete resolution is needed 2. Enlarged cardiac silhouette     Signed by: Kary Cardoso 11/3/2024 10:27 AM Dictation workstation:   [id number]        Scheduled medications  azithromycin, 500 mg, oral, q24h MANI  busPIRone, 7.5 mg, oral, BID  [Held by provider] carvedilol, 25 mg, oral, BID  cetirizine, 10 mg, oral, Daily  cholecalciferol, 2,000 Units, oral, Daily  cyanocobalamin, 1,000 mcg, oral, Daily  ferrous sulfate (325 mg ferrous sulfate), 65 mg of iron, oral, Daily with breakfast  folic acid, 1 mg, oral, Daily  gabapentin, 200 mg, oral, BID  [Held by provider] isosorbide mononitrate ER, 30 mg, oral, Daily  levothyroxine, 88 mcg, oral, Daily  nystatin, , Topical, BID  pantoprazole, 40 mg, oral, Daily before breakfast  piperacillin-tazobactam, 3.375 g, intravenous, q6h  sertraline, 25 mg, oral, Daily  zinc oxide, 1 Application, Topical, BID      Continuous medications  heparin, 0-4,000 Units/hr, Last Rate: 660 Units/hr (11/04/24 9574)  sodium chloride 0.9%, 100 mL/hr, Last Rate: 100 mL/hr (11/04/24 1256)      PRN medications  PRN medications: acetaminophen **OR** acetaminophen **OR** acetaminophen, loperamide, melatonin, ondansetron ODT **OR** ondansetron, oxyCODONE, polyethylene glycol          Assessment/Plan   Assessment & Plan  Sepsis with acute hypoxic respiratory failure without septic shock (Multi)    Iron deficiency anemia    Chronic systolic (congestive) heart failure    Essential  hypertension    History of pulmonary embolism    Anxiety    Vitamin D deficiency    Vitamin B12 deficiency    Adult hypothyroidism    Pneumonia of right lower lobe due to infectious organism    Leukocytosis    Acute kidney injury superimposed on stage 3a chronic kidney disease    Elevated troponin    Hyperbilirubinemia    Hyperglycemia    Hypokalemia    Hypomagnesemia    Hypocalcemia    Abnormality of right ventricle of heart    Pt is a 84 yo F with pertinent PMH of myeloproliferative disorder, PAI1G mutation, hyperhomocysteinemia, PE on Eliquis who is admitted for suspected pneumonia versus pulmonary edema 2/2 cardiac volume overload, pleural effusion, and UTI    #Suspected Pneumonia  #Bilateral Pleural effusion R>L 2/2 pneumonia versus pulmonary edema 2/2 cardiac volume overload    - Suspect that pleural effusion is likely due to volume overload rather than pneumonia  - Recommend starting diuresis  - Recommend starting anticoagulant as thoracentesis is needed not at this time  - Recommend procal    Will continue to follow         Patient is staffed with Dr. Lewis,  Jojo Lucero DO, PGY-3  [facility]

## 2024-11-04 NOTE — PROGRESS NOTES
11/04/24 1002   Discharge Planning   Living Arrangements Children  (lives with son)   Support Systems Children   Assistance Needed A&Ox3, pt utilizes a walker and can ambulate around the house, WC (for when leaving the house). Transfers with a gait belt and assistance of son. Uses a shower chair and raised toilet. Son is her caretaker and transports pt to all appointments. Pt's son does cooking and cleaning. Patient is active with [facility] (palliative care) and [facility](PT/OT/SN).   Type of Residence Private residence   Number of Stairs to Enter Residence 0  (ramp)   Number of Stairs Within Residence 12  (lift chair)   Do you have animals or pets at home? Yes   Type of Animals or Pets 1 cat   Home or Post Acute Services In home services   Type of Home Care Services Home nursing visits;Home OT;Home PT;Palliative   Expected Discharge Disposition Home Health  (resumed [facility](SN/PT/OT) will need referral and [facility] (palliative))   Does the patient need discharge transport arranged? No

## 2024-11-04 NOTE — CARE PLAN
The patient's goals for the shift include  rest    The clinical goals for the shift include to eat lunch    Problem: Skin  Goal: Prevent/minimize sheer/friction injuries  Outcome: Progressing     Problem: Pain  Goal: Turns in bed with improved pain control throughout the shift  Outcome: Progressing

## 2024-11-04 NOTE — PROGRESS NOTES
Desirae Gregg is a 83 y.o. female on day 1 of admission presenting with Sepsis with acute hypoxic respiratory failure without septic shock (Multi).      Subjective   Patient seen in follow up. She notes continued nausea and vomiting intermittently. Denies side effects from meds otherwise. Son notes only one episode of emesis but frequently feels nauseous.        Objective     Last Recorded Vitals  /62 (BP Location: Right arm, Patient Position: Lying)   Pulse 94   Temp 36.6 °C (97.9 °F) (Temporal)   Resp 16   Wt 72.6 kg (160 lb)   SpO2 94%   Intake/Output last 3 Shifts:    Intake/Output Summary (Last 24 hours) at 11/4/2024 0751  Last data filed at 11/4/2024 0153  Gross per 24 hour   Intake 2309.5 ml   Output --   Net 2309.5 ml       Admission Weight  Weight: 72.6 kg (160 lb) (11/03/24 0923)    Daily Weight  11/03/24 : 72.6 kg (160 lb)    Image Results  CT chest wo IV contrast  Narrative: Interpreted By:  Matthias Kolb,   STUDY:  CT CHEST WO IV CONTRAST;  11/3/2024 11:16 am      INDICATION:  Signs/Symptoms:sepsis, ? atelectasis versus PNA on CXR without  classic symptoms.          COMPARISON:  Portable chest earlier same day 3 November 2024 at 0957 hours; CT  lumbar spine without contrast 5 July 2024      ACCESSION NUMBER(S):  [id number]      ORDERING CLINICIAN:  SHERITA GOYAL      TECHNIQUE:  Helical CT chest from thoracic inlet through the hemidiaphragms  without intravenous contrast      FINDINGS:  LUNGS / AIRSPACES / AIRWAYS:      LARGE AIRWAYS  Filling defect: Negative  Wall thickening: Negative  Bronchiectasis: Negative  Other: N/A      AIRSPACES  Fibrosis: Negative  Emphysema: Negative  Consolidation: Negative  Ground glass airspace disease: Small region of ground-glass airspace  change in the middle lobe and potentially in portions of the inflated  right lower lobe. None in the left lung noting one segment of left  lower lobe collapse Edema: Negative  Nodule / Mass: Negative  Other: One segment of  left lower lobe collapse. Multiple segments of  right lower lobe collapse      PLEURA:  Effusion:  Moderate size mostly if not entirely free flowing right;  small free-flowing left Pneumothorax:  Both sides negative  Other:  n/a      CARDIOVASCULAR:  Heart size:  Mildly enlarged  Pericardial effusion:  Perhaps trace  Thoracic aortic aneurysm:  Negative  Pulmonary arteries:  Static  Heart failure change:  Negative.  No sign of interstitial or alveolar  edema. Other:  Unusual hypodensities in the right ventricular free  wall for example soft tissue windows axial series 202, image 167 and  the surrounding few images      NONVASCULAR MEDIASTINUM:  Esophagus:  Grossly normal by CT  Mediastinal Mass:  Negative  Hiatal hernia:  None  Other:  n/a      LYMPH NODES:  No thoracic adenopathy      CHEST WALL:  Soft tissues of the chest wall are unremarkable      SKELETON:  No acute or contributory abnormality      UPPER ABDOMEN:  Small volume ascites pooling about the included liver and spleen.  Spleen probably mildly enlarged but not completely included in the  field of view. Liver not overtly cirrhotic but at least borderline  fatty. The only pertinent comparison exam for the upper abdomen would  be CT lumbar spine without contrast 5 July 2024 at which time there  was not any ascites in the field of view, but the field of view did  not include the portions of the abdomen where they ascites is  presently located. It is unlikely there was ascites in July, 2024 as  more of the lower abdomen was included on that exam and it did not  appear that there is any ascites tracking down into (or up from) the  pelvis      Impression: Abnormalities at the right lung base on portable frontal chest  radiograph earlier today are due to a combination of moderate-sized,  mostly if not entirely free-flowing right pleural effusion with  associated multisegmental right lower lobe collapse adjacent to it      Small area of ground-glass airspace  disease in the middle lobe  confirmed on CT may not have been expected on the prior radiograph      Possibility of mild ground-glass airspace disease in the right lower  lobe as well      No ground-glass airspace disease in the left lung      No consolidative pneumonia in any of the inflated lungs on either side      Small free-flowing left pleural effusion      Unusual hypodensities in the right ventricular free wall of uncertain  etiology and significance      Perhaps trace (smallest detectable quantity) pericardial effusion      Incidental abnormalities in the included upper abdomen as detailed  above      MACRO:  None      Signed by: Matthias Kolb 11/3/2024 11:35 AM  Dictation workstation:   [id number]  XR chest 1 view  Narrative: Interpreted By:  Kary Cardoso,   STUDY:  XR CHEST 1 VIEW 11/3/2024 10:06 am      INDICATION:  Signs/Symptoms:cough and fever      COMPARISON:  None      ACCESSION NUMBER(S):  [id number]      ORDERING CLINICIAN:  ESTHER ROBBINS      TECHNIQUE:  AP view      FINDINGS:  There is right lower lobe airspace consolidation and a right pleural  effusion. There is an enlarged cardiac silhouette. Pulmonary  vascularity is normal. Degenerative changes are noted in both  shoulders. No sign of pneumothorax      Impression: 1. Right lower lobe airspace consolidation either pneumonia or  atelectasis with right pleural effusion. Follow-up to complete  resolution is needed  2. Enlarged cardiac silhouette          Signed by: Kary Cardoso 11/3/2024 10:27 AM  Dictation workstation:   [id number]      Physical Exam  Vitals and nursing note reviewed.   Constitutional:       General: She is not in acute distress.     Appearance: She is ill-appearing.   HENT:      Head: Normocephalic and atraumatic.      Right Ear: External ear normal.      Left Ear: External ear normal.      Nose: Nose normal. No rhinorrhea.      Mouth/Throat:      Mouth: Mucous membranes are moist.      Pharynx: Oropharynx is clear. No  oropharyngeal exudate.   Eyes:      General: No scleral icterus.        Right eye: No discharge.         Left eye: No discharge.      Conjunctiva/sclera: Conjunctivae normal.   Cardiovascular:      Rate and Rhythm: Normal rate and regular rhythm.      Pulses: Normal pulses.      Heart sounds: Normal heart sounds. No murmur heard.  Pulmonary:      Effort: Pulmonary effort is normal. No respiratory distress.      Breath sounds: Normal breath sounds. No wheezing or rales.   Abdominal:      General: Abdomen is flat. There is no distension.      Palpations: Abdomen is soft.      Tenderness: There is no abdominal tenderness.   Musculoskeletal:         General: No tenderness.      Right lower leg: No edema.      Left lower leg: No edema.   Skin:     General: Skin is warm and dry.      Capillary Refill: Capillary refill takes less than 2 seconds.      Findings: No rash.   Neurological:      General: No focal deficit present.      Mental Status: She is alert and oriented to person, place, and time. Mental status is at baseline.   Psychiatric:         Mood and Affect: Mood normal.         Behavior: Behavior normal.         Thought Content: Thought content normal.         Judgment: Judgment normal.         Relevant Results      Scheduled medications  azithromycin, 500 mg, oral, q24h MANI  busPIRone, 7.5 mg, oral, BID  carvedilol, 25 mg, oral, BID  cetirizine, 10 mg, oral, Daily  cholecalciferol, 2,000 Units, oral, Daily  cyanocobalamin, 1,000 mcg, oral, Daily  ferrous sulfate (325 mg ferrous sulfate), 65 mg of iron, oral, Daily with breakfast  folic acid, 1 mg, oral, Daily  gabapentin, 200 mg, oral, BID  isosorbide mononitrate ER, 30 mg, oral, Daily  levothyroxine, 88 mcg, oral, Daily  nystatin, , Topical, BID  pantoprazole, 40 mg, oral, Daily before breakfast  piperacillin-tazobactam, 3.375 g, intravenous, q6h  sertraline, 25 mg, oral, Daily  zinc oxide, 1 Application, Topical, BID      Continuous medications  heparin, 0-4,000  Units/hr, Last Rate: 660 Units/hr (11/04/24 8220)      PRN medications  PRN medications: acetaminophen **OR** acetaminophen **OR** acetaminophen, loperamide, melatonin, ondansetron ODT **OR** ondansetron, oxyCODONE, polyethylene glycol    Results for orders placed or performed during the hospital encounter of 11/03/24 (from the past 24 hours)   Sars-CoV-2 PCR   Result Value Ref Range    Coronavirus 2019, PCR Not Detected Not Detected   Influenza A, and B PCR   Result Value Ref Range    Flu A Result Not Detected Not Detected    Flu B Result Not Detected Not Detected   RSV PCR   Result Value Ref Range    RSV PCR Not Detected Not Detected   CBC and Auto Differential   Result Value Ref Range    WBC 20.4 (H) 4.4 - 11.3 x10*3/uL    nRBC 0.0 0.0 - 0.0 /100 WBCs    RBC 5.27 (H) 4.00 - 5.20 x10*6/uL    Hemoglobin 14.4 12.0 - 16.0 g/dL    Hematocrit 47.2 (H) 36.0 - 46.0 %    MCV 90 80 - 100 fL    MCH 27.3 26.0 - 34.0 pg    MCHC 30.5 (L) 32.0 - 36.0 g/dL    RDW 15.6 (H) 11.5 - 14.5 %    Platelets 201 150 - 450 x10*3/uL    Neutrophils % 94.7 40.0 - 80.0 %    Immature Granulocytes %, Automated 1.4 (H) 0.0 - 0.9 %    Lymphocytes % 0.8 13.0 - 44.0 %    Monocytes % 2.0 2.0 - 10.0 %    Eosinophils % 0.7 0.0 - 6.0 %    Basophils % 0.4 0.0 - 2.0 %    Neutrophils Absolute 19.32 (H) 1.60 - 5.50 x10*3/uL    Immature Granulocytes Absolute, Automated 0.29 0.00 - 0.50 x10*3/uL    Lymphocytes Absolute 0.16 (L) 0.80 - 3.00 x10*3/uL    Monocytes Absolute 0.41 0.05 - 0.80 x10*3/uL    Eosinophils Absolute 0.14 0.00 - 0.40 x10*3/uL    Basophils Absolute 0.08 0.00 - 0.10 x10*3/uL   Comprehensive Metabolic Panel   Result Value Ref Range    Glucose 119 (H) 74 - 99 mg/dL    Sodium 134 (L) 136 - 145 mmol/L    Potassium 4.3 3.5 - 5.3 mmol/L    Chloride 103 98 - 107 mmol/L    Bicarbonate 25 21 - 32 mmol/L    Anion Gap 10 10 - 20 mmol/L    Urea Nitrogen 15 6 - 23 mg/dL    Creatinine 1.21 (H) 0.50 - 1.05 mg/dL    eGFR 44 (L) >60 mL/min/1.73m*2    Calcium  7.8 (L) 8.6 - 10.3 mg/dL    Albumin 2.6 (L) 3.4 - 5.0 g/dL    Alkaline Phosphatase 48 33 - 136 U/L    Total Protein 4.0 (L) 6.4 - 8.2 g/dL    AST 12 9 - 39 U/L    Bilirubin, Total 2.2 (H) 0.0 - 1.2 mg/dL    ALT 15 7 - 45 U/L   Lactate   Result Value Ref Range    Lactate 1.3 0.4 - 2.0 mmol/L   Blood Culture    Specimen: Peripheral Venipuncture; Blood culture   Result Value Ref Range    Blood Culture Loaded on Instrument - Culture in progress    Blood Culture    Specimen: Peripheral Venipuncture; Blood culture   Result Value Ref Range    Blood Culture Loaded on Instrument - Culture in progress    Morphology   Result Value Ref Range    RBC Morphology See Below     Polychromasia Mild     Ovalocytes Few     Teardrop Cells Few    B-type natriuretic peptide   Result Value Ref Range     (H) 0 - 99 pg/mL   Troponin I, High Sensitivity   Result Value Ref Range    Troponin I, High Sensitivity 76 (HH) 0 - 13 ng/L   Hemoglobin A1c   Result Value Ref Range    Hemoglobin A1C 4.7 See comment %    Estimated Average Glucose 88 Not Established mg/dL   Urinalysis with Reflex Culture and Microscopic   Result Value Ref Range    Color, Urine Light-Orange (N) Light-Yellow, Yellow, Dark-Yellow    Appearance, Urine Turbid (N) Clear    Specific Gravity, Urine 1.025 1.005 - 1.035    pH, Urine 5.5 5.0, 5.5, 6.0, 6.5, 7.0, 7.5, 8.0    Protein, Urine 30 (1+) (A) NEGATIVE, 10 (TRACE), 20 (TRACE) mg/dL    Glucose, Urine OVER (4+) (A) Normal mg/dL    Blood, Urine 0.1 (1+) (A) NEGATIVE    Ketones, Urine TRACE (A) NEGATIVE mg/dL    Bilirubin, Urine NEGATIVE NEGATIVE    Urobilinogen, Urine Normal Normal mg/dL    Nitrite, Urine 2+ (A) NEGATIVE    Leukocyte Esterase, Urine 500 Bartolo/µL (A) NEGATIVE   Extra Urine Gray Tube   Result Value Ref Range    Extra Tube Hold for add-ons.    Microscopic Only, Urine   Result Value Ref Range    WBC, Urine >50 (A) 1-5, NONE /HPF    WBC Clumps, Urine MANY Reference range not established. /HPF    RBC, Urine >20 (A)  NONE, 1-2, 3-5 /HPF    Squamous Epithelial Cells, Urine 1-9 (SPARSE) Reference range not established. /HPF    Mucus, Urine 1+ Reference range not established. /LPF   Troponin I, High Sensitivity   Result Value Ref Range    Troponin I, High Sensitivity 76 (HH) 0 - 13 ng/L   Bilirubin, Direct   Result Value Ref Range    Bilirubin, Direct 0.6 (H) 0.0 - 0.3 mg/dL   Troponin I, High Sensitivity   Result Value Ref Range    Troponin I, High Sensitivity 62 (HH) 0 - 13 ng/L   Heparin Assay, UFH   Result Value Ref Range    Heparin Unfractionated 1.1 (HH) See Comment Below for Therapeutic Ranges IU/mL   Heparin Assay, UFH   Result Value Ref Range    Heparin Unfractionated 0.5 See Comment Below for Therapeutic Ranges IU/mL   Heparin Assay, UFH   Result Value Ref Range    Heparin Unfractionated 0.4 See Comment Below for Therapeutic Ranges IU/mL   CBC and Auto Differential   Result Value Ref Range    WBC 16.5 (H) 4.4 - 11.3 x10*3/uL    nRBC 0.0 0.0 - 0.0 /100 WBCs    RBC 4.78 4.00 - 5.20 x10*6/uL    Hemoglobin 13.1 12.0 - 16.0 g/dL    Hematocrit 43.8 36.0 - 46.0 %    MCV 92 80 - 100 fL    MCH 27.4 26.0 - 34.0 pg    MCHC 29.9 (L) 32.0 - 36.0 g/dL    RDW 15.6 (H) 11.5 - 14.5 %    Platelets 153 150 - 450 x10*3/uL    Neutrophils % 94.3 40.0 - 80.0 %    Immature Granulocytes %, Automated 1.2 (H) 0.0 - 0.9 %    Lymphocytes % 1.5 13.0 - 44.0 %    Monocytes % 2.5 2.0 - 10.0 %    Eosinophils % 0.2 0.0 - 6.0 %    Basophils % 0.3 0.0 - 2.0 %    Neutrophils Absolute 15.55 (H) 1.60 - 5.50 x10*3/uL    Immature Granulocytes Absolute, Automated 0.20 0.00 - 0.50 x10*3/uL    Lymphocytes Absolute 0.25 (L) 0.80 - 3.00 x10*3/uL    Monocytes Absolute 0.42 0.05 - 0.80 x10*3/uL    Eosinophils Absolute 0.03 0.00 - 0.40 x10*3/uL    Basophils Absolute 0.05 0.00 - 0.10 x10*3/uL   Comprehensive Metabolic Panel   Result Value Ref Range    Glucose 133 (H) 74 - 99 mg/dL    Sodium 134 (L) 136 - 145 mmol/L    Potassium 3.4 (L) 3.5 - 5.3 mmol/L    Chloride 106 98  - 107 mmol/L    Bicarbonate 22 21 - 32 mmol/L    Anion Gap 9 (L) 10 - 20 mmol/L    Urea Nitrogen 19 6 - 23 mg/dL    Creatinine 1.25 (H) 0.50 - 1.05 mg/dL    eGFR 42 (L) >60 mL/min/1.73m*2    Calcium 6.7 (L) 8.6 - 10.3 mg/dL    Albumin 2.0 (L) 3.4 - 5.0 g/dL    Alkaline Phosphatase 39 33 - 136 U/L    Total Protein 3.4 (L) 6.4 - 8.2 g/dL    AST 10 9 - 39 U/L    Bilirubin, Total 0.9 0.0 - 1.2 mg/dL    ALT 13 7 - 45 U/L   Magnesium   Result Value Ref Range    Magnesium 1.53 (L) 1.60 - 2.40 mg/dL   Troponin I, High Sensitivity   Result Value Ref Range    Troponin I, High Sensitivity 46 (H) 0 - 13 ng/L   Bilirubin, Direct   Result Value Ref Range    Bilirubin, Direct 0.3 0.0 - 0.3 mg/dL              Assessment/Plan        Active Acute Problems     Sepsis  Acute hypoxemic respiratory failure  Leukocytosis  -Sepsis diagnosis based on fever, leukocytosis, tachycardia, and tachypnea at time of presentation  -Most likely source is pneumonia and right lower lobe based on hypoxemia, though patient did have atypical presentation otherwise.  See further discussion below.  -UA with reflex also highly suggestive of urinary tract infection. See discusson below.   -Blood cultures x 2 obtained in ED, pending  -Monitoring on telemetry with continuous pulse oximetry  -Continue supplemental oxygen and wean as tolerated, keeping oxygen saturation greater than 88%  -Held off on 30 cc/kg bolus of IV fluids given history of systolic congestive heart failure.  However, patient did have hypotension after admission. She ultimately received 1L of 0.9% NS in bolus and then 1.5 L over 15 hours overnight. She has maintained sats at 2 L.NC with this and does not clinically appear fluid overloaded. Will give additional 1 L today given soft Bps and monitor.   -Trending white blood cell count with treatment:  20.4-16.5     Right lower lobe pneumonia (infiltrate?)  Right pleural effusion  -Suggested by chest x-ray and hypoxemia  -Noncontrast CT chest  "obtained on 11/3, showed right lung base abnormalities a combination of moderate-sized right pleural effusion with associated multisegmental right lower lobe collapse. Also small area of ground-glass airspace disease in RML and possible in RLL. No clear consolidative pneumonia. Also incidental findings in right ventricular wall noted (see below).   -Consult to pulmonology for evaluation of effusion  -Continue empiric azithromycin. Given hx multiple episodes of emesis along with RLL infiltrate, concern for aspiration. Changed ceftriaxone to zosyn on 11/3. Will continue current regimen for now.  -Pending sputum culture, urine Legionella antigen, and urine pneumococcal antigen  -Follow blood cultures    Urinary Tract Infection  -Suggested by septic presentation and abnormal urinalysis  -Currently on Zosyn which should cover all typical pathogens  -Reviewed prior cultures, patient has grown Enterococcus previously (VRE) but was PCN sensitive  -Await urine culture  -Reported history retained stone in bladder. Check CT Abdomen/Pelvis without contrast to eval for nidus of recurrent infection.      Acute kidney injury on chronic kidney disease  -Creatinine 1.21 on presentation.  Baseline appears to be around 1 which would be consistent with stage IIIa chronic kidney disease  -Received total of 3 L IVF to this point.  -Creatinine slightly worse this morning with creat increasing 1.21-1.25  -Will give additional 1 L IVF over 10 hours today and recheck RFP at 1600  -Check renal US  -Pending urine protein, urine creatinine, urine electrolytes, and urine eosinophils  -Consider involving nephrology if continued worsening seen.   -Held lisinopril since admission given LEVY    Right Ventricular Abnormality on CT  -CT scan noted \"unusual hypodensities in the right ventricular free wall of uncertain etiology and significance\"  -Echocardiogram on 6/28/24 showed EF 50-55% and did not mention any specific abnormality of RV. Will check " limited echo to eval RV.   -Will consult cardiology given this finding to ensure no further intervention needed.    Hypokalemia  Hypomagnesemia  Hypocalcemia  -Give magnesium sulfate 2 grams IVPB x 1 now and recheck levels this afternoon  -Give Klor-con 40 mEq PO x 1 now and recheck levels this afternoon  -Corrected calcium is 8.3. Give 1 gram calcium gluconate IVPB and recheck levels this afternoon.       Corrected Acute Problems    Hyperglycemia  -Hemoglobin A1c was normal. No further eval needed currently.     Elevated troponin  -Most consistent with nonischemic cardiac injury related to sepsis as well as poor renal clearance  -Readings adynamic at [id number]   -Monitor on telemetry as noted    Hyperbilirubinemia  -Isolated LFT finding on presentation.  -Direct bilirubin was also elevated at 0.6 on 11/3.  -However, both Dbili and Tbili are normal today.   -Monitor for recurrence.        Stable Chronic Problems     History of pulmonary embolism  -On apixaban 2.5 mg BID at home for prevention of recurrence  -Transitioned to heparin GTT low dose here given potential need for thoracentesis     Chronic systolic congestive heart failure  -BNP elevated at 867 but difficult to interpret in setting of acute kidney injury.  Patient does not clinically appear significantly fluid overloaded.  -Monitor for decompensation closely with hydration noted  -Continue home Imdur and carvedilol  -Holding furosemide temporarily given suggestive dehydration on presentation  -Holding lisinopril and empagliflozin given LEVY as noted     Depression/Anxiety  -Continue home buspirone and sertraline     Vitamin D Deficiency  -Continue home cholecalciferol     Vitamin B12 Deficiency  -Continue home cyanocobalamin     Anemia  -By history.  Hemoglobin here actually normal but likely related to hemoconcentration  -Continue home ferrous sulfate and folic acid  -Trend levels with resuscitation     Hypothyroidism  -Continue home levothyroxine      GERD  -Continue home PPI      Global Considerations    DVT Prophylaxis  Heparin GTT    PT/OT  Consulted    Code Status  Verified full code with patient and family    Disposition  Pending stabilization of hemodynamics and treatment of infection            Eloy Hua MD

## 2024-11-05 ENCOUNTER — APPOINTMENT (OUTPATIENT)
Dept: CARDIOLOGY | Facility: HOSPITAL | Age: 84
DRG: 871 | End: 2024-11-05
Payer: MEDICARE

## 2024-11-05 LAB
ALBUMIN SERPL BCP-MCNC: 1.9 G/DL (ref 3.4–5)
ALP SERPL-CCNC: 34 U/L (ref 33–136)
ALT SERPL W P-5'-P-CCNC: 11 U/L (ref 7–45)
ANION GAP SERPL CALC-SCNC: 10 MMOL/L (ref 10–20)
AORTIC VALVE MEAN GRADIENT: 7 MMHG
AORTIC VALVE PEAK VELOCITY: 1.78 M/S
AST SERPL W P-5'-P-CCNC: 7 U/L (ref 9–39)
AV PEAK GRADIENT: 13 MMHG
AVA (PEAK VEL): 2.44 CM2
AVA (VTI): 2.52 CM2
BACTERIA UR CULT: ABNORMAL
BASOPHILS # BLD AUTO: 0.07 X10*3/UL (ref 0–0.1)
BASOPHILS NFR BLD AUTO: 0.4 %
BILIRUB SERPL-MCNC: 0.7 MG/DL (ref 0–1.2)
BUN SERPL-MCNC: 24 MG/DL (ref 6–23)
CALCIUM SERPL-MCNC: 6.6 MG/DL (ref 8.6–10.3)
CHLORIDE SERPL-SCNC: 107 MMOL/L (ref 98–107)
CO2 SERPL-SCNC: 21 MMOL/L (ref 21–32)
CREAT SERPL-MCNC: 1.44 MG/DL (ref 0.5–1.05)
CRP SERPL-MCNC: 7.53 MG/DL
EGFRCR SERPLBLD CKD-EPI 2021: 36 ML/MIN/1.73M*2
EJECTION FRACTION APICAL 4 CHAMBER: 62.1
EJECTION FRACTION: 63 %
EOSINOPHIL # BLD AUTO: 0.13 X10*3/UL (ref 0–0.4)
EOSINOPHIL NFR BLD AUTO: 0.8 %
EOSINOPHIL SMEAR: ABNORMAL
ERYTHROCYTE [DISTWIDTH] IN BLOOD BY AUTOMATED COUNT: 15.8 % (ref 11.5–14.5)
GLUCOSE SERPL-MCNC: 112 MG/DL (ref 74–99)
HCT VFR BLD AUTO: 43.8 % (ref 36–46)
HGB BLD-MCNC: 12.6 G/DL (ref 12–16)
IMM GRANULOCYTES # BLD AUTO: 0.31 X10*3/UL (ref 0–0.5)
IMM GRANULOCYTES NFR BLD AUTO: 1.9 % (ref 0–0.9)
LEFT VENTRICLE INTERNAL DIMENSION DIASTOLE: 4.47 CM (ref 3.5–6)
LEFT VENTRICULAR OUTFLOW TRACT DIAMETER: 2.04 CM
LYMPHOCYTES # BLD AUTO: 0.48 X10*3/UL (ref 0.8–3)
LYMPHOCYTES NFR BLD AUTO: 2.9 %
MAGNESIUM SERPL-MCNC: 1.81 MG/DL (ref 1.6–2.4)
MCH RBC QN AUTO: 27.4 PG (ref 26–34)
MCHC RBC AUTO-ENTMCNC: 28.8 G/DL (ref 32–36)
MCV RBC AUTO: 95 FL (ref 80–100)
MITRAL VALVE E/A RATIO: 0.77
MONOCYTES # BLD AUTO: 0.69 X10*3/UL (ref 0.05–0.8)
MONOCYTES NFR BLD AUTO: 4.2 %
NEUTROPHILS # BLD AUTO: 14.72 X10*3/UL (ref 1.6–5.5)
NEUTROPHILS NFR BLD AUTO: 89.8 %
NRBC BLD-RTO: 0 /100 WBCS (ref 0–0)
PLATELET # BLD AUTO: 143 X10*3/UL (ref 150–450)
POTASSIUM SERPL-SCNC: 3.3 MMOL/L (ref 3.5–5.3)
PROCALCITONIN SERPL-MCNC: 0.6 NG/ML
PROT SERPL-MCNC: 3.1 G/DL (ref 6.4–8.2)
RBC # BLD AUTO: 4.6 X10*6/UL (ref 4–5.2)
SODIUM SERPL-SCNC: 135 MMOL/L (ref 136–145)
UFH PPP CHRO-ACNC: 0.1 IU/ML
UFH PPP CHRO-ACNC: 0.6 IU/ML
WBC # BLD AUTO: 16.4 X10*3/UL (ref 4.4–11.3)

## 2024-11-05 PROCEDURE — 2500000002 HC RX 250 W HCPCS SELF ADMINISTERED DRUGS (ALT 637 FOR MEDICARE OP, ALT 636 FOR OP/ED): Performed by: NURSE PRACTITIONER

## 2024-11-05 PROCEDURE — 85520 HEPARIN ASSAY: CPT | Performed by: INTERNAL MEDICINE

## 2024-11-05 PROCEDURE — 2500000001 HC RX 250 WO HCPCS SELF ADMINISTERED DRUGS (ALT 637 FOR MEDICARE OP): Performed by: INTERNAL MEDICINE

## 2024-11-05 PROCEDURE — 86140 C-REACTIVE PROTEIN: CPT | Performed by: INTERNAL MEDICINE

## 2024-11-05 PROCEDURE — 36415 COLL VENOUS BLD VENIPUNCTURE: CPT | Performed by: INTERNAL MEDICINE

## 2024-11-05 PROCEDURE — 1200000002 HC GENERAL ROOM WITH TELEMETRY DAILY

## 2024-11-05 PROCEDURE — 99233 SBSQ HOSP IP/OBS HIGH 50: CPT | Performed by: INTERNAL MEDICINE

## 2024-11-05 PROCEDURE — 99232 SBSQ HOSP IP/OBS MODERATE 35: CPT | Performed by: INTERNAL MEDICINE

## 2024-11-05 PROCEDURE — 93325 DOPPLER ECHO COLOR FLOW MAPG: CPT

## 2024-11-05 PROCEDURE — 2500000001 HC RX 250 WO HCPCS SELF ADMINISTERED DRUGS (ALT 637 FOR MEDICARE OP): Performed by: NURSE PRACTITIONER

## 2024-11-05 PROCEDURE — 2500000002 HC RX 250 W HCPCS SELF ADMINISTERED DRUGS (ALT 637 FOR MEDICARE OP, ALT 636 FOR OP/ED): Performed by: INTERNAL MEDICINE

## 2024-11-05 PROCEDURE — 2500000004 HC RX 250 GENERAL PHARMACY W/ HCPCS (ALT 636 FOR OP/ED): Performed by: NURSE PRACTITIONER

## 2024-11-05 PROCEDURE — 80053 COMPREHEN METABOLIC PANEL: CPT | Performed by: INTERNAL MEDICINE

## 2024-11-05 PROCEDURE — 85025 COMPLETE CBC W/AUTO DIFF WBC: CPT | Performed by: INTERNAL MEDICINE

## 2024-11-05 PROCEDURE — 2500000004 HC RX 250 GENERAL PHARMACY W/ HCPCS (ALT 636 FOR OP/ED): Performed by: INTERNAL MEDICINE

## 2024-11-05 PROCEDURE — 83735 ASSAY OF MAGNESIUM: CPT | Performed by: INTERNAL MEDICINE

## 2024-11-05 PROCEDURE — 99222 1ST HOSP IP/OBS MODERATE 55: CPT | Performed by: STUDENT IN AN ORGANIZED HEALTH CARE EDUCATION/TRAINING PROGRAM

## 2024-11-05 PROCEDURE — P9047 ALBUMIN (HUMAN), 25%, 50ML: HCPCS | Mod: JZ | Performed by: NURSE PRACTITIONER

## 2024-11-05 RX ORDER — FUROSEMIDE 10 MG/ML
20 INJECTION INTRAMUSCULAR; INTRAVENOUS ONCE
Status: COMPLETED | OUTPATIENT
Start: 2024-11-05 | End: 2024-11-05

## 2024-11-05 RX ORDER — ALBUMIN HUMAN 250 G/1000ML
12.5 SOLUTION INTRAVENOUS ONCE
Status: COMPLETED | OUTPATIENT
Start: 2024-11-05 | End: 2024-11-05

## 2024-11-05 RX ORDER — POTASSIUM CHLORIDE 20 MEQ/1
40 TABLET, EXTENDED RELEASE ORAL ONCE
Status: COMPLETED | OUTPATIENT
Start: 2024-11-05 | End: 2024-11-05

## 2024-11-05 RX ORDER — BUMETANIDE 0.25 MG/ML
0.5 INJECTION, SOLUTION INTRAMUSCULAR; INTRAVENOUS ONCE
Status: DISCONTINUED | OUTPATIENT
Start: 2024-11-05 | End: 2024-11-05

## 2024-11-05 RX ORDER — AMOXICILLIN AND CLAVULANATE POTASSIUM 875; 125 MG/1; MG/1
1 TABLET, FILM COATED ORAL 2 TIMES DAILY
Status: DISCONTINUED | OUTPATIENT
Start: 2024-11-05 | End: 2024-11-06

## 2024-11-05 RX ORDER — MIDODRINE HYDROCHLORIDE 5 MG/1
5 TABLET ORAL
Status: DISCONTINUED | OUTPATIENT
Start: 2024-11-05 | End: 2024-11-19 | Stop reason: HOSPADM

## 2024-11-05 RX ORDER — FUROSEMIDE 10 MG/ML
40 INJECTION INTRAMUSCULAR; INTRAVENOUS ONCE
Status: DISCONTINUED | OUTPATIENT
Start: 2024-11-05 | End: 2024-11-05

## 2024-11-05 RX ADMIN — HEPARIN SODIUM 860 UNITS/HR: 10000 INJECTION, SOLUTION INTRAVENOUS at 07:25

## 2024-11-05 RX ADMIN — FUROSEMIDE 20 MG: 10 INJECTION, SOLUTION INTRAMUSCULAR; INTRAVENOUS at 16:06

## 2024-11-05 RX ADMIN — FOLIC ACID 1 MG: 1 TABLET ORAL at 09:38

## 2024-11-05 RX ADMIN — FERROUS SULFATE TAB 325 MG (65 MG ELEMENTAL FE) 325 MG: 325 (65 FE) TAB at 09:37

## 2024-11-05 RX ADMIN — CETIRIZINE HYDROCHLORIDE 10 MG: 10 TABLET, FILM COATED ORAL at 09:37

## 2024-11-05 RX ADMIN — CYANOCOBALAMIN TAB 1000 MCG 1000 MCG: 1000 TAB at 09:38

## 2024-11-05 RX ADMIN — MIDODRINE HYDROCHLORIDE 5 MG: 5 TABLET ORAL at 18:01

## 2024-11-05 RX ADMIN — NYSTATIN: 100000 CREAM TOPICAL at 09:39

## 2024-11-05 RX ADMIN — Medication 1 APPLICATION: at 20:16

## 2024-11-05 RX ADMIN — MIDODRINE HYDROCHLORIDE 5 MG: 5 TABLET ORAL at 14:05

## 2024-11-05 RX ADMIN — PANTOPRAZOLE SODIUM 40 MG: 40 TABLET, DELAYED RELEASE ORAL at 16:06

## 2024-11-05 RX ADMIN — SERTRALINE HYDROCHLORIDE 25 MG: 50 TABLET ORAL at 09:38

## 2024-11-05 RX ADMIN — GABAPENTIN 200 MG: 100 CAPSULE ORAL at 20:16

## 2024-11-05 RX ADMIN — LEVOTHYROXINE SODIUM 88 MCG: 0.09 TABLET ORAL at 05:54

## 2024-11-05 RX ADMIN — AMOXICILLIN AND CLAVULANATE POTASSIUM 1 TABLET: 875; 125 TABLET, FILM COATED ORAL at 20:16

## 2024-11-05 RX ADMIN — ALBUMIN HUMAN 12.5 G: 0.25 SOLUTION INTRAVENOUS at 11:58

## 2024-11-05 RX ADMIN — SODIUM CHLORIDE 100 ML/HR: 9 INJECTION, SOLUTION INTRAVENOUS at 00:29

## 2024-11-05 RX ADMIN — APIXABAN 2.5 MG: 2.5 TABLET, FILM COATED ORAL at 20:16

## 2024-11-05 RX ADMIN — POTASSIUM CHLORIDE 40 MEQ: 1500 TABLET, EXTENDED RELEASE ORAL at 09:38

## 2024-11-05 RX ADMIN — APIXABAN 2.5 MG: 2.5 TABLET, FILM COATED ORAL at 09:38

## 2024-11-05 RX ADMIN — Medication 2000 UNITS: at 09:37

## 2024-11-05 RX ADMIN — AMOXICILLIN AND CLAVULANATE POTASSIUM 1 TABLET: 875; 125 TABLET, FILM COATED ORAL at 14:05

## 2024-11-05 RX ADMIN — GABAPENTIN 200 MG: 100 CAPSULE ORAL at 09:37

## 2024-11-05 RX ADMIN — LOPERAMIDE HYDROCHLORIDE 2 MG: 2 CAPSULE ORAL at 14:06

## 2024-11-05 RX ADMIN — PIPERACILLIN SODIUM AND TAZOBACTAM SODIUM 3.38 G: 3; .375 INJECTION, SOLUTION INTRAVENOUS at 04:31

## 2024-11-05 RX ADMIN — Medication 1 APPLICATION: at 09:39

## 2024-11-05 RX ADMIN — PIPERACILLIN SODIUM AND TAZOBACTAM SODIUM 3.38 G: 3; .375 INJECTION, SOLUTION INTRAVENOUS at 10:20

## 2024-11-05 RX ADMIN — BUSPIRONE HYDROCHLORIDE 7.5 MG: 15 TABLET ORAL at 09:37

## 2024-11-05 RX ADMIN — AZITHROMYCIN DIHYDRATE 500 MG: 500 TABLET ORAL at 09:38

## 2024-11-05 RX ADMIN — BUSPIRONE HYDROCHLORIDE 7.5 MG: 15 TABLET ORAL at 20:16

## 2024-11-05 RX ADMIN — PANTOPRAZOLE SODIUM 40 MG: 40 TABLET, DELAYED RELEASE ORAL at 05:54

## 2024-11-05 ASSESSMENT — PAIN SCALES - GENERAL
PAINLEVEL_OUTOF10: 0 - NO PAIN
PAINLEVEL_OUTOF10: 0 - NO PAIN

## 2024-11-05 ASSESSMENT — COGNITIVE AND FUNCTIONAL STATUS - GENERAL
HELP NEEDED FOR BATHING: TOTAL
TURNING FROM BACK TO SIDE WHILE IN FLAT BAD: A LOT
MOBILITY SCORE: 9
TOILETING: TOTAL
CLIMB 3 TO 5 STEPS WITH RAILING: TOTAL
WALKING IN HOSPITAL ROOM: TOTAL
DAILY ACTIVITIY SCORE: 11
TURNING FROM BACK TO SIDE WHILE IN FLAT BAD: A LOT
TOILETING: TOTAL
DRESSING REGULAR UPPER BODY CLOTHING: A LOT
MOVING TO AND FROM BED TO CHAIR: A LOT
PERSONAL GROOMING: A LOT
DAILY ACTIVITIY SCORE: 12
DRESSING REGULAR UPPER BODY CLOTHING: A LOT
HELP NEEDED FOR BATHING: A LOT
EATING MEALS: A LITTLE
STANDING UP FROM CHAIR USING ARMS: TOTAL
DRESSING REGULAR LOWER BODY CLOTHING: A LOT
CLIMB 3 TO 5 STEPS WITH RAILING: TOTAL
WALKING IN HOSPITAL ROOM: TOTAL
MOVING FROM LYING ON BACK TO SITTING ON SIDE OF FLAT BED WITH BEDRAILS: A LITTLE
STANDING UP FROM CHAIR USING ARMS: A LOT
MOVING TO AND FROM BED TO CHAIR: TOTAL
EATING MEALS: A LITTLE
PERSONAL GROOMING: A LOT
MOBILITY SCORE: 11
MOVING FROM LYING ON BACK TO SITTING ON SIDE OF FLAT BED WITH BEDRAILS: A LITTLE
DRESSING REGULAR LOWER BODY CLOTHING: A LOT

## 2024-11-05 ASSESSMENT — PAIN - FUNCTIONAL ASSESSMENT: PAIN_FUNCTIONAL_ASSESSMENT: 0-10

## 2024-11-05 NOTE — SIGNIFICANT EVENT
Called to evaluate pt that had a small bm that was blood tinged. VS remain stable. Will obtain am labs and continue to monitor pt, vs and BM.

## 2024-11-05 NOTE — PROGRESS NOTES
[facility] Hospitalist Progress Note       [id number]: Please page me for patient care issues.  [phone]: Please page night hospitalist for any issues.     Desirae Gregg  :  1940(83 y.o.)  MRN:  [id number]  PCP: Jackie Cedillo MD      Principal Problem:    Sepsis with acute hypoxic respiratory failure without septic shock (Multi)  Active Problems:    Iron deficiency anemia    Chronic systolic (congestive) heart failure    Essential hypertension    History of pulmonary embolism    Anxiety    Vitamin D deficiency    Vitamin B12 deficiency    Adult hypothyroidism    Pneumonia of right lower lobe due to infectious organism    Leukocytosis    Acute kidney injury superimposed on stage 3a chronic kidney disease    Elevated troponin    Hyperbilirubinemia    Hyperglycemia    Hypokalemia    Hypomagnesemia    Hypocalcemia    Abnormality of right ventricle of heart      Assessment and Plan:     Desirae Gregg is a 83 y.o. female with PMH CKD III, HFpEF, myelofibrosis, sciatica, remote poliomyelitis, hypertension, PE, anxiety disorder, vitamin D deficiency, vitamin B12 deficiency, hypothyroidism, GERD, depression, and JAK2 positive polycythemia vera.    Patient presented to ED with the son with complaints of 1 week of generalized weakness and fever. Associated with emesis (x3) and confusion  On arrival to the ED, patient was noted to have an oxygen saturation of 88% on room air.  She was placed on supplemental oxygen.  Temperature was 100.8 °F and heart rate was .  Respiratory rate was mildly increased at 18-23.  Patient ultimately required 4 L of supplemental oxygen per nasal cannula to maintain saturations.  Blood pressure systolic was 108-117.  Laboratory evaluation in the emergency department was notable for white blood cell count 20.4, glucose 119, creatinine 1.21, and total bilirubin 2.2.  Electrolytes, transaminases, lactic acid, hemoglobin, and platelet count were unremarkable.  COVID, influenza, and RSV testing  was negative.  Blood cultures x 2 were obtained.  Portable chest x-ray showed right lower lobe airspace consolidation, pneumonia or atelectasis with right pleural effusion.  Enlarged cardiac silhouette was also noted.      #ARF w/ hypoxia requiring NC  #Acute on chronic HFpEF  #B/L (R>L) pleural effusion  #LEVY/CKD III  #Sepsis UTI  #Electrolyte abnormalities (Hypomagnesemia, Hypokalemia)  #Protein malnutrition  #Hypoalbuminemia  #Incidental urinary bladder stone (2cm)  #myeloproliferative disorder   #Immunocompromised state  #Hx PE on apixaban  -abx:zosyn/azithromycin->Augmentin  -CHF order set  -IV diuretics as BP and renal function could tolerate. Holding other antihypertensives 2/2 hypotension  -replete lytes PRN  -Ucx appears to be contaminated    -CS recs appreciated: cards, pulm, urology, dietician     Disposition:await test results, await consultant recommendations, and await clinical improvement    DVT Prophylaxis: full anticoagulation apixaban    Code status: Full Code  Diet: Adult diet 2-3 grams sodium    Level of MDM:  High    patient and family updated, I personally examined the patient, and I personally reviewed and agree with residentphysical exam, assessment/plan with my noted corrections if any    Family Communication  Number :   Name of Designated Family Representative:       Total time spent: 50 minutes, of total time providing counseling or in coordination of care. Total time on this day of visit includes record and documentation review before and after visit including documentation and time not explicitly included on EMR time stamp      Subjective:   Interval History:  HPI  The patient complains of generalized weakness  The patient feels their symptoms areimproving  no events or new concerns    Scheduled Meds:albumin human, 12.5 g, intravenous, Once  apixaban, 2.5 mg, oral, q12h  azithromycin, 500 mg, oral, q24h MANI  bumetanide, 0.5 mg, intravenous, Once  busPIRone, 7.5 mg, oral, BID  [Held by  provider] carvedilol, 25 mg, oral, BID  cetirizine, 10 mg, oral, Daily  cholecalciferol, 2,000 Units, oral, Daily  cyanocobalamin, 1,000 mcg, oral, Daily  ferrous sulfate (325 mg ferrous sulfate), 65 mg of iron, oral, Daily with breakfast  folic acid, 1 mg, oral, Daily  gabapentin, 200 mg, oral, BID  [Held by provider] isosorbide mononitrate ER, 30 mg, oral, Daily  levothyroxine, 88 mcg, oral, Daily  nystatin, , Topical, BID  pantoprazole, 40 mg, oral, BID AC  piperacillin-tazobactam, 3.375 g, intravenous, q6h  sertraline, 25 mg, oral, Daily  zinc oxide, 1 Application, Topical, BID      Continuous Infusions:   PRN Meds:PRN medications: acetaminophen **OR** acetaminophen **OR** acetaminophen, loperamide, melatonin, ondansetron ODT **OR** ondansetron, oxyCODONE, oxygen, polyethylene glycol    Review of Systems   All other systems reviewed and are negative.    Interval Pertinent History:  Social History     Tobacco Use    Smoking status: Never    Smokeless tobacco: Never   Substance Use Topics    Alcohol use: Never         Objective:   Patient Vitals for the past 24 hrs:   BP Temp Temp src Pulse Resp SpO2   24 1016 89/58 36.4 °C (97.5 °F) Temporal 98 16 95 %   24 0611 109/63 -- -- 91 -- --   24 0454 96/61 36.2 °C (97.2 °F) Temporal 89 18 97 %   24 1958 96/68 36.4 °C (97.5 °F) Temporal 90 18 95 %   24 1400 89/59 36.4 °C (97.5 °F) Temporal 56 18 92 %   24 1058 95/62 -- -- 101 -- --       Average, Min, and Max for last 24 hours Vitals:  TEMPERATURE:  Temp  Av.3 °C (97.4 °F)  Min: 36.2 °C (97.2 °F)  Max: 36.4 °C (97.5 °F)    RESPIRATIONS RANGE: Resp  Av.5  Min: 16  Max: 18    PULSE RANGE: Pulse  Av.5  Min: 56  Max: 101    BLOOD PRESSURE RANGE:  Systolic (24hrs), Av , Min:89 , Max:109   ; Diastolic (24hrs), Av, Min:58, Max:68      PULSE OXIMETRY RANGE: SpO2  Av.8 %  Min: 92 %  Max: 97 %  Body mass index is 26.63 kg/m².    I/O last 3 completed shifts:  In: 610  (8.4 mL/kg) [P.O.:360; IV Piggyback:250]  Out: 165 (2.3 mL/kg) [Urine:165 (0.1 mL/kg/hr)]  Weight: 72.6 kg   Weight change:      Physical Exam  Vitals and nursing note reviewed.   Constitutional:       Appearance: Normal appearance.      Interventions: Nasal cannula in place.   HENT:      Head: Normocephalic and atraumatic.      Right Ear: External ear normal.      Left Ear: External ear normal.      Nose: Nose normal.      Mouth/Throat:      Mouth: Mucous membranes are moist.   Eyes:      General: No scleral icterus.        Right eye: No discharge.         Left eye: No discharge.      Extraocular Movements: Extraocular movements intact.      Conjunctiva/sclera: Conjunctivae normal.      Pupils: Pupils are equal, round, and reactive to light.   Cardiovascular:      Rate and Rhythm: Regular rhythm.   Pulmonary:      Effort: Pulmonary effort is normal.      Breath sounds: Rales present.   Abdominal:      General: Abdomen is flat. Bowel sounds are normal.      Palpations: Abdomen is soft.   Musculoskeletal:         General: Normal range of motion.      Right lower leg: Edema (trace to +1) present.      Left lower leg: Edema (trace to +1) present.   Skin:     General: Skin is warm and dry.      Capillary Refill: Capillary refill takes less than 2 seconds.   Neurological:      General: No focal deficit present.   Psychiatric:         Mood and Affect: Mood normal.         Thought Content: Thought content normal.         Judgment: Judgment normal.         Lab Results   Component Value Date     (L) 11/05/2024    K 3.3 (L) 11/05/2024     11/05/2024    CO2 21 11/05/2024    BUN 24 (H) 11/05/2024    CREATININE 1.44 (H) 11/05/2024    GLUCOSE 112 (H) 11/05/2024    CALCIUM 6.6 (L) 11/05/2024    PROT 3.1 (L) 11/05/2024    BILITOT 0.7 11/05/2024    ALKPHOS 34 11/05/2024    AST 7 (L) 11/05/2024    ALT 11 11/05/2024       Lab Results   Component Value Date    WBC 16.4 (H) 11/05/2024    HGB 12.6 11/05/2024    HCT 43.8  11/05/2024    MCV 95 11/05/2024     (L) 11/05/2024    LYMPHOPCT 2.9 11/05/2024    RBC 4.60 11/05/2024    MCH 27.4 11/05/2024    MCHC 28.8 (L) 11/05/2024    RDW 15.8 (H) 11/05/2024       Lab Results   Component Value Date    TSH 2.09 10/29/2024     Lab Results   Component Value Date    LACTATE 1.3 11/03/2024     (H) 11/03/2024    INR 1.3 (H) 08/08/2024       IMAGES:  Encounter Date: 11/03/24   ECG 12 lead   Result Value    Ventricular Rate 104    Atrial Rate 104    VA Interval 162    QRS Duration 124    QT Interval 368    QTC Calculation(Bazett) 483    P Axis 54    R Axis -52    T Axis 169    QRS Count 18    Q Onset 219    P Onset 138    P Offset 188    T Offset 403    QTC Fredericia 442    Narrative    Sinus tachycardia  Left axis deviation  Left bundle branch block  Abnormal ECG  No previous ECGs available     CT abdomen pelvis wo IV contrast    Result Date: 11/4/2024  Impression: 2 centimeters stone in the urinary bladder.   Bilateral pleural effusions. Pericardial effusion.   Ascites.   Anasarca.   Presacral edema.   Enlarged spleen.   The exam is limited by motion artifact.   MACRO: none   Signed by: Jayshree Dias 11/4/2024 3:28 PM Dictation workstation:   [id number]     renal complete    Result Date: 11/4/2024  Impression: No hydronephrosis. Decompressed bladder.   MACRO: None   Signed by: Zain Mcknight 11/4/2024 11:08 AM Dictation workstation:   [id number]    CT chest wo IV contrast    Result Date: 11/3/2024  Impression: Abnormalities at the right lung base on portable frontal chest radiograph earlier today are due to a combination of moderate-sized, mostly if not entirely free-flowing right pleural effusion with associated multisegmental right lower lobe collapse adjacent to it   Small area of ground-glass airspace disease in the middle lobe confirmed on CT may not have been expected on the prior radiograph   Possibility of mild ground-glass airspace disease in the right lower lobe as well    No ground-glass airspace disease in the left lung   No consolidative pneumonia in any of the inflated lungs on either side   Small free-flowing left pleural effusion   Unusual hypodensities in the right ventricular free wall of uncertain etiology and significance   Perhaps trace (smallest detectable quantity) pericardial effusion   Incidental abnormalities in the included upper abdomen as detailed above   MACRO: None   Signed by: Matthias Kolb 11/3/2024 11:35 AM Dictation workstation:   [id number]    XR chest 1 view    Result Date: 11/3/2024  Impression: 1. Right lower lobe airspace consolidation either pneumonia or atelectasis with right pleural effusion. Follow-up to complete resolution is needed 2. Enlarged cardiac silhouette     Signed by: Kary Cardoso 11/3/2024 10:27 AM Dictation workstation:   [id number]    No echocardiogram results found for the past 12 months  === 11/03/24 ===    XR CHEST 1 VIEW    - Impression -  1. Right lower lobe airspace consolidation either pneumonia or  atelectasis with right pleural effusion. Follow-up to complete  resolution is needed  2. Enlarged cardiac silhouette      Signed by: Kary Cardoso 11/3/2024 10:27 AM  Dictation workstation:   [id number]  === 11/03/24 ===    CT ABDOMEN PELVIS WO IV CONTRAST    - Impression -  2 centimeters stone in the urinary bladder.    Bilateral pleural effusions. Pericardial effusion.    Ascites.    Anasarca.    Presacral edema.    Enlarged spleen.    The exam is limited by motion artifact.    MACRO:  none    Signed by: Jayhsree Dias 11/4/2024 3:28 PM  Dictation workstation:   [id number]  === 11/03/24 ===    XR CHEST 1 VIEW    - Impression -  1. Right lower lobe airspace consolidation either pneumonia or  atelectasis with right pleural effusion. Follow-up to complete  resolution is needed  2. Enlarged cardiac silhouette      Signed by: Kary Cardoso 11/3/2024 10:27 AM  Dictation workstation:   [id number]      Additional results of the last 24  hours have been reviewed.    Dictated using Dragon Naturally Speaking Medical Version 2.4  Proof read however unrecognized voice recognition errors may have occurred     Electronically signed by Cha Ramsay DO on 11/05/24 at 10:45 AM

## 2024-11-05 NOTE — CONSULTS
Inpatient consult to Urology  Consult performed by: Celeste Hooks MD  Consult ordered by: Eloy Hua MD  Reason for consult: 2cm bladder stone and UTI  Assessment/Recommendations: Treat UTI  Treat medical condition  Bladder stone procedure (cystolitholapaxy with laser) on elective basis          Reason For Consult  2cm bladder stone   UTI    History Of Present Illness  Desirae Gregg is a 83 y.o. female presenting with fever and generalized weakness for the week prior to presentation.  Her son also noted confusion. She also had vomiting.    She had a history of pulmonary embolic disease in the summer 2024 as well as recurrent urinary infections.  She is on Eliquis for her history of thromboembolic disease.    History of stage III chronic kidney disease, chronic systolic congestive heart failure, myelofibrosis, sciatica, remote poliomyelitis, hypertension, pulmonary embolism, anxiety disorder, vitamin D deficiency, vitamin B12 deficiency, hypothyroidism, GERD, depression, and JAK2 positive polycythemia vera.      She was found in ED on CT to have a 2 cm bladder stone. She also has UTI and rise in WBC to 20,000.     Past Medical History  She has a past medical history of Heart failure and Sciatica.    Surgical History  She has a past surgical history that includes Hysterectomy; Cholecystectomy; Tonsillectomy; and Appendectomy.     Social History  She reports that she has never smoked. She has never used smokeless tobacco. She reports that she does not drink alcohol and does not use drugs.    Family History  Family History   Problem Relation Name Age of Onset    Stroke Mother      Asthma Father      Cancer Paternal Grandmother          Allergies  Clonidine and structural analogs, Naproxen, Tizanidine, Hydroxyzine, and Methylprednisolone    Review of Systems   A complete review of systems was performed. All systems are noted to be negative unless indicated in the history of present illness, impression, active  "problem list, or past histories.    Physical Exam  Constitutional:       Appearance: Normal appearance. She is toxic-appearing.      Comments: frail   Abdominal:      Tenderness: There is no abdominal tenderness. There is no right CVA tenderness or left CVA tenderness.   Neurological:      Mental Status: She is alert.          Last Recorded Vitals  Blood pressure 109/63, pulse 91, temperature 36.2 °C (97.2 °F), temperature source Temporal, resp. rate 18, height 1.651 m (5' 5\"), weight 72.6 kg (160 lb), SpO2 97%.    Relevant Results  Results for orders placed or performed during the hospital encounter of 11/03/24 (from the past 96 hours)   Sars-CoV-2 PCR   Result Value Ref Range    Coronavirus 2019, PCR Not Detected Not Detected   Influenza A, and B PCR   Result Value Ref Range    Flu A Result Not Detected Not Detected    Flu B Result Not Detected Not Detected   RSV PCR   Result Value Ref Range    RSV PCR Not Detected Not Detected   CBC and Auto Differential   Result Value Ref Range    WBC 20.4 (H) 4.4 - 11.3 x10*3/uL    nRBC 0.0 0.0 - 0.0 /100 WBCs    RBC 5.27 (H) 4.00 - 5.20 x10*6/uL    Hemoglobin 14.4 12.0 - 16.0 g/dL    Hematocrit 47.2 (H) 36.0 - 46.0 %    MCV 90 80 - 100 fL    MCH 27.3 26.0 - 34.0 pg    MCHC 30.5 (L) 32.0 - 36.0 g/dL    RDW 15.6 (H) 11.5 - 14.5 %    Platelets 201 150 - 450 x10*3/uL    Neutrophils % 94.7 40.0 - 80.0 %    Immature Granulocytes %, Automated 1.4 (H) 0.0 - 0.9 %    Lymphocytes % 0.8 13.0 - 44.0 %    Monocytes % 2.0 2.0 - 10.0 %    Eosinophils % 0.7 0.0 - 6.0 %    Basophils % 0.4 0.0 - 2.0 %    Neutrophils Absolute 19.32 (H) 1.60 - 5.50 x10*3/uL    Immature Granulocytes Absolute, Automated 0.29 0.00 - 0.50 x10*3/uL    Lymphocytes Absolute 0.16 (L) 0.80 - 3.00 x10*3/uL    Monocytes Absolute 0.41 0.05 - 0.80 x10*3/uL    Eosinophils Absolute 0.14 0.00 - 0.40 x10*3/uL    Basophils Absolute 0.08 0.00 - 0.10 x10*3/uL   Comprehensive Metabolic Panel   Result Value Ref Range    Glucose 119 " (H) 74 - 99 mg/dL    Sodium 134 (L) 136 - 145 mmol/L    Potassium 4.3 3.5 - 5.3 mmol/L    Chloride 103 98 - 107 mmol/L    Bicarbonate 25 21 - 32 mmol/L    Anion Gap 10 10 - 20 mmol/L    Urea Nitrogen 15 6 - 23 mg/dL    Creatinine 1.21 (H) 0.50 - 1.05 mg/dL    eGFR 44 (L) >60 mL/min/1.73m*2    Calcium 7.8 (L) 8.6 - 10.3 mg/dL    Albumin 2.6 (L) 3.4 - 5.0 g/dL    Alkaline Phosphatase 48 33 - 136 U/L    Total Protein 4.0 (L) 6.4 - 8.2 g/dL    AST 12 9 - 39 U/L    Bilirubin, Total 2.2 (H) 0.0 - 1.2 mg/dL    ALT 15 7 - 45 U/L   Lactate   Result Value Ref Range    Lactate 1.3 0.4 - 2.0 mmol/L   Blood Culture    Specimen: Peripheral Venipuncture; Blood culture   Result Value Ref Range    Blood Culture No growth at 1 day    Blood Culture    Specimen: Peripheral Venipuncture; Blood culture   Result Value Ref Range    Blood Culture No growth at 1 day    Morphology   Result Value Ref Range    RBC Morphology See Below     Polychromasia Mild     Ovalocytes Few     Teardrop Cells Few    B-type natriuretic peptide   Result Value Ref Range     (H) 0 - 99 pg/mL   Troponin I, High Sensitivity   Result Value Ref Range    Troponin I, High Sensitivity 76 (HH) 0 - 13 ng/L   Hemoglobin A1c   Result Value Ref Range    Hemoglobin A1C 4.7 See comment %    Estimated Average Glucose 88 Not Established mg/dL   ECG 12 lead   Result Value Ref Range    Ventricular Rate 104 BPM    Atrial Rate 104 BPM    CA Interval 162 ms    QRS Duration 124 ms    QT Interval 368 ms    QTC Calculation(Bazett) 483 ms    P Axis 54 degrees    R Axis -52 degrees    T Axis 169 degrees    QRS Count 18 beats    Q Onset 219 ms    P Onset 138 ms    P Offset 188 ms    T Offset 403 ms    QTC Fredericia 442 ms   Urinalysis with Reflex Culture and Microscopic   Result Value Ref Range    Color, Urine Light-Orange (N) Light-Yellow, Yellow, Dark-Yellow    Appearance, Urine Turbid (N) Clear    Specific Gravity, Urine 1.025 1.005 - 1.035    pH, Urine 5.5 5.0, 5.5, 6.0, 6.5,  7.0, 7.5, 8.0    Protein, Urine 30 (1+) (A) NEGATIVE, 10 (TRACE), 20 (TRACE) mg/dL    Glucose, Urine OVER (4+) (A) Normal mg/dL    Blood, Urine 0.1 (1+) (A) NEGATIVE    Ketones, Urine TRACE (A) NEGATIVE mg/dL    Bilirubin, Urine NEGATIVE NEGATIVE    Urobilinogen, Urine Normal Normal mg/dL    Nitrite, Urine 2+ (A) NEGATIVE    Leukocyte Esterase, Urine 500 Bartolo/µL (A) NEGATIVE   Extra Urine Gray Tube   Result Value Ref Range    Extra Tube Hold for add-ons.    Microscopic Only, Urine   Result Value Ref Range    WBC, Urine >50 (A) 1-5, NONE /HPF    WBC Clumps, Urine MANY Reference range not established. /HPF    RBC, Urine >20 (A) NONE, 1-2, 3-5 /HPF    Squamous Epithelial Cells, Urine 1-9 (SPARSE) Reference range not established. /HPF    Mucus, Urine 1+ Reference range not established. /LPF   Urine Culture    Specimen: Straight Catheter; Urine   Result Value Ref Range    Urine Culture (A)      Multiple organisms present, probable contamination. Repeat culture if clinically indicated.   Troponin I, High Sensitivity   Result Value Ref Range    Troponin I, High Sensitivity 76 (HH) 0 - 13 ng/L   Bilirubin, Direct   Result Value Ref Range    Bilirubin, Direct 0.6 (H) 0.0 - 0.3 mg/dL   Troponin I, High Sensitivity   Result Value Ref Range    Troponin I, High Sensitivity 62 (HH) 0 - 13 ng/L   Heparin Assay, UFH   Result Value Ref Range    Heparin Unfractionated 1.1 (HH) See Comment Below for Therapeutic Ranges IU/mL   Heparin Assay, UFH   Result Value Ref Range    Heparin Unfractionated 0.5 See Comment Below for Therapeutic Ranges IU/mL   Heparin Assay, UFH   Result Value Ref Range    Heparin Unfractionated 0.4 See Comment Below for Therapeutic Ranges IU/mL   CBC and Auto Differential   Result Value Ref Range    WBC 16.5 (H) 4.4 - 11.3 x10*3/uL    nRBC 0.0 0.0 - 0.0 /100 WBCs    RBC 4.78 4.00 - 5.20 x10*6/uL    Hemoglobin 13.1 12.0 - 16.0 g/dL    Hematocrit 43.8 36.0 - 46.0 %    MCV 92 80 - 100 fL    MCH 27.4 26.0 - 34.0 pg     MCHC 29.9 (L) 32.0 - 36.0 g/dL    RDW 15.6 (H) 11.5 - 14.5 %    Platelets 153 150 - 450 x10*3/uL    Neutrophils % 94.3 40.0 - 80.0 %    Immature Granulocytes %, Automated 1.2 (H) 0.0 - 0.9 %    Lymphocytes % 1.5 13.0 - 44.0 %    Monocytes % 2.5 2.0 - 10.0 %    Eosinophils % 0.2 0.0 - 6.0 %    Basophils % 0.3 0.0 - 2.0 %    Neutrophils Absolute 15.55 (H) 1.60 - 5.50 x10*3/uL    Immature Granulocytes Absolute, Automated 0.20 0.00 - 0.50 x10*3/uL    Lymphocytes Absolute 0.25 (L) 0.80 - 3.00 x10*3/uL    Monocytes Absolute 0.42 0.05 - 0.80 x10*3/uL    Eosinophils Absolute 0.03 0.00 - 0.40 x10*3/uL    Basophils Absolute 0.05 0.00 - 0.10 x10*3/uL   Comprehensive Metabolic Panel   Result Value Ref Range    Glucose 133 (H) 74 - 99 mg/dL    Sodium 134 (L) 136 - 145 mmol/L    Potassium 3.4 (L) 3.5 - 5.3 mmol/L    Chloride 106 98 - 107 mmol/L    Bicarbonate 22 21 - 32 mmol/L    Anion Gap 9 (L) 10 - 20 mmol/L    Urea Nitrogen 19 6 - 23 mg/dL    Creatinine 1.25 (H) 0.50 - 1.05 mg/dL    eGFR 42 (L) >60 mL/min/1.73m*2    Calcium 6.7 (L) 8.6 - 10.3 mg/dL    Albumin 2.0 (L) 3.4 - 5.0 g/dL    Alkaline Phosphatase 39 33 - 136 U/L    Total Protein 3.4 (L) 6.4 - 8.2 g/dL    AST 10 9 - 39 U/L    Bilirubin, Total 0.9 0.0 - 1.2 mg/dL    ALT 13 7 - 45 U/L   Magnesium   Result Value Ref Range    Magnesium 1.53 (L) 1.60 - 2.40 mg/dL   Troponin I, High Sensitivity   Result Value Ref Range    Troponin I, High Sensitivity 46 (H) 0 - 13 ng/L   Bilirubin, Direct   Result Value Ref Range    Bilirubin, Direct 0.3 0.0 - 0.3 mg/dL   Protein, Urine Random   Result Value Ref Range    Total Protein, Urine Random 68 (H) 5 - 24 mg/dL    Creatinine, Urine Random 122.1 20.0 - 320.0 mg/dL    T. Protein/Creatinine Ratio 0.56 (H) 0.00 - 0.17 mg/mg Creat   Urine electrolytes   Result Value Ref Range    Sodium, Urine Random 17 mmol/L    Sodium/Creatinine Ratio 14 Not established. mmol/g Creat    Potassium, Urine Random 111 mmol/L    Potassium/Creatinine Ratio 90  Not established mmol/g Creat    Chloride, Urine Random 15 mmol/L    Chloride/Creatinine Ratio 12 (L) 38 - 318 mmol/g creat    Creatinine, Urine Random 123.1 20.0 - 320.0 mg/dL   Magnesium   Result Value Ref Range    Magnesium 2.01 1.60 - 2.40 mg/dL   Renal function panel   Result Value Ref Range    Glucose 122 (H) 74 - 99 mg/dL    Sodium 138 136 - 145 mmol/L    Potassium 3.5 3.5 - 5.3 mmol/L    Chloride 105 98 - 107 mmol/L    Bicarbonate 24 21 - 32 mmol/L    Anion Gap 13 10 - 20 mmol/L    Urea Nitrogen 21 6 - 23 mg/dL    Creatinine 1.31 (H) 0.50 - 1.05 mg/dL    eGFR 40 (L) >60 mL/min/1.73m*2    Calcium 7.2 (L) 8.6 - 10.3 mg/dL    Phosphorus 3.3 2.5 - 4.9 mg/dL    Albumin 2.2 (L) 3.4 - 5.0 g/dL   Eosinophil smear   Result Value Ref Range    Eosinophils None (N) (none)   CBC and Auto Differential   Result Value Ref Range    WBC 16.4 (H) 4.4 - 11.3 x10*3/uL    nRBC 0.0 0.0 - 0.0 /100 WBCs    RBC 4.60 4.00 - 5.20 x10*6/uL    Hemoglobin 12.6 12.0 - 16.0 g/dL    Hematocrit 43.8 36.0 - 46.0 %    MCV 95 80 - 100 fL    MCH 27.4 26.0 - 34.0 pg    MCHC 28.8 (L) 32.0 - 36.0 g/dL    RDW 15.8 (H) 11.5 - 14.5 %    Platelets 143 (L) 150 - 450 x10*3/uL    Neutrophils % 89.8 40.0 - 80.0 %    Immature Granulocytes %, Automated 1.9 (H) 0.0 - 0.9 %    Lymphocytes % 2.9 13.0 - 44.0 %    Monocytes % 4.2 2.0 - 10.0 %    Eosinophils % 0.8 0.0 - 6.0 %    Basophils % 0.4 0.0 - 2.0 %    Neutrophils Absolute 14.72 (H) 1.60 - 5.50 x10*3/uL    Immature Granulocytes Absolute, Automated 0.31 0.00 - 0.50 x10*3/uL    Lymphocytes Absolute 0.48 (L) 0.80 - 3.00 x10*3/uL    Monocytes Absolute 0.69 0.05 - 0.80 x10*3/uL    Eosinophils Absolute 0.13 0.00 - 0.40 x10*3/uL    Basophils Absolute 0.07 0.00 - 0.10 x10*3/uL   Comprehensive Metabolic Panel   Result Value Ref Range    Glucose 112 (H) 74 - 99 mg/dL    Sodium 135 (L) 136 - 145 mmol/L    Potassium 3.3 (L) 3.5 - 5.3 mmol/L    Chloride 107 98 - 107 mmol/L    Bicarbonate 21 21 - 32 mmol/L    Anion Gap 10  10 - 20 mmol/L    Urea Nitrogen 24 (H) 6 - 23 mg/dL    Creatinine 1.44 (H) 0.50 - 1.05 mg/dL    eGFR 36 (L) >60 mL/min/1.73m*2    Calcium 6.6 (L) 8.6 - 10.3 mg/dL    Albumin 1.9 (L) 3.4 - 5.0 g/dL    Alkaline Phosphatase 34 33 - 136 U/L    Total Protein 3.1 (L) 6.4 - 8.2 g/dL    AST 7 (L) 9 - 39 U/L    Bilirubin, Total 0.7 0.0 - 1.2 mg/dL    ALT 11 7 - 45 U/L   Magnesium   Result Value Ref Range    Magnesium 1.81 1.60 - 2.40 mg/dL   Heparin Assay, UFH   Result Value Ref Range    Heparin Unfractionated 0.1 See Comment Below for Therapeutic Ranges IU/mL     CT abdomen pelvis wo IV contrast    Result Date: 11/4/2024  Interpreted By:  Jayshree Dias, STUDY: CT ABDOMEN PELVIS WO IV CONTRAST;  11/4/2024 3:04 pm   INDICATION: Signs/Symptoms:recurrent urosepsis, eval for retained stone urinary tract.   COMPARISON: None.   ACCESSION NUMBER(S): [id number]   ORDERING CLINICIAN: SHERITA GOYAL   TECHNIQUE: CT of the abdomen and pelvis was performed without intravenous contrast. Sagittal and coronal reconstructions were generated.   FINDINGS: LOWER CHEST: There are bilateral pleural effusions. There is a small pericardial effusion. There is bilateral basilar atelectasis or infiltrate.   ABDOMEN:   LIVER: Unremarkable   BILE DUCTS: Unremarkable   GALLBLADDER: Status post cholecystectomy   PANCREAS: Unremarkable   SPLEEN: The spleen is enlarged.   ADRENAL GLANDS: There are no adrenal masses.   KIDNEYS AND URETERS: Kidneys are normal size and not obstructed.   There are no renal calculi.   Ureters are normal caliber.   PELVIS:   BLADDER: The bladder is empty. There is a 2 centimeters bladder stone.   REPRODUCTIVE ORGANS: Patient is status post hysterectomy. There appear to be surgical clips in the pelvis.   BOWEL: There is no significant bowel distention. There is motion artifact. There are sigmoid diverticula.   VESSELS: There are atherosclerotic changes of the aorta. The cava is unremarkable   PERITONEUM/RETROPERITONEUM/LYMPH  NODES: There is a moderate amount of ascites. There is presacral edema. There is no obvious free air.   There is no significant lymphadenopathy.   BONES AND ABDOMINAL WALL: There is anasarca.   There are old left pelvic fractures peer there are degenerative changes of the spine. There is loss of height of L1.   COMPARISON OF FINDINGS:       2 centimeters stone in the urinary bladder.   Bilateral pleural effusions. Pericardial effusion.   Ascites.   Anasarca.   Presacral edema.   Enlarged spleen.   The exam is limited by motion artifact.   MACRO: none   Signed by: Jayshree Dias 11/4/2024 3:28 PM Dictation workstation:   [id number]    US renal complete    Result Date: 11/4/2024  Interpreted By:  Zain Mcknight, STUDY: US RENAL COMPLETE; 11/4/2024 9:03 am   INDICATION: Signs/Symptoms:LEVY on CKD, eval postrenal causes.   COMPARISON: None.   ACCESSION NUMBER(S): [id number]   ORDERING CLINICIAN: SHERITA GOYAL   TECHNIQUE: Sonography of the kidneys and urinary bladder was performed.   FINDINGS: Right Kidney: *Renal length: 10 cm *Parenchyma: Normal parenchymal echogenicity. Normal parenchymal thickness. *Collecting system: No hydronephrosis. *Calculus: No echogenic, shadowing calculus. *Lesion: None.   Left Kidney: *Renal length: 10 cm *Parenchyma: Normal parenchymal echogenicity. Normal parenchymal thickness. *Collecting system: No hydronephrosis. *Calculus: Nonobstructing calculus in the upper pole measuring 4 mm. *Lesion: None.   Bladder: Decompressed.   Other: Trace perihepatic, perisplenic and pelvic free fluid/ascites.       No hydronephrosis. Decompressed bladder.   MACRO: None   Signed by: Zain Mcknight 11/4/2024 11:08 AM Dictation workstation:   [id number]    ECG 12 lead    Result Date: 11/4/2024  Sinus tachycardia Left axis deviation Left bundle branch block Abnormal ECG No previous ECGs available    CT chest wo IV contrast    Result Date: 11/3/2024  Interpreted By:  Matthias Kolb, STUDY: CT CHEST WO IV  CONTRAST;  11/3/2024 11:16 am   INDICATION: Signs/Symptoms:sepsis, ? atelectasis versus PNA on CXR without classic symptoms.     COMPARISON: Portable chest earlier same day 3 November 2024 at 0957 hours; CT lumbar spine without contrast 5 July 2024   ACCESSION NUMBER(S): [id number]   ORDERING CLINICIAN: SHERITA GOYAL   TECHNIQUE: Helical CT chest from thoracic inlet through the hemidiaphragms without intravenous contrast   FINDINGS: LUNGS / AIRSPACES / AIRWAYS:   LARGE AIRWAYS Filling defect: Negative Wall thickening: Negative Bronchiectasis: Negative Other: N/A   AIRSPACES Fibrosis: Negative Emphysema: Negative Consolidation: Negative Ground glass airspace disease: Small region of ground-glass airspace change in the middle lobe and potentially in portions of the inflated right lower lobe. None in the left lung noting one segment of left lower lobe collapse Edema: Negative Nodule / Mass: Negative Other: One segment of left lower lobe collapse. Multiple segments of right lower lobe collapse   PLEURA: Effusion:  Moderate size mostly if not entirely free flowing right; small free-flowing left Pneumothorax:  Both sides negative Other:  n/a   CARDIOVASCULAR: Heart size:  Mildly enlarged Pericardial effusion:  Perhaps trace Thoracic aortic aneurysm:  Negative Pulmonary arteries:  Static Heart failure change:  Negative.  No sign of interstitial or alveolar edema. Other:  Unusual hypodensities in the right ventricular free wall for example soft tissue windows axial series 202, image 167 and the surrounding few images   NONVASCULAR MEDIASTINUM: Esophagus:  Grossly normal by CT Mediastinal Mass:  Negative Hiatal hernia:  None Other:  n/a   LYMPH NODES: No thoracic adenopathy   CHEST WALL: Soft tissues of the chest wall are unremarkable   SKELETON: No acute or contributory abnormality   UPPER ABDOMEN: Small volume ascites pooling about the included liver and spleen. Spleen probably mildly enlarged but not completely included  in the field of view. Liver not overtly cirrhotic but at least borderline fatty. The only pertinent comparison exam for the upper abdomen would be CT lumbar spine without contrast 5 July 2024 at which time there was not any ascites in the field of view, but the field of view did not include the portions of the abdomen where they ascites is presently located. It is unlikely there was ascites in July, 2024 as more of the lower abdomen was included on that exam and it did not appear that there is any ascites tracking down into (or up from) the pelvis       Abnormalities at the right lung base on portable frontal chest radiograph earlier today are due to a combination of moderate-sized, mostly if not entirely free-flowing right pleural effusion with associated multisegmental right lower lobe collapse adjacent to it   Small area of ground-glass airspace disease in the middle lobe confirmed on CT may not have been expected on the prior radiograph   Possibility of mild ground-glass airspace disease in the right lower lobe as well   No ground-glass airspace disease in the left lung   No consolidative pneumonia in any of the inflated lungs on either side   Small free-flowing left pleural effusion   Unusual hypodensities in the right ventricular free wall of uncertain etiology and significance   Perhaps trace (smallest detectable quantity) pericardial effusion   Incidental abnormalities in the included upper abdomen as detailed above   MACRO: None   Signed by: Matthias Kolb 11/3/2024 11:35 AM Dictation workstation:   [id number]    XR chest 1 view    Result Date: 11/3/2024  Interpreted By:  Kary Cardoso, STUDY: XR CHEST 1 VIEW 11/3/2024 10:06 am   INDICATION: Signs/Symptoms:cough and fever   COMPARISON: None   ACCESSION NUMBER(S): [id number]   ORDERING CLINICIAN: ESTHER ROBBINS   TECHNIQUE: AP view   FINDINGS: There is right lower lobe airspace consolidation and a right pleural effusion. There is an enlarged cardiac  silhouette. Pulmonary vascularity is normal. Degenerative changes are noted in both shoulders. No sign of pneumothorax       1. Right lower lobe airspace consolidation either pneumonia or atelectasis with right pleural effusion. Follow-up to complete resolution is needed 2. Enlarged cardiac silhouette     Signed by: Kary Cardoso 11/3/2024 10:27 AM Dictation workstation:   [id number]        Assessment/Plan   84 yo female presenting with UTI, found to have a 2cm bladder stone.    I personally reviewed the medical records of the patient including the note of the referring physician including the CT images as well as report, examining the bladder stone.    I explained to the patient that the stone will need to be removed/fragmented using laser, and that is usually a simple procedure that generally does not result in any bleeding. It is preferable to perform the procedure after the UTI is controlled and she is overall feeling better.     I will see her as an outpatient to plan the procedure.    Plan:  Treat UTI  Treat medical condition  Bladder stone procedure (cystolitholapaxy with laser) on elective basis as outpatient

## 2024-11-05 NOTE — NURSING NOTE
0610  While changing pt this AM it was noticed that there was blood and clots in the pts stool. Daksha Quijano notified and at the bedside. VSS. Will continue to monitor.

## 2024-11-05 NOTE — PROGRESS NOTES
Desirae Gregg is a 83 y.o. female on day 2 of admission presenting with Sepsis with acute hypoxic respiratory failure without septic shock (Multi).      Subjective   She is sitting up in the bed, son at the bedside. Still poor PO intake due to food not tasting good and no appetite. Has a nonproductive cough, slight shortness of breath.       Review of systems:  Constitutional: negative for fever, chills, or malaise  Neuro: negative for dizziness, headache, numbness, tingling  ENT: Negative for nasal congestion or sore throat  CV: negative for chest pain, palpitations  GI: negative for abd pain, nausea, vomiting or diarrhea  : negative for dysuria, frequency, or urgency  Skin: negative for lesions, wounds, or rash  Musculoskeletal: Negative for weakness, myalgia, or arthralgia  Endocrine: Negative for polyuria or polydipsia         Objective   Constitutional: Well developed, awake/alert/oriented x3, no distress, alert and cooperative  Eyes: PERRL, EOMI, clear sclera  ENMT: mucous membranes moist, no apparent injury, no lesions seen  Head/Neck: Neck supple, no apparent injury, thyroid without mass or tenderness, No JVD, trachea midline, no bruits  Respiratory/Thorax: Patent airways, diminished bilat bases with good chest expansion, thorax symmetric  Cardiovascular: Regular, rate and rhythm, no murmurs, 2+ equal pulses of the extremities, normal S 1and S 2  Gastrointestinal: Nondistended, soft, non-tender, no rebound tenderness or guarding, no masses palpable, no organomegaly, +BS, no bruits  Musculoskeletal: ROM intact, no joint swelling, normal strength  Extremities: +2 pitting edema  Neurological: alert and oriented x3, intact senses, motor, response and reflexes, normal strength  Lymphatic: No significant lymphadenopathy  Psychological: Appropriate mood and behavior  Skin: Warm and dry, no lesions, no rashes      Last Recorded Vitals  BP 89/58 (BP Location: Right arm, Patient Position: Lying) Comment: notified Rn   "Pulse 98   Temp 36.4 °C (97.5 °F) (Temporal)   Resp 16   Ht 1.651 m (5' 5\")   Wt 72.6 kg (160 lb)   SpO2 95%   BMI 26.63 kg/m²     Intake/Output last 3 Shifts:  I/O last 3 completed shifts:  In: 610 (8.4 mL/kg) [P.O.:360; IV Piggyback:250]  Out: 165 (2.3 mL/kg) [Urine:165 (0.1 mL/kg/hr)]  Weight: 72.6 kg   I/O this shift:  In: 180 [P.O.:180]  Out: 50 [Urine:50]    Relevant Results  Scheduled medications  albumin human, 12.5 g, intravenous, Once  amoxicillin-pot clavulanate, 1 tablet, oral, BID  apixaban, 2.5 mg, oral, q12h  busPIRone, 7.5 mg, oral, BID  [Held by provider] carvedilol, 25 mg, oral, BID  cetirizine, 10 mg, oral, Daily  cholecalciferol, 2,000 Units, oral, Daily  cyanocobalamin, 1,000 mcg, oral, Daily  ferrous sulfate (325 mg ferrous sulfate), 65 mg of iron, oral, Daily with breakfast  folic acid, 1 mg, oral, Daily  furosemide, 40 mg, intravenous, Once  gabapentin, 200 mg, oral, BID  [Held by provider] isosorbide mononitrate ER, 30 mg, oral, Daily  levothyroxine, 88 mcg, oral, Daily  nystatin, , Topical, BID  pantoprazole, 40 mg, oral, BID AC  sertraline, 25 mg, oral, Daily  zinc oxide, 1 Application, Topical, BID      Continuous medications     PRN medications  PRN medications: acetaminophen **OR** acetaminophen **OR** acetaminophen, loperamide, melatonin, ondansetron ODT **OR** ondansetron, oxyCODONE, oxygen, polyethylene glycol    Results for orders placed or performed during the hospital encounter of 11/03/24 (from the past 24 hours)   Protein, Urine Random   Result Value Ref Range    Total Protein, Urine Random 68 (H) 5 - 24 mg/dL    Creatinine, Urine Random 122.1 20.0 - 320.0 mg/dL    T. Protein/Creatinine Ratio 0.56 (H) 0.00 - 0.17 mg/mg Creat   Urine electrolytes   Result Value Ref Range    Sodium, Urine Random 17 mmol/L    Sodium/Creatinine Ratio 14 Not established. mmol/g Creat    Potassium, Urine Random 111 mmol/L    Potassium/Creatinine Ratio 90 Not established mmol/g Creat    Chloride, " Urine Random 15 mmol/L    Chloride/Creatinine Ratio 12 (L) 38 - 318 mmol/g creat    Creatinine, Urine Random 123.1 20.0 - 320.0 mg/dL   Magnesium   Result Value Ref Range    Magnesium 2.01 1.60 - 2.40 mg/dL   Renal function panel   Result Value Ref Range    Glucose 122 (H) 74 - 99 mg/dL    Sodium 138 136 - 145 mmol/L    Potassium 3.5 3.5 - 5.3 mmol/L    Chloride 105 98 - 107 mmol/L    Bicarbonate 24 21 - 32 mmol/L    Anion Gap 13 10 - 20 mmol/L    Urea Nitrogen 21 6 - 23 mg/dL    Creatinine 1.31 (H) 0.50 - 1.05 mg/dL    eGFR 40 (L) >60 mL/min/1.73m*2    Calcium 7.2 (L) 8.6 - 10.3 mg/dL    Phosphorus 3.3 2.5 - 4.9 mg/dL    Albumin 2.2 (L) 3.4 - 5.0 g/dL   Procalcitonin   Result Value Ref Range    Procalcitonin 0.60 (H) <=0.07 ng/mL   Eosinophil smear   Result Value Ref Range    Eosinophils None (N) (none)   CBC and Auto Differential   Result Value Ref Range    WBC 16.4 (H) 4.4 - 11.3 x10*3/uL    nRBC 0.0 0.0 - 0.0 /100 WBCs    RBC 4.60 4.00 - 5.20 x10*6/uL    Hemoglobin 12.6 12.0 - 16.0 g/dL    Hematocrit 43.8 36.0 - 46.0 %    MCV 95 80 - 100 fL    MCH 27.4 26.0 - 34.0 pg    MCHC 28.8 (L) 32.0 - 36.0 g/dL    RDW 15.8 (H) 11.5 - 14.5 %    Platelets 143 (L) 150 - 450 x10*3/uL    Neutrophils % 89.8 40.0 - 80.0 %    Immature Granulocytes %, Automated 1.9 (H) 0.0 - 0.9 %    Lymphocytes % 2.9 13.0 - 44.0 %    Monocytes % 4.2 2.0 - 10.0 %    Eosinophils % 0.8 0.0 - 6.0 %    Basophils % 0.4 0.0 - 2.0 %    Neutrophils Absolute 14.72 (H) 1.60 - 5.50 x10*3/uL    Immature Granulocytes Absolute, Automated 0.31 0.00 - 0.50 x10*3/uL    Lymphocytes Absolute 0.48 (L) 0.80 - 3.00 x10*3/uL    Monocytes Absolute 0.69 0.05 - 0.80 x10*3/uL    Eosinophils Absolute 0.13 0.00 - 0.40 x10*3/uL    Basophils Absolute 0.07 0.00 - 0.10 x10*3/uL   Comprehensive Metabolic Panel   Result Value Ref Range    Glucose 112 (H) 74 - 99 mg/dL    Sodium 135 (L) 136 - 145 mmol/L    Potassium 3.3 (L) 3.5 - 5.3 mmol/L    Chloride 107 98 - 107 mmol/L     Bicarbonate 21 21 - 32 mmol/L    Anion Gap 10 10 - 20 mmol/L    Urea Nitrogen 24 (H) 6 - 23 mg/dL    Creatinine 1.44 (H) 0.50 - 1.05 mg/dL    eGFR 36 (L) >60 mL/min/1.73m*2    Calcium 6.6 (L) 8.6 - 10.3 mg/dL    Albumin 1.9 (L) 3.4 - 5.0 g/dL    Alkaline Phosphatase 34 33 - 136 U/L    Total Protein 3.1 (L) 6.4 - 8.2 g/dL    AST 7 (L) 9 - 39 U/L    Bilirubin, Total 0.7 0.0 - 1.2 mg/dL    ALT 11 7 - 45 U/L   Magnesium   Result Value Ref Range    Magnesium 1.81 1.60 - 2.40 mg/dL   Heparin Assay, UFH   Result Value Ref Range    Heparin Unfractionated 0.1 See Comment Below for Therapeutic Ranges IU/mL   Heparin Assay   Result Value Ref Range    Heparin Unfractionated 0.6 See Comment Below for Therapeutic Ranges IU/mL       CT abdomen pelvis wo IV contrast   Final Result   2 centimeters stone in the urinary bladder.        Bilateral pleural effusions. Pericardial effusion.        Ascites.        Anasarca.        Presacral edema.        Enlarged spleen.        The exam is limited by motion artifact.        MACRO:   none        Signed by: Jayshree Dias 11/4/2024 3:28 PM   Dictation workstation:   [id number]       renal complete   Final Result   No hydronephrosis. Decompressed bladder.        MACRO:   None        Signed by: Zain Mcknight 11/4/2024 11:08 AM   Dictation workstation:   [id number]      CT chest wo IV contrast   Final Result   Abnormalities at the right lung base on portable frontal chest   radiograph earlier today are due to a combination of moderate-sized,   mostly if not entirely free-flowing right pleural effusion with   associated multisegmental right lower lobe collapse adjacent to it        Small area of ground-glass airspace disease in the middle lobe   confirmed on CT may not have been expected on the prior radiograph        Possibility of mild ground-glass airspace disease in the right lower   lobe as well        No ground-glass airspace disease in the left lung        No consolidative pneumonia in  any of the inflated lungs on either side        Small free-flowing left pleural effusion        Unusual hypodensities in the right ventricular free wall of uncertain   etiology and significance        Perhaps trace (smallest detectable quantity) pericardial effusion        Incidental abnormalities in the included upper abdomen as detailed   above        MACRO:   None        Signed by: Matthias Kolb 11/3/2024 11:35 AM   Dictation workstation:   [id number]      XR chest 1 view   Final Result   1. Right lower lobe airspace consolidation either pneumonia or   atelectasis with right pleural effusion. Follow-up to complete   resolution is needed   2. Enlarged cardiac silhouette             Signed by: Kary Cardoso 11/3/2024 10:27 AM   Dictation workstation:   [id number]      Transthoracic Echo (TTE) Limited    (Results Pending)       No echocardiogram results found for the past 12 months         Assessment/Plan   Principal Problem:    Sepsis with acute hypoxic respiratory failure without septic shock (Multi)  Active Problems:    Iron deficiency anemia    Chronic systolic (congestive) heart failure    Essential hypertension    History of pulmonary embolism    Anxiety    Vitamin D deficiency    Vitamin B12 deficiency    Adult hypothyroidism    Pneumonia of right lower lobe due to infectious organism    Leukocytosis    Acute kidney injury superimposed on stage 3a chronic kidney disease    Elevated troponin    Hyperbilirubinemia    Hyperglycemia    Hypokalemia    Hypomagnesemia    Hypocalcemia    Abnormality of right ventricle of heart    Echocardiogram from June 2024:    Left Ventricle: Low normal left ventricular systolic function with a   visually estimated EF of 50 - 55%. Left ventricle size is normal. Normal   wall thickness. Ventricular mass is normal. Findings consistent with   concentric remodeling. Normal wall motion. Grade I diastolic dysfunction   with normal LAP.     Right Ventricle: Right ventricle size is  normal. Normal systolic   function. TAPSE is 1.9 cm.     Aortic Valve: No stenosis.     Mitral Valve: Mild regurgitation.     Tricuspid Valve: Normal RVSP. The estimated RVSP is 24 mmHg.     Pericardium: No pericardial effusion.         Pneumonia  -CT chest reviewed  -Procal 0.6  -antibiotics  -bronchodilators  -oxygen support  -sputum culture  -blood cultures  -Pulmonary following, note reviewed     2. Abnormal CT finding of RV  -Unusual hypodensities in the right ventricular free wall  -Echo as above  -Repeat limited echo to assess     3. Acute on chronic diastolic CHF with mildly reduced EF  -CT showed pleural effusion, possible parapneumonic vs cardiac  -  -I reviewed the Echocardiogram as above  -Strict I & Os  -Daily weights  -2gm na diet  -BP low most likely from infection and third spacing with low albumin  -No plans for thoracentesis->restart Eliquis  -Hold imdur and coreg for low BP  -CT abd/pelvis showed pleural effusions, anasarca, and ascities  -Discussed with hospitalist->albumin 1.9, will give IV albumin and then Lasix IV after    4. UTI  -WBC elevated  -Urine culture showed multiple organisms, possible contamination  -antibiotics     5. Elevated troponins-Non MI elevation  -Troponin 76, now downtrending  -EKG showed SR LBBB->chronic LBBB  -Denies ACS symptoms  -repeat limited echo     6. Hx of DVT  -Eliquis     7. Hypokalemia/hypomagnesemia  -supplement     8. Hx of HTN, now with hypotension  -Hold imdur  -Hold coreg  -IVF given->hold  -Giving albumin today with lasix post  -Monitor        OLI Jules-CNP

## 2024-11-06 LAB
ABO GROUP (TYPE) IN BLOOD: NORMAL
ALBUMIN SERPL BCP-MCNC: 2.1 G/DL (ref 3.4–5)
ANION GAP SERPL CALC-SCNC: 9 MMOL/L (ref 10–20)
ANTIBODY SCREEN: NORMAL
BASOPHILS # BLD AUTO: 0.05 X10*3/UL (ref 0–0.1)
BASOPHILS NFR BLD AUTO: 0.3 %
BUN SERPL-MCNC: 26 MG/DL (ref 6–23)
CALCIUM SERPL-MCNC: 7 MG/DL (ref 8.6–10.3)
CHLORIDE SERPL-SCNC: 107 MMOL/L (ref 98–107)
CO2 SERPL-SCNC: 21 MMOL/L (ref 21–32)
CREAT SERPL-MCNC: 1.57 MG/DL (ref 0.5–1.05)
CRP SERPL-MCNC: 6.2 MG/DL
EGFRCR SERPLBLD CKD-EPI 2021: 32 ML/MIN/1.73M*2
EOSINOPHIL # BLD AUTO: 0.4 X10*3/UL (ref 0–0.4)
EOSINOPHIL NFR BLD AUTO: 2.1 %
ERYTHROCYTE [DISTWIDTH] IN BLOOD BY AUTOMATED COUNT: 15.5 % (ref 11.5–14.5)
ERYTHROCYTE [DISTWIDTH] IN BLOOD BY AUTOMATED COUNT: 15.7 % (ref 11.5–14.5)
GLUCOSE SERPL-MCNC: 112 MG/DL (ref 74–99)
HCT VFR BLD AUTO: 41.7 % (ref 36–46)
HCT VFR BLD AUTO: 45.2 % (ref 36–46)
HGB BLD-MCNC: 12.6 G/DL (ref 12–16)
HGB BLD-MCNC: 13.6 G/DL (ref 12–16)
IMM GRANULOCYTES # BLD AUTO: 0.32 X10*3/UL (ref 0–0.5)
IMM GRANULOCYTES NFR BLD AUTO: 1.7 % (ref 0–0.9)
INR PPP: 2.1 (ref 0.9–1.1)
LEGIONELLA AG UR QL: NEGATIVE
LYMPHOCYTES # BLD AUTO: 0.37 X10*3/UL (ref 0.8–3)
LYMPHOCYTES NFR BLD AUTO: 2 %
MAGNESIUM SERPL-MCNC: 1.77 MG/DL (ref 1.6–2.4)
MCH RBC QN AUTO: 27.1 PG (ref 26–34)
MCH RBC QN AUTO: 27.2 PG (ref 26–34)
MCHC RBC AUTO-ENTMCNC: 30.1 G/DL (ref 32–36)
MCHC RBC AUTO-ENTMCNC: 30.2 G/DL (ref 32–36)
MCV RBC AUTO: 90 FL (ref 80–100)
MCV RBC AUTO: 90 FL (ref 80–100)
MONOCYTES # BLD AUTO: 0.74 X10*3/UL (ref 0.05–0.8)
MONOCYTES NFR BLD AUTO: 3.9 %
NEUTROPHILS # BLD AUTO: 16.92 X10*3/UL (ref 1.6–5.5)
NEUTROPHILS NFR BLD AUTO: 90 %
NRBC BLD-RTO: 0 /100 WBCS (ref 0–0)
NRBC BLD-RTO: 0 /100 WBCS (ref 0–0)
PHOSPHATE SERPL-MCNC: 2.9 MG/DL (ref 2.5–4.9)
PLATELET # BLD AUTO: 145 X10*3/UL (ref 150–450)
PLATELET # BLD AUTO: 210 X10*3/UL (ref 150–450)
POTASSIUM SERPL-SCNC: 3.2 MMOL/L (ref 3.5–5.3)
PROCALCITONIN SERPL-MCNC: 0.45 NG/ML
PROTHROMBIN TIME: 24 SECONDS (ref 9.8–12.8)
RBC # BLD AUTO: 4.63 X10*6/UL (ref 4–5.2)
RBC # BLD AUTO: 5.01 X10*6/UL (ref 4–5.2)
RH FACTOR (ANTIGEN D): NORMAL
S PNEUM AG UR QL: NEGATIVE
SODIUM SERPL-SCNC: 134 MMOL/L (ref 136–145)
WBC # BLD AUTO: 18.8 X10*3/UL (ref 4.4–11.3)
WBC # BLD AUTO: 29.5 X10*3/UL (ref 4.4–11.3)

## 2024-11-06 PROCEDURE — 84145 PROCALCITONIN (PCT): CPT | Mod: GEALAB | Performed by: INTERNAL MEDICINE

## 2024-11-06 PROCEDURE — 85025 COMPLETE CBC W/AUTO DIFF WBC: CPT | Performed by: INTERNAL MEDICINE

## 2024-11-06 PROCEDURE — 99221 1ST HOSP IP/OBS SF/LOW 40: CPT | Performed by: INTERNAL MEDICINE

## 2024-11-06 PROCEDURE — 86140 C-REACTIVE PROTEIN: CPT | Performed by: INTERNAL MEDICINE

## 2024-11-06 PROCEDURE — 2500000002 HC RX 250 W HCPCS SELF ADMINISTERED DRUGS (ALT 637 FOR MEDICARE OP, ALT 636 FOR OP/ED): Performed by: INTERNAL MEDICINE

## 2024-11-06 PROCEDURE — 2500000001 HC RX 250 WO HCPCS SELF ADMINISTERED DRUGS (ALT 637 FOR MEDICARE OP): Performed by: INTERNAL MEDICINE

## 2024-11-06 PROCEDURE — 83735 ASSAY OF MAGNESIUM: CPT | Performed by: INTERNAL MEDICINE

## 2024-11-06 PROCEDURE — 99232 SBSQ HOSP IP/OBS MODERATE 35: CPT | Performed by: INTERNAL MEDICINE

## 2024-11-06 PROCEDURE — 36415 COLL VENOUS BLD VENIPUNCTURE: CPT | Performed by: INTERNAL MEDICINE

## 2024-11-06 PROCEDURE — 97165 OT EVAL LOW COMPLEX 30 MIN: CPT | Mod: GO

## 2024-11-06 PROCEDURE — 97530 THERAPEUTIC ACTIVITIES: CPT | Mod: GP

## 2024-11-06 PROCEDURE — 85610 PROTHROMBIN TIME: CPT

## 2024-11-06 PROCEDURE — 84100 ASSAY OF PHOSPHORUS: CPT | Performed by: INTERNAL MEDICINE

## 2024-11-06 PROCEDURE — 99231 SBSQ HOSP IP/OBS SF/LOW 25: CPT | Performed by: INTERNAL MEDICINE

## 2024-11-06 PROCEDURE — 97162 PT EVAL MOD COMPLEX 30 MIN: CPT | Mod: GP

## 2024-11-06 PROCEDURE — 92610 EVALUATE SWALLOWING FUNCTION: CPT | Mod: GN

## 2024-11-06 PROCEDURE — 86901 BLOOD TYPING SEROLOGIC RH(D): CPT

## 2024-11-06 PROCEDURE — 2500000005 HC RX 250 GENERAL PHARMACY W/O HCPCS: Performed by: INTERNAL MEDICINE

## 2024-11-06 PROCEDURE — 2500000004 HC RX 250 GENERAL PHARMACY W/ HCPCS (ALT 636 FOR OP/ED): Performed by: INTERNAL MEDICINE

## 2024-11-06 PROCEDURE — 85027 COMPLETE CBC AUTOMATED: CPT | Performed by: INTERNAL MEDICINE

## 2024-11-06 PROCEDURE — 94760 N-INVAS EAR/PLS OXIMETRY 1: CPT

## 2024-11-06 PROCEDURE — 1200000002 HC GENERAL ROOM WITH TELEMETRY DAILY

## 2024-11-06 PROCEDURE — 97530 THERAPEUTIC ACTIVITIES: CPT | Mod: GO

## 2024-11-06 PROCEDURE — 2500000004 HC RX 250 GENERAL PHARMACY W/ HCPCS (ALT 636 FOR OP/ED): Performed by: NURSE PRACTITIONER

## 2024-11-06 PROCEDURE — 2500000001 HC RX 250 WO HCPCS SELF ADMINISTERED DRUGS (ALT 637 FOR MEDICARE OP): Performed by: NURSE PRACTITIONER

## 2024-11-06 PROCEDURE — 89125 SPECIMEN FAT STAIN: CPT

## 2024-11-06 RX ORDER — POTASSIUM CHLORIDE 20 MEQ/1
40 TABLET, EXTENDED RELEASE ORAL 2 TIMES DAILY
Status: DISCONTINUED | OUTPATIENT
Start: 2024-11-06 | End: 2024-11-07

## 2024-11-06 RX ORDER — BUMETANIDE 0.25 MG/ML
1 INJECTION, SOLUTION INTRAMUSCULAR; INTRAVENOUS ONCE
Status: COMPLETED | OUTPATIENT
Start: 2024-11-06 | End: 2024-11-06

## 2024-11-06 RX ORDER — LANOLIN ALCOHOL/MO/W.PET/CERES
400 CREAM (GRAM) TOPICAL DAILY
Status: DISCONTINUED | OUTPATIENT
Start: 2024-11-06 | End: 2024-11-07

## 2024-11-06 RX ORDER — AMOXICILLIN AND CLAVULANATE POTASSIUM 500; 125 MG/1; MG/1
1 TABLET, FILM COATED ORAL EVERY 12 HOURS SCHEDULED
Status: DISCONTINUED | OUTPATIENT
Start: 2024-11-06 | End: 2024-11-10

## 2024-11-06 RX ADMIN — BUSPIRONE HYDROCHLORIDE 7.5 MG: 15 TABLET ORAL at 09:40

## 2024-11-06 RX ADMIN — PANTOPRAZOLE SODIUM 40 MG: 40 TABLET, DELAYED RELEASE ORAL at 17:07

## 2024-11-06 RX ADMIN — PANTOPRAZOLE SODIUM 40 MG: 40 TABLET, DELAYED RELEASE ORAL at 06:28

## 2024-11-06 RX ADMIN — MIDODRINE HYDROCHLORIDE 5 MG: 5 TABLET ORAL at 09:41

## 2024-11-06 RX ADMIN — MIDODRINE HYDROCHLORIDE 5 MG: 5 TABLET ORAL at 13:00

## 2024-11-06 RX ADMIN — GABAPENTIN 200 MG: 100 CAPSULE ORAL at 09:40

## 2024-11-06 RX ADMIN — LEVOTHYROXINE SODIUM 88 MCG: 0.09 TABLET ORAL at 06:28

## 2024-11-06 RX ADMIN — CETIRIZINE HYDROCHLORIDE 10 MG: 10 TABLET, FILM COATED ORAL at 09:41

## 2024-11-06 RX ADMIN — Medication 21 PERCENT: at 11:07

## 2024-11-06 RX ADMIN — AMOXICILLIN AND CLAVULANATE POTASSIUM 1 TABLET: 500; 125 TABLET, FILM COATED ORAL at 21:05

## 2024-11-06 RX ADMIN — POTASSIUM CHLORIDE 40 MEQ: 1500 TABLET, EXTENDED RELEASE ORAL at 09:40

## 2024-11-06 RX ADMIN — NYSTATIN: 100000 CREAM TOPICAL at 09:39

## 2024-11-06 RX ADMIN — FERROUS SULFATE TAB 325 MG (65 MG ELEMENTAL FE) 325 MG: 325 (65 FE) TAB at 09:40

## 2024-11-06 RX ADMIN — MIDODRINE HYDROCHLORIDE 5 MG: 5 TABLET ORAL at 17:07

## 2024-11-06 RX ADMIN — NYSTATIN: 100000 CREAM TOPICAL at 21:04

## 2024-11-06 RX ADMIN — Medication 1 APPLICATION: at 21:04

## 2024-11-06 RX ADMIN — FOLIC ACID 1 MG: 1 TABLET ORAL at 09:40

## 2024-11-06 RX ADMIN — GABAPENTIN 200 MG: 100 CAPSULE ORAL at 21:05

## 2024-11-06 RX ADMIN — POTASSIUM CHLORIDE 40 MEQ: 1500 TABLET, EXTENDED RELEASE ORAL at 21:04

## 2024-11-06 RX ADMIN — Medication 2000 UNITS: at 09:41

## 2024-11-06 RX ADMIN — BUMETANIDE 1 MG: 0.25 INJECTION INTRAMUSCULAR; INTRAVENOUS at 14:03

## 2024-11-06 RX ADMIN — BUSPIRONE HYDROCHLORIDE 7.5 MG: 15 TABLET ORAL at 21:05

## 2024-11-06 RX ADMIN — CYANOCOBALAMIN TAB 1000 MCG 1000 MCG: 1000 TAB at 09:45

## 2024-11-06 RX ADMIN — SERTRALINE HYDROCHLORIDE 25 MG: 50 TABLET ORAL at 09:41

## 2024-11-06 RX ADMIN — AMOXICILLIN AND CLAVULANATE POTASSIUM 1 TABLET: 875; 125 TABLET, FILM COATED ORAL at 09:40

## 2024-11-06 RX ADMIN — Medication 1 APPLICATION: at 09:44

## 2024-11-06 RX ADMIN — Medication 400 MG: at 09:41

## 2024-11-06 ASSESSMENT — COGNITIVE AND FUNCTIONAL STATUS - GENERAL
STANDING UP FROM CHAIR USING ARMS: A LOT
CLIMB 3 TO 5 STEPS WITH RAILING: TOTAL
MOVING FROM LYING ON BACK TO SITTING ON SIDE OF FLAT BED WITH BEDRAILS: A LOT
PERSONAL GROOMING: A LITTLE
TURNING FROM BACK TO SIDE WHILE IN FLAT BAD: A LOT
DRESSING REGULAR UPPER BODY CLOTHING: A LOT
TOILETING: TOTAL
TOILETING: TOTAL
MOBILITY SCORE: 11
DRESSING REGULAR LOWER BODY CLOTHING: A LOT
DAILY ACTIVITIY SCORE: 13
STANDING UP FROM CHAIR USING ARMS: TOTAL
CLIMB 3 TO 5 STEPS WITH RAILING: TOTAL
MOVING TO AND FROM BED TO CHAIR: TOTAL
WALKING IN HOSPITAL ROOM: TOTAL
PERSONAL GROOMING: A LOT
DRESSING REGULAR LOWER BODY CLOTHING: TOTAL
STANDING UP FROM CHAIR USING ARMS: A LOT
EATING MEALS: A LITTLE
HELP NEEDED FOR BATHING: A LOT
WALKING IN HOSPITAL ROOM: A LOT
MOVING TO AND FROM BED TO CHAIR: A LOT
EATING MEALS: A LITTLE
DRESSING REGULAR UPPER BODY CLOTHING: A LITTLE
HELP NEEDED FOR BATHING: A LOT
MOVING TO AND FROM BED TO CHAIR: A LOT
DRESSING REGULAR LOWER BODY CLOTHING: A LOT
MOBILITY SCORE: 7
DAILY ACTIVITIY SCORE: 12
TOILETING: A LOT
DRESSING REGULAR UPPER BODY CLOTHING: A LOT
WALKING IN HOSPITAL ROOM: TOTAL
TURNING FROM BACK TO SIDE WHILE IN FLAT BAD: TOTAL
EATING MEALS: A LITTLE
TURNING FROM BACK TO SIDE WHILE IN FLAT BAD: A LOT
CLIMB 3 TO 5 STEPS WITH RAILING: A LOT
DAILY ACTIVITIY SCORE: 15
HELP NEEDED FOR BATHING: A LOT
MOVING FROM LYING ON BACK TO SITTING ON SIDE OF FLAT BED WITH BEDRAILS: A LITTLE
MOVING FROM LYING ON BACK TO SITTING ON SIDE OF FLAT BED WITH BEDRAILS: A LOT
MOBILITY SCORE: 12

## 2024-11-06 ASSESSMENT — PAIN SCALES - GENERAL
PAINLEVEL_OUTOF10: 0 - NO PAIN
PAINLEVEL_OUTOF10: 6
PAINLEVEL_OUTOF10: 0 - NO PAIN

## 2024-11-06 ASSESSMENT — PAIN DESCRIPTION - DESCRIPTORS: DESCRIPTORS: ACHING

## 2024-11-06 ASSESSMENT — PAIN SCALES - PAIN ASSESSMENT IN ADVANCED DEMENTIA (PAINAD)
BODYLANGUAGE: RELAXED
CONSOLABILITY: NO NEED TO CONSOLE
BREATHING: NORMAL
FACIALEXPRESSION: SMILING OR INEXPRESSIVE
TOTALSCORE: 0

## 2024-11-06 ASSESSMENT — ACTIVITIES OF DAILY LIVING (ADL)
ADL_ASSISTANCE: NEEDS ASSISTANCE
ADL_ASSISTANCE: NEEDS ASSISTANCE
BATHING_ASSISTANCE: MODERATE

## 2024-11-06 ASSESSMENT — ENCOUNTER SYMPTOMS
ABDOMINAL PAIN: 0
RECTAL PAIN: 0
DIARRHEA: 1
ANAL BLEEDING: 1
ABDOMINAL DISTENTION: 0
BLOOD IN STOOL: 1
VOMITING: 0
NAUSEA: 0

## 2024-11-06 ASSESSMENT — PAIN - FUNCTIONAL ASSESSMENT
PAIN_FUNCTIONAL_ASSESSMENT: 0-10
PAIN_FUNCTIONAL_ASSESSMENT: PAINAD (PAIN ASSESSMENT IN ADVANCED DEMENTIA SCALE)

## 2024-11-06 NOTE — CONSULTS
"Patient has Malnutrition Diagnosis: No  Nutrition Assessment    Reason for Assessment: Provider consult order    Patient is a 83 y.o. female presenting with: Sepsis with acute hypoxic respiratory failure without septic shock   Pt adm with PNA, PE likely related to CHF, and UTI. SLP recs Easy to Chew diet, meds whole in applesauce. GI consulted for BRBPR- suspect d/t internal hemorrhoid.       Past Medical History:   Diagnosis Date    Heart failure     Sciatica       Past Surgical History:   Procedure Laterality Date    APPENDECTOMY      CHOLECYSTECTOMY      HYSTERECTOMY      TONSILLECTOMY        Nutrition History:  Food and Nutrient History: Met with pt and pts son to obtain history. Son reports pt with decreased appetite for about 1 year, takes protein shakes at home, most recently premier protein. Weights stable. Pt follows low sodium diet at home, son does meal prep and grocery shopping. Pt with occasional nausea.  Energy Intake: Fair 50-75 %  Allergies   Allergen Reactions    Clonidine And Structural Analogs Anaphylaxis    Naproxen Nausea Only    Tizanidine Drowsiness    Hydroxyzine Palpitations    Methylprednisolone Palpitations      GI Symptoms: Nausea- on occasion  Vitamin/Herbal Supplement Use: Vitamin D3, Vitamin B12, Folic acid, iron  Oral Problems: None          Anthropometrics:  Height: 165.1 cm (5' 5\")   Weight: 80.1 kg (176 lb 9.4 oz)   BMI (Calculated): 29.39  IBW/kg (Dietitian Calculated): 56.8 kg  Percent of IBW: 141 %       Weight History:     Daily Weight  11/06/24 : 80.1 kg (176 lb 9.4 oz)  10/29/24 : 73.5 kg (162 lb)  08/08/24 : 75.5 kg (166 lb 7.2 oz)  07/04/24 : 71.7 kg (158 lb)    Weight         11/3/2024  0923 11/6/2024  0541          Weight: 72.6 kg (160 lb) 80.1 kg (176 lb 9.4 oz)              Weight Change %:     Significant Weight Loss: No          Nutrition Focused Physical Exam Findings:    Subcutaneous Fat Loss  Orbital Fat Pads: Mild-Moderate (slight dark circles and slight " hollowing)  Buccal Fat Pads: Mild-Moderate (flat cheeks, minimal bounce)  Triceps: Mild-Moderate (less than ample fat tissue)  Ribs: Defer  Muscle Wasting  Temporalis: Severe (hollowed scooping depression)  Pectoralis (Clavicular Region): Severe (protruding prominent clavicle)  Deltoid/Trapezius: Mild-Moderate (slight protrusion of acromion process)  Trapezius/Infraspinatus/Supraspinatus (Scapular Region): Mild-Moderate (slight protrusion of scapula)  Quadriceps: Defer  Gastrocnemius: Defer  Edema  Edema: +2 mild  Edema Location: bilat LE edema  Physical Findings (Nutrition Deficiency/Toxicity)  Skin: Positive (red perineum)    Nutrition Significant Labs:    Results from last 7 days   Lab Units 11/06/24  0634 11/05/24  0600 11/04/24  1536   GLUCOSE mg/dL 112* 112* 122*   SODIUM mmol/L 134* 135* 138   POTASSIUM mmol/L 3.2* 3.3* 3.5   CHLORIDE mmol/L 107 107 105   CO2 mmol/L 21 21 24   BUN mg/dL 26* 24* 21   CREATININE mg/dL 1.57* 1.44* 1.31*   EGFR mL/min/1.73m*2 32* 36* 40*   CALCIUM mg/dL 7.0* 6.6* 7.2*   PHOSPHORUS mg/dL 2.9  --  3.3   MAGNESIUM mg/dL 1.77 1.81 2.01     Lab Results   Component Value Date    HGBA1C 4.7 11/03/2024         Lab Results   Component Value Date    ALBUMIN 2.1 (L) 11/06/2024      Lab Results   Component Value Date    CRP 6.20 (H) 11/06/2024       Nutrition Specific Medications:   Scheduled medications:  amoxicillin-pot clavulanate, 1 tablet, oral, q12h MANI  [Held by provider] apixaban, 2.5 mg, oral, q12h  busPIRone, 7.5 mg, oral, BID  [Held by provider] carvedilol, 25 mg, oral, BID  cetirizine, 10 mg, oral, Daily  cholecalciferol, 2,000 Units, oral, Daily  cyanocobalamin, 1,000 mcg, oral, Daily  ferrous sulfate (325 mg ferrous sulfate), 65 mg of iron, oral, Daily with breakfast  folic acid, 1 mg, oral, Daily  gabapentin, 200 mg, oral, BID  [Held by provider] isosorbide mononitrate ER, 30 mg, oral, Daily  levothyroxine, 88 mcg, oral, Daily  magnesium oxide, 400 mg, oral, Daily  midodrine,  5 mg, oral, TID  nystatin, , Topical, BID  pantoprazole, 40 mg, oral, BID AC  potassium chloride CR, 40 mEq, oral, BID  sertraline, 25 mg, oral, Daily  zinc oxide, 1 Application, Topical, BID      Continuous medications:     PRN medications:  PRN medications: acetaminophen **OR** acetaminophen **OR** acetaminophen, loperamide, melatonin, ondansetron ODT **OR** ondansetron, oxyCODONE, oxygen, polyethylene glycol     Nursing Data Per flowsheet:   Stool Appearance: Loose, Soft (11/06/24 1241)  Gastrointestinal  Gastrointestinal (WDL): Exceptions to WDL  Last BM Date: 11/06/24  Bowel Incontinence: Yes  Stool Appearance: Loose, Soft  Stool Color: Brown, Red  Gastrointestinal Symptoms: Diarrhea  Feeding assistance level:      Intake/Output Summary (Last 24 hours) at 11/6/2024 1506  Last data filed at 11/6/2024 1408  Gross per 24 hour   Intake 953.33 ml   Output 525 ml   Net 428.33 ml      0-10 (Numeric) Pain Score: 0 - No pain   Dietary Orders (From admission, onward)       Start     Ordered    11/06/24 1120  Oral nutritional supplements  Until discontinued        Question Answer Comment   Deliver with Breakfast chocolate   Deliver with Dinner    Select supplement: Ensure Plus High Protein        11/06/24 1119    11/06/24 1120  Oral nutritional supplements  Until discontinued        Question Answer Comment   Deliver with Lunch    Select supplement: Gelatein Plus        11/06/24 1119    11/03/24 1320  May Participate in Room Service With Assistance  ( ROOM SERVICE MAY PARTICIPATE WITH ASSISTANCE)  Once        Question:  .  Answer:  Yes    11/03/24 1319    11/03/24 1225  Adult diet 2-3 grams sodium  Diet effective now        Question:  Diet type  Answer:  2-3 grams sodium    11/03/24 1226                     Estimated Needs:   Total Energy Estimated Needs (kCal): 1704 kCal  Method for Estimating Needs: 30 kcal/kg/IBW  Total Protein Estimated Needs (g):  (68-85 g protein)  Method for Estimating Needs: 1.2 - 1.5 g/kg/IBW      Method for Estimating Needs: 1 ml/kcal       Nutrition Diagnosis   Malnutrition Diagnosis  Patient has Malnutrition Diagnosis: No    Nutrition Diagnosis  Patient has Nutrition Diagnosis: Yes  Diagnosis Status (1): New  Nutrition Diagnosis 1: Increased nutrient needs  Related to (1): Sepsis with acute hypoxic respiratory failure without septic shock  As Evidenced by (1): increased nutrient demand for illness/infection  Additional Assessment Information (1): ONS added for additional kcal and protein     Albumin level low at 2.1- suspect d/t edema, third spacing, CHF dx as well as elevated CRP- which is indicative of inflammatory response Would not anticipate improvement in Albumin level until inflammatory response subsides    Nutrition Interventions/Recommendations   Nutrition Prescription:  diet, fluids    Nutrition Interventions:   Interventions: Medical food supplement  Goal: Ensure Plus HP 2 x/day= 700 kcal, 40 g protein; Gelatein Plus daily= 160 kcal, 20 g protein      Coordination of Care: n/a  Nutrition Education:   N/A  Recommendations:  Encourage oral intakes, feed/assist pt as needed during meal times. Keep therapeutic diet restrictions liberal  Diet consistency per SLP  Weights: daily for trends- anticipate fluctuations with edema, diuretics, fluid status changes  GOC per palliative care         Nutrition Monitoring and Evaluation   Food/Nutrient Related History Monitoring  Monitoring and Evaluation Plan: Amount of food  Criteria: Pt will consume 50-75% of meals and ONS provided    Body Composition/Growth/Weight History  Monitoring and Evaluation Plan: Weight  Weight: Measured weight  Criteria: Monitor    Biochemical Data, Medical Tests and Procedures  Monitoring and Evaluation Plan: Electrolyte/renal panel, Glucose/endocrine profile  Criteria: Monitor      Time Spent (min): 75 minutes

## 2024-11-06 NOTE — CONSULTS
"  Wound Care Consult     Visit Date: 11/6/2024      Patient Name: Desirae Gregg         MRN: [id number]           YOB: 1940     Reason for Consult: reddened groin        Wound History: Patient states she kicked her bed to get son's attention upstairs, leg wound resulted.     Pertinent Labs:   Albumin   Date Value Ref Range Status   11/06/2024 2.1 (L) 3.4 - 5.0 g/dL Final       Wound Assessment:  Wound 07/06/24 Skin Tear Elbow Dorsal;Right (Active)       Wound 08/09/24 Sacrum (Active)       Wound 08/10/24 Arm Distal;Left;Lower (Active)       Wound Team Summary Assessment: Patient in bed states, \"I have been bothered by more people today\", consented to wound assessment.    -- Groin seen with Zinc, no openings noted.  -- Left shin 8 x 6 cm deflated partial covered blister?  [place] wound bed.  -- Buttocks RAB, small epidermal erosion.  -- Bilateral heels dark red blanchable.     Wound Team Plan:   Orders obtained for protection, offloading and moist wound healing.  -- Continue Zinc to protect  -- Mepilex to sacrum if it sticks with zinc  -- Adaptic, Xeroform, [place] dressing moist wound healing  -- Placed waffle boots to offload at risk heels  -- Continue q 2 turns with turning wedge  -- Education to patient rt wound care and PI prevention.  -- Message with questions.     Nisha Loera RN, [facility]  11/6/2024  6:39 PM        "

## 2024-11-06 NOTE — PROGRESS NOTES
"Desirae Gregg is a 83 y.o. female on day 2 of admission presenting with Sepsis with acute hypoxic respiratory failure without septic shock (Multi).    Subjective   Feels ok today.       Objective     Physical Exam  Vitals reviewed.   Constitutional:       Appearance: She is ill-appearing.   HENT:      Head: Normocephalic and atraumatic.   Eyes:      Extraocular Movements: Extraocular movements intact.   Cardiovascular:      Rate and Rhythm: Normal rate and regular rhythm.      Heart sounds: Normal heart sounds.   Pulmonary:      Effort: Pulmonary effort is normal.      Comments: Coarse breath sounds bilaterally   Abdominal:      Palpations: Abdomen is soft.      Tenderness: There is no abdominal tenderness.   Musculoskeletal:      Cervical back: Normal range of motion.   Skin:     General: Skin is warm.   Neurological:      General: No focal deficit present.      Mental Status: She is alert and oriented to person, place, and time. Mental status is at baseline.   Psychiatric:         Mood and Affect: Mood normal.         Behavior: Behavior normal.         Last Recorded Vitals  Blood pressure 100/67, pulse 97, temperature 36.3 °C (97.3 °F), temperature source Temporal, resp. rate 16, height 1.651 m (5' 5\"), weight 72.6 kg (160 lb), SpO2 94%.  Intake/Output last 3 Shifts:  I/O last 3 completed shifts:  In: 1565.1 (21.6 mL/kg) [P.O.:1020; I.V.:345.1 (4.8 mL/kg); IV Piggyback:200]  Out: 515 (7.1 mL/kg) [Urine:515 (0.2 mL/kg/hr)]  Weight: 72.6 kg     Relevant Results  Results for orders placed or performed during the hospital encounter of 11/03/24 (from the past 24 hours)   CBC and Auto Differential   Result Value Ref Range    WBC 16.4 (H) 4.4 - 11.3 x10*3/uL    nRBC 0.0 0.0 - 0.0 /100 WBCs    RBC 4.60 4.00 - 5.20 x10*6/uL    Hemoglobin 12.6 12.0 - 16.0 g/dL    Hematocrit 43.8 36.0 - 46.0 %    MCV 95 80 - 100 fL    MCH 27.4 26.0 - 34.0 pg    MCHC 28.8 (L) 32.0 - 36.0 g/dL    RDW 15.8 (H) 11.5 - 14.5 %    Platelets 143 (L) " 150 - 450 x10*3/uL    Neutrophils % 89.8 40.0 - 80.0 %    Immature Granulocytes %, Automated 1.9 (H) 0.0 - 0.9 %    Lymphocytes % 2.9 13.0 - 44.0 %    Monocytes % 4.2 2.0 - 10.0 %    Eosinophils % 0.8 0.0 - 6.0 %    Basophils % 0.4 0.0 - 2.0 %    Neutrophils Absolute 14.72 (H) 1.60 - 5.50 x10*3/uL    Immature Granulocytes Absolute, Automated 0.31 0.00 - 0.50 x10*3/uL    Lymphocytes Absolute 0.48 (L) 0.80 - 3.00 x10*3/uL    Monocytes Absolute 0.69 0.05 - 0.80 x10*3/uL    Eosinophils Absolute 0.13 0.00 - 0.40 x10*3/uL    Basophils Absolute 0.07 0.00 - 0.10 x10*3/uL   Comprehensive Metabolic Panel   Result Value Ref Range    Glucose 112 (H) 74 - 99 mg/dL    Sodium 135 (L) 136 - 145 mmol/L    Potassium 3.3 (L) 3.5 - 5.3 mmol/L    Chloride 107 98 - 107 mmol/L    Bicarbonate 21 21 - 32 mmol/L    Anion Gap 10 10 - 20 mmol/L    Urea Nitrogen 24 (H) 6 - 23 mg/dL    Creatinine 1.44 (H) 0.50 - 1.05 mg/dL    eGFR 36 (L) >60 mL/min/1.73m*2    Calcium 6.6 (L) 8.6 - 10.3 mg/dL    Albumin 1.9 (L) 3.4 - 5.0 g/dL    Alkaline Phosphatase 34 33 - 136 U/L    Total Protein 3.1 (L) 6.4 - 8.2 g/dL    AST 7 (L) 9 - 39 U/L    Bilirubin, Total 0.7 0.0 - 1.2 mg/dL    ALT 11 7 - 45 U/L   Magnesium   Result Value Ref Range    Magnesium 1.81 1.60 - 2.40 mg/dL   Heparin Assay, UFH   Result Value Ref Range    Heparin Unfractionated 0.1 See Comment Below for Therapeutic Ranges IU/mL   C-reactive protein   Result Value Ref Range    C-Reactive Protein 7.53 (H) <1.00 mg/dL   Heparin Assay   Result Value Ref Range    Heparin Unfractionated 0.6 See Comment Below for Therapeutic Ranges IU/mL   Transthoracic Echo (TTE) Limited   Result Value Ref Range    AV pk aura 1.78 m/s    AV mn grad 7 mmHg    LVOT diam 2.04 cm    MV E/A ratio 0.77     LV EF 63 %    LVIDd 4.47 cm    Aortic Valve Area by Continuity of VTI 2.52 cm2    Aortic Valve Area by Continuity of Peak Velocity 2.44 cm2    AV pk grad 13 mmHg    LV A4C EF 62.1         Scheduled  medications  amoxicillin-pot clavulanate, 1 tablet, oral, BID  apixaban, 2.5 mg, oral, q12h  busPIRone, 7.5 mg, oral, BID  [Held by provider] carvedilol, 25 mg, oral, BID  cetirizine, 10 mg, oral, Daily  cholecalciferol, 2,000 Units, oral, Daily  cyanocobalamin, 1,000 mcg, oral, Daily  ferrous sulfate (325 mg ferrous sulfate), 65 mg of iron, oral, Daily with breakfast  folic acid, 1 mg, oral, Daily  gabapentin, 200 mg, oral, BID  [Held by provider] isosorbide mononitrate ER, 30 mg, oral, Daily  levothyroxine, 88 mcg, oral, Daily  midodrine, 5 mg, oral, TID  nystatin, , Topical, BID  pantoprazole, 40 mg, oral, BID AC  sertraline, 25 mg, oral, Daily  zinc oxide, 1 Application, Topical, BID      Continuous medications     PRN medications  PRN medications: acetaminophen **OR** acetaminophen **OR** acetaminophen, loperamide, melatonin, ondansetron ODT **OR** ondansetron, oxyCODONE, oxygen, polyethylene glycol       Assessment/Plan   Principal Problem:    Sepsis with acute hypoxic respiratory failure without septic shock (Multi)  Active Problems:    Iron deficiency anemia    Chronic systolic (congestive) heart failure    Essential hypertension    History of pulmonary embolism    Anxiety    Vitamin D deficiency    Vitamin B12 deficiency    Adult hypothyroidism    Pneumonia of right lower lobe due to infectious organism    Leukocytosis    Acute kidney injury superimposed on stage 3a chronic kidney disease    Elevated troponin    Hyperbilirubinemia    Hyperglycemia    Hypokalemia    Hypomagnesemia    Hypocalcemia    Abnormality of right ventricle of heart    84 yo woman w/ h/o myeloproliferative disorder admitted w/ acute hypoxic resp failure 2/2 pulm edema     Acute hypoxic resp failure 2/2 pulm edema - On 2L.  Wean O2 as tolerated.  Cont incentive spirometry  Recommend diuresis.     PNA - can change abx to augmentin     Pleural effusions - likely 2/2 CHF.  No  plan for thora at this time.  Cont anticoagulation.        Ruperto Lewis MD

## 2024-11-06 NOTE — PROGRESS NOTES
[facility] Hospitalist Progress Note       [id number]: Please page me for patient care issues.  [id number]: Please page night hospitalist for any issues.     Desirae Gregg  :  1940(83 y.o.)  MRN:  [id number]  PCP: Jackie Cedillo MD      Principal Problem:    Sepsis with acute hypoxic respiratory failure without septic shock (Multi)  Active Problems:    Iron deficiency anemia    Chronic systolic (congestive) heart failure    Essential hypertension    History of pulmonary embolism    Anxiety    Vitamin D deficiency    Vitamin B12 deficiency    Adult hypothyroidism    Pneumonia of right lower lobe due to infectious organism    Leukocytosis    Acute kidney injury superimposed on stage 3a chronic kidney disease    Elevated troponin    Hyperbilirubinemia    Hyperglycemia    Hypokalemia    Hypomagnesemia    Hypocalcemia    Abnormality of right ventricle of heart      Assessment and Plan:     Desirae Gregg is a 83 y.o. female with PMH CKD III, HFpEF, myelofibrosis, sciatica, remote poliomyelitis, hypertension, PE, anxiety disorder, vitamin D deficiency, vitamin B12 deficiency, hypothyroidism, GERD, depression, and JAK2 positive polycythemia vera.    Patient presented to ED with the son with complaints of 1 week of generalized weakness and fever. Associated with emesis (x3) and confusion  On arrival to the ED, patient was noted to have an oxygen saturation of 88% on room air.  She was placed on supplemental oxygen.  Temperature was 100.8 °F and heart rate was .  Respiratory rate was mildly increased at 18-23.  Patient ultimately required 4 L of supplemental oxygen per nasal cannula to maintain saturations.  Blood pressure systolic was 108-117.  Laboratory evaluation in the emergency department was notable for white blood cell count 20.4, glucose 119, creatinine 1.21, and total bilirubin 2.2.  Electrolytes, transaminases, lactic acid, hemoglobin, and platelet count were unremarkable.  COVID, influenza, and RSV testing  was negative.  Blood cultures x 2 were obtained.  Portable chest x-ray showed right lower lobe airspace consolidation, pneumonia or atelectasis with right pleural effusion.  Enlarged cardiac silhouette was also noted.      #ARF w/ hypoxia requiring NC  #Acute on chronic HFpEF  #B/L (R>L) pleural effusion  #LEVY/CKD III  #Rectal bleeding w/o overt anemia  #Sepsis UTI  #Electrolyte abnormalities (Hypomagnesemia, Hypokalemia)  #Protein malnutrition  #Hypoalbuminemia  #Incidental urinary bladder stone (2cm)-Urology rec outpt F/U for 2cm bladder stone management via cystolitholapaxy with laser   #myeloproliferative disorder   #Immunocompromised state  #Hx PE on apixaban  -abx:zosyn/azithromycin->Augmentin  -hold apixaban/chemical DVT prophylaxis 2/2 rectal bleeding  -trend H/H  -CHF order set  -IV diuretics as BP and renal function could tolerate. Holding other antihypertensives 2/2 hypotension  -replete lytes PRN  -Ucx appears to be contaminated  -CS recs appreciated: GI, cards, pulm, urology, dietician     Disposition:await test results, await consultant recommendations, and await clinical improvement    DVT Prophylaxis: SCDs    Code status: Full Code  Diet: Adult diet 2-3 grams sodium    Level of MDM:  High    patient and family updated, I personally examined the patient, and I personally reviewed and agree with residentphysical exam, assessment/plan with my noted corrections if any    Family Communication  Number :   Name of Designated Family Representative:       Total time spent: 50 minutes, of total time providing counseling or in coordination of care. Total time on this day of visit includes record and documentation review before and after visit including documentation and time not explicitly included on EMR time stamp      Subjective:   Interval History:  HPI  The patient complains of generalized weakness  The patient feels their symptoms areimproving  no events or new concerns    Scheduled Meds:amoxicillin-pot  clavulanate, 1 tablet, oral, BID  [Held by provider] apixaban, 2.5 mg, oral, q12h  busPIRone, 7.5 mg, oral, BID  [Held by provider] carvedilol, 25 mg, oral, BID  cetirizine, 10 mg, oral, Daily  cholecalciferol, 2,000 Units, oral, Daily  cyanocobalamin, 1,000 mcg, oral, Daily  ferrous sulfate (325 mg ferrous sulfate), 65 mg of iron, oral, Daily with breakfast  folic acid, 1 mg, oral, Daily  gabapentin, 200 mg, oral, BID  [Held by provider] isosorbide mononitrate ER, 30 mg, oral, Daily  levothyroxine, 88 mcg, oral, Daily  midodrine, 5 mg, oral, TID  nystatin, , Topical, BID  pantoprazole, 40 mg, oral, BID AC  sertraline, 25 mg, oral, Daily  zinc oxide, 1 Application, Topical, BID      Continuous Infusions:   PRN Meds:PRN medications: acetaminophen **OR** acetaminophen **OR** acetaminophen, loperamide, melatonin, ondansetron ODT **OR** ondansetron, oxyCODONE, oxygen, polyethylene glycol    Review of Systems   All other systems reviewed and are negative.    Interval Pertinent History:  Social History     Tobacco Use    Smoking status: Never    Smokeless tobacco: Never   Substance Use Topics    Alcohol use: Never         Objective:   Patient Vitals for the past 24 hrs:   BP Temp Temp src Pulse Resp SpO2 Weight   24 0541 109/71 36.4 °C (97.5 °F) Temporal 62 16 94 % 80.1 kg (176 lb 9.4 oz)   24 1931 100/67 36.3 °C (97.3 °F) Temporal 97 16 94 % --   24 1802 101/68 -- -- 85 -- -- --   24 1500 94/61 36.8 °C (98.2 °F) Temporal 99 16 -- --   24 1255 88/62 36.2 °C (97.2 °F) Temporal 103 18 96 % --   24 1016 89/58 36.4 °C (97.5 °F) Temporal 98 16 95 % --       Average, Min, and Max for last 24 hours Vitals:  TEMPERATURE:  Temp  Av.4 °C (97.5 °F)  Min: 36.2 °C (97.2 °F)  Max: 36.8 °C (98.2 °F)    RESPIRATIONS RANGE: Resp  Av.4  Min: 16  Max: 18    PULSE RANGE: Pulse  Av.7  Min: 62  Max: 103    BLOOD PRESSURE RANGE:  Systolic (24hrs), Av , Min:88 , Max:109   ; Diastolic  (24hrs), Av, Min:58, Max:71      PULSE OXIMETRY RANGE: SpO2  Av.8 %  Min: 94 %  Max: 96 %  Body mass index is 29.39 kg/m².    I/O last 3 completed shifts:  In: 1575.1 (19.7 mL/kg) [P.O.:1080; I.V.:345.1 (4.3 mL/kg); IV Piggyback:150]  Out: 715 (8.9 mL/kg) [Urine:715 (0.2 mL/kg/hr)]  Weight: 80.1 kg   Weight change:      Physical Exam  Vitals and nursing note reviewed.   Constitutional:       Appearance: Normal appearance.      Interventions: Nasal cannula in place.   HENT:      Head: Normocephalic and atraumatic.      Right Ear: External ear normal.      Left Ear: External ear normal.      Nose: Nose normal.      Mouth/Throat:      Mouth: Mucous membranes are moist.   Eyes:      General: No scleral icterus.        Right eye: No discharge.         Left eye: No discharge.      Extraocular Movements: Extraocular movements intact.      Conjunctiva/sclera: Conjunctivae normal.      Pupils: Pupils are equal, round, and reactive to light.   Cardiovascular:      Rate and Rhythm: Regular rhythm.   Pulmonary:      Effort: Pulmonary effort is normal.      Breath sounds: Rales present.   Abdominal:      General: Abdomen is flat. Bowel sounds are normal.      Palpations: Abdomen is soft.   Musculoskeletal:         General: Normal range of motion.      Right lower leg: Edema (trace to +1) present.      Left lower leg: Edema (trace to +1) present.   Skin:     General: Skin is warm and dry.      Capillary Refill: Capillary refill takes less than 2 seconds.   Neurological:      General: No focal deficit present.   Psychiatric:         Mood and Affect: Mood normal.         Thought Content: Thought content normal.         Judgment: Judgment normal.         Lab Results   Component Value Date     (L) 2024    K 3.2 (L) 2024     2024    CO2 21 2024    BUN 26 (H) 2024    CREATININE 1.57 (H) 2024    GLUCOSE 112 (H) 2024    CALCIUM 7.0 (L) 2024    PROT 3.1 (L) 2024     BILITOT 0.7 11/05/2024    ALKPHOS 34 11/05/2024    AST 7 (L) 11/05/2024    ALT 11 11/05/2024       Lab Results   Component Value Date    WBC 18.8 (H) 11/06/2024    HGB 12.6 11/06/2024    HCT 41.7 11/06/2024    MCV 90 11/06/2024     (L) 11/06/2024    LYMPHOPCT 2.0 11/06/2024    RBC 4.63 11/06/2024    MCH 27.2 11/06/2024    MCHC 30.2 (L) 11/06/2024    RDW 15.5 (H) 11/06/2024    CRP 6.20 (H) 11/06/2024       Lab Results   Component Value Date    TSH 2.09 10/29/2024     Lab Results   Component Value Date    LACTATE 1.3 11/03/2024     (H) 11/03/2024    INR 2.1 (H) 11/06/2024       IMAGES:  Encounter Date: 11/03/24   ECG 12 lead   Result Value    Ventricular Rate 104    Atrial Rate 104    NV Interval 162    QRS Duration 124    QT Interval 368    QTC Calculation(Bazett) 483    P Axis 54    R Axis -52    T Axis 169    QRS Count 18    Q Onset 219    P Onset 138    P Offset 188    T Offset 403    QTC Fredericia 442    Narrative    Sinus tachycardia  Left axis deviation  Left bundle branch block  Abnormal ECG  No previous ECGs available     CT abdomen pelvis wo IV contrast    Result Date: 11/4/2024  Impression: 2 centimeters stone in the urinary bladder.   Bilateral pleural effusions. Pericardial effusion.   Ascites.   Anasarca.   Presacral edema.   Enlarged spleen.   The exam is limited by motion artifact.   MACRO: none   Signed by: Jayshree Dias 11/4/2024 3:28 PM Dictation workstation:   [id number]    US renal complete    Result Date: 11/4/2024  Impression: No hydronephrosis. Decompressed bladder.   MACRO: None   Signed by: Zain Mcknight 11/4/2024 11:08 AM Dictation workstation:   [id number]    CT chest wo IV contrast    Result Date: 11/3/2024  Impression: Abnormalities at the right lung base on portable frontal chest radiograph earlier today are due to a combination of moderate-sized, mostly if not entirely free-flowing right pleural effusion with associated multisegmental right lower lobe collapse adjacent  to it   Small area of ground-glass airspace disease in the middle lobe confirmed on CT may not have been expected on the prior radiograph   Possibility of mild ground-glass airspace disease in the right lower lobe as well   No ground-glass airspace disease in the left lung   No consolidative pneumonia in any of the inflated lungs on either side   Small free-flowing left pleural effusion   Unusual hypodensities in the right ventricular free wall of uncertain etiology and significance   Perhaps trace (smallest detectable quantity) pericardial effusion   Incidental abnormalities in the included upper abdomen as detailed above   MACRO: None   Signed by: Matthias Kolb 11/3/2024 11:35 AM Dictation workstation:   [id number]    XR chest 1 view    Result Date: 11/3/2024  Impression: 1. Right lower lobe airspace consolidation either pneumonia or atelectasis with right pleural effusion. Follow-up to complete resolution is needed 2. Enlarged cardiac silhouette     Signed by: Kary Cardoso 11/3/2024 10:27 AM Dictation workstation:   [id number]    Transthoracic Echo (TTE) Limited    Result Date: 11/5/2024   [facility], [address] [place] [address]               Tel [phone] and Fax [phone] TRANSTHORACIC ECHOCARDIOGRAM REPORT  Patient Name:       ELIE Mccain Physician:    [id number] Chicho Goldman MD Study Date:         11/5/2024           Ordering Provider:    [id number] SHERITA GOYAL MRN/PID:            [id number]            Fellow: Accession#:         [id number]        Nurse: Date of Birth/Age:  1940 / 83     Sonographer:          Cathi garcia                                     [facility] [[email or web address]] Gender assigned at  F                   Additional Staff: Birth: Height:             165.10 cm           Admit Date:           11/3/2024 Weight:              72.57 kg            Admission Status:     Inpatient -                                                               Routine BSA / BMI:          1.80 m2 / 26.63     Encounter#:           [id number]                     kg/m2 Blood Pressure:     103/62 mmHg         Department Location:  [facility] Non                                                               Invasive Study Type:    TRANSTHORACIC ECHO (TTE) LIMITED Diagnosis/ICD: 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 Study Detail: The following Echo studies were performed: 2D, Doppler and color               flow.  PHYSICIAN INTERPRETATION: Left Ventricle: The left ventricular systolic function is normal, with a visually estimated ejection fraction of 60-65%. There is severely increased concentric left ventricular hypertrophy. There are no regional left ventricular wall motion abnormalities. The left ventricular cavity size is normal. There is moderately increased septal and moderately increased posterior left ventricular wall thickness. Spectral Doppler shows an abnormal pattern of left ventricular diastolic filling. Left Atrium: The left atrium is upper limits of normal in size. Right Ventricle: The right ventricle is normal in size. There is normal right ventricular global systolic function. Right Atrium: The right atrium is normal in size. Aortic Valve: The aortic valve is trileaflet. The aortic valve dimensionless index is 0.77. There is trace aortic valve regurgitation. The peak instantaneous gradient of the aortic valve is 13 mmHg. The mean gradient of the mitral valve is 7 mmHg. Mitral Valve: The mitral valve is normal in structure. There  is trace to mild mitral valve regurgitation. Tricuspid Valve: The tricuspid valve is structurally normal. There is trace to mild tricuspid regurgitation. Pulmonic Valve: The pulmonic valve is not well visualized. The pulmonic valve regurgitation was not well visualized. Pericardium: There is no pericardial effusion noted. Aorta: The aortic root is normal.  CONCLUSIONS:  1. The left ventricular systolic function is normal, with a visually estimated ejection fraction of 60-65%.  2. Spectral Doppler shows an abnormal pattern of left ventricular diastolic filling.  3. There is moderately increased septal and moderately increased posterior left ventricular wall thickness.  4. There is severely increased concentric left ventricular hypertrophy.  5. There is normal right ventricular global systolic function. QUANTITATIVE DATA SUMMARY:  2D MEASUREMENTS:          Normal Ranges: IVSd:            1.49 cm  (0.6-1.1cm) LVPWd:           1.26 cm  (0.6-1.1cm) LVIDd:           4.47 cm  (3.9-5.9cm) LVIDs:           2.98 cm LV Mass Index:   133 g/m2 LVEDV Index:     23 ml/m2 LV % FS          33.4 %  LV SYSTOLIC FUNCTION BY 2D PLANIMETRY (MOD):                      Normal Ranges: EF-A4C View:    62 % (>=55%) EF-A2C View:    61 % EF-Biplane:     64 % EF-Visual:      63 % LV EF Reported: 63 %  LV DIASTOLIC FUNCTION:          Normal Ranges: MV Peak E:             0.70 m/s (0.7-1.2 m/s) MV Peak A:             0.91 m/s (0.42-0.7 m/s) E/A Ratio:             0.77     (1.0-2.2)  MITRAL VALVE:          Normal Ranges: MV DT:        264 msec (150-240msec)  AORTIC VALVE:                      Normal Ranges: AoV Vmax:                1.78 m/s  (<=1.7m/s) AoV Peak P.7 mmHg (<20mmHg) AoV Mean P.0 mmHg  (1.7-11.5mmHg) LVOT Max Josiah:            1.33 m/s  (<=1.1m/s) AoV VTI:                 31.31 cm  (18-25cm) LVOT VTI:                24.11 cm LVOT Diameter:           2.04 cm   (1.8-2.4cm) AoV Area, VTI:           2.52 cm2   (2.5-5.5cm2) AoV Area,Vmax:           2.44 cm2  (2.5-4.5cm2) AoV Dimensionless Index: 0.77  TRICUSPID VALVE/RVSP:          Normal Ranges: Peak TR Velocity:     3.46 m/s  PULMONIC VALVE:          Normal Ranges: PV Max Josiah:     0.8 m/s  (0.6-0.9m/s) PV Max P.8 mmHg  [id number] Chicho Goldman MD Electronically signed on 2024 at 2:09:49 PM  ** Final **    === 24 ===    XR CHEST 1 VIEW    - Impression -  1. Right lower lobe airspace consolidation either pneumonia or  atelectasis with right pleural effusion. Follow-up to complete  resolution is needed  2. Enlarged cardiac silhouette      Signed by: Kary Cardoso 11/3/2024 10:27 AM  Dictation workstation:   [id number]  === 24 ===    CT ABDOMEN PELVIS WO IV CONTRAST    - Impression -  2 centimeters stone in the urinary bladder.    Bilateral pleural effusions. Pericardial effusion.    Ascites.    Anasarca.    Presacral edema.    Enlarged spleen.    The exam is limited by motion artifact.    MACRO:  none    Signed by: Jayshree Dias 2024 3:28 PM  Dictation workstation:   [id number]  === 24 ===    XR CHEST 1 VIEW    - Impression -  1. Right lower lobe airspace consolidation either pneumonia or  atelectasis with right pleural effusion. Follow-up to complete  resolution is needed  2. Enlarged cardiac silhouette      Signed by: Kary Cardoso 11/3/2024 10:27 AM  Dictation workstation:   [id number]      Additional results of the last 24 hours have been reviewed.    Dictated using Watchwith Version 2.4  Proof read however unrecognized voice recognition errors may have occurred     Electronically signed by Cha Ramsay DO on 24 at 7:35 AM

## 2024-11-06 NOTE — CONSULTS
Reason For Consult  BRBPR    History Of Present Illness  Desirae Gregg is a 83 y.o. female with a PMH of stage III chronic kidney disease, chronic systolic congestive heart failure, myelofibrosis, sciatica, remote poliomyelitis, hypertension, pulmonary embolism, anxiety disorder, vitamin D deficiency, vitamin B12 deficiency, hypothyroidism, GERD, depression, and JAK2 positive polycythemia vera previously on Jakafi and Eliquis, patient states there were stopped a few months ago. GI consulted for bright red blood per rectum. Patient has external hemorrhoids noted on assessment. Patient denies any previous BRBPR, melena, nausea, emesis or abdominal pain. Patient endorses that she goes from constipation to diarrhea and currently has diarrhea which she takes Imodium at home for.    No EGD or colonoscopy documented  Hgb stable at 12.6       Past Medical History  She has a past medical history of Heart failure and Sciatica.    Surgical History  She has a past surgical history that includes Hysterectomy; Cholecystectomy; Tonsillectomy; and Appendectomy.     Social History  She reports that she has never smoked. She has never used smokeless tobacco. She reports that she does not drink alcohol and does not use drugs.    Family History  Family History   Problem Relation Name Age of Onset    Stroke Mother      Asthma Father      Cancer Paternal Grandmother          Allergies  Clonidine and structural analogs, Naproxen, Tizanidine, Hydroxyzine, and Methylprednisolone    Review of Systems  Review of Systems   Gastrointestinal:  Positive for anal bleeding, blood in stool and diarrhea. Negative for abdominal distention, abdominal pain, nausea, rectal pain and vomiting.   All other systems reviewed and are negative.        Physical Exam  Physical Exam  Vitals reviewed.   Constitutional:       Appearance: Normal appearance.   HENT:      Nose: Nose normal.      Mouth/Throat:      Mouth: Mucous membranes are moist.   Cardiovascular:       "Rate and Rhythm: Regular rhythm.      Pulses: Normal pulses.   Pulmonary:      Effort: Pulmonary effort is normal.   Abdominal:      General: Bowel sounds are normal.      Palpations: Abdomen is soft.   Skin:     General: Skin is warm.      Findings: Erythema present.      Comments: Red excoriation around  rectum with external hemorrhoids      Neurological:      Mental Status: She is alert and oriented to person, place, and time.           Last Recorded Vitals  Blood pressure 109/71, pulse 62, temperature 36.4 °C (97.5 °F), temperature source Temporal, resp. rate 16, height 1.651 m (5' 5\"), weight 80.1 kg (176 lb 9.4 oz), SpO2 94%.    Relevant Results    Scheduled medications  amoxicillin-pot clavulanate, 1 tablet, oral, BID  [Held by provider] apixaban, 2.5 mg, oral, q12h  busPIRone, 7.5 mg, oral, BID  [Held by provider] carvedilol, 25 mg, oral, BID  cetirizine, 10 mg, oral, Daily  cholecalciferol, 2,000 Units, oral, Daily  cyanocobalamin, 1,000 mcg, oral, Daily  ferrous sulfate (325 mg ferrous sulfate), 65 mg of iron, oral, Daily with breakfast  folic acid, 1 mg, oral, Daily  gabapentin, 200 mg, oral, BID  [Held by provider] isosorbide mononitrate ER, 30 mg, oral, Daily  levothyroxine, 88 mcg, oral, Daily  magnesium oxide, 400 mg, oral, Daily  midodrine, 5 mg, oral, TID  nystatin, , Topical, BID  pantoprazole, 40 mg, oral, BID AC  potassium chloride CR, 40 mEq, oral, BID  sertraline, 25 mg, oral, Daily  zinc oxide, 1 Application, Topical, BID      Continuous medications     PRN medications  PRN medications: acetaminophen **OR** acetaminophen **OR** acetaminophen, loperamide, melatonin, ondansetron ODT **OR** ondansetron, oxyCODONE, oxygen, polyethylene glycol   Results for orders placed or performed during the hospital encounter of 11/03/24 (from the past 24 hours)   Heparin Assay   Result Value Ref Range    Heparin Unfractionated 0.6 See Comment Below for Therapeutic Ranges IU/mL   Transthoracic Echo (TTE) " Limited   Result Value Ref Range    AV pk aura 1.78 m/s    AV mn grad 7 mmHg    LVOT diam 2.04 cm    MV E/A ratio 0.77     LV EF 63 %    LVIDd 4.47 cm    Aortic Valve Area by Continuity of VTI 2.52 cm2    Aortic Valve Area by Continuity of Peak Velocity 2.44 cm2    AV pk grad 13 mmHg    LV A4C EF 62.1    Renal Function Panel   Result Value Ref Range    Glucose 112 (H) 74 - 99 mg/dL    Sodium 134 (L) 136 - 145 mmol/L    Potassium 3.2 (L) 3.5 - 5.3 mmol/L    Chloride 107 98 - 107 mmol/L    Bicarbonate 21 21 - 32 mmol/L    Anion Gap 9 (L) 10 - 20 mmol/L    Urea Nitrogen 26 (H) 6 - 23 mg/dL    Creatinine 1.57 (H) 0.50 - 1.05 mg/dL    eGFR 32 (L) >60 mL/min/1.73m*2    Calcium 7.0 (L) 8.6 - 10.3 mg/dL    Phosphorus 2.9 2.5 - 4.9 mg/dL    Albumin 2.1 (L) 3.4 - 5.0 g/dL   Magnesium   Result Value Ref Range    Magnesium 1.77 1.60 - 2.40 mg/dL   C-reactive protein   Result Value Ref Range    C-Reactive Protein 6.20 (H) <1.00 mg/dL   Protime-INR   Result Value Ref Range    Protime 24.0 (H) 9.8 - 12.8 seconds    INR 2.1 (H) 0.9 - 1.1   CBC and Auto Differential   Result Value Ref Range    WBC 18.8 (H) 4.4 - 11.3 x10*3/uL    nRBC 0.0 0.0 - 0.0 /100 WBCs    RBC 4.63 4.00 - 5.20 x10*6/uL    Hemoglobin 12.6 12.0 - 16.0 g/dL    Hematocrit 41.7 36.0 - 46.0 %    MCV 90 80 - 100 fL    MCH 27.2 26.0 - 34.0 pg    MCHC 30.2 (L) 32.0 - 36.0 g/dL    RDW 15.5 (H) 11.5 - 14.5 %    Platelets 145 (L) 150 - 450 x10*3/uL    Neutrophils % 90.0 40.0 - 80.0 %    Immature Granulocytes %, Automated 1.7 (H) 0.0 - 0.9 %    Lymphocytes % 2.0 13.0 - 44.0 %    Monocytes % 3.9 2.0 - 10.0 %    Eosinophils % 2.1 0.0 - 6.0 %    Basophils % 0.3 0.0 - 2.0 %    Neutrophils Absolute 16.92 (H) 1.60 - 5.50 x10*3/uL    Immature Granulocytes Absolute, Automated 0.32 0.00 - 0.50 x10*3/uL    Lymphocytes Absolute 0.37 (L) 0.80 - 3.00 x10*3/uL    Monocytes Absolute 0.74 0.05 - 0.80 x10*3/uL    Eosinophils Absolute 0.40 0.00 - 0.40 x10*3/uL    Basophils Absolute 0.05 0.00 -  0.10 x10*3/uL   Type and screen   Result Value Ref Range    ABO TYPE B     Rh TYPE POS     ANTIBODY SCREEN NEG       Transthoracic Echo (TTE) Limited    Result Date: 11/5/2024   [facility], [address] [place] [address]               Tel [phone] and Fax [phone] TRANSTHORACIC ECHOCARDIOGRAM REPORT  Patient Name:       ELIE WHITLEY          Kalyn Physician:    [id number] Chicho Goldman MD Study Date:         11/5/2024           Ordering Provider:    [id number] SHERITA GOYAL MRN/PID:            [id number]            Fellow: Accession#:         [id number]        Nurse: Date of Birth/Age:  1940 / 83     Sonographer:          Cathi garcia RDCS Gender assigned at  F                   Additional Staff: Birth: Height:             165.10 cm           Admit Date:           11/3/2024 Weight:             72.57 kg            Admission Status:     Inpatient -                                                               Routine BSA / BMI:          1.80 m2 / 26.63     Encounter#:           [id number]                     kg/m2 Blood Pressure:     103/62 mmHg         Department Location:  [facility] Non                                                               Invasive Study Type:    TRANSTHORACIC ECHO (TTE) LIMITED Diagnosis/ICD: 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 Study Detail: The following Echo studies were performed: 2D, Doppler and color               flow.  PHYSICIAN INTERPRETATION: Left  Ventricle: The left ventricular systolic function is normal, with a visually estimated ejection fraction of 60-65%. There is severely increased concentric left ventricular hypertrophy. There are no regional left ventricular wall motion abnormalities. The left ventricular cavity size is normal. There is moderately increased septal and moderately increased posterior left ventricular wall thickness. Spectral Doppler shows an abnormal pattern of left ventricular diastolic filling. Left Atrium: The left atrium is upper limits of normal in size. Right Ventricle: The right ventricle is normal in size. There is normal right ventricular global systolic function. Right Atrium: The right atrium is normal in size. Aortic Valve: The aortic valve is trileaflet. The aortic valve dimensionless index is 0.77. There is trace aortic valve regurgitation. The peak instantaneous gradient of the aortic valve is 13 mmHg. The mean gradient of the mitral valve is 7 mmHg. Mitral Valve: The mitral valve is normal in structure. There is trace to mild mitral valve regurgitation. Tricuspid Valve: The tricuspid valve is structurally normal. There is trace to mild tricuspid regurgitation. Pulmonic Valve: The pulmonic valve is not well visualized. The pulmonic valve regurgitation was not well visualized. Pericardium: There is no pericardial effusion noted. Aorta: The aortic root is normal.  CONCLUSIONS:  1. The left ventricular systolic function is normal, with a visually estimated ejection fraction of 60-65%.  2. Spectral Doppler shows an abnormal pattern of left ventricular diastolic filling.  3. There is moderately increased septal and moderately increased posterior left ventricular wall thickness.  4. There is severely increased concentric left ventricular hypertrophy.  5. There is normal right ventricular global systolic function. QUANTITATIVE DATA SUMMARY:  2D MEASUREMENTS:          Normal Ranges: IVSd:            1.49 cm  (0.6-1.1cm) LVPWd:            1.26 cm  (0.6-1.1cm) LVIDd:           4.47 cm  (3.9-5.9cm) LVIDs:           2.98 cm LV Mass Index:   133 g/m2 LVEDV Index:     23 ml/m2 LV % FS          33.4 %  LV SYSTOLIC FUNCTION BY 2D PLANIMETRY (MOD):                      Normal Ranges: EF-A4C View:    62 % (>=55%) EF-A2C View:    61 % EF-Biplane:     64 % EF-Visual:      63 % LV EF Reported: 63 %  LV DIASTOLIC FUNCTION:          Normal Ranges: MV Peak E:             0.70 m/s (0.7-1.2 m/s) MV Peak A:             0.91 m/s (0.42-0.7 m/s) E/A Ratio:             0.77     (1.0-2.2)  MITRAL VALVE:          Normal Ranges: MV DT:        264 msec (150-240msec)  AORTIC VALVE:                      Normal Ranges: AoV Vmax:                1.78 m/s  (<=1.7m/s) AoV Peak P.7 mmHg (<20mmHg) AoV Mean P.0 mmHg  (1.7-11.5mmHg) LVOT Max Josiah:            1.33 m/s  (<=1.1m/s) AoV VTI:                 31.31 cm  (18-25cm) LVOT VTI:                24.11 cm LVOT Diameter:           2.04 cm   (1.8-2.4cm) AoV Area, VTI:           2.52 cm2  (2.5-5.5cm2) AoV Area,Vmax:           2.44 cm2  (2.5-4.5cm2) AoV Dimensionless Index: 0.77  TRICUSPID VALVE/RVSP:          Normal Ranges: Peak TR Velocity:     3.46 m/s  PULMONIC VALVE:          Normal Ranges: PV Max Josiah:     0.8 m/s  (0.6-0.9m/s) PV Max P.8 mmHg  [id number] Chicho Goldman MD Electronically signed on 2024 at 2:09:49 PM  ** Final **     CT abdomen pelvis wo IV contrast    Result Date: 2024  Interpreted By:  Jayshree Dias, STUDY: CT ABDOMEN PELVIS WO IV CONTRAST;  2024 3:04 pm   INDICATION: Signs/Symptoms:recurrent urosepsis, eval for retained stone urinary tract.   COMPARISON: None.   ACCESSION NUMBER(S): [id number]   ORDERING CLINICIAN: SHERITA GOYAL   TECHNIQUE: CT of the abdomen and pelvis was performed without intravenous contrast. Sagittal and coronal reconstructions were generated.   FINDINGS: LOWER CHEST: There are bilateral pleural effusions. There is a small pericardial  effusion. There is bilateral basilar atelectasis or infiltrate.   ABDOMEN:   LIVER: Unremarkable   BILE DUCTS: Unremarkable   GALLBLADDER: Status post cholecystectomy   PANCREAS: Unremarkable   SPLEEN: The spleen is enlarged.   ADRENAL GLANDS: There are no adrenal masses.   KIDNEYS AND URETERS: Kidneys are normal size and not obstructed.   There are no renal calculi.   Ureters are normal caliber.   PELVIS:   BLADDER: The bladder is empty. There is a 2 centimeters bladder stone.   REPRODUCTIVE ORGANS: Patient is status post hysterectomy. There appear to be surgical clips in the pelvis.   BOWEL: There is no significant bowel distention. There is motion artifact. There are sigmoid diverticula.   VESSELS: There are atherosclerotic changes of the aorta. The cava is unremarkable   PERITONEUM/RETROPERITONEUM/LYMPH NODES: There is a moderate amount of ascites. There is presacral edema. There is no obvious free air.   There is no significant lymphadenopathy.   BONES AND ABDOMINAL WALL: There is anasarca.   There are old left pelvic fractures peer there are degenerative changes of the spine. There is loss of height of L1.   COMPARISON OF FINDINGS:       2 centimeters stone in the urinary bladder.   Bilateral pleural effusions. Pericardial effusion.   Ascites.   Anasarca.   Presacral edema.   Enlarged spleen.   The exam is limited by motion artifact.   MACRO: none   Signed by: Jayshree Dias 11/4/2024 3:28 PM Dictation workstation:   [id number]    US renal complete    Result Date: 11/4/2024  Interpreted By:  Zain Mcknight, STUDY: US RENAL COMPLETE; 11/4/2024 9:03 am   INDICATION: Signs/Symptoms:LEVY on CKD, eval postrenal causes.   COMPARISON: None.   ACCESSION NUMBER(S): [id number]   ORDERING CLINICIAN: SHERITA GOYAL   TECHNIQUE: Sonography of the kidneys and urinary bladder was performed.   FINDINGS: Right Kidney: *Renal length: 10 cm *Parenchyma: Normal parenchymal echogenicity. Normal parenchymal thickness. *Collecting  system: No hydronephrosis. *Calculus: No echogenic, shadowing calculus. *Lesion: None.   Left Kidney: *Renal length: 10 cm *Parenchyma: Normal parenchymal echogenicity. Normal parenchymal thickness. *Collecting system: No hydronephrosis. *Calculus: Nonobstructing calculus in the upper pole measuring 4 mm. *Lesion: None.   Bladder: Decompressed.   Other: Trace perihepatic, perisplenic and pelvic free fluid/ascites.       No hydronephrosis. Decompressed bladder.   MACRO: None   Signed by: Zain Mcknight 11/4/2024 11:08 AM Dictation workstation:   [id number]    ECG 12 lead    Result Date: 11/4/2024  Sinus tachycardia Left axis deviation Left bundle branch block Abnormal ECG No previous ECGs available    CT chest wo IV contrast    Result Date: 11/3/2024  Interpreted By:  Matthias Kolb, STUDY: CT CHEST WO IV CONTRAST;  11/3/2024 11:16 am   INDICATION: Signs/Symptoms:sepsis, ? atelectasis versus PNA on CXR without classic symptoms.     COMPARISON: Portable chest earlier same day 3 November 2024 at 0957 hours; CT lumbar spine without contrast 5 July 2024   ACCESSION NUMBER(S): [id number]   ORDERING CLINICIAN: SHERITA GOYAL   TECHNIQUE: Helical CT chest from thoracic inlet through the hemidiaphragms without intravenous contrast   FINDINGS: LUNGS / AIRSPACES / AIRWAYS:   LARGE AIRWAYS Filling defect: Negative Wall thickening: Negative Bronchiectasis: Negative Other: N/A   AIRSPACES Fibrosis: Negative Emphysema: Negative Consolidation: Negative Ground glass airspace disease: Small region of ground-glass airspace change in the middle lobe and potentially in portions of the inflated right lower lobe. None in the left lung noting one segment of left lower lobe collapse Edema: Negative Nodule / Mass: Negative Other: One segment of left lower lobe collapse. Multiple segments of right lower lobe collapse   PLEURA: Effusion:  Moderate size mostly if not entirely free flowing right; small free-flowing left Pneumothorax:  Both  sides negative Other:  n/a   CARDIOVASCULAR: Heart size:  Mildly enlarged Pericardial effusion:  Perhaps trace Thoracic aortic aneurysm:  Negative Pulmonary arteries:  Static Heart failure change:  Negative.  No sign of interstitial or alveolar edema. Other:  Unusual hypodensities in the right ventricular free wall for example soft tissue windows axial series 202, image 167 and the surrounding few images   NONVASCULAR MEDIASTINUM: Esophagus:  Grossly normal by CT Mediastinal Mass:  Negative Hiatal hernia:  None Other:  n/a   LYMPH NODES: No thoracic adenopathy   CHEST WALL: Soft tissues of the chest wall are unremarkable   SKELETON: No acute or contributory abnormality   UPPER ABDOMEN: Small volume ascites pooling about the included liver and spleen. Spleen probably mildly enlarged but not completely included in the field of view. Liver not overtly cirrhotic but at least borderline fatty. The only pertinent comparison exam for the upper abdomen would be CT lumbar spine without contrast 5 July 2024 at which time there was not any ascites in the field of view, but the field of view did not include the portions of the abdomen where they ascites is presently located. It is unlikely there was ascites in July, 2024 as more of the lower abdomen was included on that exam and it did not appear that there is any ascites tracking down into (or up from) the pelvis       Abnormalities at the right lung base on portable frontal chest radiograph earlier today are due to a combination of moderate-sized, mostly if not entirely free-flowing right pleural effusion with associated multisegmental right lower lobe collapse adjacent to it   Small area of ground-glass airspace disease in the middle lobe confirmed on CT may not have been expected on the prior radiograph   Possibility of mild ground-glass airspace disease in the right lower lobe as well   No ground-glass airspace disease in the left lung   No consolidative pneumonia in any  of the inflated lungs on either side   Small free-flowing left pleural effusion   Unusual hypodensities in the right ventricular free wall of uncertain etiology and significance   Perhaps trace (smallest detectable quantity) pericardial effusion   Incidental abnormalities in the included upper abdomen as detailed above   MACRO: None   Signed by: Matthias Kolb 11/3/2024 11:35 AM Dictation workstation:   [id number]    XR chest 1 view    Result Date: 11/3/2024  Interpreted By:  Kary Cardoso, STUDY: XR CHEST 1 VIEW 11/3/2024 10:06 am   INDICATION: Signs/Symptoms:cough and fever   COMPARISON: None   ACCESSION NUMBER(S): [id number]   ORDERING CLINICIAN: ESTHER ROBBINS   TECHNIQUE: AP view   FINDINGS: There is right lower lobe airspace consolidation and a right pleural effusion. There is an enlarged cardiac silhouette. Pulmonary vascularity is normal. Degenerative changes are noted in both shoulders. No sign of pneumothorax       1. Right lower lobe airspace consolidation either pneumonia or atelectasis with right pleural effusion. Follow-up to complete resolution is needed 2. Enlarged cardiac silhouette     Signed by: Kary Cardoso 11/3/2024 10:27 AM Dictation workstation:   [id number]       Assessment/Plan     83 y.o. female with a PMH of stage III chronic kidney disease, chronic systolic congestive heart failure, myelofibrosis, sciatica, remote poliomyelitis, hypertension, pulmonary embolism, anxiety disorder, vitamin D deficiency, vitamin B12 deficiency, hypothyroidism, GERD, depression, and JAK2 positive polycythemia vera previously on Jakafi and Eliquis, patient states there were stopped a few months ago. GI consulted for bright red blood per rectum. Scant amount of blood on pad with brown stool noted. Patient's perianal area  red and excoriated.   Etiology likely internal hemorrhoid bleed. Possible scan blood from perianal excoriation. Trace External hemorrhoids noted but not bleeding.     PLAN:  -continue to  monitor H&H   -recommend fiber/metamucil for constipation and diarrhea   -No c-scope at this time. Will consider with overt signs of bleeding or drop in hgb.  Perioperative risk with GI procedure.  Also difficult to prep  -recommend preparation H   -Evaluation by wound care nurse    GI to sign off today.     Follow-up with GI after DC        OLI Rubio-CNP  I saw and evaluated the patient. I personally obtained the key and critical portions of the history and physical exam or was physically present for key and critical portions performed by the NP. I reviewed the resident's documentation and discussed the patient with the NP. I agree with the NP's medical decision making as documented in the note.

## 2024-11-06 NOTE — PROGRESS NOTES
Physical Therapy    Physical Therapy Evaluation & Treatment    Patient Name: Desirae Gregg  MRN: [id number]  Department: [address]  [facility]  Today's Date: 11/6/2024   Time Calculation  Start Time: 1413  Stop Time: 1438  Time Calculation (min): 25 min    Assessment/Plan   PT Assessment  PT Assessment Results: Decreased range of motion, Decreased strength, Decreased endurance, Impaired balance, Decreased mobility, Decreased cognition, Impaired judgement, Decreased safety awareness  Rehab Prognosis: Fair  Barriers to Discharge: Comorbidities, medical management  Evaluation/Treatment Tolerance: Patient limited by fatigue  Medical Staff Made Aware: Yes  End of Session Communication: Bedside nurse  End of Session Patient Position: Bed, 3 rail up, Alarm on (pt verbalized correct use of call bell)     Assessment:   Pt is an 84 y/o female presenting for PT evaluation. Pt currently requires modA x2 persons for bed mobility. Session limited this date by fatigue and report of dizziness (SpO2 87% on room air, RN notified). Will continue to follow in-house for strengthening, balance, and mobility. Recommend moderate intensity PT at discharge to assist pt in regaining independence.       IP OR SWING BED PT PLAN  Inpatient or Swing Bed: Inpatient  PT Plan  Treatment/Interventions: Bed mobility, Transfer training, Gait training, Therapeutic exercise, Therapeutic activity, Positioning  PT Plan: Ongoing PT  PT Frequency: 3 times per week  PT Discharge Recommendations: Moderate intensity level of continued care, 24 hr supervision due to cognition  Equipment Recommended upon Discharge: Wheeled walker, Wheelchair (owns both)  PT Recommended Transfer Status: Assist x2 (with FWW (pending vitals/sx))  PT - OK to Discharge: Yes (per PT POC)      Subjective     General Visit Information:  General  Reason for Referral: Pt is an 82 yo female who presented [facility] on [facility]/3/24 with generalized weakness, vomiting, and fever. CT chest showed  moderate right pleural effusion. PT consulted for impaired mobility and safety awareness.  Past Medical History Relevant to Rehab: HTN, chronic systolic CHF, PE, myelofibrosis, sciatica, remote poliomyelitis (affecting LLE), JAK2 positive polycythemia vera, CKDIII  Family/Caregiver Present: Yes  Caregiver Feedback: Son (Alexis) present during session, assisted with home setup and PLOF  Co-Treatment: OT  Co-Treatment Reason: To maximize pt safety and functional outcomes  Prior to Session Communication: Bedside nurse  Patient Position Received: Bed, 3 rail up, Alarm on  General Comment: Pt pleasantly confused and agreeable to therapy evals.  Home Living:  Home Living  Type of Home: House  Lives With: Adult children (Son (primary caregiver))  Home Adaptive Equipment: Walker rolling or standard, Wheelchair-manual, Cane  Home Layout: Two level (Chair lift access to 2nd floor)  Home Access: Ramped entrance  Bathroom Shower/Tub: Tub/shower unit  Bathroom Toilet: Adaptive toilet seating  Bathroom Equipment: Grab bars in shower, Shower chair with back, Raised toilet seat with rails, Bedside commode (Sleeps in regular bed)  Prior Level of Function:  Prior Function Per Pt/Caregiver Report  Level of [place]: Needs assistance with ADLs, Needs assistance with homemaking, Needs assistance with functional transfers  Receives Help From: Family (Son primarily, daughter also assists)  ADL Assistance: Needs assistance (Yeimi for LB dressing/bathing)  Homemaking Assistance: Needs assistance (Son/daughter complete)  Ambulatory Assistance: Needs assistance (Recently, son reports assisting pt around the house with FWW and gait belt)  Precautions:  Precautions  Medical Precautions: Fall precautions  Precautions Comment: Tele, IV, verma    Vital Signs (Past 2hrs)        Date/Time Vitals Session Patient Position Pulse Resp SpO2 BP MAP (mmHg)    11/06/24 1413 During PT  Sitting  114  --  87 %  155/78  103     11/06/24 1435 --  --  114  18   "87 %  155/78  103                        Objective   Pain:  Pain Assessment  Pain Assessment: 0-10  0-10 (Numeric) Pain Score: 6  Pain Type: Chronic pain  Pain Location: Shoulder  Pain Orientation: Left  Pain Descriptors: Aching  Pain Frequency: Intermittent  Pain Interventions: Repositioned, Ambulation/increased activity  Response to Interventions: Pt resting comfortably at end of session  Cognition:  Cognition  Overall Cognitive Status: Impaired at baseline  Orientation Level: Disoriented to time, Disoriented to situation (\"November 2040, 2014\" - reoriented to year)    General Assessments:  General Observation  General Observation: Wound over L lower shin, surrounded by redness               Activity Tolerance  Endurance: Tolerates 10 - 20 min exercise with multiple rests         Strength  Strength Comments: RUE 3/5, LUE 2+/5; BLE 3/5  Static Sitting Balance  Static Sitting-Balance Support: Feet supported, Right upper extremity supported  Static Sitting-Level of Assistance: Close supervision  Static Sitting-Comment/Number of Minutes: EOB       Functional Assessments:  Bed Mobility  Bed Mobility: Yes  Bed Mobility 1  Bed Mobility 1: Supine to sitting  Level of Assistance 1: Moderate assistance, +2  Bed Mobility Comments 1: assist to advance LLE towards EOB (chronic weakness 2/2 polio), assist at trunk to achieve upright  Bed Mobility 2  Bed Mobility  2: Sitting to supine  Level of Assistance 2: Moderate assistance, +2  Bed Mobility Comments 2: assist to manage BLE and trunk safely into bed    Transfers  Transfer: No (Pt fatigued after sitting upright at EOB, also reported some dizziness (SpO2 87%); RN aware)    Treatments:  Therapeutic Activity  Therapeutic Activity Performed: Yes  Therapeutic Activity 1: Static sitting balance at EOB x15 minutes - cues for positioning, UE support, upright posture    Bed Mobility  Bed Mobility: Yes  Bed Mobility 1  Bed Mobility 1: Supine to sitting  Level of Assistance 1: Moderate " assistance, +2  Bed Mobility Comments 1: assist to advance LLE towards EOB (chronic weakness 2/2 polio), assist at trunk to achieve upright  Bed Mobility 2  Bed Mobility  2: Sitting to supine  Level of Assistance 2: Moderate assistance, +2  Bed Mobility Comments 2: assist to manage BLE and trunk safely into bed  Outcome Measures:  [facility] Basic Mobility  Turning from your back to your side while in a flat bed without using bedrails: A lot  Moving from lying on your back to sitting on the side of a flat bed without using bedrails: Total  Moving to and from bed to chair (including a wheelchair): Total  Standing up from a chair using your arms (e.g. wheelchair or bedside chair): Total  To walk in hospital room: Total  Climbing 3-5 steps with railing: Total  Basic Mobility - Total Score: 7    Encounter Problems       Encounter Problems (Active)       Balance       Patient will tolerate sitting upright OOB for 30 minutes without symptoms in order to complete self-care tasks.        Start:  11/06/24    Expected End:  11/20/24               Mobility       Patient will ambulate bathroom distance of 15' with FWW and CGA in order to complete toileting and hygiene activities with greater ease.        Start:  11/06/24    Expected End:  11/20/24               PT Transfers       Patient will transfer to and from sit to supine with Yeimi in order to decrease caregiver burden.        Start:  11/06/24    Expected End:  11/20/24            Patient will transfer sit to and from stand with FWW and Yeimi.        Start:  11/06/24    Expected End:  11/20/24               Pain - Adult              Education Documentation  Body Mechanics, taught by Kimberly Dyer, PT at 11/6/2024  4:10 PM.  Learner: Family, Patient  Readiness: Acceptance  Method: Explanation, Demonstration  Response: Needs Reinforcement    Mobility Training, taught by Kimberly Dyer, PT at 11/6/2024  4:10 PM.  Learner: Family, Patient  Readiness: Acceptance  Method:  Explanation, Demonstration  Response: Needs Reinforcement    Education Comments  Role of acute care PT, POC, discharge planning, hospital safety (use of call light, staff assist for all OOB mobility)

## 2024-11-06 NOTE — SIGNIFICANT EVENT
I was called to see patient for bright red blood per rectum.  Patient was seen and examined.  She does have hemorrhoids.  I was not able to visualize any external hemorrhoidal bleed.  Rule out internal hemorrhoid bleeding.  Other etiologies cannot be completely ruled out.  Her hemoglobin is currently 12.6 but please note that it was 14.4 on November 3.  Will hold Eliquis temporarily.  Consult GI.  Repeat H&H later today.  Consider CT scan if no improvement.  Follow-up with the day team for further management.

## 2024-11-06 NOTE — PROGRESS NOTES
11/06/24 0825   Discharge Planning   Living Arrangements Children  (lives with son)   Support Systems Children   Assistance Needed A&Ox3 (confused to some quesions -ie didn't know if she wore O2 at baseline)assist for ADLs and transfers at time by son with gait belt; room air baseline and currently 2L NC   Type of Residence Private residence   Number of Stairs to Enter Residence 0  (ramp)   Number of Stairs Within Residence 12  (lift chair)   Do you have animals or pets at home? Yes   Type of Animals or Pets 1 cat   Home or Post Acute Services In home services   Type of Home Care Services Home nursing visits;Home OT;Home PT;Palliative   Expected Discharge Disposition Home Health  (DC dispo pending PT OT evals. Son would like SNF if recommended active [facility](SN/PT/OT) will need referral and [facility] (palliative))   Does the patient need discharge transport arranged? Yes   RoundTrip coordination needed? Yes   Has discharge transport been arranged? No

## 2024-11-06 NOTE — CONSULTS
Consults    PALLIATIVE CARE SOCIAL WORK CONSULT:  Nery ANDERSON  CURRENT ATTENDING PROVIDER: Cha Ramsay DO     Reason for Consult    GOC    Introduction to Palliative Care  Met with pt and son Alexis at bedside  Pt was alert and oriented and able to participate in visit although son answered most questions  Staff present:  eNry Abebe  Palliative care was introduced as a service for patients with serious illness to help with symptoms, assist with goals of care conversations, navigate complex decision making, improve quality of life for patients, and provide support to patients and families.  Support and empathy was provided throughout the encounter.    History of Present Illness  Desirae Gregg is a 83 y.o. female with past medical history of stage III chronic kidney disease, chronic systolic congestive heart failure, myelofibrosis, sciatica, remote poliomyelitis, hypertension, pulmonary embolism, anxiety disorder, vitamin D deficiency, vitamin B12 deficiency, hypothyroidism, GERD, depression, and JAK2 positive polycythemia vera. She presented to the emergency department on 11/3 with fever and generalized weakness for the past week, and noted confusion per family.     Caregiving/Caregiver Support  Does the patient require assistance in some or all components of her care, including coordination of medical care?  yes  If Yes, which person serves that role?   Christiano Espinoza, HC    Medical History  Per EMR    Personal History  Pt is .  Pt has four children, all local.  Christiano Espinoza lives with pt.  Pt worked in a factory.     Synagogue and Importance of Synagogue:  none    Depression   yes     Anxiety   yes     Advance Directives  Surrogate Health Care Decision Maker:  son and dtr Lb GASCA  yes, completed 7/5/24  Living Will  no  On file     Code Status                    FULL CODE.  Reviewed risks of CPR, including trauma, death, and intubation.  Son states FULL CODE is appropriate.  Son states that home based  palliative care program Siri has also discussed code status with pt and him.     Serious Illness Conversation        Life Limiting Disease:  multiple  Disease Specific Information Provided:  frequent hospitalizations, functional decline  What is your understanding now of where you are with your illness:  son states pt can continue to benefit from aggressive treatment.  Son states hospice has been discussed, son wants to continue all treatment.   What are your fears, worries, or concerns about the future:   none expressed  What are you hoping for:  improvement in functional status  How much does your family know about your priorities and wishes:  son feels he has a good understanding of pt's wishes.  Pt wants to be at home.     Other distressing Symptoms        Intermittent confusion, frequent hospitalizations.  SLP evaluation pending.  Son does not feel pt has any issues swallowing.      Plan and Social Considerations  Son would like pt to go to SNF before pt returns home.  Son feels pt can improve.  Pt is active with [facility] and [facility].    Plan of Care discussed with: team    Thank you for asking Palliative Care to assist with care of this patient.  We will continue to follow  Please contact us for additional questions or concerns.    JUSTIN Ellis  Palliative Care   Contact Via Epic Secure chat

## 2024-11-06 NOTE — PROGRESS NOTES
Occupational Therapy    Evaluation    Patient Name: Desirae Gregg  MRN: [id number]  Department: [address]  Room: [address] [place] [address]  Today's Date: 11/6/2024  Time Calculation  Start Time: 1412  Stop Time: 1437  Time Calculation (min): 25 min    Assessment  IP OT Assessment  OT Assessment: Pt presents with decreased ADL performance, decreased functional mobility, decreased endurance. Continued skilled OT recommended to maximize pt safety and independence.  Prognosis: Good  Barriers to Discharge: None  Evaluation/Treatment Tolerance: Patient limited by fatigue  Medical Staff Made Aware: Yes  End of Session Communication: Bedside nurse  End of Session Patient Position: Bed, 3 rail up, Alarm on  Plan:  Treatment Interventions: ADL retraining, Functional transfer training, UE strengthening/ROM, Endurance training  OT Frequency: 3 times per week  OT Discharge Recommendations: Moderate intensity level of continued care  Equipment Recommended upon Discharge: Wheeled walker  OT Recommended Transfer Status: Assist of 2  OT - OK to Discharge: Yes (per OT POC)    Subjective   Current Problem:  1. Pneumonia of right lower lobe due to infectious organism        2. Abnormality of right ventricle of heart  Transthoracic Echo (TTE) Limited    Transthoracic Echo (TTE) Limited      3. Chronic systolic (congestive) heart failure  Transthoracic Echo (TTE) Limited    Transthoracic Echo (TTE) Limited        General:  General  Reason for Referral: 82 yo female referred to OT for hypoxic resp failure, impaired ADLs/mobility  Referred By: FRANCISCA Ramsay DO  Past Medical History Relevant to Rehab: stage III chronic kidney disease, chronic systolic congestive heart failure, myelofibrosis, sciatica, remote poliomyelitis, hypertension, pulmonary embolism, anxiety disorder, vitamin D deficiency, vitamin B12 deficiency, hypothyroidism, GERD, depression, and JAK2 positive polycythemia vera  Family/Caregiver Present: Yes  Caregiver Feedback: son present, able to discuss  POC and confirm PLOF  Co-Treatment: PT  Co-Treatment Reason: maximize pt safety and outcomes  Prior to Session Communication: Bedside nurse  Patient Position Received: Bed, 3 rail up, Alarm on  General Comment: Pt pleasant, cooperative with OT evaluation  Precautions:  Medical Precautions: Fall precautions (tele, IV, verma)    Vital Sign (Past 2hrs)        Date/Time Vitals Session Patient Position Pulse Resp SpO2 BP MAP (mmHg)    11/06/24 1435 --  --  114  18  87 %  155/78  103                        Pain:  Pain Assessment  Pain Assessment: 0-10  0-10 (Numeric) Pain Score: 0 - No pain    Objective   Cognition:  Overall Cognitive Status: Impaired  Orientation Level: Disoriented to situation           Home Living:  Type of Home: House  Lives With: Adult children (son)  Home Adaptive Equipment: Walker rolling or standard, Wheelchair-manual  Home Layout: Multi-level (with chair lift)  Home Access: Ramped entrance  Bathroom Shower/Tub: Tub/shower unit  Bathroom Toilet: Adaptive toilet seating  Bathroom Equipment: Grab bars in shower, Shower chair with back, Raised toilet seat with rails, Bedside commode   Prior Function:  Level of [place]: Needs assistance with ADLs, Needs assistance with homemaking  Receives Help From: Family  ADL Assistance: Needs assistance (son provides min assist for LE ADL, bathing)  Homemaking Assistance: Needs assistance  Ambulatory Assistance: Independent (with FWW)     ADL:  Eating Assistance: Stand by  Grooming Assistance: Stand by  Bathing Assistance: Moderate  UE Dressing Assistance: Minimal  LE Dressing Assistance: Maximal  Toileting Assistance with Device: Maximal  Functional Assistance: Moderate  ADL Comments: ADL performance anticipated d/t current clincial presentation  Activity Tolerance:  Endurance: Tolerates 10 - 20 min exercise with multiple rests  Bed Mobility/Transfers: Bed Mobility  Bed Mobility: Yes  Bed Mobility 1  Bed Mobility 1: Supine to sitting, Sitting to  supine  Level of Assistance 1: Moderate assistance (x2)  Bed Mobility Comments 1: assist with BLE    Transfers  Transfer: No (Pt declined, fatigued after sitting EOB and needing to return supine)      Sitting Balance:  Static Sitting Balance  Static Sitting-Balance Support: Feet supported  Static Sitting-Level of Assistance: Close supervision  Static Sitting-Comment/Number of Minutes: Able to tolerate 10-15 minutes of unsupported sitting before fatiguing    Strength:  Strength Comments: RUE 3+/5, LUE 2+/5    Coordination:  Movements are Fluid and Coordinated: Yes   Hand Function:  Hand Function  Gross Grasp: Functional  Coordination: Functional  Extremities: RUE   RUE : Within Functional Limits and LUE   LUE: Exceptions to WFL (All shoulder AROM maximally impaired)    Outcome Measures: [facility] Daily Activity  Putting on and taking off regular lower body clothing: Total  Bathing (including washing, rinsing, drying): A lot  Putting on and taking off regular upper body clothing: A little  Toileting, which includes using toilet, bedpan or urinal: Total  Taking care of personal grooming such as brushing teeth: A little  Eating Meals: A little  Daily Activity - Total Score: 13      Education Documentation  Body Mechanics, taught by Matthew Bush OT at 11/6/2024  3:35 PM.  Learner: Patient  Readiness: Acceptance  Method: Explanation  Response: Verbalizes Understanding    Precautions, taught by Matthew Bush OT at 11/6/2024  3:35 PM.  Learner: Patient  Readiness: Acceptance  Method: Explanation  Response: Verbalizes Understanding    ADL Training, taught by Matthew Bush OT at 11/6/2024  3:35 PM.  Learner: Patient  Readiness: Acceptance  Method: Explanation  Response: Verbalizes Understanding    Education Comments  No comments found.      Goals:   Encounter Problems       Encounter Problems (Active)       OT Goals       Pt will complete [id number] transfers using LRD in preparation for ADLs with CGA        Start:  11/06/24    Expected End:  11/20/24            Pt will increase endurance to tolerate 15min of OOB activity with no more than 1 rest break in order to increase ability to engage in ADL completion.        Start:  11/06/24    Expected End:  11/20/24            Pt will tolerate 10min stand during functional task completion with no more than 1 rest break in order to increase endurance for functional task completion.        Start:  11/06/24    Expected End:  11/20/24            Pt will demo Grooming/UE ADL completion with modified independence, using adaptive strategies and energy conservation techniques as applicable.        Start:  11/06/24    Expected End:  11/20/24            Pt will demo and/or verbalize 2-3 energy conservation techniques to incorporate into functional mobility or ADL to improve performance and increase independence.        Start:  11/06/24    Expected End:  11/20/24

## 2024-11-06 NOTE — PROGRESS NOTES
Desirae Gregg is a 83 y.o. female on day 3 of admission presenting with Sepsis with acute hypoxic respiratory failure without septic shock (Multi).      Subjective   She is sitting up in the bed, currently on room air. Complains of pain to the left lower leg. Dressing removed by nursing, large amount of serous drainage from open blister noted. BLE pitting edema.       Review of systems:  Constitutional: negative for fever, chills, or malaise  Neuro: negative for dizziness, headache, numbness, tingling  ENT: Negative for nasal congestion or sore throat  CV: negative for chest pain, palpitations  GI: negative for abd pain, nausea, vomiting or diarrhea  : negative for dysuria, frequency, or urgency  Skin: negative for lesions, wounds, or rash  Musculoskeletal: Negative for weakness, myalgia, or arthralgia  Endocrine: Negative for polyuria or polydipsia         Objective   Constitutional: Well developed, awake/alert/oriented x3, no distress, alert and cooperative  Eyes: PERRL, EOMI, clear sclera  ENMT: mucous membranes moist, no apparent injury, no lesions seen  Head/Neck: Neck supple, no apparent injury, thyroid without mass or tenderness, No JVD, trachea midline, no bruits  Respiratory/Thorax: Patent airways, diminished bilat bases with good chest expansion, thorax symmetric  Cardiovascular: Regular, rate and rhythm, no murmurs, 2+ equal pulses of the extremities, normal S 1and S 2  Gastrointestinal: Nondistended, soft, non-tender, no rebound tenderness or guarding, no masses palpable, no organomegaly, +BS, no bruits  Musculoskeletal: ROM intact, no joint swelling, normal strength  Extremities: +2 pitting edema, large open blister with serous drainage noted to LLE  Neurological: alert and oriented x3, intact senses, motor, response and reflexes, normal strength  Lymphatic: No significant lymphadenopathy  Psychological: Appropriate mood and behavior  Skin: Warm and dry, large open blister with serous drainage noted to  "LLE      Last Recorded Vitals  /71 (BP Location: Right arm, Patient Position: Lying)   Pulse 62   Temp 36.4 °C (97.5 °F) (Temporal)   Resp 16   Ht 1.651 m (5' 5\")   Wt 80.1 kg (176 lb 9.4 oz)   SpO2 93%   BMI 29.39 kg/m²     Intake/Output last 3 Shifts:  I/O last 3 completed shifts:  In: 1575.1 (19.7 mL/kg) [P.O.:1080; I.V.:345.1 (4.3 mL/kg); IV Piggyback:150]  Out: 715 (8.9 mL/kg) [Urine:715 (0.2 mL/kg/hr)]  Weight: 80.1 kg   I/O this shift:  In: 240 [P.O.:240]  Out: -     Relevant Results  Scheduled medications  amoxicillin-pot clavulanate, 1 tablet, oral, BID  [Held by provider] apixaban, 2.5 mg, oral, q12h  bumetanide, 1 mg, intravenous, Once  busPIRone, 7.5 mg, oral, BID  [Held by provider] carvedilol, 25 mg, oral, BID  cetirizine, 10 mg, oral, Daily  cholecalciferol, 2,000 Units, oral, Daily  cyanocobalamin, 1,000 mcg, oral, Daily  ferrous sulfate (325 mg ferrous sulfate), 65 mg of iron, oral, Daily with breakfast  folic acid, 1 mg, oral, Daily  gabapentin, 200 mg, oral, BID  [Held by provider] isosorbide mononitrate ER, 30 mg, oral, Daily  levothyroxine, 88 mcg, oral, Daily  magnesium oxide, 400 mg, oral, Daily  midodrine, 5 mg, oral, TID  nystatin, , Topical, BID  pantoprazole, 40 mg, oral, BID AC  potassium chloride CR, 40 mEq, oral, BID  sertraline, 25 mg, oral, Daily  zinc oxide, 1 Application, Topical, BID      Continuous medications     PRN medications  PRN medications: acetaminophen **OR** acetaminophen **OR** acetaminophen, loperamide, melatonin, ondansetron ODT **OR** ondansetron, oxyCODONE, oxygen, polyethylene glycol    Results for orders placed or performed during the hospital encounter of 11/03/24 (from the past 24 hours)   Transthoracic Echo (TTE) Limited   Result Value Ref Range    AV pk aura 1.78 m/s    AV mn grad 7 mmHg    LVOT diam 2.04 cm    MV E/A ratio 0.77     LV EF 63 %    LVIDd 4.47 cm    Aortic Valve Area by Continuity of VTI 2.52 cm2    Aortic Valve Area by Continuity of " Peak Velocity 2.44 cm2    AV pk grad 13 mmHg    LV A4C EF 62.1    Renal Function Panel   Result Value Ref Range    Glucose 112 (H) 74 - 99 mg/dL    Sodium 134 (L) 136 - 145 mmol/L    Potassium 3.2 (L) 3.5 - 5.3 mmol/L    Chloride 107 98 - 107 mmol/L    Bicarbonate 21 21 - 32 mmol/L    Anion Gap 9 (L) 10 - 20 mmol/L    Urea Nitrogen 26 (H) 6 - 23 mg/dL    Creatinine 1.57 (H) 0.50 - 1.05 mg/dL    eGFR 32 (L) >60 mL/min/1.73m*2    Calcium 7.0 (L) 8.6 - 10.3 mg/dL    Phosphorus 2.9 2.5 - 4.9 mg/dL    Albumin 2.1 (L) 3.4 - 5.0 g/dL   Magnesium   Result Value Ref Range    Magnesium 1.77 1.60 - 2.40 mg/dL   C-reactive protein   Result Value Ref Range    C-Reactive Protein 6.20 (H) <1.00 mg/dL   Protime-INR   Result Value Ref Range    Protime 24.0 (H) 9.8 - 12.8 seconds    INR 2.1 (H) 0.9 - 1.1   CBC and Auto Differential   Result Value Ref Range    WBC 18.8 (H) 4.4 - 11.3 x10*3/uL    nRBC 0.0 0.0 - 0.0 /100 WBCs    RBC 4.63 4.00 - 5.20 x10*6/uL    Hemoglobin 12.6 12.0 - 16.0 g/dL    Hematocrit 41.7 36.0 - 46.0 %    MCV 90 80 - 100 fL    MCH 27.2 26.0 - 34.0 pg    MCHC 30.2 (L) 32.0 - 36.0 g/dL    RDW 15.5 (H) 11.5 - 14.5 %    Platelets 145 (L) 150 - 450 x10*3/uL    Neutrophils % 90.0 40.0 - 80.0 %    Immature Granulocytes %, Automated 1.7 (H) 0.0 - 0.9 %    Lymphocytes % 2.0 13.0 - 44.0 %    Monocytes % 3.9 2.0 - 10.0 %    Eosinophils % 2.1 0.0 - 6.0 %    Basophils % 0.3 0.0 - 2.0 %    Neutrophils Absolute 16.92 (H) 1.60 - 5.50 x10*3/uL    Immature Granulocytes Absolute, Automated 0.32 0.00 - 0.50 x10*3/uL    Lymphocytes Absolute 0.37 (L) 0.80 - 3.00 x10*3/uL    Monocytes Absolute 0.74 0.05 - 0.80 x10*3/uL    Eosinophils Absolute 0.40 0.00 - 0.40 x10*3/uL    Basophils Absolute 0.05 0.00 - 0.10 x10*3/uL   Type and screen   Result Value Ref Range    ABO TYPE B     Rh TYPE POS     ANTIBODY SCREEN NEG        Transthoracic Echo (TTE) Limited   Final Result      CT abdomen pelvis wo IV contrast   Final Result   2 centimeters  stone in the urinary bladder.        Bilateral pleural effusions. Pericardial effusion.        Ascites.        Anasarca.        Presacral edema.        Enlarged spleen.        The exam is limited by motion artifact.        MACRO:   none        Signed by: Jayshree Dias 11/4/2024 3:28 PM   Dictation workstation:   [id number]       renal complete   Final Result   No hydronephrosis. Decompressed bladder.        MACRO:   None        Signed by: Zain Mcknight 11/4/2024 11:08 AM   Dictation workstation:   [id number]      CT chest wo IV contrast   Final Result   Abnormalities at the right lung base on portable frontal chest   radiograph earlier today are due to a combination of moderate-sized,   mostly if not entirely free-flowing right pleural effusion with   associated multisegmental right lower lobe collapse adjacent to it        Small area of ground-glass airspace disease in the middle lobe   confirmed on CT may not have been expected on the prior radiograph        Possibility of mild ground-glass airspace disease in the right lower   lobe as well        No ground-glass airspace disease in the left lung        No consolidative pneumonia in any of the inflated lungs on either side        Small free-flowing left pleural effusion        Unusual hypodensities in the right ventricular free wall of uncertain   etiology and significance        Perhaps trace (smallest detectable quantity) pericardial effusion        Incidental abnormalities in the included upper abdomen as detailed   above        MACRO:   None        Signed by: Matthias Kolb 11/3/2024 11:35 AM   Dictation workstation:   [id number]      XR chest 1 view   Final Result   1. Right lower lobe airspace consolidation either pneumonia or   atelectasis with right pleural effusion. Follow-up to complete   resolution is needed   2. Enlarged cardiac silhouette             Signed by: Kary Cardoso 11/3/2024 10:27 AM   Dictation workstation:   [id number]           Transthoracic Echo (TTE) Limited    Result Date: 11/5/2024   [facility], [address] [address]               Tel [phone] and Fax [phone] TRANSTHORACIC ECHOCARDIOGRAM REPORT  Patient Name:       ELIE Mccain Physician:    [id number] Chicho Goldman MD Study Date:         11/5/2024           Ordering Provider:    [id number] SHERITA GOYAL MRN/PID:            [id number]            Fellow: Accession#:         [id number]        Nurse: Date of Birth/Age:  1940 / 83     Sonographer:          Cathi garcia                                     RDYVROSE Gender assigned at  F                   Additional Staff: Birth: Height:             165.10 cm           Admit Date:           11/3/2024 Weight:             72.57 kg            Admission Status:     Inpatient -                                                               Routine BSA / BMI:          1.80 m2 / 26.63     Encounter#:           [id number]                     kg/m2 Blood Pressure:     103/62 mmHg         Department Location:  [facility]                                                               Invasive Study Type:    TRANSTHORACIC ECHO (TTE) LIMITED Diagnosis/ICD: 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 Study Detail: The following Echo studies were performed: 2D, Doppler and color               flow.  PHYSICIAN INTERPRETATION: Left Ventricle: The left ventricular systolic function is normal, with a visually estimated ejection fraction of  60-65%. There is severely increased concentric left ventricular hypertrophy. There are no regional left ventricular wall motion abnormalities. The left ventricular cavity size is normal. There is moderately increased septal and moderately increased posterior left ventricular wall thickness. Spectral Doppler shows an abnormal pattern of left ventricular diastolic filling. Left Atrium: The left atrium is upper limits of normal in size. Right Ventricle: The right ventricle is normal in size. There is normal right ventricular global systolic function. Right Atrium: The right atrium is normal in size. Aortic Valve: The aortic valve is trileaflet. The aortic valve dimensionless index is 0.77. There is trace aortic valve regurgitation. The peak instantaneous gradient of the aortic valve is 13 mmHg. The mean gradient of the mitral valve is 7 mmHg. Mitral Valve: The mitral valve is normal in structure. There is trace to mild mitral valve regurgitation. Tricuspid Valve: The tricuspid valve is structurally normal. There is trace to mild tricuspid regurgitation. Pulmonic Valve: The pulmonic valve is not well visualized. The pulmonic valve regurgitation was not well visualized. Pericardium: There is no pericardial effusion noted. Aorta: The aortic root is normal.  CONCLUSIONS:  1. The left ventricular systolic function is normal, with a visually estimated ejection fraction of 60-65%.  2. Spectral Doppler shows an abnormal pattern of left ventricular diastolic filling.  3. There is moderately increased septal and moderately increased posterior left ventricular wall thickness.  4. There is severely increased concentric left ventricular hypertrophy.  5. There is normal right ventricular global systolic function. QUANTITATIVE DATA SUMMARY:  2D MEASUREMENTS:          Normal Ranges: IVSd:            1.49 cm  (0.6-1.1cm) LVPWd:           1.26 cm  (0.6-1.1cm) LVIDd:           4.47 cm  (3.9-5.9cm) LVIDs:           2.98 cm LV Mass Index:    133 g/m2 LVEDV Index:     23 ml/m2 LV % FS          33.4 %  LV SYSTOLIC FUNCTION BY 2D PLANIMETRY (MOD):                      Normal Ranges: EF-A4C View:    62 % (>=55%) EF-A2C View:    61 % EF-Biplane:     64 % EF-Visual:      63 % LV EF Reported: 63 %  LV DIASTOLIC FUNCTION:          Normal Ranges: MV Peak E:             0.70 m/s (0.7-1.2 m/s) MV Peak A:             0.91 m/s (0.42-0.7 m/s) E/A Ratio:             0.77     (1.0-2.2)  MITRAL VALVE:          Normal Ranges: MV DT:        264 msec (150-240msec)  AORTIC VALVE:                      Normal Ranges: AoV Vmax:                1.78 m/s  (<=1.7m/s) AoV Peak P.7 mmHg (<20mmHg) AoV Mean P.0 mmHg  (1.7-11.5mmHg) LVOT Max Josiah:            1.33 m/s  (<=1.1m/s) AoV VTI:                 31.31 cm  (18-25cm) LVOT VTI:                24.11 cm LVOT Diameter:           2.04 cm   (1.8-2.4cm) AoV Area, VTI:           2.52 cm2  (2.5-5.5cm2) AoV Area,Vmax:           2.44 cm2  (2.5-4.5cm2) AoV Dimensionless Index: 0.77  TRICUSPID VALVE/RVSP:          Normal Ranges: Peak TR Velocity:     3.46 m/s  PULMONIC VALVE:          Normal Ranges: PV Max Josiah:     0.8 m/s  (0.6-0.9m/s) PV Max P.8 mmHg  [id number] Chicho Goldman MD Electronically signed on 2024 at 2:09:49 PM  ** Final **           Assessment/Plan   Principal Problem:    Sepsis with acute hypoxic respiratory failure without septic shock (Multi)  Active Problems:    Iron deficiency anemia    Chronic systolic (congestive) heart failure    Essential hypertension    History of pulmonary embolism    Anxiety    Vitamin D deficiency    Vitamin B12 deficiency    Adult hypothyroidism    Pneumonia of right lower lobe due to infectious organism    Leukocytosis    Acute kidney injury superimposed on stage 3a chronic kidney disease    Elevated troponin    Hyperbilirubinemia    Hyperglycemia    Hypokalemia    Hypomagnesemia    Hypocalcemia    Abnormality of right ventricle of heart    Echocardiogram  from June 2024:    Left Ventricle: Low normal left ventricular systolic function with a   visually estimated EF of 50 - 55%. Left ventricle size is normal. Normal   wall thickness. Ventricular mass is normal. Findings consistent with   concentric remodeling. Normal wall motion. Grade I diastolic dysfunction   with normal LAP.     Right Ventricle: Right ventricle size is normal. Normal systolic   function. TAPSE is 1.9 cm.     Aortic Valve: No stenosis.     Mitral Valve: Mild regurgitation.     Tricuspid Valve: Normal RVSP. The estimated RVSP is 24 mmHg.     Pericardium: No pericardial effusion.         Pneumonia  -CT chest reviewed  -Procal 0.6  -antibiotics  -bronchodilators  -oxygen support->currently on room air  -sputum culture  -blood cultures  -Pulmonary following, note reviewed     2. Abnormal CT finding of RV  -Unusual hypodensities in the right ventricular free wall  -Echo as above  -Repeat limited echo reviewed, no abnormality noted  -Most likely an RV wall fat pad  -Can obtain outpt cardiac MRI to further eval     3. Acute on chronic diastolic CHF with mildly reduced EF  -CT showed pleural effusion, possible parapneumonic vs cardiac  -  -I reviewed the Echocardiogram as above  -Strict I & Os  -Daily weights  -2gm na diet  -BP low most likely from infection and third spacing with low albumin  -No plans for thoracentesis->restarted Eliquis  -Hold imdur and coreg for low BP  -CT abd/pelvis showed pleural effusions, anasarca, and ascities  -Discussed with hospitalist->albumin 1.9, given IV albumin and Lasix IV (11/5)  -BP still borderline low, Midodrine 5mg TID started  -Give Bumex 1mg IV x1 today    4. UTI  -WBC elevated  -Urine culture showed multiple organisms, possible contamination  -antibiotics     5. Elevated troponins-Non MI elevation  -Troponin 76, now downtrending  -EKG showed SR LBBB->chronic LBBB  -Denies ACS symptoms  -limited echo as above, normal LVEF     6. Hx of DVT  -Eliquis     7.  Hypokalemia/hypomagnesemia  -supplement     8. Hx of HTN, now with hypotension  -Hold imdur  -Hold coreg  -IVF given->hold  -Gave albumin with lasix post (11/5)  -Start Midodrine 5mg TID  -Monitor      OLI Jules-CNP

## 2024-11-06 NOTE — PROGRESS NOTES
Speech-Language Pathology    Speech-Language Pathology Clinical Swallow Evaluation    Patient Name: Desirae Gregg  MRN: [id number]  : 1940  Today's Date: 24  Start Time: 1235  Stop Time: 1256  Time Calculation (min): 21 min      ASSESSMENT  Impressions:  Functional oral phase and no suspected pharyngeal phase dysphagia based on clinical swallow evaluation.   Prognosis: Good    PLAN  Recommendations:  Is MBSS recommended? No; no pharyngeal dysphagia suspected.  Solid consistency: Easy to chew  Liquid consistency: Thin (IDDSI 0)  Medication administration: Whole, Crushed, in puree  Compensatory swallow strategies:  - Upright positioning for all PO intake  - Slow rate of intake  - Single sips    Recommended frequency/duration:  Skilled SLP services recommended: No    SUBJECTIVE    PMHx relevant to rehab: Principal Problem:    Sepsis with acute hypoxic respiratory failure without septic shock (Multi)  Active Problems:    Iron deficiency anemia    Chronic systolic (congestive) heart failure    Essential hypertension    History of pulmonary embolism    Anxiety    Vitamin D deficiency    Vitamin B12 deficiency    Adult hypothyroidism    Pneumonia of right lower lobe due to infectious organism    Leukocytosis    Acute kidney injury superimposed on stage 3a chronic kidney disease    Elevated troponin    Hyperbilirubinemia    Hyperglycemia    Hypokalemia    Hypomagnesemia    Hypocalcemia    Abnormality of right ventricle of heart      Chief complaint: Pt was admitted on 11/3/24 due to   Chief Complaint   Patient presents with    Fever   . She was found to have Sepsis with acute hypoxic respiratory failure without septic shock (Multi).    Relevant imaging results:  11/3/24 Chest x ray  IMPRESSION:  1. Right lower lobe airspace consolidation either pneumonia or  atelectasis with right pleural effusion. Follow-up to complete  resolution is needed  2. Enlarged cardiac silhouette    General Visit Information:  SLP  Received On: 11/06/24  Patient Class: Inpatient  Living Environment: Home  Ordering Physician: Dr. Ramsay  Reason for Referral: difficulty swallowing pills today  Prior to Session Communication: Bedside nurse    RN cleared pt to participate in session and reported that she held her pills in her mouth without swallowing earlier today.     Pt's son reported that a few of her pills are bigger here than they are at home. He thinks that may have contributed to the difficulty displayed earlier today.    [employer] Diet: Regular/thin  Current Diet : regular, thin    Status at time of evaluation:  Pain Assessment  Pain Assessment: 0-10  0-10 (Numeric) Pain Score: 0 - No pain    Pt was alert, pleasant, and cooperative for session.  Orientation: Oriented to self, Oriented to location, Oriented to month, and Oriented to year  Ability to follow functional commands: WFL  Nutritional status: Appears well-nourished/no concerns    Respiratory status: Room air  Baseline Vocal Quality: Normal  Volitional Cough: Strong  Volitional Swallow: Within Functional Limits  Patient positioning: Upright in bed      OBJECTIVE  Clinical swallow evaluation completed and consisted of interview (family assisted), oral motor assessment, and PO trials (mashed potatoes,  salmon, soft drink).  ORAL PHASE: Edentulous. Oral mucosa were pink, moist, and free of obvious lesions. Lingual strength and ROM were functional. Labial strength/ROM were good. Labial seal was adequate. Mastication of regular solids was limited by edentulous status.  A/P transit and oral clearance were functional.  PHARYNGEAL PHASE: Laryngeal elevation was visualized or palpated with all trials, however adequacy of hyolaryngeal elevation/excursion cannot be determined at bedside. No immediate or delayed s/sx aspiration/penetration were observed with any consistencies.    Was 3oz challenge administered: Yes; pt unable to successfully complete due to taking breaks. No overt s/sx aspiration  were observed.      Treatment/Education:  Results and recommendations were relayed to: Patient and Family  Education provided: Yes   Learner: Patient and Family   Barriers to learning: None   Method of teaching: Verbal and Demonstration   Topic: role of ST, results of assessment, recommended diet modifications, and recommended safe swallow strategies   Outcome of teaching: Pt/family demonstrated good understanding  Treatment provided: No

## 2024-11-07 PROBLEM — Z74.09 IMPAIRED MOBILITY AND ADLS: Status: ACTIVE | Noted: 2024-11-07

## 2024-11-07 PROBLEM — Z78.9 IMPAIRED MOBILITY AND ADLS: Status: ACTIVE | Noted: 2024-11-07

## 2024-11-07 LAB
ALBUMIN SERPL BCP-MCNC: 2.2 G/DL (ref 3.4–5)
ANION GAP SERPL CALC-SCNC: 12 MMOL/L (ref 10–20)
BACTERIA BLD CULT: NORMAL
BACTERIA BLD CULT: NORMAL
BASOPHILS # BLD AUTO: 0.16 X10*3/UL (ref 0–0.1)
BASOPHILS NFR BLD AUTO: 0.6 %
BUN SERPL-MCNC: 25 MG/DL (ref 6–23)
CALCIUM SERPL-MCNC: 7.1 MG/DL (ref 8.6–10.3)
CHLORIDE SERPL-SCNC: 107 MMOL/L (ref 98–107)
CO2 SERPL-SCNC: 20 MMOL/L (ref 21–32)
CREAT SERPL-MCNC: 1.35 MG/DL (ref 0.5–1.05)
CRP SERPL-MCNC: 5.69 MG/DL
EGFRCR SERPLBLD CKD-EPI 2021: 39 ML/MIN/1.73M*2
EOSINOPHIL # BLD AUTO: 0.49 X10*3/UL (ref 0–0.4)
EOSINOPHIL NFR BLD AUTO: 1.9 %
ERYTHROCYTE [DISTWIDTH] IN BLOOD BY AUTOMATED COUNT: 15.5 % (ref 11.5–14.5)
GLUCOSE SERPL-MCNC: 124 MG/DL (ref 74–99)
HCT VFR BLD AUTO: 46.7 % (ref 36–46)
HCT VFR BLD AUTO: 49.8 % (ref 36–46)
HGB BLD-MCNC: 13.6 G/DL (ref 12–16)
HGB BLD-MCNC: 14.6 G/DL (ref 12–16)
IMM GRANULOCYTES # BLD AUTO: 0.42 X10*3/UL (ref 0–0.5)
IMM GRANULOCYTES NFR BLD AUTO: 1.7 % (ref 0–0.9)
LYMPHOCYTES # BLD AUTO: 0.78 X10*3/UL (ref 0.8–3)
LYMPHOCYTES NFR BLD AUTO: 3.1 %
MAGNESIUM SERPL-MCNC: 1.62 MG/DL (ref 1.6–2.4)
MCH RBC QN AUTO: 27.1 PG (ref 26–34)
MCHC RBC AUTO-ENTMCNC: 29.1 G/DL (ref 32–36)
MCV RBC AUTO: 93 FL (ref 80–100)
MONOCYTES # BLD AUTO: 1.23 X10*3/UL (ref 0.05–0.8)
MONOCYTES NFR BLD AUTO: 4.9 %
NEUTROPHILS # BLD AUTO: 22.14 X10*3/UL (ref 1.6–5.5)
NEUTROPHILS NFR BLD AUTO: 87.8 %
NRBC BLD-RTO: 0 /100 WBCS (ref 0–0)
PHOSPHATE SERPL-MCNC: 2.2 MG/DL (ref 2.5–4.9)
PLATELET # BLD AUTO: 170 X10*3/UL (ref 150–450)
POTASSIUM SERPL-SCNC: 3.4 MMOL/L (ref 3.5–5.3)
PROCALCITONIN SERPL-MCNC: 0.34 NG/ML
RBC # BLD AUTO: 5.01 X10*6/UL (ref 4–5.2)
SODIUM SERPL-SCNC: 136 MMOL/L (ref 136–145)
WBC # BLD AUTO: 25.2 X10*3/UL (ref 4.4–11.3)

## 2024-11-07 PROCEDURE — 36415 COLL VENOUS BLD VENIPUNCTURE: CPT | Performed by: INTERNAL MEDICINE

## 2024-11-07 PROCEDURE — 83735 ASSAY OF MAGNESIUM: CPT | Performed by: INTERNAL MEDICINE

## 2024-11-07 PROCEDURE — 97530 THERAPEUTIC ACTIVITIES: CPT | Mod: GO,CO,59

## 2024-11-07 PROCEDURE — 2500000001 HC RX 250 WO HCPCS SELF ADMINISTERED DRUGS (ALT 637 FOR MEDICARE OP): Performed by: INTERNAL MEDICINE

## 2024-11-07 PROCEDURE — 97530 THERAPEUTIC ACTIVITIES: CPT | Mod: GP,CQ

## 2024-11-07 PROCEDURE — 86140 C-REACTIVE PROTEIN: CPT | Performed by: INTERNAL MEDICINE

## 2024-11-07 PROCEDURE — 2500000002 HC RX 250 W HCPCS SELF ADMINISTERED DRUGS (ALT 637 FOR MEDICARE OP, ALT 636 FOR OP/ED): Performed by: INTERNAL MEDICINE

## 2024-11-07 PROCEDURE — 85025 COMPLETE CBC W/AUTO DIFF WBC: CPT | Performed by: INTERNAL MEDICINE

## 2024-11-07 PROCEDURE — 84145 PROCALCITONIN (PCT): CPT | Mod: GEALAB | Performed by: INTERNAL MEDICINE

## 2024-11-07 PROCEDURE — 2500000004 HC RX 250 GENERAL PHARMACY W/ HCPCS (ALT 636 FOR OP/ED): Performed by: NURSE PRACTITIONER

## 2024-11-07 PROCEDURE — 97535 SELF CARE MNGMENT TRAINING: CPT | Mod: GO,CO

## 2024-11-07 PROCEDURE — 2500000001 HC RX 250 WO HCPCS SELF ADMINISTERED DRUGS (ALT 637 FOR MEDICARE OP): Performed by: NURSE PRACTITIONER

## 2024-11-07 PROCEDURE — 80069 RENAL FUNCTION PANEL: CPT | Performed by: INTERNAL MEDICINE

## 2024-11-07 PROCEDURE — 85014 HEMATOCRIT: CPT | Performed by: INTERNAL MEDICINE

## 2024-11-07 PROCEDURE — 1200000002 HC GENERAL ROOM WITH TELEMETRY DAILY

## 2024-11-07 PROCEDURE — 94760 N-INVAS EAR/PLS OXIMETRY 1: CPT

## 2024-11-07 PROCEDURE — 99232 SBSQ HOSP IP/OBS MODERATE 35: CPT | Performed by: INTERNAL MEDICINE

## 2024-11-07 PROCEDURE — 2500000005 HC RX 250 GENERAL PHARMACY W/O HCPCS: Performed by: INTERNAL MEDICINE

## 2024-11-07 PROCEDURE — 97110 THERAPEUTIC EXERCISES: CPT | Mod: GP,CQ

## 2024-11-07 PROCEDURE — 2500000004 HC RX 250 GENERAL PHARMACY W/ HCPCS (ALT 636 FOR OP/ED): Performed by: INTERNAL MEDICINE

## 2024-11-07 RX ORDER — POTASSIUM CHLORIDE 20 MEQ/1
40 TABLET, EXTENDED RELEASE ORAL 3 TIMES DAILY
Status: COMPLETED | OUTPATIENT
Start: 2024-11-07 | End: 2024-11-08

## 2024-11-07 RX ORDER — CARVEDILOL 3.12 MG/1
3.12 TABLET ORAL 2 TIMES DAILY
Status: DISCONTINUED | OUTPATIENT
Start: 2024-11-07 | End: 2024-11-13

## 2024-11-07 RX ORDER — BUMETANIDE 0.25 MG/ML
1 INJECTION, SOLUTION INTRAMUSCULAR; INTRAVENOUS ONCE
Status: COMPLETED | OUTPATIENT
Start: 2024-11-07 | End: 2024-11-07

## 2024-11-07 RX ORDER — LANOLIN ALCOHOL/MO/W.PET/CERES
400 CREAM (GRAM) TOPICAL 2 TIMES DAILY
Status: DISCONTINUED | OUTPATIENT
Start: 2024-11-07 | End: 2024-11-10

## 2024-11-07 RX ADMIN — GABAPENTIN 200 MG: 100 CAPSULE ORAL at 09:30

## 2024-11-07 RX ADMIN — Medication 1 APPLICATION: at 20:32

## 2024-11-07 RX ADMIN — MIDODRINE HYDROCHLORIDE 5 MG: 5 TABLET ORAL at 16:17

## 2024-11-07 RX ADMIN — CARVEDILOL 3.12 MG: 3.12 TABLET, FILM COATED ORAL at 09:21

## 2024-11-07 RX ADMIN — FERROUS SULFATE TAB 325 MG (65 MG ELEMENTAL FE) 325 MG: 325 (65 FE) TAB at 09:31

## 2024-11-07 RX ADMIN — PSYLLIUM HUSK 1 PACKET: 3.4 POWDER ORAL at 09:37

## 2024-11-07 RX ADMIN — BUSPIRONE HYDROCHLORIDE 7.5 MG: 15 TABLET ORAL at 20:32

## 2024-11-07 RX ADMIN — NYSTATIN: 100000 CREAM TOPICAL at 09:32

## 2024-11-07 RX ADMIN — Medication 2000 UNITS: at 09:30

## 2024-11-07 RX ADMIN — BUMETANIDE 1 MG: 0.25 INJECTION INTRAMUSCULAR; INTRAVENOUS at 12:00

## 2024-11-07 RX ADMIN — AMOXICILLIN AND CLAVULANATE POTASSIUM 1 TABLET: 500; 125 TABLET, FILM COATED ORAL at 09:20

## 2024-11-07 RX ADMIN — APIXABAN 2.5 MG: 2.5 TABLET, FILM COATED ORAL at 20:31

## 2024-11-07 RX ADMIN — PSYLLIUM HUSK 1 PACKET: 3.4 POWDER ORAL at 16:16

## 2024-11-07 RX ADMIN — GABAPENTIN 200 MG: 100 CAPSULE ORAL at 20:29

## 2024-11-07 RX ADMIN — CETIRIZINE HYDROCHLORIDE 10 MG: 10 TABLET, FILM COATED ORAL at 09:30

## 2024-11-07 RX ADMIN — POTASSIUM CHLORIDE 40 MEQ: 1500 TABLET, EXTENDED RELEASE ORAL at 16:17

## 2024-11-07 RX ADMIN — MENTHOL 10%, METHYL SALICYLATE 15% 1 APPLICATION: 10; 15 CREAM TOPICAL at 20:28

## 2024-11-07 RX ADMIN — Medication 2 L/MIN: at 08:07

## 2024-11-07 RX ADMIN — PANTOPRAZOLE SODIUM 40 MG: 40 TABLET, DELAYED RELEASE ORAL at 16:17

## 2024-11-07 RX ADMIN — PSYLLIUM HUSK 1 PACKET: 3.4 POWDER ORAL at 20:31

## 2024-11-07 RX ADMIN — SERTRALINE HYDROCHLORIDE 25 MG: 50 TABLET ORAL at 09:20

## 2024-11-07 RX ADMIN — POTASSIUM CHLORIDE 40 MEQ: 1500 TABLET, EXTENDED RELEASE ORAL at 20:30

## 2024-11-07 RX ADMIN — NYSTATIN: 100000 CREAM TOPICAL at 20:32

## 2024-11-07 RX ADMIN — MINERAL OIL, PETROLATUM, PHENYLEPHRINE HCL: 14; 74.9; .25 OINTMENT RECTAL at 16:16

## 2024-11-07 RX ADMIN — MIDODRINE HYDROCHLORIDE 5 MG: 5 TABLET ORAL at 12:30

## 2024-11-07 RX ADMIN — APIXABAN 2.5 MG: 2.5 TABLET, FILM COATED ORAL at 09:22

## 2024-11-07 RX ADMIN — BUSPIRONE HYDROCHLORIDE 7.5 MG: 15 TABLET ORAL at 09:21

## 2024-11-07 RX ADMIN — Medication 400 MG: at 20:30

## 2024-11-07 RX ADMIN — Medication 400 MG: at 09:30

## 2024-11-07 RX ADMIN — CYANOCOBALAMIN TAB 1000 MCG 1000 MCG: 1000 TAB at 09:31

## 2024-11-07 RX ADMIN — Medication 2 L/MIN: at 21:00

## 2024-11-07 RX ADMIN — POTASSIUM CHLORIDE 40 MEQ: 1500 TABLET, EXTENDED RELEASE ORAL at 09:30

## 2024-11-07 RX ADMIN — PANTOPRAZOLE SODIUM 40 MG: 40 TABLET, DELAYED RELEASE ORAL at 05:44

## 2024-11-07 RX ADMIN — AMOXICILLIN AND CLAVULANATE POTASSIUM 1 TABLET: 500; 125 TABLET, FILM COATED ORAL at 20:28

## 2024-11-07 RX ADMIN — Medication 1 APPLICATION: at 09:23

## 2024-11-07 RX ADMIN — FOLIC ACID 1 MG: 1 TABLET ORAL at 09:31

## 2024-11-07 RX ADMIN — Medication 2 L/MIN: at 05:57

## 2024-11-07 RX ADMIN — OXYCODONE HYDROCHLORIDE 5 MG: 5 TABLET ORAL at 12:58

## 2024-11-07 RX ADMIN — LEVOTHYROXINE SODIUM 88 MCG: 0.09 TABLET ORAL at 05:44

## 2024-11-07 RX ADMIN — MIDODRINE HYDROCHLORIDE 5 MG: 5 TABLET ORAL at 09:20

## 2024-11-07 RX ADMIN — CARVEDILOL 3.12 MG: 3.12 TABLET, FILM COATED ORAL at 20:28

## 2024-11-07 ASSESSMENT — PAIN - FUNCTIONAL ASSESSMENT
PAIN_FUNCTIONAL_ASSESSMENT: PAINAD (PAIN ASSESSMENT IN ADVANCED DEMENTIA SCALE)
PAIN_FUNCTIONAL_ASSESSMENT: WONG-BAKER FACES

## 2024-11-07 ASSESSMENT — COGNITIVE AND FUNCTIONAL STATUS - GENERAL
MOVING FROM LYING ON BACK TO SITTING ON SIDE OF FLAT BED WITH BEDRAILS: A LOT
TURNING FROM BACK TO SIDE WHILE IN FLAT BAD: A LOT
TOILETING: TOTAL
MOVING TO AND FROM BED TO CHAIR: TOTAL
DAILY ACTIVITIY SCORE: 15
TURNING FROM BACK TO SIDE WHILE IN FLAT BAD: A LOT
DRESSING REGULAR LOWER BODY CLOTHING: TOTAL
PERSONAL GROOMING: A LITTLE
CLIMB 3 TO 5 STEPS WITH RAILING: TOTAL
HELP NEEDED FOR BATHING: A LOT
TOILETING: A LOT
WALKING IN HOSPITAL ROOM: TOTAL
STANDING UP FROM CHAIR USING ARMS: A LOT
DRESSING REGULAR LOWER BODY CLOTHING: A LOT
DAILY ACTIVITIY SCORE: 13
MOBILITY SCORE: 10
CLIMB 3 TO 5 STEPS WITH RAILING: TOTAL
MOVING FROM LYING ON BACK TO SITTING ON SIDE OF FLAT BED WITH BEDRAILS: A LOT
WALKING IN HOSPITAL ROOM: TOTAL
EATING MEALS: A LITTLE
MOBILITY SCORE: 9
HELP NEEDED FOR BATHING: A LOT
DRESSING REGULAR UPPER BODY CLOTHING: A LITTLE
MOVING TO AND FROM BED TO CHAIR: A LOT
DRESSING REGULAR UPPER BODY CLOTHING: A LOT
PERSONAL GROOMING: A LITTLE
STANDING UP FROM CHAIR USING ARMS: A LOT

## 2024-11-07 ASSESSMENT — PAIN SCALES - GENERAL
PAINLEVEL_OUTOF10: 3
PAINLEVEL_OUTOF10: 5 - MODERATE PAIN
PAINLEVEL_OUTOF10: 2
PAINLEVEL_OUTOF10: 0 - NO PAIN
PAINLEVEL_OUTOF10: 6

## 2024-11-07 ASSESSMENT — ACTIVITIES OF DAILY LIVING (ADL): HOME_MANAGEMENT_TIME_ENTRY: 25

## 2024-11-07 ASSESSMENT — PAIN SCALES - WONG BAKER: WONGBAKER_NUMERICALRESPONSE: HURTS LITTLE MORE

## 2024-11-07 ASSESSMENT — PAIN DESCRIPTION - DESCRIPTORS: DESCRIPTORS: ACHING

## 2024-11-07 NOTE — PROGRESS NOTES
11/07/24 0950   Discharge Planning   Home or Post Acute Services Post acute facilities (Rehab/SNF/etc)   Type of Post Acute Facility Services Skilled nursing   Expected Discharge Disposition SNF  (Spoke with the patient and the patient's son at the bedside regarding discharge plan. Patient and son are agreeable to therapies recommendations for moderate intensity rehab and would preferto go to  [facility]. Will send referral to the facility.)

## 2024-11-07 NOTE — PROGRESS NOTES
Occupational Therapy    Occupational Therapy Treatment    Name: Desirae Gregg  MRN: [id number]  Department: [address]  Room: [address] [place] [address]  Date: 11/07/24  Time Calculation  Start Time: 1115  Stop Time: 1156  Time Calculation (min): 41 min    Assessment:  OT Assessment: Pt presents with decreased ADL performance, decreased functional mobility, decreased endurance. Continued skilled OT recommended to maximize pt safety and independence.  Prognosis: Good  Barriers to Discharge: None  Evaluation/Treatment Tolerance: Patient limited by fatigue  Medical Staff Made Aware: Yes  End of Session Communication: Bedside nurse, PCT/NA/CTA  End of Session Patient Position: Bed, 3 rail up, Alarm on (Son present, call bell in easy reach.)  Plan:  Treatment Interventions: ADL retraining, Functional transfer training, UE strengthening/ROM, Endurance training  OT Frequency: 3 times per week  OT Discharge Recommendations: Moderate intensity level of continued care  Equipment Recommended upon Discharge: Wheeled walker, Wheelchair  OT Recommended Transfer Status: Assist of 1  OT - OK to Discharge: Yes (In accord with OT POC)    Subjective   Previous Visit Info:  OT Last Visit  OT Received On: 11/07/24  General:  General  Reason for Referral: Pt is an 84 yo female who presented to [facility] on 11/3/24 with generalized weakness, vomiting, and fever. CT chest showed moderate right pleural effusion. OT consulted for impaired ADL and related mobility.  Referred By: FRANCISCA Ramsay DO  Past Medical History Relevant to Rehab: HTN, chronic systolic CHF, PE, myelofibrosis, sciatica, remote poliomyelitis (affecting LLE), JAK2 positive polycythemia vera, CKDIII  Family/Caregiver Present: Yes  Caregiver Feedback: Son arrived near end of session, appropriately encouraging patient participation.  Prior to Session Communication: Bedside nurse, PCT/NA/CTA (Patient appropriate for treatment with no new limitations.)  Patient Position Received: Bed, 3 rail up, Alarm  on  Preferred Learning Style: verbal, visual  General Comment: Pt pleasantly confused and disorganized, agreeable to OT treatment.  Precautions:  Medical Precautions: Fall precautions, Oxygen therapy device and L/min (2 liters min O2 via nasal cannula, telemetry, IV, Cardona catheter.)    Vital Signs (Past 2hrs)        Date/Time Vitals Session Patient Position Pulse Resp SpO2 BP MAP (mmHg)    11/07/24 1115 During OT  --  --  --  96 %  --  --                        Pain Assessment:  Pain Assessment  Pain Assessment: PAINAD (Pain Assessment in Advanced Dementia Scale)  0-10 (Numeric) Pain Score: 2  Pain Type: Chronic pain  Pain Location: Shoulder  Pain Orientation: Left  Pain Descriptors: Aching  Pain Frequency: Intermittent  Pain Interventions: Repositioned, Ambulation/increased activity  Response to Interventions: Pt eating lunch without compliant     Objective   Cognition:  Overall Cognitive Status: Impaired at baseline  Orientation Level: Disoriented to situation, Disoriented to time, Disoriented to place  Activities of Daily Living: Grooming  Grooming Level of Assistance: Setup, Close supervision  Grooming Where Assessed: Chair  Grooming Comments: face washing with set-up of prepped washcloth and towel. Increased time on task required with on task cues needed    UE Dressing  UE Dressing Level of Assistance: Moderate assistance, Setup, Moderate verbal cues  UE Dressing Where Assessed: Chair  UE Dressing Comments: direction and redirection needed for technique d/t distractability and perseveration    Toileting  Toileting Level of Assistance: Dependent, Maximum verbal cues  Where Assessed: Bedside commode  Toileting Comments: Pt fully dependent for clean-up after BM on commode    Functional Standing Tolerance:     Bed Mobility/Transfers: Bed Mobility  Bed Mobility: Yes  Bed Mobility 1  Bed Mobility 1: Sitting to supine  Level of Assistance 1: Maximum assistance  Bed Mobility Comments 1: Assist needed for both legs and  trunk  Bed Mobility 2  Bed Mobility  2: Rolling right, Rolling left  Level of Assistance 2: Moderate assistance  Bed Mobility Comments 2: Using draw sheet and bedrails    Transfers  Transfer: Yes  Transfer 1  Transfer From 1: Chair with arms to  Transfer to 1: Toilet  Technique 1: Stand pivot  Transfer Device 1: Walker  Transfer Level of Assistance 1: Minimum assistance, Moderate tactile cues, Moderate verbal cues  Transfers 2  Transfer From 2: Toilet to  Transfer to 2: Bed  Technique 2: Stand pivot  Transfer Device 2: Walker  Transfer Level of Assistance 2: Minimum assistance, Moderate verbal cues, Moderate tactile cues    Outcome Measures:  [facility] Daily Activity  Putting on and taking off regular lower body clothing: Total  Bathing (including washing, rinsing, drying): A lot  Putting on and taking off regular upper body clothing: A little  Toileting, which includes using toilet, bedpan or urinal: Total  Taking care of personal grooming such as brushing teeth: A little  Eating Meals: A little  Daily Activity - Total Score: 13    Education Documentation  Body Mechanics, taught by TRU Michelle at 11/7/2024 12:20 PM.  Learner: Family, Caregiver, Patient  Readiness: Acceptance  Method: Explanation, Demonstration  Response: Verbalizes Understanding, Demonstrated Understanding, Needs Reinforcement    Precautions, taught by TRU Michelle at 11/7/2024 12:20 PM.  Learner: Family, Caregiver, Patient  Readiness: Acceptance  Method: Explanation, Demonstration  Response: Verbalizes Understanding, Demonstrated Understanding, Needs Reinforcement    ADL Training, taught by TRU Michelle at 11/7/2024 12:20 PM.  Learner: Family, Caregiver, Patient  Readiness: Acceptance  Method: Explanation, Demonstration  Response: Verbalizes Understanding, Demonstrated Understanding, Needs Reinforcement    Education Comments  Pt verbalized and demonstrated compliance to instruction and though unable to retain instruction, was  compliant in course of tasks. Son demonstrated good grasp of instruction.    Goals:  Encounter Problems       Encounter Problems (Active)       OT Goals       Pt will complete [id number] transfers using LRD in preparation for ADLs with CGA (Progressing)       Start:  11/06/24    Expected End:  11/20/24            Pt will increase endurance to tolerate 15min of OOB activity with no more than 1 rest break in order to increase ability to engage in ADL completion.  (Progressing)       Start:  11/06/24    Expected End:  11/20/24            Pt will tolerate 10min stand during functional task completion with no more than 1 rest break in order to increase endurance for functional task completion.  (Progressing)       Start:  11/06/24    Expected End:  11/20/24            Pt will demo Grooming/UE ADL completion with modified independence, using adaptive strategies and energy conservation techniques as applicable.  (Progressing)       Start:  11/06/24    Expected End:  11/20/24            Pt will demo and/or verbalize 2-3 energy conservation techniques to incorporate into functional mobility or ADL to improve performance and increase independence.  (Progressing)       Start:  11/06/24    Expected End:  11/20/24

## 2024-11-07 NOTE — PROGRESS NOTES
Desirae Gregg is a 83 y.o. female on day 4 of admission presenting with Sepsis with acute hypoxic respiratory failure without septic shock (Multi).      Subjective   She is sitting up in the bed, currently on O2 at 2L per NC. She states she feels about the same as yesterday. Good UOP after Bumex yesterday. Legs less swollen.       Review of systems:  Constitutional: negative for fever, chills, or malaise  Neuro: negative for dizziness, headache, numbness, tingling  ENT: Negative for nasal congestion or sore throat  CV: negative for chest pain, palpitations  GI: negative for abd pain, nausea, vomiting or diarrhea  : negative for dysuria, frequency, or urgency  Skin: negative for lesions, wounds, or rash  Musculoskeletal: Negative for weakness, myalgia, or arthralgia  Endocrine: Negative for polyuria or polydipsia         Objective   Constitutional: Well developed, awake/alert/oriented x3, no distress, alert and cooperative  Eyes: PERRL, EOMI, clear sclera  ENMT: mucous membranes moist, no apparent injury, no lesions seen  Head/Neck: Neck supple, no apparent injury, thyroid without mass or tenderness, No JVD, trachea midline, no bruits  Respiratory/Thorax: Patent airways, diminished bilat bases with good chest expansion, thorax symmetric  Cardiovascular: Regular, rate and rhythm, no murmurs, 2+ equal pulses of the extremities, normal S 1and S 2  Gastrointestinal: Nondistended, soft, non-tender, no rebound tenderness or guarding, no masses palpable, no organomegaly, +BS, no bruits  Musculoskeletal: ROM intact, no joint swelling, normal strength  Extremities: +2 pitting edema, large open blister with serous drainage noted to LLE  Neurological: alert and oriented x3, intact senses, motor, response and reflexes, normal strength  Lymphatic: No significant lymphadenopathy  Psychological: Appropriate mood and behavior  Skin: Warm and dry, large open blister with serous drainage noted to LLE      Last Recorded Vitals  BP  "117/74   Pulse 88   Temp 36.2 °C (97.2 °F) (Temporal)   Resp 15   Ht 1.651 m (5' 5\")   Wt 78.3 kg (172 lb 11.2 oz)   SpO2 92%   BMI 28.74 kg/m²     Intake/Output last 3 Shifts:  I/O last 3 completed shifts:  In: 1160 (14.8 mL/kg) [P.O.:1160]  Out: 1950 (24.9 mL/kg) [Urine:1950 (0.7 mL/kg/hr)]  Weight: 78.3 kg   I/O this shift:  In: 120 [P.O.:120]  Out: 150 [Urine:150]    Relevant Results  Scheduled medications  amoxicillin-pot clavulanate, 1 tablet, oral, q12h MANI  apixaban, 2.5 mg, oral, q12h  bumetanide, 1 mg, intravenous, Once  busPIRone, 7.5 mg, oral, BID  carvedilol, 3.125 mg, oral, BID  cetirizine, 10 mg, oral, Daily  cholecalciferol, 2,000 Units, oral, Daily  cyanocobalamin, 1,000 mcg, oral, Daily  ferrous sulfate (325 mg ferrous sulfate), 65 mg of iron, oral, Daily with breakfast  folic acid, 1 mg, oral, Daily  gabapentin, 200 mg, oral, BID  [Held by provider] isosorbide mononitrate ER, 30 mg, oral, Daily  levothyroxine, 88 mcg, oral, Daily  magnesium oxide, 400 mg, oral, BID  midodrine, 5 mg, oral, TID  nystatin, , Topical, BID  pantoprazole, 40 mg, oral, BID AC  phenyleph-min oil-petrolatum, , rectal, TID  potassium chloride CR, 40 mEq, oral, TID  psyllium, 1 packet, oral, TID  sertraline, 25 mg, oral, Daily  zinc oxide, 1 Application, Topical, BID      Continuous medications     PRN medications  PRN medications: acetaminophen **OR** acetaminophen **OR** acetaminophen, loperamide, melatonin, methyl salicylate-menthol, ondansetron ODT **OR** ondansetron, oxyCODONE, oxygen, polyethylene glycol    Results for orders placed or performed during the hospital encounter of 11/03/24 (from the past 24 hours)   CBC   Result Value Ref Range    WBC 29.5 (H) 4.4 - 11.3 x10*3/uL    nRBC 0.0 0.0 - 0.0 /100 WBCs    RBC 5.01 4.00 - 5.20 x10*6/uL    Hemoglobin 13.6 12.0 - 16.0 g/dL    Hematocrit 45.2 36.0 - 46.0 %    MCV 90 80 - 100 fL    MCH 27.1 26.0 - 34.0 pg    MCHC 30.1 (L) 32.0 - 36.0 g/dL    RDW 15.7 (H) 11.5 - " 14.5 %    Platelets 210 150 - 450 x10*3/uL   Renal Function Panel   Result Value Ref Range    Glucose 124 (H) 74 - 99 mg/dL    Sodium 136 136 - 145 mmol/L    Potassium 3.4 (L) 3.5 - 5.3 mmol/L    Chloride 107 98 - 107 mmol/L    Bicarbonate 20 (L) 21 - 32 mmol/L    Anion Gap 12 10 - 20 mmol/L    Urea Nitrogen 25 (H) 6 - 23 mg/dL    Creatinine 1.35 (H) 0.50 - 1.05 mg/dL    eGFR 39 (L) >60 mL/min/1.73m*2    Calcium 7.1 (L) 8.6 - 10.3 mg/dL    Phosphorus 2.2 (L) 2.5 - 4.9 mg/dL    Albumin 2.2 (L) 3.4 - 5.0 g/dL   Magnesium   Result Value Ref Range    Magnesium 1.62 1.60 - 2.40 mg/dL   CBC and Auto Differential   Result Value Ref Range    WBC 25.2 (H) 4.4 - 11.3 x10*3/uL    nRBC 0.0 0.0 - 0.0 /100 WBCs    RBC 5.01 4.00 - 5.20 x10*6/uL    Hemoglobin 13.6 12.0 - 16.0 g/dL    Hematocrit 46.7 (H) 36.0 - 46.0 %    MCV 93 80 - 100 fL    MCH 27.1 26.0 - 34.0 pg    MCHC 29.1 (L) 32.0 - 36.0 g/dL    RDW 15.5 (H) 11.5 - 14.5 %    Platelets 170 150 - 450 x10*3/uL    Neutrophils % 87.8 40.0 - 80.0 %    Immature Granulocytes %, Automated 1.7 (H) 0.0 - 0.9 %    Lymphocytes % 3.1 13.0 - 44.0 %    Monocytes % 4.9 2.0 - 10.0 %    Eosinophils % 1.9 0.0 - 6.0 %    Basophils % 0.6 0.0 - 2.0 %    Neutrophils Absolute 22.14 (H) 1.60 - 5.50 x10*3/uL    Immature Granulocytes Absolute, Automated 0.42 0.00 - 0.50 x10*3/uL    Lymphocytes Absolute 0.78 (L) 0.80 - 3.00 x10*3/uL    Monocytes Absolute 1.23 (H) 0.05 - 0.80 x10*3/uL    Eosinophils Absolute 0.49 (H) 0.00 - 0.40 x10*3/uL    Basophils Absolute 0.16 (H) 0.00 - 0.10 x10*3/uL   C-reactive protein   Result Value Ref Range    C-Reactive Protein 5.69 (H) <1.00 mg/dL       Transthoracic Echo (TTE) Limited   Final Result      CT abdomen pelvis wo IV contrast   Final Result   2 centimeters stone in the urinary bladder.        Bilateral pleural effusions. Pericardial effusion.        Ascites.        Anasarca.        Presacral edema.        Enlarged spleen.        The exam is limited by motion  artifact.        MACRO:   none        Signed by: Jayshree Dias 11/4/2024 3:28 PM   Dictation workstation:   [id number]       renal complete   Final Result   No hydronephrosis. Decompressed bladder.        MACRO:   None        Signed by: Zain Mcknight 11/4/2024 11:08 AM   Dictation workstation:   [id number]      CT chest wo IV contrast   Final Result   Abnormalities at the right lung base on portable frontal chest   radiograph earlier today are due to a combination of moderate-sized,   mostly if not entirely free-flowing right pleural effusion with   associated multisegmental right lower lobe collapse adjacent to it        Small area of ground-glass airspace disease in the middle lobe   confirmed on CT may not have been expected on the prior radiograph        Possibility of mild ground-glass airspace disease in the right lower   lobe as well        No ground-glass airspace disease in the left lung        No consolidative pneumonia in any of the inflated lungs on either side        Small free-flowing left pleural effusion        Unusual hypodensities in the right ventricular free wall of uncertain   etiology and significance        Perhaps trace (smallest detectable quantity) pericardial effusion        Incidental abnormalities in the included upper abdomen as detailed   above        MACRO:   None        Signed by: Matthias Kolb 11/3/2024 11:35 AM   Dictation workstation:   [id number]      XR chest 1 view   Final Result   1. Right lower lobe airspace consolidation either pneumonia or   atelectasis with right pleural effusion. Follow-up to complete   resolution is needed   2. Enlarged cardiac silhouette             Signed by: Kary Cardoso 11/3/2024 10:27 AM   Dictation workstation:   [id number]          Transthoracic Echo (TTE) Limited    Result Date: 11/5/2024   [facility], [address] [place] [address]               Tel [phone] and Fax [phone] TRANSTHORACIC ECHOCARDIOGRAM  REPORT  Patient Name:       ELIE WHITLEY          Kalyn Physician:    [id number] Chicho Goldman MD Study Date:         11/5/2024           Ordering Provider:    [id number] SHERITA AINSLEY                                                               JYOTSNA MRN/PID:            [id number]            Fellow: Accession#:         [id number]        Nurse: Date of Birth/Age:  1940 / 83     Sonographer:          Cathi garcia                                     RDYVROSE Gender assigned at  F                   Additional Staff: Birth: Height:             165.10 cm           Admit Date:           11/3/2024 Weight:             72.57 kg            Admission Status:     Inpatient -                                                               Routine BSA / BMI:          1.80 m2 / 26.63     Encounter#:           [id number]                     kg/m2 Blood Pressure:     103/62 mmHg         Department Location:  [facility] Non                                                               Invasive Study Type:    TRANSTHORACIC ECHO (TTE) LIMITED Diagnosis/ICD: 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 Study Detail: The following Echo studies were performed: 2D, Doppler and color               flow.  PHYSICIAN INTERPRETATION: Left Ventricle: The left ventricular systolic function is normal, with a visually estimated ejection fraction of 60-65%. There is severely increased concentric left ventricular hypertrophy. There are no regional left ventricular wall motion abnormalities. The left ventricular cavity size is normal. There is moderately increased  septal and moderately increased posterior left ventricular wall thickness. Spectral Doppler shows an abnormal pattern of left ventricular diastolic filling. Left Atrium: The left atrium is upper limits of normal in size. Right Ventricle: The right ventricle is normal in size. There is normal right ventricular global systolic function. Right Atrium: The right atrium is normal in size. Aortic Valve: The aortic valve is trileaflet. The aortic valve dimensionless index is 0.77. There is trace aortic valve regurgitation. The peak instantaneous gradient of the aortic valve is 13 mmHg. The mean gradient of the mitral valve is 7 mmHg. Mitral Valve: The mitral valve is normal in structure. There is trace to mild mitral valve regurgitation. Tricuspid Valve: The tricuspid valve is structurally normal. There is trace to mild tricuspid regurgitation. Pulmonic Valve: The pulmonic valve is not well visualized. The pulmonic valve regurgitation was not well visualized. Pericardium: There is no pericardial effusion noted. Aorta: The aortic root is normal.  CONCLUSIONS:  1. The left ventricular systolic function is normal, with a visually estimated ejection fraction of 60-65%.  2. Spectral Doppler shows an abnormal pattern of left ventricular diastolic filling.  3. There is moderately increased septal and moderately increased posterior left ventricular wall thickness.  4. There is severely increased concentric left ventricular hypertrophy.  5. There is normal right ventricular global systolic function. QUANTITATIVE DATA SUMMARY:  2D MEASUREMENTS:          Normal Ranges: IVSd:            1.49 cm  (0.6-1.1cm) LVPWd:           1.26 cm  (0.6-1.1cm) LVIDd:           4.47 cm  (3.9-5.9cm) LVIDs:           2.98 cm LV Mass Index:   133 g/m2 LVEDV Index:     23 ml/m2 LV % FS          33.4 %  LV SYSTOLIC FUNCTION BY 2D PLANIMETRY (MOD):                      Normal Ranges: EF-A4C View:    62 % (>=55%) EF-A2C View:    61 % EF-Biplane:     64 %  EF-Visual:      63 % LV EF Reported: 63 %  LV DIASTOLIC FUNCTION:          Normal Ranges: MV Peak E:             0.70 m/s (0.7-1.2 m/s) MV Peak A:             0.91 m/s (0.42-0.7 m/s) E/A Ratio:             0.77     (1.0-2.2)  MITRAL VALVE:          Normal Ranges: MV DT:        264 msec (150-240msec)  AORTIC VALVE:                      Normal Ranges: AoV Vmax:                1.78 m/s  (<=1.7m/s) AoV Peak P.7 mmHg (<20mmHg) AoV Mean P.0 mmHg  (1.7-11.5mmHg) LVOT Max Josiah:            1.33 m/s  (<=1.1m/s) AoV VTI:                 31.31 cm  (18-25cm) LVOT VTI:                24.11 cm LVOT Diameter:           2.04 cm   (1.8-2.4cm) AoV Area, VTI:           2.52 cm2  (2.5-5.5cm2) AoV Area,Vmax:           2.44 cm2  (2.5-4.5cm2) AoV Dimensionless Index: 0.77  TRICUSPID VALVE/RVSP:          Normal Ranges: Peak TR Velocity:     3.46 m/s  PULMONIC VALVE:          Normal Ranges: PV Max Josiah:     0.8 m/s  (0.6-0.9m/s) PV Max P.8 mmHg  [id number] Chicho Goldman MD Electronically signed on 2024 at 2:09:49 PM  ** Final **           Assessment/Plan   Principal Problem:    Sepsis with acute hypoxic respiratory failure without septic shock (Multi)  Active Problems:    Iron deficiency anemia    Chronic systolic (congestive) heart failure    Essential hypertension    History of pulmonary embolism    Anxiety    Vitamin D deficiency    Vitamin B12 deficiency    Adult hypothyroidism    Pneumonia of right lower lobe due to infectious organism    Leukocytosis    Acute kidney injury superimposed on stage 3a chronic kidney disease    Elevated troponin    Hyperbilirubinemia    Hyperglycemia    Hypokalemia    Hypomagnesemia    Hypocalcemia    Abnormality of right ventricle of heart    Echocardiogram from 2024:    Left Ventricle: Low normal left ventricular systolic function with a   visually estimated EF of 50 - 55%. Left ventricle size is normal. Normal   wall thickness. Ventricular mass is normal.  Findings consistent with   concentric remodeling. Normal wall motion. Grade I diastolic dysfunction   with normal LAP.     Right Ventricle: Right ventricle size is normal. Normal systolic   function. TAPSE is 1.9 cm.     Aortic Valve: No stenosis.     Mitral Valve: Mild regurgitation.     Tricuspid Valve: Normal RVSP. The estimated RVSP is 24 mmHg.     Pericardium: No pericardial effusion.         Pneumonia  -CT chest reviewed  -Procal 0.6  -antibiotics  -bronchodilators  -oxygen support  -sputum culture  -blood cultures  -Pulmonary following, note reviewed     2. Abnormal CT finding of RV  -Unusual hypodensities in the right ventricular free wall  -Echo as above  -Repeat limited echo reviewed, no abnormality noted  -Most likely an RV wall fat pad  -Can obtain outpt cardiac MRI to further eval     3. Acute on chronic diastolic CHF with mildly reduced EF  -CT showed pleural effusion, possible parapneumonic vs cardiac  -  -I reviewed the Echocardiogram as above  -Strict I & Os  -Daily weights  -2gm na diet  -BP low most likely from infection and third spacing with low albumin  -No plans for thoracentesis->restarted Eliquis  -Hold imdur for low BP  -restart low dose Coreg  -CT abd/pelvis showed pleural effusions, anasarca, and ascities  -Discussed with hospitalist->albumin 1.9, given IV albumin and Lasix IV (11/5)  -BP still borderline low, Midodrine 5mg TID started  -Bumex 1mg IV x1 (11/6)  -Repeat Bumex 1mg IV x1 today  -Recheck CXR tomorrow    4. UTI  -WBC elevated  -Urine culture showed multiple organisms, possible contamination  -antibiotics     5. Elevated troponins-Non MI elevation  -Troponin 76, now downtrending  -EKG showed SR LBBB->chronic LBBB  -Denies ACS symptoms  -limited echo as above, normal LVEF     6. Hx of DVT  -Eliquis     7. Hypokalemia/hypomagnesemia  -supplement     8. Hx of HTN, now with hypotension  -Hold imdur  -IVF given->hold  -Gave albumin with lasix post (11/5)  -Cont Midodrine 5mg  TID  -Restart Coreg low dose  -Monitor      Sherron Jones, APRN-CNP

## 2024-11-07 NOTE — NURSING NOTE
Changed dressing to left shin, weeping wound. Large amount of clear drainage, ced wound skin fragile, warm and slightly darker pink than rest of skin color. +2 edema to BLE

## 2024-11-07 NOTE — PROGRESS NOTES
[facility] Hospitalist Progress Note       [phone]: Please page me for patient care issues.  [phone]: Please page night hospitalist for any issues.     Desirae Gregg  :  1940(83 y.o.)  MRN:  [id number]  PCP: Jackie Cedillo MD      Principal Problem:    Sepsis with acute hypoxic respiratory failure without septic shock (Multi)  Active Problems:    Iron deficiency anemia    Chronic systolic (congestive) heart failure    Essential hypertension    History of pulmonary embolism    Anxiety    Vitamin D deficiency    Vitamin B12 deficiency    Adult hypothyroidism    Pneumonia of right lower lobe due to infectious organism    Leukocytosis    Acute kidney injury superimposed on stage 3a chronic kidney disease    Elevated troponin    Hyperbilirubinemia    Hyperglycemia    Hypokalemia    Hypomagnesemia    Hypocalcemia    Abnormality of right ventricle of heart      Assessment and Plan:     Desirae Gregg is a 83 y.o. female with PMH CKD III, HFpEF, myelofibrosis, sciatica, remote poliomyelitis, hypertension, PE, anxiety disorder, vitamin D deficiency, vitamin B12 deficiency, hypothyroidism, GERD, depression, and JAK2 positive polycythemia vera.    Patient presented to ED with the son with complaints of 1 week of generalized weakness and fever. Associated with emesis (x3) and confusion  On arrival to the ED, patient was noted to have an oxygen saturation of 88% on room air.  She was placed on supplemental oxygen.  Temperature was 100.8 °F and heart rate was .  Respiratory rate was mildly increased at 18-23.  Patient ultimately required 4 L of supplemental oxygen per nasal cannula to maintain saturations.  Blood pressure systolic was 108-117.  Laboratory evaluation in the emergency department was notable for white blood cell count 20.4, glucose 119, creatinine 1.21, and total bilirubin 2.2.  Electrolytes, transaminases, lactic acid, hemoglobin, and platelet count were unremarkable.  COVID, influenza, and RSV testing  was negative.  Blood cultures x 2 were obtained.  Portable chest x-ray showed right lower lobe airspace consolidation, pneumonia or atelectasis with right pleural effusion.  Enlarged cardiac silhouette was also noted.        #Acute on chronic HFpEF  #B/L (R>L) pleural effusion  #ARF w/ hypoxia, now off NC  #LEVY/CKD III, improving  #Rectal (hemorrhoidal) bleeding w/o overt anemia  #Sepsis UTI  #Electrolyte abnormalities (Hypomagnesemia, Hypokalemia)  #A risk for hypotension-on midodrine 2/2   #Leukocytosis, improving  #Protein malnutrition  #Hypoalbuminemia  #Incidental urinary bladder stone (2cm)-Urology rec outpt F/U for 2cm bladder stone management via cystolitholapaxy with laser   #myeloproliferative disorder   #Immunocompromised state  #Hx PE on apixaban  -abx:zosyn/azithromycin->Augmentin  -resumed apixaban  -No plans for colonoscopy in the absence of active bleeding/overt anemia. Trend H/H. Trial of metamucil and preparation H  -CHF order set  -IV diuretics as BP and renal function could tolerate.   -GDMT: low dose coreg, PRN IV bumex per cards  -replete lytes PRN  -Ucx appears to be contaminated  -CS recs appreciated: GI (s/o), cards, pulm (s/o), urology (s/o), dietician     Disposition:await test results, await consultant recommendations, and await clinical improvement    DVT Prophylaxis: full anticoagulation apixaban    Code status: Full Code  Diet: Adult diet 2-3 grams sodium    Level of MDM:  High    patient and family updated, I personally examined the patient, and I personally reviewed and agree with residentphysical exam, assessment/plan with my noted corrections if any    Family Communication  Number :   Name of Designated Family Representative:       Total time spent: 35 minutes, of total time providing counseling or in coordination of care. Total time on this day of visit includes record and documentation review before and after visit including documentation and time not explicitly included on EMR  time stamp      Subjective:   Interval History:  HPI  The patient complains of generalized weakness  The patient feels their symptoms areimproving  no events or new concerns    Scheduled Meds:amoxicillin-pot clavulanate, 1 tablet, oral, q12h MANI  apixaban, 2.5 mg, oral, q12h  busPIRone, 7.5 mg, oral, BID  carvedilol, 3.125 mg, oral, BID  cetirizine, 10 mg, oral, Daily  cholecalciferol, 2,000 Units, oral, Daily  cyanocobalamin, 1,000 mcg, oral, Daily  ferrous sulfate (325 mg ferrous sulfate), 65 mg of iron, oral, Daily with breakfast  folic acid, 1 mg, oral, Daily  gabapentin, 200 mg, oral, BID  [Held by provider] isosorbide mononitrate ER, 30 mg, oral, Daily  levothyroxine, 88 mcg, oral, Daily  magnesium oxide, 400 mg, oral, Daily  midodrine, 5 mg, oral, TID  nystatin, , Topical, BID  pantoprazole, 40 mg, oral, BID AC  potassium chloride CR, 40 mEq, oral, BID  sertraline, 25 mg, oral, Daily  zinc oxide, 1 Application, Topical, BID      Continuous Infusions:   PRN Meds:PRN medications: acetaminophen **OR** acetaminophen **OR** acetaminophen, loperamide, melatonin, ondansetron ODT **OR** ondansetron, oxyCODONE, oxygen, polyethylene glycol    Review of Systems   All other systems reviewed and are negative.    Interval Pertinent History:  Social History     Tobacco Use    Smoking status: Never    Smokeless tobacco: Never   Substance Use Topics    Alcohol use: Never         Objective:   Patient Vitals for the past 24 hrs:   BP Temp Temp src Pulse Resp SpO2 Weight   11/07/24 0807 -- -- -- -- -- 92 % --   11/07/24 0557 -- -- -- -- -- 94 % --   11/07/24 0543 -- -- -- -- -- (!) 85 % 78.3 kg (172 lb 11.2 oz)   11/07/24 0525 117/74 36.2 °C (97.2 °F) Temporal 88 15 93 % --   11/06/24 2141 -- -- -- 95 -- 93 % --   11/06/24 1957 115/68 36.5 °C (97.7 °F) Temporal 91 -- 91 % --   11/06/24 1435 155/78 36.6 °C (97.9 °F) Temporal (!) 114 18 (!) 87 % --   11/06/24 1413 155/78 -- -- (!) 114 -- (!) 87 % --   11/06/24 1107 -- -- -- -- --  93 % --       Average, Min, and Max for last 24 hours Vitals:  TEMPERATURE:  Temp  Av.4 °C (97.6 °F)  Min: 36.2 °C (97.2 °F)  Max: 36.6 °C (97.9 °F)    RESPIRATIONS RANGE: Resp  Av.5  Min: 15  Max: 18    PULSE RANGE: Pulse  Av.4  Min: 88  Max: 114    BLOOD PRESSURE RANGE:  Systolic (24hrs), Av , Min:115 , Max:155   ; Diastolic (24hrs), Av, Min:68, Max:78      PULSE OXIMETRY RANGE: SpO2  Av.6 %  Min: 85 %  Max: 94 %  Body mass index is 28.74 kg/m².    I/O last 3 completed shifts:  In: 1160 (14.8 mL/kg) [P.O.:1160]  Out: 1950 (24.9 mL/kg) [Urine:1950 (0.7 mL/kg/hr)]  Weight: 78.3 kg   Weight change: -1.764 kg (-3 lb 14.2 oz)     Physical Exam  Vitals and nursing note reviewed.   Constitutional:       Appearance: Normal appearance.      Interventions: Nasal cannula in place.   HENT:      Head: Normocephalic and atraumatic.      Right Ear: External ear normal.      Left Ear: External ear normal.      Nose: Nose normal.      Mouth/Throat:      Mouth: Mucous membranes are moist.   Eyes:      General: No scleral icterus.        Right eye: No discharge.         Left eye: No discharge.      Extraocular Movements: Extraocular movements intact.      Conjunctiva/sclera: Conjunctivae normal.      Pupils: Pupils are equal, round, and reactive to light.   Cardiovascular:      Rate and Rhythm: Regular rhythm.   Pulmonary:      Effort: Pulmonary effort is normal.      Breath sounds: Rales present.   Abdominal:      General: Abdomen is flat. Bowel sounds are normal.      Palpations: Abdomen is soft.   Musculoskeletal:         General: Normal range of motion.      Right lower leg: Edema (trace to +1, improving) present.      Left lower leg: Edema (trace to +1, improving) present.   Skin:     General: Skin is warm and dry.      Capillary Refill: Capillary refill takes less than 2 seconds.   Neurological:      General: No focal deficit present.   Psychiatric:         Mood and Affect: Mood normal.          Thought Content: Thought content normal.         Judgment: Judgment normal.         Lab Results   Component Value Date     11/07/2024    K 3.4 (L) 11/07/2024     11/07/2024    CO2 20 (L) 11/07/2024    BUN 25 (H) 11/07/2024    CREATININE 1.35 (H) 11/07/2024    GLUCOSE 124 (H) 11/07/2024    CALCIUM 7.1 (L) 11/07/2024    PROT 3.1 (L) 11/05/2024    BILITOT 0.7 11/05/2024    ALKPHOS 34 11/05/2024    AST 7 (L) 11/05/2024    ALT 11 11/05/2024       Lab Results   Component Value Date    WBC 25.2 (H) 11/07/2024    HGB 13.6 11/07/2024    HCT 46.7 (H) 11/07/2024    MCV 93 11/07/2024     11/07/2024    LYMPHOPCT 3.1 11/07/2024    RBC 5.01 11/07/2024    MCH 27.1 11/07/2024    MCHC 29.1 (L) 11/07/2024    RDW 15.5 (H) 11/07/2024    CRP 5.69 (H) 11/07/2024       Lab Results   Component Value Date    TSH 2.09 10/29/2024     Lab Results   Component Value Date    LACTATE 1.3 11/03/2024     (H) 11/03/2024    INR 2.1 (H) 11/06/2024       IMAGES:  Encounter Date: 11/03/24   ECG 12 lead   Result Value    Ventricular Rate 104    Atrial Rate 104    NC Interval 162    QRS Duration 124    QT Interval 368    QTC Calculation(Bazett) 483    P Axis 54    R Axis -52    T Axis 169    QRS Count 18    Q Onset 219    P Onset 138    P Offset 188    T Offset 403    QTC Fredericia 442    Narrative    Sinus tachycardia  Left axis deviation  Left bundle branch block  Abnormal ECG  No previous ECGs available     CT abdomen pelvis wo IV contrast    Result Date: 11/4/2024  Impression: 2 centimeters stone in the urinary bladder.   Bilateral pleural effusions. Pericardial effusion.   Ascites.   Anasarca.   Presacral edema.   Enlarged spleen.   The exam is limited by motion artifact.   MACRO: none   Signed by: Jayshree Dias 11/4/2024 3:28 PM Dictation workstation:   [id number]     renal complete    Result Date: 11/4/2024  Impression: No hydronephrosis. Decompressed bladder.   MACRO: None   Signed by: Zain Mcknight 11/4/2024 11:08 AM  Dictation workstation:   [id number]    CT chest wo IV contrast    Result Date: 11/3/2024  Impression: Abnormalities at the right lung base on portable frontal chest radiograph earlier today are due to a combination of moderate-sized, mostly if not entirely free-flowing right pleural effusion with associated multisegmental right lower lobe collapse adjacent to it   Small area of ground-glass airspace disease in the middle lobe confirmed on CT may not have been expected on the prior radiograph   Possibility of mild ground-glass airspace disease in the right lower lobe as well   No ground-glass airspace disease in the left lung   No consolidative pneumonia in any of the inflated lungs on either side   Small free-flowing left pleural effusion   Unusual hypodensities in the right ventricular free wall of uncertain etiology and significance   Perhaps trace (smallest detectable quantity) pericardial effusion   Incidental abnormalities in the included upper abdomen as detailed above   MACRO: None   Signed by: Matthias Kolb 11/3/2024 11:35 AM Dictation workstation:   [id number]    XR chest 1 view    Result Date: 11/3/2024  Impression: 1. Right lower lobe airspace consolidation either pneumonia or atelectasis with right pleural effusion. Follow-up to complete resolution is needed 2. Enlarged cardiac silhouette     Signed by: Kary Cardoso 11/3/2024 10:27 AM Dictation workstation:   [id number]    Transthoracic Echo (TTE) Limited    Result Date: 11/5/2024   [facility], [address] [place] [address]               Tel [phone] and Fax [phone] TRANSTHORACIC ECHOCARDIOGRAM REPORT  Patient Name:       ELIE Mccain Physician:    [id number] Chicho Goldman MD Study Date:         11/5/2024           Ordering Provider:    [id number] SHERITA GOYAL MRN/PID:            [id number]             Fellow: Accession#:         [id number]        Nurse: Date of Birth/Age:  1940 / 83     Sonographer:          Cathi Migueldl                     years                                     RDCS Gender assigned at  F                   Additional Staff: Birth: Height:             165.10 cm           Admit Date:           11/3/2024 Weight:             72.57 kg            Admission Status:     Inpatient -                                                               Routine BSA / BMI:          1.80 m2 / 26.63     Encounter#:           [id number]                     kg/m2 Blood Pressure:     103/62 mmHg         Department Location:  [facility] Non                                                               Invasive Study Type:    TRANSTHORACIC ECHO (TTE) LIMITED Diagnosis/ICD: 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 Study Detail: The following Echo studies were performed: 2D, Doppler and color               flow.  PHYSICIAN INTERPRETATION: Left Ventricle: The left ventricular systolic function is normal, with a visually estimated ejection fraction of 60-65%. There is severely increased concentric left ventricular hypertrophy. There are no regional left ventricular wall motion abnormalities. The left ventricular cavity size is normal. There is moderately increased septal and moderately increased posterior left ventricular wall thickness. Spectral Doppler shows an abnormal pattern of left ventricular diastolic filling. Left Atrium: The left atrium is upper limits of normal in size. Right Ventricle: The right ventricle is normal in size. There is normal right ventricular global systolic  function. Right Atrium: The right atrium is normal in size. Aortic Valve: The aortic valve is trileaflet. The aortic valve dimensionless index is 0.77. There is trace aortic valve regurgitation. The peak instantaneous gradient of the aortic valve is 13 mmHg. The mean gradient of the mitral valve is 7 mmHg. Mitral Valve: The mitral valve is normal in structure. There is trace to mild mitral valve regurgitation. Tricuspid Valve: The tricuspid valve is structurally normal. There is trace to mild tricuspid regurgitation. Pulmonic Valve: The pulmonic valve is not well visualized. The pulmonic valve regurgitation was not well visualized. Pericardium: There is no pericardial effusion noted. Aorta: The aortic root is normal.  CONCLUSIONS:  1. The left ventricular systolic function is normal, with a visually estimated ejection fraction of 60-65%.  2. Spectral Doppler shows an abnormal pattern of left ventricular diastolic filling.  3. There is moderately increased septal and moderately increased posterior left ventricular wall thickness.  4. There is severely increased concentric left ventricular hypertrophy.  5. There is normal right ventricular global systolic function. QUANTITATIVE DATA SUMMARY:  2D MEASUREMENTS:          Normal Ranges: IVSd:            1.49 cm  (0.6-1.1cm) LVPWd:           1.26 cm  (0.6-1.1cm) LVIDd:           4.47 cm  (3.9-5.9cm) LVIDs:           2.98 cm LV Mass Index:   133 g/m2 LVEDV Index:     23 ml/m2 LV % FS          33.4 %  LV SYSTOLIC FUNCTION BY 2D PLANIMETRY (MOD):                      Normal Ranges: EF-A4C View:    62 % (>=55%) EF-A2C View:    61 % EF-Biplane:     64 % EF-Visual:      63 % LV EF Reported: 63 %  LV DIASTOLIC FUNCTION:          Normal Ranges: MV Peak E:             0.70 m/s (0.7-1.2 m/s) MV Peak A:             0.91 m/s (0.42-0.7 m/s) E/A Ratio:             0.77     (1.0-2.2)  MITRAL VALVE:          Normal Ranges: MV DT:        264 msec (150-240msec)  AORTIC VALVE:                       Normal Ranges: AoV Vmax:                1.78 m/s  (<=1.7m/s) AoV Peak P.7 mmHg (<20mmHg) AoV Mean P.0 mmHg  (1.7-11.5mmHg) LVOT Max Josaih:            1.33 m/s  (<=1.1m/s) AoV VTI:                 31.31 cm  (18-25cm) LVOT VTI:                24.11 cm LVOT Diameter:           2.04 cm   (1.8-2.4cm) AoV Area, VTI:           2.52 cm2  (2.5-5.5cm2) AoV Area,Vmax:           2.44 cm2  (2.5-4.5cm2) AoV Dimensionless Index: 0.77  TRICUSPID VALVE/RVSP:          Normal Ranges: Peak TR Velocity:     3.46 m/s  PULMONIC VALVE:          Normal Ranges: PV Max Josiah:     0.8 m/s  (0.6-0.9m/s) PV Max P.8 mmHg  [id number] Chicho Goldman MD Electronically signed on 2024 at 2:09:49 PM  ** Final **    === 24 ===    XR CHEST 1 VIEW    - Impression -  1. Right lower lobe airspace consolidation either pneumonia or  atelectasis with right pleural effusion. Follow-up to complete  resolution is needed  2. Enlarged cardiac silhouette      Signed by: Kary Cardoso 11/3/2024 10:27 AM  Dictation workstation:   [id number]  === 24 ===    CT ABDOMEN PELVIS WO IV CONTRAST    - Impression -  2 centimeters stone in the urinary bladder.    Bilateral pleural effusions. Pericardial effusion.    Ascites.    Anasarca.    Presacral edema.    Enlarged spleen.    The exam is limited by motion artifact.    MACRO:  none    Signed by: Jayshree Dias 2024 3:28 PM  Dictation workstation:   [id number]  === 24 ===    XR CHEST 1 VIEW    - Impression -  1. Right lower lobe airspace consolidation either pneumonia or  atelectasis with right pleural effusion. Follow-up to complete  resolution is needed  2. Enlarged cardiac silhouette      Signed by: Kary Cardoso 11/3/2024 10:27 AM  Dictation workstation:   [id number]      Additional results of the last 24 hours have been reviewed.    Dictated using Machina Version 2.4  Proof read however unrecognized voice recognition errors may have  occurred     Electronically signed by Cha Ramsay DO on 11/07/24 at 8:55 AM

## 2024-11-07 NOTE — PROGRESS NOTES
"Desirae Gregg is a 83 y.o. female on day 3 of admission presenting with Sepsis with acute hypoxic respiratory failure without septic shock (Multi).    Subjective   Feels about the same today.       Objective     Physical Exam  Vitals reviewed.   Constitutional:       Appearance: She is ill-appearing.   HENT:      Head: Normocephalic and atraumatic.   Eyes:      Extraocular Movements: Extraocular movements intact.   Cardiovascular:      Rate and Rhythm: Normal rate and regular rhythm.      Heart sounds: Normal heart sounds.   Pulmonary:      Effort: Pulmonary effort is normal.      Comments: Coarse breath sounds bilaterally   Abdominal:      Palpations: Abdomen is soft.      Tenderness: There is no abdominal tenderness.   Musculoskeletal:      Cervical back: Normal range of motion.   Skin:     General: Skin is warm.   Neurological:      General: No focal deficit present.      Mental Status: She is alert and oriented to person, place, and time. Mental status is at baseline.   Psychiatric:         Mood and Affect: Mood normal.         Behavior: Behavior normal.         Last Recorded Vitals  Blood pressure 115/68, pulse 91, temperature 36.5 °C (97.7 °F), temperature source Temporal, resp. rate 18, height 1.651 m (5' 5\"), weight 80.1 kg (176 lb 9.4 oz), SpO2 91%.  Intake/Output last 3 Shifts:  I/O last 3 completed shifts:  In: 2095.1 (26.2 mL/kg) [P.O.:1700; I.V.:345.1 (4.3 mL/kg); IV Piggyback:50]  Out: 1425 (17.8 mL/kg) [Urine:1425 (0.5 mL/kg/hr)]  Weight: 80.1 kg     Relevant Results  Results for orders placed or performed during the hospital encounter of 11/03/24 (from the past 24 hours)   Renal Function Panel   Result Value Ref Range    Glucose 112 (H) 74 - 99 mg/dL    Sodium 134 (L) 136 - 145 mmol/L    Potassium 3.2 (L) 3.5 - 5.3 mmol/L    Chloride 107 98 - 107 mmol/L    Bicarbonate 21 21 - 32 mmol/L    Anion Gap 9 (L) 10 - 20 mmol/L    Urea Nitrogen 26 (H) 6 - 23 mg/dL    Creatinine 1.57 (H) 0.50 - 1.05 mg/dL    eGFR " 32 (L) >60 mL/min/1.73m*2    Calcium 7.0 (L) 8.6 - 10.3 mg/dL    Phosphorus 2.9 2.5 - 4.9 mg/dL    Albumin 2.1 (L) 3.4 - 5.0 g/dL   Magnesium   Result Value Ref Range    Magnesium 1.77 1.60 - 2.40 mg/dL   Procalcitonin   Result Value Ref Range    Procalcitonin 0.45 (H) <=0.07 ng/mL   C-reactive protein   Result Value Ref Range    C-Reactive Protein 6.20 (H) <1.00 mg/dL   Protime-INR   Result Value Ref Range    Protime 24.0 (H) 9.8 - 12.8 seconds    INR 2.1 (H) 0.9 - 1.1   CBC and Auto Differential   Result Value Ref Range    WBC 18.8 (H) 4.4 - 11.3 x10*3/uL    nRBC 0.0 0.0 - 0.0 /100 WBCs    RBC 4.63 4.00 - 5.20 x10*6/uL    Hemoglobin 12.6 12.0 - 16.0 g/dL    Hematocrit 41.7 36.0 - 46.0 %    MCV 90 80 - 100 fL    MCH 27.2 26.0 - 34.0 pg    MCHC 30.2 (L) 32.0 - 36.0 g/dL    RDW 15.5 (H) 11.5 - 14.5 %    Platelets 145 (L) 150 - 450 x10*3/uL    Neutrophils % 90.0 40.0 - 80.0 %    Immature Granulocytes %, Automated 1.7 (H) 0.0 - 0.9 %    Lymphocytes % 2.0 13.0 - 44.0 %    Monocytes % 3.9 2.0 - 10.0 %    Eosinophils % 2.1 0.0 - 6.0 %    Basophils % 0.3 0.0 - 2.0 %    Neutrophils Absolute 16.92 (H) 1.60 - 5.50 x10*3/uL    Immature Granulocytes Absolute, Automated 0.32 0.00 - 0.50 x10*3/uL    Lymphocytes Absolute 0.37 (L) 0.80 - 3.00 x10*3/uL    Monocytes Absolute 0.74 0.05 - 0.80 x10*3/uL    Eosinophils Absolute 0.40 0.00 - 0.40 x10*3/uL    Basophils Absolute 0.05 0.00 - 0.10 x10*3/uL   Type and screen   Result Value Ref Range    ABO TYPE B     Rh TYPE POS     ANTIBODY SCREEN NEG    CBC   Result Value Ref Range    WBC 29.5 (H) 4.4 - 11.3 x10*3/uL    nRBC 0.0 0.0 - 0.0 /100 WBCs    RBC 5.01 4.00 - 5.20 x10*6/uL    Hemoglobin 13.6 12.0 - 16.0 g/dL    Hematocrit 45.2 36.0 - 46.0 %    MCV 90 80 - 100 fL    MCH 27.1 26.0 - 34.0 pg    MCHC 30.1 (L) 32.0 - 36.0 g/dL    RDW 15.7 (H) 11.5 - 14.5 %    Platelets 210 150 - 450 x10*3/uL        Scheduled medications  amoxicillin-pot clavulanate, 1 tablet, oral, q12h MANI  [Held by  provider] apixaban, 2.5 mg, oral, q12h  busPIRone, 7.5 mg, oral, BID  [Held by provider] carvedilol, 25 mg, oral, BID  cetirizine, 10 mg, oral, Daily  cholecalciferol, 2,000 Units, oral, Daily  cyanocobalamin, 1,000 mcg, oral, Daily  ferrous sulfate (325 mg ferrous sulfate), 65 mg of iron, oral, Daily with breakfast  folic acid, 1 mg, oral, Daily  gabapentin, 200 mg, oral, BID  [Held by provider] isosorbide mononitrate ER, 30 mg, oral, Daily  levothyroxine, 88 mcg, oral, Daily  magnesium oxide, 400 mg, oral, Daily  midodrine, 5 mg, oral, TID  nystatin, , Topical, BID  pantoprazole, 40 mg, oral, BID AC  potassium chloride CR, 40 mEq, oral, BID  sertraline, 25 mg, oral, Daily  zinc oxide, 1 Application, Topical, BID      Continuous medications     PRN medications  PRN medications: acetaminophen **OR** acetaminophen **OR** acetaminophen, loperamide, melatonin, ondansetron ODT **OR** ondansetron, oxyCODONE, oxygen, polyethylene glycol       Assessment/Plan   Principal Problem:    Sepsis with acute hypoxic respiratory failure without septic shock (Multi)  Active Problems:    Iron deficiency anemia    Chronic systolic (congestive) heart failure    Essential hypertension    History of pulmonary embolism    Anxiety    Vitamin D deficiency    Vitamin B12 deficiency    Adult hypothyroidism    Pneumonia of right lower lobe due to infectious organism    Leukocytosis    Acute kidney injury superimposed on stage 3a chronic kidney disease    Elevated troponin    Hyperbilirubinemia    Hyperglycemia    Hypokalemia    Hypomagnesemia    Hypocalcemia    Abnormality of right ventricle of heart    84 yo woman w/ h/o myeloproliferative disorder admitted w/ acute hypoxic resp failure 2/2 pulm edema     Acute hypoxic resp failure 2/2 pulm edema - now on RA.  Cont incentive spirometry  Recommend diuresis.     PNA - cont augmentin     Pleural effusions - likely 2/2 CHF.  No  plan for thora at this time.  Can re-evaluate if more dyspneic and  cannot diurese.    Will sign off at this time.  Please reconsult if there are any further questions.         Ruperto Lewis MD

## 2024-11-07 NOTE — DOCUMENTATION CLARIFICATION NOTE
"    PATIENT:               ELIE WHITLEY  ACCT #:                  [id number]  MRN:                       [id number]  :                       1940  ADMIT DATE:       11/3/2024 9:17 AM  DISCH DATE:  RESPONDING PROVIDER #:        [id number]          PROVIDER RESPONSE TEXT:    Gram Negative PNA    CDI QUERY TEXT:    Clarification    Instruction:    Based on your assessment of the patient and the clinical information, please provide the requested documentation by clicking on the appropriate radio button and enter any additional information if prompted.    Question: Please further specify the type of pneumonia being treated    When answering this query, please exercise your independent professional judgment. The fact that a question is being asked, does not imply that any particular answer is desired or expected.    The patient's clinical indicators include:  Clinical Information: 83 year old female, BMI 29.39, presenting with right lower lobe PNA and related sepsis.    Clinical Indicators:  Per 11/3 Flu A/Flu B/RSV/Coronavirus PCR: \"Not Detected\"    Per 11/3 BC: \"No growth at 2 days\"    Per 11/3 CT Chest: \"Abnormalities at the right lung base on portable frontal chest  radiograph earlier today are due to a combination of moderate-sized,  mostly if not entirely free-flowing right pleural effusion with  associated multisegmental right lower lobe collapse adjacent to it    Small area of ground-glass airspace disease in the middle lobe  confirmed on CT may not have been expected on the prior radiograph    Possibility of mild ground-glass airspace disease in the right lower  lobe as well    No ground-glass airspace disease in the left lung    No consolidative pneumonia in any of the inflated lungs on either side    Small free-flowing left pleural effusion    Unusual hypodensities in the right ventricular free wall of uncertain  etiology and significance\"    Per 11/3 CXR: \"Right lower lobe airspace consolidation either " "pneumonia or  atelectasis with right pleural effusion. Follow-up to complete  resolution is needed\"    Per 11/3 H/P: \"Right lower lobe pneumonia...Suggested by chest x-ray and hypoxemia...Given hx multiple episodes of emesis along with RLL infiltrate, concern for aspiration. Will change ceftriaxone to zosyn and monitor.\"    Treatment:  Pulmonology consult, Augmentin 875-125mg po BID (11/5-11/6), Azithromycin 500mg IV x1 (11/3), Rocephin 2g IV x1 (11/3), Azithromycin 500mg po q24hrs (11/4-11/5), Zosyn 3.375g IV q6hrs (11/3-11/5), repeated VS, repeated labs, supportive care.    Risk Factors: 83 year old female being treated for sepsis and PNA.  Options provided:  -- Aspiration PNA  -- Gram Negative PNA  -- Gram Positive PNA  -- Gram Negative and Gram Positive PNA  -- Pseudomonas PNA  -- Other - I will add my own diagnosis  -- Refer to Clinical Documentation Reviewer    Query created by: Denae Cummings on 11/7/2024 2:16 PM      Electronically signed by:  SYED JOSHI DO 11/7/2024 2:23 PM          "

## 2024-11-07 NOTE — PROGRESS NOTES
Palliative Care Social Work Note     Met with pt and son Alexis.  Pt sitting up in bed, eating lunch.  Pt states she feels better.  Son feels his mother is doing better.  Son wants to continue full measures, full treatment.  Son feels pt can continue to benefit from any and all treatment.  Plan is for pt to go to [facility] before returning home.  No further palliative care needs identified.  Palliative care will dc, please reconsult if indicated.  Team, Dr Ramsay, updated.

## 2024-11-07 NOTE — PROGRESS NOTES
11/07/24 0950   Discharge Planning   Home or Post Acute Services Post acute facilities (Rehab/SNF/etc)   Type of Post Acute Facility Services Skilled nursing   Expected Discharge Disposition SNF  (Spoke with the patient and the patient's son at the bedside regarding discharge plan. Patient and son are agreeable to therapies recommendations for moderate intensity rehab and would preferto go to  [facility]. Will send referral to the facility.)   Patient Choice   Provider Choice list and CMS website (https://medicare.gov/care-compare#search) for post-acute Quality and Resource Measure Data were provided and reviewed with: Family;Patient   Patient / Family choosing to utilize agency / facility established prior to hospitalization No

## 2024-11-07 NOTE — NURSING NOTE
1930: assumed pt care    2149: pt's heart rate on tele is irregular and tachy at times like 108, coreg 25 mg PO on hold, let Dr. Zepeda know, no further orders

## 2024-11-07 NOTE — PROGRESS NOTES
Physical Therapy    Physical Therapy Treatment    Patient Name: Desirae Gregg  MRN: [id number]  Department: [address]  Room: [address] [place] [address]  Today's Date: 11/7/2024  Time Calculation  Start Time: 1050  Stop Time: 1114  Time Calculation (min): 24 min         Assessment/Plan   PT Assessment  PT Assessment Results: Decreased range of motion, Decreased strength, Decreased endurance, Impaired balance, Decreased mobility, Decreased cognition, Impaired judgement, Decreased safety awareness  Rehab Prognosis: Fair  Barriers to Discharge: Comorbidities, medical management  End of Session Communication: Bedside nurse  End of Session Patient Position: Bed, 3 rail up, Alarm on (pt verbalized correct use of call bell)     PT Plan  Treatment/Interventions: Bed mobility, Transfer training, Gait training, Therapeutic exercise, Therapeutic activity, Positioning  PT Plan: Ongoing PT  PT Frequency: 3 times per week  PT Discharge Recommendations: Moderate intensity level of continued care, 24 hr supervision due to cognition  Equipment Recommended upon Discharge: Wheeled walker, Wheelchair (owns both)  PT Recommended Transfer Status: Assist x2 (with FWW (pending vitals/sx))  PT - OK to Discharge: Yes      General Visit Information:   PT  Visit  PT Received On: 11/07/24  General  Reason for Referral: Pt is an 82 yo female who presented to [facility] on 11/3/24 with generalized weakness, vomiting, and fever. CT chest showed moderate right pleural effusion. PT consulted for impaired mobility and safety awareness.  Past Medical History Relevant to Rehab: HTN, chronic systolic CHF, PE, myelofibrosis, sciatica, remote poliomyelitis (affecting LLE), JAK2 positive polycythemia vera, CKDIII  Family/Caregiver Present: Yes  Caregiver Feedback: Son (Alexis) present during session, assisted with home setup and PLOF  Prior to Session Communication: Bedside nurse  Patient Position Received: Bed, 3 rail up, Alarm on  General Comment: AXOX3, forgetful, son at bedside, 02  Received referral to lung cancer screening program.  Chart review to assess for lung cancer screening program eligibility.   1. Age 55-77 yrs of age? Yes 75 y.o.  2. 30 pack year hx of smoking, or greater? Yes 1 [id number]= [id number] hx  3. Current smoker or if quit, has pt quit within last 15 yrs?Yes  Current Smoker   4. Any signs or symptoms of lung cancer? None noted  5. Previous history of lung cancer? None noted  6. Chest CT within past 12 mos.? None noted  Patient does meet eligibility criteria. [facility] scheduling notified to schedule the shared decision making visit.       "on at 2L, no pulse ox reading, does not wear 02 at baseline. verma to CD, Tele. Bilat LE edema , LLE reddened with dressing appearing damp \"I will get nursing to change that its due\" stated son. Agreeable to therapy with encouragement from son.    Subjective   Precautions:  Precautions  Medical Precautions: Fall precautions    Vital Signs (Past 2hrs)        Date/Time Vitals Session Patient Position Pulse Resp SpO2 BP MAP (mmHg)    11/07/24 1115 During OT  --  --  --  96 %  --  --                         Objective   Pain:  Pain Assessment  Pain Assessment: Muñoz-Baker FACES  Muñoz-Baker FACES Pain Rating: Hurts little more  Pain Type: Chronic pain (bilat shoulder pain > on left. \"she usually uses icy hot but they dont have it here, said they have bengay I will go ask for it\" stated son.)  Cognition:  Cognition  Orientation Level: Disoriented to time, Disoriented to situation (\"November 20[birth year], 2014\" - reoriented to year)  Coordination:     Postural Control:  Static Sitting Balance  Static Sitting-Balance Support: Feet supported, Right upper extremity supported  Static Sitting-Level of Assistance: Close supervision  Static Sitting-Comment/Number of Minutes: 8 minutes  Static Standing Balance  Static Standing-Balance Support: Bilateral upper extremity supported  Static Standing-Level of Assistance: Moderate assistance, Minimum assistance  Static Standing-Comment/Number of Minutes: 1 mijnute  Extremity/Trunk Assessments:    Activity Tolerance:  Activity Tolerance  Endurance: Tolerates 10 - 20 min exercise with multiple rests  Treatments:  Therapeutic Exercise  Therapeutic Exercise Performed: Yes  Therapeutic Exercise Activity 1: long sitting ankle pumps. quad sets, abd/add, all x10 reps, abd curls semi sitting x7 reps, bridging x5 all for strengthening to improve functional moblity and independence         Therapeutic Activity  Therapeutic Activity Performed: Yes  Therapeutic Activity 1: C&DB                   Bed " Mobility  Bed Mobility: Yes  Bed Mobility 1  Bed Mobility 1: Supine to sitting  Level of Assistance 1: Maximum verbal cues    Ambulation/Gait Training  Ambulation/Gait Training Performed: No  Transfers  Transfer: Yes (Pt fatigued after sitting upright at EOB, also reported some dizziness (SpO2 87%); RN aware)  Transfer 1  Transfer From 1: Sit to  Transfer to 1: Stand  Technique 1: Sit to stand, Stand to sit, Stand pivot  Transfer Device 1: Walker  Transfer Level of Assistance 1: Moderate assistance, Moderate tactile cues, Moderate verbal cues                          Outcome Measures:  [facility] Basic Mobility  Turning from your back to your side while in a flat bed without using bedrails: A lot  Moving from lying on your back to sitting on the side of a flat bed without using bedrails: A lot  Moving to and from bed to chair (including a wheelchair): Total  Standing up from a chair using your arms (e.g. wheelchair or bedside chair): A lot  To walk in hospital room: Total  Climbing 3-5 steps with railing: Total  Basic Mobility - Total Score: 9    Education Documentation  Handouts, taught by Kaye Trejo PTA at 11/7/2024 12:51 PM.  Learner: Family  Readiness: Acceptance  Method: Explanation  Response: Needs Reinforcement, Verbalizes Understanding    Precautions, taught by Kaye Trejo PTA at 11/7/2024 12:51 PM.  Learner: Family  Readiness: Acceptance  Method: Explanation  Response: Needs Reinforcement, Verbalizes Understanding    Body Mechanics, taught by Kaye Trejo PTA at 11/7/2024 12:51 PM.  Learner: Family  Readiness: Acceptance  Method: Explanation  Response: Needs Reinforcement, Verbalizes Understanding    Home Exercise Program, taught by Kaye Trejo PTA at 11/7/2024 12:51 PM.  Learner: Family  Readiness: Acceptance  Method: Explanation  Response: Needs Reinforcement, Verbalizes Understanding    Mobility Training, taught by Kaye Trejo PTA at 11/7/2024 12:51 PM.  Learner: Family  Readiness:  Acceptance  Method: Explanation  Response: Needs Reinforcement, Verbalizes Understanding    Handouts, taught by Kaye Trejo PTA at 11/7/2024 12:51 PM.  Learner: Family  Readiness: Acceptance  Method: Explanation  Response: Needs Reinforcement, Verbalizes Understanding    Body Mechanics, taught by Kaye Trejo PTA at 11/7/2024 12:51 PM.  Learner: Family  Readiness: Acceptance  Method: Explanation  Response: Needs Reinforcement, Verbalizes Understanding    Precautions, taught by Kaye Trejo PTA at 11/7/2024 12:51 PM.  Learner: Family  Readiness: Acceptance  Method: Explanation  Response: Needs Reinforcement, Verbalizes Understanding    Home Exercise Program, taught by Kaye Trejo PTA at 11/7/2024 12:51 PM.  Learner: Family  Readiness: Acceptance  Method: Explanation  Response: Needs Reinforcement, Verbalizes Understanding    ADL Training, taught by Kaye Trejo PTA at 11/7/2024 12:51 PM.  Learner: Family  Readiness: Acceptance  Method: Explanation  Response: Needs Reinforcement, Verbalizes Understanding    Education Comments  No comments found.        OP EDUCATION:       Encounter Problems       Encounter Problems (Active)       Balance       Patient will tolerate sitting upright OOB for 30 minutes without symptoms in order to complete self-care tasks.  (Progressing)       Start:  11/06/24    Expected End:  11/20/24               Mobility       Patient will ambulate bathroom distance of 15' with FWW and CGA in order to complete toileting and hygiene activities with greater ease.  (Progressing)       Start:  11/06/24    Expected End:  11/20/24               PT Transfers       Patient will transfer to and from sit to supine with Yeimi in order to decrease caregiver burden.  (Progressing)       Start:  11/06/24    Expected End:  11/20/24            Patient will transfer sit to and from stand with FWW and Yeimi.  (Progressing)       Start:  11/06/24    Expected End:  11/20/24               Pain - Adult

## 2024-11-07 NOTE — CONSULTS
Heart Failure Nurse Navigator      Assessment    Desirae Gregg is a 83 y.o. female presents with a complaint of fever and generalized weakness x 1 week. Son at bedside participating in education. Patient alert to self with some confusion to time, and situation, but interactive. Son provides most of the support for his mother at home. He states that she ambulates about 30 feet 4-5 times a day with a walker and ambulates frequently to the bathroom. He states that she is undernourished and finds meeting protein and calorie requirements while trying to limit sodium intake a challenge.     CHF disease education performed with the patient's son. Educational emphasis placed on medication compliance, physician appointment follow-up, and signs and symptoms of acute HF exacerbation, when to follow up with her cardiologist or seek emergent help. We also discussed the pathophysiology of HF and how medications help treat HF symptoms. We discussed various methods to improve compliance with post-discharge plan of care. We reviewed how sodium affects heart failure. We also reviewed opportunities to reduce sodium in her diet.    Patients Cardiologist(s): Chicho Goldman MD    Patients Primary Care Provider: Jackie Cedillo MD    1. Medical Domain  What is the patient's most recent LVEF? 60-65%  HFrEF (LVEF <= 40%) Quadruple therapy recommended  HFmrEF (LVEF 41-49%) Quadruple therapy recommended  HFpEF (LVEF >= 50%) Minimum recommendations: SGLT2i and MRA  Is the patient on GDMT for their condition?   ARNI/ACEI/ARB: No None  BB: Yes Carvedilol 3.125 mg BID  MRA: No None  SGLT2i: No None  Is the patient prescribed a diuretic? Yes  Bumetanide 1 mg daily  Does this patient have an implanted device (ie cardioMEMS, ICD, CRT-D)?  Device type: n/a  Could this patient have advanced heart failure (Stage D heart failure)?: No   If yes, the potential markers of advanced heart failure include:     REFERENCE: Potential markers of advanced HF   Inotrope  "(dobutamine or milrinone) used during this admission?   LVEF<=25%?   >=2 hospitalizations for ADHF in the last year?   Severe symptoms of HF (fatigue, dyspnea, confusion, edema) despite medical therapy?   Downtitration of GDMT as compared to home medications?   Discontinuation of GDMT because of hypotension or renal intolerance?   Recurrent arrhythmias (AF, VT with ICD shocks)?   Cardiac cachexia (i.e., unintentional weight loss due to HF)?   High-risk biomarker profile (e.g., hyponatremia [Na<135], very elevated BNP, worsening kidney function)   Escalating doses of diuretics or persistent edema despite escalation     2. Mind and Emotion  Does this patient have possible cognitive impairment?: Yes (The Mini-Cog score n/a - unable to participate/5)  Ask the patient to memorize these 3 words: banana, sunrise, chair  Ask the patient to draw a clock with hands pointing at \"20 minutes after 8\"  Ask the patient to recall the 3 words  Score: Add number of words recalled + clock drawing (0 points for any errors, 2 points if correct)  Interpretation: A score of 0-2 suggests cognitive impairment is present, a score of 3-5 suggests cognitive impairment is absent  Does this patient have major depression?: N/A (PH-Q2 score n/a - unable to participate/6)  Over the last 2 weeks: Little interest or pleasure in doing things? (Not at all +0, Several days +1, More than half the days +2, Nearly every day +3)  Over the last 2 weeks: Feeling down, depressed or hopeless? (Not at all +0, Several days +1, More than half the days +2, Nearly every day +3)  Score: Add points  Interpretation: A score of 3 or more suggests that major depression is likely.     3. Physical Function  Could this patient be frail?: Yes   Defined by presence of all of these: slowness, weakness, shrinking, inactivity, exhaustion  Is this patient at risk for falling?: Yes   Defined by having experienced a fall in the last 12 months.    4. Social Determinants of " Health  Transportation deficits?: No   Lack of insurance?: No   Poor financial resources?: No   Living conditions (homelessness, unstable home)?: No   Poor family/social support?: No   Poor personal care?: No   Food insecurity?: No   Lack of HCPOA?: No    Summary of Assessment  Chronic pain   stage III chronic kidney disease,   pulmonary embolism   myelofibrosis       Recommendations  Recommend evaluation by physical therapist, nutritionist, and/or Palliative Medicine consultant (Patient may be frail and/or at risk of falling).       Heart Failure Education   - Living With Heart Failure book provided.  - HF signs and symptoms, heart failure zones and when to call cardiologist.   - Controlling Heart Failure at Home: medication adherence, following up with cardiologist at least once yearly, staying healthy and active, limiting sodium and fluid intake as directed by cardiologist.  - Daily Weight Education      Ernesto Keene RN

## 2024-11-08 ENCOUNTER — APPOINTMENT (OUTPATIENT)
Dept: CARDIOLOGY | Facility: HOSPITAL | Age: 84
DRG: 871 | End: 2024-11-08
Payer: MEDICARE

## 2024-11-08 ENCOUNTER — APPOINTMENT (OUTPATIENT)
Dept: RADIOLOGY | Facility: HOSPITAL | Age: 84
DRG: 871 | End: 2024-11-08
Payer: MEDICARE

## 2024-11-08 LAB
ALBUMIN SERPL BCP-MCNC: 2.1 G/DL (ref 3.4–5)
ANION GAP SERPL CALC-SCNC: 8 MMOL/L (ref 10–20)
BASOPHILS # BLD AUTO: 0.1 X10*3/UL (ref 0–0.1)
BASOPHILS NFR BLD AUTO: 0.5 %
BUN SERPL-MCNC: 24 MG/DL (ref 6–23)
CALCIUM SERPL-MCNC: 7.3 MG/DL (ref 8.6–10.3)
CHLORIDE SERPL-SCNC: 105 MMOL/L (ref 98–107)
CO2 SERPL-SCNC: 29 MMOL/L (ref 21–32)
CREAT SERPL-MCNC: 1.21 MG/DL (ref 0.5–1.05)
CRP SERPL-MCNC: 2.84 MG/DL
EGFRCR SERPLBLD CKD-EPI 2021: 44 ML/MIN/1.73M*2
EOSINOPHIL # BLD AUTO: 0.63 X10*3/UL (ref 0–0.4)
EOSINOPHIL NFR BLD AUTO: 3.4 %
ERYTHROCYTE [DISTWIDTH] IN BLOOD BY AUTOMATED COUNT: 15.6 % (ref 11.5–14.5)
FAT STL QL: NORMAL
GLUCOSE SERPL-MCNC: 108 MG/DL (ref 74–99)
HCT VFR BLD AUTO: 45.1 % (ref 36–46)
HCT VFR BLD AUTO: 45.9 % (ref 36–46)
HGB BLD-MCNC: 13.5 G/DL (ref 12–16)
HGB BLD-MCNC: 13.8 G/DL (ref 12–16)
IMM GRANULOCYTES # BLD AUTO: 0.23 X10*3/UL (ref 0–0.5)
IMM GRANULOCYTES NFR BLD AUTO: 1.2 % (ref 0–0.9)
LYMPHOCYTES # BLD AUTO: 0.76 X10*3/UL (ref 0.8–3)
LYMPHOCYTES NFR BLD AUTO: 4 %
MAGNESIUM SERPL-MCNC: 1.52 MG/DL (ref 1.6–2.4)
MCH RBC QN AUTO: 27.3 PG (ref 26–34)
MCHC RBC AUTO-ENTMCNC: 29.9 G/DL (ref 32–36)
MCV RBC AUTO: 91 FL (ref 80–100)
MONOCYTES # BLD AUTO: 0.82 X10*3/UL (ref 0.05–0.8)
MONOCYTES NFR BLD AUTO: 4.4 %
NEUTRAL FAT STL QL: NORMAL
NEUTROPHILS # BLD AUTO: 16.25 X10*3/UL (ref 1.6–5.5)
NEUTROPHILS NFR BLD AUTO: 86.5 %
NRBC BLD-RTO: 0 /100 WBCS (ref 0–0)
PHOSPHATE SERPL-MCNC: 2.1 MG/DL (ref 2.5–4.9)
PLATELET # BLD AUTO: 166 X10*3/UL (ref 150–450)
POTASSIUM SERPL-SCNC: 4.1 MMOL/L (ref 3.5–5.3)
PROCALCITONIN SERPL-MCNC: 0.28 NG/ML
RBC # BLD AUTO: 4.94 X10*6/UL (ref 4–5.2)
SODIUM SERPL-SCNC: 138 MMOL/L (ref 136–145)
WBC # BLD AUTO: 18.8 X10*3/UL (ref 4.4–11.3)

## 2024-11-08 PROCEDURE — 93005 ELECTROCARDIOGRAM TRACING: CPT

## 2024-11-08 PROCEDURE — 83735 ASSAY OF MAGNESIUM: CPT | Performed by: INTERNAL MEDICINE

## 2024-11-08 PROCEDURE — 85014 HEMATOCRIT: CPT | Performed by: INTERNAL MEDICINE

## 2024-11-08 PROCEDURE — 36415 COLL VENOUS BLD VENIPUNCTURE: CPT | Performed by: INTERNAL MEDICINE

## 2024-11-08 PROCEDURE — 71045 X-RAY EXAM CHEST 1 VIEW: CPT | Performed by: STUDENT IN AN ORGANIZED HEALTH CARE EDUCATION/TRAINING PROGRAM

## 2024-11-08 PROCEDURE — 2500000004 HC RX 250 GENERAL PHARMACY W/ HCPCS (ALT 636 FOR OP/ED): Performed by: INTERNAL MEDICINE

## 2024-11-08 PROCEDURE — 2500000002 HC RX 250 W HCPCS SELF ADMINISTERED DRUGS (ALT 637 FOR MEDICARE OP, ALT 636 FOR OP/ED): Performed by: INTERNAL MEDICINE

## 2024-11-08 PROCEDURE — 86140 C-REACTIVE PROTEIN: CPT | Performed by: INTERNAL MEDICINE

## 2024-11-08 PROCEDURE — 71045 X-RAY EXAM CHEST 1 VIEW: CPT

## 2024-11-08 PROCEDURE — 84145 PROCALCITONIN (PCT): CPT | Mod: GEALAB | Performed by: INTERNAL MEDICINE

## 2024-11-08 PROCEDURE — 99232 SBSQ HOSP IP/OBS MODERATE 35: CPT | Performed by: INTERNAL MEDICINE

## 2024-11-08 PROCEDURE — 85025 COMPLETE CBC W/AUTO DIFF WBC: CPT | Performed by: INTERNAL MEDICINE

## 2024-11-08 PROCEDURE — 2500000001 HC RX 250 WO HCPCS SELF ADMINISTERED DRUGS (ALT 637 FOR MEDICARE OP): Performed by: INTERNAL MEDICINE

## 2024-11-08 PROCEDURE — 2500000001 HC RX 250 WO HCPCS SELF ADMINISTERED DRUGS (ALT 637 FOR MEDICARE OP): Performed by: NURSE PRACTITIONER

## 2024-11-08 PROCEDURE — 1200000002 HC GENERAL ROOM WITH TELEMETRY DAILY

## 2024-11-08 PROCEDURE — 2500000004 HC RX 250 GENERAL PHARMACY W/ HCPCS (ALT 636 FOR OP/ED): Performed by: NURSE PRACTITIONER

## 2024-11-08 PROCEDURE — 80069 RENAL FUNCTION PANEL: CPT | Performed by: INTERNAL MEDICINE

## 2024-11-08 PROCEDURE — 2500000005 HC RX 250 GENERAL PHARMACY W/O HCPCS: Performed by: INTERNAL MEDICINE

## 2024-11-08 RX ORDER — MAGNESIUM SULFATE HEPTAHYDRATE 40 MG/ML
2 INJECTION, SOLUTION INTRAVENOUS ONCE
Status: COMPLETED | OUTPATIENT
Start: 2024-11-08 | End: 2024-11-08

## 2024-11-08 RX ORDER — BUMETANIDE 0.25 MG/ML
1 INJECTION, SOLUTION INTRAMUSCULAR; INTRAVENOUS ONCE
Status: COMPLETED | OUTPATIENT
Start: 2024-11-08 | End: 2024-11-08

## 2024-11-08 RX ADMIN — NYSTATIN: 100000 CREAM TOPICAL at 09:24

## 2024-11-08 RX ADMIN — MENTHOL 10%, METHYL SALICYLATE 15% 1 APPLICATION: 10; 15 CREAM TOPICAL at 21:28

## 2024-11-08 RX ADMIN — MAGNESIUM SULFATE HEPTAHYDRATE 2 G: 2 INJECTION, SOLUTION INTRAVENOUS at 09:27

## 2024-11-08 RX ADMIN — BUSPIRONE HYDROCHLORIDE 7.5 MG: 15 TABLET ORAL at 21:28

## 2024-11-08 RX ADMIN — POTASSIUM CHLORIDE 40 MEQ: 1500 TABLET, EXTENDED RELEASE ORAL at 09:08

## 2024-11-08 RX ADMIN — PANTOPRAZOLE SODIUM 40 MG: 40 TABLET, DELAYED RELEASE ORAL at 06:02

## 2024-11-08 RX ADMIN — SERTRALINE HYDROCHLORIDE 25 MG: 50 TABLET ORAL at 09:08

## 2024-11-08 RX ADMIN — NYSTATIN: 100000 CREAM TOPICAL at 21:30

## 2024-11-08 RX ADMIN — Medication 2000 UNITS: at 09:08

## 2024-11-08 RX ADMIN — AMOXICILLIN AND CLAVULANATE POTASSIUM 1 TABLET: 500; 125 TABLET, FILM COATED ORAL at 09:08

## 2024-11-08 RX ADMIN — MINERAL OIL, PETROLATUM, PHENYLEPHRINE HCL: 14; 74.9; .25 OINTMENT RECTAL at 21:29

## 2024-11-08 RX ADMIN — AMOXICILLIN AND CLAVULANATE POTASSIUM 1 TABLET: 500; 125 TABLET, FILM COATED ORAL at 21:28

## 2024-11-08 RX ADMIN — Medication 1 APPLICATION: at 09:23

## 2024-11-08 RX ADMIN — MINERAL OIL, PETROLATUM, PHENYLEPHRINE HCL: 14; 74.9; .25 OINTMENT RECTAL at 14:32

## 2024-11-08 RX ADMIN — OXYCODONE HYDROCHLORIDE 5 MG: 5 TABLET ORAL at 14:08

## 2024-11-08 RX ADMIN — PANTOPRAZOLE SODIUM 40 MG: 40 TABLET, DELAYED RELEASE ORAL at 15:38

## 2024-11-08 RX ADMIN — LEVOTHYROXINE SODIUM 88 MCG: 0.09 TABLET ORAL at 06:02

## 2024-11-08 RX ADMIN — APIXABAN 2.5 MG: 2.5 TABLET, FILM COATED ORAL at 09:08

## 2024-11-08 RX ADMIN — CARVEDILOL 3.12 MG: 3.12 TABLET, FILM COATED ORAL at 21:28

## 2024-11-08 RX ADMIN — BUSPIRONE HYDROCHLORIDE 7.5 MG: 15 TABLET ORAL at 09:08

## 2024-11-08 RX ADMIN — CARVEDILOL 3.12 MG: 3.12 TABLET, FILM COATED ORAL at 09:09

## 2024-11-08 RX ADMIN — GABAPENTIN 200 MG: 100 CAPSULE ORAL at 21:28

## 2024-11-08 RX ADMIN — FOLIC ACID 1 MG: 1 TABLET ORAL at 09:08

## 2024-11-08 RX ADMIN — PSYLLIUM HUSK 1 PACKET: 3.4 POWDER ORAL at 21:28

## 2024-11-08 RX ADMIN — Medication 400 MG: at 09:09

## 2024-11-08 RX ADMIN — MIDODRINE HYDROCHLORIDE 5 MG: 5 TABLET ORAL at 09:08

## 2024-11-08 RX ADMIN — LOPERAMIDE HYDROCHLORIDE 2 MG: 2 CAPSULE ORAL at 13:19

## 2024-11-08 RX ADMIN — Medication 400 MG: at 21:28

## 2024-11-08 RX ADMIN — BUMETANIDE 1 MG: 0.25 INJECTION INTRAMUSCULAR; INTRAVENOUS at 13:55

## 2024-11-08 RX ADMIN — APIXABAN 2.5 MG: 2.5 TABLET, FILM COATED ORAL at 21:28

## 2024-11-08 RX ADMIN — CETIRIZINE HYDROCHLORIDE 10 MG: 10 TABLET, FILM COATED ORAL at 09:09

## 2024-11-08 RX ADMIN — Medication 1 APPLICATION: at 21:30

## 2024-11-08 RX ADMIN — GABAPENTIN 200 MG: 100 CAPSULE ORAL at 09:08

## 2024-11-08 RX ADMIN — FERROUS SULFATE TAB 325 MG (65 MG ELEMENTAL FE) 325 MG: 325 (65 FE) TAB at 09:09

## 2024-11-08 RX ADMIN — PSYLLIUM HUSK 1 PACKET: 3.4 POWDER ORAL at 09:07

## 2024-11-08 RX ADMIN — SODIUM PHOSPHATE, MONOBASIC, MONOHYDRATE AND SODIUM PHOSPHATE, DIBASIC, ANHYDROUS 30 MMOL: 276; 142 INJECTION, SOLUTION INTRAVENOUS at 13:55

## 2024-11-08 RX ADMIN — CYANOCOBALAMIN TAB 1000 MCG 1000 MCG: 1000 TAB at 09:09

## 2024-11-08 ASSESSMENT — COGNITIVE AND FUNCTIONAL STATUS - GENERAL
MOBILITY SCORE: 15
DAILY ACTIVITIY SCORE: 19
STANDING UP FROM CHAIR USING ARMS: A LITTLE
DRESSING REGULAR LOWER BODY CLOTHING: A LITTLE
WALKING IN HOSPITAL ROOM: A LOT
DAILY ACTIVITIY SCORE: 19
PERSONAL GROOMING: A LITTLE
DRESSING REGULAR LOWER BODY CLOTHING: A LITTLE
DRESSING REGULAR LOWER BODY CLOTHING: A LITTLE
MOVING FROM LYING ON BACK TO SITTING ON SIDE OF FLAT BED WITH BEDRAILS: A LITTLE
HELP NEEDED FOR BATHING: A LITTLE
DRESSING REGULAR UPPER BODY CLOTHING: A LITTLE
MOVING TO AND FROM BED TO CHAIR: A LITTLE
CLIMB 3 TO 5 STEPS WITH RAILING: TOTAL
DRESSING REGULAR UPPER BODY CLOTHING: A LITTLE
MOBILITY SCORE: 15
TURNING FROM BACK TO SIDE WHILE IN FLAT BAD: A LITTLE
PERSONAL GROOMING: A LITTLE
WALKING IN HOSPITAL ROOM: A LOT
HELP NEEDED FOR BATHING: A LITTLE
WALKING IN HOSPITAL ROOM: A LOT
MOBILITY SCORE: 15
MOVING FROM LYING ON BACK TO SITTING ON SIDE OF FLAT BED WITH BEDRAILS: A LITTLE
PERSONAL GROOMING: A LITTLE
TOILETING: A LITTLE
TOILETING: A LITTLE
CLIMB 3 TO 5 STEPS WITH RAILING: TOTAL
TOILETING: A LITTLE
DRESSING REGULAR UPPER BODY CLOTHING: A LITTLE
TURNING FROM BACK TO SIDE WHILE IN FLAT BAD: A LITTLE
HELP NEEDED FOR BATHING: A LITTLE
DAILY ACTIVITIY SCORE: 19
MOVING FROM LYING ON BACK TO SITTING ON SIDE OF FLAT BED WITH BEDRAILS: A LITTLE
MOVING TO AND FROM BED TO CHAIR: A LITTLE
STANDING UP FROM CHAIR USING ARMS: A LITTLE
TURNING FROM BACK TO SIDE WHILE IN FLAT BAD: A LITTLE
CLIMB 3 TO 5 STEPS WITH RAILING: TOTAL
MOVING TO AND FROM BED TO CHAIR: A LITTLE
STANDING UP FROM CHAIR USING ARMS: A LITTLE

## 2024-11-08 ASSESSMENT — PAIN SCALES - GENERAL
PAINLEVEL_OUTOF10: 7
PAINLEVEL_OUTOF10: 1

## 2024-11-08 ASSESSMENT — PAIN DESCRIPTION - LOCATION: LOCATION: GENERALIZED

## 2024-11-08 NOTE — CARE PLAN
"The patient's goals for the shift include  \"rest and feeling better.\"    The clinical goals for the shift include Pt will remain safe and free from injury throughout shift.    1200  -Midodrine was held per Dr. Ramsay's orders. BP was 126/82    1400  -IV bumex administered per orders and was told to recheck pts BP in 2 hours    1530  -Picture was captured of pts LLE wound and added to LDA avatar. Alnita aware and wound was changed and orders are in for it.    1700  -Evening midodrine was held per Dr. Ramsay's orders. BP was 138/74    1800  -12 lead EKG was obtained for pt being tachycardic. Dr. Ramsay was sent the results & there are no new orders at this time. (See results in chart review or pts chart).     1845  -Pt is resting comfortably, pts heart rate at this time is 88 BPM.     Problem: Skin  Goal: Prevent/minimize sheer/friction injuries  Outcome: Met  Flowsheets (Taken 11/8/2024 1833)  Prevent/minimize sheer/friction injuries:   Complete micro-shifts as needed if patient unable. Adjust patient position to relieve pressure points, not a full turn   HOB 30 degrees or less   Turn/reposition every 2 hours/use positioning/transfer devices   Use pull sheet     Problem: Heart Failure  Goal: Improved urinary output this shift  Outcome: Progressing  Flowsheets (Taken 11/8/2024 1833)  Improved urinary output this shift:   Med administration/monitor effect - bumex   Monitor intake/output and daily weight -- 700 mL output total for 12 hour shift (7a-7p)   Monitor serum electrolytes/replace per order -- mag/phos     Problem: Heart Failure  Goal: Increase self care and/or family involvement in 24 hours  Outcome: Met  Note: Family @ bedside    Problem: Fall/Injury  Goal: Not fall by end of shift  Outcome: Met     Problem: Discharge Planning  Goal: Discharge to home or other facility with appropriate resources  Outcome: Progressing     "

## 2024-11-08 NOTE — NURSING NOTE
JC Lopez reached out with concern for draining wound lower left shin - EMR reviewed, this wound present on admission, orders in place.  It is a 7 x 5 cm ruptured bullae with moderate amount serous drainage, there is localized lavender discoloration and soft edema to the leg.  She does have Waffle boots in place.  Goal:  moist healing and protection.  Recommend:  switch to Xeroform, ABD, Kerlix roll gauze and elevation as wound draining and ced-wound skin fragile.  Orders were obtained and care provided.

## 2024-11-08 NOTE — PROGRESS NOTES
Desirae Gregg is a 83 y.o. female on day 5 of admission presenting with Sepsis with acute hypoxic respiratory failure without septic shock (Multi).      Subjective   She is sitting up in the bed, currently on O2 at 2L per NC. She states she feels about the same as yesterday.       Review of systems:  Constitutional: negative for fever, chills, or malaise  Neuro: negative for dizziness, headache, numbness, tingling  ENT: Negative for nasal congestion or sore throat  CV: negative for chest pain, palpitations  GI: negative for abd pain, nausea, vomiting or diarrhea  : negative for dysuria, frequency, or urgency  Skin: negative for lesions, wounds, or rash  Musculoskeletal: Negative for weakness, myalgia, or arthralgia  Endocrine: Negative for polyuria or polydipsia         Objective   Constitutional: Well developed, awake/alert/oriented x3, no distress, alert and cooperative  Eyes: PERRL, EOMI, clear sclera  ENMT: mucous membranes moist, no apparent injury, no lesions seen  Head/Neck: Neck supple, no apparent injury, thyroid without mass or tenderness, No JVD, trachea midline, no bruits  Respiratory/Thorax: Patent airways, diminished bilat bases with good chest expansion, thorax symmetric  Cardiovascular: Regular, rate and rhythm, no murmurs, 2+ equal pulses of the extremities, normal S 1and S 2  Gastrointestinal: Nondistended, soft, non-tender, no rebound tenderness or guarding, no masses palpable, no organomegaly, +BS, no bruits  Musculoskeletal: ROM intact, no joint swelling, normal strength  Extremities: +2 pitting edema, large open blister with serous drainage noted to LLE  Neurological: alert and oriented x3, intact senses, motor, response and reflexes, normal strength  Lymphatic: No significant lymphadenopathy  Psychological: Appropriate mood and behavior  Skin: Warm and dry, large open blister with serous drainage noted to LLE      Last Recorded Vitals  /82   Pulse 79   Temp 36.2 °C (97.2 °F)   Resp 18   "  1.651 m (5' 5\")   Wt 81 kg (178 lb 9.6 oz)   SpO2 98%   BMI 29.72 kg/m²     Intake/Output last 3 Shifts:  I/O last 3 completed shifts:  In: 540 (6.7 mL/kg) [P.O.:540]  Out: 1025 (12.7 mL/kg) [Urine:1025 (0.4 mL/kg/hr)]  Weight: 81 kg   I/O this shift:  In: 120 [P.O.:120]  Out: 100 [Urine:100]    Relevant Results  Scheduled medications  amoxicillin-pot clavulanate, 1 tablet, oral, q12h MANI  apixaban, 2.5 mg, oral, q12h  bumetanide, 1 mg, intravenous, Once  busPIRone, 7.5 mg, oral, BID  carvedilol, 3.125 mg, oral, BID  cetirizine, 10 mg, oral, Daily  cholecalciferol, 2,000 Units, oral, Daily  cyanocobalamin, 1,000 mcg, oral, Daily  ferrous sulfate (325 mg ferrous sulfate), 65 mg of iron, oral, Daily with breakfast  folic acid, 1 mg, oral, Daily  gabapentin, 200 mg, oral, BID  [Held by provider] isosorbide mononitrate ER, 30 mg, oral, Daily  levothyroxine, 88 mcg, oral, Daily  magnesium oxide, 400 mg, oral, BID  midodrine, 5 mg, oral, TID  nystatin, , Topical, BID  pantoprazole, 40 mg, oral, BID AC  phenyleph-min oil-petrolatum, , rectal, TID  psyllium, 1 packet, oral, TID  sertraline, 25 mg, oral, Daily  sodium phosphate, 30 mmol, intravenous, Once  zinc oxide, 1 Application, Topical, BID      Continuous medications     PRN medications  PRN medications: acetaminophen **OR** acetaminophen **OR** acetaminophen, loperamide, melatonin, methyl salicylate-menthol, ondansetron ODT **OR** ondansetron, oxyCODONE, oxygen, polyethylene glycol    Results for orders placed or performed during the hospital encounter of 11/03/24 (from the past 24 hours)   Hemoglobin and hematocrit, blood   Result Value Ref Range    Hemoglobin 14.6 12.0 - 16.0 g/dL    Hematocrit 49.8 (H) 36.0 - 46.0 %   CBC and Auto Differential   Result Value Ref Range    WBC 18.8 (H) 4.4 - 11.3 x10*3/uL    nRBC 0.0 0.0 - 0.0 /100 WBCs    RBC 4.94 4.00 - 5.20 x10*6/uL    Hemoglobin 13.5 12.0 - 16.0 g/dL    Hematocrit 45.1 36.0 - 46.0 %    MCV 91 80 - 100 fL "    MCH 27.3 26.0 - 34.0 pg    MCHC 29.9 (L) 32.0 - 36.0 g/dL    RDW 15.6 (H) 11.5 - 14.5 %    Platelets 166 150 - 450 x10*3/uL    Neutrophils % 86.5 40.0 - 80.0 %    Immature Granulocytes %, Automated 1.2 (H) 0.0 - 0.9 %    Lymphocytes % 4.0 13.0 - 44.0 %    Monocytes % 4.4 2.0 - 10.0 %    Eosinophils % 3.4 0.0 - 6.0 %    Basophils % 0.5 0.0 - 2.0 %    Neutrophils Absolute 16.25 (H) 1.60 - 5.50 x10*3/uL    Immature Granulocytes Absolute, Automated 0.23 0.00 - 0.50 x10*3/uL    Lymphocytes Absolute 0.76 (L) 0.80 - 3.00 x10*3/uL    Monocytes Absolute 0.82 (H) 0.05 - 0.80 x10*3/uL    Eosinophils Absolute 0.63 (H) 0.00 - 0.40 x10*3/uL    Basophils Absolute 0.10 0.00 - 0.10 x10*3/uL   Renal Function Panel   Result Value Ref Range    Glucose 108 (H) 74 - 99 mg/dL    Sodium 138 136 - 145 mmol/L    Potassium 4.1 3.5 - 5.3 mmol/L    Chloride 105 98 - 107 mmol/L    Bicarbonate 29 21 - 32 mmol/L    Anion Gap 8 (L) 10 - 20 mmol/L    Urea Nitrogen 24 (H) 6 - 23 mg/dL    Creatinine 1.21 (H) 0.50 - 1.05 mg/dL    eGFR 44 (L) >60 mL/min/1.73m*2    Calcium 7.3 (L) 8.6 - 10.3 mg/dL    Phosphorus 2.1 (L) 2.5 - 4.9 mg/dL    Albumin 2.1 (L) 3.4 - 5.0 g/dL   Magnesium   Result Value Ref Range    Magnesium 1.52 (L) 1.60 - 2.40 mg/dL   C-reactive protein   Result Value Ref Range    C-Reactive Protein 2.84 (H) <1.00 mg/dL       Transthoracic Echo (TTE) Limited   Final Result      CT abdomen pelvis wo IV contrast   Final Result   2 centimeters stone in the urinary bladder.        Bilateral pleural effusions. Pericardial effusion.        Ascites.        Anasarca.        Presacral edema.        Enlarged spleen.        The exam is limited by motion artifact.        MACRO:   none        Signed by: Jayshree Dias 11/4/2024 3:28 PM   Dictation workstation:   [id number]       renal complete   Final Result   No hydronephrosis. Decompressed bladder.        MACRO:   None        Signed by: Zain Mcknight 11/4/2024 11:08 AM   Dictation workstation:    [id number]      CT chest wo IV contrast   Final Result   Abnormalities at the right lung base on portable frontal chest   radiograph earlier today are due to a combination of moderate-sized,   mostly if not entirely free-flowing right pleural effusion with   associated multisegmental right lower lobe collapse adjacent to it        Small area of ground-glass airspace disease in the middle lobe   confirmed on CT may not have been expected on the prior radiograph        Possibility of mild ground-glass airspace disease in the right lower   lobe as well        No ground-glass airspace disease in the left lung        No consolidative pneumonia in any of the inflated lungs on either side        Small free-flowing left pleural effusion        Unusual hypodensities in the right ventricular free wall of uncertain   etiology and significance        Perhaps trace (smallest detectable quantity) pericardial effusion        Incidental abnormalities in the included upper abdomen as detailed   above        MACRO:   None        Signed by: Matthias Kolb 11/3/2024 11:35 AM   Dictation workstation:   [id number]      XR chest 1 view   Final Result   1. Right lower lobe airspace consolidation either pneumonia or   atelectasis with right pleural effusion. Follow-up to complete   resolution is needed   2. Enlarged cardiac silhouette             Signed by: Kary Cardoso 11/3/2024 10:27 AM   Dictation workstation:   [id number]      XR chest 1 view    (Results Pending)       Transthoracic Echo (TTE) Limited    Result Date: 11/5/2024   [facility], [address] [place] [address]               Tel [phone] and Fax [phone] TRANSTHORACIC ECHOCARDIOGRAM REPORT  Patient Name:       ELIE Mccain Physician:    [id number] Chicho Goldman MD Study Date:         11/5/2024           Ordering Provider:    [id number] SCHMITZ                                                                JYOTSNA MRN/PID:            [id number]            Fellow: Accession#:         [id number]        Nurse: Date of Birth/Age:  1940 / 83     Sonographer:          Cathi garcia                                     RDYVROSE Gender assigned at  F                   Additional Staff: Birth: Height:             165.10 cm           Admit Date:           11/3/2024 Weight:             72.57 kg            Admission Status:     Inpatient -                                                               Routine BSA / BMI:          1.80 m2 / 26.63     Encounter#:           [id number]                     kg/m2 Blood Pressure:     103/62 mmHg         Department Location:  [facility]                                                               Invasive Study Type:    TRANSTHORACIC ECHO (TTE) LIMITED Diagnosis/ICD: 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 Study Detail: The following Echo studies were performed: 2D, Doppler and color               flow.  PHYSICIAN INTERPRETATION: Left Ventricle: The left ventricular systolic function is normal, with a visually estimated ejection fraction of 60-65%. There is severely increased concentric left ventricular hypertrophy. There are no regional left ventricular wall motion abnormalities. The left ventricular cavity size is normal. There is moderately increased septal and moderately increased posterior left ventricular wall thickness. Spectral Doppler shows an abnormal pattern of left ventricular diastolic filling. Left Atrium: The left atrium is upper limits of normal in size. Right Ventricle: The right ventricle is normal in  size. There is normal right ventricular global systolic function. Right Atrium: The right atrium is normal in size. Aortic Valve: The aortic valve is trileaflet. The aortic valve dimensionless index is 0.77. There is trace aortic valve regurgitation. The peak instantaneous gradient of the aortic valve is 13 mmHg. The mean gradient of the mitral valve is 7 mmHg. Mitral Valve: The mitral valve is normal in structure. There is trace to mild mitral valve regurgitation. Tricuspid Valve: The tricuspid valve is structurally normal. There is trace to mild tricuspid regurgitation. Pulmonic Valve: The pulmonic valve is not well visualized. The pulmonic valve regurgitation was not well visualized. Pericardium: There is no pericardial effusion noted. Aorta: The aortic root is normal.  CONCLUSIONS:  1. The left ventricular systolic function is normal, with a visually estimated ejection fraction of 60-65%.  2. Spectral Doppler shows an abnormal pattern of left ventricular diastolic filling.  3. There is moderately increased septal and moderately increased posterior left ventricular wall thickness.  4. There is severely increased concentric left ventricular hypertrophy.  5. There is normal right ventricular global systolic function. QUANTITATIVE DATA SUMMARY:  2D MEASUREMENTS:          Normal Ranges: IVSd:            1.49 cm  (0.6-1.1cm) LVPWd:           1.26 cm  (0.6-1.1cm) LVIDd:           4.47 cm  (3.9-5.9cm) LVIDs:           2.98 cm LV Mass Index:   133 g/m2 LVEDV Index:     23 ml/m2 LV % FS          33.4 %  LV SYSTOLIC FUNCTION BY 2D PLANIMETRY (MOD):                      Normal Ranges: EF-A4C View:    62 % (>=55%) EF-A2C View:    61 % EF-Biplane:     64 % EF-Visual:      63 % LV EF Reported: 63 %  LV DIASTOLIC FUNCTION:          Normal Ranges: MV Peak E:             0.70 m/s (0.7-1.2 m/s) MV Peak A:             0.91 m/s (0.42-0.7 m/s) E/A Ratio:             0.77     (1.0-2.2)  MITRAL VALVE:          Normal Ranges: MV DT:         264 msec (150-240msec)  AORTIC VALVE:                      Normal Ranges: AoV Vmax:                1.78 m/s  (<=1.7m/s) AoV Peak P.7 mmHg (<20mmHg) AoV Mean P.0 mmHg  (1.7-11.5mmHg) LVOT Max Josiah:            1.33 m/s  (<=1.1m/s) AoV VTI:                 31.31 cm  (18-25cm) LVOT VTI:                24.11 cm LVOT Diameter:           2.04 cm   (1.8-2.4cm) AoV Area, VTI:           2.52 cm2  (2.5-5.5cm2) AoV Area,Vmax:           2.44 cm2  (2.5-4.5cm2) AoV Dimensionless Index: 0.77  TRICUSPID VALVE/RVSP:          Normal Ranges: Peak TR Velocity:     3.46 m/s  PULMONIC VALVE:          Normal Ranges: PV Max Josiah:     0.8 m/s  (0.6-0.9m/s) PV Max P.8 mmHg  [id number] Chicho Goldman MD Electronically signed on 2024 at 2:09:49 PM  ** Final **           Assessment/Plan   Principal Problem:    Sepsis with acute hypoxic respiratory failure without septic shock (Multi)  Active Problems:    Iron deficiency anemia    Chronic systolic (congestive) heart failure    Essential hypertension    History of pulmonary embolism    Anxiety    Vitamin D deficiency    Vitamin B12 deficiency    Adult hypothyroidism    Pneumonia of right lower lobe due to infectious organism    Leukocytosis    Acute kidney injury superimposed on stage 3a chronic kidney disease    Elevated troponin    Hyperbilirubinemia    Hyperglycemia    Hypokalemia    Hypomagnesemia    Hypocalcemia    Abnormality of right ventricle of heart    Impaired mobility and ADLs    Echocardiogram from 2024:    Left Ventricle: Low normal left ventricular systolic function with a   visually estimated EF of 50 - 55%. Left ventricle size is normal. Normal   wall thickness. Ventricular mass is normal. Findings consistent with   concentric remodeling. Normal wall motion. Grade I diastolic dysfunction   with normal LAP.     Right Ventricle: Right ventricle size is normal. Normal systolic   function. TAPSE is 1.9 cm.     Aortic Valve: No stenosis.      Mitral Valve: Mild regurgitation.     Tricuspid Valve: Normal RVSP. The estimated RVSP is 24 mmHg.     Pericardium: No pericardial effusion.         Pneumonia  -CT chest reviewed  -Procal downtrending  -antibiotics  -bronchodilators  -oxygen support  -sputum culture  -blood cultures  -Pulmonary following, note reviewed     2. Abnormal CT finding of RV  -Unusual hypodensities in the right ventricular free wall  -Echo as above  -Repeat limited echo reviewed, no abnormality noted  -Most likely an RV wall fat pad  -Can obtain outpt cardiac MRI to further eval     3. Acute on chronic diastolic CHF with mildly reduced EF  -CT showed pleural effusion, possible parapneumonic vs cardiac  -  -I reviewed the Echocardiogram as above  -Strict I & Os  -Daily weights  -2gm na diet  -BP low most likely from infection and third spacing with low albumin  -No plans for thoracentesis->restarted Eliquis  -Hold imdur for low BP  -Cont low dose Coreg  -CT abd/pelvis showed pleural effusions, anasarca, and ascities  -Albumin 1.9, given IV albumin and Lasix IV (11/5)  -BP borderline low, Midodrine 5mg TID started  -Bumex 1mg IV x1 (11/6, 11/7)  -Repeat Bumex 1mg IV x1 today  -CXR pending    4. UTI  -WBC elevated  -Urine culture showed multiple organisms, possible contamination  -antibiotics     5. Elevated troponins-Non MI elevation  -Troponin 76, now downtrending  -EKG showed SR LBBB->chronic LBBB  -Denies ACS symptoms  -limited echo as above, normal LVEF     6. Hx of DVT  -Eliquis     7. Hypokalemia/hypomagnesemia  -supplement     8. Hx of HTN, with hypotension  -Hold imdur  -IVF given->hold  -Gave albumin with lasix post (11/5)  -Cont Midodrine 5mg TID  -Coreg low dose  -Monitor      Sherron Jones, APRN-CNP

## 2024-11-08 NOTE — CARE PLAN
Problem: Skin  Goal: Decreased wound size/increased tissue granulation at next dressing change  Outcome: Progressing     Problem: Pain  Goal: Takes deep breaths with improved pain control throughout the shift  Outcome: Progressing     Problem: Fall/Injury  Goal: Not fall by end of shift  Outcome: Progressing     Problem: Safety - Adult  Goal: Free from fall injury  Outcome: Progressing     Problem: Heart Failure  Goal: Report improvement of dyspnea/breathlessness this shift  Outcome: Progressing   The patient's goals for the shift include decrease in SOB overnight     The clinical goals for the shift include decrease in anxiety r/t to new dementia

## 2024-11-08 NOTE — PROGRESS NOTES
[facility] Hospitalist Progress Note       [phone]: Please page me for patient care issues.  [phone]: Please page night hospitalist for any issues.     Desirae Gregg  :  1940(83 y.o.)  MRN:  [id number]  PCP: Jackie Cedillo MD      Principal Problem:    Sepsis with acute hypoxic respiratory failure without septic shock (Multi)  Active Problems:    Iron deficiency anemia    Chronic systolic (congestive) heart failure    Essential hypertension    History of pulmonary embolism    Anxiety    Vitamin D deficiency    Vitamin B12 deficiency    Adult hypothyroidism    Pneumonia of right lower lobe due to infectious organism    Leukocytosis    Acute kidney injury superimposed on stage 3a chronic kidney disease    Elevated troponin    Hyperbilirubinemia    Hyperglycemia    Hypokalemia    Hypomagnesemia    Hypocalcemia    Abnormality of right ventricle of heart    Impaired mobility and ADLs      Assessment and Plan:     Desirae Gregg is a 83 y.o. female with PMH CKD III, HFpEF, myelofibrosis, sciatica, remote poliomyelitis, hypertension, PE, anxiety disorder, vitamin D deficiency, vitamin B12 deficiency, hypothyroidism, GERD, depression, and JAK2 positive polycythemia vera.    Patient presented to ED with the son with complaints of 1 week of generalized weakness and fever. Associated with emesis (x3) and confusion  On arrival to the ED, patient was noted to have an oxygen saturation of 88% on room air.  She was placed on supplemental oxygen.  Temperature was 100.8 °F and heart rate was .  Respiratory rate was mildly increased at 18-23.  Patient ultimately required 4 L of supplemental oxygen per nasal cannula to maintain saturations.  Blood pressure systolic was 108-117.  Laboratory evaluation in the emergency department was notable for white blood cell count 20.4, glucose 119, creatinine 1.21, and total bilirubin 2.2.  Electrolytes, transaminases, lactic acid, hemoglobin, and platelet count were unremarkable.  COVID,  influenza, and RSV testing was negative.  Blood cultures x 2 were obtained.  Portable chest x-ray showed right lower lobe airspace consolidation, pneumonia or atelectasis with right pleural effusion.  Enlarged cardiac silhouette was also noted.        #Acute on chronic HFpEF  #B/L (R>L) pleural effusion  #ARF w/ hypoxia, now off NC  #LEVY/CKD III, improving  #Rectal (hemorrhoidal) bleeding w/o overt anemia  #Sepsis UTI and gram negative pneumonia  #Electrolyte abnormalities (Hypomagnesemia, Hypokalemia, Hpophosphatemia)  #A risk for hypotension-on midodrine 2/2   #Leukocytosis, improving  #Protein malnutrition  #Hypoalbuminemia  #Incidental urinary bladder stone (2cm)-Urology rec outpt F/U for 2cm bladder stone management via cystolitholapaxy with laser   #myeloproliferative disorder   #Immunocompromised state  #Hx PE on apixaban  -abx:zosyn/azithromycin->Augmentin  -resumed apixaban  -No plans for colonoscopy in the absence of active bleeding/overt anemia. Trend H/H. Trial of metamucil and preparation H  -CHF order set  -IV diuretics as BP and renal function could tolerate.   -GDMT: low dose coreg, PRN IV bumex per cards  -replete lytes PRN  -Ucx appears to be contaminated  -CS recs appreciated: Cards, GI (s/o), pulm (s/o), urology (s/o), dietician     Disposition: SNF placement once euvolemic following diuresis as tolerated by BP and renal functions, await consultant recommendations, await clinical improvement, and anticipate discharge 11/11 vs 11/12    DVT Prophylaxis: full anticoagulation apixaban    Code status: Full Code  Diet: Adult diet 2-3 grams sodium    Level of MDM:  High    patient and family updated, I personally examined the patient, and I personally reviewed and agree with residentphysical exam, assessment/plan with my noted corrections if any    Family Communication  Number :   Name of Designated Family Representative:       Total time spent: 35 minutes, of total time providing counseling or in  coordination of care. Total time on this day of visit includes record and documentation review before and after visit including documentation and time not explicitly included on EMR time stamp      Subjective:   Interval History:  HPI  The patient complains of generalized weakness  The patient feels their symptoms areimproving  no events or new concerns    Scheduled Meds:amoxicillin-pot clavulanate, 1 tablet, oral, q12h MANI  apixaban, 2.5 mg, oral, q12h  busPIRone, 7.5 mg, oral, BID  carvedilol, 3.125 mg, oral, BID  cetirizine, 10 mg, oral, Daily  cholecalciferol, 2,000 Units, oral, Daily  cyanocobalamin, 1,000 mcg, oral, Daily  ferrous sulfate (325 mg ferrous sulfate), 65 mg of iron, oral, Daily with breakfast  folic acid, 1 mg, oral, Daily  gabapentin, 200 mg, oral, BID  [Held by provider] isosorbide mononitrate ER, 30 mg, oral, Daily  levothyroxine, 88 mcg, oral, Daily  magnesium oxide, 400 mg, oral, BID  magnesium sulfate, 2 g, intravenous, Once  midodrine, 5 mg, oral, TID  nystatin, , Topical, BID  pantoprazole, 40 mg, oral, BID AC  phenyleph-min oil-petrolatum, , rectal, TID  potassium phosphate (monobasic), 1,000 mg, oral, With meals & nightly  psyllium, 1 packet, oral, TID  sertraline, 25 mg, oral, Daily  zinc oxide, 1 Application, Topical, BID      Continuous Infusions:   PRN Meds:PRN medications: acetaminophen **OR** acetaminophen **OR** acetaminophen, loperamide, melatonin, methyl salicylate-menthol, ondansetron ODT **OR** ondansetron, oxyCODONE, oxygen, polyethylene glycol    Review of Systems   All other systems reviewed and are negative.    Interval Pertinent History:  Social History     Tobacco Use    Smoking status: Never    Smokeless tobacco: Never   Substance Use Topics    Alcohol use: Never         Objective:   Patient Vitals for the past 24 hrs:   BP Temp Temp src Pulse Resp SpO2 Weight   11/08/24 0902 121/82 -- -- 79 -- 98 % --   11/08/24 0630 120/67 36.2 °C (97.2 °F) -- 80 18 99 % --   11/08/24  0600 -- -- -- -- -- -- 81 kg (178 lb 9.6 oz)   24 2100 -- -- -- -- -- 95 % --   24 1947 118/78 36.8 °C (98.2 °F) Temporal 89 17 95 % --   24 1354 118/65 36.4 °C (97.5 °F) -- 88 16 96 % --   24 1115 -- -- -- -- -- 96 % --       Average, Min, and Max for last 24 hours Vitals:  TEMPERATURE:  Temp  Av.4 °C (97.6 °F)  Min: 36.2 °C (97.2 °F)  Max: 36.8 °C (98.2 °F)    RESPIRATIONS RANGE: Resp  Av  Min: 16  Max: 18    PULSE RANGE: Pulse  Av  Min: 79  Max: 89    BLOOD PRESSURE RANGE:  Systolic (24hrs), Av , Min:118 , Max:121   ; Diastolic (24hrs), Av, Min:65, Max:82      PULSE OXIMETRY RANGE: SpO2  Av.5 %  Min: 95 %  Max: 99 %  Body mass index is 29.72 kg/m².    I/O last 3 completed shifts:  In: 540 (6.7 mL/kg) [P.O.:540]  Out: 1025 (12.7 mL/kg) [Urine:1025 (0.4 mL/kg/hr)]  Weight: 81 kg   Weight change: 2.676 kg (5 lb 14.4 oz)     Physical Exam  Vitals and nursing note reviewed.   Constitutional:       Appearance: Normal appearance.      Interventions: Nasal cannula in place.   HENT:      Head: Normocephalic and atraumatic.      Right Ear: External ear normal.      Left Ear: External ear normal.      Nose: Nose normal.      Mouth/Throat:      Mouth: Mucous membranes are moist.   Eyes:      General: No scleral icterus.        Right eye: No discharge.         Left eye: No discharge.      Extraocular Movements: Extraocular movements intact.      Conjunctiva/sclera: Conjunctivae normal.      Pupils: Pupils are equal, round, and reactive to light.   Cardiovascular:      Rate and Rhythm: Regular rhythm.   Pulmonary:      Effort: Pulmonary effort is normal.      Breath sounds: Rales present.   Abdominal:      General: Abdomen is flat. Bowel sounds are normal.      Palpations: Abdomen is soft.   Musculoskeletal:         General: Normal range of motion.      Right lower leg: Edema (trace to +1, improving) present.      Left lower leg: Edema (trace to +1, improving) present.    Skin:     General: Skin is warm and dry.      Capillary Refill: Capillary refill takes less than 2 seconds.   Neurological:      General: No focal deficit present.   Psychiatric:         Mood and Affect: Mood normal.         Thought Content: Thought content normal.         Judgment: Judgment normal.         Lab Results   Component Value Date     11/08/2024    K 4.1 11/08/2024     11/08/2024    CO2 29 11/08/2024    BUN 24 (H) 11/08/2024    CREATININE 1.21 (H) 11/08/2024    GLUCOSE 108 (H) 11/08/2024    CALCIUM 7.3 (L) 11/08/2024    PROT 3.1 (L) 11/05/2024    BILITOT 0.7 11/05/2024    ALKPHOS 34 11/05/2024    AST 7 (L) 11/05/2024    ALT 11 11/05/2024       Lab Results   Component Value Date    WBC 18.8 (H) 11/08/2024    HGB 13.5 11/08/2024    HCT 45.1 11/08/2024    MCV 91 11/08/2024     11/08/2024    LYMPHOPCT 4.0 11/08/2024    RBC 4.94 11/08/2024    MCH 27.3 11/08/2024    MCHC 29.9 (L) 11/08/2024    RDW 15.6 (H) 11/08/2024    CRP 2.84 (H) 11/08/2024       Lab Results   Component Value Date    TSH 2.09 10/29/2024     Lab Results   Component Value Date    LACTATE 1.3 11/03/2024     (H) 11/03/2024    INR 2.1 (H) 11/06/2024       IMAGES:  Encounter Date: 11/03/24   ECG 12 lead   Result Value    Ventricular Rate 104    Atrial Rate 104    AR Interval 162    QRS Duration 124    QT Interval 368    QTC Calculation(Bazett) 483    P Axis 54    R Axis -52    T Axis 169    QRS Count 18    Q Onset 219    P Onset 138    P Offset 188    T Offset 403    QTC Fredericia 442    Narrative    Sinus tachycardia  Left axis deviation  Left bundle branch block  Abnormal ECG  No previous ECGs available     CT abdomen pelvis wo IV contrast    Result Date: 11/4/2024  Impression: 2 centimeters stone in the urinary bladder.   Bilateral pleural effusions. Pericardial effusion.   Ascites.   Anasarca.   Presacral edema.   Enlarged spleen.   The exam is limited by motion artifact.   MACRO: none   Signed by: Jayshree Dias  11/4/2024 3:28 PM Dictation workstation:   [id number]     renal complete    Result Date: 11/4/2024  Impression: No hydronephrosis. Decompressed bladder.   MACRO: None   Signed by: Zain Mcknight 11/4/2024 11:08 AM Dictation workstation:   [id number]    CT chest wo IV contrast    Result Date: 11/3/2024  Impression: Abnormalities at the right lung base on portable frontal chest radiograph earlier today are due to a combination of moderate-sized, mostly if not entirely free-flowing right pleural effusion with associated multisegmental right lower lobe collapse adjacent to it   Small area of ground-glass airspace disease in the middle lobe confirmed on CT may not have been expected on the prior radiograph   Possibility of mild ground-glass airspace disease in the right lower lobe as well   No ground-glass airspace disease in the left lung   No consolidative pneumonia in any of the inflated lungs on either side   Small free-flowing left pleural effusion   Unusual hypodensities in the right ventricular free wall of uncertain etiology and significance   Perhaps trace (smallest detectable quantity) pericardial effusion   Incidental abnormalities in the included upper abdomen as detailed above   MACRO: None   Signed by: Matthias Kolb 11/3/2024 11:35 AM Dictation workstation:   [id number]    XR chest 1 view    Result Date: 11/3/2024  Impression: 1. Right lower lobe airspace consolidation either pneumonia or atelectasis with right pleural effusion. Follow-up to complete resolution is needed 2. Enlarged cardiac silhouette     Signed by: Kary Cardoso 11/3/2024 10:27 AM Dictation workstation:   [id number]    Transthoracic Echo (TTE) Limited    Result Date: 11/5/2024   [facility], [address] [place] [address]               Tel [phone] and Fax [phone] TRANSTHORACIC ECHOCARDIOGRAM REPORT  Patient Name:       ELIE Mccain Physician:    [id number] Chicho                                                                Jones LARIOS Study Date:         11/5/2024           Ordering Provider:    [id number] SHERITA GOYAL MRN/PID:            [id number]            Fellow: Accession#:         [id number]        Nurse: Date of Birth/Age:  1940 / 83     Sonographer:          Cathi garcia                                     RDYVROSE Gender assigned at  F                   Additional Staff: Birth: Height:             165.10 cm           Admit Date:           11/3/2024 Weight:             72.57 kg            Admission Status:     Inpatient -                                                               Routine BSA / BMI:          1.80 m2 / 26.63     Encounter#:           [id number]                     kg/m2 Blood Pressure:     103/62 mmHg         Department Location:  [facility] Non                                                               Invasive Study Type:    TRANSTHORACIC ECHO (TTE) LIMITED Diagnosis/ICD: 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 Study Detail: The following Echo studies were performed: 2D, Doppler and color               flow.  PHYSICIAN INTERPRETATION: Left Ventricle: The left ventricular systolic function is normal, with a visually estimated ejection fraction of 60-65%. There is severely increased concentric left ventricular hypertrophy. There are no regional left ventricular wall motion abnormalities. The left ventricular cavity size is normal. There is moderately increased septal and moderately increased posterior left ventricular wall thickness. Spectral Doppler shows an abnormal  pattern of left ventricular diastolic filling. Left Atrium: The left atrium is upper limits of normal in size. Right Ventricle: The right ventricle is normal in size. There is normal right ventricular global systolic function. Right Atrium: The right atrium is normal in size. Aortic Valve: The aortic valve is trileaflet. The aortic valve dimensionless index is 0.77. There is trace aortic valve regurgitation. The peak instantaneous gradient of the aortic valve is 13 mmHg. The mean gradient of the mitral valve is 7 mmHg. Mitral Valve: The mitral valve is normal in structure. There is trace to mild mitral valve regurgitation. Tricuspid Valve: The tricuspid valve is structurally normal. There is trace to mild tricuspid regurgitation. Pulmonic Valve: The pulmonic valve is not well visualized. The pulmonic valve regurgitation was not well visualized. Pericardium: There is no pericardial effusion noted. Aorta: The aortic root is normal.  CONCLUSIONS:  1. The left ventricular systolic function is normal, with a visually estimated ejection fraction of 60-65%.  2. Spectral Doppler shows an abnormal pattern of left ventricular diastolic filling.  3. There is moderately increased septal and moderately increased posterior left ventricular wall thickness.  4. There is severely increased concentric left ventricular hypertrophy.  5. There is normal right ventricular global systolic function. QUANTITATIVE DATA SUMMARY:  2D MEASUREMENTS:          Normal Ranges: IVSd:            1.49 cm  (0.6-1.1cm) LVPWd:           1.26 cm  (0.6-1.1cm) LVIDd:           4.47 cm  (3.9-5.9cm) LVIDs:           2.98 cm LV Mass Index:   133 g/m2 LVEDV Index:     23 ml/m2 LV % FS          33.4 %  LV SYSTOLIC FUNCTION BY 2D PLANIMETRY (MOD):                      Normal Ranges: EF-A4C View:    62 % (>=55%) EF-A2C View:    61 % EF-Biplane:     64 % EF-Visual:      63 % LV EF Reported: 63 %  LV DIASTOLIC FUNCTION:          Normal Ranges: MV Peak E:              0.70 m/s (0.7-1.2 m/s) MV Peak A:             0.91 m/s (0.42-0.7 m/s) E/A Ratio:             0.77     (1.0-2.2)  MITRAL VALVE:          Normal Ranges: MV DT:        264 msec (150-240msec)  AORTIC VALVE:                      Normal Ranges: AoV Vmax:                1.78 m/s  (<=1.7m/s) AoV Peak P.7 mmHg (<20mmHg) AoV Mean P.0 mmHg  (1.7-11.5mmHg) LVOT Max Josiah:            1.33 m/s  (<=1.1m/s) AoV VTI:                 31.31 cm  (18-25cm) LVOT VTI:                24.11 cm LVOT Diameter:           2.04 cm   (1.8-2.4cm) AoV Area, VTI:           2.52 cm2  (2.5-5.5cm2) AoV Area,Vmax:           2.44 cm2  (2.5-4.5cm2) AoV Dimensionless Index: 0.77  TRICUSPID VALVE/RVSP:          Normal Ranges: Peak TR Velocity:     3.46 m/s  PULMONIC VALVE:          Normal Ranges: PV Max Josiah:     0.8 m/s  (0.6-0.9m/s) PV Max P.8 mmHg  [id number] Chicho Goldman MD Electronically signed on 2024 at 2:09:49 PM  ** Final **    === 24 ===    XR CHEST 1 VIEW    - Impression -  1. Right lower lobe airspace consolidation either pneumonia or  atelectasis with right pleural effusion. Follow-up to complete  resolution is needed  2. Enlarged cardiac silhouette      Signed by: Kary Cardoso 11/3/2024 10:27 AM  Dictation workstation:   [id number]  === 24 ===    CT ABDOMEN PELVIS WO IV CONTRAST    - Impression -  2 centimeters stone in the urinary bladder.    Bilateral pleural effusions. Pericardial effusion.    Ascites.    Anasarca.    Presacral edema.    Enlarged spleen.    The exam is limited by motion artifact.    MACRO:  none    Signed by: Jayshree Dias 2024 3:28 PM  Dictation workstation:   [id number]  === 24 ===    XR CHEST 1 VIEW    - Impression -  1. Right lower lobe airspace consolidation either pneumonia or  atelectasis with right pleural effusion. Follow-up to complete  resolution is needed  2. Enlarged cardiac silhouette      Signed by: Kary Cardoso 11/3/2024 10:27 AM  Dictation  workstation:   [id number]      Additional results of the last 24 hours have been reviewed.    Dictated using CustomInk Version 2.4  Proof read however unrecognized voice recognition errors may have occurred     Electronically signed by Cha Ramsay DO on 11/08/24 at 9:15 AM

## 2024-11-08 NOTE — PROGRESS NOTES
11/08/24 1323   Discharge Planning   Living Arrangements Children  (lives with son)   Support Systems Children   Assistance Needed A&Ox3 (confused to some quesions -ie didn't know if she wore O2 at baseline)assist for ADLs and transfers at time by son with gait belt; room air baseline and currently 2L NC   Type of Residence Private residence   Number of Stairs to Enter Residence 0  (ramp)   Number of Stairs Within Residence 12  (lift chair)   Do you have animals or pets at home? Yes   Type of Animals or Pets 1 cat   Home or Post Acute Services Post acute facilities (Rehab/SNF/etc)   Type of Post Acute Facility Services Skilled nursing   Expected Discharge Disposition SNF  (PT/OT recommending mod, pt and son agreeable to SNF, [facility]. Will need precert once accepted.)   Does the patient need discharge transport arranged? Yes   RoundTrip coordination needed? Yes   Has discharge transport been arranged? No

## 2024-11-09 LAB
ALBUMIN SERPL BCP-MCNC: 1.9 G/DL (ref 3.4–5)
ANION GAP SERPL CALC-SCNC: 9 MMOL/L (ref 10–20)
BASOPHILS # BLD AUTO: 0.13 X10*3/UL (ref 0–0.1)
BASOPHILS NFR BLD AUTO: 0.8 %
BUN SERPL-MCNC: 21 MG/DL (ref 6–23)
CALCIUM SERPL-MCNC: 6.9 MG/DL (ref 8.6–10.3)
CHLORIDE SERPL-SCNC: 105 MMOL/L (ref 98–107)
CO2 SERPL-SCNC: 24 MMOL/L (ref 21–32)
CREAT SERPL-MCNC: 0.93 MG/DL (ref 0.5–1.05)
EGFRCR SERPLBLD CKD-EPI 2021: 60 ML/MIN/1.73M*2
EOSINOPHIL # BLD AUTO: 0.6 X10*3/UL (ref 0–0.4)
EOSINOPHIL NFR BLD AUTO: 3.5 %
ERYTHROCYTE [DISTWIDTH] IN BLOOD BY AUTOMATED COUNT: 15.7 % (ref 11.5–14.5)
GLUCOSE SERPL-MCNC: 99 MG/DL (ref 74–99)
HCT VFR BLD AUTO: 47.7 % (ref 36–46)
HGB BLD-MCNC: 14.1 G/DL (ref 12–16)
IMM GRANULOCYTES # BLD AUTO: 0.35 X10*3/UL (ref 0–0.5)
IMM GRANULOCYTES NFR BLD AUTO: 2.1 % (ref 0–0.9)
LYMPHOCYTES # BLD AUTO: 0.69 X10*3/UL (ref 0.8–3)
LYMPHOCYTES NFR BLD AUTO: 4.1 %
MAGNESIUM SERPL-MCNC: 1.64 MG/DL (ref 1.6–2.4)
MCH RBC QN AUTO: 27.2 PG (ref 26–34)
MCHC RBC AUTO-ENTMCNC: 29.6 G/DL (ref 32–36)
MCV RBC AUTO: 92 FL (ref 80–100)
MONOCYTES # BLD AUTO: 0.73 X10*3/UL (ref 0.05–0.8)
MONOCYTES NFR BLD AUTO: 4.3 %
NEUTROPHILS # BLD AUTO: 14.42 X10*3/UL (ref 1.6–5.5)
NEUTROPHILS NFR BLD AUTO: 85.2 %
NRBC BLD-RTO: 0 /100 WBCS (ref 0–0)
PHOSPHATE SERPL-MCNC: 3.1 MG/DL (ref 2.5–4.9)
PLATELET # BLD AUTO: 153 X10*3/UL (ref 150–450)
POTASSIUM SERPL-SCNC: 3.3 MMOL/L (ref 3.5–5.3)
RBC # BLD AUTO: 5.18 X10*6/UL (ref 4–5.2)
SODIUM SERPL-SCNC: 135 MMOL/L (ref 136–145)
WBC # BLD AUTO: 16.9 X10*3/UL (ref 4.4–11.3)

## 2024-11-09 PROCEDURE — 99232 SBSQ HOSP IP/OBS MODERATE 35: CPT | Performed by: INTERNAL MEDICINE

## 2024-11-09 PROCEDURE — 87329 GIARDIA AG IA: CPT | Performed by: INTERNAL MEDICINE

## 2024-11-09 PROCEDURE — 80069 RENAL FUNCTION PANEL: CPT | Performed by: INTERNAL MEDICINE

## 2024-11-09 PROCEDURE — 87506 IADNA-DNA/RNA PROBE TQ 6-11: CPT | Performed by: INTERNAL MEDICINE

## 2024-11-09 PROCEDURE — 97530 THERAPEUTIC ACTIVITIES: CPT | Mod: GO,CO

## 2024-11-09 PROCEDURE — 87328 CRYPTOSPORIDIUM AG IA: CPT | Performed by: INTERNAL MEDICINE

## 2024-11-09 PROCEDURE — 2500000001 HC RX 250 WO HCPCS SELF ADMINISTERED DRUGS (ALT 637 FOR MEDICARE OP): Performed by: NURSE PRACTITIONER

## 2024-11-09 PROCEDURE — 97535 SELF CARE MNGMENT TRAINING: CPT | Mod: GO,CO

## 2024-11-09 PROCEDURE — 83735 ASSAY OF MAGNESIUM: CPT | Performed by: INTERNAL MEDICINE

## 2024-11-09 PROCEDURE — 87493 C DIFF AMPLIFIED PROBE: CPT | Mod: GEALAB | Performed by: INTERNAL MEDICINE

## 2024-11-09 PROCEDURE — 2500000002 HC RX 250 W HCPCS SELF ADMINISTERED DRUGS (ALT 637 FOR MEDICARE OP, ALT 636 FOR OP/ED): Performed by: INTERNAL MEDICINE

## 2024-11-09 PROCEDURE — 1200000002 HC GENERAL ROOM WITH TELEMETRY DAILY

## 2024-11-09 PROCEDURE — 2500000001 HC RX 250 WO HCPCS SELF ADMINISTERED DRUGS (ALT 637 FOR MEDICARE OP): Performed by: INTERNAL MEDICINE

## 2024-11-09 PROCEDURE — 2500000004 HC RX 250 GENERAL PHARMACY W/ HCPCS (ALT 636 FOR OP/ED)

## 2024-11-09 PROCEDURE — 85025 COMPLETE CBC W/AUTO DIFF WBC: CPT | Performed by: INTERNAL MEDICINE

## 2024-11-09 PROCEDURE — 36415 COLL VENOUS BLD VENIPUNCTURE: CPT | Performed by: INTERNAL MEDICINE

## 2024-11-09 PROCEDURE — 2500000004 HC RX 250 GENERAL PHARMACY W/ HCPCS (ALT 636 FOR OP/ED): Performed by: INTERNAL MEDICINE

## 2024-11-09 RX ORDER — VANCOMYCIN HCL 50 MG/ML
125 SOLUTION, RECONSTITUTED, ORAL ORAL 4 TIMES DAILY
Status: DISCONTINUED | OUTPATIENT
Start: 2024-11-09 | End: 2024-11-10

## 2024-11-09 RX ORDER — BUMETANIDE 0.25 MG/ML
0.5 INJECTION, SOLUTION INTRAMUSCULAR; INTRAVENOUS ONCE
Status: COMPLETED | OUTPATIENT
Start: 2024-11-09 | End: 2024-11-09

## 2024-11-09 RX ORDER — POTASSIUM CHLORIDE 20 MEQ/1
40 TABLET, EXTENDED RELEASE ORAL 2 TIMES DAILY
Status: COMPLETED | OUTPATIENT
Start: 2024-11-09 | End: 2024-11-10

## 2024-11-09 RX ORDER — BUMETANIDE 0.25 MG/ML
1 INJECTION, SOLUTION INTRAMUSCULAR; INTRAVENOUS ONCE
Status: COMPLETED | OUTPATIENT
Start: 2024-11-09 | End: 2024-11-09

## 2024-11-09 RX ADMIN — NYSTATIN: 100000 CREAM TOPICAL at 09:31

## 2024-11-09 RX ADMIN — Medication 400 MG: at 20:58

## 2024-11-09 RX ADMIN — CARVEDILOL 3.12 MG: 3.12 TABLET, FILM COATED ORAL at 20:58

## 2024-11-09 RX ADMIN — NYSTATIN: 100000 CREAM TOPICAL at 21:20

## 2024-11-09 RX ADMIN — CYANOCOBALAMIN TAB 1000 MCG 1000 MCG: 1000 TAB at 09:34

## 2024-11-09 RX ADMIN — Medication 2000 UNITS: at 09:30

## 2024-11-09 RX ADMIN — BUSPIRONE HYDROCHLORIDE 7.5 MG: 15 TABLET ORAL at 20:58

## 2024-11-09 RX ADMIN — BUMETANIDE 1 MG: 0.25 INJECTION INTRAMUSCULAR; INTRAVENOUS at 11:21

## 2024-11-09 RX ADMIN — MINERAL OIL, PETROLATUM, PHENYLEPHRINE HCL: 14; 74.9; .25 OINTMENT RECTAL at 21:19

## 2024-11-09 RX ADMIN — VANCOMYCIN HYDROCHLORIDE 125 MG: KIT at 18:43

## 2024-11-09 RX ADMIN — POTASSIUM CHLORIDE 40 MEQ: 1500 TABLET, EXTENDED RELEASE ORAL at 15:21

## 2024-11-09 RX ADMIN — Medication 400 MG: at 09:30

## 2024-11-09 RX ADMIN — APIXABAN 2.5 MG: 2.5 TABLET, FILM COATED ORAL at 09:30

## 2024-11-09 RX ADMIN — MINERAL OIL, PETROLATUM, PHENYLEPHRINE HCL: 14; 74.9; .25 OINTMENT RECTAL at 09:31

## 2024-11-09 RX ADMIN — Medication 1 APPLICATION: at 09:31

## 2024-11-09 RX ADMIN — POTASSIUM CHLORIDE 40 MEQ: 1500 TABLET, EXTENDED RELEASE ORAL at 20:58

## 2024-11-09 RX ADMIN — PANTOPRAZOLE SODIUM 40 MG: 40 TABLET, DELAYED RELEASE ORAL at 15:20

## 2024-11-09 RX ADMIN — LEVOTHYROXINE SODIUM 88 MCG: 0.09 TABLET ORAL at 06:15

## 2024-11-09 RX ADMIN — MINERAL OIL, PETROLATUM, PHENYLEPHRINE HCL: 14; 74.9; .25 OINTMENT RECTAL at 15:56

## 2024-11-09 RX ADMIN — AMOXICILLIN AND CLAVULANATE POTASSIUM 1 TABLET: 500; 125 TABLET, FILM COATED ORAL at 20:57

## 2024-11-09 RX ADMIN — OXYCODONE HYDROCHLORIDE 5 MG: 5 TABLET ORAL at 18:17

## 2024-11-09 RX ADMIN — MENTHOL 10%, METHYL SALICYLATE 15% 1 APPLICATION: 10; 15 CREAM TOPICAL at 21:20

## 2024-11-09 RX ADMIN — MIDODRINE HYDROCHLORIDE 5 MG: 5 TABLET ORAL at 15:20

## 2024-11-09 RX ADMIN — FERROUS SULFATE TAB 325 MG (65 MG ELEMENTAL FE) 325 MG: 325 (65 FE) TAB at 09:30

## 2024-11-09 RX ADMIN — CARVEDILOL 3.12 MG: 3.12 TABLET, FILM COATED ORAL at 09:30

## 2024-11-09 RX ADMIN — BUMETANIDE 0.5 MG: 0.25 INJECTION INTRAMUSCULAR; INTRAVENOUS at 18:43

## 2024-11-09 RX ADMIN — APIXABAN 2.5 MG: 2.5 TABLET, FILM COATED ORAL at 20:57

## 2024-11-09 RX ADMIN — SERTRALINE HYDROCHLORIDE 25 MG: 50 TABLET ORAL at 09:30

## 2024-11-09 RX ADMIN — AMOXICILLIN AND CLAVULANATE POTASSIUM 1 TABLET: 500; 125 TABLET, FILM COATED ORAL at 09:30

## 2024-11-09 RX ADMIN — Medication 1 APPLICATION: at 21:19

## 2024-11-09 RX ADMIN — MIDODRINE HYDROCHLORIDE 5 MG: 5 TABLET ORAL at 09:30

## 2024-11-09 RX ADMIN — GABAPENTIN 200 MG: 100 CAPSULE ORAL at 20:58

## 2024-11-09 RX ADMIN — VANCOMYCIN HYDROCHLORIDE 125 MG: KIT at 15:20

## 2024-11-09 RX ADMIN — CETIRIZINE HYDROCHLORIDE 10 MG: 10 TABLET, FILM COATED ORAL at 09:30

## 2024-11-09 RX ADMIN — GABAPENTIN 200 MG: 100 CAPSULE ORAL at 09:30

## 2024-11-09 RX ADMIN — FOLIC ACID 1 MG: 1 TABLET ORAL at 09:30

## 2024-11-09 RX ADMIN — BUSPIRONE HYDROCHLORIDE 7.5 MG: 15 TABLET ORAL at 09:30

## 2024-11-09 RX ADMIN — PANTOPRAZOLE SODIUM 40 MG: 40 TABLET, DELAYED RELEASE ORAL at 06:15

## 2024-11-09 ASSESSMENT — COGNITIVE AND FUNCTIONAL STATUS - GENERAL
TURNING FROM BACK TO SIDE WHILE IN FLAT BAD: A LITTLE
HELP NEEDED FOR BATHING: A LITTLE
TURNING FROM BACK TO SIDE WHILE IN FLAT BAD: A LITTLE
DRESSING REGULAR UPPER BODY CLOTHING: A LITTLE
CLIMB 3 TO 5 STEPS WITH RAILING: TOTAL
MOVING FROM LYING ON BACK TO SITTING ON SIDE OF FLAT BED WITH BEDRAILS: A LITTLE
WALKING IN HOSPITAL ROOM: A LOT
DRESSING REGULAR LOWER BODY CLOTHING: A LOT
EATING MEALS: A LITTLE
DRESSING REGULAR LOWER BODY CLOTHING: A LOT
TOILETING: A LOT
PERSONAL GROOMING: A LITTLE
TOILETING: A LITTLE
DRESSING REGULAR UPPER BODY CLOTHING: A LITTLE
DAILY ACTIVITIY SCORE: 17
MOBILITY SCORE: 14
STANDING UP FROM CHAIR USING ARMS: A LOT
DAILY ACTIVITIY SCORE: 16
PERSONAL GROOMING: A LITTLE
HELP NEEDED FOR BATHING: A LOT
CLIMB 3 TO 5 STEPS WITH RAILING: TOTAL
WALKING IN HOSPITAL ROOM: A LOT
MOVING TO AND FROM BED TO CHAIR: A LITTLE
TOILETING: A LITTLE
DAILY ACTIVITIY SCORE: 17
HELP NEEDED FOR BATHING: A LITTLE
MOVING TO AND FROM BED TO CHAIR: A LITTLE
PERSONAL GROOMING: A LITTLE
EATING MEALS: A LITTLE
MOVING FROM LYING ON BACK TO SITTING ON SIDE OF FLAT BED WITH BEDRAILS: A LITTLE
DRESSING REGULAR UPPER BODY CLOTHING: A LITTLE
MOBILITY SCORE: 14
DRESSING REGULAR LOWER BODY CLOTHING: A LOT
STANDING UP FROM CHAIR USING ARMS: A LOT

## 2024-11-09 ASSESSMENT — PAIN SCALES - GENERAL
PAINLEVEL_OUTOF10: 8
PAINLEVEL_OUTOF10: 5 - MODERATE PAIN
PAINLEVEL_OUTOF10: 0 - NO PAIN
PAINLEVEL_OUTOF10: 5 - MODERATE PAIN

## 2024-11-09 ASSESSMENT — ACTIVITIES OF DAILY LIVING (ADL): HOME_MANAGEMENT_TIME_ENTRY: 20

## 2024-11-09 ASSESSMENT — PAIN - FUNCTIONAL ASSESSMENT
PAIN_FUNCTIONAL_ASSESSMENT: 0-10
PAIN_FUNCTIONAL_ASSESSMENT: 0-10

## 2024-11-09 ASSESSMENT — PAIN DESCRIPTION - LOCATION: LOCATION: RECTUM

## 2024-11-09 NOTE — CARE PLAN
Problem: Skin  Goal: Decreased wound size/increased tissue granulation at next dressing change  Outcome: Progressing     Problem: Pain  Goal: Takes deep breaths with improved pain control throughout the shift  Outcome: Progressing     Problem: Fall/Injury  Goal: Be free from injury by end of the shift  Outcome: Progressing   The patient's goals for the shift include decrease in GI upset     The clinical goals for the shift include Pt will remain safe and free from injury throughout shift.

## 2024-11-09 NOTE — PROGRESS NOTES
[facility] Hospitalist Progress Note       [id number]: Please page me for patient care issues.  [phone]: Please page night hospitalist for any issues.     Desirae Gregg  :  1940(83 y.o.)  MRN:  [id number]  PCP: Jackie Cedillo MD      Principal Problem:    Sepsis with acute hypoxic respiratory failure without septic shock (Multi)  Active Problems:    Iron deficiency anemia    Chronic systolic (congestive) heart failure    Essential hypertension    History of pulmonary embolism    Anxiety    Vitamin D deficiency    Vitamin B12 deficiency    Adult hypothyroidism    Pneumonia of right lower lobe due to infectious organism    Leukocytosis    Acute kidney injury superimposed on stage 3a chronic kidney disease    Elevated troponin    Hyperbilirubinemia    Hyperglycemia    Hypokalemia    Hypomagnesemia    Hypocalcemia    Abnormality of right ventricle of heart    Impaired mobility and ADLs      Assessment and Plan:     Desirae Gregg is a 83 y.o. female with PMH CKD III, HFpEF, myelofibrosis, sciatica, remote poliomyelitis, hypertension, PE, anxiety disorder, vitamin D deficiency, vitamin B12 deficiency, hypothyroidism, GERD, depression, and JAK2 positive polycythemia vera.    Patient presented to ED with the son with complaints of 1 week of generalized weakness and fever. Associated with emesis (x3) and confusion  On arrival to the ED, patient was noted to have an oxygen saturation of 88% on room air.  She was placed on supplemental oxygen.  Temperature was 100.8 °F and heart rate was .  Respiratory rate was mildly increased at 18-23.  Patient ultimately required 4 L of supplemental oxygen per nasal cannula to maintain saturations.  Blood pressure systolic was 108-117.  Laboratory evaluation in the emergency department was notable for white blood cell count 20.4, glucose 119, creatinine 1.21, and total bilirubin 2.2.  Electrolytes, transaminases, lactic acid, hemoglobin, and platelet count were unremarkable.  COVID,  influenza, and RSV testing was negative.  Blood cultures x 2 were obtained.  Portable chest x-ray showed right lower lobe airspace consolidation, pneumonia or atelectasis with right pleural effusion.  Enlarged cardiac silhouette was also noted.        #Acute on chronic HFpEF  #B/L (R>L) pleural effusion  #ARF w/ hypoxia, now off NC  #LEVY/CKD III, improving  #Rectal (hemorrhoidal) bleeding w/o overt anemia  #Sepsis UTI and gram negative pneumonia  #Electrolyte abnormalities (Hypomagnesemia, Hypokalemia, Hpophosphatemia)  #Acute on chronic diarrhea r/o infection  #A risk for hypotension-on midodrine 2/2   #Leukocytosis, improving  #Protein malnutrition  #Hypoalbuminemia  #Incidental urinary bladder stone (2cm)-Urology rec outpt F/U for 2cm bladder stone management via cystolitholapaxy with laser   #myeloproliferative disorder   #Immunocompromised state  #Dysphagia?  -no s/o dysphagia following SLP eval however pt and family continue to complain of poor PO intake and pharyngeal an esophageal dysphagia symptoms.   -Family reported hx of hiatal hernia. However chart review including images was unremarkable for hiatal hernia  -MBS in 1/23/24  was unrevealing for aspiration/dysphagia. However there was questionable soft tissue density seen superimposed over the larynx.  There was recommendation for ENT eval for possible visualization. There is not record of ENT eval per chart review.  #Hx PE on apixaban  -abx:zosyn/azithromycin->Augmentin. Last day of abx on 11/10/24  -resumed apixaban  -No plans for colonoscopy in the absence of active bleeding/overt anemia. Trend H/H. Trial of metamucil and preparation H  -CHF order set  -IV diuretics as BP and renal function could tolerate.   -GDMT: low dose coreg, PRN IV bumex per cards  -replete lytes PRN  -Ucx appears to be contaminated  -Awaiting stool studies. Start PO of vanc as stool studies take 24-48 hrs to result   -Will order esophagram likely to be done on 11/11/24  -CS recs  appreciated: Cards, GI (s/o), pulm (s/o), urology (s/o), dietician, SLP     Disposition: SNF placement once euvolemic following diuresis as tolerated by BP and renal functions, await consultant recommendations, await clinical improvement, and anticipate discharge 11/11 vs 11/12    DVT Prophylaxis: full anticoagulation apixaban    Code status: Full Code  Diet: Adult diet 2-3 grams sodium    Level of MDM:  High    patient and family updated, I personally examined the patient, and I personally reviewed and agree with residentphysical exam, assessment/plan with my noted corrections if any    Family Communication  Number :   Name of Designated Family Representative:       Total time spent: 35 minutes, of total time providing counseling or in coordination of care. Total time on this day of visit includes record and documentation review before and after visit including documentation and time not explicitly included on EMR time stamp      Subjective:   Interval History:  HPI  The patient complains of generalized weakness  The patient feels their symptoms areimproving  no events or new concerns    Scheduled Meds:amoxicillin-pot clavulanate, 1 tablet, oral, q12h MANI  apixaban, 2.5 mg, oral, q12h  bumetanide, 0.5 mg, intravenous, Once  bumetanide, 1 mg, intravenous, Once  busPIRone, 7.5 mg, oral, BID  carvedilol, 3.125 mg, oral, BID  cetirizine, 10 mg, oral, Daily  cholecalciferol, 2,000 Units, oral, Daily  cyanocobalamin, 1,000 mcg, oral, Daily  ferrous sulfate (325 mg ferrous sulfate), 65 mg of iron, oral, Daily with breakfast  folic acid, 1 mg, oral, Daily  gabapentin, 200 mg, oral, BID  [Held by provider] isosorbide mononitrate ER, 30 mg, oral, Daily  levothyroxine, 88 mcg, oral, Daily  magnesium oxide, 400 mg, oral, BID  midodrine, 5 mg, oral, TID  nystatin, , Topical, BID  pantoprazole, 40 mg, oral, BID AC  phenyleph-min oil-petrolatum, , rectal, TID  psyllium, 1 packet, oral, TID  sertraline, 25 mg, oral, Daily  zinc  oxide, 1 Application, Topical, BID      Continuous Infusions:   PRN Meds:PRN medications: acetaminophen **OR** acetaminophen **OR** acetaminophen, loperamide, melatonin, methyl salicylate-menthol, ondansetron ODT **OR** ondansetron, oxyCODONE, oxygen, polyethylene glycol    Review of Systems   All other systems reviewed and are negative.    Interval Pertinent History:  Social History     Tobacco Use    Smoking status: Never    Smokeless tobacco: Never   Substance Use Topics    Alcohol use: Never         Objective:   Patient Vitals for the past 24 hrs:   BP Temp Temp src Pulse Resp SpO2 Weight   24 0600 -- -- -- -- -- -- 79.4 kg (175 lb 1.6 oz)   24 0439 129/78 36.6 °C (97.9 °F) Temporal 89 16 96 % --   24 2010 131/74 36.3 °C (97.3 °F) Temporal 79 17 99 % --   24 1844 -- -- -- 88 -- -- --   24 1800 -- -- -- 110 -- -- --   24 1544 138/74 -- -- 88 -- -- --   24 1243 126/82 36.2 °C (97.2 °F) -- 89 -- 98 % --       Average, Min, and Max for last 24 hours Vitals:  TEMPERATURE:  Temp  Av.4 °C (97.5 °F)  Min: 36.2 °C (97.2 °F)  Max: 36.6 °C (97.9 °F)    RESPIRATIONS RANGE: Resp  Av.5  Min: 16  Max: 17    PULSE RANGE: Pulse  Av.5  Min: 79  Max: 110    BLOOD PRESSURE RANGE:  Systolic (24hrs), Av , Min:126 , Max:138   ; Diastolic (24hrs), Av, Min:74, Max:82      PULSE OXIMETRY RANGE: SpO2  Av.7 %  Min: 96 %  Max: 99 %  Body mass index is 29.14 kg/m².    I/O last 3 completed shifts:  In: 1080.8 (13.6 mL/kg) [P.O.:780; I.V.:50.8 (0.6 mL/kg); IV Piggyback:250]  Out: 2150 (27.1 mL/kg) [Urine:2150 (0.8 mL/kg/hr)]  Weight: 79.4 kg   Weight change: -1.588 kg (-3 lb 8 oz)     Physical Exam  Vitals and nursing note reviewed.   Constitutional:       Appearance: Normal appearance.      Interventions: Nasal cannula in place.   HENT:      Head: Normocephalic and atraumatic.      Right Ear: External ear normal.      Left Ear: External ear normal.      Nose: Nose normal.       Mouth/Throat:      Mouth: Mucous membranes are moist.   Eyes:      General: No scleral icterus.        Right eye: No discharge.         Left eye: No discharge.      Extraocular Movements: Extraocular movements intact.      Conjunctiva/sclera: Conjunctivae normal.      Pupils: Pupils are equal, round, and reactive to light.   Cardiovascular:      Rate and Rhythm: Rhythm irregularly irregular.   Pulmonary:      Effort: Pulmonary effort is normal.      Breath sounds: Rales present.   Abdominal:      General: Abdomen is flat. Bowel sounds are normal.      Palpations: Abdomen is soft.   Musculoskeletal:         General: Normal range of motion.      Right lower leg: Edema (trace to +1, improving) present.      Left lower leg: Edema (trace to +1, improving) present.   Skin:     General: Skin is warm and dry.      Capillary Refill: Capillary refill takes less than 2 seconds.      Findings: Wound present.   Neurological:      General: No focal deficit present.   Psychiatric:         Mood and Affect: Mood normal.         Thought Content: Thought content normal.         Judgment: Judgment normal.         Lab Results   Component Value Date     (L) 11/09/2024    K 3.3 (L) 11/09/2024     11/09/2024    CO2 24 11/09/2024    BUN 21 11/09/2024    CREATININE 0.93 11/09/2024    GLUCOSE 99 11/09/2024    CALCIUM 6.9 (L) 11/09/2024    PROT 3.1 (L) 11/05/2024    BILITOT 0.7 11/05/2024    ALKPHOS 34 11/05/2024    AST 7 (L) 11/05/2024    ALT 11 11/05/2024       Lab Results   Component Value Date    WBC 16.9 (H) 11/09/2024    HGB 14.1 11/09/2024    HCT 47.7 (H) 11/09/2024    MCV 92 11/09/2024     11/09/2024    LYMPHOPCT 4.1 11/09/2024    RBC 5.18 11/09/2024    MCH 27.2 11/09/2024    MCHC 29.6 (L) 11/09/2024    RDW 15.7 (H) 11/09/2024    CRP 2.84 (H) 11/08/2024       Lab Results   Component Value Date    TSH 2.09 10/29/2024     Lab Results   Component Value Date    LACTATE 1.3 11/03/2024     (H) 11/03/2024    INR  2.1 (H) 11/06/2024       IMAGES:  Encounter Date: 11/03/24   ECG 12 lead   Result Value    Ventricular Rate 104    Atrial Rate 104    NH Interval 162    QRS Duration 124    QT Interval 368    QTC Calculation(Bazett) 483    P Axis 54    R Axis -52    T Axis 169    QRS Count 18    Q Onset 219    P Onset 138    P Offset 188    T Offset 403    QTC Fredericia 442    Narrative    Sinus tachycardia  Left axis deviation  Left bundle branch block  Abnormal ECG  No previous ECGs available     CT abdomen pelvis wo IV contrast    Result Date: 11/4/2024  Impression: 2 centimeters stone in the urinary bladder.   Bilateral pleural effusions. Pericardial effusion.   Ascites.   Anasarca.   Presacral edema.   Enlarged spleen.   The exam is limited by motion artifact.   MACRO: none   Signed by: Jayshree Dias 11/4/2024 3:28 PM Dictation workstation:   [id number]     renal complete    Result Date: 11/4/2024  Impression: No hydronephrosis. Decompressed bladder.   MACRO: None   Signed by: Zain Mcknight 11/4/2024 11:08 AM Dictation workstation:   [id number]    CT chest wo IV contrast    Result Date: 11/3/2024  Impression: Abnormalities at the right lung base on portable frontal chest radiograph earlier today are due to a combination of moderate-sized, mostly if not entirely free-flowing right pleural effusion with associated multisegmental right lower lobe collapse adjacent to it   Small area of ground-glass airspace disease in the middle lobe confirmed on CT may not have been expected on the prior radiograph   Possibility of mild ground-glass airspace disease in the right lower lobe as well   No ground-glass airspace disease in the left lung   No consolidative pneumonia in any of the inflated lungs on either side   Small free-flowing left pleural effusion   Unusual hypodensities in the right ventricular free wall of uncertain etiology and significance   Perhaps trace (smallest detectable quantity) pericardial effusion   Incidental  abnormalities in the included upper abdomen as detailed above   MACRO: None   Signed by: Matthias Kolb 11/3/2024 11:35 AM Dictation workstation:   [id number]    XR chest 1 view    Result Date: 11/3/2024  Impression: 1. Right lower lobe airspace consolidation either pneumonia or atelectasis with right pleural effusion. Follow-up to complete resolution is needed 2. Enlarged cardiac silhouette     Signed by: Kary Cardoso 11/3/2024 10:27 AM Dictation workstation:   [id number]    Transthoracic Echo (TTE) Limited    Result Date: 11/5/2024   [facility], [address] [place] [address]               Tel [phone] and Fax [phone] TRANSTHORACIC ECHOCARDIOGRAM REPORT  Patient Name:       ELIE Mccain Physician:    [id number] Chicho Goldman MD Study Date:         11/5/2024           Ordering Provider:    [id number] SHERITA GOYAL MRN/PID:            [id number]            Fellow: Accession#:         [id number]        Nurse: Date of Birth/Age:  1940 / 83     Sonographer:          Cathi garcia                                     [facility] Gender assigned at  F                   Additional Staff: Birth: Height:             165.10 cm           Admit Date:           11/3/2024 Weight:             72.57 kg            Admission Status:     Inpatient -                                                               Routine BSA / BMI:          1.80 m2 / 26.63     Encounter#:           [id number]                     kg/m2 Blood Pressure:     103/62 mmHg         Department Location:  [facility] Non                                                               Invasive Study Type:    TRANSTHORACIC ECHO (TTE) LIMITED Diagnosis/ICD: 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 Study Detail: The following Echo studies were performed: 2D, Doppler and color               flow.  PHYSICIAN INTERPRETATION: Left Ventricle: The left ventricular systolic function is normal, with a visually estimated ejection fraction of 60-65%. There is severely increased concentric left ventricular hypertrophy. There are no regional left ventricular wall motion abnormalities. The left ventricular cavity size is normal. There is moderately increased septal and moderately increased posterior left ventricular wall thickness. Spectral Doppler shows an abnormal pattern of left ventricular diastolic filling. Left Atrium: The left atrium is upper limits of normal in size. Right Ventricle: The right ventricle is normal in size. There is normal right ventricular global systolic function. Right Atrium: The right atrium is normal in size. Aortic Valve: The aortic valve is trileaflet. The aortic valve dimensionless index is 0.77. There is trace aortic valve regurgitation. The peak instantaneous gradient of the aortic valve is 13 mmHg. The mean gradient of the mitral valve is 7 mmHg. Mitral Valve: The mitral valve is normal in structure. There is trace to mild mitral valve regurgitation. Tricuspid Valve: The tricuspid valve is structurally normal. There is trace to mild tricuspid regurgitation. Pulmonic Valve: The pulmonic valve is not well visualized. The pulmonic valve regurgitation was not well visualized. Pericardium: There is no pericardial effusion noted. Aorta: The aortic root is normal.  CONCLUSIONS:  1. The left ventricular systolic function is normal, with a visually estimated ejection fraction of 60-65%.  2. Spectral Doppler shows an abnormal pattern of left ventricular diastolic  filling.  3. There is moderately increased septal and moderately increased posterior left ventricular wall thickness.  4. There is severely increased concentric left ventricular hypertrophy.  5. There is normal right ventricular global systolic function. QUANTITATIVE DATA SUMMARY:  2D MEASUREMENTS:          Normal Ranges: IVSd:            1.49 cm  (0.6-1.1cm) LVPWd:           1.26 cm  (0.6-1.1cm) LVIDd:           4.47 cm  (3.9-5.9cm) LVIDs:           2.98 cm LV Mass Index:   133 g/m2 LVEDV Index:     23 ml/m2 LV % FS          33.4 %  LV SYSTOLIC FUNCTION BY 2D PLANIMETRY (MOD):                      Normal Ranges: EF-A4C View:    62 % (>=55%) EF-A2C View:    61 % EF-Biplane:     64 % EF-Visual:      63 % LV EF Reported: 63 %  LV DIASTOLIC FUNCTION:          Normal Ranges: MV Peak E:             0.70 m/s (0.7-1.2 m/s) MV Peak A:             0.91 m/s (0.42-0.7 m/s) E/A Ratio:             0.77     (1.0-2.2)  MITRAL VALVE:          Normal Ranges: MV DT:        264 msec (150-240msec)  AORTIC VALVE:                      Normal Ranges: AoV Vmax:                1.78 m/s  (<=1.7m/s) AoV Peak P.7 mmHg (<20mmHg) AoV Mean P.0 mmHg  (1.7-11.5mmHg) LVOT Max Josiah:            1.33 m/s  (<=1.1m/s) AoV VTI:                 31.31 cm  (18-25cm) LVOT VTI:                24.11 cm LVOT Diameter:           2.04 cm   (1.8-2.4cm) AoV Area, VTI:           2.52 cm2  (2.5-5.5cm2) AoV Area,Vmax:           2.44 cm2  (2.5-4.5cm2) AoV Dimensionless Index: 0.77  TRICUSPID VALVE/RVSP:          Normal Ranges: Peak TR Velocity:     3.46 m/s  PULMONIC VALVE:          Normal Ranges: PV Max Josiah:     0.8 m/s  (0.6-0.9m/s) PV Max P.8 mmHg  [id number] Agustus Goldman MD Electronically signed on 2024 at 2:09:49 PM  ** Final **    === 24 ===    XR CHEST 1 VIEW    - Impression -  1. Right lower lobe airspace consolidation either pneumonia or  atelectasis with right pleural effusion. Follow-up to complete  resolution is  needed  2. Enlarged cardiac silhouette      Signed by: Kary Cardoso 11/3/2024 10:27 AM  Dictation workstation:   [id number]  === 11/03/24 ===    CT ABDOMEN PELVIS WO IV CONTRAST    - Impression -  2 centimeters stone in the urinary bladder.    Bilateral pleural effusions. Pericardial effusion.    Ascites.    Anasarca.    Presacral edema.    Enlarged spleen.    The exam is limited by motion artifact.    MACRO:  none    Signed by: Jayshree Dias 11/4/2024 3:28 PM  Dictation workstation:   [id number]  === 11/03/24 ===    XR CHEST 1 VIEW    - Impression -  1. Right lower lobe airspace consolidation either pneumonia or  atelectasis with right pleural effusion. Follow-up to complete  resolution is needed  2. Enlarged cardiac silhouette      Signed by: Kary Cardoso 11/3/2024 10:27 AM  Dictation workstation:   [id number]      Additional results of the last 24 hours have been reviewed.    Dictated using [employer] Version 2.4  Proof read however unrecognized voice recognition errors may have occurred     Electronically signed by Cha Ramsay DO on 11/09/24 at 9:40 AM

## 2024-11-09 NOTE — PROGRESS NOTES
Desirae Gregg is a 83 y.o. female on day 6 of admission presenting with Sepsis with acute hypoxic respiratory failure without septic shock (Multi).      Subjective     Graciela was laying in bed, denies having any shortness of breath, currently on 2 L nc. Pitting edema 2 plus lower extremities. Urine output +1470 ml's  Tele reviewed, SR with frequent PAC's PVCs    Review of systems:  Constitutional: negative for fever, chills, or malaise  Neuro: negative for dizziness, headache, numbness, tingling  ENT: Negative for nasal congestion or sore throat  Resp: negative for shortness of breath, cough, or wheezing  CV: negative for chest pain, palpitations  GI: negative for abd pain, nausea, vomiting or diarrhea  : negative for dysuria, frequency, or urgency  Skin: negative for lesions, wounds, or rash  Musculoskeletal: Positive for weakness, myalgia, or arthralgia  Endocrine: Negative for polyuria or polydipsia       Objective   Constitutional: Well developed, awake/alert/oriented x3, no distress, alert and cooperative  Eyes: PERRL, EOMI, clear sclera  ENMT: mucous membranes moist, no apparent injury, no lesions seen  Head/Neck: Neck supple, no apparent injury, thyroid without mass or tenderness, No JVD, trachea midline, no bruits  Respiratory/Thorax: Patent airways, CTAB, normal breath sounds with good chest expansion, thorax symmetric  Cardiovascular: Regular, rate and rhythm, no murmurs, 2+ equal pulses of the extremities, normal S 1and S 2  Gastrointestinal: Nondistended, soft, non-tender, no rebound tenderness or guarding, no masses palpable, no organomegaly, +BS, no bruits  Musculoskeletal: ROM intact, no joint swelling, normal strength  Extremities: normal extremities, no cyanosis, 2+ pitting edema BLE, no contusions or wounds, no clubbing  Neurological: alert and oriented x3, intact senses, motor, response and reflexes, normal strength  Lymphatic: No significant lymphadenopathy  Psychological: Appropriate mood and  "behavior  Skin: Warm and dry, no lesions, no rashes    Last Recorded Vitals  /78 (BP Location: Left arm, Patient Position: Lying)   Pulse 89   Temp 36.6 °C (97.9 °F) (Temporal)   Resp 16   Ht 1.651 m (5' 5\")   Wt 79.4 kg (175 lb 1.6 oz)   SpO2 96%   BMI 29.14 kg/m²     Intake/Output last 3 Shifts:  I/O last 3 completed shifts:  In: 1080.8 (13.6 mL/kg) [P.O.:780; I.V.:50.8 (0.6 mL/kg); IV Piggyback:250]  Out: 2150 (27.1 mL/kg) [Urine:2150 (0.8 mL/kg/hr)]  Weight: 79.4 kg   No intake/output data recorded.    Relevant Results  Scheduled medications  amoxicillin-pot clavulanate, 1 tablet, oral, q12h MANI  apixaban, 2.5 mg, oral, q12h  busPIRone, 7.5 mg, oral, BID  carvedilol, 3.125 mg, oral, BID  cetirizine, 10 mg, oral, Daily  cholecalciferol, 2,000 Units, oral, Daily  cyanocobalamin, 1,000 mcg, oral, Daily  ferrous sulfate (325 mg ferrous sulfate), 65 mg of iron, oral, Daily with breakfast  folic acid, 1 mg, oral, Daily  gabapentin, 200 mg, oral, BID  [Held by provider] isosorbide mononitrate ER, 30 mg, oral, Daily  levothyroxine, 88 mcg, oral, Daily  magnesium oxide, 400 mg, oral, BID  midodrine, 5 mg, oral, TID  nystatin, , Topical, BID  pantoprazole, 40 mg, oral, BID AC  phenyleph-min oil-petrolatum, , rectal, TID  psyllium, 1 packet, oral, TID  sertraline, 25 mg, oral, Daily  zinc oxide, 1 Application, Topical, BID      Continuous medications     PRN medications  PRN medications: acetaminophen **OR** acetaminophen **OR** acetaminophen, loperamide, melatonin, methyl salicylate-menthol, ondansetron ODT **OR** ondansetron, oxyCODONE, oxygen, polyethylene glycol    Results for orders placed or performed during the hospital encounter of 11/03/24 (from the past 24 hours)   Hemoglobin and hematocrit, blood   Result Value Ref Range    Hemoglobin 13.8 12.0 - 16.0 g/dL    Hematocrit 45.9 36.0 - 46.0 %   Renal Function Panel   Result Value Ref Range    Glucose 99 74 - 99 mg/dL    Sodium 135 (L) 136 - 145 mmol/L "    Potassium 3.3 (L) 3.5 - 5.3 mmol/L    Chloride 105 98 - 107 mmol/L    Bicarbonate 24 21 - 32 mmol/L    Anion Gap 9 (L) 10 - 20 mmol/L    Urea Nitrogen 21 6 - 23 mg/dL    Creatinine 0.93 0.50 - 1.05 mg/dL    eGFR 60 (L) >60 mL/min/1.73m*2    Calcium 6.9 (L) 8.6 - 10.3 mg/dL    Phosphorus 3.1 2.5 - 4.9 mg/dL    Albumin 1.9 (L) 3.4 - 5.0 g/dL   Magnesium   Result Value Ref Range    Magnesium 1.64 1.60 - 2.40 mg/dL   CBC and Auto Differential   Result Value Ref Range    WBC 16.9 (H) 4.4 - 11.3 x10*3/uL    nRBC 0.0 0.0 - 0.0 /100 WBCs    RBC 5.18 4.00 - 5.20 x10*6/uL    Hemoglobin 14.1 12.0 - 16.0 g/dL    Hematocrit 47.7 (H) 36.0 - 46.0 %    MCV 92 80 - 100 fL    MCH 27.2 26.0 - 34.0 pg    MCHC 29.6 (L) 32.0 - 36.0 g/dL    RDW 15.7 (H) 11.5 - 14.5 %    Platelets 153 150 - 450 x10*3/uL    Neutrophils % 85.2 40.0 - 80.0 %    Immature Granulocytes %, Automated 2.1 (H) 0.0 - 0.9 %    Lymphocytes % 4.1 13.0 - 44.0 %    Monocytes % 4.3 2.0 - 10.0 %    Eosinophils % 3.5 0.0 - 6.0 %    Basophils % 0.8 0.0 - 2.0 %    Neutrophils Absolute 14.42 (H) 1.60 - 5.50 x10*3/uL    Immature Granulocytes Absolute, Automated 0.35 0.00 - 0.50 x10*3/uL    Lymphocytes Absolute 0.69 (L) 0.80 - 3.00 x10*3/uL    Monocytes Absolute 0.73 0.05 - 0.80 x10*3/uL    Eosinophils Absolute 0.60 (H) 0.00 - 0.40 x10*3/uL    Basophils Absolute 0.13 (H) 0.00 - 0.10 x10*3/uL       XR chest 1 view   Final Result   1. Suggestion of mild interstitial edema, more pronounced on this   radiograph compared to prior. Recommend correlation with fluid status.   2. Redemonstration of moderate right and small left pleural effusions.   3. Right basilar airspace opacity, which could be related to   atelectasis versus pneumonia in appropriate clinical setting.        MACRO:   None        Signed by: Yazmin Oropeza 11/8/2024 4:17 PM   Dictation workstation:   [id number]      Transthoracic Echo (TTE) Limited   Final Result      CT abdomen pelvis wo IV contrast   Final Result    2 centimeters stone in the urinary bladder.        Bilateral pleural effusions. Pericardial effusion.        Ascites.        Anasarca.        Presacral edema.        Enlarged spleen.        The exam is limited by motion artifact.        MACRO:   none        Signed by: Jayshree Dias 11/4/2024 3:28 PM   Dictation workstation:   [id number]       renal complete   Final Result   No hydronephrosis. Decompressed bladder.        MACRO:   None        Signed by: Zain Mcknight 11/4/2024 11:08 AM   Dictation workstation:   [id number]      CT chest wo IV contrast   Final Result   Abnormalities at the right lung base on portable frontal chest   radiograph earlier today are due to a combination of moderate-sized,   mostly if not entirely free-flowing right pleural effusion with   associated multisegmental right lower lobe collapse adjacent to it        Small area of ground-glass airspace disease in the middle lobe   confirmed on CT may not have been expected on the prior radiograph        Possibility of mild ground-glass airspace disease in the right lower   lobe as well        No ground-glass airspace disease in the left lung        No consolidative pneumonia in any of the inflated lungs on either side        Small free-flowing left pleural effusion        Unusual hypodensities in the right ventricular free wall of uncertain   etiology and significance        Perhaps trace (smallest detectable quantity) pericardial effusion        Incidental abnormalities in the included upper abdomen as detailed   above        MACRO:   None        Signed by: Matthias Kolb 11/3/2024 11:35 AM   Dictation workstation:   [id number]      XR chest 1 view   Final Result   1. Right lower lobe airspace consolidation either pneumonia or   atelectasis with right pleural effusion. Follow-up to complete   resolution is needed   2. Enlarged cardiac silhouette             Signed by: Kary Cardoso 11/3/2024 10:27 AM   Dictation workstation:   [id number]           Transthoracic Echo (TTE) Limited    Result Date: 11/5/2024   [facility], [address] [address]               Tel [phone] and Fax [phone] TRANSTHORACIC ECHOCARDIOGRAM REPORT  Patient Name:       ELIE Mccain Physician:    [id number] Chicho Goldman MD Study Date:         11/5/2024           Ordering Provider:    [id number] SHERITA GOYAL MRN/PID:            [id number]            Fellow: Accession#:         [id number]        Nurse: Date of Birth/Age:  1940 / 83     Sonographer:          Cathi garcia                                     RDYVROSE Gender assigned at  F                   Additional Staff: Birth: Height:             165.10 cm           Admit Date:           11/3/2024 Weight:             72.57 kg            Admission Status:     Inpatient -                                                               Routine BSA / BMI:          1.80 m2 / 26.63     Encounter#:           [id number]                     kg/m2 Blood Pressure:     103/62 mmHg         Department Location:  [facility] Non                                                               Invasive Study Type:    TRANSTHORACIC ECHO (TTE) LIMITED Diagnosis/ICD: 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 Study Detail: The following Echo studies were performed: 2D, Doppler and color               flow.  PHYSICIAN INTERPRETATION: Left Ventricle: The left ventricular systolic function is normal, with a visually estimated ejection fraction of  60-65%. There is severely increased concentric left ventricular hypertrophy. There are no regional left ventricular wall motion abnormalities. The left ventricular cavity size is normal. There is moderately increased septal and moderately increased posterior left ventricular wall thickness. Spectral Doppler shows an abnormal pattern of left ventricular diastolic filling. Left Atrium: The left atrium is upper limits of normal in size. Right Ventricle: The right ventricle is normal in size. There is normal right ventricular global systolic function. Right Atrium: The right atrium is normal in size. Aortic Valve: The aortic valve is trileaflet. The aortic valve dimensionless index is 0.77. There is trace aortic valve regurgitation. The peak instantaneous gradient of the aortic valve is 13 mmHg. The mean gradient of the mitral valve is 7 mmHg. Mitral Valve: The mitral valve is normal in structure. There is trace to mild mitral valve regurgitation. Tricuspid Valve: The tricuspid valve is structurally normal. There is trace to mild tricuspid regurgitation. Pulmonic Valve: The pulmonic valve is not well visualized. The pulmonic valve regurgitation was not well visualized. Pericardium: There is no pericardial effusion noted. Aorta: The aortic root is normal.  CONCLUSIONS:  1. The left ventricular systolic function is normal, with a visually estimated ejection fraction of 60-65%.  2. Spectral Doppler shows an abnormal pattern of left ventricular diastolic filling.  3. There is moderately increased septal and moderately increased posterior left ventricular wall thickness.  4. There is severely increased concentric left ventricular hypertrophy.  5. There is normal right ventricular global systolic function. QUANTITATIVE DATA SUMMARY:  2D MEASUREMENTS:          Normal Ranges: IVSd:            1.49 cm  (0.6-1.1cm) LVPWd:           1.26 cm  (0.6-1.1cm) LVIDd:           4.47 cm  (3.9-5.9cm) LVIDs:           2.98 cm LV Mass Index:    133 g/m2 LVEDV Index:     23 ml/m2 LV % FS          33.4 %  LV SYSTOLIC FUNCTION BY 2D PLANIMETRY (MOD):                      Normal Ranges: EF-A4C View:    62 % (>=55%) EF-A2C View:    61 % EF-Biplane:     64 % EF-Visual:      63 % LV EF Reported: 63 %  LV DIASTOLIC FUNCTION:          Normal Ranges: MV Peak E:             0.70 m/s (0.7-1.2 m/s) MV Peak A:             0.91 m/s (0.42-0.7 m/s) E/A Ratio:             0.77     (1.0-2.2)  MITRAL VALVE:          Normal Ranges: MV DT:        264 msec (150-240msec)  AORTIC VALVE:                      Normal Ranges: AoV Vmax:                1.78 m/s  (<=1.7m/s) AoV Peak P.7 mmHg (<20mmHg) AoV Mean P.0 mmHg  (1.7-11.5mmHg) LVOT Max Josiah:            1.33 m/s  (<=1.1m/s) AoV VTI:                 31.31 cm  (18-25cm) LVOT VTI:                24.11 cm LVOT Diameter:           2.04 cm   (1.8-2.4cm) AoV Area, VTI:           2.52 cm2  (2.5-5.5cm2) AoV Area,Vmax:           2.44 cm2  (2.5-4.5cm2) AoV Dimensionless Index: 0.77  TRICUSPID VALVE/RVSP:          Normal Ranges: Peak TR Velocity:     3.46 m/s  PULMONIC VALVE:          Normal Ranges: PV Max Josiah:     0.8 m/s  (0.6-0.9m/s) PV Max P.8 mmHg  [id number] Chicho Goldman MD Electronically signed on 2024 at 2:09:49 PM  ** Final **           Assessment/Plan   Principal Problem:    Sepsis with acute hypoxic respiratory failure without septic shock (Multi)  Active Problems:    Iron deficiency anemia    Chronic systolic (congestive) heart failure    Essential hypertension    History of pulmonary embolism    Anxiety    Vitamin D deficiency    Vitamin B12 deficiency    Adult hypothyroidism    Pneumonia of right lower lobe due to infectious organism    Leukocytosis    Acute kidney injury superimposed on stage 3a chronic kidney disease    Elevated troponin    Hyperbilirubinemia    Hyperglycemia    Hypokalemia    Hypomagnesemia    Hypocalcemia    Abnormality of right ventricle of heart    Impaired  mobility and ADLs      Echocardiogram from June 2024:    Left Ventricle: Low normal left ventricular systolic function with a   visually estimated EF of 50 - 55%. Left ventricle size is normal. Normal   wall thickness. Ventricular mass is normal. Findings consistent with   concentric remodeling. Normal wall motion. Grade I diastolic dysfunction   with normal LAP.     Right Ventricle: Right ventricle size is normal. Normal systolic   function. TAPSE is 1.9 cm.     Aortic Valve: No stenosis.     Mitral Valve: Mild regurgitation.     Tricuspid Valve: Normal RVSP. The estimated RVSP is 24 mmHg.     Pericardium: No pericardial effusion.         Pneumonia  -CT chest reviewed  -Procal downtrending  -antibiotics  -bronchodilators  -oxygen support  -sputum culture  -blood cultures  -Pulmonary following, note reviewed     2. Abnormal CT finding of RV  -Unusual hypodensities in the right ventricular free wall  -Echo as above  -Repeat limited echo reviewed, no abnormality noted  -Most likely an RV wall fat pad  -Can obtain outpt cardiac MRI to further eval     3. Acute on chronic diastolic CHF with mildly reduced EF  -CT showed pleural effusion, possible parapneumonic vs cardiac  -  -I reviewed the Echocardiogram as above  -Strict I & Os  -Daily weights  -2gm na diet  -BP low most likely from infection and third spacing with low albumin  -No plans for thoracentesis->restarted Eliquis  -Hold imdur for low BP  -Cont low dose Coreg  -CT abd/pelvis showed pleural effusions, anasarca, and ascities  -Albumin 1.9, given IV albumin and Lasix IV (11/5)  -BP borderline low, Midodrine 5mg TID started  -Bumex 1mg IV x1 (11/6, 11/7)  -Repeat Bumex 1mg IV x1 today  -CXR pending  - Repeat Bumex 1 mg (11/9) for pitting edema BLE, + 1470 output, blood pressure stable     4. UTI  -WBC elevated  -Urine culture showed multiple organisms, possible contamination  -antibiotics     5. Elevated troponins-Non MI elevation  -Troponin 76, now  downtrending  -EKG showed SR LBBB->chronic LBBB  -Denies ACS symptoms  -limited echo as above, normal LVEF     6. Hx of DVT  -Eliquis     7. Hypokalemia/hypomagnesemia  -supplement     8. Hx of HTN, with hypotension  -Hold imdur  -IVF given->hold  -Gave albumin with lasix post (11/5)  -Cont Midodrine 5mg TID  -Coreg low dose  -Monitor    Melany Camarena, OLI-CNP

## 2024-11-09 NOTE — CARE PLAN
The patient's goals for the shift include  manage bowel movements.  Up to chair for 1 hour    The clinical goals for the shift include pt will be able to control bowel movements      Problem: Skin  Goal: Decreased wound size/increased tissue granulation at next dressing change  Outcome: Progressing  Goal: Participates in plan/prevention/treatment measures  Outcome: Progressing  Goal: Prevent/manage excess moisture  Outcome: Progressing  Goal: Promote/optimize nutrition  Outcome: Progressing  Goal: Promote skin healing  Outcome: Progressing     Problem: Pain - Adult  Goal: Verbalizes/displays adequate comfort level or baseline comfort level  Outcome: Progressing     Problem: Safety - Adult  Goal: Free from fall injury  Outcome: Progressing     Problem: Discharge Planning  Goal: Discharge to home or other facility with appropriate resources  Outcome: Progressing     Problem: Chronic Conditions and Co-morbidities  Goal: Patient's chronic conditions and co-morbidity symptoms are monitored and maintained or improved  Outcome: Progressing

## 2024-11-09 NOTE — PROGRESS NOTES
"Occupational Therapy    OT Treatment    Patient Name: Desirae Gregg  MRN: [id number]  Department: [address]  Room: [address] [place] [address]  Today's Date: 11/9/2024  Time Calculation  Start Time: 0959  Stop Time: 1038  Time Calculation (min): 39 min        Assessment:  Prognosis: Good  Barriers to Discharge: None  Evaluation/Treatment Tolerance: Patient tolerated treatment well, Patient limited by fatigue  Medical Staff Made Aware: Yes  End of Session Communication: Bedside nurse, PCT/NA/CTA  End of Session Patient Position:  (in chair, chair alarm activated. Call light in reach)  Prognosis: Good  Barriers to Discharge: None  Evaluation/Treatment Tolerance: Patient tolerated treatment well, Patient limited by fatigue  Medical Staff Made Aware: Yes  Barriers to Participation: Comorbidities  Plan:  Treatment Interventions: ADL retraining, Functional transfer training, UE strengthening/ROM, Endurance training  OT Frequency: 3 times per week  OT Discharge Recommendations: Moderate intensity level of continued care  Equipment Recommended upon Discharge: Wheeled walker, Bedside commode  OT Recommended Transfer Status: Assist of 1, Assist of 2  OT - OK to Discharge: Yes  Treatment Interventions: ADL retraining, Functional transfer training, UE strengthening/ROM, Endurance training    Subjective   Previous Visit Info:     General:  General  Reason for Referral: Pt is an 82 yo female who presented to [facility] on 11/3/24 with generalized weakness, vomiting, and fever. CT chest showed moderate right pleural effusion. OT consulted for impaired ADL and related mobility.  Referred By: FRANCISCA Ramsay DO  Past Medical History Relevant to Rehab: HTN, chronic systolic CHF, PE, myelofibrosis, sciatica, remote poliomyelitis (affecting LLE), JAK2 positive polycythemia vera, CKDIII  Family/Caregiver Present: Yes  Caregiver Feedback: son receptive and shares \"we help with most of her needs at home\"  Prior to Session Communication:  (NSG agreeable to Tx session stating " "\"shes doing better this morning\" Pt agreeable to session, cooperative, pleasant and eager \"to use the bathroom, I really need to go\")  Patient Position Received:  (pt supine at arrival, no bed alarm activated. willing to complete session.)  General Comment: Following session, all needs met, call light in reach, chair alarm activated, and NSG made aware of same.  Precautions:  Precautions Comment: tele, verma, falls risk, contact prec (r/o Cdiff), impaired memory, decreased activity tolerance, knee buckling.            Pain:  Pain Assessment  Pain Assessment:  (6/10 \"bottom\" pain at beginning of session. 4/10 \"bottom\" pain reported at end of session.)    Objective    Cognition:  Cognition  Overall Cognitive Status: Within Functional Limits  Orientation Level:  (Pt able to state her name, age, hospital and county.)    Activities of Daily Living: Toileting  Toileting Comments: Pt completed toileting (BSC) at distant S level. no LOB throughout. Pt attempts to complete ced care following BM however, unable to stand safely to complete. pt then required Max A for completion (pt able to stand with Mod A of 1 while 2nd person completed ced care).  Functional Standing Tolerance:  Functional Standing Tolerance Comments: Pt engaged in X4 static stands in prep for functional TRF and ADL tasks. pt required Mod-Min AX1 to maintain safe upright position with continuous cues as pt has fear of falling. X3 LOB and knee buckling observed. Pt is a high risk for falls/injury.  Bed Mobility/Transfers: Bed Mobility 1  Bed Mobility Comments 1: d/t weaknes/pain, pt requires Min A to guide BLE across bed surface, Mod AX1 sit to supine. Max AX1 to scoot hips toward EOB/feet flat on floor. Once upright, CGA/SBA level to maintain safe upright position.    Transfers  Transfer:  (X3 sit<>stand trials completed from various surfaces. pt fluctuates from Max AX1 to Mod AX1 during trials d/t fear of falling and overall weakness/fatigue.)  Transfer " 1  Trials/Comments 1: Pt completes bed to BSC TRF Mod Ax1 for safe transiton using the FWW. pt required cues for hand placement and sequencing. Pt then completed BSC to chair TRF requiring again Mod AX1 for safety and sequencing.      Therapy/Activity: Therapeutic Activity  Therapeutic Activity Performed:  (BUE ROM completed as a prepatory activity to TRF training finger flex, elbow flex/ext, FW punching 10 reps X1 set. F tolerance observed with intermittent rest breaks d/t fatigue.)         Outcome Measures:[facility] Daily Activity  Putting on and taking off regular lower body clothing: A lot  Bathing (including washing, rinsing, drying): A lot  Putting on and taking off regular upper body clothing: A little  Toileting, which includes using toilet, bedpan or urinal: A lot  Taking care of personal grooming such as brushing teeth: A little  Eating Meals: None  Daily Activity - Total Score: 16          OP EDUCATION:  Education  Individual(s) Educated: Patient, Child  Education Provided:  (Pt/son educated in OT services, importance of OOB tasks, strength, bed mobility, TRF training, stadn tolerance. G understanding with F teachback d/t increasing fatigue.)    Goals:  Encounter Problems       Encounter Problems (Active)       OT Goals       Pt will complete [id number] transfers using LRD in preparation for ADLs with CGA (Progressing)       Start:  11/06/24    Expected End:  11/20/24            Pt will increase endurance to tolerate 15min of OOB activity with no more than 1 rest break in order to increase ability to engage in ADL completion.  (Progressing)       Start:  11/06/24    Expected End:  11/20/24            Pt will tolerate 10min stand during functional task completion with no more than 1 rest break in order to increase endurance for functional task completion.  (Progressing)       Start:  11/06/24    Expected End:  11/20/24            Pt will demo Grooming/UE ADL completion with modified independence, using  adaptive strategies and energy conservation techniques as applicable.  (Progressing)       Start:  11/06/24    Expected End:  11/20/24            Pt will demo and/or verbalize 2-3 energy conservation techniques to incorporate into functional mobility or ADL to improve performance and increase independence.  (Progressing)       Start:  11/06/24    Expected End:  11/20/24

## 2024-11-10 VITALS
BODY MASS INDEX: 29.09 KG/M2 | OXYGEN SATURATION: 91 % | RESPIRATION RATE: 16 BRPM | TEMPERATURE: 97.3 F | HEART RATE: 76 BPM | DIASTOLIC BLOOD PRESSURE: 74 MMHG | WEIGHT: 174.6 LBS | HEIGHT: 65 IN | SYSTOLIC BLOOD PRESSURE: 131 MMHG

## 2024-11-10 LAB
ALBUMIN SERPL BCP-MCNC: 2 G/DL (ref 3.4–5)
ANION GAP SERPL CALC-SCNC: 10 MMOL/L (ref 10–20)
BASOPHILS # BLD AUTO: 0.13 X10*3/UL (ref 0–0.1)
BASOPHILS NFR BLD AUTO: 0.6 %
BUN SERPL-MCNC: 18 MG/DL (ref 6–23)
C DIF TOX TCDA+TCDB STL QL NAA+PROBE: NOT DETECTED
CALCIUM SERPL-MCNC: 7.1 MG/DL (ref 8.6–10.3)
CHLORIDE SERPL-SCNC: 105 MMOL/L (ref 98–107)
CO2 SERPL-SCNC: 26 MMOL/L (ref 21–32)
CREAT SERPL-MCNC: 0.86 MG/DL (ref 0.5–1.05)
EGFRCR SERPLBLD CKD-EPI 2021: 66 ML/MIN/1.73M*2
EOSINOPHIL # BLD AUTO: 0.67 X10*3/UL (ref 0–0.4)
EOSINOPHIL NFR BLD AUTO: 3.3 %
ERYTHROCYTE [DISTWIDTH] IN BLOOD BY AUTOMATED COUNT: 15.7 % (ref 11.5–14.5)
GLUCOSE SERPL-MCNC: 109 MG/DL (ref 74–99)
HCT VFR BLD AUTO: 49.9 % (ref 36–46)
HGB BLD-MCNC: 14.9 G/DL (ref 12–16)
IMM GRANULOCYTES # BLD AUTO: 0.84 X10*3/UL (ref 0–0.5)
IMM GRANULOCYTES NFR BLD AUTO: 4.2 % (ref 0–0.9)
LYMPHOCYTES # BLD AUTO: 0.85 X10*3/UL (ref 0.8–3)
LYMPHOCYTES NFR BLD AUTO: 4.2 %
MAGNESIUM SERPL-MCNC: 1.48 MG/DL (ref 1.6–2.4)
MCH RBC QN AUTO: 27 PG (ref 26–34)
MCHC RBC AUTO-ENTMCNC: 29.9 G/DL (ref 32–36)
MCV RBC AUTO: 90 FL (ref 80–100)
MONOCYTES # BLD AUTO: 0.85 X10*3/UL (ref 0.05–0.8)
MONOCYTES NFR BLD AUTO: 4.2 %
NEUTROPHILS # BLD AUTO: 16.85 X10*3/UL (ref 1.6–5.5)
NEUTROPHILS NFR BLD AUTO: 83.5 %
NRBC BLD-RTO: 0 /100 WBCS (ref 0–0)
PHOSPHATE SERPL-MCNC: 2.5 MG/DL (ref 2.5–4.9)
PLATELET # BLD AUTO: 229 X10*3/UL (ref 150–450)
POTASSIUM SERPL-SCNC: 3.8 MMOL/L (ref 3.5–5.3)
RBC # BLD AUTO: 5.52 X10*6/UL (ref 4–5.2)
SODIUM SERPL-SCNC: 137 MMOL/L (ref 136–145)
WBC # BLD AUTO: 20.2 X10*3/UL (ref 4.4–11.3)

## 2024-11-10 PROCEDURE — 80069 RENAL FUNCTION PANEL: CPT | Performed by: INTERNAL MEDICINE

## 2024-11-10 PROCEDURE — 2500000001 HC RX 250 WO HCPCS SELF ADMINISTERED DRUGS (ALT 637 FOR MEDICARE OP): Performed by: INTERNAL MEDICINE

## 2024-11-10 PROCEDURE — 99232 SBSQ HOSP IP/OBS MODERATE 35: CPT | Performed by: INTERNAL MEDICINE

## 2024-11-10 PROCEDURE — 2500000004 HC RX 250 GENERAL PHARMACY W/ HCPCS (ALT 636 FOR OP/ED): Performed by: INTERNAL MEDICINE

## 2024-11-10 PROCEDURE — 2500000002 HC RX 250 W HCPCS SELF ADMINISTERED DRUGS (ALT 637 FOR MEDICARE OP, ALT 636 FOR OP/ED): Performed by: INTERNAL MEDICINE

## 2024-11-10 PROCEDURE — 83735 ASSAY OF MAGNESIUM: CPT | Performed by: INTERNAL MEDICINE

## 2024-11-10 PROCEDURE — 1200000002 HC GENERAL ROOM WITH TELEMETRY DAILY

## 2024-11-10 PROCEDURE — 36415 COLL VENOUS BLD VENIPUNCTURE: CPT | Performed by: INTERNAL MEDICINE

## 2024-11-10 PROCEDURE — 2500000005 HC RX 250 GENERAL PHARMACY W/O HCPCS: Performed by: INTERNAL MEDICINE

## 2024-11-10 PROCEDURE — 85025 COMPLETE CBC W/AUTO DIFF WBC: CPT | Performed by: INTERNAL MEDICINE

## 2024-11-10 PROCEDURE — 2500000001 HC RX 250 WO HCPCS SELF ADMINISTERED DRUGS (ALT 637 FOR MEDICARE OP): Performed by: NURSE PRACTITIONER

## 2024-11-10 RX ORDER — DIPHENOXYLATE HYDROCHLORIDE AND ATROPINE SULFATE 2.5; .025 MG/1; MG/1
1 TABLET ORAL 3 TIMES DAILY
Status: COMPLETED | OUTPATIENT
Start: 2024-11-10 | End: 2024-11-10

## 2024-11-10 RX ORDER — BUMETANIDE 0.25 MG/ML
1 INJECTION, SOLUTION INTRAMUSCULAR; INTRAVENOUS ONCE
Status: COMPLETED | OUTPATIENT
Start: 2024-11-10 | End: 2024-11-10

## 2024-11-10 RX ORDER — DIPHENOXYLATE HYDROCHLORIDE AND ATROPINE SULFATE 2.5; .025 MG/5ML; MG/5ML
5 SOLUTION ORAL 3 TIMES DAILY
Status: DISCONTINUED | OUTPATIENT
Start: 2024-11-10 | End: 2024-11-10 | Stop reason: CLARIF

## 2024-11-10 RX ORDER — MAGNESIUM SULFATE HEPTAHYDRATE 40 MG/ML
4 INJECTION, SOLUTION INTRAVENOUS ONCE
Status: COMPLETED | OUTPATIENT
Start: 2024-11-10 | End: 2024-11-10

## 2024-11-10 RX ORDER — CEFTRIAXONE 1 G/50ML
1 INJECTION, SOLUTION INTRAVENOUS EVERY 24 HOURS
Status: DISCONTINUED | OUTPATIENT
Start: 2024-11-10 | End: 2024-11-11

## 2024-11-10 RX ADMIN — PSYLLIUM HUSK 1 PACKET: 3.4 POWDER ORAL at 21:34

## 2024-11-10 RX ADMIN — DIPHENOXYLATE HYDROCHLORIDE AND ATROPINE SULFATE 1 TABLET: 2.5; .025 TABLET ORAL at 10:34

## 2024-11-10 RX ADMIN — MINERAL OIL, PETROLATUM, PHENYLEPHRINE HCL: 14; 74.9; .25 OINTMENT RECTAL at 21:50

## 2024-11-10 RX ADMIN — NYSTATIN: 100000 CREAM TOPICAL at 09:19

## 2024-11-10 RX ADMIN — MINERAL OIL, PETROLATUM, PHENYLEPHRINE HCL: 14; 74.9; .25 OINTMENT RECTAL at 15:06

## 2024-11-10 RX ADMIN — BUMETANIDE 1 MG: 0.25 INJECTION INTRAMUSCULAR; INTRAVENOUS at 17:42

## 2024-11-10 RX ADMIN — MIDODRINE HYDROCHLORIDE 5 MG: 5 TABLET ORAL at 16:55

## 2024-11-10 RX ADMIN — APIXABAN 2.5 MG: 2.5 TABLET, FILM COATED ORAL at 21:34

## 2024-11-10 RX ADMIN — SERTRALINE HYDROCHLORIDE 25 MG: 50 TABLET ORAL at 09:16

## 2024-11-10 RX ADMIN — Medication 2000 UNITS: at 09:18

## 2024-11-10 RX ADMIN — Medication 1 APPLICATION: at 21:34

## 2024-11-10 RX ADMIN — GABAPENTIN 200 MG: 100 CAPSULE ORAL at 21:34

## 2024-11-10 RX ADMIN — CARVEDILOL 3.12 MG: 3.12 TABLET, FILM COATED ORAL at 09:15

## 2024-11-10 RX ADMIN — CYANOCOBALAMIN TAB 1000 MCG 1000 MCG: 1000 TAB at 09:16

## 2024-11-10 RX ADMIN — LEVOTHYROXINE SODIUM 88 MCG: 0.09 TABLET ORAL at 06:12

## 2024-11-10 RX ADMIN — PSYLLIUM HUSK 1 PACKET: 3.4 POWDER ORAL at 09:18

## 2024-11-10 RX ADMIN — MAGNESIUM SULFATE HEPTAHYDRATE 4 G: 4 INJECTION, SOLUTION INTRAVENOUS at 10:26

## 2024-11-10 RX ADMIN — FOLIC ACID 1 MG: 1 TABLET ORAL at 09:17

## 2024-11-10 RX ADMIN — MIDODRINE HYDROCHLORIDE 5 MG: 5 TABLET ORAL at 12:35

## 2024-11-10 RX ADMIN — APIXABAN 2.5 MG: 2.5 TABLET, FILM COATED ORAL at 09:17

## 2024-11-10 RX ADMIN — BUSPIRONE HYDROCHLORIDE 7.5 MG: 15 TABLET ORAL at 09:18

## 2024-11-10 RX ADMIN — DIPHENOXYLATE HYDROCHLORIDE AND ATROPINE SULFATE 1 TABLET: 2.5; .025 TABLET ORAL at 21:34

## 2024-11-10 RX ADMIN — DIPHENOXYLATE HYDROCHLORIDE AND ATROPINE SULFATE 1 TABLET: 2.5; .025 TABLET ORAL at 15:06

## 2024-11-10 RX ADMIN — POTASSIUM CHLORIDE 40 MEQ: 1500 TABLET, EXTENDED RELEASE ORAL at 09:16

## 2024-11-10 RX ADMIN — MENTHOL 10%, METHYL SALICYLATE 15% 1 APPLICATION: 10; 15 CREAM TOPICAL at 12:38

## 2024-11-10 RX ADMIN — GABAPENTIN 200 MG: 100 CAPSULE ORAL at 09:15

## 2024-11-10 RX ADMIN — BUSPIRONE HYDROCHLORIDE 7.5 MG: 15 TABLET ORAL at 21:34

## 2024-11-10 RX ADMIN — PSYLLIUM HUSK 1 PACKET: 3.4 POWDER ORAL at 15:05

## 2024-11-10 RX ADMIN — MIDODRINE HYDROCHLORIDE 5 MG: 5 TABLET ORAL at 09:16

## 2024-11-10 RX ADMIN — BUMETANIDE 1 MG: 0.25 INJECTION INTRAMUSCULAR; INTRAVENOUS at 10:25

## 2024-11-10 RX ADMIN — VANCOMYCIN HYDROCHLORIDE 125 MG: KIT at 06:12

## 2024-11-10 RX ADMIN — MINERAL OIL, PETROLATUM, PHENYLEPHRINE HCL: 14; 74.9; .25 OINTMENT RECTAL at 09:18

## 2024-11-10 RX ADMIN — NYSTATIN: 100000 CREAM TOPICAL at 21:49

## 2024-11-10 RX ADMIN — Medication 1 APPLICATION: at 09:19

## 2024-11-10 RX ADMIN — PANTOPRAZOLE SODIUM 40 MG: 40 TABLET, DELAYED RELEASE ORAL at 15:06

## 2024-11-10 RX ADMIN — CEFTRIAXONE SODIUM 1 G: 1 INJECTION, SOLUTION INTRAVENOUS at 15:54

## 2024-11-10 RX ADMIN — FERROUS SULFATE TAB 325 MG (65 MG ELEMENTAL FE) 325 MG: 325 (65 FE) TAB at 09:15

## 2024-11-10 RX ADMIN — CETIRIZINE HYDROCHLORIDE 10 MG: 10 TABLET, FILM COATED ORAL at 09:18

## 2024-11-10 RX ADMIN — AMOXICILLIN AND CLAVULANATE POTASSIUM 1 TABLET: 500; 125 TABLET, FILM COATED ORAL at 09:15

## 2024-11-10 RX ADMIN — LOPERAMIDE HYDROCHLORIDE 2 MG: 2 CAPSULE ORAL at 09:45

## 2024-11-10 RX ADMIN — PANTOPRAZOLE SODIUM 40 MG: 40 TABLET, DELAYED RELEASE ORAL at 06:12

## 2024-11-10 RX ADMIN — CARVEDILOL 3.12 MG: 3.12 TABLET, FILM COATED ORAL at 21:34

## 2024-11-10 ASSESSMENT — COGNITIVE AND FUNCTIONAL STATUS - GENERAL
STANDING UP FROM CHAIR USING ARMS: A LOT
PERSONAL GROOMING: A LITTLE
WALKING IN HOSPITAL ROOM: TOTAL
DRESSING REGULAR UPPER BODY CLOTHING: A LITTLE
MOVING TO AND FROM BED TO CHAIR: A LOT
MOBILITY SCORE: 12
DRESSING REGULAR LOWER BODY CLOTHING: A LOT
HELP NEEDED FOR BATHING: A LOT
DAILY ACTIVITIY SCORE: 15
WALKING IN HOSPITAL ROOM: TOTAL
MOVING TO AND FROM BED TO CHAIR: A LOT
DRESSING REGULAR UPPER BODY CLOTHING: A LITTLE
DRESSING REGULAR LOWER BODY CLOTHING: A LOT
HELP NEEDED FOR BATHING: A LOT
TOILETING: A LOT
DAILY ACTIVITIY SCORE: 15
PERSONAL GROOMING: A LITTLE
MOVING FROM LYING ON BACK TO SITTING ON SIDE OF FLAT BED WITH BEDRAILS: A LITTLE
CLIMB 3 TO 5 STEPS WITH RAILING: TOTAL
STANDING UP FROM CHAIR USING ARMS: A LOT
MOVING FROM LYING ON BACK TO SITTING ON SIDE OF FLAT BED WITH BEDRAILS: A LITTLE
CLIMB 3 TO 5 STEPS WITH RAILING: TOTAL
EATING MEALS: A LITTLE
MOBILITY SCORE: 12
EATING MEALS: A LITTLE
TURNING FROM BACK TO SIDE WHILE IN FLAT BAD: A LITTLE
TURNING FROM BACK TO SIDE WHILE IN FLAT BAD: A LITTLE
TOILETING: A LOT

## 2024-11-10 ASSESSMENT — PAIN - FUNCTIONAL ASSESSMENT: PAIN_FUNCTIONAL_ASSESSMENT: 0-10

## 2024-11-10 ASSESSMENT — PAIN SCALES - GENERAL
PAINLEVEL_OUTOF10: 0 - NO PAIN
PAINLEVEL_OUTOF10: 0 - NO PAIN

## 2024-11-10 NOTE — PROGRESS NOTES
11/10/24 1606   Discharge Planning   Expected Discharge Disposition SNF  (1. [facility] unsure they will have a bed available--- son called to obtain additional choices 2.[facility] and 3.[facility] referrals sent in [facility] at this time)

## 2024-11-10 NOTE — PROGRESS NOTES
[facility] Hospitalist Progress Note       [id number]: Please page me for patient care issues.  [phone]: Please page night hospitalist for any issues.     Desirae Gregg  :  1940(83 y.o.)  MRN:  [id number]  PCP: Jackie Cedillo MD      Principal Problem:    Sepsis with acute hypoxic respiratory failure without septic shock (Multi)  Active Problems:    Iron deficiency anemia    Chronic systolic (congestive) heart failure    Essential hypertension    History of pulmonary embolism    Anxiety    Vitamin D deficiency    Vitamin B12 deficiency    Adult hypothyroidism    Pneumonia of right lower lobe due to infectious organism    Leukocytosis    Acute kidney injury superimposed on stage 3a chronic kidney disease    Elevated troponin    Hyperbilirubinemia    Hyperglycemia    Hypokalemia    Hypomagnesemia    Hypocalcemia    Abnormality of right ventricle of heart    Impaired mobility and ADLs      Assessment and Plan:     Desirae Gregg is a 83 y.o. female with PMH CKD III, HFpEF, myelofibrosis, Chronic lymphoid leukemia, sciatica, remote poliomyelitis, hypertension, PE, anxiety disorder, vitamin D deficiency, vitamin B12 deficiency, hypothyroidism, GERD, depression, and JAK2 positive polycythemia vera.    Patient presented to ED with the son with complaints of 1 week of generalized weakness and fever. Associated with emesis (x3) and confusion  On arrival to the ED, patient was noted to have an oxygen saturation of 88% on room air.  She was placed on supplemental oxygen.  Temperature was 100.8 °F and heart rate was .  Respiratory rate was mildly increased at 18-23.  Patient ultimately required 4 L of supplemental oxygen per nasal cannula to maintain saturations.  Blood pressure systolic was 108-117.  Laboratory evaluation in the emergency department was notable for white blood cell count 20.4, glucose 119, creatinine 1.21, and total bilirubin 2.2.  Electrolytes, transaminases, lactic acid, hemoglobin, and platelet count  were unremarkable.  COVID, influenza, and RSV testing was negative.  Blood cultures x 2 were obtained.  Portable chest x-ray showed right lower lobe airspace consolidation, pneumonia or atelectasis with right pleural effusion.  Enlarged cardiac silhouette was also noted.      #Acute on chronic HFpEF  #B/L (R>L) pleural effusion  #ARF w/ hypoxia, now off NC  #LEVY/CKD III, improving  #Rectal (hemorrhoidal) bleeding w/o overt anemia  #Sepsis UTI and gram negative pneumonia  #Electrolyte abnormalities (Hypomagnesemia, Hypokalemia, Hpophosphatemia)  #Acute on chronic diarrhea-C diff negative  #A risk for hypotension-on midodrine 2/2   #Leukocytosis, improving  #Protein malnutrition  #Hypoalbuminemia  #Incidental urinary bladder stone (2cm)-Urology rec outpt F/U for 2cm bladder stone management via cystolitholapaxy with laser   #Myeloproliferative disorder  #JAK2 positive polycythemia vera  #Chronic lymphoid leukemia  -primary hem onc is Dr Karen Resendiz of [facility] for cancer care, [facility].  #Immunocompromised state  #Dysphagia?  -no s/o dysphagia following SLP eval however pt and family continue to complain of poor PO intake and pharyngeal an esophageal dysphagia symptoms.   -Family reported hx of hiatal hernia. However chart review including images was unremarkable for hiatal hernia  -MBS in 1/23/24  was unrevealing for aspiration/dysphagia. However there was questionable soft tissue density seen superimposed over the larynx.  There was recommendation for ENT eval for possible visualization. There is not record of ENT eval per chart review.  #Hx PE on apixaban  -CHF order set  -abx:zosyn/azithromycin->Augmentin->ceftraixone. DC Augmentin due to intractable diarrhea  -resumed apixaban  -No plans for colonoscopy in the absence of active bleeding/overt anemia. Trend H/H. Trial of metamucil and preparation H  -IV bumex as BP and renal function could tolerate.   -GDMT: low dose coreg,  -replete lytes PRN, use IV mag  instead of Magox due to potential diarrhea as a side effect  -Ucx appears to be contaminated  -Stool studies neg for C diff neg   -trial of antidiarrhea meds (imodium and lomotil) due to significant diarrhea contributing to electrolyte disturbance  -Will order esophagram likely to be done on 11/11/24  -CS recs appreciated: Cards, GI (s/o), pulm (s/o), urology (s/o), dietician, SLP     Disposition: SNF placement once euvolemic following diuresis and cardiology final recs    DVT Prophylaxis: full anticoagulation apixaban    Code status: Full Code  Diet: Adult diet 2-3 grams sodium    Level of MDM:  High    patient and family updated, I personally examined the patient, and I personally reviewed and agree with residentphysical exam, assessment/plan with my noted corrections if any    Family Communication  Number :   Name of Designated Family Representative:       Total time spent: 35 minutes, of total time providing counseling or in coordination of care. Total time on this day of visit includes record and documentation review before and after visit including documentation and time not explicitly included on EMR time stamp      Subjective:   Interval History:  HPI  The patient complains of generalized weakness  The patient feels their symptoms areimproving  no events or new concerns    Scheduled Meds:amoxicillin-pot clavulanate, 1 tablet, oral, q12h MANI  apixaban, 2.5 mg, oral, q12h  bumetanide, 1 mg, intravenous, Once  bumetanide, 1 mg, intravenous, Once  busPIRone, 7.5 mg, oral, BID  carvedilol, 3.125 mg, oral, BID  cetirizine, 10 mg, oral, Daily  cholecalciferol, 2,000 Units, oral, Daily  cyanocobalamin, 1,000 mcg, oral, Daily  diphenoxylate-atropine, 5 mL, oral, TID  ferrous sulfate (325 mg ferrous sulfate), 65 mg of iron, oral, Daily with breakfast  folic acid, 1 mg, oral, Daily  gabapentin, 200 mg, oral, BID  [Held by provider] isosorbide mononitrate ER, 30 mg, oral, Daily  levothyroxine, 88 mcg, oral,  Daily  magnesium sulfate, 4 g, intravenous, Once  midodrine, 5 mg, oral, TID  nystatin, , Topical, BID  pantoprazole, 40 mg, oral, BID AC  phenyleph-min oil-petrolatum, , rectal, TID  potassium chloride CR, 40 mEq, oral, BID  psyllium, 1 packet, oral, TID  sertraline, 25 mg, oral, Daily  vancomycin, 125 mg, oral, 4x daily  zinc oxide, 1 Application, Topical, BID      Continuous Infusions:   PRN Meds:PRN medications: acetaminophen **OR** acetaminophen **OR** acetaminophen, loperamide, melatonin, methyl salicylate-menthol, ondansetron ODT **OR** ondansetron, oxyCODONE, oxygen, polyethylene glycol    Review of Systems   All other systems reviewed and are negative.    Interval Pertinent History:  Social History     Tobacco Use    Smoking status: Never    Smokeless tobacco: Never   Substance Use Topics    Alcohol use: Never         Objective:   Patient Vitals for the past 24 hrs:   BP Temp Temp src Pulse Resp SpO2 Weight   11/10/24 0545 -- -- -- -- -- -- 79.2 kg (174 lb 9.6 oz)   11/10/24 0519 134/72 36.5 °C (97.7 °F) Temporal 71 17 93 % --   24 1937 131/79 36.3 °C (97.3 °F) Temporal 83 18 90 % --   24 1834 148/80 -- -- 96 -- -- --   24 1434 121/73 36.1 °C (97 °F) Temporal 85 16 92 % --       Average, Min, and Max for last 24 hours Vitals:  TEMPERATURE:  Temp  Av.3 °C (97.3 °F)  Min: 36.1 °C (97 °F)  Max: 36.5 °C (97.7 °F)    RESPIRATIONS RANGE: Resp  Av  Min: 16  Max: 18    PULSE RANGE: Pulse  Av.8  Min: 71  Max: 96    BLOOD PRESSURE RANGE:  Systolic (24hrs), Av , Min:121 , Max:148   ; Diastolic (24hrs), Av, Min:72, Max:80      PULSE OXIMETRY RANGE: SpO2  Av.7 %  Min: 90 %  Max: 93 %  Body mass index is 29.05 kg/m².    I/O last 3 completed shifts:  In: 1140 (14.4 mL/kg) [P.O.:1140]  Out: 3000 (37.9 mL/kg) [Urine:3000 (1.1 mL/kg/hr)]  Weight: 79.2 kg   Weight change: -0.227 kg (-8 oz)     Physical Exam  Vitals and nursing note reviewed.   Constitutional:       Appearance:  Normal appearance.      Interventions: Nasal cannula in place.   HENT:      Head: Normocephalic and atraumatic.      Right Ear: External ear normal.      Left Ear: External ear normal.      Nose: Nose normal.      Mouth/Throat:      Mouth: Mucous membranes are moist.   Eyes:      General: No scleral icterus.        Right eye: No discharge.         Left eye: No discharge.      Extraocular Movements: Extraocular movements intact.      Conjunctiva/sclera: Conjunctivae normal.      Pupils: Pupils are equal, round, and reactive to light.   Cardiovascular:      Rate and Rhythm: Rhythm irregularly irregular.   Pulmonary:      Effort: Pulmonary effort is normal.      Breath sounds: Rales present.   Abdominal:      General: Abdomen is flat. Bowel sounds are normal.      Palpations: Abdomen is soft.   Musculoskeletal:         General: Normal range of motion.      Right lower leg: Edema (+2, improving) present.      Left lower leg: Edema (+2, improving) present.   Skin:     General: Skin is warm and dry.      Capillary Refill: Capillary refill takes less than 2 seconds.      Findings: Wound present.   Neurological:      General: No focal deficit present.   Psychiatric:         Mood and Affect: Mood normal.         Thought Content: Thought content normal.         Judgment: Judgment normal.         Lab Results   Component Value Date     11/10/2024    K 3.8 11/10/2024     11/10/2024    CO2 26 11/10/2024    BUN 18 11/10/2024    CREATININE 0.86 11/10/2024    GLUCOSE 109 (H) 11/10/2024    CALCIUM 7.1 (L) 11/10/2024    PROT 3.1 (L) 11/05/2024    BILITOT 0.7 11/05/2024    ALKPHOS 34 11/05/2024    AST 7 (L) 11/05/2024    ALT 11 11/05/2024       Lab Results   Component Value Date    WBC 20.2 (H) 11/10/2024    HGB 14.9 11/10/2024    HCT 49.9 (H) 11/10/2024    MCV 90 11/10/2024     11/10/2024    LYMPHOPCT 4.2 11/10/2024    RBC 5.52 (H) 11/10/2024    MCH 27.0 11/10/2024    MCHC 29.9 (L) 11/10/2024    RDW 15.7 (H)  11/10/2024    CRP 2.84 (H) 11/08/2024       Lab Results   Component Value Date    TSH 2.09 10/29/2024     Lab Results   Component Value Date    LACTATE 1.3 11/03/2024     (H) 11/03/2024    INR 2.1 (H) 11/06/2024       IMAGES:  Encounter Date: 11/03/24   ECG 12 lead   Result Value    Ventricular Rate 104    Atrial Rate 104    NV Interval 162    QRS Duration 124    QT Interval 368    QTC Calculation(Bazett) 483    P Axis 54    R Axis -52    T Axis 169    QRS Count 18    Q Onset 219    P Onset 138    P Offset 188    T Offset 403    QTC Fredericia 442    Narrative    Sinus tachycardia  Left axis deviation  Left bundle branch block  Abnormal ECG  No previous ECGs available     CT abdomen pelvis wo IV contrast    Result Date: 11/4/2024  Impression: 2 centimeters stone in the urinary bladder.   Bilateral pleural effusions. Pericardial effusion.   Ascites.   Anasarca.   Presacral edema.   Enlarged spleen.   The exam is limited by motion artifact.   MACRO: none   Signed by: Jayshree Dias 11/4/2024 3:28 PM Dictation workstation:   [id number]    US renal complete    Result Date: 11/4/2024  Impression: No hydronephrosis. Decompressed bladder.   MACRO: None   Signed by: Zain Mcknight 11/4/2024 11:08 AM Dictation workstation:   [id number]    CT chest wo IV contrast    Result Date: 11/3/2024  Impression: Abnormalities at the right lung base on portable frontal chest radiograph earlier today are due to a combination of moderate-sized, mostly if not entirely free-flowing right pleural effusion with associated multisegmental right lower lobe collapse adjacent to it   Small area of ground-glass airspace disease in the middle lobe confirmed on CT may not have been expected on the prior radiograph   Possibility of mild ground-glass airspace disease in the right lower lobe as well   No ground-glass airspace disease in the left lung   No consolidative pneumonia in any of the inflated lungs on either side   Small free-flowing  left pleural effusion   Unusual hypodensities in the right ventricular free wall of uncertain etiology and significance   Perhaps trace (smallest detectable quantity) pericardial effusion   Incidental abnormalities in the included upper abdomen as detailed above   MACRO: None   Signed by: Matthias Kolb 11/3/2024 11:35 AM Dictation workstation:   [id number]    XR chest 1 view    Result Date: 11/3/2024  Impression: 1. Right lower lobe airspace consolidation either pneumonia or atelectasis with right pleural effusion. Follow-up to complete resolution is needed 2. Enlarged cardiac silhouette     Signed by: Kary Cardoso 11/3/2024 10:27 AM Dictation workstation:   [id number]    Transthoracic Echo (TTE) Limited    Result Date: 11/5/2024   [facility], [address] [place] [address]               Tel [phone] and Fax [phone] TRANSTHORACIC ECHOCARDIOGRAM REPORT  Patient Name:       ELIE Mccain Physician:    [id number] Chicho Goldman MD Study Date:         11/5/2024           Ordering Provider:    [id number] SHERITA GOYAL MRN/PID:            [id number]            Fellow: Accession#:         [id number]        Nurse: Date of Birth/Age:  1940 / 83     Sonographer:          Cathi garcia                                     RDCS Gender assigned at  F                   Additional Staff: Birth: Height:             165.10 cm           Admit Date:           11/3/2024 Weight:             72.57 kg            Admission Status:     Inpatient -                                                               Routine BSA / BMI:          1.80 m2 / 26.63     Encounter#:           [id number]                     kg/m2 Blood Pressure:     103/62 mmHg         Department Location:  [facility], [facility]                                                                Invasive Study Type:    TRANSTHORACIC ECHO (TTE) LIMITED Diagnosis/ICD: 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 Study Detail: The following Echo studies were performed: 2D, Doppler and color               flow.  PHYSICIAN INTERPRETATION: Left Ventricle: The left ventricular systolic function is normal, with a visually estimated ejection fraction of 60-65%. There is severely increased concentric left ventricular hypertrophy. There are no regional left ventricular wall motion abnormalities. The left ventricular cavity size is normal. There is moderately increased septal and moderately increased posterior left ventricular wall thickness. Spectral Doppler shows an abnormal pattern of left ventricular diastolic filling. Left Atrium: The left atrium is upper limits of normal in size. Right Ventricle: The right ventricle is normal in size. There is normal right ventricular global systolic function. Right Atrium: The right atrium is normal in size. Aortic Valve: The aortic valve is trileaflet. The aortic valve dimensionless index is 0.77. There is trace aortic valve regurgitation. The peak instantaneous gradient of the aortic valve is 13 mmHg. The mean gradient of the mitral valve is 7 mmHg. Mitral Valve: The mitral valve is normal in structure. There is trace to mild mitral valve regurgitation. Tricuspid Valve: The tricuspid valve is structurally normal. There is trace to mild tricuspid regurgitation. Pulmonic Valve: The pulmonic valve is not well visualized. The pulmonic valve regurgitation was not well visualized. Pericardium: There is no pericardial effusion noted. Aorta: The aortic root is normal.   CONCLUSIONS:  1. The left ventricular systolic function is normal, with a visually estimated ejection fraction of 60-65%.  2. Spectral Doppler shows an abnormal pattern of left ventricular diastolic filling.  3. There is moderately increased septal and moderately increased posterior left ventricular wall thickness.  4. There is severely increased concentric left ventricular hypertrophy.  5. There is normal right ventricular global systolic function. QUANTITATIVE DATA SUMMARY:  2D MEASUREMENTS:          Normal Ranges: IVSd:            1.49 cm  (0.6-1.1cm) LVPWd:           1.26 cm  (0.6-1.1cm) LVIDd:           4.47 cm  (3.9-5.9cm) LVIDs:           2.98 cm LV Mass Index:   133 g/m2 LVEDV Index:     23 ml/m2 LV % FS          33.4 %  LV SYSTOLIC FUNCTION BY 2D PLANIMETRY (MOD):                      Normal Ranges: EF-A4C View:    62 % (>=55%) EF-A2C View:    61 % EF-Biplane:     64 % EF-Visual:      63 % LV EF Reported: 63 %  LV DIASTOLIC FUNCTION:          Normal Ranges: MV Peak E:             0.70 m/s (0.7-1.2 m/s) MV Peak A:             0.91 m/s (0.42-0.7 m/s) E/A Ratio:             0.77     (1.0-2.2)  MITRAL VALVE:          Normal Ranges: MV DT:        264 msec (150-240msec)  AORTIC VALVE:                      Normal Ranges: AoV Vmax:                1.78 m/s  (<=1.7m/s) AoV Peak P.7 mmHg (<20mmHg) AoV Mean P.0 mmHg  (1.7-11.5mmHg) LVOT Max Josiah:            1.33 m/s  (<=1.1m/s) AoV VTI:                 31.31 cm  (18-25cm) LVOT VTI:                24.11 cm LVOT Diameter:           2.04 cm   (1.8-2.4cm) AoV Area, VTI:           2.52 cm2  (2.5-5.5cm2) AoV Area,Vmax:           2.44 cm2  (2.5-4.5cm2) AoV Dimensionless Index: 0.77  TRICUSPID VALVE/RVSP:          Normal Ranges: Peak TR Velocity:     3.46 m/s  PULMONIC VALVE:          Normal Ranges: PV Max Josiah:     0.8 m/s  (0.6-0.9m/s) PV Max P.8 mmHg  [id number] Chicho Goldman MD Electronically signed on 2024 at 2:09:49 PM  ** Final **     === 11/03/24 ===    XR CHEST 1 VIEW    - Impression -  1. Right lower lobe airspace consolidation either pneumonia or  atelectasis with right pleural effusion. Follow-up to complete  resolution is needed  2. Enlarged cardiac silhouette      Signed by: Kary Cardoso 11/3/2024 10:27 AM  Dictation workstation:   [id number]  === 11/03/24 ===    CT ABDOMEN PELVIS WO IV CONTRAST    - Impression -  2 centimeters stone in the urinary bladder.    Bilateral pleural effusions. Pericardial effusion.    Ascites.    Anasarca.    Presacral edema.    Enlarged spleen.    The exam is limited by motion artifact.    MACRO:  none    Signed by: Jayshree Dias 11/4/2024 3:28 PM  Dictation workstation:   [id number]  === 11/03/24 ===    XR CHEST 1 VIEW    - Impression -  1. Right lower lobe airspace consolidation either pneumonia or  atelectasis with right pleural effusion. Follow-up to complete  resolution is needed  2. Enlarged cardiac silhouette      Signed by: Kary Cardoso 11/3/2024 10:27 AM  Dictation workstation:   [id number]      Additional results of the last 24 hours have been reviewed.    Dictated using [employer] Version 2.4  Proof read however unrecognized voice recognition errors may have occurred     Electronically signed by Cha Ramsay DO on 11/10/24 at 9:08 AM

## 2024-11-11 ENCOUNTER — APPOINTMENT (OUTPATIENT)
Dept: RADIOLOGY | Facility: HOSPITAL | Age: 84
DRG: 871 | End: 2024-11-11
Payer: MEDICARE

## 2024-11-11 LAB
ALBUMIN SERPL BCP-MCNC: 2 G/DL (ref 3.4–5)
ANION GAP SERPL CALC-SCNC: 10 MMOL/L (ref 10–20)
ATRIAL RATE: 90 BPM
ATRIAL RATE: 96 BPM
BASOPHILS # BLD AUTO: 0.17 X10*3/UL (ref 0–0.1)
BASOPHILS NFR BLD AUTO: 0.8 %
BUN SERPL-MCNC: 18 MG/DL (ref 6–23)
CALCIUM SERPL-MCNC: 7 MG/DL (ref 8.6–10.3)
CHLORIDE SERPL-SCNC: 102 MMOL/L (ref 98–107)
CO2 SERPL-SCNC: 26 MMOL/L (ref 21–32)
CREAT SERPL-MCNC: 0.85 MG/DL (ref 0.5–1.05)
CRP SERPL-MCNC: 0.86 MG/DL
EGFRCR SERPLBLD CKD-EPI 2021: 67 ML/MIN/1.73M*2
EOSINOPHIL # BLD AUTO: 0.85 X10*3/UL (ref 0–0.4)
EOSINOPHIL NFR BLD AUTO: 3.9 %
ERYTHROCYTE [DISTWIDTH] IN BLOOD BY AUTOMATED COUNT: 15.7 % (ref 11.5–14.5)
GLUCOSE SERPL-MCNC: 123 MG/DL (ref 74–99)
HCT VFR BLD AUTO: 47.7 % (ref 36–46)
HGB BLD-MCNC: 14.5 G/DL (ref 12–16)
IMM GRANULOCYTES # BLD AUTO: 0.74 X10*3/UL (ref 0–0.5)
IMM GRANULOCYTES NFR BLD AUTO: 3.4 % (ref 0–0.9)
LYMPHOCYTES # BLD AUTO: 0.91 X10*3/UL (ref 0.8–3)
LYMPHOCYTES NFR BLD AUTO: 4.1 %
MAGNESIUM SERPL-MCNC: 1.74 MG/DL (ref 1.6–2.4)
MCH RBC QN AUTO: 27.1 PG (ref 26–34)
MCHC RBC AUTO-ENTMCNC: 30.4 G/DL (ref 32–36)
MCV RBC AUTO: 89 FL (ref 80–100)
MONOCYTES # BLD AUTO: 0.98 X10*3/UL (ref 0.05–0.8)
MONOCYTES NFR BLD AUTO: 4.4 %
NEUTROPHILS # BLD AUTO: 18.42 X10*3/UL (ref 1.6–5.5)
NEUTROPHILS NFR BLD AUTO: 83.4 %
NRBC BLD-RTO: 0 /100 WBCS (ref 0–0)
P AXIS: 25 DEGREES
PHOSPHATE SERPL-MCNC: 2.3 MG/DL (ref 2.5–4.9)
PLATELET # BLD AUTO: 255 X10*3/UL (ref 150–450)
POTASSIUM SERPL-SCNC: 3.3 MMOL/L (ref 3.5–5.3)
PR INTERVAL: 168 MS
Q ONSET: 217 MS
Q ONSET: 217 MS
QRS COUNT: 15 BEATS
QRS COUNT: 16 BEATS
QRS DURATION: 130 MS
QRS DURATION: 134 MS
QT INTERVAL: 382 MS
QT INTERVAL: 418 MS
QTC CALCULATION(BAZETT): 490 MS
QTC CALCULATION(BAZETT): 511 MS
QTC FREDERICIA: 451 MS
QTC FREDERICIA: 478 MS
R AXIS: -12 DEGREES
R AXIS: -12 DEGREES
RBC # BLD AUTO: 5.35 X10*6/UL (ref 4–5.2)
SODIUM SERPL-SCNC: 135 MMOL/L (ref 136–145)
T AXIS: 150 DEGREES
T AXIS: 152 DEGREES
T OFFSET: 408 MS
T OFFSET: 426 MS
VENTRICULAR RATE: 90 BPM
VENTRICULAR RATE: 99 BPM
WBC # BLD AUTO: 22.1 X10*3/UL (ref 4.4–11.3)

## 2024-11-11 PROCEDURE — 2500000001 HC RX 250 WO HCPCS SELF ADMINISTERED DRUGS (ALT 637 FOR MEDICARE OP): Performed by: INTERNAL MEDICINE

## 2024-11-11 PROCEDURE — 86140 C-REACTIVE PROTEIN: CPT | Performed by: INTERNAL MEDICINE

## 2024-11-11 PROCEDURE — 85025 COMPLETE CBC W/AUTO DIFF WBC: CPT | Performed by: INTERNAL MEDICINE

## 2024-11-11 PROCEDURE — 2500000004 HC RX 250 GENERAL PHARMACY W/ HCPCS (ALT 636 FOR OP/ED): Performed by: NURSE PRACTITIONER

## 2024-11-11 PROCEDURE — 83735 ASSAY OF MAGNESIUM: CPT | Performed by: INTERNAL MEDICINE

## 2024-11-11 PROCEDURE — 74220 X-RAY XM ESOPHAGUS 1CNTRST: CPT | Performed by: STUDENT IN AN ORGANIZED HEALTH CARE EDUCATION/TRAINING PROGRAM

## 2024-11-11 PROCEDURE — 2500000004 HC RX 250 GENERAL PHARMACY W/ HCPCS (ALT 636 FOR OP/ED): Performed by: INTERNAL MEDICINE

## 2024-11-11 PROCEDURE — 97530 THERAPEUTIC ACTIVITIES: CPT | Mod: GP

## 2024-11-11 PROCEDURE — 36415 COLL VENOUS BLD VENIPUNCTURE: CPT | Performed by: INTERNAL MEDICINE

## 2024-11-11 PROCEDURE — 1200000002 HC GENERAL ROOM WITH TELEMETRY DAILY

## 2024-11-11 PROCEDURE — 2500000001 HC RX 250 WO HCPCS SELF ADMINISTERED DRUGS (ALT 637 FOR MEDICARE OP): Performed by: NURSE PRACTITIONER

## 2024-11-11 PROCEDURE — 2500000002 HC RX 250 W HCPCS SELF ADMINISTERED DRUGS (ALT 637 FOR MEDICARE OP, ALT 636 FOR OP/ED): Performed by: INTERNAL MEDICINE

## 2024-11-11 PROCEDURE — 74220 X-RAY XM ESOPHAGUS 1CNTRST: CPT

## 2024-11-11 PROCEDURE — 97110 THERAPEUTIC EXERCISES: CPT | Mod: GP

## 2024-11-11 PROCEDURE — 99232 SBSQ HOSP IP/OBS MODERATE 35: CPT | Performed by: INTERNAL MEDICINE

## 2024-11-11 PROCEDURE — 2550000001 HC RX 255 CONTRASTS: Performed by: INTERNAL MEDICINE

## 2024-11-11 PROCEDURE — 80069 RENAL FUNCTION PANEL: CPT | Performed by: INTERNAL MEDICINE

## 2024-11-11 RX ORDER — MIDODRINE HYDROCHLORIDE 5 MG/1
5 TABLET ORAL 3 TIMES DAILY PRN
Start: 2024-11-11

## 2024-11-11 RX ORDER — BUMETANIDE 1 MG/1
1 TABLET ORAL DAILY
Status: DISCONTINUED | OUTPATIENT
Start: 2024-11-12 | End: 2024-11-14

## 2024-11-11 RX ORDER — NYSTATIN 100000 U/G
CREAM TOPICAL 2 TIMES DAILY
Start: 2024-11-11

## 2024-11-11 RX ORDER — BUMETANIDE 0.25 MG/ML
1 INJECTION, SOLUTION INTRAMUSCULAR; INTRAVENOUS ONCE
Status: COMPLETED | OUTPATIENT
Start: 2024-11-11 | End: 2024-11-11

## 2024-11-11 RX ORDER — DIATRIZOATE MEGLUMINE AND DIATRIZOATE SODIUM 660; 100 MG/ML; MG/ML
30 SOLUTION ORAL; RECTAL ONCE
Status: COMPLETED | OUTPATIENT
Start: 2024-11-11 | End: 2024-11-11

## 2024-11-11 RX ORDER — BUMETANIDE 1 MG/1
1 TABLET ORAL DAILY
Start: 2024-11-12

## 2024-11-11 RX ORDER — CARVEDILOL 3.12 MG/1
3.12 TABLET ORAL 2 TIMES DAILY
Start: 2024-11-11

## 2024-11-11 ASSESSMENT — PAIN DESCRIPTION - LOCATION: LOCATION: GENERALIZED

## 2024-11-11 ASSESSMENT — COGNITIVE AND FUNCTIONAL STATUS - GENERAL
HELP NEEDED FOR BATHING: A LOT
STANDING UP FROM CHAIR USING ARMS: A LOT
TURNING FROM BACK TO SIDE WHILE IN FLAT BAD: A LITTLE
PERSONAL GROOMING: A LITTLE
MOVING TO AND FROM BED TO CHAIR: A LOT
MOBILITY SCORE: 12
DRESSING REGULAR LOWER BODY CLOTHING: A LOT
WALKING IN HOSPITAL ROOM: TOTAL
CLIMB 3 TO 5 STEPS WITH RAILING: TOTAL
DAILY ACTIVITIY SCORE: 15
MOVING FROM LYING ON BACK TO SITTING ON SIDE OF FLAT BED WITH BEDRAILS: A LITTLE
MOVING TO AND FROM BED TO CHAIR: A LITTLE
MOBILITY SCORE: 13
CLIMB 3 TO 5 STEPS WITH RAILING: TOTAL
MOBILITY SCORE: 13
DRESSING REGULAR LOWER BODY CLOTHING: A LOT
HELP NEEDED FOR BATHING: A LOT
DAILY ACTIVITIY SCORE: 15
TOILETING: A LOT
STANDING UP FROM CHAIR USING ARMS: A LOT
EATING MEALS: A LITTLE
STANDING UP FROM CHAIR USING ARMS: A LITTLE
WALKING IN HOSPITAL ROOM: A LOT
EATING MEALS: A LITTLE
TOILETING: A LOT
TURNING FROM BACK TO SIDE WHILE IN FLAT BAD: A LITTLE
MOVING FROM LYING ON BACK TO SITTING ON SIDE OF FLAT BED WITH BEDRAILS: A LOT
DRESSING REGULAR UPPER BODY CLOTHING: A LITTLE
TURNING FROM BACK TO SIDE WHILE IN FLAT BAD: A LOT
MOVING TO AND FROM BED TO CHAIR: A LOT
DRESSING REGULAR UPPER BODY CLOTHING: A LITTLE
CLIMB 3 TO 5 STEPS WITH RAILING: TOTAL
MOVING FROM LYING ON BACK TO SITTING ON SIDE OF FLAT BED WITH BEDRAILS: A LITTLE
PERSONAL GROOMING: A LITTLE
WALKING IN HOSPITAL ROOM: A LOT

## 2024-11-11 ASSESSMENT — PAIN SCALES - GENERAL
PAINLEVEL_OUTOF10: 0 - NO PAIN
PAINLEVEL_OUTOF10: 8
PAINLEVEL_OUTOF10: 8

## 2024-11-11 ASSESSMENT — PAIN - FUNCTIONAL ASSESSMENT: PAIN_FUNCTIONAL_ASSESSMENT: 0-10

## 2024-11-11 NOTE — PROGRESS NOTES
Physical Therapy    Physical Therapy Treatment    Patient Name: Desirae Gregg  MRN: [id number]  Department: [address]  Room: [address] [place], [place] [address]  Today's Date: 11/11/2024  Time Calculation  Start Time: 1045  Stop Time: 1116  Time Calculation (min): 31 min         Assessment/Plan   PT Assessment  PT Assessment Results: Decreased strength, Decreased endurance, Impaired balance, Decreased mobility  Rehab Prognosis: Good  Barriers to Discharge: Comorbidities, medical management  Evaluation/Treatment Tolerance: Patient tolerated treatment well  Medical Staff Made Aware: Yes  Strengths: Support of Caregivers  Barriers to Participation: Comorbidities  End of Session Communication: Bedside nurse, PCT/NA/CTA  Assessment Comment: Pt. progressing with strength and mobility.  Continue POC to maximize safety and progress toward PLOF.  End of Session Patient Position:  (Up on commode with dtr present - alarm off - RN aware, call bell in reach, RN aware and in agreement.  Pt. and dtr instructed to call for assist once ready to move to chair. pt. & dtr demo understanding and verbally agreed.)     PT Plan  Treatment/Interventions: Transfer training, Bed mobility, Gait training, Strengthening  PT Plan: Ongoing PT  PT Frequency: 3 times per week  PT Discharge Recommendations: Moderate intensity level of continued care, 24 hr supervision due to cognition  Equipment Recommended upon Discharge: Wheeled walker, Bedside commode  PT Recommended Transfer Status: Assist x2  PT - OK to Discharge: Yes      General Visit Information:   PT  Visit  PT Received On: 11/11/24  Response to Previous Treatment: Patient with no complaints from previous session.  General  Reason for Referral: Pt is an 82 yo female who presented to [facility] on 11/3/24 with generalized weakness, vomiting, and fever. CT chest showed moderate right pleural effusion. PT consulted for impaired mobility/gait training.  Referred By: FRANCISCA Ramsay DO  Past Medical History Relevant to Rehab: HTN, chronic  systolic CHF, PE, myelofibrosis, sciatica, remote poliomyelitis (affecting LLE), JAK2 positive polycythemia vera, CKDIII  Missed Visit: Yes  Missed Visit Reason: Other (Comment) (Upon arrival, pt. in process of being transported to X-ray.  No PT tx at this time.)  Family/Caregiver Present: Yes  Caregiver Feedback: dtr present throughout session, very supportive  Prior to Session Communication: Bedside nurse, PCT/NA/CTA  Patient Position Received: Bed, 3 rail up, Alarm off, not on at start of session  Preferred Learning Style: verbal, visual  General Comment:  (Pleasant, cooperative, agreeable to therapy tx.  Pt. needed to have bowel movement during session; became incontinent of bowel during transfer to [facility])    Subjective   Precautions:  Precautions  Medical Precautions: Fall precautions (tele, verma catheter)    Vital Signs (Past 2hrs)        Date/Time Vitals Session Patient Position Pulse Resp SpO2 BP MAP (mmHg)    11/11/24 1009 --  --  96  18  94 %  114/75  88                         Objective   Pain:  Pain Assessment  Pain Assessment: 0-10  0-10 (Numeric) Pain Score: 0 - No pain  Cognition:  Cognition  Overall Cognitive Status: Within Functional Limits    Activity Tolerance:  Activity Tolerance  Endurance: Decreased tolerance for upright activites  Early Mobility/Exercise Safety Screen: Proceed with mobilization - No exclusion criteria met  Treatments:  Therapeutic Exercise  Therapeutic Exercise Performed: Yes  Therapeutic Exercise Activity 1: Long sitting in bed BLE ther ex: ankle pumps 10x2, heel slides with assist x10, hip abd/add with assist x10, SAQ's 10x2,  bridging with blanket roll under each knee x 5, attempted bridge with feet flat on bed x2 unable to clear buttocks off bed.       Bed Mobility  Bed Mobility: Yes  Bed Mobility 1  Bed Mobility 1: Sitting to supine  Level of Assistance 1: Moderate assistance, Moderate verbal cues, Moderate tactile cues  Bed Mobility Comments 1: d/t jairo, Mod AX1  sit to supine for trunk. Mod AX1 to scoot hips toward EOB/feet flat on floor. Once upright, SBA level to maintain safe upright position.    Ambulation/Gait Training  Ambulation/Gait Training Performed: Yes  Ambulation/Gait Training 1  Surface 1: Level tile  Device 1: Rolling walker  Assistance 1: Moderate assistance  Quality of Gait 1: Narrow base of support, Diminished heel strike, Inconsistent stride length, Decreased step length (mildly unsteady)  Comments/Distance (ft) 1: steps from bed to commode via FWW  Transfers  Transfer: Yes  Transfer 1  Transfer From 1: Bed to  Transfer to 1: Stand  Technique 1: Sit to stand, Stand to sit  Transfer Device 1: Walker  Transfer Level of Assistance 1: Minimum assistance, Moderate tactile cues, Moderate verbal cues  Trials/Comments 1: from raised EOB; pt. became incontinent of bowel upon standing  Transfers 2  Transfer From 2: Bed to  Transfer to 2: Commode-standard  Technique 2: Sit to stand, Stand to sit  Transfer Device 2: Walker  Transfer Level of Assistance 2: Minimum assistance, Moderate verbal cues, Moderate tactile cues  Trials/Comments 2: cues for safe hand placement    Outcome Measures:  [facility] Basic Mobility  Turning from your back to your side while in a flat bed without using bedrails: A lot  Moving from lying on your back to sitting on the side of a flat bed without using bedrails: A lot  Moving to and from bed to chair (including a wheelchair): A little  Standing up from a chair using your arms (e.g. wheelchair or bedside chair): A little  To walk in hospital room: Total  Climbing 3-5 steps with railing: Total  Basic Mobility - Total Score: 12    Education Documentation  Mobility Training, taught by Jane Carrillo, PT at 11/11/2024 11:35 AM.  Learner: Family, Patient  Readiness: Acceptance  Method: Explanation  Response: Verbalizes Understanding, Needs Reinforcement    Education Comments  No comments found.        OP EDUCATION:       Encounter Problems        Encounter Problems (Active)       Balance       Patient will tolerate sitting upright OOB for 30 minutes without symptoms in order to complete self-care tasks.  (Progressing)       Start:  11/06/24    Expected End:  11/20/24               Mobility       Patient will ambulate bathroom distance of 15' with FWW and CGA in order to complete toileting and hygiene activities with greater ease.  (Progressing)       Start:  11/06/24    Expected End:  11/20/24               PT Transfers       Patient will transfer to and from sit to supine with Yeimi in order to decrease caregiver burden.  (Progressing)       Start:  11/06/24    Expected End:  11/20/24            Patient will transfer sit to and from stand with FWW and Yeimi.  (Progressing)       Start:  11/06/24    Expected End:  11/20/24               Pain - Adult

## 2024-11-11 NOTE — PROGRESS NOTES
Physical Therapy                 Therapy Communication Note    Patient Name: Desirae Gregg  MRN: [id number]  Department: [facility]  Room: 132/[facility]  Today's Date: 11/11/2024     Discipline: Physical Therapy    Missed Visit Reason: Missed Visit Reason: Other (Comment) (Upon arrival, pt. in process of being transported to X-ray.  No PT tx at this time.)    Missed Time: Attempt    Comment:

## 2024-11-11 NOTE — PROGRESS NOTES
[facility] Hospitalist Progress Note       [id number]: Please page me for patient care issues.  [phone]: Please page night hospitalist for any issues.     Desirae Gregg  :  1940(83 y.o.)  MRN:  [id number]  PCP: Jackie Cedillo MD      Principal Problem:    Sepsis with acute hypoxic respiratory failure without septic shock (Multi)  Active Problems:    Iron deficiency anemia    Chronic systolic (congestive) heart failure    Essential hypertension    History of pulmonary embolism    Anxiety    Vitamin D deficiency    Vitamin B12 deficiency    Adult hypothyroidism    Pneumonia of right lower lobe due to infectious organism    Leukocytosis    Acute kidney injury superimposed on stage 3a chronic kidney disease    Elevated troponin    Hyperbilirubinemia    Hyperglycemia    Hypokalemia    Hypomagnesemia    Hypocalcemia    Abnormality of right ventricle of heart    Impaired mobility and ADLs      Assessment and Plan:     Desirae Gregg is a 83 y.o. female with PMH CKD III, HFpEF, myelofibrosis, Chronic lymphoid leukemia, sciatica, remote poliomyelitis, hypertension, PE, anxiety disorder, vitamin D deficiency, vitamin B12 deficiency, hypothyroidism, GERD, depression, and JAK2 positive polycythemia vera.    Patient presented to ED with the son with complaints of 1 week of generalized weakness and fever. Associated with emesis (x3) and confusion  On arrival to the ED, patient was noted to have an oxygen saturation of 88% on room air.  She was placed on supplemental oxygen.  Temperature was 100.8 °F and heart rate was .  Respiratory rate was mildly increased at 18-23.  Patient ultimately required 4 L of supplemental oxygen per nasal cannula to maintain saturations.  Blood pressure systolic was 108-117.  Laboratory evaluation in the emergency department was notable for white blood cell count 20.4, glucose 119, creatinine 1.21, and total bilirubin 2.2.  Electrolytes, transaminases, lactic acid, hemoglobin, and platelet count  "were unremarkable.  COVID, influenza, and RSV testing was negative.  Blood cultures x 2 were obtained.  Portable chest x-ray showed right lower lobe airspace consolidation, pneumonia or atelectasis with right pleural effusion.  Enlarged cardiac silhouette was also noted.      #Acute on chronic HFpEF  #B/L (R>L) pleural effusion  #ARF w/ hypoxia, now off NC  #LEVY/CKD III, resolved  #Rectal (hemorrhoidal) bleeding w/o overt anemia  #Sepsis UTI and gram negative pneumonia  #Electrolyte abnormalities (Hypomagnesemia, Hypokalemia, Hpophosphatemia)  #Acute on chronic diarrhea-C diff negative  #A risk for hypotension-on midodrine 2/2   #Leukocytosis, waxes and wanes, likely related to CLL  #Protein malnutrition  #Hypoalbuminemia  #Incidental urinary bladder stone (2cm)-Urology rec outpt F/U for 2cm bladder stone management via cystolitholapaxy with laser   #Myeloproliferative disorder  #JAK2 positive polycythemia vera  #Chronic lymphocytic leukemia  -primary hem onc is Dr Karen Resendiz of [facility] for cancer care, [facility].  -previously on Jakafi per chart review. Family is not   #Immunocompromised state  #Dysphagia?  -no s/o dysphagia following SLP eval however pt and family continue to complain of poor PO intake and pharyngeal an esophageal dysphagia symptoms.   -Family reported hx of hiatal hernia. However chart review including images was unremarkable for hiatal hernia  -MBS in 1/23/24  was unrevealing for aspiration/dysphagia. However there was questionable soft tissue density seen superimposed over the larynx.  There was recommendation for ENT eval for possible visualization. S/p ENT eval w/ nasopharyngoscopy (2/15/23, Dr. Epstein. Emiliana):  \"No evidence of lesions or masses. She does have some right sided fullness on scope exam which represents a medialized carotid and could be what the findings were reported on MBSS. Suspect esophageal etiology of dysphagia. No specific ENT recommendations for dysphagia " "recommend GI evaluation.\"  -Esophagram (11/11/24) is grossly unrevealing  #Hx PE on apixaban  -CHF order set  -abx (11/3-11/11):zosyn/azithromycin->Augmentin (DC due to intractable diarrhea)->ceftraixone.   -resumed apixaban  -No plans for colonoscopy in the absence of active bleeding/overt anemia. Trend H/H. Trial of metamucil and preparation H  -IV bumex as BP and renal function could tolerate.   -GDMT: low dose coreg,  -replete lytes PRN, use IV mag instead of Magox due to potential diarrhea as a side effect  -Ucx appears to be contaminated  -Stool studies neg for C diff neg   -trial of antidiarrhea meds (imodium and lomotil) due to significant diarrhea contributing to electrolyte disturbance  -Esophagram 11/11/24 is grossly unrevealing. Rec outpt GI F/U  -CS recs appreciated: Cards, GI (s/o), pulm (s/o), urology (s/o), dietician, SLP     Disposition:await placement    DVT Prophylaxis: full anticoagulation apixaban    Code status: Full Code  Diet: Adult diet 2-3 grams sodium    Level of MDM:  High    patient and family updated, I personally examined the patient, and I personally reviewed and agree with residentphysical exam, assessment/plan with my noted corrections if any    Family Communication  Number :   Name of Designated Family Representative:       Total time spent: 35 minutes, of total time providing counseling or in coordination of care. Total time on this day of visit includes record and documentation review before and after visit including documentation and time not explicitly included on EMR time stamp      Subjective:   Interval History:  HPI  The patient complains of generalized weakness  The patient feels their symptoms areimproving  no events or new concerns    Scheduled Meds:apixaban, 2.5 mg, oral, q12h  busPIRone, 7.5 mg, oral, BID  carvedilol, 3.125 mg, oral, BID  cefTRIAXone, 1 g, intravenous, q24h  cetirizine, 10 mg, oral, Daily  cholecalciferol, 2,000 Units, oral, Daily  cyanocobalamin, " 1,000 mcg, oral, Daily  ferrous sulfate (325 mg ferrous sulfate), 65 mg of iron, oral, Daily with breakfast  folic acid, 1 mg, oral, Daily  gabapentin, 200 mg, oral, BID  [Held by provider] isosorbide mononitrate ER, 30 mg, oral, Daily  levothyroxine, 88 mcg, oral, Daily  midodrine, 5 mg, oral, TID  nystatin, , Topical, BID  pantoprazole, 40 mg, oral, BID AC  phenyleph-min oil-petrolatum, , rectal, TID  potassium phosphate (monobasic), 1,000 mg, oral, With meals & nightly  psyllium, 1 packet, oral, TID  sertraline, 25 mg, oral, Daily  zinc oxide, 1 Application, Topical, BID      Continuous Infusions:   PRN Meds:PRN medications: acetaminophen **OR** acetaminophen **OR** acetaminophen, loperamide, melatonin, methyl salicylate-menthol, ondansetron ODT **OR** ondansetron, oxyCODONE, oxygen, polyethylene glycol    Review of Systems   All other systems reviewed and are negative.    Interval Pertinent History:  Social History     Tobacco Use    Smoking status: Never    Smokeless tobacco: Never   Substance Use Topics    Alcohol use: Never         Objective:   Patient Vitals for the past 24 hrs:   BP Temp Temp src Pulse Resp SpO2   24 0500 105/69 36.2 °C (97.2 °F) Temporal 79 16 90 %   11/10/24 1900 131/74 36.3 °C (97.3 °F) Temporal 76 16 91 %   11/10/24 1322 113/69 36.6 °C (97.9 °F) Temporal 78 16 93 %       Average, Min, and Max for last 24 hours Vitals:  TEMPERATURE:  Temp  Av.4 °C (97.5 °F)  Min: 36.2 °C (97.2 °F)  Max: 36.6 °C (97.9 °F)    RESPIRATIONS RANGE: Resp  Av  Min: 16  Max: 16    PULSE RANGE: Pulse  Av.7  Min: 76  Max: 79    BLOOD PRESSURE RANGE:  Systolic (24hrs), Av , Min:105 , Max:131   ; Diastolic (24hrs), Av, Min:69, Max:74      PULSE OXIMETRY RANGE: SpO2  Av.3 %  Min: 90 %  Max: 93 %  Body mass index is 29.05 kg/m².    I/O last 3 completed shifts:  In: 1270 (16 mL/kg) [P.O.:1220; IV Piggyback:50]  Out: 2700 (34.1 mL/kg) [Urine:2700 (0.9 mL/kg/hr)]  Weight: 79.2 kg    Weight change:      Physical Exam  Vitals and nursing note reviewed.   Constitutional:       Appearance: Normal appearance.      Interventions: Nasal cannula in place.   HENT:      Head: Normocephalic and atraumatic.      Right Ear: External ear normal.      Left Ear: External ear normal.      Nose: Nose normal.      Mouth/Throat:      Mouth: Mucous membranes are moist.   Eyes:      General: No scleral icterus.        Right eye: No discharge.         Left eye: No discharge.      Extraocular Movements: Extraocular movements intact.      Conjunctiva/sclera: Conjunctivae normal.      Pupils: Pupils are equal, round, and reactive to light.   Cardiovascular:      Rate and Rhythm: Rhythm irregularly irregular.   Pulmonary:      Effort: Pulmonary effort is normal.      Breath sounds: Rales (mild and improving) present.   Abdominal:      General: Abdomen is flat. Bowel sounds are normal.      Palpations: Abdomen is soft.   Musculoskeletal:         General: Normal range of motion.      Right lower leg: Edema (+1, improving) present.      Left lower leg: Edema (+1, improving) present.   Skin:     General: Skin is warm and dry.      Capillary Refill: Capillary refill takes less than 2 seconds.      Findings: Wound present.   Neurological:      General: No focal deficit present.   Psychiatric:         Mood and Affect: Mood normal.         Thought Content: Thought content normal.         Judgment: Judgment normal.         Lab Results   Component Value Date     (L) 11/11/2024    K 3.3 (L) 11/11/2024     11/11/2024    CO2 26 11/11/2024    BUN 18 11/11/2024    CREATININE 0.85 11/11/2024    GLUCOSE 123 (H) 11/11/2024    CALCIUM 7.0 (L) 11/11/2024    PROT 3.1 (L) 11/05/2024    BILITOT 0.7 11/05/2024    ALKPHOS 34 11/05/2024    AST 7 (L) 11/05/2024    ALT 11 11/05/2024       Lab Results   Component Value Date    WBC 22.1 (H) 11/11/2024    HGB 14.5 11/11/2024    HCT 47.7 (H) 11/11/2024    MCV 89 11/11/2024      11/11/2024    LYMPHOPCT 4.1 11/11/2024    RBC 5.35 (H) 11/11/2024    MCH 27.1 11/11/2024    MCHC 30.4 (L) 11/11/2024    RDW 15.7 (H) 11/11/2024    CRP 2.84 (H) 11/08/2024       Lab Results   Component Value Date    TSH 2.09 10/29/2024     Lab Results   Component Value Date    LACTATE 1.3 11/03/2024     (H) 11/03/2024    INR 2.1 (H) 11/06/2024       IMAGES:  Encounter Date: 11/03/24   ECG 12 lead   Result Value    Ventricular Rate 104    Atrial Rate 104    UT Interval 162    QRS Duration 124    QT Interval 368    QTC Calculation(Bazett) 483    P Axis 54    R Axis -52    T Axis 169    QRS Count 18    Q Onset 219    P Onset 138    P Offset 188    T Offset 403    QTC Fredericia 442    Narrative    Sinus tachycardia  Left axis deviation  Left bundle branch block  Abnormal ECG  No previous ECGs available     CT abdomen pelvis wo IV contrast    Result Date: 11/4/2024  Impression: 2 centimeters stone in the urinary bladder.   Bilateral pleural effusions. Pericardial effusion.   Ascites.   Anasarca.   Presacral edema.   Enlarged spleen.   The exam is limited by motion artifact.   MACRO: none   Signed by: Jayshree Dias 11/4/2024 3:28 PM Dictation workstation:   [id number]    US renal complete    Result Date: 11/4/2024  Impression: No hydronephrosis. Decompressed bladder.   MACRO: None   Signed by: Zain Mcknight 11/4/2024 11:08 AM Dictation workstation:   [id number]    CT chest wo IV contrast    Result Date: 11/3/2024  Impression: Abnormalities at the right lung base on portable frontal chest radiograph earlier today are due to a combination of moderate-sized, mostly if not entirely free-flowing right pleural effusion with associated multisegmental right lower lobe collapse adjacent to it   Small area of ground-glass airspace disease in the middle lobe confirmed on CT may not have been expected on the prior radiograph   Possibility of mild ground-glass airspace disease in the right lower lobe as well   No  ground-glass airspace disease in the left lung   No consolidative pneumonia in any of the inflated lungs on either side   Small free-flowing left pleural effusion   Unusual hypodensities in the right ventricular free wall of uncertain etiology and significance   Perhaps trace (smallest detectable quantity) pericardial effusion   Incidental abnormalities in the included upper abdomen as detailed above   MACRO: None   Signed by: Matthias Kolb 11/3/2024 11:35 AM Dictation workstation:   [id number]    XR chest 1 view    Result Date: 11/3/2024  Impression: 1. Right lower lobe airspace consolidation either pneumonia or atelectasis with right pleural effusion. Follow-up to complete resolution is needed 2. Enlarged cardiac silhouette     Signed by: Kary Cardoso 11/3/2024 10:27 AM Dictation workstation:   [id number]    Transthoracic Echo (TTE) Limited    Result Date: 11/5/2024   [facility], [address] [place] [address]               Tel [phone] and Fax [phone] TRANSTHORACIC ECHOCARDIOGRAM REPORT  Patient Name:       ELIE Mccain Physician:    [id number] Chicho Goldman MD Study Date:         11/5/2024           Ordering Provider:    [id number] SHERITA GOYAL MRN/PID:            [id number]            Fellow: Accession#:         [id number]        Nurse: Date of Birth/Age:  1940 / 83     Sonographer:          Cathi garcia RDCS Gender assigned at  F                   Additional Staff: Birth: Height:             165.10 cm           Admit Date:           11/3/2024 Weight:             72.57 kg            Admission Status:     Inpatient -                                                               Routine BSA / BMI:          1.80 m2 / 26.63     Encounter#:           [id number]                      kg/m2 Blood Pressure:     103/62 mmHg         Department Location:  [facility]                                                               Invasive Study Type:    TRANSTHORACIC ECHO (TTE) LIMITED Diagnosis/ICD: 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 Study Detail: The following Echo studies were performed: 2D, Doppler and color               flow.  PHYSICIAN INTERPRETATION: Left Ventricle: The left ventricular systolic function is normal, with a visually estimated ejection fraction of 60-65%. There is severely increased concentric left ventricular hypertrophy. There are no regional left ventricular wall motion abnormalities. The left ventricular cavity size is normal. There is moderately increased septal and moderately increased posterior left ventricular wall thickness. Spectral Doppler shows an abnormal pattern of left ventricular diastolic filling. Left Atrium: The left atrium is upper limits of normal in size. Right Ventricle: The right ventricle is normal in size. There is normal right ventricular global systolic function. Right Atrium: The right atrium is normal in size. Aortic Valve: The aortic valve is trileaflet. The aortic valve dimensionless index is 0.77. There is trace aortic valve regurgitation. The peak instantaneous gradient of the aortic valve is 13 mmHg. The mean gradient of the mitral valve is 7 mmHg. Mitral Valve: The mitral valve is normal in structure. There is trace to mild mitral valve regurgitation. Tricuspid Valve: The tricuspid valve is structurally normal. There is trace to mild tricuspid regurgitation. Pulmonic Valve: The pulmonic valve is not well visualized. The  pulmonic valve regurgitation was not well visualized. Pericardium: There is no pericardial effusion noted. Aorta: The aortic root is normal.  CONCLUSIONS:  1. The left ventricular systolic function is normal, with a visually estimated ejection fraction of 60-65%.  2. Spectral Doppler shows an abnormal pattern of left ventricular diastolic filling.  3. There is moderately increased septal and moderately increased posterior left ventricular wall thickness.  4. There is severely increased concentric left ventricular hypertrophy.  5. There is normal right ventricular global systolic function. QUANTITATIVE DATA SUMMARY:  2D MEASUREMENTS:          Normal Ranges: IVSd:            1.49 cm  (0.6-1.1cm) LVPWd:           1.26 cm  (0.6-1.1cm) LVIDd:           4.47 cm  (3.9-5.9cm) LVIDs:           2.98 cm LV Mass Index:   133 g/m2 LVEDV Index:     23 ml/m2 LV % FS          33.4 %  LV SYSTOLIC FUNCTION BY 2D PLANIMETRY (MOD):                      Normal Ranges: EF-A4C View:    62 % (>=55%) EF-A2C View:    61 % EF-Biplane:     64 % EF-Visual:      63 % LV EF Reported: 63 %  LV DIASTOLIC FUNCTION:          Normal Ranges: MV Peak E:             0.70 m/s (0.7-1.2 m/s) MV Peak A:             0.91 m/s (0.42-0.7 m/s) E/A Ratio:             0.77     (1.0-2.2)  MITRAL VALVE:          Normal Ranges: MV DT:        264 msec (150-240msec)  AORTIC VALVE:                      Normal Ranges: AoV Vmax:                1.78 m/s  (<=1.7m/s) AoV Peak P.7 mmHg (<20mmHg) AoV Mean P.0 mmHg  (1.7-11.5mmHg) LVOT Max Josiah:            1.33 m/s  (<=1.1m/s) AoV VTI:                 31.31 cm  (18-25cm) LVOT VTI:                24.11 cm LVOT Diameter:           2.04 cm   (1.8-2.4cm) AoV Area, VTI:           2.52 cm2  (2.5-5.5cm2) AoV Area,Vmax:           2.44 cm2  (2.5-4.5cm2) AoV Dimensionless Index: 0.77  TRICUSPID VALVE/RVSP:          Normal Ranges: Peak TR Velocity:     3.46 m/s  PULMONIC VALVE:          Normal Ranges: PV Max  Josiah:     0.8 m/s  (0.6-0.9m/s) PV Max P.8 mmHg  [id number] Chicho Goldman MD Electronically signed on 2024 at 2:09:49 PM  ** Final **    === 24 ===    XR CHEST 1 VIEW    - Impression -  1. Right lower lobe airspace consolidation either pneumonia or  atelectasis with right pleural effusion. Follow-up to complete  resolution is needed  2. Enlarged cardiac silhouette      Signed by: Kary Cardoso 11/3/2024 10:27 AM  Dictation workstation:   [id number]  === 24 ===    CT ABDOMEN PELVIS WO IV CONTRAST    - Impression -  2 centimeters stone in the urinary bladder.    Bilateral pleural effusions. Pericardial effusion.    Ascites.    Anasarca.    Presacral edema.    Enlarged spleen.    The exam is limited by motion artifact.    MACRO:  none    Signed by: Jayshree Dias 2024 3:28 PM  Dictation workstation:   [id number]  === 24 ===    XR CHEST 1 VIEW    - Impression -  1. Right lower lobe airspace consolidation either pneumonia or  atelectasis with right pleural effusion. Follow-up to complete  resolution is needed  2. Enlarged cardiac silhouette      Signed by: Kary Cardoso 11/3/2024 10:27 AM  Dictation workstation:   [id number]      Additional results of the last 24 hours have been reviewed.    Dictated using Easy Eye Version 2.4  Proof read however unrecognized voice recognition errors may have occurred     Electronically signed by Cha Ramsay DO on 24 at 9:17 AM

## 2024-11-11 NOTE — SIGNIFICANT EVENT
I was called by nursing staff to see patient because of left lower extremity swelling.  The left lower extremity was warm to touch and erythematous.  Please note that patient has a wound in that area.  It was wrapped the dressing was too tight.  The leg was rewrapped by nursing staff.  Will keep the leg elevated.  Please note that patient has edema bilaterally.  Will continue supportive care for now.  Patient is already on apixaban.

## 2024-11-11 NOTE — PROGRESS NOTES
"Desirae Gregg is a 83 y.o. female on day 8 of admission presenting with Sepsis with acute hypoxic respiratory failure without septic shock (Multi).      Subjective   She is sitting up in the chair, states her left leg is painful.       Review of systems:  Constitutional: negative for fever, chills, or malaise  Neuro: negative for dizziness, headache, numbness, tingling  ENT: Negative for nasal congestion or sore throat  CV: negative for chest pain, palpitations  GI: negative for abd pain, nausea, vomiting or diarrhea  : negative for dysuria, frequency, or urgency  Musculoskeletal: Negative for weakness, myalgia, or arthralgia  Endocrine: Negative for polyuria or polydipsia         Objective   Constitutional: Well developed, awake/alert/oriented x3, no distress, alert and cooperative  Eyes: PERRL, EOMI, clear sclera  ENMT: mucous membranes moist, no apparent injury, no lesions seen  Head/Neck: Neck supple, no apparent injury, thyroid without mass or tenderness, No JVD, trachea midline, no bruits  Respiratory/Thorax: Patent airways, diminished bilat bases with good chest expansion, thorax symmetric  Cardiovascular: Regular, rate and rhythm, no murmurs, 2+ equal pulses of the extremities, normal S 1and S 2  Gastrointestinal: Nondistended, soft, non-tender, no rebound tenderness or guarding, no masses palpable, no organomegaly, +BS, no bruits  Musculoskeletal: ROM intact, no joint swelling, normal strength  Extremities: +2 pitting edema, dressing to left leg  Neurological: alert and oriented x3, intact senses, motor, response and reflexes, normal strength  Lymphatic: No significant lymphadenopathy  Psychological: Appropriate mood and behavior  Skin: Warm and dry, dressing to left leg, surrounding skin red and warm      Last Recorded Vitals  /75 (BP Location: Right arm, Patient Position: Lying)   Pulse 96   Temp 36.4 °C (97.5 °F) (Temporal)   Resp 18   Ht 1.651 m (5' 5\")   Wt 79.2 kg (174 lb 9.6 oz)   SpO2 94% "   BMI 29.05 kg/m²     Intake/Output last 3 Shifts:  I/O last 3 completed shifts:  In: 1270 (16 mL/kg) [P.O.:1220; IV Piggyback:50]  Out: 2700 (34.1 mL/kg) [Urine:2700 (0.9 mL/kg/hr)]  Weight: 79.2 kg   I/O this shift:  In: 240 [P.O.:240]  Out: 125 [Urine:125]    Relevant Results  Scheduled medications  apixaban, 2.5 mg, oral, q12h  bumetanide, 1 mg, intravenous, Once  busPIRone, 7.5 mg, oral, BID  carvedilol, 3.125 mg, oral, BID  cetirizine, 10 mg, oral, Daily  cholecalciferol, 2,000 Units, oral, Daily  cyanocobalamin, 1,000 mcg, oral, Daily  ferrous sulfate (325 mg ferrous sulfate), 65 mg of iron, oral, Daily with breakfast  folic acid, 1 mg, oral, Daily  gabapentin, 200 mg, oral, BID  [Held by provider] isosorbide mononitrate ER, 30 mg, oral, Daily  levothyroxine, 88 mcg, oral, Daily  midodrine, 5 mg, oral, TID  nystatin, , Topical, BID  pantoprazole, 40 mg, oral, BID AC  phenyleph-min oil-petrolatum, , rectal, TID  potassium phosphate (monobasic), 1,000 mg, oral, With meals & nightly  psyllium, 1 packet, oral, TID  sertraline, 25 mg, oral, Daily  zinc oxide, 1 Application, Topical, BID      Continuous medications     PRN medications  PRN medications: acetaminophen **OR** acetaminophen **OR** acetaminophen, loperamide, melatonin, methyl salicylate-menthol, ondansetron ODT **OR** ondansetron, oxyCODONE, oxygen, polyethylene glycol    Results for orders placed or performed during the hospital encounter of 11/03/24 (from the past 24 hours)   Renal Function Panel   Result Value Ref Range    Glucose 123 (H) 74 - 99 mg/dL    Sodium 135 (L) 136 - 145 mmol/L    Potassium 3.3 (L) 3.5 - 5.3 mmol/L    Chloride 102 98 - 107 mmol/L    Bicarbonate 26 21 - 32 mmol/L    Anion Gap 10 10 - 20 mmol/L    Urea Nitrogen 18 6 - 23 mg/dL    Creatinine 0.85 0.50 - 1.05 mg/dL    eGFR 67 >60 mL/min/1.73m*2    Calcium 7.0 (L) 8.6 - 10.3 mg/dL    Phosphorus 2.3 (L) 2.5 - 4.9 mg/dL    Albumin 2.0 (L) 3.4 - 5.0 g/dL   Magnesium   Result Value  Ref Range    Magnesium 1.74 1.60 - 2.40 mg/dL   CBC and Auto Differential   Result Value Ref Range    WBC 22.1 (H) 4.4 - 11.3 x10*3/uL    nRBC 0.0 0.0 - 0.0 /100 WBCs    RBC 5.35 (H) 4.00 - 5.20 x10*6/uL    Hemoglobin 14.5 12.0 - 16.0 g/dL    Hematocrit 47.7 (H) 36.0 - 46.0 %    MCV 89 80 - 100 fL    MCH 27.1 26.0 - 34.0 pg    MCHC 30.4 (L) 32.0 - 36.0 g/dL    RDW 15.7 (H) 11.5 - 14.5 %    Platelets 255 150 - 450 x10*3/uL    Neutrophils % 83.4 40.0 - 80.0 %    Immature Granulocytes %, Automated 3.4 (H) 0.0 - 0.9 %    Lymphocytes % 4.1 13.0 - 44.0 %    Monocytes % 4.4 2.0 - 10.0 %    Eosinophils % 3.9 0.0 - 6.0 %    Basophils % 0.8 0.0 - 2.0 %    Neutrophils Absolute 18.42 (H) 1.60 - 5.50 x10*3/uL    Immature Granulocytes Absolute, Automated 0.74 (H) 0.00 - 0.50 x10*3/uL    Lymphocytes Absolute 0.91 0.80 - 3.00 x10*3/uL    Monocytes Absolute 0.98 (H) 0.05 - 0.80 x10*3/uL    Eosinophils Absolute 0.85 (H) 0.00 - 0.40 x10*3/uL    Basophils Absolute 0.17 (H) 0.00 - 0.10 x10*3/uL   C-reactive protein   Result Value Ref Range    C-Reactive Protein 0.86 <1.00 mg/dL       FL GI esophagram   Final Result   Modified exam due to patient's clinical condition. No esophageal   obstruction.        LETHA Mcknight discussed the significance and urgency of this   critical finding by Epic secure chat with  SYED JOSHI on 11/11/2024   at 10:56 am.  (**-RCF-**) Findings:  See findings.        Signed by: Zain Mcknight 11/11/2024 11:02 AM   Dictation workstation:   [id number]      XR chest 1 view   Final Result   1. Suggestion of mild interstitial edema, more pronounced on this   radiograph compared to prior. Recommend correlation with fluid status.   2. Redemonstration of moderate right and small left pleural effusions.   3. Right basilar airspace opacity, which could be related to   atelectasis versus pneumonia in appropriate clinical setting.        MACRO:   None        Signed by: Yazmin Oropeza 11/8/2024 4:17 PM   Dictation  workstation:   [id number]      Transthoracic Echo (TTE) Limited   Final Result      CT abdomen pelvis wo IV contrast   Final Result   2 centimeters stone in the urinary bladder.        Bilateral pleural effusions. Pericardial effusion.        Ascites.        Anasarca.        Presacral edema.        Enlarged spleen.        The exam is limited by motion artifact.        MACRO:   none        Signed by: Jayshree Dias 11/4/2024 3:28 PM   Dictation workstation:   [id number]       renal complete   Final Result   No hydronephrosis. Decompressed bladder.        MACRO:   None        Signed by: Zain Mcknight 11/4/2024 11:08 AM   Dictation workstation:   [id number]      CT chest wo IV contrast   Final Result   Abnormalities at the right lung base on portable frontal chest   radiograph earlier today are due to a combination of moderate-sized,   mostly if not entirely free-flowing right pleural effusion with   associated multisegmental right lower lobe collapse adjacent to it        Small area of ground-glass airspace disease in the middle lobe   confirmed on CT may not have been expected on the prior radiograph        Possibility of mild ground-glass airspace disease in the right lower   lobe as well        No ground-glass airspace disease in the left lung        No consolidative pneumonia in any of the inflated lungs on either side        Small free-flowing left pleural effusion        Unusual hypodensities in the right ventricular free wall of uncertain   etiology and significance        Perhaps trace (smallest detectable quantity) pericardial effusion        Incidental abnormalities in the included upper abdomen as detailed   above        MACRO:   None        Signed by: Matthias Kolb 11/3/2024 11:35 AM   Dictation workstation:   [id number]      XR chest 1 view   Final Result   1. Right lower lobe airspace consolidation either pneumonia or   atelectasis with right pleural effusion. Follow-up to complete   resolution is  needed   2. Enlarged cardiac silhouette             Signed by: Kary Cardoso 11/3/2024 10:27 AM   Dictation workstation:   [id number]          Transthoracic Echo (TTE) Limited    Result Date: 11/5/2024   [facility], [address] [place] [address]               Tel [phone] and Fax [phone] TRANSTHORACIC ECHOCARDIOGRAM REPORT  Patient Name:       ELIE Mccain Physician:    [id number] Chicho Goldman MD Study Date:         11/5/2024           Ordering Provider:    [id number] SHERITA GOYAL MRN/PID:            [id number]            Fellow: Accession#:         [id number]        Nurse: Date of Birth/Age:  1940 / 83     Sonographer:          Cathi garcia RDCS Gender assigned at  F                   Additional Staff: Birth: Height:             165.10 cm           Admit Date:           11/3/2024 Weight:             72.57 kg            Admission Status:     Inpatient -                                                               Routine BSA / BMI:          1.80 m2 / 26.63     Encounter#:           [id number]                     kg/m2 Blood Pressure:     103/62 mmHg         Department Location:  [facility] Non                                                               Invasive Study Type:    TRANSTHORACIC ECHO (TTE) LIMITED Diagnosis/ICD: 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 Study Detail: The following Echo studies were performed: 2D, Doppler and color               flow.   PHYSICIAN INTERPRETATION: Left Ventricle: The left ventricular systolic function is normal, with a visually estimated ejection fraction of 60-65%. There is severely increased concentric left ventricular hypertrophy. There are no regional left ventricular wall motion abnormalities. The left ventricular cavity size is normal. There is moderately increased septal and moderately increased posterior left ventricular wall thickness. Spectral Doppler shows an abnormal pattern of left ventricular diastolic filling. Left Atrium: The left atrium is upper limits of normal in size. Right Ventricle: The right ventricle is normal in size. There is normal right ventricular global systolic function. Right Atrium: The right atrium is normal in size. Aortic Valve: The aortic valve is trileaflet. The aortic valve dimensionless index is 0.77. There is trace aortic valve regurgitation. The peak instantaneous gradient of the aortic valve is 13 mmHg. The mean gradient of the mitral valve is 7 mmHg. Mitral Valve: The mitral valve is normal in structure. There is trace to mild mitral valve regurgitation. Tricuspid Valve: The tricuspid valve is structurally normal. There is trace to mild tricuspid regurgitation. Pulmonic Valve: The pulmonic valve is not well visualized. The pulmonic valve regurgitation was not well visualized. Pericardium: There is no pericardial effusion noted. Aorta: The aortic root is normal.  CONCLUSIONS:  1. The left ventricular systolic function is normal, with a visually estimated ejection fraction of 60-65%.  2. Spectral Doppler shows an abnormal pattern of left ventricular diastolic filling.  3. There is moderately increased septal and moderately increased posterior left ventricular wall thickness.  4. There is severely increased concentric left ventricular hypertrophy.  5. There is normal right ventricular global systolic function. QUANTITATIVE DATA SUMMARY:  2D MEASUREMENTS:          Normal Ranges: IVSd:             1.49 cm  (0.6-1.1cm) LVPWd:           1.26 cm  (0.6-1.1cm) LVIDd:           4.47 cm  (3.9-5.9cm) LVIDs:           2.98 cm LV Mass Index:   133 g/m2 LVEDV Index:     23 ml/m2 LV % FS          33.4 %  LV SYSTOLIC FUNCTION BY 2D PLANIMETRY (MOD):                      Normal Ranges: EF-A4C View:    62 % (>=55%) EF-A2C View:    61 % EF-Biplane:     64 % EF-Visual:      63 % LV EF Reported: 63 %  LV DIASTOLIC FUNCTION:          Normal Ranges: MV Peak E:             0.70 m/s (0.7-1.2 m/s) MV Peak A:             0.91 m/s (0.42-0.7 m/s) E/A Ratio:             0.77     (1.0-2.2)  MITRAL VALVE:          Normal Ranges: MV DT:        264 msec (150-240msec)  AORTIC VALVE:                      Normal Ranges: AoV Vmax:                1.78 m/s  (<=1.7m/s) AoV Peak P.7 mmHg (<20mmHg) AoV Mean P.0 mmHg  (1.7-11.5mmHg) LVOT Max Josiah:            1.33 m/s  (<=1.1m/s) AoV VTI:                 31.31 cm  (18-25cm) LVOT VTI:                24.11 cm LVOT Diameter:           2.04 cm   (1.8-2.4cm) AoV Area, VTI:           2.52 cm2  (2.5-5.5cm2) AoV Area,Vmax:           2.44 cm2  (2.5-4.5cm2) AoV Dimensionless Index: 0.77  TRICUSPID VALVE/RVSP:          Normal Ranges: Peak TR Velocity:     3.46 m/s  PULMONIC VALVE:          Normal Ranges: PV Max Josiah:     0.8 m/s  (0.6-0.9m/s) PV Max P.8 mmHg  [id number] Chicho Goldman MD Electronically signed on 2024 at 2:09:49 PM  ** Final **           Assessment/Plan   Principal Problem:    Sepsis with acute hypoxic respiratory failure without septic shock (Multi)  Active Problems:    Iron deficiency anemia    Chronic systolic (congestive) heart failure    Essential hypertension    History of pulmonary embolism    Anxiety    Vitamin D deficiency    Vitamin B12 deficiency    Adult hypothyroidism    Pneumonia of right lower lobe due to infectious organism    Leukocytosis    Acute kidney injury superimposed on stage 3a chronic kidney disease    Elevated troponin     Hyperbilirubinemia    Hyperglycemia    Hypokalemia    Hypomagnesemia    Hypocalcemia    Abnormality of right ventricle of heart    Impaired mobility and ADLs    Echocardiogram from June 2024:    Left Ventricle: Low normal left ventricular systolic function with a   visually estimated EF of 50 - 55%. Left ventricle size is normal. Normal   wall thickness. Ventricular mass is normal. Findings consistent with   concentric remodeling. Normal wall motion. Grade I diastolic dysfunction   with normal LAP.     Right Ventricle: Right ventricle size is normal. Normal systolic   function. TAPSE is 1.9 cm.     Aortic Valve: No stenosis.     Mitral Valve: Mild regurgitation.     Tricuspid Valve: Normal RVSP. The estimated RVSP is 24 mmHg.     Pericardium: No pericardial effusion.         Pneumonia  -CT chest reviewed  -Procal downtrending  -antibiotics  -bronchodilators  -oxygen support  -sputum culture  -blood cultures  -Pulmonary following, note reviewed     2. Abnormal CT finding of RV  -Unusual hypodensities in the right ventricular free wall  -Echo as above  -Repeat limited echo reviewed, no abnormality noted  -Most likely an RV wall fat pad  -Can obtain outpt cardiac MRI to further eval     3. Acute on chronic diastolic CHF with mildly reduced EF  -CT showed pleural effusion, possible parapneumonic vs cardiac  -  -I reviewed the Echocardiogram as above  -Strict I & Os  -Daily weights  -2gm na diet  -BP low most likely from infection and third spacing with low albumin  -No plans for thoracentesis->restarted Eliquis  -Stop imdur for low BP  -Cont low dose Coreg  -CT abd/pelvis showed pleural effusions, anasarca, and ascities  -Albumin 1.9, given IV albumin and Lasix IV (11/5)  -BP borderline low, Midodrine 5mg TID started  -Bumex 1mg IV daily since last week  -Give another dose of Bumex 1mg IV x1 today and then discharge on Bumex 1mg PO daily    4. UTI  -WBC elevated  -Urine culture showed multiple organisms, possible  contamination  -antibiotics     5. Elevated troponins-Non MI elevation  -Troponin 76, now downtrending  -EKG showed SR LBBB->chronic LBBB  -Denies ACS symptoms  -limited echo as above, normal LVEF     6. Hx of DVT  -Eliquis     7. Hypokalemia/hypomagnesemia  -supplement     8. Hx of HTN, with hypotension  -stop imdur  -IVF given->hold  -Gave albumin with lasix post (11/5)  -Cont Midodrine 5mg TID  -Coreg low dose  -Monitor      Sherron Jones, OLI-CNP

## 2024-11-11 NOTE — CONSULTS
Consults  Referred by FRANCISCA Ramsay MD: Jackie Cedillo MD    Reason For Consult  leukocytosis    History Of Present Illness  Desirae Gregg is a 83 y.o. female, hx of polycythemia / myelofibrosis, has been previously treated with hydrea and Jakafi, hx of CKD, hx of PE, hx of GERD, hx of hypothyroidism, she was admitted 11/4, she was dx with sepsis / pneumonia, has been on antibiotics, the BC is negative, the ct with Rt sided effusion / infiltrate, bladder stone, ascites, C. Diff is negative, her WBC has been noted to be elevated, feels nauseated, no emesis, no diarrhea, has occasional Lt flank pain, her sob has improved, O2 weaned off, no dysuria, no rash     Past Medical History  She has a past medical history of Heart failure and Sciatica.    Surgical History  She has a past surgical history that includes Hysterectomy; Cholecystectomy; Tonsillectomy; and Appendectomy.     Social History     Occupational History    Not on file   Tobacco Use    Smoking status: Never    Smokeless tobacco: Never   Substance and Sexual Activity    Alcohol use: Never    Drug use: Never    Sexual activity: Not Currently     Travel History   Travel since 10/11/24    No documented travel since 10/11/24          Family History  Family History   Problem Relation Name Age of Onset    Stroke Mother      Asthma Father      Cancer Paternal Grandmother       Allergies  Clonidine and structural analogs, Naproxen, Tizanidine, Hydroxyzine, and Methylprednisolone     Immunization History   Administered Date(s) Administered    Flu vaccine (IIV4), preservative free *Check age/dose* 01/13/2017    Flu vaccine, trivalent, preservative free, HIGH-DOSE, age 65y+ (Fluzone) 09/07/2017, 11/20/2019    Influenza, Seasonal, Quadrivalent, Adjuvanted 10/12/2020, 10/05/2021, 11/18/2022    Influenza, injectable, quadrivalent 10/15/2015    Influenza, seasonal, injectable 09/01/2016    Moderna SARS-CoV-2 Vaccination 01/25/2021, 02/22/2021, 11/09/2021    Pneumococcal  Conjugate, Unspecified 01/01/2012    Pneumococcal conjugate vaccine, 13-valent (PREVNAR 13) 12/09/2014    Pneumococcal polysaccharide vaccine, 23-valent, age 2 years and older (PNEUMOVAX 23) 10/24/2017    Tdap vaccine, age 7 year and older (BOOSTRIX, ADACEL) 12/09/2014, 10/02/2021   Pneumonia vaccine is up to date, influenza vaccine is planned  Be[place] fall risk 100, preventive protocol was implemented  Depression screen is negative    Medications  Home medications:  Medications Prior to Admission   Medication Sig Dispense Refill Last Dose/Taking    loperamide (Imodium A-D) 2 mg tablet Take 1 tablet (2 mg) by mouth 4 times a day as needed for diarrhea.   11/2/2024    acetaminophen (Tylenol) 500 mg tablet Take 2 tablets (1,000 mg) by mouth every 6 hours if needed for mild pain (1 - 3).       apixaban (Eliquis) 2.5 mg tablet Take 1 tablet (2.5 mg) by mouth 2 times a day. 180 tablet 1     busPIRone (Buspar) 7.5 mg tablet Take 1 tablet (7.5 mg) by mouth 2 times a day. 180 tablet 1     carvedilol (Coreg) 25 mg tablet Take 1 tablet (25 mg) by mouth 2 times a day. 180 tablet 1     cetirizine (ZyrTEC) 10 mg tablet Take 1 tablet (10 mg) by mouth once daily. 90 tablet 1     cholecalciferol (Vitamin D-3) 50 mcg (2,000 unit) capsule Take 1 capsule (50 mcg) by mouth once daily. 90 capsule 3     cyanocobalamin (Vitamin B-12) 1,000 mcg tablet Take 1 tablet (1,000 mcg) by mouth once daily. 90 tablet 3     empagliflozin (Jardiance) 25 mg Take 1 tablet (25 mg) by mouth once daily. 90 tablet 1     ferrous sulfate, 325 mg ferrous sulfate, tablet Take 1 tablet (325 mg) by mouth once daily with breakfast. 90 tablet 1     folic acid (Folvite) 400 mcg tablet Take 2 tablets (0.8 mg) by mouth once daily.       furosemide (Lasix) 20 mg tablet Take 1 tablet (20 mg) by mouth once daily. 90 tablet 1     gabapentin (Neurontin) 100 mg capsule Take 2 capsules (200 mg) by mouth 2 times a day. 360 capsule 1     isosorbide mononitrate ER (Imdur) 30 mg  24 hr tablet Take 1 tablet (30 mg) by mouth once daily. Do not crush or chew. 90 tablet 1     levothyroxine (Synthroid, Levoxyl) 88 mcg tablet Take 1 tablet (88 mcg) by mouth early in the morning.. Take on an empty stomach at the same time each day, either 30 to 60 minutes prior to breakfast 90 tablet 1     lisinopril 10 mg tablet Take 1 tablet (10 mg) by mouth once daily. 90 tablet 1     [] oxyCODONE (Roxicodone) 5 mg immediate release tablet Take 1 tablet (5 mg) by mouth every 6 hours if needed for severe pain (7 - 10) for up to 6 days. 24 tablet 0     pantoprazole (ProtoNix) 40 mg EC tablet Take 1 tablet (40 mg) by mouth once daily in the morning. Take before meals. Do not crush, chew, or split. 90 tablet 1     polyethylene glycol (Glycolax, Miralax) 17 gram packet Take 17 g by mouth once daily as needed.       sertraline (Zoloft) 25 mg tablet Take 1 tablet (25 mg) by mouth once daily. 90 tablet 1     zinc oxide 40 % ointment ointment Apply 1 Application topically every 1 hour if needed (irritation). 56 g 0      Current medications:  Scheduled medications  apixaban, 2.5 mg, oral, q12h  busPIRone, 7.5 mg, oral, BID  carvedilol, 3.125 mg, oral, BID  cefTRIAXone, 1 g, intravenous, q24h  cetirizine, 10 mg, oral, Daily  cholecalciferol, 2,000 Units, oral, Daily  cyanocobalamin, 1,000 mcg, oral, Daily  ferrous sulfate (325 mg ferrous sulfate), 65 mg of iron, oral, Daily with breakfast  folic acid, 1 mg, oral, Daily  gabapentin, 200 mg, oral, BID  [Held by provider] isosorbide mononitrate ER, 30 mg, oral, Daily  levothyroxine, 88 mcg, oral, Daily  midodrine, 5 mg, oral, TID  nystatin, , Topical, BID  pantoprazole, 40 mg, oral, BID AC  phenyleph-min oil-petrolatum, , rectal, TID  potassium phosphate (monobasic), 1,000 mg, oral, With meals & nightly  psyllium, 1 packet, oral, TID  sertraline, 25 mg, oral, Daily  zinc oxide, 1 Application, Topical, BID      Continuous medications     PRN medications  PRN  medications: acetaminophen **OR** acetaminophen **OR** acetaminophen, loperamide, melatonin, methyl salicylate-menthol, ondansetron ODT **OR** ondansetron, oxyCODONE, oxygen, polyethylene glycol    Review of Systems   All other systems reviewed and are negative.       Objective  Range of Vitals (last 24 hours)  Heart Rate:  [76-79]   Temp:  [36.2 °C (97.2 °F)-36.6 °C (97.9 °F)]   Resp:  [16]   BP: (105-131)/(69-74)   SpO2:  [90 %-93 %]   Daily Weight  11/10/24 : 79.2 kg (174 lb 9.6 oz)    Body mass index is 29.05 kg/m².   Nutritional consult    Physical Exam  Constitutional:       Appearance: Normal appearance.   HENT:      Head: Normocephalic and atraumatic.      Mouth/Throat:      Mouth: Mucous membranes are moist.      Pharynx: Oropharynx is clear.   Eyes:      Pupils: Pupils are equal, round, and reactive to light.   Cardiovascular:      Rate and Rhythm: Normal rate and regular rhythm.      Heart sounds: Normal heart sounds.   Pulmonary:      Effort: Pulmonary effort is normal.      Breath sounds: Rales present.   Abdominal:      General: Abdomen is flat. Bowel sounds are normal.      Palpations: Abdomen is soft.   Musculoskeletal:      Cervical back: Normal range of motion.      Comments: Lt shin wound, no cellulitis   Neurological:      Mental Status: She is alert.          Relevant Results  Outside Hospital Results  Reviewed  Labs  Results from last 72 hours   Lab Units 11/11/24  0614 11/10/24  0538 11/09/24  0547   WBC AUTO x10*3/uL 22.1* 20.2* 16.9*   HEMOGLOBIN g/dL 14.5 14.9 14.1   HEMATOCRIT % 47.7* 49.9* 47.7*   PLATELETS AUTO x10*3/uL 255 229 153   NEUTROS PCT AUTO % 83.4 83.5 85.2   LYMPHS PCT AUTO % 4.1 4.2 4.1   MONOS PCT AUTO % 4.4 4.2 4.3   EOS PCT AUTO % 3.9 3.3 3.5     Results from last 72 hours   Lab Units 11/11/24  0614 11/10/24  0538 11/09/24  0547   SODIUM mmol/L 135* 137 135*   POTASSIUM mmol/L 3.3* 3.8 3.3*   CHLORIDE mmol/L 102 105 105   CO2 mmol/L 26 26 24   BUN mg/dL 18 18 21  "  CREATININE mg/dL 0.85 0.86 0.93   GLUCOSE mg/dL 123* 109* 99   CALCIUM mg/dL 7.0* 7.1* 6.9*   ANION GAP mmol/L 10 10 9*   EGFR mL/min/1.73m*2 67 66 60*   PHOSPHORUS mg/dL 2.3* 2.5 3.1     Results from last 72 hours   Lab Units 11/11/24  0614 11/10/24  0538 11/09/24  0547   ALBUMIN g/dL 2.0* 2.0* 1.9*     Estimated Creatinine Clearance: 50.3 mL/min (by C-G formula based on SCr of 0.85 mg/dL).  C-Reactive Protein   Date Value Ref Range Status   11/11/2024 0.86 <1.00 mg/dL Final   11/08/2024 2.84 (H) <1.00 mg/dL Final   11/07/2024 5.69 (H) <1.00 mg/dL Final     No results found for: \"HIV1X2\", \"HIVCONF\", \"PYRCVJ2GP\"  No results found for: \"HEPCABINIT\", \"HEPCAB\", \"HCVPCRQUANT\"  Microbiology  Reviewed  Imaging  Reviewed      Assessment/Plan   Leukocytosis  Sepsis, treated  Respiratory failure / pneumonia  Bacteriuria  Polycythemia / myelofibrosis    Recommendations :  Discontinue the antibiotics  Follow the WBC  Will need a follow up with her hematologist  Chest PT    I spent minutes in the professional and overall care of this patient.      Cameron Lassiter MD  "

## 2024-11-11 NOTE — PROGRESS NOTES
11/11/24 1225   Discharge Planning   Living Arrangements Children  (lives with son)   Support Systems Children   Assistance Needed Patient is A&O X2-3 (forgetful at times), requires minimal assist with ADLs (such as for transfers with assist from son using a gait belt), utilizes a walker and can ambulate around the house, uses a WC (when leaving the house). Uses a shower chair and raised toilet seat. Son is her caretaker and transports patient to all appointments. Patient's son does cooking and cleaning. Patient is active with [facility] (palliative care) and [facility] (PT/OT/SN).   Type of Residence Private residence   Number of Stairs to Enter Residence 0  (ramp)   Number of Stairs Within Residence 12  (lift chair)   Do you have animals or pets at home? Yes   Type of Animals or Pets 1 cat   Home or Post Acute Services Post acute facilities (Rehab/SNF/etc)   Type of Post Acute Facility Services Skilled nursing   Expected Discharge Disposition SNF  (Daughter & patient aware [facility] not [facility] bed availability but [facility] do. [facility] preference [facility]. [facility]   Does the patient need discharge transport arranged? Yes   RoundTrip coordination needed? Yes   Has discharge transport been arranged? No

## 2024-11-11 NOTE — NURSING NOTE
1900 Assumed care of patient,   2130 assessment as charted, VSS, patient awake in bed, alert and oriented to self. Patient denies pain, nausea, sob or CP. Cardona secured draining clear yellow urine. Dressing to LLE intact, redness noted to LLE. IV intact, bed in lowest and locked position. Bed alarm on and functioning. Call light within reach, safety maintained. Plan of care ongoing  Message sent to provider- patients leg dressing was too tight I rewrapped it and noticed her LLE was swollen, warm to the touch and red in color.

## 2024-11-11 NOTE — CARE PLAN
SW asked to meet with pt & family at bedside.  Pt is interested in completing financial POA and per their  require a witness.  SW advised family that staff is unable to witness any financial documentation.  Dtr stated understanding.

## 2024-11-11 NOTE — PROGRESS NOTES
"Occupational Therapy    OT Treatment    Patient Name: Desirae Gregg  MRN: [id number]  Department: [address]  Room: [facility]  Today's Date: 11/11/2024         Missed visit attempt made 1330-1337pm        Plan:  Treatment Interventions: ADL retraining, Functional transfer training, UE strengthening/ROM, Endurance training  OT Frequency: 3 times per week  OT Discharge Recommendations: Moderate intensity level of continued care  Equipment Recommended upon Discharge: Wheeled walker, Bedside commode  OT Recommended Transfer Status: Assist of 1, Assist of 2  OT - OK to Discharge: Yes  Treatment Interventions: ADL retraining, Functional transfer training, UE strengthening/ROM, Endurance training      General:  General  Missed Visit: Yes  Missed Visit Reason:  (upon arrival, pt on bedpan, daughter present requesting \"you come back for Tx later when she isnt using the bathroom, she just needs more time\" MARES assisted pt on bedpain further to increase comfort level for toileting.)         Goals:  Encounter Problems       Encounter Problems (Active)       OT Goals       Pt will complete [id number]-jh-[id number] transfers using LRD in preparation for ADLs with CGA (Progressing)       Start:  11/06/24    Expected End:  11/20/24            Pt will increase endurance to tolerate 15min of OOB activity with no more than 1 rest break in order to increase ability to engage in ADL completion.  (Progressing)       Start:  11/06/24    Expected End:  11/20/24            Pt will tolerate 10min stand during functional task completion with no more than 1 rest break in order to increase endurance for functional task completion.  (Progressing)       Start:  11/06/24    Expected End:  11/20/24            Pt will demo Grooming/UE ADL completion with modified independence, using adaptive strategies and energy conservation techniques as applicable.  (Progressing)       Start:  11/06/24    Expected End:  11/20/24            Pt will demo and/or verbalize 2-3 energy " conservation techniques to incorporate into functional mobility or ADL to improve performance and increase independence.  (Progressing)       Start:  11/06/24    Expected End:  11/20/24

## 2024-11-11 NOTE — CARE PLAN
The patient's goals for the shift include  increase in mobility    The clinical goals for the shift include no falls      Problem: Skin  Goal: Decreased wound size/increased tissue granulation at next dressing change  Outcome: Progressing  Goal: Participates in plan/prevention/treatment measures  Outcome: Progressing  Goal: Prevent/manage excess moisture  Outcome: Progressing  Goal: Promote/optimize nutrition  Outcome: Progressing  Goal: Promote skin healing  Outcome: Progressing     Problem: Fall/Injury  Goal: Be free from injury by end of the shift  Outcome: Progressing  Goal: Verbalize understanding of personal risk factors for fall in the hospital  Outcome: Progressing  Goal: Verbalize understanding of risk factor reduction measures to prevent injury from fall in the home  Outcome: Progressing  Goal: Use assistive devices by end of the shift  Outcome: Progressing  Goal: Pace activities to prevent fatigue by end of the shift  Outcome: Progressing

## 2024-11-12 ENCOUNTER — APPOINTMENT (OUTPATIENT)
Dept: CARDIOLOGY | Facility: HOSPITAL | Age: 84
End: 2024-11-12
Payer: MEDICARE

## 2024-11-12 ENCOUNTER — APPOINTMENT (OUTPATIENT)
Dept: VASCULAR MEDICINE | Facility: HOSPITAL | Age: 84
DRG: 871 | End: 2024-11-12
Payer: MEDICARE

## 2024-11-12 ENCOUNTER — APPOINTMENT (OUTPATIENT)
Dept: RADIOLOGY | Facility: HOSPITAL | Age: 84
DRG: 871 | End: 2024-11-12
Payer: MEDICARE

## 2024-11-12 LAB
ALBUMIN SERPL BCP-MCNC: 2.1 G/DL (ref 3.4–5)
ANION GAP SERPL CALC-SCNC: 9 MMOL/L (ref 10–20)
BASOPHILS # BLD AUTO: 0.2 X10*3/UL (ref 0–0.1)
BASOPHILS NFR BLD AUTO: 0.8 %
BUN SERPL-MCNC: 20 MG/DL (ref 6–23)
CALCIUM SERPL-MCNC: 7.5 MG/DL (ref 8.6–10.3)
CHLORIDE SERPL-SCNC: 101 MMOL/L (ref 98–107)
CO2 SERPL-SCNC: 29 MMOL/L (ref 21–32)
CREAT SERPL-MCNC: 0.92 MG/DL (ref 0.5–1.05)
CRP SERPL-MCNC: 1.04 MG/DL
EGFRCR SERPLBLD CKD-EPI 2021: 61 ML/MIN/1.73M*2
EOSINOPHIL # BLD AUTO: 0.67 X10*3/UL (ref 0–0.4)
EOSINOPHIL NFR BLD AUTO: 2.6 %
ERYTHROCYTE [DISTWIDTH] IN BLOOD BY AUTOMATED COUNT: 16.2 % (ref 11.5–14.5)
GLUCOSE SERPL-MCNC: 108 MG/DL (ref 74–99)
HCT VFR BLD AUTO: 47.8 % (ref 36–46)
HGB BLD-MCNC: 14.5 G/DL (ref 12–16)
IMM GRANULOCYTES # BLD AUTO: 0.89 X10*3/UL (ref 0–0.5)
IMM GRANULOCYTES NFR BLD AUTO: 3.5 % (ref 0–0.9)
LYMPHOCYTES # BLD AUTO: 1.25 X10*3/UL (ref 0.8–3)
LYMPHOCYTES NFR BLD AUTO: 4.9 %
MAGNESIUM SERPL-MCNC: 1.7 MG/DL (ref 1.6–2.4)
MCH RBC QN AUTO: 26.5 PG (ref 26–34)
MCHC RBC AUTO-ENTMCNC: 30.3 G/DL (ref 32–36)
MCV RBC AUTO: 87 FL (ref 80–100)
MONOCYTES # BLD AUTO: 1.06 X10*3/UL (ref 0.05–0.8)
MONOCYTES NFR BLD AUTO: 4.1 %
NEUTROPHILS # BLD AUTO: 21.68 X10*3/UL (ref 1.6–5.5)
NEUTROPHILS NFR BLD AUTO: 84.1 %
NRBC BLD-RTO: 0 /100 WBCS (ref 0–0)
PHOSPHATE SERPL-MCNC: 3 MG/DL (ref 2.5–4.9)
PLATELET # BLD AUTO: 257 X10*3/UL (ref 150–450)
POTASSIUM SERPL-SCNC: 3.6 MMOL/L (ref 3.5–5.3)
RBC # BLD AUTO: 5.47 X10*6/UL (ref 4–5.2)
SODIUM SERPL-SCNC: 135 MMOL/L (ref 136–145)
UFH PPP CHRO-ACNC: 0.5 IU/ML
WBC # BLD AUTO: 25.8 X10*3/UL (ref 4.4–11.3)

## 2024-11-12 PROCEDURE — 2500000002 HC RX 250 W HCPCS SELF ADMINISTERED DRUGS (ALT 637 FOR MEDICARE OP, ALT 636 FOR OP/ED): Performed by: INTERNAL MEDICINE

## 2024-11-12 PROCEDURE — 2500000004 HC RX 250 GENERAL PHARMACY W/ HCPCS (ALT 636 FOR OP/ED): Performed by: INTERNAL MEDICINE

## 2024-11-12 PROCEDURE — 83735 ASSAY OF MAGNESIUM: CPT | Performed by: INTERNAL MEDICINE

## 2024-11-12 PROCEDURE — 99233 SBSQ HOSP IP/OBS HIGH 50: CPT | Performed by: INTERNAL MEDICINE

## 2024-11-12 PROCEDURE — 85025 COMPLETE CBC W/AUTO DIFF WBC: CPT | Performed by: INTERNAL MEDICINE

## 2024-11-12 PROCEDURE — 85520 HEPARIN ASSAY: CPT | Performed by: INTERNAL MEDICINE

## 2024-11-12 PROCEDURE — 36415 COLL VENOUS BLD VENIPUNCTURE: CPT | Performed by: INTERNAL MEDICINE

## 2024-11-12 PROCEDURE — 97116 GAIT TRAINING THERAPY: CPT | Mod: GP,CQ

## 2024-11-12 PROCEDURE — 2500000001 HC RX 250 WO HCPCS SELF ADMINISTERED DRUGS (ALT 637 FOR MEDICARE OP): Performed by: INTERNAL MEDICINE

## 2024-11-12 PROCEDURE — 71045 X-RAY EXAM CHEST 1 VIEW: CPT | Performed by: RADIOLOGY

## 2024-11-12 PROCEDURE — 93970 EXTREMITY STUDY: CPT

## 2024-11-12 PROCEDURE — 80069 RENAL FUNCTION PANEL: CPT | Performed by: INTERNAL MEDICINE

## 2024-11-12 PROCEDURE — 93970 EXTREMITY STUDY: CPT | Performed by: INTERNAL MEDICINE

## 2024-11-12 PROCEDURE — 97530 THERAPEUTIC ACTIVITIES: CPT | Mod: GO

## 2024-11-12 PROCEDURE — 86140 C-REACTIVE PROTEIN: CPT | Performed by: INTERNAL MEDICINE

## 2024-11-12 PROCEDURE — 2500000001 HC RX 250 WO HCPCS SELF ADMINISTERED DRUGS (ALT 637 FOR MEDICARE OP): Performed by: NURSE PRACTITIONER

## 2024-11-12 PROCEDURE — 97110 THERAPEUTIC EXERCISES: CPT | Mod: GP,CQ

## 2024-11-12 PROCEDURE — 2500000004 HC RX 250 GENERAL PHARMACY W/ HCPCS (ALT 636 FOR OP/ED): Performed by: NURSE PRACTITIONER

## 2024-11-12 PROCEDURE — 97535 SELF CARE MNGMENT TRAINING: CPT | Mod: GO

## 2024-11-12 PROCEDURE — 1200000002 HC GENERAL ROOM WITH TELEMETRY DAILY

## 2024-11-12 PROCEDURE — 71045 X-RAY EXAM CHEST 1 VIEW: CPT

## 2024-11-12 RX ORDER — HEPARIN SODIUM 10000 [USP'U]/100ML
0-4500 INJECTION, SOLUTION INTRAVENOUS CONTINUOUS
Status: DISCONTINUED | OUTPATIENT
Start: 2024-11-12 | End: 2024-11-15

## 2024-11-12 ASSESSMENT — COGNITIVE AND FUNCTIONAL STATUS - GENERAL
STANDING UP FROM CHAIR USING ARMS: A LITTLE
STANDING UP FROM CHAIR USING ARMS: A LOT
TURNING FROM BACK TO SIDE WHILE IN FLAT BAD: A LOT
WALKING IN HOSPITAL ROOM: A LOT
EATING MEALS: A LOT
TURNING FROM BACK TO SIDE WHILE IN FLAT BAD: A LOT
PERSONAL GROOMING: A LOT
DRESSING REGULAR UPPER BODY CLOTHING: A LOT
TOILETING: A LOT
HELP NEEDED FOR BATHING: A LOT
MOVING FROM LYING ON BACK TO SITTING ON SIDE OF FLAT BED WITH BEDRAILS: A LOT
MOVING TO AND FROM BED TO CHAIR: A LOT
DAILY ACTIVITIY SCORE: 14
DRESSING REGULAR LOWER BODY CLOTHING: A LOT
HELP NEEDED FOR BATHING: A LOT
TOILETING: A LOT
CLIMB 3 TO 5 STEPS WITH RAILING: A LOT
MOVING TO AND FROM BED TO CHAIR: A LITTLE
EATING MEALS: A LITTLE
PERSONAL GROOMING: A LITTLE
CLIMB 3 TO 5 STEPS WITH RAILING: TOTAL
TOILETING: TOTAL
MOBILITY SCORE: 14
DRESSING REGULAR UPPER BODY CLOTHING: A LITTLE
DAILY ACTIVITIY SCORE: 12
PERSONAL GROOMING: A LOT
TURNING FROM BACK TO SIDE WHILE IN FLAT BAD: A LOT
WALKING IN HOSPITAL ROOM: A LOT
MOVING FROM LYING ON BACK TO SITTING ON SIDE OF FLAT BED WITH BEDRAILS: A LOT
MOVING TO AND FROM BED TO CHAIR: A LOT
STANDING UP FROM CHAIR USING ARMS: A LOT
DRESSING REGULAR UPPER BODY CLOTHING: A LITTLE
WALKING IN HOSPITAL ROOM: A LOT
DRESSING REGULAR LOWER BODY CLOTHING: A LOT
CLIMB 3 TO 5 STEPS WITH RAILING: A LOT
DAILY ACTIVITIY SCORE: 14
MOBILITY SCORE: 11
MOBILITY SCORE: 12
HELP NEEDED FOR BATHING: A LOT
MOVING FROM LYING ON BACK TO SITTING ON SIDE OF FLAT BED WITH BEDRAILS: A LOT
DRESSING REGULAR LOWER BODY CLOTHING: TOTAL

## 2024-11-12 ASSESSMENT — PAIN DESCRIPTION - ORIENTATION
ORIENTATION: RIGHT;LEFT
ORIENTATION: LEFT

## 2024-11-12 ASSESSMENT — PAIN SCALES - GENERAL
PAINLEVEL_OUTOF10: 9
PAINLEVEL_OUTOF10: 3
PAINLEVEL_OUTOF10: 7

## 2024-11-12 ASSESSMENT — PAIN SCALES - WONG BAKER: WONGBAKER_NUMERICALRESPONSE: HURTS LITTLE MORE

## 2024-11-12 ASSESSMENT — PAIN - FUNCTIONAL ASSESSMENT
PAIN_FUNCTIONAL_ASSESSMENT: 0-10
PAIN_FUNCTIONAL_ASSESSMENT: WONG-BAKER FACES

## 2024-11-12 ASSESSMENT — ACTIVITIES OF DAILY LIVING (ADL): HOME_MANAGEMENT_TIME_ENTRY: 13

## 2024-11-12 ASSESSMENT — PAIN DESCRIPTION - LOCATION
LOCATION: LEG
LOCATION: SHOULDER

## 2024-11-12 NOTE — PROGRESS NOTES
Physical Therapy    Physical Therapy Treatment    Patient Name: Desirae Gregg  MRN: [id number]  Department: [address]  Room: [address] [place], [place] [address]  Today's Date: 11/12/2024  Time Calculation  Start Time: 1054  Stop Time: 1118  Time Calculation (min): 24 min         Assessment/Plan   PT Assessment  PT Assessment Results: Decreased strength, Decreased endurance, Impaired balance, Decreased mobility  Rehab Prognosis: Good  Barriers to Discharge: Comorbidities, medical management  End of Session Communication: Bedside nurse, PCT/NA/CTA  Assessment Comment: Continue to recommend Mod Intensity skilled PT  End of Session Patient Position: Up in chair, Alarm on (Up on commode with dtr present - alarm off - RN aware, call bell in reach, RN aware and in agreement.  Pt. and dtr instructed to call for assist once ready to move to chair. pt. & dtr demo understanding and verbally agreed.)     PT Plan  Treatment/Interventions: Transfer training, Bed mobility, Gait training, Strengthening  PT Plan: Ongoing PT  PT Frequency: 3 times per week  PT Discharge Recommendations: Moderate intensity level of continued care, 24 hr supervision due to cognition  Equipment Recommended upon Discharge: Wheeled walker, Bedside commode  PT Recommended Transfer Status: Assist x2  PT - OK to Discharge: Yes      General Visit Information:   PT  Visit  PT Received On: 11/12/24  Response to Previous Treatment: Patient with no complaints from previous session.  General  Reason for Referral: Pt is an 84 yo female who presented to [facility] on 11/3/24 with generalized weakness, vomiting, and fever. CT chest showed moderate right pleural effusion. PT consulted for impaired mobility/gait training.  Referred By: FRANCISCA Ramsay DO  Past Medical History Relevant to Rehab: HTN, chronic systolic CHF, PE, myelofibrosis, sciatica, remote poliomyelitis (affecting LLE), JAK2 positive polycythemia vera, CKDIII  Family/Caregiver Present: Yes  Caregiver Feedback: dtr present throughout session, very  "supportive  Prior to Session Communication: Bedside nurse, PCT/NA/CTA  Patient Position Received: Bed, 3 rail up, Alarm off, not on at start of session  Preferred Learning Style: verbal, visual  General Comment: AXOX2-3, Kake, son at bedside, agreeable with son encouragement, bilat LE edema and LLE reddened with dressing D&I. (Pleasant, cooperative, agreeable to therapy tx.  Pt. needed to have bowel movement during session; became incontinent of bowel during transfer to [facility])    Subjective   Precautions:  Precautions  Medical Precautions: Fall precautions (tele, verma catheter)    Vital Signs (Past 2hrs)                 Objective   Pain:  Pain Assessment  Pain Assessment: Muñoz-Baker FACES  Muñoz-Baker FACES Pain Rating: Hurts little more (when utilizing bilat UE, especially LUE \"my shoulder\" states patient. Nursing aware)  Pain Type: Chronic pain  Cognition:  Cognition  Overall Cognitive Status: Within Functional Limits  Coordination:     Postural Control:  Static Sitting Balance  Static Sitting-Balance Support: Feet supported, Bilateral upper extremity supported  Static Sitting-Comment/Number of Minutes: 8 minutes and fatigues  Dynamic Sitting Balance  Dynamic Sitting-Balance Support: Feet supported, Bilateral upper extremity supported  Dynamic Sitting-Level of Assistance: Close supervision  Dynamic Sitting-Balance: Forward lean, Reaching for objects, Reaching across midline (LE therex,)  Dynamic Sitting-Comments: fatigues easily  Static Standing Balance  Static Standing-Balance Support: Bilateral upper extremity supported  Static Standing-Level of Assistance: Contact guard  Static Standing-Comment/Number of Minutes: 1 1/2 minutes for Max a personal care and donning briefs.  Extremity/Trunk Assessments:    Activity Tolerance:  Activity Tolerance  Endurance: Decreased tolerance for upright activites  Treatments:  Therapeutic Exercise  Therapeutic Exercise Performed: Yes  Therapeutic Exercise Activity 1: Long " sitting in chair BLE ther ex: ankle pumps 10x1, quad sets, glute sets x10 reps each, sitting heel toe raises, long arc quads x10 reps each. strengtheing to improve trnasfers and standing and gait.    Therapeutic Activity  Therapeutic Activity Performed: Yes  Therapeutic Activity 1: toileting on bedside commode, inc of loose stool , personal care cpmpleted and brief donned and offed, Max A x1 and SBA x1.                        Ambulation/Gait Training  Ambulation/Gait Training Performed: Yes  Ambulation/Gait Training 1  Surface 1: Level tile  Device 1: Rolling walker  Assistance 1: Moderate assistance  Quality of Gait 1: Narrow base of support, Diminished heel strike, Inconsistent stride length, Decreased step length (mildly unsteady)  Comments/Distance (ft) 1: 5ft x2  Transfers  Transfer: Yes  Transfer 1  Transfer From 1: Bed to  Transfer to 1: Stand  Technique 1: Sit to stand, Stand to sit  Transfer Device 1: Walker  Transfer Level of Assistance 1: Minimum assistance, Moderate tactile cues, Moderate verbal cues (Mod A x2 or Max A x1 with raised surface)  Trials/Comments 1: x2                          Outcome Measures:  [facility] Basic Mobility  Turning from your back to your side while in a flat bed without using bedrails: A lot  Moving from lying on your back to sitting on the side of a flat bed without using bedrails: A lot  Moving to and from bed to chair (including a wheelchair): A little  Standing up from a chair using your arms (e.g. wheelchair or bedside chair): A little  To walk in hospital room: A lot  Climbing 3-5 steps with railing: A lot  Basic Mobility - Total Score: 14    Education Documentation  Handouts, taught by Kaye Trejo PTA at 11/12/2024 12:16 PM.  Learner: Family, Patient  Readiness: Acceptance  Method: Explanation  Response: Needs Reinforcement    Precautions, taught by Kaye Trejo PTA at 11/12/2024 12:16 PM.  Learner: Family, Patient  Readiness: Acceptance  Method: Explanation  Response:  Needs Reinforcement    Body Mechanics, taught by Kaye Trejo PTA at 11/12/2024 12:16 PM.  Learner: Family, Patient  Readiness: Acceptance  Method: Explanation  Response: Needs Reinforcement    Home Exercise Program, taught by Kaye Trejo PTA at 11/12/2024 12:16 PM.  Learner: Family, Patient  Readiness: Acceptance  Method: Explanation  Response: Needs Reinforcement    Mobility Training, taught by Kaye Trejo PTA at 11/12/2024 12:16 PM.  Learner: Family, Patient  Readiness: Acceptance  Method: Explanation  Response: Needs Reinforcement    Handouts, taught by Kaye Trejo PTA at 11/12/2024 12:16 PM.  Learner: Family, Patient  Readiness: Acceptance  Method: Explanation  Response: Needs Reinforcement    Body Mechanics, taught by Kaye Trejo PTA at 11/12/2024 12:16 PM.  Learner: Family, Patient  Readiness: Acceptance  Method: Explanation  Response: Needs Reinforcement    Precautions, taught by Kaye Trejo PTA at 11/12/2024 12:16 PM.  Learner: Family, Patient  Readiness: Acceptance  Method: Explanation  Response: Needs Reinforcement    Home Exercise Program, taught by Kaye Trejo PTA at 11/12/2024 12:16 PM.  Learner: Family, Patient  Readiness: Acceptance  Method: Explanation  Response: Needs Reinforcement    ADL Training, taught by Kaye Trejo PTA at 11/12/2024 12:16 PM.  Learner: Family, Patient  Readiness: Acceptance  Method: Explanation  Response: Needs Reinforcement    Education Comments  No comments found.        OP EDUCATION:       Encounter Problems       Encounter Problems (Active)       Mobility       Patient will ambulate bathroom distance of 15' with FWW and CGA in order to complete toileting and hygiene activities with greater ease.  (Progressing)       Start:  11/06/24    Expected End:  11/20/24               PT Transfers       Patient will transfer to and from sit to supine with Yeimi in order to decrease caregiver burden.  (Progressing)       Start:  11/06/24    Expected End:  11/20/24             Patient will transfer sit to and from stand with FWW and Yeimi.  (Progressing)       Start:  11/06/24    Expected End:  11/20/24               Pain - Adult             Encounter Problems (Resolved)       Balance       Patient will tolerate sitting upright OOB for 30 minutes without symptoms in order to complete self-care tasks.  (Met)       Start:  11/06/24    Expected End:  11/20/24    Resolved:  11/12/24

## 2024-11-12 NOTE — PROGRESS NOTES
"Desirae Gregg is a 83 y.o. female on day 9 of admission presenting with Sepsis with acute hypoxic respiratory failure without septic shock (Multi).      Subjective   She is sitting up in the chair, states her left leg is painful. Son is concerned about left leg being more swollen and red.       Review of systems:  Constitutional: negative for fever, chills, or malaise  Neuro: negative for dizziness, headache, numbness, tingling  ENT: Negative for nasal congestion or sore throat  CV: negative for chest pain, palpitations  GI: negative for abd pain, nausea, vomiting or diarrhea  : negative for dysuria, frequency, or urgency  Musculoskeletal: Negative for weakness, myalgia, or arthralgia  Endocrine: Negative for polyuria or polydipsia         Objective   Constitutional: Well developed, awake/alert/oriented x3, no distress, alert and cooperative  Eyes: PERRL, EOMI, clear sclera  ENMT: mucous membranes moist, no apparent injury, no lesions seen  Head/Neck: Neck supple, no apparent injury, thyroid without mass or tenderness, No JVD, trachea midline, no bruits  Respiratory/Thorax: Patent airways, diminished bilat bases with good chest expansion, thorax symmetric  Cardiovascular: Regular, rate and rhythm, no murmurs, 2+ equal pulses of the extremities, normal S 1and S 2  Gastrointestinal: Nondistended, soft, non-tender, no rebound tenderness or guarding, no masses palpable, no organomegaly, +BS, no bruits  Musculoskeletal: ROM intact, no joint swelling, normal strength  Extremities: +2 pitting edema, L>R, dressing to left leg  Neurological: alert and oriented x3, intact senses, motor, response and reflexes, normal strength  Lymphatic: No significant lymphadenopathy  Psychological: Appropriate mood and behavior  Skin: Warm and dry, dressing to left leg, surrounding skin red and warm      Last Recorded Vitals  /80   Pulse 87   Temp 36.4 °C (97.5 °F) (Temporal)   Resp 21   Ht 1.651 m (5' 5\")   Wt 79.2 kg (174 lb 9.6 " oz)   SpO2 92%   BMI 29.05 kg/m²     Intake/Output last 3 Shifts:  I/O last 3 completed shifts:  In: 960 (12.1 mL/kg) [P.O.:960]  Out: 2075 (26.2 mL/kg) [Urine:2075 (0.7 mL/kg/hr)]  Weight: 79.2 kg   I/O this shift:  In: 240 [P.O.:240]  Out: 75 [Urine:75]    Relevant Results  Scheduled medications  apixaban, 2.5 mg, oral, q12h  bumetanide, 1 mg, oral, Daily  busPIRone, 7.5 mg, oral, BID  carvedilol, 3.125 mg, oral, BID  cetirizine, 10 mg, oral, Daily  cholecalciferol, 2,000 Units, oral, Daily  cyanocobalamin, 1,000 mcg, oral, Daily  ferrous sulfate (325 mg ferrous sulfate), 65 mg of iron, oral, Daily with breakfast  folic acid, 1 mg, oral, Daily  gabapentin, 200 mg, oral, BID  [Held by provider] isosorbide mononitrate ER, 30 mg, oral, Daily  levothyroxine, 88 mcg, oral, Daily  midodrine, 5 mg, oral, TID  nystatin, , Topical, BID  pantoprazole, 40 mg, oral, BID AC  phenyleph-min oil-petrolatum, , rectal, TID  [START ON 11/13/2024] psyllium, 1 packet, oral, Daily  sertraline, 25 mg, oral, Daily  zinc oxide, 1 Application, Topical, BID      Continuous medications     PRN medications  PRN medications: acetaminophen **OR** acetaminophen **OR** [DISCONTINUED] acetaminophen, loperamide, melatonin, methyl salicylate-menthol, ondansetron ODT **OR** ondansetron, oxyCODONE, oxygen, polyethylene glycol    Results for orders placed or performed during the hospital encounter of 11/03/24 (from the past 24 hours)   Renal Function Panel   Result Value Ref Range    Glucose 108 (H) 74 - 99 mg/dL    Sodium 135 (L) 136 - 145 mmol/L    Potassium 3.6 3.5 - 5.3 mmol/L    Chloride 101 98 - 107 mmol/L    Bicarbonate 29 21 - 32 mmol/L    Anion Gap 9 (L) 10 - 20 mmol/L    Urea Nitrogen 20 6 - 23 mg/dL    Creatinine 0.92 0.50 - 1.05 mg/dL    eGFR 61 >60 mL/min/1.73m*2    Calcium 7.5 (L) 8.6 - 10.3 mg/dL    Phosphorus 3.0 2.5 - 4.9 mg/dL    Albumin 2.1 (L) 3.4 - 5.0 g/dL   Magnesium   Result Value Ref Range    Magnesium 1.70 1.60 - 2.40 mg/dL    CBC and Auto Differential   Result Value Ref Range    WBC 25.8 (H) 4.4 - 11.3 x10*3/uL    nRBC 0.0 0.0 - 0.0 /100 WBCs    RBC 5.47 (H) 4.00 - 5.20 x10*6/uL    Hemoglobin 14.5 12.0 - 16.0 g/dL    Hematocrit 47.8 (H) 36.0 - 46.0 %    MCV 87 80 - 100 fL    MCH 26.5 26.0 - 34.0 pg    MCHC 30.3 (L) 32.0 - 36.0 g/dL    RDW 16.2 (H) 11.5 - 14.5 %    Platelets 257 150 - 450 x10*3/uL    Neutrophils % 84.1 40.0 - 80.0 %    Immature Granulocytes %, Automated 3.5 (H) 0.0 - 0.9 %    Lymphocytes % 4.9 13.0 - 44.0 %    Monocytes % 4.1 2.0 - 10.0 %    Eosinophils % 2.6 0.0 - 6.0 %    Basophils % 0.8 0.0 - 2.0 %    Neutrophils Absolute 21.68 (H) 1.60 - 5.50 x10*3/uL    Immature Granulocytes Absolute, Automated 0.89 (H) 0.00 - 0.50 x10*3/uL    Lymphocytes Absolute 1.25 0.80 - 3.00 x10*3/uL    Monocytes Absolute 1.06 (H) 0.05 - 0.80 x10*3/uL    Eosinophils Absolute 0.67 (H) 0.00 - 0.40 x10*3/uL    Basophils Absolute 0.20 (H) 0.00 - 0.10 x10*3/uL   C-reactive protein   Result Value Ref Range    C-Reactive Protein 1.04 (H) <1.00 mg/dL       FL GI esophagram   Final Result   Modified exam due to patient's clinical condition. No esophageal   obstruction.        MACRO   Zain Mcknight discussed the significance and urgency of this   critical finding by Epic secure chat with  SYED JOSHI on 11/11/2024   at 10:56 am.  (**-RCF-**) Findings:  See findings.        Signed by: Zain Mcknight 11/11/2024 11:02 AM   Dictation workstation:   [id number]      XR chest 1 view   Final Result   1. Suggestion of mild interstitial edema, more pronounced on this   radiograph compared to prior. Recommend correlation with fluid status.   2. Redemonstration of moderate right and small left pleural effusions.   3. Right basilar airspace opacity, which could be related to   atelectasis versus pneumonia in appropriate clinical setting.        MACRO:   None        Signed by: Yazmin Oropeza 11/8/2024 4:17 PM   Dictation workstation:   [id number]       Transthoracic Echo (TTE) Limited   Final Result      CT abdomen pelvis wo IV contrast   Final Result   2 centimeters stone in the urinary bladder.        Bilateral pleural effusions. Pericardial effusion.        Ascites.        Anasarca.        Presacral edema.        Enlarged spleen.        The exam is limited by motion artifact.        MACRO:   none        Signed by: Jayshree Dias 11/4/2024 3:28 PM   Dictation workstation:   [id number]       renal complete   Final Result   No hydronephrosis. Decompressed bladder.        MACRO:   None        Signed by: Zain Mcknight 11/4/2024 11:08 AM   Dictation workstation:   [id number]      CT chest wo IV contrast   Final Result   Abnormalities at the right lung base on portable frontal chest   radiograph earlier today are due to a combination of moderate-sized,   mostly if not entirely free-flowing right pleural effusion with   associated multisegmental right lower lobe collapse adjacent to it        Small area of ground-glass airspace disease in the middle lobe   confirmed on CT may not have been expected on the prior radiograph        Possibility of mild ground-glass airspace disease in the right lower   lobe as well        No ground-glass airspace disease in the left lung        No consolidative pneumonia in any of the inflated lungs on either side        Small free-flowing left pleural effusion        Unusual hypodensities in the right ventricular free wall of uncertain   etiology and significance        Perhaps trace (smallest detectable quantity) pericardial effusion        Incidental abnormalities in the included upper abdomen as detailed   above        MACRO:   None        Signed by: Matthias Kolb 11/3/2024 11:35 AM   Dictation workstation:   [id number]      XR chest 1 view   Final Result   1. Right lower lobe airspace consolidation either pneumonia or   atelectasis with right pleural effusion. Follow-up to complete   resolution is needed   2. Enlarged cardiac  silhouette             Signed by: Kary Cardoso 11/3/2024 10:27 AM   Dictation workstation:   [id number]      XR chest 1 view    (Results Pending)   Vascular US lower extremity venous duplex bilateral    (Results Pending)       Transthoracic Echo (TTE) Limited    Result Date: 11/5/2024   [facility], [address] [place] [address]               Tel [phone] and Fax [phone] TRANSTHORACIC ECHOCARDIOGRAM REPORT  Patient Name:       ELIE Mccain Physician:    [id number] Chicho Goldman MD Study Date:         11/5/2024           Ordering Provider:    [id number] SHERITA GOYAL MRN/PID:            [id number]            Fellow: Accession#:         [id number]        Nurse: Date of Birth/Age:  1940 / 83     Sonographer:          Cathi garcia RDCS Gender assigned at  F                   Additional Staff: Birth: Height:             165.10 cm           Admit Date:           11/3/2024 Weight:             72.57 kg            Admission Status:     Inpatient -                                                               Routine BSA / BMI:          1.80 m2 / 26.63     Encounter#:           [id number]                     kg/m2 Blood Pressure:     103/62 mmHg         Department Location:  [facility] Non                                                               Invasive Study Type:    TRANSTHORACIC ECHO (TTE) LIMITED Diagnosis/ICD: 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 Study Detail: The  following Echo studies were performed: 2D, Doppler and color               flow.  PHYSICIAN INTERPRETATION: Left Ventricle: The left ventricular systolic function is normal, with a visually estimated ejection fraction of 60-65%. There is severely increased concentric left ventricular hypertrophy. There are no regional left ventricular wall motion abnormalities. The left ventricular cavity size is normal. There is moderately increased septal and moderately increased posterior left ventricular wall thickness. Spectral Doppler shows an abnormal pattern of left ventricular diastolic filling. Left Atrium: The left atrium is upper limits of normal in size. Right Ventricle: The right ventricle is normal in size. There is normal right ventricular global systolic function. Right Atrium: The right atrium is normal in size. Aortic Valve: The aortic valve is trileaflet. The aortic valve dimensionless index is 0.77. There is trace aortic valve regurgitation. The peak instantaneous gradient of the aortic valve is 13 mmHg. The mean gradient of the mitral valve is 7 mmHg. Mitral Valve: The mitral valve is normal in structure. There is trace to mild mitral valve regurgitation. Tricuspid Valve: The tricuspid valve is structurally normal. There is trace to mild tricuspid regurgitation. Pulmonic Valve: The pulmonic valve is not well visualized. The pulmonic valve regurgitation was not well visualized. Pericardium: There is no pericardial effusion noted. Aorta: The aortic root is normal.  CONCLUSIONS:  1. The left ventricular systolic function is normal, with a visually estimated ejection fraction of 60-65%.  2. Spectral Doppler shows an abnormal pattern of left ventricular diastolic filling.  3. There is moderately increased septal and moderately increased posterior left ventricular wall thickness.  4. There is severely increased concentric left ventricular hypertrophy.  5. There is normal right ventricular global systolic function.  QUANTITATIVE DATA SUMMARY:  2D MEASUREMENTS:          Normal Ranges: IVSd:            1.49 cm  (0.6-1.1cm) LVPWd:           1.26 cm  (0.6-1.1cm) LVIDd:           4.47 cm  (3.9-5.9cm) LVIDs:           2.98 cm LV Mass Index:   133 g/m2 LVEDV Index:     23 ml/m2 LV % FS          33.4 %  LV SYSTOLIC FUNCTION BY 2D PLANIMETRY (MOD):                      Normal Ranges: EF-A4C View:    62 % (>=55%) EF-A2C View:    61 % EF-Biplane:     64 % EF-Visual:      63 % LV EF Reported: 63 %  LV DIASTOLIC FUNCTION:          Normal Ranges: MV Peak E:             0.70 m/s (0.7-1.2 m/s) MV Peak A:             0.91 m/s (0.42-0.7 m/s) E/A Ratio:             0.77     (1.0-2.2)  MITRAL VALVE:          Normal Ranges: MV DT:        264 msec (150-240msec)  AORTIC VALVE:                      Normal Ranges: AoV Vmax:                1.78 m/s  (<=1.7m/s) AoV Peak P.7 mmHg (<20mmHg) AoV Mean P.0 mmHg  (1.7-11.5mmHg) LVOT Max Josiah:            1.33 m/s  (<=1.1m/s) AoV VTI:                 31.31 cm  (18-25cm) LVOT VTI:                24.11 cm LVOT Diameter:           2.04 cm   (1.8-2.4cm) AoV Area, VTI:           2.52 cm2  (2.5-5.5cm2) AoV Area,Vmax:           2.44 cm2  (2.5-4.5cm2) AoV Dimensionless Index: 0.77  TRICUSPID VALVE/RVSP:          Normal Ranges: Peak TR Velocity:     3.46 m/s  PULMONIC VALVE:          Normal Ranges: PV Max Josiah:     0.8 m/s  (0.6-0.9m/s) PV Max P.8 mmHg  [id number] Chicho Goldman MD Electronically signed on 2024 at 2:09:49 PM  ** Final **           Assessment/Plan   Principal Problem:    Sepsis with acute hypoxic respiratory failure without septic shock (Multi)  Active Problems:    Iron deficiency anemia    Chronic systolic (congestive) heart failure    Essential hypertension    History of pulmonary embolism    Anxiety    Vitamin D deficiency    Vitamin B12 deficiency    Adult hypothyroidism    Pneumonia of right lower lobe due to infectious organism    Leukocytosis    Acute kidney  injury superimposed on stage 3a chronic kidney disease    Elevated troponin    Hyperbilirubinemia    Hyperglycemia    Hypokalemia    Hypomagnesemia    Hypocalcemia    Abnormality of right ventricle of heart    Impaired mobility and ADLs    Echocardiogram from June 2024:    Left Ventricle: Low normal left ventricular systolic function with a   visually estimated EF of 50 - 55%. Left ventricle size is normal. Normal   wall thickness. Ventricular mass is normal. Findings consistent with   concentric remodeling. Normal wall motion. Grade I diastolic dysfunction   with normal LAP.     Right Ventricle: Right ventricle size is normal. Normal systolic   function. TAPSE is 1.9 cm.     Aortic Valve: No stenosis.     Mitral Valve: Mild regurgitation.     Tricuspid Valve: Normal RVSP. The estimated RVSP is 24 mmHg.     Pericardium: No pericardial effusion.         Pneumonia  -CT chest reviewed  -Procal downtrending  -antibiotics completed  -bronchodilators  -oxygen support->currently on room air  -Pulmonary following, note reviewed     2. Abnormal CT finding of RV  -Unusual hypodensities in the right ventricular free wall  -Echo as above  -Repeat limited echo reviewed, no abnormality noted  -Most likely an RV wall fat pad  -Can obtain outpt cardiac MRI to further eval     3. Acute on chronic diastolic CHF with mildly reduced EF  -CT showed pleural effusion, possible parapneumonic vs cardiac  -  -I reviewed the Echocardiogram as above  -Strict I & Os  -Daily weights  -2gm na diet  -BP low most likely from infection and third spacing with low albumin  -No plans for thoracentesis->restarted Eliquis  -Stop imdur for low BP  -Cont low dose Coreg  -CT abd/pelvis showed pleural effusions, anasarca, and ascities  -Albumin 1.9, given IV albumin and Lasix IV (11/5)  -BP borderline low, Midodrine 5mg TID started  -Bumex 1mg IV daily since last week  -Start Bumex 1mg PO daily  -Repeat CXR pending    4. UTI  -WBC elevated  -Urine  culture showed multiple organisms, possible contamination  -antibiotics completed     5. Elevated troponins-Non MI elevation  -Troponin 76, now downtrending  -EKG showed SR LBBB->chronic LBBB  -Denies ACS symptoms  -limited echo as above, normal LVEF     6. Hx of DVT  -Eliquis     7. Hypokalemia/hypomagnesemia  -supplement     8. Hx of HTN, with hypotension  -stop imdur  -Gave albumin with lasix post (11/5)  -Cont Midodrine 5mg TID  -Coreg low dose  -Monitor      Sherron Jones, APRN-CNP

## 2024-11-12 NOTE — PROGRESS NOTES
Desirae Gregg is a 83 y.o. female on day 9 of admission presenting with Sepsis with acute hypoxic respiratory failure without septic shock (Multi).    Subjective   Interval History: no fever, no new complaints        Review of Systems    Objective   Range of Vitals (last 24 hours)  Heart Rate:  []   Temp:  [36.3 °C (97.3 °F)-36.4 °C (97.5 °F)]   Resp:  [16-21]   BP: (116-147)/(76-80)   SpO2:  [92 %-93 %]   Daily Weight  11/10/24 : 79.2 kg (174 lb 9.6 oz)    Body mass index is 29.05 kg/m².    Physical Exam  Constitutional:       Appearance: Normal appearance.   HENT:      Head: Normocephalic and atraumatic.      Mouth/Throat:      Mouth: Mucous membranes are moist.      Pharynx: Oropharynx is clear.   Eyes:      Pupils: Pupils are equal, round, and reactive to light.   Cardiovascular:      Rate and Rhythm: Normal rate and regular rhythm.      Heart sounds: Normal heart sounds.   Pulmonary:      Effort: Pulmonary effort is normal.      Breath sounds: Normal breath sounds.   Abdominal:      General: Abdomen is flat. Bowel sounds are normal.      Palpations: Abdomen is soft.   Musculoskeletal:      Cervical back: Normal range of motion.      Comments: Lt leg wound, no cellulitis    Neurological:      Mental Status: She is alert.         Antibiotics  This patient does not have an active medication from one of the medication groupers.    Relevant Results  Labs  Results from last 72 hours   Lab Units 11/12/24 0622 11/11/24  0614 11/10/24  0538   WBC AUTO x10*3/uL 25.8* 22.1* 20.2*   HEMOGLOBIN g/dL 14.5 14.5 14.9   HEMATOCRIT % 47.8* 47.7* 49.9*   PLATELETS AUTO x10*3/uL 257 255 229   NEUTROS PCT AUTO % 84.1 83.4 83.5   LYMPHS PCT AUTO % 4.9 4.1 4.2   MONOS PCT AUTO % 4.1 4.4 4.2   EOS PCT AUTO % 2.6 3.9 3.3     Results from last 72 hours   Lab Units 11/12/24 0622 11/11/24  0614 11/10/24  0538   SODIUM mmol/L 135* 135* 137   POTASSIUM mmol/L 3.6 3.3* 3.8   CHLORIDE mmol/L 101 102 105   CO2 mmol/L 29 26 26   BUN mg/dL  20 18 18   CREATININE mg/dL 0.92 0.85 0.86   GLUCOSE mg/dL 108* 123* 109*   CALCIUM mg/dL 7.5* 7.0* 7.1*   ANION GAP mmol/L 9* 10 10   EGFR mL/min/1.73m*2 61 67 66   PHOSPHORUS mg/dL 3.0 2.3* 2.5     Results from last 72 hours   Lab Units 11/12/24  0622 11/11/24  0614 11/10/24  0538   ALBUMIN g/dL 2.1* 2.0* 2.0*     Estimated Creatinine Clearance: 46.5 mL/min (by C-G formula based on SCr of 0.92 mg/dL).  C-Reactive Protein   Date Value Ref Range Status   11/12/2024 1.04 (H) <1.00 mg/dL Final   11/11/2024 0.86 <1.00 mg/dL Final   11/08/2024 2.84 (H) <1.00 mg/dL Final     Microbiology  Reviewed  Imaging  Reviewed        Assessment/Plan   Leukocytosis, likely related to myelodysplasia   Sepsis, treated  Respiratory failure / pneumonia  Bacteriuria  Polycythemia / myelofibrosis     Recommendations :  Supportive care  Leg elevation     I spent minutes in the professional and overall care of this patient.      Cameron Lassiter MD

## 2024-11-12 NOTE — PROGRESS NOTES
"Desirae Gregg is a 83 y.o. female on day 9 of admission presenting with Sepsis with acute hypoxic respiratory failure without septic shock (Multi).    Subjective   Doing ok. No new issues overnight. Says therapy is going 'ok'       Objective     Physical Exam  HENT:      Head: Normocephalic.      Nose: Nose normal.      Mouth/Throat:      Mouth: Mucous membranes are dry.   Eyes:      Extraocular Movements: Extraocular movements intact.      Pupils: Pupils are equal, round, and reactive to light.   Neck:      Comments: No jvd  Cardiovascular:      Pulses: Normal pulses.      Comments: Irregular, controlled rate. 1/6 short systolic murmur  Pulmonary:      Comments: Bilateral inspiratory crackles. No wheezing or rhonchi anteriorly  Abdominal:      Comments: Soft, present bowel sounds. No tenderness   Genitourinary:     Comments: Cardona. Urine is slightly cloudy  Musculoskeletal:      Comments: Dressing LLE>   2+ pitting edema of LLE, 1+ of RLE  LLE is warmer than right and tender , but improved over previous. Did not remove dressing at this time   Skin:     General: Skin is warm and dry.   Neurological:      Mental Status: She is alert.      Motor: Weakness present.   Psychiatric:         Mood and Affect: Mood normal.         Behavior: Behavior normal.       Last Recorded Vitals  Blood pressure 116/80, pulse 87, temperature 36.4 °C (97.5 °F), temperature source Temporal, resp. rate 21, height 1.651 m (5' 5\"), weight 79.2 kg (174 lb 9.6 oz), SpO2 92%.  Intake/Output last 3 Shifts:  I/O last 3 completed shifts:  In: 960 (12.1 mL/kg) [P.O.:960]  Out: 2075 (26.2 mL/kg) [Urine:2075 (0.7 mL/kg/hr)]  Weight: 79.2 kg     Relevant Results    Current Facility-Administered Medications:     acetaminophen (Tylenol) tablet 650 mg, 650 mg, oral, q4h PRN **OR** acetaminophen (Tylenol) oral liquid 650 mg, 650 mg, oral, q4h PRN **OR** [DISCONTINUED] acetaminophen (Tylenol) suppository 650 mg, 650 mg, rectal, q4h PRN, Eloy Hua MD    " apixaban (Eliquis) tablet 2.5 mg, 2.5 mg, oral, q12h, Sherron Jones, APRN-CNP, 2.5 mg at 11/12/24 0834    bumetanide (Bumex) tablet 1 mg, 1 mg, oral, Daily, Cha Ramsay, DO, 1 mg at 11/12/24 0833    busPIRone (Buspar) tablet 7.5 mg, 7.5 mg, oral, BID, Eloy Hua MD, 7.5 mg at 11/12/24 0834    carvedilol (Coreg) tablet 3.125 mg, 3.125 mg, oral, BID, Sherron Jones APRN-CNP, 3.125 mg at 11/12/24 0834    cetirizine (ZyrTEC) tablet 10 mg, 10 mg, oral, Daily, Eloy Hua MD, 10 mg at 11/12/24 0833    cholecalciferol (Vitamin D-3) tablet 2,000 Units, 2,000 Units, oral, Daily, Eloy Hua MD, 2,000 Units at 11/12/24 0833    cyanocobalamin (Vitamin B-12) tablet 1,000 mcg, 1,000 mcg, oral, Daily, Eloy Hua MD, 1,000 mcg at 11/12/24 0833    ferrous sulfate (325 mg ferrous sulfate) tablet 325 mg, 65 mg of iron, oral, Daily with breakfast, Eloy Hua MD, 325 mg at 11/12/24 0833    folic acid (Folvite) tablet 1 mg, 1 mg, oral, Daily, Eloy Hua MD, 1 mg at 11/12/24 0833    gabapentin (Neurontin) capsule 200 mg, 200 mg, oral, BID, Eloy Hua MD, 200 mg at 11/12/24 0833    [Held by provider] isosorbide mononitrate ER (Imdur) 24 hr tablet 30 mg, 30 mg, oral, Daily, Eloy Hua MD    levothyroxine (Synthroid, Levoxyl) tablet 88 mcg, 88 mcg, oral, Daily, Eloy Hua MD, 88 mcg at 11/12/24 0619    loperamide (Imodium) capsule 2 mg, 2 mg, oral, 4x daily PRN, Eloy Hua MD, 2 mg at 11/10/24 0945    melatonin tablet 3 mg, 3 mg, oral, Nightly PRN, Eloy Hua MD    methyl salicylate-menthol (Bengay) 15-10 % greaseless cream 1 Application, 1 Application, Topical, TID PRN, Senyo Agidi, DO, 1 Application at 11/11/24 1306    midodrine (Proamatine) tablet 5 mg, 5 mg, oral, TID, Senyo Agidi, DO, 5 mg at 11/12/24 0834    nystatin (Mycostatin) cream, , Topical, BID, Eloy Hua MD, Given at 11/11/24 2045    ondansetron ODT (Zofran-ODT) disintegrating tablet 4 mg, 4 mg,  oral, q8h PRN **OR** ondansetron (Zofran) injection 4 mg, 4 mg, intravenous, q8h PRN, Eloy Hua MD, 4 mg at 11/11/24 2220    oxyCODONE (Roxicodone) immediate release tablet 5 mg, 5 mg, oral, q6h PRN, Eloy Hua MD, 5 mg at 11/11/24 2210    oxygen (O2) therapy, , inhalation, Continuous PRN - O2/gases, Cha Ramsay DO, 2 L/min at 11/07/24 2100    pantoprazole (ProtoNix) EC tablet 40 mg, 40 mg, oral, BID AC, Eloy Hua MD, 40 mg at 11/12/24 0619    phenyleph-min oil-petrolatum (Preparation H) 0.25-14-74.9 % rectal ointment, , rectal, TID, Cha Ramsay DO, Given at 11/11/24 2045    polyethylene glycol (Glycolax, Miralax) packet 17 g, 17 g, oral, Daily PRN, Eloy Hua MD    [START ON 11/13/2024] psyllium (Metamucil) 3.4 gram packet 1 packet, 1 packet, oral, Daily, Татьяна Agarwal MD    sertraline (Zoloft) tablet 25 mg, 25 mg, oral, Daily, Eloy Hua MD, 25 mg at 11/12/24 0834    zinc oxide 40 % ointment 1 Application, 1 Application, Topical, BID, Eloy Hua MD, 1 Application at 11/11/24 2038     Results for orders placed or performed during the hospital encounter of 11/03/24 (from the past 24 hours)   Renal Function Panel   Result Value Ref Range    Glucose 108 (H) 74 - 99 mg/dL    Sodium 135 (L) 136 - 145 mmol/L    Potassium 3.6 3.5 - 5.3 mmol/L    Chloride 101 98 - 107 mmol/L    Bicarbonate 29 21 - 32 mmol/L    Anion Gap 9 (L) 10 - 20 mmol/L    Urea Nitrogen 20 6 - 23 mg/dL    Creatinine 0.92 0.50 - 1.05 mg/dL    eGFR 61 >60 mL/min/1.73m*2    Calcium 7.5 (L) 8.6 - 10.3 mg/dL    Phosphorus 3.0 2.5 - 4.9 mg/dL    Albumin 2.1 (L) 3.4 - 5.0 g/dL   Magnesium   Result Value Ref Range    Magnesium 1.70 1.60 - 2.40 mg/dL   CBC and Auto Differential   Result Value Ref Range    WBC 25.8 (H) 4.4 - 11.3 x10*3/uL    nRBC 0.0 0.0 - 0.0 /100 WBCs    RBC 5.47 (H) 4.00 - 5.20 x10*6/uL    Hemoglobin 14.5 12.0 - 16.0 g/dL    Hematocrit 47.8 (H) 36.0 - 46.0 %    MCV 87 80 - 100 fL    MCH 26.5 26.0 -  34.0 pg    MCHC 30.3 (L) 32.0 - 36.0 g/dL    RDW 16.2 (H) 11.5 - 14.5 %    Platelets 257 150 - 450 x10*3/uL    Neutrophils % 84.1 40.0 - 80.0 %    Immature Granulocytes %, Automated 3.5 (H) 0.0 - 0.9 %    Lymphocytes % 4.9 13.0 - 44.0 %    Monocytes % 4.1 2.0 - 10.0 %    Eosinophils % 2.6 0.0 - 6.0 %    Basophils % 0.8 0.0 - 2.0 %    Neutrophils Absolute 21.68 (H) 1.60 - 5.50 x10*3/uL    Immature Granulocytes Absolute, Automated 0.89 (H) 0.00 - 0.50 x10*3/uL    Lymphocytes Absolute 1.25 0.80 - 3.00 x10*3/uL    Monocytes Absolute 1.06 (H) 0.05 - 0.80 x10*3/uL    Eosinophils Absolute 0.67 (H) 0.00 - 0.40 x10*3/uL    Basophils Absolute 0.20 (H) 0.00 - 0.10 x10*3/uL   C-reactive protein   Result Value Ref Range    C-Reactive Protein 1.04 (H) <1.00 mg/dL               This patient has a urinary catheter   Reason for the urinary catheter remaining today? urinary retention/bladder outlet obstruction, acute or chronic               Assessment/Plan   Principal Problem:    Sepsis with acute hypoxic respiratory failure without septic shock (Multi)  Active Problems:    Iron deficiency anemia    Chronic systolic (congestive) heart failure    Essential hypertension    History of pulmonary embolism    Anxiety    Vitamin D deficiency    Vitamin B12 deficiency    Adult hypothyroidism    Pneumonia of right lower lobe due to infectious organism    Leukocytosis    Acute kidney injury superimposed on stage 3a chronic kidney disease    Elevated troponin    Hyperbilirubinemia    Hyperglycemia    Hypokalemia    Hypomagnesemia    Hypocalcemia    Abnormality of right ventricle of heart    Impaired mobility and ADLs    Acute hypoxic respiratory failure/sepsis/pneumonia. Improved. She is on room air now. Has completed antibiotics. Will follow up cxr  CLL/Myelofibrosis. Does have leukocytosis. Afebrile.   LEVY, resolved. She is on bumex again. Neg about 1300 per calculations  Hx PE. Continue eliquis  Hypothyroid, continue  supplement  Hypertension. She is on midodrine currently. Consider lowering dose if pressure maintains  Deconditioning/ambulatory dysfunction. Therapy. Awaiting snf placement       I spent 32 minutes in the professional and overall care of this patient.      Татьяна Agarwal MD

## 2024-11-12 NOTE — PROGRESS NOTES
Occupational Therapy    Occupational Therapy Treatment    Name: Desirae Gregg  MRN: [id number]  Department: [address]  Room: [address] [place] [address]  Date: 11/12/24  Time Calculation  Start Time: 0859  Stop Time: 0925  Time Calculation (min): 26 min    Assessment:  OT Assessment: Pt making fair progress towards OT goals. WOuld continue to benefit from skilled services in order to maximize independence and safety prior to returning home.  Prognosis: Good  Barriers to Discharge: None  Evaluation/Treatment Tolerance: Patient tolerated treatment well, Patient limited by fatigue  Medical Staff Made Aware: Yes  End of Session Communication: Bedside nurse  End of Session Patient Position: Up in chair, Alarm on  Plan:  Treatment Interventions: ADL retraining, Functional transfer training, UE strengthening/ROM, Endurance training  OT Frequency: 3 times per week  OT Discharge Recommendations: Moderate intensity level of continued care  Equipment Recommended upon Discharge: Wheeled walker, Bedside commode  OT Recommended Transfer Status: Assist of 1, Assist of 2  OT - OK to Discharge: Yes    Subjective   Previous Visit Info:  OT Last Visit  OT Received On: 11/12/24  General:  General  Reason for Referral: 84 yo female referred to OT for hypoxic resp failure, impaired ADLs/mobility  Referred By: FRANCISCA Ramsay DO  Past Medical History Relevant to Rehab: HTN, chronic systolic CHF, PE, myelofibrosis, sciatica, remote poliomyelitis (affecting LLE), JAK2 positive polycythemia vera, CKDIII  Prior to Session Communication: Bedside nurse  Patient Position Received: Bed, 3 rail up, Alarm on  General Comment: Pt pleasant, cooperative with OT treatment  Precautions:  Medical Precautions: Fall precautions (verma, tele)    Vital Signs (Past 2hrs)        Date/Time Vitals Session Patient Position Pulse Resp SpO2 BP MAP (mmHg)    11/12/24 0834 --  --  87  --  --  116/80  92                        Pain Assessment:  Pain Assessment  Pain Assessment: 0-10  0-10 (Numeric)  Pain Score: 7  Pain Type: Acute pain  Pain Location: Leg  Pain Orientation: Left  Pain Interventions: Repositioned, Ambulation/increased activity (RN notified)     Objective   Cognition:  Overall Cognitive Status: Within Functional Limits  Orientation Level: Oriented X4  Activities of Daily Living: Toileting  Toileting Level of Assistance: Dependent  Where Assessed: Bedside commode  Toileting Comments: Assist needed to complete ced care after BM, apply butt paste    Functional Standing Tolerance:  Functional Standing Tolerance  Time: <1 minute  Activity: toileting ADL, transfer training  Functional Standing Tolerance Comments: Requiring seated rest break during toileting after ~30 seconds of standing d/t fatigue  Bed Mobility/Transfers: Bed Mobility  Bed Mobility: Yes  Bed Mobility 1  Bed Mobility 1: Supine to sitting  Level of Assistance 1: Moderate assistance, Moderate verbal cues, Moderate tactile cues  Bed Mobility Comments 1: Assist with BLE, trunk    Transfers  Transfer: Yes  Transfer 1  Transfer From 1: Bed to  Transfer to 1: Commode-standard  Technique 1: Stand pivot  Transfer Device 1: Walker  Transfer Level of Assistance 1: Maximum assistance  Trials/Comments 1: Assist needed to initially elevated from bed, additional assist to guide hips towards commode and safely descend into seated position  Transfers 2  Transfer From 2: Sit to  Transfer to 2: Stand  Technique 2: Sit to stand, Stand to sit  Transfer Device 2: Walker  Transfer Level of Assistance 2: Maximum assistance  Trials/Comments 2: 2 trials, assist under arm and bottom to elevate      Sitting Balance:  Static Sitting Balance  Static Sitting-Balance Support: Feet supported  Static Sitting-Level of Assistance: Contact guard  Standing Balance:  Static Standing Balance  Static Standing-Balance Support: Bilateral upper extremity supported  Static Standing-Level of Assistance: Contact guard    Outcome Measures:  [facility] Daily Activity  Putting on and  taking off regular lower body clothing: Total  Bathing (including washing, rinsing, drying): A lot  Putting on and taking off regular upper body clothing: A little  Toileting, which includes using toilet, bedpan or urinal: Total  Taking care of personal grooming such as brushing teeth: A little  Eating Meals: None  Daily Activity - Total Score: 14      Education Documentation  Handouts, taught by Matthew Bush OT at 11/12/2024 10:20 AM.  Learner: Patient  Readiness: Acceptance  Method: Explanation  Response: Verbalizes Understanding    Body Mechanics, taught by Matthew Bush OT at 11/12/2024 10:20 AM.  Learner: Patient  Readiness: Acceptance  Method: Explanation  Response: Verbalizes Understanding    Precautions, taught by Matthew Bush OT at 11/12/2024 10:20 AM.  Learner: Patient  Readiness: Acceptance  Method: Explanation  Response: Verbalizes Understanding    Home Exercise Program, taught by Matthew Bush OT at 11/12/2024 10:20 AM.  Learner: Patient  Readiness: Acceptance  Method: Explanation  Response: Verbalizes Understanding    ADL Training, taught by Matthew Bush OT at 11/12/2024 10:20 AM.  Learner: Patient  Readiness: Acceptance  Method: Explanation  Response: Verbalizes Understanding    Education Comments  No comments found.      Goals:  Encounter Problems       Encounter Problems (Active)       OT Goals       Pt will complete [id number] transfers using LRD in preparation for ADLs with CGA (Progressing)       Start:  11/06/24    Expected End:  11/20/24            Pt will increase endurance to tolerate 15min of OOB activity with no more than 1 rest break in order to increase ability to engage in ADL completion.  (Progressing)       Start:  11/06/24    Expected End:  11/20/24            Pt will tolerate 10min stand during functional task completion with no more than 1 rest break in order to increase endurance for functional task completion.  (Progressing)       Start:   11/06/24    Expected End:  11/20/24            Pt will demo Grooming/UE ADL completion with modified independence, using adaptive strategies and energy conservation techniques as applicable.  (Progressing)       Start:  11/06/24    Expected End:  11/20/24            Pt will demo and/or verbalize 2-3 energy conservation techniques to incorporate into functional mobility or ADL to improve performance and increase independence.  (Progressing)       Start:  11/06/24    Expected End:  11/20/24

## 2024-11-13 LAB
ALBUMIN SERPL BCP-MCNC: 2.1 G/DL (ref 3.4–5)
ANION GAP SERPL CALC-SCNC: 11 MMOL/L (ref 10–20)
BASOPHILS # BLD AUTO: 0.21 X10*3/UL (ref 0–0.1)
BASOPHILS NFR BLD AUTO: 0.8 %
BUN SERPL-MCNC: 17 MG/DL (ref 6–23)
CALCIUM SERPL-MCNC: 7.2 MG/DL (ref 8.6–10.3)
CHLORIDE SERPL-SCNC: 97 MMOL/L (ref 98–107)
CO2 SERPL-SCNC: 30 MMOL/L (ref 21–32)
CREAT SERPL-MCNC: 0.93 MG/DL (ref 0.5–1.05)
CRYPTOSP AG STL QL IA: NEGATIVE
EGFRCR SERPLBLD CKD-EPI 2021: 60 ML/MIN/1.73M*2
EOSINOPHIL # BLD AUTO: 0.64 X10*3/UL (ref 0–0.4)
EOSINOPHIL NFR BLD AUTO: 2.4 %
ERYTHROCYTE [DISTWIDTH] IN BLOOD BY AUTOMATED COUNT: 16.5 % (ref 11.5–14.5)
G LAMBLIA AG STL QL IA: NEGATIVE
GLUCOSE SERPL-MCNC: 103 MG/DL (ref 74–99)
HCT VFR BLD AUTO: 48 % (ref 36–46)
HGB BLD-MCNC: 14.7 G/DL (ref 12–16)
IMM GRANULOCYTES # BLD AUTO: 0.98 X10*3/UL (ref 0–0.5)
IMM GRANULOCYTES NFR BLD AUTO: 3.7 % (ref 0–0.9)
LYMPHOCYTES # BLD AUTO: 1.39 X10*3/UL (ref 0.8–3)
LYMPHOCYTES NFR BLD AUTO: 5.3 %
MCH RBC QN AUTO: 26.9 PG (ref 26–34)
MCHC RBC AUTO-ENTMCNC: 30.6 G/DL (ref 32–36)
MCV RBC AUTO: 88 FL (ref 80–100)
MONOCYTES # BLD AUTO: 1.05 X10*3/UL (ref 0.05–0.8)
MONOCYTES NFR BLD AUTO: 4 %
NEUTROPHILS # BLD AUTO: 21.87 X10*3/UL (ref 1.6–5.5)
NEUTROPHILS NFR BLD AUTO: 83.8 %
NRBC BLD-RTO: 0 /100 WBCS (ref 0–0)
PHOSPHATE SERPL-MCNC: 3 MG/DL (ref 2.5–4.9)
PLATELET # BLD AUTO: 250 X10*3/UL (ref 150–450)
POTASSIUM SERPL-SCNC: 3.2 MMOL/L (ref 3.5–5.3)
RBC # BLD AUTO: 5.46 X10*6/UL (ref 4–5.2)
SCAN RESULT: NORMAL
SODIUM SERPL-SCNC: 135 MMOL/L (ref 136–145)
UFH PPP CHRO-ACNC: 0.5 IU/ML
WBC # BLD AUTO: 26.1 X10*3/UL (ref 4.4–11.3)

## 2024-11-13 PROCEDURE — 36415 COLL VENOUS BLD VENIPUNCTURE: CPT | Performed by: INTERNAL MEDICINE

## 2024-11-13 PROCEDURE — 2500000004 HC RX 250 GENERAL PHARMACY W/ HCPCS (ALT 636 FOR OP/ED): Mod: JZ | Performed by: INTERNAL MEDICINE

## 2024-11-13 PROCEDURE — 80069 RENAL FUNCTION PANEL: CPT | Performed by: INTERNAL MEDICINE

## 2024-11-13 PROCEDURE — 87081 CULTURE SCREEN ONLY: CPT | Mod: GEALAB | Performed by: INTERNAL MEDICINE

## 2024-11-13 PROCEDURE — 85520 HEPARIN ASSAY: CPT | Performed by: INTERNAL MEDICINE

## 2024-11-13 PROCEDURE — 2500000002 HC RX 250 W HCPCS SELF ADMINISTERED DRUGS (ALT 637 FOR MEDICARE OP, ALT 636 FOR OP/ED): Performed by: INTERNAL MEDICINE

## 2024-11-13 PROCEDURE — 2500000001 HC RX 250 WO HCPCS SELF ADMINISTERED DRUGS (ALT 637 FOR MEDICARE OP): Performed by: NURSE PRACTITIONER

## 2024-11-13 PROCEDURE — 85025 COMPLETE CBC W/AUTO DIFF WBC: CPT | Performed by: INTERNAL MEDICINE

## 2024-11-13 PROCEDURE — 2500000001 HC RX 250 WO HCPCS SELF ADMINISTERED DRUGS (ALT 637 FOR MEDICARE OP): Performed by: INTERNAL MEDICINE

## 2024-11-13 PROCEDURE — 99233 SBSQ HOSP IP/OBS HIGH 50: CPT | Performed by: INTERNAL MEDICINE

## 2024-11-13 PROCEDURE — 1200000002 HC GENERAL ROOM WITH TELEMETRY DAILY

## 2024-11-13 RX ORDER — METOPROLOL SUCCINATE 25 MG/1
25 TABLET, EXTENDED RELEASE ORAL 2 TIMES DAILY
Status: DISCONTINUED | OUTPATIENT
Start: 2024-11-13 | End: 2024-11-19 | Stop reason: HOSPADM

## 2024-11-13 RX ORDER — CEFAZOLIN SODIUM 1 G/50ML
1 SOLUTION INTRAVENOUS EVERY 8 HOURS
Status: DISCONTINUED | OUTPATIENT
Start: 2024-11-13 | End: 2024-11-14

## 2024-11-13 RX ORDER — POTASSIUM CHLORIDE 20 MEQ/1
40 TABLET, EXTENDED RELEASE ORAL EVERY 4 HOURS
Status: COMPLETED | OUTPATIENT
Start: 2024-11-13 | End: 2024-11-13

## 2024-11-13 ASSESSMENT — COGNITIVE AND FUNCTIONAL STATUS - GENERAL
HELP NEEDED FOR BATHING: A LOT
STANDING UP FROM CHAIR USING ARMS: A LOT
TOILETING: A LOT
MOVING TO AND FROM BED TO CHAIR: A LOT
DAILY ACTIVITIY SCORE: 13
MOBILITY SCORE: 12
PERSONAL GROOMING: A LOT
TURNING FROM BACK TO SIDE WHILE IN FLAT BAD: A LOT
WALKING IN HOSPITAL ROOM: A LOT
MOVING FROM LYING ON BACK TO SITTING ON SIDE OF FLAT BED WITH BEDRAILS: A LOT
DRESSING REGULAR LOWER BODY CLOTHING: A LOT
DRESSING REGULAR UPPER BODY CLOTHING: A LOT
EATING MEALS: A LITTLE
CLIMB 3 TO 5 STEPS WITH RAILING: A LOT

## 2024-11-13 ASSESSMENT — PAIN SCALES - GENERAL
PAINLEVEL_OUTOF10: 0 - NO PAIN
PAINLEVEL_OUTOF10: 0 - NO PAIN

## 2024-11-13 NOTE — PROGRESS NOTES
"Nutrition Progress Note  Nutrition Follow Up Note  Patient has Malnutrition Diagnosis: No  Nutrition Assessment     Pt with worsening CHF per MD notes, on bumex drip. Pt with LLE acute DVT, on anticoagulants; ID and wound care for LLE cellulitis and wound. Low Albumin noted- suspect d/t edema, infection, dilutional effects with CHF. CRP slightly elevated- indicative of inflammatory response.    Nutrition History:  Food and Nutrient History:     (11/13/24) Nursing care in progress at time of visit, met with pts son outside of room. Pt accepting of Gelatein and Ensure ONS- taking about 50% of most. Per nursing documentation, pt eating 100% of most meals, occasionally consume 25-50%. Discharge planning in progress.     (11/6/24) Met with pt and pts son to obtain history. Son reports pt with decreased appetite for about 1 year, takes protein shakes at home, most recently premier protein. Weights stable. Pt follows low sodium diet at home, son does meal prep and grocery shopping. Pt with occasional nausea.  Energy Intake: Fair 50-75 %  Allergies   Allergen Reactions    Clonidine And Structural Analogs Anaphylaxis    Naproxen Nausea Only    Tizanidine Drowsiness    Hydroxyzine Palpitations    Methylprednisolone Palpitations      GI Symptoms: None     Oral Problems: None          Anthropometrics:  Height: 165.1 cm (5' 5\")   Weight: 72.8 kg (160 lb 7.9 oz)   BMI (Calculated): 26.71  IBW/kg (Dietitian Calculated): 56.8 kg  Percent of IBW: 141 %       Weight History:     Weight         11/8/2024  0600 11/9/2024  0600 11/10/2024  0545 11/13/2024  0542 11/13/2024  0706    Weight: 81 kg (178 lb 9.6 oz) 79.4 kg (175 lb 1.6 oz) 79.2 kg (174 lb 9.6 oz) 74.6 kg (164 lb 6.4 oz) 72.8 kg (160 lb 7.9 oz)            Nutrition Focused Physical Exam Findings:        Edema  Edema: +1 trace, +2 mild  Edema Location: + 2 generalized, +1 bilat LE edema  Physical Findings (Nutrition Deficiency/Toxicity)  Skin: Positive (wounds to L pretibial " region and sacrum)    Nutrition Significant Labs:    Results from last 7 days   Lab Units 11/13/24  0633 11/12/24  0622 11/11/24  0614 11/10/24  0538   GLUCOSE mg/dL 103* 108* 123* 109*   SODIUM mmol/L 135* 135* 135* 137   POTASSIUM mmol/L 3.2* 3.6 3.3* 3.8   CHLORIDE mmol/L 97* 101 102 105   CO2 mmol/L 30 29 26 26   BUN mg/dL 17 20 18 18   CREATININE mg/dL 0.93 0.92 0.85 0.86   EGFR mL/min/1.73m*2 60* 61 67 66   CALCIUM mg/dL 7.2* 7.5* 7.0* 7.1*   PHOSPHORUS mg/dL 3.0 3.0 2.3* 2.5   MAGNESIUM mg/dL  --  1.70 1.74 1.48*     Lab Results   Component Value Date    HGBA1C 4.7 11/03/2024         Lab Results   Component Value Date    ALBUMIN 2.1 (L) 11/13/2024      Lab Results   Component Value Date    CRP 1.04 (H) 11/12/2024           Nutrition Specific Medications:   Scheduled medications:  [Held by provider] apixaban, 2.5 mg, oral, q12h  [Held by provider] bumetanide, 1 mg, oral, Daily  busPIRone, 7.5 mg, oral, BID  cetirizine, 10 mg, oral, Daily  cholecalciferol, 2,000 Units, oral, Daily  cyanocobalamin, 1,000 mcg, oral, Daily  [Held by provider] ferrous sulfate (325 mg ferrous sulfate), 65 mg of iron, oral, Daily with breakfast  folic acid, 1 mg, oral, Daily  gabapentin, 200 mg, oral, BID  levothyroxine, 88 mcg, oral, Daily  metoprolol succinate XL, 25 mg, oral, BID  midodrine, 5 mg, oral, TID  nystatin, , Topical, BID  pantoprazole, 40 mg, oral, BID AC  phenyleph-min oil-petrolatum, , rectal, TID  potassium chloride CR, 40 mEq, oral, q4h  psyllium, 1 packet, oral, Daily  sertraline, 25 mg, oral, Daily  zinc oxide, 1 Application, Topical, BID      Continuous medications:  bumetanide, 0.5 mg/hr, Last Rate: 0.5 mg/hr (11/13/24 0428)  heparin, 0-4,500 Units/hr, Last Rate: 1,400 Units/hr (11/13/24 0931)      PRN medications:  PRN medications: acetaminophen **OR** acetaminophen **OR** [DISCONTINUED] acetaminophen, heparin, loperamide, melatonin, methyl salicylate-menthol, ondansetron ODT **OR** ondansetron, oxyCODONE,  oxygen, polyethylene glycol     Nursing Data Per flowsheet:   Stool Appearance: Liquid, Mucous (11/13/24 1328)  Gastrointestinal  Gastrointestinal (WDL): Exceptions to WDL  Abdomen Inspection: Soft  Abdominal Tenderness: Soft  Bowel Sounds: All quadrants  Bowel Sounds (All Quadrants): Active  Bowel Incontinence: Yes  Stool Appearance: Liquid, Mucous  Stool Color: Brown  Gastrointestinal Symptoms: Diarrhea  Feeding assistance level: Independent    Intake/Output Summary (Last 24 hours) at 11/13/2024 1334  Last data filed at 11/13/2024 1134  Gross per 24 hour   Intake 1321.01 ml   Output 1202 ml   Net 119.01 ml      0-10 (Numeric) Pain Score: 0 - No pain   Dietary Orders (From admission, onward)       Start     Ordered    11/06/24 1120  Oral nutritional supplements  Until discontinued        Question Answer Comment   Deliver with Breakfast chocolate   Deliver with Dinner    Select supplement: Ensure Plus High Protein        11/06/24 1119    11/06/24 1120  Oral nutritional supplements  Until discontinued        Question Answer Comment   Deliver with Lunch    Select supplement: Gelatein Plus        11/06/24 1119    11/03/24 1320  May Participate in Room Service With Assistance  ( ROOM SERVICE MAY PARTICIPATE WITH ASSISTANCE)  Once        Question:  .  Answer:  Yes    11/03/24 1319    11/03/24 1225  Adult diet 2-3 grams sodium  Diet effective now        Question:  Diet type  Answer:  2-3 grams sodium    11/03/24 1226                     Estimated Needs:   Total Energy Estimated Needs (kCal): 1704 kCal  Method for Estimating Needs: 30 kcal/kg/IBW  Total Protein Estimated Needs (g):  (68-85 g protein)  Method for Estimating Needs: 1.2 - 1.5 g/kg/IBW     Method for Estimating Needs: 1 ml/kcal       Nutrition Diagnosis   Malnutrition Diagnosis  Patient has Malnutrition Diagnosis: No    Nutrition Diagnosis  Patient has Nutrition Diagnosis: Yes  Diagnosis Status (1): Ongoing  Nutrition Diagnosis 1: Increased nutrient  needs  Related to (1): Sepsis with acute hypoxic respiratory failure without septic shock  As Evidenced by (1): increased nutrient demand for illness/infection  Additional Assessment Information (1): ONS added for additional kcal and protein       Nutrition Interventions/Recommendations   Nutrition Prescription:  diet, fluids    Nutrition Interventions:   Interventions: Medical food supplement  Goal: Ensure Plus HP 2 x/day= 700 kcal, 40 g protein; Gelatein Plus daily= 160 kcal, 20 g protein      Coordination of Care: n/a  Nutrition Education:   N/A    Recommendations:  Continue with current diet and ONS  Weights: daily  RFP, Mg daily; replete lytes prn          Nutrition Monitoring and Evaluation   Food/Nutrient Related History Monitoring  Monitoring and Evaluation Plan: Amount of food  Criteria: Pt will consume 50-75% of meals and ONS provided    Body Composition/Growth/Weight History  Monitoring and Evaluation Plan: Weight  Weight: Measured weight  Criteria: Monitor    Biochemical Data, Medical Tests and Procedures  Monitoring and Evaluation Plan: Electrolyte/renal panel, Glucose/endocrine profile  Criteria: Monitor         Time Spent (min): 45 minutes

## 2024-11-13 NOTE — CARE PLAN
The patient's goals for the shift include    Problem: Skin  Goal: Promote/optimize nutrition  Outcome: Progressing     Problem: Pain  Goal: Takes deep breaths with improved pain control throughout the shift  Outcome: Progressing     Problem: Fall/Injury  Goal: Be free from injury by end of the shift  Outcome: Progressing     Problem: Heart Failure  Goal: Report improvement of dyspnea/breathlessness this shift  Outcome: Progressing       The clinical goals for the shift include patient will have therapeutic heparin levels by end of shift

## 2024-11-13 NOTE — PROGRESS NOTES
11/13/24 0910   Discharge Planning   Living Arrangements Children  (lives with son)   Support Systems Children   Assistance Needed Patient is A&O X2-3 (forgetful at times), requires minimal assist with ADLs (such as for transfers with assist from son using a gait belt), utilizes a walker and can ambulate around the house, uses a WC (when leaving the house). Uses a shower chair and raised toilet seat. Son is her caretaker and transports patient to all appointments. Patient's son does cooking and cleaning. Patient is active with [facility] (palliative care) and [facility] (PT/OT/SN). Plan is for patient to DC to SNF once medically ready- will need pre-cert. Discharge  is pending cardiology final recs and patient being off IV bumex.   Type of Residence Private residence   Number of Stairs to Enter Residence 0  (ramp)   Number of Stairs Within Residence 12  (lift chair)   Do you have animals or pets at home? Yes   Type of Animals or Pets 1 cat   Home or Post Acute Services Post acute facilities (Rehab/SNF/etc)   Type of Post Acute Facility Services Skilled nursing   Expected Discharge Disposition SNF  (Daughter & patient aware [facility] not have bed availability but [facility] do. Their preference is [facility]. Facility made aware via [facility].)   Does the patient need discharge transport arranged? Yes   RoundTrip coordination needed? Yes   Has discharge transport been arranged? No

## 2024-11-13 NOTE — PROGRESS NOTES
Desirae Gregg is a 83 y.o. female on day 10 of admission presenting with Sepsis with acute hypoxic respiratory failure without septic shock (Multi).    Subjective   Interval History: no fever, no new complaints        Review of Systems    Objective   Range of Vitals (last 24 hours)  Heart Rate:  [62-94]   Temp:  [36.3 °C (97.3 °F)-37.2 °C (99 °F)]   Resp:  [18-20]   BP: (113-130)/(69-79)   Weight:  [72.8 kg (160 lb 7.9 oz)-74.6 kg (164 lb 6.4 oz)]   SpO2:  [92 %-93 %]   Daily Weight  11/13/24 : 72.8 kg (160 lb 7.9 oz)    Body mass index is 26.71 kg/m².    Physical Exam  Constitutional:       Appearance: Normal appearance.   HENT:      Head: Normocephalic and atraumatic.      Mouth/Throat:      Mouth: Mucous membranes are moist.      Pharynx: Oropharynx is clear.   Eyes:      Pupils: Pupils are equal, round, and reactive to light.   Cardiovascular:      Rate and Rhythm: Normal rate and regular rhythm.      Heart sounds: Normal heart sounds.   Pulmonary:      Effort: Pulmonary effort is normal.      Breath sounds: Normal breath sounds.   Abdominal:      General: Abdomen is flat. Bowel sounds are normal.      Palpations: Abdomen is soft.   Musculoskeletal:      Cervical back: Normal range of motion.      Comments: Lt leg wound, no cellulitis    Neurological:      Mental Status: She is alert.         Antibiotics  This patient does not have an active medication from one of the medication groupers.    Relevant Results  Labs  Results from last 72 hours   Lab Units 11/13/24 0633 11/12/24 0622 11/11/24 0614   WBC AUTO x10*3/uL 26.1* 25.8* 22.1*   HEMOGLOBIN g/dL 14.7 14.5 14.5   HEMATOCRIT % 48.0* 47.8* 47.7*   PLATELETS AUTO x10*3/uL 250 257 255   NEUTROS PCT AUTO % 83.8 84.1 83.4   LYMPHS PCT AUTO % 5.3 4.9 4.1   MONOS PCT AUTO % 4.0 4.1 4.4   EOS PCT AUTO % 2.4 2.6 3.9     Results from last 72 hours   Lab Units 11/13/24 0633 11/12/24 0622 11/11/24  0614   SODIUM mmol/L 135* 135* 135*   POTASSIUM mmol/L 3.2* 3.6 3.3*    CHLORIDE mmol/L 97* 101 102   CO2 mmol/L 30 29 26   BUN mg/dL 17 20 18   CREATININE mg/dL 0.93 0.92 0.85   GLUCOSE mg/dL 103* 108* 123*   CALCIUM mg/dL 7.2* 7.5* 7.0*   ANION GAP mmol/L 11 9* 10   EGFR mL/min/1.73m*2 60* 61 67   PHOSPHORUS mg/dL 3.0 3.0 2.3*     Results from last 72 hours   Lab Units 11/13/24  0633 11/12/24  0622 11/11/24  0614   ALBUMIN g/dL 2.1* 2.1* 2.0*     Estimated Creatinine Clearance: 44.2 mL/min (by C-G formula based on SCr of 0.93 mg/dL).  C-Reactive Protein   Date Value Ref Range Status   11/12/2024 1.04 (H) <1.00 mg/dL Final   11/11/2024 0.86 <1.00 mg/dL Final   11/08/2024 2.84 (H) <1.00 mg/dL Final     Microbiology  Reviewed  Imaging  Reviewed        Assessment/Plan   Leukocytosis, likely related to myelodysplasia, US with legs DVT   Sepsis, treated  Respiratory failure / pneumonia  Bacteriuria  Polycythemia / myelofibrosis     Recommendations :  Supportive care  Discussed with the medical team     I spent minutes in the professional and overall care of this patient.      Cameron Lassiter MD

## 2024-11-13 NOTE — PROGRESS NOTES
"Desirae Gregg is a 83 y.o. female on day 10 of admission presenting with Sepsis with acute hypoxic respiratory failure without septic shock (Multi).      Subjective   She is sitting up in the bed, states she actually feels better today. Legs less swollen and less painful.       Review of systems:  Constitutional: negative for fever, chills, or malaise  Neuro: negative for dizziness, headache, numbness, tingling  ENT: Negative for nasal congestion or sore throat  CV: negative for chest pain, palpitations  GI: negative for abd pain, nausea, vomiting or diarrhea  : negative for dysuria, frequency, or urgency  Musculoskeletal: Negative for weakness, myalgia, or arthralgia  Endocrine: Negative for polyuria or polydipsia         Objective   Constitutional: Well developed, awake/alert/oriented x3, no distress, alert and cooperative  Eyes: PERRL, EOMI, clear sclera  ENMT: mucous membranes moist, no apparent injury, no lesions seen  Head/Neck: Neck supple, no apparent injury, thyroid without mass or tenderness, No JVD, trachea midline, no bruits  Respiratory/Thorax: Patent airways, diminished bilat bases with good chest expansion, thorax symmetric  Cardiovascular: tachycardic, slightly irregular rhythm, no murmurs, 2+ equal pulses of the extremities, normal S 1and S 2  Gastrointestinal: Nondistended, soft, non-tender, no rebound tenderness or guarding, no masses palpable, no organomegaly, +BS, no bruits  Musculoskeletal: ROM intact, no joint swelling, normal strength  Extremities: +2 pitting edema, L>R, dressing to left leg  Neurological: alert and oriented x3, intact senses, motor, response and reflexes, normal strength  Lymphatic: No significant lymphadenopathy  Psychological: Appropriate mood and behavior  Skin: Warm and dry, dressing to left leg, surrounding skin red and warm      Last Recorded Vitals  /79   Pulse 62   Temp 36.6 °C (97.9 °F) (Temporal)   Resp 18   Ht 1.651 m (5' 5\")   Wt 72.8 kg (160 lb 7.9 oz) "   SpO2 93%   BMI 26.71 kg/m²     Intake/Output last 3 Shifts:  I/O last 3 completed shifts:  In: 1321 (17.7 mL/kg) [P.O.:1080; I.V.:241 (3.2 mL/kg)]  Out: 1227 (16.5 mL/kg) [Urine:1225 (0.5 mL/kg/hr); Stool:2]  Weight: 74.6 kg   I/O this shift:  In: 240 [P.O.:240]  Out: -     Relevant Results  Scheduled medications  [Held by provider] apixaban, 2.5 mg, oral, q12h  [Held by provider] bumetanide, 1 mg, oral, Daily  busPIRone, 7.5 mg, oral, BID  carvedilol, 3.125 mg, oral, BID  cetirizine, 10 mg, oral, Daily  cholecalciferol, 2,000 Units, oral, Daily  cyanocobalamin, 1,000 mcg, oral, Daily  [Held by provider] ferrous sulfate (325 mg ferrous sulfate), 65 mg of iron, oral, Daily with breakfast  folic acid, 1 mg, oral, Daily  gabapentin, 200 mg, oral, BID  [Held by provider] isosorbide mononitrate ER, 30 mg, oral, Daily  levothyroxine, 88 mcg, oral, Daily  midodrine, 5 mg, oral, TID  nystatin, , Topical, BID  pantoprazole, 40 mg, oral, BID AC  phenyleph-min oil-petrolatum, , rectal, TID  potassium chloride CR, 40 mEq, oral, q4h  psyllium, 1 packet, oral, Daily  sertraline, 25 mg, oral, Daily  zinc oxide, 1 Application, Topical, BID      Continuous medications  bumetanide, 0.5 mg/hr, Last Rate: 0.5 mg/hr (11/13/24 7433)  heparin, 0-4,500 Units/hr, Last Rate: 1,400 Units/hr (11/13/24 0948)      PRN medications  PRN medications: acetaminophen **OR** acetaminophen **OR** [DISCONTINUED] acetaminophen, heparin, loperamide, melatonin, methyl salicylate-menthol, ondansetron ODT **OR** ondansetron, oxyCODONE, oxygen, polyethylene glycol    Results for orders placed or performed during the hospital encounter of 11/03/24 (from the past 24 hours)   Heparin Assay, UFH   Result Value Ref Range    Heparin Unfractionated 0.5 See Comment Below for Therapeutic Ranges IU/mL   Heparin Assay, UFH   Result Value Ref Range    Heparin Unfractionated 0.5 See Comment Below for Therapeutic Ranges IU/mL   Renal Function Panel   Result Value Ref  Range    Glucose 103 (H) 74 - 99 mg/dL    Sodium 135 (L) 136 - 145 mmol/L    Potassium 3.2 (L) 3.5 - 5.3 mmol/L    Chloride 97 (L) 98 - 107 mmol/L    Bicarbonate 30 21 - 32 mmol/L    Anion Gap 11 10 - 20 mmol/L    Urea Nitrogen 17 6 - 23 mg/dL    Creatinine 0.93 0.50 - 1.05 mg/dL    eGFR 60 (L) >60 mL/min/1.73m*2    Calcium 7.2 (L) 8.6 - 10.3 mg/dL    Phosphorus 3.0 2.5 - 4.9 mg/dL    Albumin 2.1 (L) 3.4 - 5.0 g/dL   CBC and Auto Differential   Result Value Ref Range    WBC 26.1 (H) 4.4 - 11.3 x10*3/uL    nRBC 0.0 0.0 - 0.0 /100 WBCs    RBC 5.46 (H) 4.00 - 5.20 x10*6/uL    Hemoglobin 14.7 12.0 - 16.0 g/dL    Hematocrit 48.0 (H) 36.0 - 46.0 %    MCV 88 80 - 100 fL    MCH 26.9 26.0 - 34.0 pg    MCHC 30.6 (L) 32.0 - 36.0 g/dL    RDW 16.5 (H) 11.5 - 14.5 %    Platelets 250 150 - 450 x10*3/uL    Neutrophils % 83.8 40.0 - 80.0 %    Immature Granulocytes %, Automated 3.7 (H) 0.0 - 0.9 %    Lymphocytes % 5.3 13.0 - 44.0 %    Monocytes % 4.0 2.0 - 10.0 %    Eosinophils % 2.4 0.0 - 6.0 %    Basophils % 0.8 0.0 - 2.0 %    Neutrophils Absolute 21.87 (H) 1.60 - 5.50 x10*3/uL    Immature Granulocytes Absolute, Automated 0.98 (H) 0.00 - 0.50 x10*3/uL    Lymphocytes Absolute 1.39 0.80 - 3.00 x10*3/uL    Monocytes Absolute 1.05 (H) 0.05 - 0.80 x10*3/uL    Eosinophils Absolute 0.64 (H) 0.00 - 0.40 x10*3/uL    Basophils Absolute 0.21 (H) 0.00 - 0.10 x10*3/uL       Vascular US lower extremity venous duplex bilateral   Final Result      XR chest 1 view   Final Result   1.  Increased left lower lung airspace disease and probable   congestion.        Signed by: Alfonso Ricci 11/12/2024 5:25 PM   Dictation workstation:   [id number]      FL GI esophagram   Final Result   Modified exam due to patient's clinical condition. No esophageal   obstruction.        LETHA Mcknight discussed the significance and urgency of this   critical finding by Epic secure chat with  SYED JOSHI on 11/11/2024   at 10:56 am.  (**-RCF-**) Findings:  See  findings.        Signed by: Zain Mcknight 11/11/2024 11:02 AM   Dictation workstation:   [id number]      XR chest 1 view   Final Result   1. Suggestion of mild interstitial edema, more pronounced on this   radiograph compared to prior. Recommend correlation with fluid status.   2. Redemonstration of moderate right and small left pleural effusions.   3. Right basilar airspace opacity, which could be related to   atelectasis versus pneumonia in appropriate clinical setting.        MACRO:   None        Signed by: Yazmin Oropeza 11/8/2024 4:17 PM   Dictation workstation:   [id number]      Transthoracic Echo (TTE) Limited   Final Result      CT abdomen pelvis wo IV contrast   Final Result   2 centimeters stone in the urinary bladder.        Bilateral pleural effusions. Pericardial effusion.        Ascites.        Anasarca.        Presacral edema.        Enlarged spleen.        The exam is limited by motion artifact.        MACRO:   none        Signed by: Jayshree Dias 11/4/2024 3:28 PM   Dictation workstation:   [id number]       renal complete   Final Result   No hydronephrosis. Decompressed bladder.        MACRO:   None        Signed by: Zain Mcknight 11/4/2024 11:08 AM   Dictation workstation:   [id number]      CT chest wo IV contrast   Final Result   Abnormalities at the right lung base on portable frontal chest   radiograph earlier today are due to a combination of moderate-sized,   mostly if not entirely free-flowing right pleural effusion with   associated multisegmental right lower lobe collapse adjacent to it        Small area of ground-glass airspace disease in the middle lobe   confirmed on CT may not have been expected on the prior radiograph        Possibility of mild ground-glass airspace disease in the right lower   lobe as well        No ground-glass airspace disease in the left lung        No consolidative pneumonia in any of the inflated lungs on either side        Small free-flowing left  pleural effusion        Unusual hypodensities in the right ventricular free wall of uncertain   etiology and significance        Perhaps trace (smallest detectable quantity) pericardial effusion        Incidental abnormalities in the included upper abdomen as detailed   above        MACRO:   None        Signed by: Matthias Kolb 11/3/2024 11:35 AM   Dictation workstation:   [id number]      XR chest 1 view   Final Result   1. Right lower lobe airspace consolidation either pneumonia or   atelectasis with right pleural effusion. Follow-up to complete   resolution is needed   2. Enlarged cardiac silhouette             Signed by: Kary Cardoso 11/3/2024 10:27 AM   Dictation workstation:   [id number]          Transthoracic Echo (TTE) Limited    Result Date: 11/5/2024   [facility], [address] [place] [address]               Tel [phone] and Fax [phone] TRANSTHORACIC ECHOCARDIOGRAM REPORT  Patient Name:       ELIE Mccain Physician:    [id number] Chicho Goldman MD Study Date:         11/5/2024           Ordering Provider:    [id number] SHERITA GOYAL MRN/PID:            [id number]            Fellow: Accession#:         [id number]        Nurse: Date of Birth/Age:  1940 / 83     Sonographer:          Cathi garcia                                     RDYVROSE Gender assigned at  F                   Additional Staff: Birth: Height:             165.10 cm           Admit Date:           11/3/2024 Weight:             72.57 kg            Admission Status:     Inpatient -                                                               Routine BSA / BMI:          1.80 m2 / 26.63     Encounter#:           [id number]                     kg/m2 Blood Pressure:     103/62 mmHg         Department Location:  [facility]                                                                Invasive Study Type:    TRANSTHORACIC ECHO (TTE) LIMITED Diagnosis/ICD: 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 Study Detail: The following Echo studies were performed: 2D, Doppler and color               flow.  PHYSICIAN INTERPRETATION: Left Ventricle: The left ventricular systolic function is normal, with a visually estimated ejection fraction of 60-65%. There is severely increased concentric left ventricular hypertrophy. There are no regional left ventricular wall motion abnormalities. The left ventricular cavity size is normal. There is moderately increased septal and moderately increased posterior left ventricular wall thickness. Spectral Doppler shows an abnormal pattern of left ventricular diastolic filling. Left Atrium: The left atrium is upper limits of normal in size. Right Ventricle: The right ventricle is normal in size. There is normal right ventricular global systolic function. Right Atrium: The right atrium is normal in size. Aortic Valve: The aortic valve is trileaflet. The aortic valve dimensionless index is 0.77. There is trace aortic valve regurgitation. The peak instantaneous gradient of the aortic valve is 13 mmHg. The mean gradient of the mitral valve is 7 mmHg. Mitral Valve: The mitral valve is normal in structure. There is trace to mild mitral valve regurgitation. Tricuspid Valve: The tricuspid valve is structurally normal. There is trace to mild tricuspid regurgitation. Pulmonic Valve: The pulmonic valve is not well visualized. The pulmonic valve regurgitation was not well visualized. Pericardium: There is no pericardial effusion noted. Aorta: The  aortic root is normal.  CONCLUSIONS:  1. The left ventricular systolic function is normal, with a visually estimated ejection fraction of 60-65%.  2. Spectral Doppler shows an abnormal pattern of left ventricular diastolic filling.  3. There is moderately increased septal and moderately increased posterior left ventricular wall thickness.  4. There is severely increased concentric left ventricular hypertrophy.  5. There is normal right ventricular global systolic function. QUANTITATIVE DATA SUMMARY:  2D MEASUREMENTS:          Normal Ranges: IVSd:            1.49 cm  (0.6-1.1cm) LVPWd:           1.26 cm  (0.6-1.1cm) LVIDd:           4.47 cm  (3.9-5.9cm) LVIDs:           2.98 cm LV Mass Index:   133 g/m2 LVEDV Index:     23 ml/m2 LV % FS          33.4 %  LV SYSTOLIC FUNCTION BY 2D PLANIMETRY (MOD):                      Normal Ranges: EF-A4C View:    62 % (>=55%) EF-A2C View:    61 % EF-Biplane:     64 % EF-Visual:      63 % LV EF Reported: 63 %  LV DIASTOLIC FUNCTION:          Normal Ranges: MV Peak E:             0.70 m/s (0.7-1.2 m/s) MV Peak A:             0.91 m/s (0.42-0.7 m/s) E/A Ratio:             0.77     (1.0-2.2)  MITRAL VALVE:          Normal Ranges: MV DT:        264 msec (150-240msec)  AORTIC VALVE:                      Normal Ranges: AoV Vmax:                1.78 m/s  (<=1.7m/s) AoV Peak P.7 mmHg (<20mmHg) AoV Mean P.0 mmHg  (1.7-11.5mmHg) LVOT Max Josiah:            1.33 m/s  (<=1.1m/s) AoV VTI:                 31.31 cm  (18-25cm) LVOT VTI:                24.11 cm LVOT Diameter:           2.04 cm   (1.8-2.4cm) AoV Area, VTI:           2.52 cm2  (2.5-5.5cm2) AoV Area,Vmax:           2.44 cm2  (2.5-4.5cm2) AoV Dimensionless Index: 0.77  TRICUSPID VALVE/RVSP:          Normal Ranges: Peak TR Velocity:     3.46 m/s  PULMONIC VALVE:          Normal Ranges: PV Max Josiah:     0.8 m/s  (0.6-0.9m/s) PV Max P.8 mmHg  [id number] Chicho Goldman MD Electronically signed on 2024 at  2:09:49 PM  ** Final **           Assessment/Plan   Principal Problem:    Sepsis with acute hypoxic respiratory failure without septic shock (Multi)  Active Problems:    Iron deficiency anemia    Chronic systolic (congestive) heart failure    Essential hypertension    History of pulmonary embolism    Anxiety    Vitamin D deficiency    Vitamin B12 deficiency    Adult hypothyroidism    Pneumonia of right lower lobe due to infectious organism    Leukocytosis    Acute kidney injury superimposed on stage 3a chronic kidney disease    Elevated troponin    Hyperbilirubinemia    Hyperglycemia    Hypokalemia    Hypomagnesemia    Hypocalcemia    Abnormality of right ventricle of heart    Impaired mobility and ADLs    Echocardiogram from June 2024:    Left Ventricle: Low normal left ventricular systolic function with a   visually estimated EF of 50 - 55%. Left ventricle size is normal. Normal   wall thickness. Ventricular mass is normal. Findings consistent with   concentric remodeling. Normal wall motion. Grade I diastolic dysfunction   with normal LAP.     Right Ventricle: Right ventricle size is normal. Normal systolic   function. TAPSE is 1.9 cm.     Aortic Valve: No stenosis.     Mitral Valve: Mild regurgitation.     Tricuspid Valve: Normal RVSP. The estimated RVSP is 24 mmHg.     Pericardium: No pericardial effusion.         Pneumonia  -CT chest reviewed  -Procal downtrending  -antibiotics completed  -bronchodilators  -oxygen support->currently on room air  -Pulmonary following, note reviewed     2. Abnormal CT finding of RV  -Unusual hypodensities in the right ventricular free wall  -Echo as above  -Repeat limited echo reviewed, no abnormality noted  -Most likely an RV wall fat pad  -Can obtain outpt cardiac MRI to further eval     3. Acute on chronic diastolic CHF with mildly reduced EF  -CT showed pleural effusion, possible parapneumonic vs cardiac  -  -I reviewed the Echocardiogram as above  -Strict I &  Os  -Daily weights  -2gm na diet  -BP low most likely from infection and third spacing with low albumin  -No plans for thoracentesis->restarted Eliquis  -Stop imdur for low BP  -Change Coreg to metoprolol 25mg BID for better rate control  -CT abd/pelvis showed pleural effusions, anasarca, and ascities  -Albumin 1.9, given IV albumin and Lasix IV (11/5)  -BP borderline low, Midodrine 5mg TID started  -Bumex 1mg IV daily since last week  -Started Bumex 1mg PO daily  -Repeat CXR showed worsened effusion and congestion  -Started Bumex gtt at 0.5mg/hr    4. UTI, resolved  -WBC elevated  -Urine culture showed multiple organisms, possible contamination  -antibiotics completed     5. Elevated troponins-Non MI elevation  -Troponin 76, now downtrending  -EKG showed SR LBBB->chronic LBBB  -Denies ACS symptoms  -limited echo as above, normal LVEF     6. Hx of DVT, now with bilateral acute DVT  -Venous U/S showed DVT right femoral vein, age indeterminate thrombus in right popliteal, right posterior tibial and right peroneal veins. DVT left popliteal vein   -Eliquis held  -heparin gtt started  -Heme/onc consulted     7. Hypokalemia/hypomagnesemia  -supplement     8. Hx of HTN, with hypotension  -stop imdur  -Gave albumin with lasix post (11/5)  -Cont Midodrine 5mg TID  -Coreg changed to metoprolol  -Monitor      OLI Jules-CNP

## 2024-11-13 NOTE — PROGRESS NOTES
"Desirae Gregg is a 83 y.o. female on day 10 of admission presenting with Sepsis with acute hypoxic respiratory failure without septic shock (Multi).    Subjective   Leg is sore. Denies increased sob. Doing ok     Objective     Physical Exam  Constitutional:       Appearance: Normal appearance.   HENT:      Head: Normocephalic.      Nose: Nose normal.      Mouth/Throat:      Mouth: Mucous membranes are dry.   Eyes:      Extraocular Movements: Extraocular movements intact.      Pupils: Pupils are equal, round, and reactive to light.   Neck:      Comments: Slight jvd noted  Cardiovascular:      Pulses: Normal pulses.      Comments: Irregular, tachy at times. 1/6 short systolic murmur  Pulmonary:      Comments: Coarse basilar breath sounds on right, decreased on left. Easy respirations  Abdominal:      General: Bowel sounds are normal.      Palpations: Abdomen is soft.   Musculoskeletal:      Comments: LLE significantly larger than right, increased today. No edema of RLE  Pulses equal  Erythema around wound, quite warm to touch in lower leg as well. Wound is large, has grayish exudate present currently. Quite tender.    Skin:     General: Skin is warm and dry.   Neurological:      Mental Status: She is alert and oriented to person, place, and time.      Motor: Weakness present.   Psychiatric:         Mood and Affect: Mood normal.         Behavior: Behavior normal.       Last Recorded Vitals  Blood pressure 113/72, pulse 91, temperature 36.6 °C (97.9 °F), temperature source Temporal, resp. rate 18, height 1.651 m (5' 5\"), weight 72.8 kg (160 lb 7.9 oz), SpO2 93%.  Intake/Output last 3 Shifts:  I/O last 3 completed shifts:  In: 1321 (17.7 mL/kg) [P.O.:1080; I.V.:241 (3.2 mL/kg)]  Out: 1227 (16.5 mL/kg) [Urine:1225 (0.5 mL/kg/hr); Stool:2]  Weight: 74.6 kg     Relevant Results    Current Facility-Administered Medications:     acetaminophen (Tylenol) tablet 650 mg, 650 mg, oral, q4h PRN **OR** acetaminophen (Tylenol) oral " liquid 650 mg, 650 mg, oral, q4h PRN **OR** [DISCONTINUED] acetaminophen (Tylenol) suppository 650 mg, 650 mg, rectal, q4h PRN, Eloy Hua MD    [Held by provider] apixaban (Eliquis) tablet 2.5 mg, 2.5 mg, oral, q12h, SHELBY Jules, 2.5 mg at 11/12/24 0834    bumetanide (Bumex) 25 mg in 100 mL (0.25 mg/mL) infusion, 0.5 mg/hr, intravenous, Continuous, SHELBY Jules, Last Rate: 2 mL/hr at 11/13/24 0428, 0.5 mg/hr at 11/13/24 0428    [Held by provider] bumetanide (Bumex) tablet 1 mg, 1 mg, oral, Daily, Cha Ramsay DO, 1 mg at 11/12/24 0833    busPIRone (Buspar) tablet 7.5 mg, 7.5 mg, oral, BID, Eloy Hua MD, 7.5 mg at 11/12/24 2016    carvedilol (Coreg) tablet 3.125 mg, 3.125 mg, oral, BID, SHELBY Jules, 3.125 mg at 11/12/24 2016    cetirizine (ZyrTEC) tablet 10 mg, 10 mg, oral, Daily, Eloy Hua MD, 10 mg at 11/12/24 0833    cholecalciferol (Vitamin D-3) tablet 2,000 Units, 2,000 Units, oral, Daily, Eloy Hua MD, 2,000 Units at 11/12/24 0833    cyanocobalamin (Vitamin B-12) tablet 1,000 mcg, 1,000 mcg, oral, Daily, Eloy Hua MD, 1,000 mcg at 11/12/24 0833    ferrous sulfate (325 mg ferrous sulfate) tablet 325 mg, 65 mg of iron, oral, Daily with breakfast, Eloy Hua MD, 325 mg at 11/12/24 0833    folic acid (Folvite) tablet 1 mg, 1 mg, oral, Daily, Eloy Hua MD, 1 mg at 11/12/24 0833    gabapentin (Neurontin) capsule 200 mg, 200 mg, oral, BID, Eloy Hua MD, 200 mg at 11/12/24 2015    heparin 25,000 Units in dextrose 5% 250 mL (100 Units/mL) infusion (premix), 0-4,500 Units/hr, intravenous, Continuous, Татьяна Agarwal MD, Last Rate: 14 mL/hr at 11/13/24 0428, 1,400 Units/hr at 11/13/24 0428    heparin bolus from bag 3,000-6,000 Units, 3,000-6,000 Units, intravenous, q4h PRN, Татьяна Agarwal MD    [Held by provider] isosorbide mononitrate ER (Imdur) 24 hr tablet 30 mg, 30 mg, oral, Daily, Eloy Hua MD     levothyroxine (Synthroid, Levoxyl) tablet 88 mcg, 88 mcg, oral, Daily, Eloy Hua MD, 88 mcg at 11/13/24 0527    loperamide (Imodium) capsule 2 mg, 2 mg, oral, 4x daily PRN, Eloy Hua MD, 2 mg at 11/10/24 0945    melatonin tablet 3 mg, 3 mg, oral, Nightly PRN, Eloy Hua MD    methyl salicylate-menthol (Bengay) 15-10 % greaseless cream 1 Application, 1 Application, Topical, TID PRN, Cha Ramsay, DO, 1 Application at 11/12/24 1217    midodrine (Proamatine) tablet 5 mg, 5 mg, oral, TID, Cha Agidi, DO, 5 mg at 11/12/24 1820    nystatin (Mycostatin) cream, , Topical, BID, Eloy Hua MD, Given at 11/12/24 2017    ondansetron ODT (Zofran-ODT) disintegrating tablet 4 mg, 4 mg, oral, q8h PRN **OR** ondansetron (Zofran) injection 4 mg, 4 mg, intravenous, q8h PRN, Eloy Hua MD, 4 mg at 11/11/24 2220    oxyCODONE (Roxicodone) immediate release tablet 5 mg, 5 mg, oral, q6h PRN, Eloy Hua MD, 5 mg at 11/12/24 0856    oxygen (O2) therapy, , inhalation, Continuous PRN - O2/gases, Cha Ramsay, DO, 2 L/min at 11/07/24 2100    pantoprazole (ProtoNix) EC tablet 40 mg, 40 mg, oral, BID AC, Eloy Hua MD, 40 mg at 11/13/24 0527    phenyleph-min oil-petrolatum (Preparation H) 0.25-14-74.9 % rectal ointment, , rectal, TID, Sensaúl Mckoyidi, DO, Given at 11/12/24 2017    polyethylene glycol (Glycolax, Miralax) packet 17 g, 17 g, oral, Daily PRN, Eloy Hua MD    potassium chloride CR (Klor-Con M20) ER tablet 40 mEq, 40 mEq, oral, q4h, Sherron Jones, APRN-CNP    psyllium (Metamucil) 3.4 gram packet 1 packet, 1 packet, oral, Daily, Татьяна Agarwal MD    sertraline (Zoloft) tablet 25 mg, 25 mg, oral, Daily, Eloy Hua MD, 25 mg at 11/12/24 0834    zinc oxide 40 % ointment 1 Application, 1 Application, Topical, BID, Eloy Hua MD, 1 Application at 11/12/24 2017     Results for orders placed or performed during the hospital encounter of 11/03/24 (from the past 24 hours)   Heparin  Assay, [facility]   Result Value Ref Range    Heparin Unfractionated 0.5 See Comment Below for Therapeutic Ranges IU/mL   Heparin Assay, [facility]   Result Value Ref Range    Heparin Unfractionated 0.5 See Comment Below for Therapeutic Ranges IU/mL   Renal Function Panel   Result Value Ref Range    Glucose 103 (H) 74 - 99 mg/dL    Sodium 135 (L) 136 - 145 mmol/L    Potassium 3.2 (L) 3.5 - 5.3 mmol/L    Chloride 97 (L) 98 - 107 mmol/L    Bicarbonate 30 21 - 32 mmol/L    Anion Gap 11 10 - 20 mmol/L    Urea Nitrogen 17 6 - 23 mg/dL    Creatinine 0.93 0.50 - 1.05 mg/dL    eGFR 60 (L) >60 mL/min/1.73m*2    Calcium 7.2 (L) 8.6 - 10.3 mg/dL    Phosphorus 3.0 2.5 - 4.9 mg/dL    Albumin 2.1 (L) 3.4 - 5.0 g/dL   CBC and Auto Differential   Result Value Ref Range    WBC 26.1 (H) 4.4 - 11.3 x10*3/uL    nRBC 0.0 0.0 - 0.0 /100 WBCs    RBC 5.46 (H) 4.00 - 5.20 x10*6/uL    Hemoglobin 14.7 12.0 - 16.0 g/dL    Hematocrit 48.0 (H) 36.0 - 46.0 %    MCV 88 80 - 100 fL    MCH 26.9 26.0 - 34.0 pg    MCHC 30.6 (L) 32.0 - 36.0 g/dL    RDW 16.5 (H) 11.5 - 14.5 %    Platelets 250 150 - 450 x10*3/uL    Neutrophils % 83.8 40.0 - 80.0 %    Immature Granulocytes %, Automated 3.7 (H) 0.0 - 0.9 %    Lymphocytes % 5.3 13.0 - 44.0 %    Monocytes % 4.0 2.0 - 10.0 %    Eosinophils % 2.4 0.0 - 6.0 %    Basophils % 0.8 0.0 - 2.0 %    Neutrophils Absolute 21.87 (H) 1.60 - 5.50 x10*3/uL    Immature Granulocytes Absolute, Automated 0.98 (H) 0.00 - 0.50 x10*3/uL    Lymphocytes Absolute 1.39 0.80 - 3.00 x10*3/uL    Monocytes Absolute 1.05 (H) 0.05 - 0.80 x10*3/uL    Eosinophils Absolute 0.64 (H) 0.00 - 0.40 x10*3/uL    Basophils Absolute 0.21 (H) 0.00 - 0.10 x10*3/uL               Assessment/Plan     Acute hypoxic respiratory failure, secondary to decompensated heart failure. She is on room air now, but cxr did show worsening of chf yesterday. Output has declined over last 24 hours. Cards on board. She is on bumex drip  Acute DVT, LLE. She had been on eliquis, but low  dose, possibly due to risk factors, now on heparin drip. Have asked heme to see for recommendations about anticoagulation. Increase eliquis or switch back to coumadin, which she has been on in past.   Cellulitis/wound LLE. Wound care input. ID on board.   Hypokalemia. Supplement and recheck  CLL/myelofibrosis  Hypothyroid, stable  Protein rosi malnutrition, albumin is 2.1. encourage oral intake, supplements  CKD, stage 3. Stable at this time  Tele--reviewed, appears to be having some af, rvr. Cards on board. Defer thx to them at this time       I spent 32 minutes in the professional and overall care of this patient.      Татьяна Agarwal MD

## 2024-11-13 NOTE — NURSING NOTE
1930: assumed pt care, pt has a heparin drip running already at 14 ml/hr    2013: pt's hep drip running already, asking Daksha Quijano NP if she still wants the aPTT lab drawn after drip was hung    2032: b/c pt's hep drip already running, no need to draw aPTT level since it may not be accurate per Daksha Quijano NP    11-13-24/0130: gave pt over to JC Kapadia

## 2024-11-14 LAB
ALBUMIN SERPL BCP-MCNC: 2 G/DL (ref 3.4–5)
ANION GAP SERPL CALC-SCNC: 9 MMOL/L (ref 10–20)
ATRIAL RATE: 104 BPM
BUN SERPL-MCNC: 16 MG/DL (ref 6–23)
CALCIUM SERPL-MCNC: 7.3 MG/DL (ref 8.6–10.3)
CHLORIDE SERPL-SCNC: 96 MMOL/L (ref 98–107)
CO2 SERPL-SCNC: 30 MMOL/L (ref 21–32)
CREAT SERPL-MCNC: 0.95 MG/DL (ref 0.5–1.05)
EGFRCR SERPLBLD CKD-EPI 2021: 59 ML/MIN/1.73M*2
ERYTHROCYTE [DISTWIDTH] IN BLOOD BY AUTOMATED COUNT: 17.3 % (ref 11.5–14.5)
GLUCOSE SERPL-MCNC: 110 MG/DL (ref 74–99)
HCT VFR BLD AUTO: 51.3 % (ref 36–46)
HGB BLD-MCNC: 14.9 G/DL (ref 12–16)
MCH RBC QN AUTO: 26.9 PG (ref 26–34)
MCHC RBC AUTO-ENTMCNC: 29 G/DL (ref 32–36)
MCV RBC AUTO: 93 FL (ref 80–100)
NRBC BLD-RTO: 0 /100 WBCS (ref 0–0)
P AXIS: 54 DEGREES
P OFFSET: 188 MS
P ONSET: 138 MS
PHOSPHATE SERPL-MCNC: 2.8 MG/DL (ref 2.5–4.9)
PLATELET # BLD AUTO: 205 X10*3/UL (ref 150–450)
POTASSIUM SERPL-SCNC: 3.4 MMOL/L (ref 3.5–5.3)
PR INTERVAL: 162 MS
Q ONSET: 219 MS
QRS COUNT: 18 BEATS
QRS DURATION: 124 MS
QT INTERVAL: 368 MS
QTC CALCULATION(BAZETT): 483 MS
QTC FREDERICIA: 442 MS
R AXIS: -52 DEGREES
RBC # BLD AUTO: 5.53 X10*6/UL (ref 4–5.2)
SODIUM SERPL-SCNC: 132 MMOL/L (ref 136–145)
T AXIS: 169 DEGREES
T OFFSET: 403 MS
UFH PPP CHRO-ACNC: 0.5 IU/ML
UFH PPP CHRO-ACNC: 0.6 IU/ML
UFH PPP CHRO-ACNC: 0.9 IU/ML
VENTRICULAR RATE: 104 BPM
WBC # BLD AUTO: 31.2 X10*3/UL (ref 4.4–11.3)

## 2024-11-14 PROCEDURE — 85520 HEPARIN ASSAY: CPT | Performed by: INTERNAL MEDICINE

## 2024-11-14 PROCEDURE — 36415 COLL VENOUS BLD VENIPUNCTURE: CPT | Performed by: INTERNAL MEDICINE

## 2024-11-14 PROCEDURE — 2500000004 HC RX 250 GENERAL PHARMACY W/ HCPCS (ALT 636 FOR OP/ED): Performed by: INTERNAL MEDICINE

## 2024-11-14 PROCEDURE — 99233 SBSQ HOSP IP/OBS HIGH 50: CPT | Performed by: INTERNAL MEDICINE

## 2024-11-14 PROCEDURE — 85027 COMPLETE CBC AUTOMATED: CPT | Performed by: INTERNAL MEDICINE

## 2024-11-14 PROCEDURE — 2500000001 HC RX 250 WO HCPCS SELF ADMINISTERED DRUGS (ALT 637 FOR MEDICARE OP): Performed by: INTERNAL MEDICINE

## 2024-11-14 PROCEDURE — 2500000004 HC RX 250 GENERAL PHARMACY W/ HCPCS (ALT 636 FOR OP/ED): Performed by: NURSE PRACTITIONER

## 2024-11-14 PROCEDURE — 80069 RENAL FUNCTION PANEL: CPT | Performed by: INTERNAL MEDICINE

## 2024-11-14 PROCEDURE — 2500000002 HC RX 250 W HCPCS SELF ADMINISTERED DRUGS (ALT 637 FOR MEDICARE OP, ALT 636 FOR OP/ED): Performed by: INTERNAL MEDICINE

## 2024-11-14 PROCEDURE — 2500000001 HC RX 250 WO HCPCS SELF ADMINISTERED DRUGS (ALT 637 FOR MEDICARE OP): Performed by: NURSE PRACTITIONER

## 2024-11-14 PROCEDURE — 99232 SBSQ HOSP IP/OBS MODERATE 35: CPT | Performed by: INTERNAL MEDICINE

## 2024-11-14 PROCEDURE — 1200000002 HC GENERAL ROOM WITH TELEMETRY DAILY

## 2024-11-14 RX ORDER — BUMETANIDE 1 MG/1
1 TABLET ORAL
Status: DISCONTINUED | OUTPATIENT
Start: 2024-11-14 | End: 2024-11-19 | Stop reason: HOSPADM

## 2024-11-14 ASSESSMENT — COGNITIVE AND FUNCTIONAL STATUS - GENERAL
MOVING TO AND FROM BED TO CHAIR: A LOT
MOBILITY SCORE: 12
TOILETING: A LOT
STANDING UP FROM CHAIR USING ARMS: A LOT
PERSONAL GROOMING: A LOT
MOVING FROM LYING ON BACK TO SITTING ON SIDE OF FLAT BED WITH BEDRAILS: A LITTLE
DRESSING REGULAR UPPER BODY CLOTHING: A LITTLE
DRESSING REGULAR LOWER BODY CLOTHING: A LOT
EATING MEALS: A LITTLE
EATING MEALS: A LITTLE
DAILY ACTIVITIY SCORE: 14
DRESSING REGULAR UPPER BODY CLOTHING: A LOT
PERSONAL GROOMING: A LOT
MOBILITY SCORE: 13
CLIMB 3 TO 5 STEPS WITH RAILING: A LOT
TURNING FROM BACK TO SIDE WHILE IN FLAT BAD: A LOT
DAILY ACTIVITIY SCORE: 13
WALKING IN HOSPITAL ROOM: A LOT
WALKING IN HOSPITAL ROOM: A LOT
CLIMB 3 TO 5 STEPS WITH RAILING: A LOT
TURNING FROM BACK TO SIDE WHILE IN FLAT BAD: A LOT
STANDING UP FROM CHAIR USING ARMS: A LOT
TOILETING: A LOT
HELP NEEDED FOR BATHING: A LOT
MOVING TO AND FROM BED TO CHAIR: A LOT
HELP NEEDED FOR BATHING: A LOT
DRESSING REGULAR LOWER BODY CLOTHING: A LOT
MOVING FROM LYING ON BACK TO SITTING ON SIDE OF FLAT BED WITH BEDRAILS: A LOT

## 2024-11-14 ASSESSMENT — PAIN SCALES - GENERAL
PAINLEVEL_OUTOF10: 0 - NO PAIN
PAINLEVEL_OUTOF10: 0 - NO PAIN

## 2024-11-14 NOTE — PROGRESS NOTES
Occupational Therapy                 Therapy Communication Note    Patient Name: Desirae Gregg  MRN: [id number]  Department: [address]  Room: [address] [place], [place] [address]  Today's Date: 11/14/2024     Discipline: Occupational Therapy    Missed Visit Reason: Missed Visit Reason: Patient placed on medical hold (Pt with DVT on Heparin. Per PT, pt with very excoriated skin on her sacrum/bottom along with several bouts of diarrhea, RN requesting to hold therapy. No OT treatment this date.)    Missed Time: Attempt    Comment: 1122

## 2024-11-14 NOTE — PROGRESS NOTES
"Desirae Gregg is a 83 y.o. female on day 11 of admission presenting with Sepsis with acute hypoxic respiratory failure without septic shock (Multi).    Subjective   Says her leg feels better. Having quite a bit of loose stools. Bottom is sore, reddened       Objective     Physical Exam  HENT:      Head: Normocephalic.      Ears:      Comments: Slightly [place]     Nose: Nose normal.      Mouth/Throat:      Mouth: Mucous membranes are dry.   Eyes:      Extraocular Movements: Extraocular movements intact.      Pupils: Pupils are equal, round, and reactive to light.   Cardiovascular:      Pulses: Normal pulses.      Comments: Regular, 2/6 systolic murmur  Pulmonary:      Comments: Basilar crackles bilaterally. No use of accessory muscles  Abdominal:      Comments: Soft. Hyperactive bowel sounds. Mild discomfort to palpation   Musculoskeletal:      Comments: LLE is much less edematous. Normal temp now.   Erythema has receded nicely. Dressing intact.   No pitting edema, either leg   Skin:     General: Skin is dry.   Neurological:      Mental Status: She is alert. Mental status is at baseline.   Psychiatric:         Mood and Affect: Mood normal.         Last Recorded Vitals  Blood pressure 110/70, pulse 78, temperature 35.2 °C (95.4 °F), temperature source Temporal, resp. rate 16, height 1.651 m (5' 5\"), weight 75.8 kg (167 lb 3.2 oz), SpO2 90%.  Intake/Output last 3 Shifts:  I/O last 3 completed shifts:  In: 1922.2 (25.3 mL/kg) [P.O.:1345; I.V.:527.2 (7 mL/kg); IV Piggyback:50]  Out: 2052 (27.1 mL/kg) [Urine:2050 (0.8 mL/kg/hr); Stool:2]  Weight: 75.8 kg     Relevant Results    Current Facility-Administered Medications:     acetaminophen (Tylenol) tablet 650 mg, 650 mg, oral, q4h PRN **OR** acetaminophen (Tylenol) oral liquid 650 mg, 650 mg, oral, q4h PRN **OR** [DISCONTINUED] acetaminophen (Tylenol) suppository 650 mg, 650 mg, rectal, q4h PRN, Eloy Hua MD    [Held by provider] apixaban (Eliquis) tablet 2.5 mg, 2.5 mg, " oral, q12h, SHELBY Jules, 2.5 mg at 11/12/24 0834    bumetanide (Bumex) 25 mg in 100 mL (0.25 mg/mL) infusion, 0.5 mg/hr, intravenous, Continuous, SHELBY Jules, Last Rate: 2 mL/hr at 11/14/24 1126, 0.5 mg/hr at 11/14/24 1126    [Held by provider] bumetanide (Bumex) tablet 1 mg, 1 mg, oral, Daily, Cha Ramsay DO, 1 mg at 11/12/24 0833    busPIRone (Buspar) tablet 7.5 mg, 7.5 mg, oral, BID, Eloy Hua MD, 7.5 mg at 11/14/24 0918    cetirizine (ZyrTEC) tablet 10 mg, 10 mg, oral, Daily, Eloy Hua MD, 10 mg at 11/14/24 0919    cholecalciferol (Vitamin D-3) tablet 2,000 Units, 2,000 Units, oral, Daily, Eloy Hua MD, 2,000 Units at 11/14/24 0919    cyanocobalamin (Vitamin B-12) tablet 1,000 mcg, 1,000 mcg, oral, Daily, Eloy Hua MD, 1,000 mcg at 11/14/24 0919    [Held by provider] ferrous sulfate (325 mg ferrous sulfate) tablet 325 mg, 65 mg of iron, oral, Daily with breakfast, Eloy Hua MD, 325 mg at 11/12/24 0833    folic acid (Folvite) tablet 1 mg, 1 mg, oral, Daily, Eloy Hua MD, 1 mg at 11/14/24 0924    gabapentin (Neurontin) capsule 200 mg, 200 mg, oral, BID, Eloy Hua MD, 200 mg at 11/14/24 0919    heparin 25,000 Units in dextrose 5% 250 mL (100 Units/mL) infusion (premix), 0-4,500 Units/hr, intravenous, Continuous, Татьяна Agarwal MD, Last Rate: 12 mL/hr at 11/14/24 0911, 1,200 Units/hr at 11/14/24 0911    heparin bolus from bag 3,000-6,000 Units, 3,000-6,000 Units, intravenous, q4h PRN, Татьяна Agarwal MD    levothyroxine (Synthroid, Levoxyl) tablet 88 mcg, 88 mcg, oral, Daily, Eloy Hua MD, 88 mcg at 11/14/24 0618    loperamide (Imodium) capsule 2 mg, 2 mg, oral, 4x daily PRN, Eloy Hua MD, 2 mg at 11/14/24 1034    melatonin tablet 3 mg, 3 mg, oral, Nightly PRN, Eloy Hua MD    methyl salicylate-menthol (Bengay) 15-10 % greaseless cream 1 Application, 1 Application, Topical, TID PRN, Cha Ramsay DO, 1  Application at 11/12/24 1217    metoprolol succinate XL (Toprol-XL) 24 hr tablet 25 mg, 25 mg, oral, BID, Sherron Jones, APRN-CNP, 25 mg at 11/14/24 0919    midodrine (Proamatine) tablet 5 mg, 5 mg, oral, TID, Cha Ramsay DO, 5 mg at 11/14/24 0919    nystatin (Mycostatin) cream, , Topical, BID, Eloy Hua MD, Given at 11/13/24 2103    ondansetron ODT (Zofran-ODT) disintegrating tablet 4 mg, 4 mg, oral, q8h PRN **OR** ondansetron (Zofran) injection 4 mg, 4 mg, intravenous, q8h PRN, Eloy Hua MD, 4 mg at 11/11/24 2220    oxyCODONE (Roxicodone) immediate release tablet 5 mg, 5 mg, oral, q6h PRN, Eloy Hua MD, 5 mg at 11/12/24 0856    oxygen (O2) therapy, , inhalation, Continuous PRN - O2/gases, Cha Ramsay DO, 2 L/min at 11/07/24 2100    pantoprazole (ProtoNix) EC tablet 40 mg, 40 mg, oral, BID AC, Eloy Hua MD, 40 mg at 11/14/24 0618    phenyleph-min oil-petrolatum (Preparation H) 0.25-14-74.9 % rectal ointment, , rectal, TID, Cha Ramsay DO, Given at 11/13/24 2102    polyethylene glycol (Glycolax, Miralax) packet 17 g, 17 g, oral, Daily PRN, Eloy Hua MD    [Held by provider] psyllium (Metamucil) 3.4 gram packet 1 packet, 1 packet, oral, Daily, Таьтяна Agarwal MD, 1 packet at 11/13/24 0948    sertraline (Zoloft) tablet 25 mg, 25 mg, oral, Daily, Eloy Hua MD, 25 mg at 11/14/24 0918    zinc oxide 40 % ointment 1 Application, 1 Application, Topical, BID, Eloy Hua MD, 1 Application at 11/14/24 0918              Results for orders placed or performed during the hospital encounter of 11/03/24 (from the past 24 hours)   Renal Function Panel   Result Value Ref Range    Glucose 110 (H) 74 - 99 mg/dL    Sodium 132 (L) 136 - 145 mmol/L    Potassium 3.4 (L) 3.5 - 5.3 mmol/L    Chloride 96 (L) 98 - 107 mmol/L    Bicarbonate 30 21 - 32 mmol/L    Anion Gap 9 (L) 10 - 20 mmol/L    Urea Nitrogen 16 6 - 23 mg/dL    Creatinine 0.95 0.50 - 1.05 mg/dL    eGFR 59 (L) >60  mL/min/1.73m*2    Calcium 7.3 (L) 8.6 - 10.3 mg/dL    Phosphorus 2.8 2.5 - 4.9 mg/dL    Albumin 2.0 (L) 3.4 - 5.0 g/dL   CBC   Result Value Ref Range    WBC 31.2 (H) 4.4 - 11.3 x10*3/uL    nRBC 0.0 0.0 - 0.0 /100 WBCs    RBC 5.53 (H) 4.00 - 5.20 x10*6/uL    Hemoglobin 14.9 12.0 - 16.0 g/dL    Hematocrit 51.3 (H) 36.0 - 46.0 %    MCV 93 80 - 100 fL    MCH 26.9 26.0 - 34.0 pg    MCHC 29.0 (L) 32.0 - 36.0 g/dL    RDW 17.3 (H) 11.5 - 14.5 %    Platelets 205 150 - 450 x10*3/uL   Heparin Assay, [facility]   Result Value Ref Range    Heparin Unfractionated 0.9 See Comment Below for Therapeutic Ranges IU/mL       Assessment/Plan     Acute DVT, also chronic DVT. She is on heparin at this time. Awaiting input from heme. I think we could probably send her on higher dose of eliquis  Diarrhea. Stop metamucil. Add lacto. Likely secondary to ancef, which I am also going to stop. If it continues will need to send for c diff  CLL/myelofibrosis. White count continues to slowly rise. Again, awaiting input from heme. Her usual hematologist is Dr Resendiz.  Hypokalemia, supplement and recheck  Diastolic heart failure. Remains on bumex currently, may be able to stop this. ? Thora for effusion, follow up with pulm  Hypertension. Stable, on midodrine now, regimen has been adjusted  Depression. Stable on current regimen.       I spent 31 minutes in the professional and overall care of this patient.      Татьяна Agarwal MD

## 2024-11-14 NOTE — PROGRESS NOTES
Desirae Gregg is a 83 y.o. female on day 11 of admission presenting with Sepsis with acute hypoxic respiratory failure without septic shock (Multi).      Subjective   She is sitting up in the bed, states she feels better today. States her leg feels better. Leg edema improved and less red.     Telemetry SR with PACs and intermittent atrial tachycardia      Review of systems:  Constitutional: negative for fever, chills, or malaise  Neuro: negative for dizziness, headache, numbness, tingling  ENT: Negative for nasal congestion or sore throat  CV: negative for chest pain, palpitations  GI: negative for abd pain, nausea, vomiting or diarrhea  : negative for dysuria, frequency, or urgency  Musculoskeletal: Negative for weakness, myalgia, or arthralgia  Endocrine: Negative for polyuria or polydipsia         Objective   Constitutional: Well developed, awake/alert/oriented x3, no distress, alert and cooperative  Eyes: PERRL, EOMI, clear sclera  ENMT: mucous membranes moist, no apparent injury, no lesions seen  Head/Neck: Neck supple, no apparent injury, thyroid without mass or tenderness, No JVD, trachea midline, no bruits  Respiratory/Thorax: Patent airways, diminished bilat bases with good chest expansion, thorax symmetric  Cardiovascular: slightly irregular rhythm, no murmurs, 2+ equal pulses of the extremities, normal S 1and S 2  Gastrointestinal: Nondistended, soft, non-tender, no rebound tenderness or guarding, no masses palpable, no organomegaly, +BS, no bruits  Musculoskeletal: ROM intact, no joint swelling, normal strength  Extremities: +2 pitting edema, L>R, dressing to left leg  Neurological: alert and oriented x3, intact senses, motor, response and reflexes, normal strength  Lymphatic: No significant lymphadenopathy  Psychological: Appropriate mood and behavior  Skin: Warm and dry, dressing to left leg, surrounding skin less red and warm      Last Recorded Vitals  /70   Pulse 78   Temp 35.2 °C (95.4 °F)  "(Temporal)   Resp 16   Ht 1.651 m (5' 5\")   Wt 75.8 kg (167 lb 3.2 oz)   SpO2 90%   BMI 27.82 kg/m²     Intake/Output last 3 Shifts:  I/O last 3 completed shifts:  In: 1922.2 (25.3 mL/kg) [P.O.:1345; I.V.:527.2 (7 mL/kg); IV Piggyback:50]  Out: 2052 (27.1 mL/kg) [Urine:2050 (0.8 mL/kg/hr); Stool:2]  Weight: 75.8 kg   I/O this shift:  In: 420 [P.O.:420]  Out: -     Relevant Results  Scheduled medications  [Held by provider] apixaban, 2.5 mg, oral, q12h  [Held by provider] bumetanide, 1 mg, oral, Daily  busPIRone, 7.5 mg, oral, BID  cetirizine, 10 mg, oral, Daily  cholecalciferol, 2,000 Units, oral, Daily  cyanocobalamin, 1,000 mcg, oral, Daily  [Held by provider] ferrous sulfate (325 mg ferrous sulfate), 65 mg of iron, oral, Daily with breakfast  folic acid, 1 mg, oral, Daily  gabapentin, 200 mg, oral, BID  lactobacillus acidophilus, 1 capsule, oral, Daily  levothyroxine, 88 mcg, oral, Daily  metoprolol succinate XL, 25 mg, oral, BID  midodrine, 5 mg, oral, TID  nystatin, , Topical, BID  pantoprazole, 40 mg, oral, BID AC  phenyleph-min oil-petrolatum, , rectal, TID  [Held by provider] psyllium, 1 packet, oral, Daily  sertraline, 25 mg, oral, Daily  zinc oxide, 1 Application, Topical, BID      Continuous medications  bumetanide, 0.5 mg/hr, Last Rate: 0.5 mg/hr (11/14/24 1126)  heparin, 0-4,500 Units/hr, Last Rate: 1,200 Units/hr (11/14/24 0911)      PRN medications  PRN medications: acetaminophen **OR** acetaminophen **OR** [DISCONTINUED] acetaminophen, heparin, loperamide, melatonin, methyl salicylate-menthol, ondansetron ODT **OR** ondansetron, oxyCODONE, oxygen, polyethylene glycol    Results for orders placed or performed during the hospital encounter of 11/03/24 (from the past 24 hours)   Renal Function Panel   Result Value Ref Range    Glucose 110 (H) 74 - 99 mg/dL    Sodium 132 (L) 136 - 145 mmol/L    Potassium 3.4 (L) 3.5 - 5.3 mmol/L    Chloride 96 (L) 98 - 107 mmol/L    Bicarbonate 30 21 - 32 mmol/L    " Anion Gap 9 (L) 10 - 20 mmol/L    Urea Nitrogen 16 6 - 23 mg/dL    Creatinine 0.95 0.50 - 1.05 mg/dL    eGFR 59 (L) >60 mL/min/1.73m*2    Calcium 7.3 (L) 8.6 - 10.3 mg/dL    Phosphorus 2.8 2.5 - 4.9 mg/dL    Albumin 2.0 (L) 3.4 - 5.0 g/dL   CBC   Result Value Ref Range    WBC 31.2 (H) 4.4 - 11.3 x10*3/uL    nRBC 0.0 0.0 - 0.0 /100 WBCs    RBC 5.53 (H) 4.00 - 5.20 x10*6/uL    Hemoglobin 14.9 12.0 - 16.0 g/dL    Hematocrit 51.3 (H) 36.0 - 46.0 %    MCV 93 80 - 100 fL    MCH 26.9 26.0 - 34.0 pg    MCHC 29.0 (L) 32.0 - 36.0 g/dL    RDW 17.3 (H) 11.5 - 14.5 %    Platelets 205 150 - 450 x10*3/uL   Heparin Assay, UFH   Result Value Ref Range    Heparin Unfractionated 0.9 See Comment Below for Therapeutic Ranges IU/mL       Vascular US lower extremity venous duplex bilateral   Final Result      XR chest 1 view   Final Result   1.  Increased left lower lung airspace disease and probable   congestion.        Signed by: Alfonso Ricci 11/12/2024 5:25 PM   Dictation workstation:   [id number]      FL GI esophagram   Final Result   Modified exam due to patient's clinical condition. No esophageal   obstruction.        MACRO   Zain Mcknight discussed the significance and urgency of this   critical finding by Epic secure chat with  SYED JOSHI on 11/11/2024   at 10:56 am.  (**-RCF-**) Findings:  See findings.        Signed by: Zain Mcknight 11/11/2024 11:02 AM   Dictation workstation:   [id number]      XR chest 1 view   Final Result   1. Suggestion of mild interstitial edema, more pronounced on this   radiograph compared to prior. Recommend correlation with fluid status.   2. Redemonstration of moderate right and small left pleural effusions.   3. Right basilar airspace opacity, which could be related to   atelectasis versus pneumonia in appropriate clinical setting.        MACRO:   None        Signed by: Yazmin Oropeza 11/8/2024 4:17 PM   Dictation workstation:   [id number]      Transthoracic Echo (TTE) Limited   Final Result       CT abdomen pelvis wo IV contrast   Final Result   2 centimeters stone in the urinary bladder.        Bilateral pleural effusions. Pericardial effusion.        Ascites.        Anasarca.        Presacral edema.        Enlarged spleen.        The exam is limited by motion artifact.        MACRO:   none        Signed by: Jayshree Dias 11/4/2024 3:28 PM   Dictation workstation:   [id number]       renal complete   Final Result   No hydronephrosis. Decompressed bladder.        MACRO:   None        Signed by: Zain Mcknight 11/4/2024 11:08 AM   Dictation workstation:   [id number]      CT chest wo IV contrast   Final Result   Abnormalities at the right lung base on portable frontal chest   radiograph earlier today are due to a combination of moderate-sized,   mostly if not entirely free-flowing right pleural effusion with   associated multisegmental right lower lobe collapse adjacent to it        Small area of ground-glass airspace disease in the middle lobe   confirmed on CT may not have been expected on the prior radiograph        Possibility of mild ground-glass airspace disease in the right lower   lobe as well        No ground-glass airspace disease in the left lung        No consolidative pneumonia in any of the inflated lungs on either side        Small free-flowing left pleural effusion        Unusual hypodensities in the right ventricular free wall of uncertain   etiology and significance        Perhaps trace (smallest detectable quantity) pericardial effusion        Incidental abnormalities in the included upper abdomen as detailed   above        MACRO:   None        Signed by: Matthias Kolb 11/3/2024 11:35 AM   Dictation workstation:   [id number]      XR chest 1 view   Final Result   1. Right lower lobe airspace consolidation either pneumonia or   atelectasis with right pleural effusion. Follow-up to complete   resolution is needed   2. Enlarged cardiac silhouette             Signed by: Kary Cardoso  11/3/2024 10:27 AM   Dictation workstation:   [id number]          Transthoracic Echo (TTE) Limited    Result Date: 11/5/2024   [facility], [address] [place] [address]               Tel [phone] and Fax [phone] TRANSTHORACIC ECHOCARDIOGRAM REPORT  Patient Name:       EILE KING Kalyn Physician:    [id number] Chicho Goldman MD Study Date:         11/5/2024           Ordering Provider:    [id number] SHERITA GOYAL MRN/PID:            [id number]            Fellow: Accession#:         [id number]        Nurse: Date of Birth/Age:  1940 / 83     Sonographer:          Cathi garcia RDCS Gender assigned at  F                   Additional Staff: Birth: Height:             165.10 cm           Admit Date:           11/3/2024 Weight:             72.57 kg            Admission Status:     Inpatient -                                                               Routine BSA / BMI:          1.80 m2 / 26.63     Encounter#:           [id number]                     kg/m2 Blood Pressure:     103/62 mmHg         Department Location:  [facility]                                                               Invasive Study Type:    TRANSTHORACIC ECHO (TTE) LIMITED Diagnosis/ICD: 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 Study Detail: The following Echo studies were performed: 2D, Doppler and color               flow.  PHYSICIAN INTERPRETATION: Left Ventricle: The left ventricular systolic function is  normal, with a visually estimated ejection fraction of 60-65%. There is severely increased concentric left ventricular hypertrophy. There are no regional left ventricular wall motion abnormalities. The left ventricular cavity size is normal. There is moderately increased septal and moderately increased posterior left ventricular wall thickness. Spectral Doppler shows an abnormal pattern of left ventricular diastolic filling. Left Atrium: The left atrium is upper limits of normal in size. Right Ventricle: The right ventricle is normal in size. There is normal right ventricular global systolic function. Right Atrium: The right atrium is normal in size. Aortic Valve: The aortic valve is trileaflet. The aortic valve dimensionless index is 0.77. There is trace aortic valve regurgitation. The peak instantaneous gradient of the aortic valve is 13 mmHg. The mean gradient of the mitral valve is 7 mmHg. Mitral Valve: The mitral valve is normal in structure. There is trace to mild mitral valve regurgitation. Tricuspid Valve: The tricuspid valve is structurally normal. There is trace to mild tricuspid regurgitation. Pulmonic Valve: The pulmonic valve is not well visualized. The pulmonic valve regurgitation was not well visualized. Pericardium: There is no pericardial effusion noted. Aorta: The aortic root is normal.  CONCLUSIONS:  1. The left ventricular systolic function is normal, with a visually estimated ejection fraction of 60-65%.  2. Spectral Doppler shows an abnormal pattern of left ventricular diastolic filling.  3. There is moderately increased septal and moderately increased posterior left ventricular wall thickness.  4. There is severely increased concentric left ventricular hypertrophy.  5. There is normal right ventricular global systolic function. QUANTITATIVE DATA SUMMARY:  2D MEASUREMENTS:          Normal Ranges: IVSd:            1.49 cm  (0.6-1.1cm) LVPWd:           1.26 cm  (0.6-1.1cm) LVIDd:           4.47  cm  (3.9-5.9cm) LVIDs:           2.98 cm LV Mass Index:   133 g/m2 LVEDV Index:     23 ml/m2 LV % FS          33.4 %  LV SYSTOLIC FUNCTION BY 2D PLANIMETRY (MOD):                      Normal Ranges: EF-A4C View:    62 % (>=55%) EF-A2C View:    61 % EF-Biplane:     64 % EF-Visual:      63 % LV EF Reported: 63 %  LV DIASTOLIC FUNCTION:          Normal Ranges: MV Peak E:             0.70 m/s (0.7-1.2 m/s) MV Peak A:             0.91 m/s (0.42-0.7 m/s) E/A Ratio:             0.77     (1.0-2.2)  MITRAL VALVE:          Normal Ranges: MV DT:        264 msec (150-240msec)  AORTIC VALVE:                      Normal Ranges: AoV Vmax:                1.78 m/s  (<=1.7m/s) AoV Peak P.7 mmHg (<20mmHg) AoV Mean P.0 mmHg  (1.7-11.5mmHg) LVOT Max Josiah:            1.33 m/s  (<=1.1m/s) AoV VTI:                 31.31 cm  (18-25cm) LVOT VTI:                24.11 cm LVOT Diameter:           2.04 cm   (1.8-2.4cm) AoV Area, VTI:           2.52 cm2  (2.5-5.5cm2) AoV Area,Vmax:           2.44 cm2  (2.5-4.5cm2) AoV Dimensionless Index: 0.77  TRICUSPID VALVE/RVSP:          Normal Ranges: Peak TR Velocity:     3.46 m/s  PULMONIC VALVE:          Normal Ranges: PV Max Josiah:     0.8 m/s  (0.6-0.9m/s) PV Max P.8 mmHg  [id number] Chicho Goldman MD Electronically signed on 2024 at 2:09:49 PM  ** Final **           Assessment/Plan   Principal Problem:    Sepsis with acute hypoxic respiratory failure without septic shock (Multi)  Active Problems:    Iron deficiency anemia    Chronic systolic (congestive) heart failure    Essential hypertension    History of pulmonary embolism    Anxiety    Vitamin D deficiency    Vitamin B12 deficiency    Adult hypothyroidism    Pneumonia of right lower lobe due to infectious organism    Leukocytosis    Acute kidney injury superimposed on stage 3a chronic kidney disease    Elevated troponin    Hyperbilirubinemia    Hyperglycemia    Hypokalemia    Hypomagnesemia    Hypocalcemia     Abnormality of right ventricle of heart    Impaired mobility and ADLs    Echocardiogram from June 2024:    Left Ventricle: Low normal left ventricular systolic function with a   visually estimated EF of 50 - 55%. Left ventricle size is normal. Normal   wall thickness. Ventricular mass is normal. Findings consistent with   concentric remodeling. Normal wall motion. Grade I diastolic dysfunction   with normal LAP.     Right Ventricle: Right ventricle size is normal. Normal systolic   function. TAPSE is 1.9 cm.     Aortic Valve: No stenosis.     Mitral Valve: Mild regurgitation.     Tricuspid Valve: Normal RVSP. The estimated RVSP is 24 mmHg.     Pericardium: No pericardial effusion.         Pneumonia  -CT chest reviewed  -Procal downtrending  -antibiotics completed  -bronchodilators  -oxygen support->currently on room air  -Pulmonary following, note reviewed     2. Abnormal CT finding of RV  -Unusual hypodensities in the right ventricular free wall  -Echo as above  -Repeat limited echo reviewed, no abnormality noted  -Most likely an RV wall fat pad  -Can obtain outpt cardiac MRI to further eval     3. Acute on chronic diastolic CHF with mildly reduced EF  -CT showed pleural effusion, possible parapneumonic vs cardiac  -  -I reviewed the Echocardiogram as above  -Strict I & Os  -Daily weights  -2gm na diet  -BP low most likely from infection and third spacing with low albumin  -Stop imdur for low BP  -Changed Coreg to metoprolol 25mg BID for better rate control  -CT abd/pelvis showed pleural effusions, anasarca, and ascities  -Albumin 1.9, given IV albumin and Lasix IV (11/5)  -Cont Midodrine 5mg TID  -Bumex 1mg IV daily since admit,  then started Bumex 1mg PO daily  -Repeat CXR showed worsened effusion and congestion  -Started Bumex gtt at 0.5mg/hr  -Discussed with pulmonology, will check bedside US to assess effusions    4. UTI, resolved  -WBC elevated  -Urine culture showed multiple organisms, possible  contamination  -antibiotics completed     5. Elevated troponins-Non MI elevation  -Troponin 76, now downtrending  -EKG showed SR LBBB->chronic LBBB  -Denies ACS symptoms  -limited echo as above, normal LVEF     6. Hx of DVT, now with bilateral acute DVT  -Venous U/S showed DVT right femoral vein, age indeterminate thrombus in right popliteal, right posterior tibial and right peroneal veins. DVT left popliteal vein   -Eliquis held  -heparin gtt started  -Heme/onc consulted     7. Hypokalemia/hypomagnesemia  -supplement     8. Hx of HTN, with hypotension  -stop imdur  -Gave albumin with lasix post (11/5)  -Cont Midodrine 5mg TID  -Coreg changed to metoprolol  -Monitor      OLI Jules-CNP

## 2024-11-14 NOTE — PROGRESS NOTES
Desirae Gregg is a 83 y.o. female on day 11 of admission presenting with Sepsis with acute hypoxic respiratory failure without septic shock (Multi).    Subjective   Interval History: no fever, no new complaints        Review of Systems    Objective   Range of Vitals (last 24 hours)  Heart Rate:  [63-82]   Temp:  [35.2 °C (95.4 °F)-36.2 °C (97.2 °F)]   Resp:  [16-18]   BP: (110-136)/(70-80)   Weight:  [75.8 kg (167 lb 3.2 oz)]   SpO2:  [90 %-95 %]   Daily Weight  11/14/24 : 75.8 kg (167 lb 3.2 oz)    Body mass index is 27.82 kg/m².    Physical Exam  Constitutional:       Appearance: Normal appearance.   HENT:      Head: Normocephalic and atraumatic.      Mouth/Throat:      Mouth: Mucous membranes are moist.      Pharynx: Oropharynx is clear.   Eyes:      Pupils: Pupils are equal, round, and reactive to light.   Cardiovascular:      Rate and Rhythm: Normal rate and regular rhythm.      Heart sounds: Normal heart sounds.   Pulmonary:      Effort: Pulmonary effort is normal.      Breath sounds: Normal breath sounds.   Abdominal:      General: Abdomen is flat. Bowel sounds are normal.      Palpations: Abdomen is soft.   Musculoskeletal:      Cervical back: Normal range of motion.      Comments: Lt leg wound, no cellulitis    Neurological:      Mental Status: She is alert.         Antibiotics  ceFAZolin - 1 gram/50 mL    Relevant Results  Labs  Results from last 72 hours   Lab Units 11/14/24  0617 11/13/24  0633 11/12/24  0622   WBC AUTO x10*3/uL 31.2* 26.1* 25.8*   HEMOGLOBIN g/dL 14.9 14.7 14.5   HEMATOCRIT % 51.3* 48.0* 47.8*   PLATELETS AUTO x10*3/uL 205 250 257   NEUTROS PCT AUTO %  --  83.8 84.1   LYMPHS PCT AUTO %  --  5.3 4.9   MONOS PCT AUTO %  --  4.0 4.1   EOS PCT AUTO %  --  2.4 2.6     Results from last 72 hours   Lab Units 11/14/24  0617 11/13/24  0633 11/12/24  0622   SODIUM mmol/L 132* 135* 135*   POTASSIUM mmol/L 3.4* 3.2* 3.6   CHLORIDE mmol/L 96* 97* 101   CO2 mmol/L 30 30 29   BUN mg/dL 16 17 20    CREATININE mg/dL 0.95 0.93 0.92   GLUCOSE mg/dL 110* 103* 108*   CALCIUM mg/dL 7.3* 7.2* 7.5*   ANION GAP mmol/L 9* 11 9*   EGFR mL/min/1.73m*2 59* 60* 61   PHOSPHORUS mg/dL 2.8 3.0 3.0     Results from last 72 hours   Lab Units 11/14/24  0617 11/13/24  0633 11/12/24  0622   ALBUMIN g/dL 2.0* 2.1* 2.1*     Estimated Creatinine Clearance: 44.1 mL/min (by C-G formula based on SCr of 0.95 mg/dL).  C-Reactive Protein   Date Value Ref Range Status   11/12/2024 1.04 (H) <1.00 mg/dL Final   11/11/2024 0.86 <1.00 mg/dL Final   11/08/2024 2.84 (H) <1.00 mg/dL Final     Microbiology  Reviewed  Imaging  Reviewed        Assessment/Plan   Leukocytosis, likely related to myelodysplasia, US with legs DVT   Sepsis, treated  Respiratory failure / pneumonia  Bacteriuria  Polycythemia / myelofibrosis  Left leg wound / some stasis, unlikely to be cellulitis     Recommendations :  Supportive care  Discussed with the medical team     I spent minutes in the professional and overall care of this patient.      Cameron Lassiter MD

## 2024-11-14 NOTE — PROGRESS NOTES
"Physical Therapy                 Therapy Communication Note    Patient Name: Desirae Gregg  MRN: [id number]  Department: [address]  Room: 132[facility]  Today's Date: 11/14/2024     Discipline: Physical Therapy    Missed Visit Reason: Missed Visit Reason: Patient in a medical procedure, No show (Per JC Zavala \"hold therapy today, glen has had 4 bouts of diarrhea, and moved x4 already, her bottom very excoriated, to try some Immodium and skin care\" no tx, notified OT. spoke to glen.'s son who agrees with PT  hold, states \"she has a DVT now too.)    Missed Time: [id number]    Comment:  "

## 2024-11-14 NOTE — CARE PLAN
The patient's goals for the shift include      The clinical goals for the shift include pt will have reduced stool frequency this shift.    Over the shift, the patient did not make progress toward the following goals. Barriers to progression include pt having frequent stools . Recommendations to address these barriers include immodium given.    Problem: Fall/Injury  Goal: Use assistive devices by end of the shift  Outcome: Not Progressing

## 2024-11-15 LAB
ALBUMIN SERPL BCP-MCNC: 2.4 G/DL (ref 3.4–5)
ANION GAP SERPL CALC-SCNC: 14 MMOL/L (ref 10–20)
BNP SERPL-MCNC: 499 PG/ML (ref 0–99)
BUN SERPL-MCNC: 18 MG/DL (ref 6–23)
CALCIUM SERPL-MCNC: 7.7 MG/DL (ref 8.6–10.3)
CHLORIDE SERPL-SCNC: 93 MMOL/L (ref 98–107)
CO2 SERPL-SCNC: 31 MMOL/L (ref 21–32)
CREAT SERPL-MCNC: 1.05 MG/DL (ref 0.5–1.05)
CRP SERPL-MCNC: 0.8 MG/DL
EGFRCR SERPLBLD CKD-EPI 2021: 52 ML/MIN/1.73M*2
GLUCOSE SERPL-MCNC: 126 MG/DL (ref 74–99)
MAGNESIUM SERPL-MCNC: 1.48 MG/DL (ref 1.6–2.4)
PHOSPHATE SERPL-MCNC: 2.9 MG/DL (ref 2.5–4.9)
POTASSIUM SERPL-SCNC: 3.5 MMOL/L (ref 3.5–5.3)
PROCALCITONIN SERPL-MCNC: 0.24 NG/ML
SODIUM SERPL-SCNC: 134 MMOL/L (ref 136–145)
STAPHYLOCOCCUS SPEC CULT: ABNORMAL
UFH PPP CHRO-ACNC: 0.5 IU/ML

## 2024-11-15 PROCEDURE — 2500000004 HC RX 250 GENERAL PHARMACY W/ HCPCS (ALT 636 FOR OP/ED): Performed by: INTERNAL MEDICINE

## 2024-11-15 PROCEDURE — 36415 COLL VENOUS BLD VENIPUNCTURE: CPT | Performed by: INTERNAL MEDICINE

## 2024-11-15 PROCEDURE — 2500000002 HC RX 250 W HCPCS SELF ADMINISTERED DRUGS (ALT 637 FOR MEDICARE OP, ALT 636 FOR OP/ED): Performed by: INTERNAL MEDICINE

## 2024-11-15 PROCEDURE — 84100 ASSAY OF PHOSPHORUS: CPT | Performed by: INTERNAL MEDICINE

## 2024-11-15 PROCEDURE — 83880 ASSAY OF NATRIURETIC PEPTIDE: CPT | Performed by: INTERNAL MEDICINE

## 2024-11-15 PROCEDURE — 2500000001 HC RX 250 WO HCPCS SELF ADMINISTERED DRUGS (ALT 637 FOR MEDICARE OP): Performed by: INTERNAL MEDICINE

## 2024-11-15 PROCEDURE — 84145 PROCALCITONIN (PCT): CPT | Mod: GEALAB | Performed by: INTERNAL MEDICINE

## 2024-11-15 PROCEDURE — 1200000002 HC GENERAL ROOM WITH TELEMETRY DAILY

## 2024-11-15 PROCEDURE — 99233 SBSQ HOSP IP/OBS HIGH 50: CPT | Performed by: INTERNAL MEDICINE

## 2024-11-15 PROCEDURE — 2500000005 HC RX 250 GENERAL PHARMACY W/O HCPCS: Performed by: INTERNAL MEDICINE

## 2024-11-15 PROCEDURE — 86140 C-REACTIVE PROTEIN: CPT | Performed by: INTERNAL MEDICINE

## 2024-11-15 PROCEDURE — 85520 HEPARIN ASSAY: CPT | Performed by: INTERNAL MEDICINE

## 2024-11-15 PROCEDURE — 83735 ASSAY OF MAGNESIUM: CPT | Performed by: INTERNAL MEDICINE

## 2024-11-15 PROCEDURE — 2500000001 HC RX 250 WO HCPCS SELF ADMINISTERED DRUGS (ALT 637 FOR MEDICARE OP): Performed by: NURSE PRACTITIONER

## 2024-11-15 PROCEDURE — 2500000004 HC RX 250 GENERAL PHARMACY W/ HCPCS (ALT 636 FOR OP/ED): Performed by: NURSE PRACTITIONER

## 2024-11-15 PROCEDURE — 94760 N-INVAS EAR/PLS OXIMETRY 1: CPT

## 2024-11-15 RX ORDER — LANOLIN ALCOHOL/MO/W.PET/CERES
400 CREAM (GRAM) TOPICAL 2 TIMES DAILY
Status: DISCONTINUED | OUTPATIENT
Start: 2024-11-15 | End: 2024-11-19

## 2024-11-15 ASSESSMENT — COGNITIVE AND FUNCTIONAL STATUS - GENERAL
MOBILITY SCORE: 9
MOBILITY SCORE: 13
MOVING TO AND FROM BED TO CHAIR: A LOT
TURNING FROM BACK TO SIDE WHILE IN FLAT BAD: A LOT
DRESSING REGULAR UPPER BODY CLOTHING: A LOT
DAILY ACTIVITIY SCORE: 16
HELP NEEDED FOR BATHING: A LOT
MOVING FROM LYING ON BACK TO SITTING ON SIDE OF FLAT BED WITH BEDRAILS: A LITTLE
STANDING UP FROM CHAIR USING ARMS: TOTAL
CLIMB 3 TO 5 STEPS WITH RAILING: TOTAL
HELP NEEDED FOR BATHING: A LOT
STANDING UP FROM CHAIR USING ARMS: A LOT
DRESSING REGULAR LOWER BODY CLOTHING: A LOT
EATING MEALS: A LITTLE
WALKING IN HOSPITAL ROOM: TOTAL
DAILY ACTIVITIY SCORE: 14
TOILETING: A LOT
TOILETING: A LOT
WALKING IN HOSPITAL ROOM: A LOT
MOVING FROM LYING ON BACK TO SITTING ON SIDE OF FLAT BED WITH BEDRAILS: A LOT
DRESSING REGULAR LOWER BODY CLOTHING: A LOT
PERSONAL GROOMING: A LOT
MOVING TO AND FROM BED TO CHAIR: A LOT
DRESSING REGULAR UPPER BODY CLOTHING: A LITTLE
TURNING FROM BACK TO SIDE WHILE IN FLAT BAD: A LOT
CLIMB 3 TO 5 STEPS WITH RAILING: A LOT

## 2024-11-15 ASSESSMENT — PAIN - FUNCTIONAL ASSESSMENT
PAIN_FUNCTIONAL_ASSESSMENT: 0-10
PAIN_FUNCTIONAL_ASSESSMENT: 0-10

## 2024-11-15 ASSESSMENT — PAIN SCALES - GENERAL
PAINLEVEL_OUTOF10: 0 - NO PAIN
PAINLEVEL_OUTOF10: 0 - NO PAIN

## 2024-11-15 NOTE — PROGRESS NOTES
Physical Therapy                 Therapy Communication Note    Patient Name: Desirae Gregg  MRN: [id number]  Department: [address]  Room: [address] [place] [address]-A  Today's Date: 11/15/2024     Discipline: Physical Therapy    Missed Visit Reason:  Patient and son refused secndary to pateint have diarrhea, sore excoriated buttocks and fatigue, Nursing aware.     Missed Time: [id number]    Comment:

## 2024-11-15 NOTE — PROGRESS NOTES
11/15/24 0948   Discharge Planning   Living Arrangements Children  (lives with son)   Support Systems Children   Assistance Needed Patient is A&O X2-3 (forgetful at times), requires minimal assist with ADLs (such as for transfers with assist from son using a gait belt), utilizes a walker and can ambulate around the house, uses a WC (when leaving the house). Uses a shower chair and raised toilet seat. Son is her caretaker and transports patient to all appointments. Patient's son does cooking and cleaning. Patient is active with [facility] (palliative care) and [facility] (PT/OT/SN).   Type of Residence Private residence   Number of Stairs to Enter Residence 0  (ramp)   Number of Stairs Within Residence 12  (lift chair)   Do you have animals or pets at home? Yes   Type of Animals or Pets 1 cat   Home or Post Acute Services Post acute facilities (Rehab/SNF/etc)   Type of Post Acute Facility Services Skilled nursing   Expected Discharge Disposition SNF  ([facility] SN[facility], precert started 11/15 @ 0900 by DSC team)   Does the patient need discharge transport arranged? Yes   RoundTrip coordination needed? Yes   Has discharge transport been arranged? No     11/15/2024 1133: Pt's son updated to ME planning at the bedside.

## 2024-11-15 NOTE — PROGRESS NOTES
Desirae Gregg is a 83 y.o. female on day 12 of admission presenting with Sepsis with acute hypoxic respiratory failure without septic shock (Multi).      Subjective   She is sitting up in the bed, states her left leg feels a lot better today.     Telemetry SR with PACs and intermittent atrial tachycardia      Review of systems:  Constitutional: negative for fever, chills, or malaise  Neuro: negative for dizziness, headache, numbness, tingling  ENT: Negative for nasal congestion or sore throat  CV: negative for chest pain, palpitations  GI: negative for abd pain, nausea, vomiting or diarrhea  : negative for dysuria, frequency, or urgency  Musculoskeletal: Negative for weakness, myalgia, or arthralgia  Endocrine: Negative for polyuria or polydipsia         Objective   Constitutional: Well developed, awake/alert/oriented x3, no distress, alert and cooperative  Eyes: PERRL, EOMI, clear sclera  ENMT: mucous membranes moist, no apparent injury, no lesions seen  Head/Neck: Neck supple, no apparent injury, thyroid without mass or tenderness, No JVD, trachea midline, no bruits  Respiratory/Thorax: Patent airways, diminished bilat bases with good chest expansion, thorax symmetric  Cardiovascular: slightly irregular rhythm, no murmurs, 2+ equal pulses of the extremities, normal S 1and S 2  Gastrointestinal: Nondistended, soft, non-tender, no rebound tenderness or guarding, no masses palpable, no organomegaly, +BS, no bruits  Musculoskeletal: ROM intact, no joint swelling, normal strength  Extremities: +2 pitting edema, L>R, dressing to left leg  Neurological: alert and oriented x3, intact senses, motor, response and reflexes, normal strength  Lymphatic: No significant lymphadenopathy  Psychological: Appropriate mood and behavior  Skin: Warm and dry, dressing to left leg, surrounding skin less red and warm      Last Recorded Vitals  /60 (BP Location: Right arm, Patient Position: Lying)   Pulse 80   Temp 36.2 °C (97.2 °F)  "(Temporal)   Resp 18   Ht 1.651 m (5' 5\")   Wt 75.8 kg (167 lb 3.2 oz)   SpO2 91%   BMI 27.82 kg/m²     Intake/Output last 3 Shifts:  I/O last 3 completed shifts:  In: 1841.2 (24.3 mL/kg) [P.O.:1505; I.V.:286.2 (3.8 mL/kg); IV Piggyback:50]  Out: 2750 (36.3 mL/kg) [Urine:2750 (1 mL/kg/hr)]  Weight: 75.8 kg   I/O this shift:  In: 125 [P.O.:60; I.V.:65]  Out: -     Relevant Results  Scheduled medications  [Held by provider] apixaban, 5 mg, oral, q12h  bumetanide, 1 mg, oral, BID  busPIRone, 7.5 mg, oral, BID  cetirizine, 10 mg, oral, Daily  cholecalciferol, 2,000 Units, oral, Daily  cyanocobalamin, 1,000 mcg, oral, Daily  ferrous sulfate (325 mg ferrous sulfate), 65 mg of iron, oral, Daily with breakfast  folic acid, 1 mg, oral, Daily  gabapentin, 200 mg, oral, BID  lactobacillus acidophilus, 1 capsule, oral, Daily  levothyroxine, 88 mcg, oral, Daily  metoprolol succinate XL, 25 mg, oral, BID  midodrine, 5 mg, oral, TID  nystatin, , Topical, BID  pantoprazole, 40 mg, oral, BID AC  phenyleph-min oil-petrolatum, , rectal, TID  [Held by provider] psyllium, 1 packet, oral, Daily  sertraline, 25 mg, oral, Daily      Continuous medications       PRN medications  PRN medications: acetaminophen **OR** acetaminophen **OR** [DISCONTINUED] acetaminophen, loperamide, melatonin, methyl salicylate-menthol, ondansetron ODT **OR** ondansetron, oxyCODONE, oxygen, polyethylene glycol    Results for orders placed or performed during the hospital encounter of 11/03/24 (from the past 24 hours)   Heparin Assay, UFH   Result Value Ref Range    Heparin Unfractionated 0.5 See Comment Below for Therapeutic Ranges IU/mL   Heparin Assay   Result Value Ref Range    Heparin Unfractionated 0.5 See Comment Below for Therapeutic Ranges IU/mL       Vascular US lower extremity venous duplex bilateral   Final Result      XR chest 1 view   Final Result   1.  Increased left lower lung airspace disease and probable   congestion.        Signed by: Alfonso" Radhames 11/12/2024 5:25 PM   Dictation workstation:   [id number]      FL GI esophagram   Final Result   Modified exam due to patient's clinical condition. No esophageal   obstruction.        MACRO   Zain Mcknight discussed the significance and urgency of this   critical finding by Epic secure chat with  SYED JOSHI on 11/11/2024   at 10:56 am.  (**-RCF-**) Findings:  See findings.        Signed by: Zain Mcknight 11/11/2024 11:02 AM   Dictation workstation:   [id number]      XR chest 1 view   Final Result   1. Suggestion of mild interstitial edema, more pronounced on this   radiograph compared to prior. Recommend correlation with fluid status.   2. Redemonstration of moderate right and small left pleural effusions.   3. Right basilar airspace opacity, which could be related to   atelectasis versus pneumonia in appropriate clinical setting.        MACRO:   None        Signed by: Yazmin Oropeza 11/8/2024 4:17 PM   Dictation workstation:   [id number]      Transthoracic Echo (TTE) Limited   Final Result      CT abdomen pelvis wo IV contrast   Final Result   2 centimeters stone in the urinary bladder.        Bilateral pleural effusions. Pericardial effusion.        Ascites.        Anasarca.        Presacral edema.        Enlarged spleen.        The exam is limited by motion artifact.        MACRO:   none        Signed by: Jayshree Dias 11/4/2024 3:28 PM   Dictation workstation:   [id number]      US renal complete   Final Result   No hydronephrosis. Decompressed bladder.        MACRO:   None        Signed by: Zain Mcknight 11/4/2024 11:08 AM   Dictation workstation:   [id number]      CT chest wo IV contrast   Final Result   Abnormalities at the right lung base on portable frontal chest   radiograph earlier today are due to a combination of moderate-sized,   mostly if not entirely free-flowing right pleural effusion with   associated multisegmental right lower lobe collapse adjacent to it        Small area of  ground-glass airspace disease in the middle lobe   confirmed on CT may not have been expected on the prior radiograph        Possibility of mild ground-glass airspace disease in the right lower   lobe as well        No ground-glass airspace disease in the left lung        No consolidative pneumonia in any of the inflated lungs on either side        Small free-flowing left pleural effusion        Unusual hypodensities in the right ventricular free wall of uncertain   etiology and significance        Perhaps trace (smallest detectable quantity) pericardial effusion        Incidental abnormalities in the included upper abdomen as detailed   above        MACRO:   None        Signed by: Matthias Kolb 11/3/2024 11:35 AM   Dictation workstation:   [id number]      XR chest 1 view   Final Result   1. Right lower lobe airspace consolidation either pneumonia or   atelectasis with right pleural effusion. Follow-up to complete   resolution is needed   2. Enlarged cardiac silhouette             Signed by: Kary Cardoso 11/3/2024 10:27 AM   Dictation workstation:   [id number]          Transthoracic Echo (TTE) Limited    Result Date: 11/5/2024   [facility], [address] [place] [address]               Tel [phone] and Fax [phone] TRANSTHORACIC ECHOCARDIOGRAM REPORT  Patient Name:       ELIE Mccain Physician:    [id number] Chicho Goldman MD Study Date:         11/5/2024           Ordering Provider:    [id number] SHERITA GOYAL MRN/PID:            [id number]            Fellow: Accession#:         [id number]        Nurse: Date of Birth/Age:  1940 / 83     Sonographer:          Cathi garcia                                     RDCS Gender assigned at  F                   Additional Staff: Birth: Height:             165.10 cm           Admit  Date:           11/3/2024 Weight:             72.57 kg            Admission Status:     Inpatient -                                                               Routine BSA / BMI:          1.80 m2 / 26.63     Encounter#:           [id number]                     kg/m2 Blood Pressure:     103/62 mmHg         Department Location:  [facility]                                                               Invasive Study Type:    TRANSTHORACIC ECHO (TTE) LIMITED Diagnosis/ICD: 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 Study Detail: The following Echo studies were performed: 2D, Doppler and color               flow.  PHYSICIAN INTERPRETATION: Left Ventricle: The left ventricular systolic function is normal, with a visually estimated ejection fraction of 60-65%. There is severely increased concentric left ventricular hypertrophy. There are no regional left ventricular wall motion abnormalities. The left ventricular cavity size is normal. There is moderately increased septal and moderately increased posterior left ventricular wall thickness. Spectral Doppler shows an abnormal pattern of left ventricular diastolic filling. Left Atrium: The left atrium is upper limits of normal in size. Right Ventricle: The right ventricle is normal in size. There is normal right ventricular global systolic function. Right Atrium: The right atrium is normal in size. Aortic Valve: The aortic valve is trileaflet. The aortic valve dimensionless index is 0.77. There is trace aortic valve regurgitation. The peak instantaneous gradient of the aortic valve is 13 mmHg. The mean gradient of the mitral valve is 7 mmHg. Mitral Valve: The  mitral valve is normal in structure. There is trace to mild mitral valve regurgitation. Tricuspid Valve: The tricuspid valve is structurally normal. There is trace to mild tricuspid regurgitation. Pulmonic Valve: The pulmonic valve is not well visualized. The pulmonic valve regurgitation was not well visualized. Pericardium: There is no pericardial effusion noted. Aorta: The aortic root is normal.  CONCLUSIONS:  1. The left ventricular systolic function is normal, with a visually estimated ejection fraction of 60-65%.  2. Spectral Doppler shows an abnormal pattern of left ventricular diastolic filling.  3. There is moderately increased septal and moderately increased posterior left ventricular wall thickness.  4. There is severely increased concentric left ventricular hypertrophy.  5. There is normal right ventricular global systolic function. QUANTITATIVE DATA SUMMARY:  2D MEASUREMENTS:          Normal Ranges: IVSd:            1.49 cm  (0.6-1.1cm) LVPWd:           1.26 cm  (0.6-1.1cm) LVIDd:           4.47 cm  (3.9-5.9cm) LVIDs:           2.98 cm LV Mass Index:   133 g/m2 LVEDV Index:     23 ml/m2 LV % FS          33.4 %  LV SYSTOLIC FUNCTION BY 2D PLANIMETRY (MOD):                      Normal Ranges: EF-A4C View:    62 % (>=55%) EF-A2C View:    61 % EF-Biplane:     64 % EF-Visual:      63 % LV EF Reported: 63 %  LV DIASTOLIC FUNCTION:          Normal Ranges: MV Peak E:             0.70 m/s (0.7-1.2 m/s) MV Peak A:             0.91 m/s (0.42-0.7 m/s) E/A Ratio:             0.77     (1.0-2.2)  MITRAL VALVE:          Normal Ranges: MV DT:        264 msec (150-240msec)  AORTIC VALVE:                      Normal Ranges: AoV Vmax:                1.78 m/s  (<=1.7m/s) AoV Peak P.7 mmHg (<20mmHg) AoV Mean P.0 mmHg  (1.7-11.5mmHg) LVOT Max Josiah:            1.33 m/s  (<=1.1m/s) AoV VTI:                 31.31 cm  (18-25cm) LVOT VTI:                24.11 cm LVOT Diameter:           2.04 cm    (1.8-2.4cm) AoV Area, VTI:           2.52 cm2  (2.5-5.5cm2) AoV Area,Vmax:           2.44 cm2  (2.5-4.5cm2) AoV Dimensionless Index: 0.77  TRICUSPID VALVE/RVSP:          Normal Ranges: Peak TR Velocity:     3.46 m/s  PULMONIC VALVE:          Normal Ranges: PV Max Josiah:     0.8 m/s  (0.6-0.9m/s) PV Max P.8 mmHg  [id number] Chicho Goldman MD Electronically signed on 2024 at 2:09:49 PM  ** Final **           Assessment/Plan   Principal Problem:    Sepsis with acute hypoxic respiratory failure without septic shock (Multi)  Active Problems:    Iron deficiency anemia    Chronic systolic (congestive) heart failure    Essential hypertension    History of pulmonary embolism    Anxiety    Vitamin D deficiency    Vitamin B12 deficiency    Adult hypothyroidism    Pneumonia of right lower lobe due to infectious organism    Leukocytosis    Acute kidney injury superimposed on stage 3a chronic kidney disease    Elevated troponin    Hyperbilirubinemia    Hyperglycemia    Hypokalemia    Hypomagnesemia    Hypocalcemia    Abnormality of right ventricle of heart    Impaired mobility and ADLs    Echocardiogram from 2024:    Left Ventricle: Low normal left ventricular systolic function with a   visually estimated EF of 50 - 55%. Left ventricle size is normal. Normal   wall thickness. Ventricular mass is normal. Findings consistent with   concentric remodeling. Normal wall motion. Grade I diastolic dysfunction   with normal LAP.     Right Ventricle: Right ventricle size is normal. Normal systolic   function. TAPSE is 1.9 cm.     Aortic Valve: No stenosis.     Mitral Valve: Mild regurgitation.     Tricuspid Valve: Normal RVSP. The estimated RVSP is 24 mmHg.     Pericardium: No pericardial effusion.         Pneumonia  -CT chest reviewed  -Procal downtrending  -antibiotics completed  -bronchodilators  -oxygen support->currently on room air  -Pulmonary following, note reviewed     2. Abnormal CT finding of RV  -Unusual hypodensities  in the right ventricular free wall  -Echo as above  -Repeat limited echo reviewed, no abnormality noted  -Most likely an RV wall fat pad  -Can obtain outpt cardiac MRI to further eval     3. Acute on chronic diastolic CHF with mildly reduced EF  -CT showed pleural effusion, possible parapneumonic vs cardiac  -  -I reviewed the Echocardiogram as above  -Strict I & Os  -Daily weights  -2gm na diet  -BP low most likely from infection and third spacing with low albumin  -Stop imdur for low BP  -Changed Coreg to metoprolol 25mg BID for better rate control  -CT abd/pelvis showed pleural effusions, anasarca, and ascities  -Albumin 1.9, given IV albumin and Lasix IV (11/5)  -Cont Midodrine 5mg TID  -Bumex 1mg IV daily since admit,  then started Bumex 1mg PO daily  -Repeat CXR showed worsened effusion and congestion  -Started Bumex gtt at 0.5mg/hr->changed to Bumex 1mg PO BID  -bedside US per pulmonary showed small bilateral effusions    4. UTI, resolved  -WBC elevated  -Urine culture showed multiple organisms, possible contamination  -antibiotics completed     5. Elevated troponins-Non MI elevation  -Troponin 76, now downtrending  -EKG showed SR LBBB->chronic LBBB  -Denies ACS symptoms  -limited echo as above, normal LVEF     6. Hx of DVT, now with bilateral acute DVT  -Venous U/S showed DVT right femoral vein, age indeterminate thrombus in right popliteal, right posterior tibial and right peroneal veins. DVT left popliteal vein   -Eliquis held  -heparin gtt started  -Agree with restarting Eliquis at higher dose  -Heme/onc consulted     7. Hypokalemia/hypomagnesemia, resolved  -supplemented     8. Hx of HTN, with hypotension  -stop imdur  -Gave albumin with lasix post (11/5)  -Cont Midodrine 5mg TID  -Coreg changed to metoprolol  -Monitor      Sherron Jones, OLI-CNP

## 2024-11-15 NOTE — NURSING NOTE
Rounded to reassess status of LLE wound - son and caregiver Alexis at her side.  The anterior shin wound is 8 x 5.5 cm in size and now seen to be fully covered with devitalized tissue (70% tan, 30% brown) which appears to be thin. There is no erythema, mild tenderness only, pink/tan drainage in moderate amount, new image taken.  Goal:  debridement, begin with autolytic.  Recommend podiatry consult with [facility].  Wound status reviewed with attending provider, consult orders obtained.  For now the wound was cleansed with NS, ced-wound skin prepped with Cavilon, Medihoney, Aquacel, adaptic, ABD and Kerlix roll gauze applied pending podiatry evaluation.  Legs are elevated and heels offloaded.     Assisted PCNA Onel with incontinence care - patient is seen to have acutely inflamed, tender perianal and groin area with scattered epidermal erosions consistent with IAD.   Bordeaux Butt paste has been in use, however at this time recommend consistent use of TRIAD hydrophilic dressing as the skin is denuded, raw.  PCNA reports she stools fairly often, loosely formed tan stool.  Updated orders obtained.

## 2024-11-15 NOTE — ED PROCEDURE NOTE
Procedure  Critical Care    Performed by: Trixie Helm DO  Authorized by: Trixie Helm DO    Critical care provider statement:     Critical care time (minutes):  40    Critical care time was exclusive of:  Separately billable procedures and treating other patients    Critical care was necessary to treat or prevent imminent or life-threatening deterioration of the following conditions:  Sepsis    Critical care was time spent personally by me on the following activities:  Blood draw for specimens, ordering and performing treatments and interventions, ordering and review of laboratory studies, development of treatment plan with patient or surrogate, discussions with consultants, ordering and review of radiographic studies, pulse oximetry, discussions with primary provider, evaluation of patient's response to treatment, re-evaluation of patient's condition, review of old charts, examination of patient and obtaining history from patient or surrogate    Care discussed with: admitting provider                 Trixie Helm DO  11/15/24 [id number]

## 2024-11-15 NOTE — PROGRESS NOTES
Occupational Therapy                 Therapy Communication Note    Patient Name: Desirae Gregg  MRN: [id number]  Department: [address]  Room: [address] [place], [place] [address]A  Today's Date: 11/15/2024     Discipline: Occupational Therapy    Missed Visit Reason: Missed Visit Reason: Patient refused (Encouraged pt to participate in OT treatment, pt declined referring to pain and severly excoriated skin on sacral area. Educated pt on benefits of participating with treatment yet pt continued to decline. No OT treatment this date.)    Missed Time: Attempt    Comment: [id number]

## 2024-11-15 NOTE — PROGRESS NOTES
"Desirae Gregg is a 83 y.o. female on day 12 of admission presenting with Sepsis with acute hypoxic respiratory failure without septic shock (Multi).    Subjective   Some nausea off and on. Diarrhea is less, but still present.        Objective     Physical Exam  Constitutional:       Comments: Alert, lying flat without difficulty   HENT:      Head: Normocephalic.      Ears:      Comments: Slightly [place]     Nose: Nose normal.      Mouth/Throat:      Mouth: Mucous membranes are dry.   Eyes:      Extraocular Movements: Extraocular movements intact.      Pupils: Pupils are equal, round, and reactive to light.   Cardiovascular:      Pulses: Normal pulses.      Comments: Irregular, tachy at times.   Pulmonary:      Comments: Crackles, left base more than right  Easy respirations  Abdominal:      Comments: Soft, hyperactive bowel sounds.   No tenderness at this time   Musculoskeletal:      Comments: Trace pitting edema today, slightly increased  Dressing intact. Improvement of erythema.    Skin:     General: Skin is dry.   Neurological:      Motor: Weakness present.         Last Recorded Vitals  Blood pressure 101/60, pulse 80, temperature 36.2 °C (97.2 °F), temperature source Temporal, resp. rate 18, height 1.651 m (5' 5\"), weight 75.8 kg (167 lb 3.2 oz), SpO2 91%.  Intake/Output last 3 Shifts:  I/O last 3 completed shifts:  In: 1841.2 (24.3 mL/kg) [P.O.:1505; I.V.:286.2 (3.8 mL/kg); IV Piggyback:50]  Out: 2750 (36.3 mL/kg) [Urine:2750 (1 mL/kg/hr)]  Weight: 75.8 kg     Relevant Results    Current Facility-Administered Medications:     acetaminophen (Tylenol) tablet 650 mg, 650 mg, oral, q4h PRN **OR** acetaminophen (Tylenol) oral liquid 650 mg, 650 mg, oral, q4h PRN **OR** [DISCONTINUED] acetaminophen (Tylenol) suppository 650 mg, 650 mg, rectal, q4h PRN, Eloy Hua MD    [Held by provider] apixaban (Eliquis) tablet 5 mg, 5 mg, oral, q12h, Татьяна Agarwal MD    bumetanide (Bumex) tablet 1 mg, 1 mg, oral, BID, Sherron" GUILLERMINA Jones, APRN-CNP, 1 mg at 11/15/24 0944    busPIRone (Buspar) tablet 7.5 mg, 7.5 mg, oral, BID, Eloy Hua MD, 7.5 mg at 11/15/24 0945    cetirizine (ZyrTEC) tablet 10 mg, 10 mg, oral, Daily, Eloy Hua MD, 10 mg at 11/15/24 0948    cholecalciferol (Vitamin D-3) tablet 2,000 Units, 2,000 Units, oral, Daily, Eloy Hua MD, 2,000 Units at 11/15/24 0945    cyanocobalamin (Vitamin B-12) tablet 1,000 mcg, 1,000 mcg, oral, Daily, Eloy Hua MD, 1,000 mcg at 11/15/24 0945    ferrous sulfate (325 mg ferrous sulfate) tablet 325 mg, 65 mg of iron, oral, Daily with breakfast, Татьяна Agarwal MD, 325 mg at 11/12/24 0833    folic acid (Folvite) tablet 1 mg, 1 mg, oral, Daily, Eloy Hua MD, 1 mg at 11/15/24 0945    gabapentin (Neurontin) capsule 200 mg, 200 mg, oral, BID, Eloy Hua MD, 200 mg at 11/15/24 0945    lactobacillus acidophilus capsule 1 capsule, 1 capsule, oral, Daily, Татьяна Agarwal MD, 1 capsule at 11/15/24 0945    levothyroxine (Synthroid, Levoxyl) tablet 88 mcg, 88 mcg, oral, Daily, Eloy Hua MD, 88 mcg at 11/15/24 0646    loperamide (Imodium) capsule 2 mg, 2 mg, oral, 4x daily PRN, Eloy uHa MD, 2 mg at 11/15/24 1013    melatonin tablet 3 mg, 3 mg, oral, Nightly PRN, Eloy Hua MD    methyl salicylate-menthol (Bengay) 15-10 % greaseless cream 1 Application, 1 Application, Topical, TID PRN, Cha Ramsay, DO, 1 Application at 11/12/24 1217    metoprolol succinate XL (Toprol-XL) 24 hr tablet 25 mg, 25 mg, oral, BID, Raul Domínguez MD, 25 mg at 11/15/24 0945    midodrine (Proamatine) tablet 5 mg, 5 mg, oral, TID, Cha Ramsay DO, 5 mg at 11/15/24 0944    nystatin (Mycostatin) cream, , Topical, BID, Eloy Hua MD, Given at 11/15/24 0947    ondansetron ODT (Zofran-ODT) disintegrating tablet 4 mg, 4 mg, oral, q8h PRN **OR** ondansetron (Zofran) injection 4 mg, 4 mg, intravenous, q8h PRN, Eloy Hua MD, 4 mg at 11/15/24 0945    oxyCODONE  (Roxicodone) immediate release tablet 5 mg, 5 mg, oral, q6h PRN, Eloy Hua MD, 5 mg at 11/14/24 2030    oxygen (O2) therapy, , inhalation, Continuous PRN - O2/gases, Cha Ramsay, DO, 2 L/min at 11/07/24 2100    pantoprazole (ProtoNix) EC tablet 40 mg, 40 mg, oral, BID AC, Eloy Hua MD, 40 mg at 11/15/24 0646    phenyleph-min oil-petrolatum (Preparation H) 0.25-14-74.9 % rectal ointment, , rectal, TID, Cha Ramsay, DO, Given at 11/15/24 0946    polyethylene glycol (Glycolax, Miralax) packet 17 g, 17 g, oral, Daily PRN, Eloy Hua MD    [Held by provider] psyllium (Metamucil) 3.4 gram packet 1 packet, 1 packet, oral, Daily, Татьяна Agarwal MD, 1 packet at 11/13/24 0948    sertraline (Zoloft) tablet 25 mg, 25 mg, oral, Daily, Eloy Hua MD, 25 mg at 11/15/24 0945    zinc oxide 40 % ointment 1 Application, 1 Application, Topical, BID, Eloy Hua MD, 1 Application at 11/15/24 0945     Results for orders placed or performed during the hospital encounter of 11/03/24 (from the past 24 hours)   Heparin Assay, [facility]   Result Value Ref Range    Heparin Unfractionated 0.6 See Comment Below for Therapeutic Ranges IU/mL   Heparin Assay, [facility]   Result Value Ref Range    Heparin Unfractionated 0.5 See Comment Below for Therapeutic Ranges IU/mL   Heparin Assay   Result Value Ref Range    Heparin Unfractionated 0.5 See Comment Below for Therapeutic Ranges IU/mL              Assessment/Plan     Acute respiratory failure, secondary to pneumonia (treated), and diastolic heart failure exacerbation. She is on oral bumex now, off drip. Slight edema today. Cards on board. Having bouts of atrial tachycardia as well  Acute DVT, LLE, chronic RLE. Will stop heparin today, restart eliquis but at 5 mg dosage.  Hypothyroid, stable  CLL/Myelofibrosis. Lab pending today. White count has been rising. Will need to see heme again.   LLE wound. Continue wound care, chemical debridement as needed etc. She is positive MRSA  screen  Diarrhea. No evidence of infection at this time. Immodium as needed. Resume iron as well  Hypotension, relative. Continue midodrine   Deconditioning. She will go to rehab briefly from here.   CKD, stage 3, stable       I spent 28 minutes in the professional and overall care of this patient.      Татьяна Agarwal MD

## 2024-11-15 NOTE — PROGRESS NOTES
Desirae Gregg is a 83 y.o. female on day 12 of admission presenting with Sepsis with acute hypoxic respiratory failure without septic shock (Multi).    Subjective   Interval History: no fever, no new complaints        Review of Systems    Objective   Range of Vitals (last 24 hours)  Heart Rate:  [63-80]   Temp:  [36.2 °C (97.2 °F)-36.4 °C (97.5 °F)]   Resp:  [16-18]   BP: (101-120)/(60-78)   SpO2:  [91 %-94 %]   Daily Weight  11/14/24 : 75.8 kg (167 lb 3.2 oz)    Body mass index is 27.82 kg/m².    Physical Exam  Constitutional:       Appearance: Normal appearance.   HENT:      Head: Normocephalic and atraumatic.      Mouth/Throat:      Mouth: Mucous membranes are moist.      Pharynx: Oropharynx is clear.   Eyes:      Pupils: Pupils are equal, round, and reactive to light.   Cardiovascular:      Rate and Rhythm: Normal rate and regular rhythm.      Heart sounds: Normal heart sounds.   Pulmonary:      Effort: Pulmonary effort is normal.      Breath sounds: Normal breath sounds.   Abdominal:      General: Abdomen is flat. Bowel sounds are normal.      Palpations: Abdomen is soft.   Musculoskeletal:      Cervical back: Normal range of motion.      Comments: Lt leg wound, no cellulitis    Neurological:      Mental Status: She is alert.         Antibiotics  This patient does not have an active medication from one of the medication groupers.    Relevant Results  Labs  Results from last 72 hours   Lab Units 11/14/24 0617 11/13/24  0633   WBC AUTO x10*3/uL 31.2* 26.1*   HEMOGLOBIN g/dL 14.9 14.7   HEMATOCRIT % 51.3* 48.0*   PLATELETS AUTO x10*3/uL 205 250   NEUTROS PCT AUTO %  --  83.8   LYMPHS PCT AUTO %  --  5.3   MONOS PCT AUTO %  --  4.0   EOS PCT AUTO %  --  2.4     Results from last 72 hours   Lab Units 11/14/24 0617 11/13/24  0633   SODIUM mmol/L 132* 135*   POTASSIUM mmol/L 3.4* 3.2*   CHLORIDE mmol/L 96* 97*   CO2 mmol/L 30 30   BUN mg/dL 16 17   CREATININE mg/dL 0.95 0.93   GLUCOSE mg/dL 110* 103*   CALCIUM mg/dL  7.3* 7.2*   ANION GAP mmol/L 9* 11   EGFR mL/min/1.73m*2 59* 60*   PHOSPHORUS mg/dL 2.8 3.0     Results from last 72 hours   Lab Units 11/14/24  0617 11/13/24  0633   ALBUMIN g/dL 2.0* 2.1*     Estimated Creatinine Clearance: 44.1 mL/min (by C-G formula based on SCr of 0.95 mg/dL).  C-Reactive Protein   Date Value Ref Range Status   11/12/2024 1.04 (H) <1.00 mg/dL Final   11/11/2024 0.86 <1.00 mg/dL Final   11/08/2024 2.84 (H) <1.00 mg/dL Final     Microbiology  Reviewed  Imaging  Reviewed        Assessment/Plan   Leukocytosis, likely related to myelodysplasia, US with legs DVT   Sepsis, treated  Respiratory failure / pneumonia  Bacteriuria  Polycythemia / myelofibrosis  Left leg wound / some stasis, unlikely to be cellulitis     Recommendations :  Supportive care  Chest PT  Discussed with the medical team     I spent minutes in the professional and overall care of this patient.      Cameron Lassiter MD

## 2024-11-16 ENCOUNTER — APPOINTMENT (OUTPATIENT)
Dept: CARDIOLOGY | Facility: HOSPITAL | Age: 84
DRG: 871 | End: 2024-11-16
Payer: MEDICARE

## 2024-11-16 LAB
ALBUMIN SERPL BCP-MCNC: 2.1 G/DL (ref 3.4–5)
ANION GAP SERPL CALC-SCNC: 8 MMOL/L (ref 10–20)
BUN SERPL-MCNC: 17 MG/DL (ref 6–23)
CALCIUM SERPL-MCNC: 7.2 MG/DL (ref 8.6–10.3)
CHLORIDE SERPL-SCNC: 95 MMOL/L (ref 98–107)
CO2 SERPL-SCNC: 34 MMOL/L (ref 21–32)
CREAT SERPL-MCNC: 1.04 MG/DL (ref 0.5–1.05)
EGFRCR SERPLBLD CKD-EPI 2021: 53 ML/MIN/1.73M*2
GLUCOSE SERPL-MCNC: 94 MG/DL (ref 74–99)
PHOSPHATE SERPL-MCNC: 3.2 MG/DL (ref 2.5–4.9)
POTASSIUM SERPL-SCNC: 3.3 MMOL/L (ref 3.5–5.3)
SODIUM SERPL-SCNC: 134 MMOL/L (ref 136–145)

## 2024-11-16 PROCEDURE — 2500000001 HC RX 250 WO HCPCS SELF ADMINISTERED DRUGS (ALT 637 FOR MEDICARE OP): Performed by: INTERNAL MEDICINE

## 2024-11-16 PROCEDURE — 2500000001 HC RX 250 WO HCPCS SELF ADMINISTERED DRUGS (ALT 637 FOR MEDICARE OP): Performed by: NURSE PRACTITIONER

## 2024-11-16 PROCEDURE — 2500000002 HC RX 250 W HCPCS SELF ADMINISTERED DRUGS (ALT 637 FOR MEDICARE OP, ALT 636 FOR OP/ED): Performed by: INTERNAL MEDICINE

## 2024-11-16 PROCEDURE — 93005 ELECTROCARDIOGRAM TRACING: CPT

## 2024-11-16 PROCEDURE — 36415 COLL VENOUS BLD VENIPUNCTURE: CPT | Performed by: INTERNAL MEDICINE

## 2024-11-16 PROCEDURE — 2500000005 HC RX 250 GENERAL PHARMACY W/O HCPCS: Performed by: INTERNAL MEDICINE

## 2024-11-16 PROCEDURE — 84100 ASSAY OF PHOSPHORUS: CPT | Performed by: INTERNAL MEDICINE

## 2024-11-16 PROCEDURE — 2500000001 HC RX 250 WO HCPCS SELF ADMINISTERED DRUGS (ALT 637 FOR MEDICARE OP): Performed by: PODIATRIST

## 2024-11-16 PROCEDURE — 99233 SBSQ HOSP IP/OBS HIGH 50: CPT | Performed by: INTERNAL MEDICINE

## 2024-11-16 PROCEDURE — 1100000001 HC PRIVATE ROOM DAILY

## 2024-11-16 PROCEDURE — 2500000004 HC RX 250 GENERAL PHARMACY W/ HCPCS (ALT 636 FOR OP/ED): Performed by: NURSE PRACTITIONER

## 2024-11-16 RX ORDER — LOPERAMIDE HYDROCHLORIDE 2 MG/1
2 CAPSULE ORAL 4 TIMES DAILY
Status: DISCONTINUED | OUTPATIENT
Start: 2024-11-16 | End: 2024-11-19 | Stop reason: HOSPADM

## 2024-11-16 RX ORDER — POTASSIUM CHLORIDE 20 MEQ/1
40 TABLET, EXTENDED RELEASE ORAL ONCE
Status: DISCONTINUED | OUTPATIENT
Start: 2024-11-16 | End: 2024-11-16

## 2024-11-16 RX ORDER — POTASSIUM CHLORIDE 1.5 G/1.58G
40 POWDER, FOR SOLUTION ORAL ONCE
Status: COMPLETED | OUTPATIENT
Start: 2024-11-16 | End: 2024-11-16

## 2024-11-16 RX ORDER — LIDOCAINE HYDROCHLORIDE 20 MG/ML
1 JELLY TOPICAL ONCE
Status: COMPLETED | OUTPATIENT
Start: 2024-11-16 | End: 2024-11-16

## 2024-11-16 ASSESSMENT — COGNITIVE AND FUNCTIONAL STATUS - GENERAL
WALKING IN HOSPITAL ROOM: TOTAL
MOVING FROM LYING ON BACK TO SITTING ON SIDE OF FLAT BED WITH BEDRAILS: A LOT
MOBILITY SCORE: 9
HELP NEEDED FOR BATHING: A LOT
WALKING IN HOSPITAL ROOM: TOTAL
CLIMB 3 TO 5 STEPS WITH RAILING: TOTAL
MOVING FROM LYING ON BACK TO SITTING ON SIDE OF FLAT BED WITH BEDRAILS: A LOT
STANDING UP FROM CHAIR USING ARMS: TOTAL
DAILY ACTIVITIY SCORE: 16
MOVING TO AND FROM BED TO CHAIR: A LOT
STANDING UP FROM CHAIR USING ARMS: TOTAL
DAILY ACTIVITIY SCORE: 16
TOILETING: A LOT
TURNING FROM BACK TO SIDE WHILE IN FLAT BAD: A LOT
CLIMB 3 TO 5 STEPS WITH RAILING: TOTAL
TOILETING: A LOT
HELP NEEDED FOR BATHING: A LOT
DRESSING REGULAR LOWER BODY CLOTHING: A LOT
MOBILITY SCORE: 9
DRESSING REGULAR UPPER BODY CLOTHING: A LOT
DRESSING REGULAR UPPER BODY CLOTHING: A LOT
MOVING TO AND FROM BED TO CHAIR: A LOT
DRESSING REGULAR LOWER BODY CLOTHING: A LOT
TURNING FROM BACK TO SIDE WHILE IN FLAT BAD: A LOT

## 2024-11-16 ASSESSMENT — PAIN - FUNCTIONAL ASSESSMENT
PAIN_FUNCTIONAL_ASSESSMENT: 0-10

## 2024-11-16 ASSESSMENT — PAIN SCALES - GENERAL
PAINLEVEL_OUTOF10: 0 - NO PAIN
PAINLEVEL_OUTOF10: 0 - NO PAIN
PAINLEVEL_OUTOF10: 7
PAINLEVEL_OUTOF10: 3

## 2024-11-16 ASSESSMENT — PAIN DESCRIPTION - LOCATION: LOCATION: LEG

## 2024-11-16 ASSESSMENT — PAIN DESCRIPTION - ORIENTATION: ORIENTATION: RIGHT;LEFT;LOWER

## 2024-11-16 NOTE — NURSING NOTE
1930: assumed pt care    2141: pt's mag level low 1.48, no replacement for today, let NP Daksha Quijano know

## 2024-11-16 NOTE — CARE PLAN
The clinical goals for the shift include pt will remain safe and free from injury throughout shift.     Problem: Skin  Goal: Prevent/manage excess moisture  Outcome: Progressing  Flowsheets (Taken 11/16/2024 6153)  Prevent/manage excess moisture:   Cleanse incontinence/protect with barrier cream   Follow provider orders for dressing changes   Moisturize dry skin   Monitor for/manage infection if present  Note:   -Rectal tube inserted today r/t multiple episodes of diarrhea which is causing excoriation to pt. Pt tolerated the procedure well with no c/o pain. Triad cream is being used to help the excoriation. Pt educated on the use and importance of the rectal tube at this time.  Purewick is in use as well r/t urinary incontinence episodes today which was making the excoriation worse.   -Dr. Agarwal and Melany RYAN were notified of intake and output not being accurate r/t pt having incontinence episodes. Dr. Agarwal aware that we are going to try the purewick again while pt has rectal tube in for accurate I+Os and less skin breakdown from incontinence.     Problem: Pain  Goal: Turns in bed with improved pain control throughout the shift  Outcome: Met     Problem: Fall/Injury  Goal: Be free from injury by end of the shift  Outcome: Met     Problem: Discharge Planning  Goal: Discharge to home or other facility with appropriate resources  Outcome: Progressing     Problem: Chronic Conditions and Co-morbidities  Goal: Patient's chronic conditions and co-morbidity symptoms are monitored and maintained or improved  Outcome: Progressing     Problem: Heart Failure  Goal: Report improvement of dyspnea/breathlessness this shift  Outcome: Progressing

## 2024-11-16 NOTE — PROGRESS NOTES
Desirae Gregg is a 83 y.o. female on day 13 of admission presenting with Sepsis with acute hypoxic respiratory failure without septic shock (Multi).      Subjective   Unable to really hold her stool, or her urine very long. Urgency present       Objective     Last Recorded Vitals  /76   Pulse 68   Temp 36.5 °C (97.7 °F) (Temporal)   Resp 19   Wt 75.8 kg (167 lb 3.2 oz)   SpO2 92%   Intake/Output last 3 Shifts:    Intake/Output Summary (Last 24 hours) at 11/16/2024 1034  Last data filed at 11/15/2024 2050  Gross per 24 hour   Intake 285 ml   Output --   Net 285 ml       Admission Weight  Weight: 72.6 kg (160 lb) (11/03/24 0923)    Daily Weight  11/14/24 : 75.8 kg (167 lb 3.2 oz)    Image Results  ECG 12 lead  Sinus tachycardia  Left axis deviation  Left bundle branch block  Abnormal ECG  No previous ECGs available  See ED provider note for full interpretation and clinical correlation  Confirmed by Edwin Medley ([id number]) on 11/14/2024 3:17:28 AM      Physical Exam  HENT:      Head: Normocephalic.      Nose: Nose normal.      Mouth/Throat:      Mouth: Mucous membranes are dry.   Eyes:      Extraocular Movements: Extraocular movements intact.      Pupils: Pupils are equal, round, and reactive to light.   Cardiovascular:      Pulses: Normal pulses.      Comments: Regular, 1-2/6 systolic murmur  Pulmonary:      Comments: Bilateral inspiratory crackles, left greater than right  No wheezing. Easy respirations  Abdominal:      Comments: Soft, hyperactive bowel sounds   Musculoskeletal:      Comments: Dressing LLE> no increased warmth. Erythema improved   Skin:     General: Skin is warm and dry.   Neurological:      Mental Status: She is alert. Mental status is at baseline.       Relevant Results       Assessment/Plan        Assessment & Plan  Sepsis with acute hypoxic respiratory failure without septic shock (Multi)    Iron deficiency anemia    Chronic systolic (congestive) heart failure    Essential  hypertension    History of pulmonary embolism    Anxiety    Vitamin D deficiency    Vitamin B12 deficiency    Adult hypothyroidism    Pneumonia of right lower lobe due to infectious organism    Leukocytosis    Acute kidney injury superimposed on stage 3a chronic kidney disease    Elevated troponin    Hyperbilirubinemia    Hyperglycemia    Hypokalemia    Hypomagnesemia    Hypocalcemia    Abnormality of right ventricle of heart    Impaired mobility and ADLs    Acute DVT, LLE> she is now off heparin, transitioned to Eliquis, higher dose. Edema is improved. Chronic dvt of right leg  LLE wound. Appreciate wound care input. Wound care has been changed, needs chemical debridement, hopefully this will be successful  Hypokalemia/hypomag-both supplemented, and will recheck in am  Diastolic heart failure. She is on room air. Only  about 550 neg. No change in meds right now. Outputs likely not accurate due to incontinence  Rectal incontinence , diarrhea. Hold metamucil and miralax, not sure why these are restarted. Will schedule immodium, and she is agreeable to rectal tube for now. She has terrible excoriation, even areas of denuded tissue secondary to incontinence  CLL/myelofibrosis. She has had increasing leukocytosis. Recheck white count in am  CKD, stage 3, stable  Hypothyroid, stable              Татьяна Agarwal MD

## 2024-11-16 NOTE — PROGRESS NOTES
Desirae Gregg is a 83 y.o. female on day 13 of admission presenting with Sepsis with acute hypoxic respiratory failure without septic shock (Multi).      Subjective     Desirae was laying in bed, incontinent of stool and urine. RN at bedside to place purewick and fecal management system to obtain accurate I&O and prevent further skin breakdown. Desirae denies having any shortness of breath or chest pain.     Review of systems:  Constitutional: negative for fever, chills, or malaise  Neuro: negative for dizziness, headache, numbness, tingling  ENT: Negative for nasal congestion or sore throat  Resp: negative for shortness of breath, cough, or wheezing  CV: negative for chest pain, palpitations  GI: Posirive for abd pain, nausea, vomiting or diarrhea  : Posirive for dysuria, frequency, or urgency  Skin: negative for lesions, wounds, or rash  Musculoskeletal: Negative for weakness, myalgia, or arthralgia  Endocrine: Negative for polyuria or polydipsia         Objective   Constitutional: Well developed, awake/alert/oriented x3, no distress, alert and cooperative  Eyes: PERRL, EOMI, clear sclera  ENMT: mucous membranes moist, no apparent injury, no lesions seen  Head/Neck: Neck supple, no apparent injury, thyroid without mass or tenderness, No JVD, trachea midline, no bruits  Respiratory/Thorax: Patent airways, CTAB, normal breath sounds with good chest expansion, thorax symmetric  Cardiovascular: Regular, rate and rhythm, no murmurs, 2+ equal pulses of the extremities, normal S 1and S 2  Gastrointestinal: Nondistended, soft, non-tender, no rebound tenderness or guarding, no masses palpable, no organomegaly, +BS, no bruits, currently has diarrhea  Musculoskeletal: ROM intact, no joint swelling, normal strength  Extremities: normal extremities, no cyanosis edema, contusions or wounds, no clubbing  Neurological: alert and oriented x3, intact senses, motor, response and reflexes, normal strength  Lymphatic: No significant  "lymphadenopathy  Psychological: Appropriate mood and behavior  Skin: Warm and dry, no lesions, no rashes      Last Recorded Vitals  /78   Pulse 62   Temp 36.5 °C (97.7 °F) (Temporal)   Resp 19   Ht 1.651 m (5' 5\")   Wt 66.5 kg (146 lb 9.7 oz)   SpO2 92%   BMI 24.40 kg/m²     Intake/Output last 3 Shifts:  I/O last 3 completed shifts:  In: 565 (8.5 mL/kg) [P.O.:500; I.V.:65 (1 mL/kg)]  Out: 550 (8.3 mL/kg) [Urine:550 (0.2 mL/kg/hr)]  Weight: 66.5 kg   No intake/output data recorded.    Relevant Results  Scheduled medications  apixaban, 5 mg, oral, q12h  bumetanide, 1 mg, oral, BID  busPIRone, 7.5 mg, oral, BID  cetirizine, 10 mg, oral, Daily  cholecalciferol, 2,000 Units, oral, Daily  collagenase, , Topical, Daily  cyanocobalamin, 1,000 mcg, oral, Daily  ferrous sulfate (325 mg ferrous sulfate), 65 mg of iron, oral, Daily with breakfast  folic acid, 1 mg, oral, Daily  gabapentin, 200 mg, oral, BID  lactobacillus acidophilus, 1 capsule, oral, Daily  levothyroxine, 88 mcg, oral, Daily  lidocaine, 1 Application, Topical, Once  loperamide, 2 mg, oral, 4x daily  [Held by provider] magnesium oxide, 400 mg, oral, BID  metoprolol succinate XL, 25 mg, oral, BID  midodrine, 5 mg, oral, TID  nystatin, , Topical, BID  pantoprazole, 40 mg, oral, BID AC  phenyleph-min oil-petrolatum, , rectal, TID  [Held by provider] psyllium, 1 packet, oral, Daily  sertraline, 25 mg, oral, Daily      Continuous medications     PRN medications  PRN medications: acetaminophen **OR** acetaminophen **OR** [DISCONTINUED] acetaminophen, melatonin, methyl salicylate-menthol, ondansetron ODT **OR** ondansetron, oxyCODONE, oxygen    Results for orders placed or performed during the hospital encounter of 11/03/24 (from the past 24 hours)   Renal Function Panel   Result Value Ref Range    Glucose 126 (H) 74 - 99 mg/dL    Sodium 134 (L) 136 - 145 mmol/L    Potassium 3.5 3.5 - 5.3 mmol/L    Chloride 93 (L) 98 - 107 mmol/L    Bicarbonate 31 21 - 32 " mmol/L    Anion Gap 14 10 - 20 mmol/L    Urea Nitrogen 18 6 - 23 mg/dL    Creatinine 1.05 0.50 - 1.05 mg/dL    eGFR 52 (L) >60 mL/min/1.73m*2    Calcium 7.7 (L) 8.6 - 10.3 mg/dL    Phosphorus 2.9 2.5 - 4.9 mg/dL    Albumin 2.4 (L) 3.4 - 5.0 g/dL   C-reactive protein   Result Value Ref Range    C-Reactive Protein 0.80 <1.00 mg/dL   B-type natriuretic peptide   Result Value Ref Range     (H) 0 - 99 pg/mL   Magnesium   Result Value Ref Range    Magnesium 1.48 (L) 1.60 - 2.40 mg/dL   Renal Function Panel   Result Value Ref Range    Glucose 94 74 - 99 mg/dL    Sodium 134 (L) 136 - 145 mmol/L    Potassium 3.3 (L) 3.5 - 5.3 mmol/L    Chloride 95 (L) 98 - 107 mmol/L    Bicarbonate 34 (H) 21 - 32 mmol/L    Anion Gap 8 (L) 10 - 20 mmol/L    Urea Nitrogen 17 6 - 23 mg/dL    Creatinine 1.04 0.50 - 1.05 mg/dL    eGFR 53 (L) >60 mL/min/1.73m*2    Calcium 7.2 (L) 8.6 - 10.3 mg/dL    Phosphorus 3.2 2.5 - 4.9 mg/dL    Albumin 2.1 (L) 3.4 - 5.0 g/dL       Vascular US lower extremity venous duplex bilateral   Final Result      XR chest 1 view   Final Result   1.  Increased left lower lung airspace disease and probable   congestion.        Signed by: Alfonso Ricci 11/12/2024 5:25 PM   Dictation workstation:   [id number]      FL GI esophagram   Final Result   Modified exam due to patient's clinical condition. No esophageal   obstruction.        LETHA Mcknight discussed the significance and urgency of this   critical finding by Epic secure chat with  SYED JOSHI on 11/11/2024   at 10:56 am.  (**-RCF-**) Findings:  See findings.        Signed by: Zain Mcknight 11/11/2024 11:02 AM   Dictation workstation:   [id number]      XR chest 1 view   Final Result   1. Suggestion of mild interstitial edema, more pronounced on this   radiograph compared to prior. Recommend correlation with fluid status.   2. Redemonstration of moderate right and small left pleural effusions.   3. Right basilar airspace opacity, which could be related  to   atelectasis versus pneumonia in appropriate clinical setting.        MACRO:   None        Signed by: Yazmin Oropeza 11/8/2024 4:17 PM   Dictation workstation:   [id number]      Transthoracic Echo (TTE) Limited   Final Result      CT abdomen pelvis wo IV contrast   Final Result   2 centimeters stone in the urinary bladder.        Bilateral pleural effusions. Pericardial effusion.        Ascites.        Anasarca.        Presacral edema.        Enlarged spleen.        The exam is limited by motion artifact.        MACRO:   none        Signed by: Jayshree Dias 11/4/2024 3:28 PM   Dictation workstation:   [id number]       renal complete   Final Result   No hydronephrosis. Decompressed bladder.        MACRO:   None        Signed by: Zain Mcknight 11/4/2024 11:08 AM   Dictation workstation:   [id number]      CT chest wo IV contrast   Final Result   Abnormalities at the right lung base on portable frontal chest   radiograph earlier today are due to a combination of moderate-sized,   mostly if not entirely free-flowing right pleural effusion with   associated multisegmental right lower lobe collapse adjacent to it        Small area of ground-glass airspace disease in the middle lobe   confirmed on CT may not have been expected on the prior radiograph        Possibility of mild ground-glass airspace disease in the right lower   lobe as well        No ground-glass airspace disease in the left lung        No consolidative pneumonia in any of the inflated lungs on either side        Small free-flowing left pleural effusion        Unusual hypodensities in the right ventricular free wall of uncertain   etiology and significance        Perhaps trace (smallest detectable quantity) pericardial effusion        Incidental abnormalities in the included upper abdomen as detailed   above        MACRO:   None        Signed by: Matthias Kolb 11/3/2024 11:35 AM   Dictation workstation:   [id number]      XR chest 1 view   Final  Result   1. Right lower lobe airspace consolidation either pneumonia or   atelectasis with right pleural effusion. Follow-up to complete   resolution is needed   2. Enlarged cardiac silhouette             Signed by: Kary Cardoso 11/3/2024 10:27 AM   Dictation workstation:   [id number]          Transthoracic Echo (TTE) Limited    Result Date: 11/5/2024   [facility], [address] [place] [address]               Tel [phone] and Fax [phone] TRANSTHORACIC ECHOCARDIOGRAM REPORT  Patient Name:       ELIE Mccain Physician:    [id number] Chicho Golmdan MD Study Date:         11/5/2024           Ordering Provider:    [id number] SHERITA GOYAL MRN/PID:            [id number]            Fellow: Accession#:         [id number]        Nurse: Date of Birth/Age:  1940 / 83     Sonographer:          Cathi garcia RDCS Gender assigned at  F                   Additional Staff: Birth: Height:             165.10 cm           Admit Date:           11/3/2024 Weight:             72.57 kg            Admission Status:     Inpatient -                                                               Routine BSA / BMI:          1.80 m2 / 26.63     Encounter#:           [id number]                     kg/m2 Blood Pressure:     103/62 mmHg         Department Location:  [facility] Non                                                               Invasive Study Type:    TRANSTHORACIC ECHO (TTE) LIMITED Diagnosis/ICD: 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 Study Detail: The following Echo studies were performed: 2D, Doppler and color               flow.  PHYSICIAN INTERPRETATION: Left Ventricle: The left ventricular systolic function is normal, with a visually estimated ejection fraction of 60-65%. There is severely increased concentric left ventricular hypertrophy. There are no regional left ventricular wall motion abnormalities. The left ventricular cavity size is normal. There is moderately increased septal and moderately increased posterior left ventricular wall thickness. Spectral Doppler shows an abnormal pattern of left ventricular diastolic filling. Left Atrium: The left atrium is upper limits of normal in size. Right Ventricle: The right ventricle is normal in size. There is normal right ventricular global systolic function. Right Atrium: The right atrium is normal in size. Aortic Valve: The aortic valve is trileaflet. The aortic valve dimensionless index is 0.77. There is trace aortic valve regurgitation. The peak instantaneous gradient of the aortic valve is 13 mmHg. The mean gradient of the mitral valve is 7 mmHg. Mitral Valve: The mitral valve is normal in structure. There is trace to mild mitral valve regurgitation. Tricuspid Valve: The tricuspid valve is structurally normal. There is trace to mild tricuspid regurgitation. Pulmonic Valve: The pulmonic valve is not well visualized. The pulmonic valve regurgitation was not well visualized. Pericardium: There is no pericardial effusion noted. Aorta: The aortic root is normal.  CONCLUSIONS:  1. The left ventricular systolic function is normal, with a visually estimated ejection fraction of 60-65%.  2. Spectral Doppler shows an abnormal pattern of left ventricular diastolic filling.  3. There is moderately increased septal and moderately increased posterior left ventricular wall thickness.  4. There is severely increased concentric left ventricular  hypertrophy.  5. There is normal right ventricular global systolic function. QUANTITATIVE DATA SUMMARY:  2D MEASUREMENTS:          Normal Ranges: IVSd:            1.49 cm  (0.6-1.1cm) LVPWd:           1.26 cm  (0.6-1.1cm) LVIDd:           4.47 cm  (3.9-5.9cm) LVIDs:           2.98 cm LV Mass Index:   133 g/m2 LVEDV Index:     23 ml/m2 LV % FS          33.4 %  LV SYSTOLIC FUNCTION BY 2D PLANIMETRY (MOD):                      Normal Ranges: EF-A4C View:    62 % (>=55%) EF-A2C View:    61 % EF-Biplane:     64 % EF-Visual:      63 % LV EF Reported: 63 %  LV DIASTOLIC FUNCTION:          Normal Ranges: MV Peak E:             0.70 m/s (0.7-1.2 m/s) MV Peak A:             0.91 m/s (0.42-0.7 m/s) E/A Ratio:             0.77     (1.0-2.2)  MITRAL VALVE:          Normal Ranges: MV DT:        264 msec (150-240msec)  AORTIC VALVE:                      Normal Ranges: AoV Vmax:                1.78 m/s  (<=1.7m/s) AoV Peak P.7 mmHg (<20mmHg) AoV Mean P.0 mmHg  (1.7-11.5mmHg) LVOT Max Josiah:            1.33 m/s  (<=1.1m/s) AoV VTI:                 31.31 cm  (18-25cm) LVOT VTI:                24.11 cm LVOT Diameter:           2.04 cm   (1.8-2.4cm) AoV Area, VTI:           2.52 cm2  (2.5-5.5cm2) AoV Area,Vmax:           2.44 cm2  (2.5-4.5cm2) AoV Dimensionless Index: 0.77  TRICUSPID VALVE/RVSP:          Normal Ranges: Peak TR Velocity:     3.46 m/s  PULMONIC VALVE:          Normal Ranges: PV Max Josiah:     0.8 m/s  (0.6-0.9m/s) PV Max P.8 mmHg  [id number] Chicho Goldman MD Electronically signed on 2024 at 2:09:49 PM  ** Final **           Assessment/Plan   Principal Problem:    Sepsis with acute hypoxic respiratory failure without septic shock (Multi)  Active Problems:    Iron deficiency anemia    Chronic systolic (congestive) heart failure    Essential hypertension    History of pulmonary embolism    Anxiety    Vitamin D deficiency    Vitamin B12 deficiency    Adult hypothyroidism    Pneumonia of  right lower lobe due to infectious organism    Leukocytosis    Acute kidney injury superimposed on stage 3a chronic kidney disease    Elevated troponin    Hyperbilirubinemia    Hyperglycemia    Hypokalemia    Hypomagnesemia    Hypocalcemia    Abnormality of right ventricle of heart    Impaired mobility and ADLs      Echocardiogram from June 2024:    Left Ventricle: Low normal left ventricular systolic function with a   visually estimated EF of 50 - 55%. Left ventricle size is normal. Normal   wall thickness. Ventricular mass is normal. Findings consistent with   concentric remodeling. Normal wall motion. Grade I diastolic dysfunction   with normal LAP.     Right Ventricle: Right ventricle size is normal. Normal systolic   function. TAPSE is 1.9 cm.     Aortic Valve: No stenosis.     Mitral Valve: Mild regurgitation.     Tricuspid Valve: Normal RVSP. The estimated RVSP is 24 mmHg.     Pericardium: No pericardial effusion.         Pneumonia  -CT chest reviewed  -Procal downtrending  -antibiotics completed  -bronchodilators  -oxygen support->currently on room air  -Pulmonary following, note reviewed     2. Abnormal CT finding of RV  -Unusual hypodensities in the right ventricular free wall  -Echo as above  -Repeat limited echo reviewed, no abnormality noted  -Most likely an RV wall fat pad  -Can obtain outpt cardiac MRI to further eval     3. Acute on chronic diastolic CHF with mildly reduced EF  -CT showed pleural effusion, possible parapneumonic vs cardiac  -  -I reviewed the Echocardiogram as above  -Strict I & Os  -Daily weights  -2gm na diet  -BP low most likely from infection and third spacing with low albumin  -Stop imdur for low BP  -Changed Coreg to metoprolol 25mg BID for better rate control  -CT abd/pelvis showed pleural effusions, anasarca, and ascities  -Albumin 1.9, given IV albumin and Lasix IV (11/5)  -Cont Midodrine 5mg TID  -Bumex 1mg IV daily since admit,  then started Bumex 1mg PO  daily  -Repeat CXR showed worsened effusion and congestion  -Started Bumex gtt at 0.5mg/hr->changed to Bumex 1mg PO BID  -bedside US per pulmonary showed small bilateral effusions  - May need to hold/decrease bumex if becomes hypotensive due to diarrhea, dehydrated  - BP is currently stable     4. UTI, resolved  -WBC elevated  -Urine culture showed multiple organisms, possible contamination  -antibiotics completed     5. Elevated troponins-Non MI elevation  -Troponin 76, now downtrending  -EKG showed SR LBBB->chronic LBBB  -Denies ACS symptoms  -limited echo as above, normal LVEF     6. Hx of DVT, now with bilateral acute DVT  -Venous U/S showed DVT right femoral vein, age indeterminate thrombus in right popliteal, right posterior tibial and right peroneal veins. DVT left popliteal vein   -Eliquis held  -heparin gtt started  -Agree with restarting Eliquis at higher dose  -Heme/onc consulted     7. Hypokalemia/hypomagnesemia, resolved  -supplemented     8. Hx of HTN, with hypotension  -stop imdur  -Gave albumin with lasix post (11/5)  -Cont Midodrine 5mg TID  -Coreg changed to metoprolol  -Monitor    OLI Roche-CNP

## 2024-11-16 NOTE — PROGRESS NOTES
Desirae Gregg is a 83 y.o. female on day 13 of admission presenting with Sepsis with acute hypoxic respiratory failure without septic shock (Multi).    Subjective   Interval History: no fever, no new complaints        Review of Systems    Objective   Range of Vitals (last 24 hours)  Heart Rate:  [63-76]   Temp:  [36.5 °C (97.7 °F)]   Resp:  [18-22]   BP: (117-136)/(68-84)   SpO2:  [91 %-94 %]   Daily Weight  11/14/24 : 75.8 kg (167 lb 3.2 oz)    Body mass index is 27.82 kg/m².    Physical Exam  Constitutional:       Appearance: Normal appearance.   HENT:      Head: Normocephalic and atraumatic.      Mouth/Throat:      Mouth: Mucous membranes are moist.      Pharynx: Oropharynx is clear.   Eyes:      Pupils: Pupils are equal, round, and reactive to light.   Cardiovascular:      Rate and Rhythm: Normal rate and regular rhythm.      Heart sounds: Normal heart sounds.   Pulmonary:      Effort: Pulmonary effort is normal.      Breath sounds: Normal breath sounds.   Abdominal:      General: Abdomen is flat. Bowel sounds are normal.      Palpations: Abdomen is soft.   Musculoskeletal:      Cervical back: Normal range of motion.      Comments: Lt leg wound, no cellulitis    Neurological:      Mental Status: She is alert.         Antibiotics  This patient does not have an active medication from one of the medication groupers.    Relevant Results  Labs  Results from last 72 hours   Lab Units 11/14/24  0617   WBC AUTO x10*3/uL 31.2*   HEMOGLOBIN g/dL 14.9   HEMATOCRIT % 51.3*   PLATELETS AUTO x10*3/uL 205     Results from last 72 hours   Lab Units 11/16/24  0521 11/15/24  1352 11/14/24  0617   SODIUM mmol/L 134* 134* 132*   POTASSIUM mmol/L 3.3* 3.5 3.4*   CHLORIDE mmol/L 95* 93* 96*   CO2 mmol/L 34* 31 30   BUN mg/dL 17 18 16   CREATININE mg/dL 1.04 1.05 0.95   GLUCOSE mg/dL 94 126* 110*   CALCIUM mg/dL 7.2* 7.7* 7.3*   ANION GAP mmol/L 8* 14 9*   EGFR mL/min/1.73m*2 53* 52* 59*   PHOSPHORUS mg/dL 3.2 2.9 2.8     Results from  last 72 hours   Lab Units 11/16/24  0521 11/15/24  1352 11/14/24  0617   ALBUMIN g/dL 2.1* 2.4* 2.0*     Estimated Creatinine Clearance: 40.3 mL/min (by C-G formula based on SCr of 1.04 mg/dL).  C-Reactive Protein   Date Value Ref Range Status   11/15/2024 0.80 <1.00 mg/dL Final   11/12/2024 1.04 (H) <1.00 mg/dL Final   11/11/2024 0.86 <1.00 mg/dL Final     Microbiology  Reviewed  Imaging  Reviewed        Assessment/Plan   Leukocytosis, likely related to myelodysplasia, US with legs DVT   Sepsis, treated  Respiratory failure / pneumonia  Bacteriuria  Polycythemia / myelofibrosis  Left leg wound / some stasis, unlikely to be cellulitis     Recommendations :  Supportive care  Repeat CBC in am  Discussed with the wound care  team     I spent minutes in the professional and overall care of this patient.      Cameron Lassiter MD

## 2024-11-16 NOTE — CARE PLAN
The patient's goals for the shift include      The clinical goals for the shift include Pt will have less incontinent stool episodes this shift.    Over the shift, the patient did make progress toward the following goals. Slightly fewer episodes. Monitored and medicated as ordered this shift.

## 2024-11-16 NOTE — CONSULTS
Inpatient consult to Podiatry  Consult performed by: Yanni Mcgrath DPM  Consult ordered by: Татьяна Agarwal MD  Reason for consult: Left lower leg ulceration          Reason For Consult  Left lower leg chronic ulceration    History Of Present Illness  Desirae Gregg is a 83 y.o. female presenting with a traumatic wound to left lower leg.  Started from her banging her leg on her bed trying to get her sons attention several weeks ago.  Had significant pain initially but is better now.  Admits that initially it was not draining..     Past Medical History  She has a past medical history of Heart failure and Sciatica.    Surgical History  She has a past surgical history that includes Hysterectomy; Cholecystectomy; Tonsillectomy; and Appendectomy.     Social History  She reports that she has never smoked. She has never used smokeless tobacco. She reports that she does not drink alcohol and does not use drugs.    Family History  Family History   Problem Relation Name Age of Onset    Stroke Mother      Asthma Father      Cancer Paternal Grandmother          Allergies  Clonidine and structural analogs, Naproxen, Tizanidine, Hydroxyzine, and Methylprednisolone    Review of Systems   Per above    Physical Exam   Constitutional:       General: She is not in acute distress.     Appearance: She is ill-appearing.   HENT:      Head: Normocephalic and atraumatic.      Right Ear: External ear normal.      Left Ear: External ear normal.      Nose: Nose normal. No rhinorrhea.      Mouth/Throat:      Mouth: Mucous membranes are moist.      Pharynx: Oropharynx is clear. No oropharyngeal exudate.   Eyes:      General: No scleral icterus.        Right eye: No discharge.         Left eye: No discharge.      Conjunctiva/sclera: Conjunctivae normal.   Cardiovascular:      Rate and Rhythm: Normal rate and regular rhythm.      Pulses: Normal pulses.      Heart sounds: Normal heart sounds. No murmur heard.  Pulmonary:      Effort:  "Pulmonary effort is normal. No respiratory distress.      Breath sounds: Normal breath sounds. No wheezing or rales.   Abdominal:      General: Abdomen is flat. There is no distension.      Palpations: Abdomen is soft.      Tenderness: There is no abdominal tenderness.   Musculoskeletal:         General: No tenderness.      Right lower leg: No edema.      Left lower leg: No edema.   Skin:     General: Skin is warm and dry.      Capillary Refill: Capillary refill takes less than 2 seconds.      Findings: Ulceration to anterior aspect of left shin with softening eshcar, thin periphery of erythema, serosanguinous drainage;  no malodor;  no edema  Neurological:      General: No focal deficit present.      Mental Status: She is alert and oriented to person, place, and time. Mental status is at baseline.   Psychiatric:         Mood and Affect: Mood normal.         Behavior: Behavior normal.         Thought Content: Thought content normal.         Judgment: Judgment normal.       Last Recorded Vitals  Blood pressure 120/76, pulse 68, temperature 36.5 °C (97.7 °F), temperature source Temporal, resp. rate 19, height 1.651 m (5' 5\"), weight 75.8 kg (167 lb 3.2 oz), SpO2 92%.    Relevant Results  Susceptibility data from last 90 days.  Collected Specimen Info Organism   11/13/24 Swab from Anterior Nares Methicillin Resistant Staphylococcus aureus (MRSA)   ECG 12 lead    Result Date: 11/14/2024  Sinus tachycardia Left axis deviation Left bundle branch block Abnormal ECG No previous ECGs available See ED provider note for full interpretation and clinical correlation Confirmed by Edwin Medley ([id number]) on 11/14/2024 3:17:28 AM    Vascular US lower extremity venous duplex bilateral    Result Date: 11/13/2024          [place] [address]  Tel [phone] and Fax [phone]  Vascular Lab Report VASC US LOWER EXTREMITY VENOUS DUPLEX BILATERAL  Patient Name:      ELIE WHITLEY           " Reading Physician: [id number] Haresh Childs MD Study Date:        11/12/2024           Ordering           [id number] BIPIN ESPINOSA                                         Physician:         SARAH MRN/PID:           [id number]             Technologist:      Alexandra Dale RVT Accession#:        [id number]         Technologist 2: Date of Birth/Age: 1940 / 83      Encounter#:        [id number]                    years Gender:            F Admission Status:  Inpatient            Location           [facility]                                         Performed:  Diagnosis/ICD: Compression of vein-I87.1 CPT Codes:     52066 Peripheral venous duplex scan for DVT complete  **CRITICAL RESULT** Critical Result: DVT right femoral vein, age indeterminate thrombus in right popliteal, right posterior tibial and right peroneal veins. DVT left popliteal vein Notification called to Bipin Agarwal MD on 11/12/2024 at 1:28:12 PM by DVT.  CONCLUSIONS: Right Lower Venous: There is acute occlusive deep vein thrombosis visualized in the mid femoral and distal femoral veins. There is acute non-occlusive deep vein thrombosis visualized in the proximal femoral vein. There is age indeterminate deep vein thrombosis visualized in the popliteal, posterior tibial and peroneal veins. Left Lower Venous: There is acute non-occlusive deep vein thrombosis visualized in the popliteal vein. The remainder of the left leg is negative for deep vein thrombosis. Left posterior tibial and peroneal veins poorly visualized due to edema and bandages; cannot rule out deep vein thrombosis in non-visualized vessels.  Additional Findings: Non-vascularized structure noted in right popliteal fossa measuring 7.2 cm x 1.6 cm. Technically difficult exam due to edema and patient positioning.  Imaging & Doppler Findings:  Right                 Compressible      Thrombus              Flow Distal External Iliac                                  Spontaneous/Phasic CFV                        Yes             None         Spontaneous/Phasic PFV                       Yes             None         Spontaneous/Phasic FV Proximal             Partial    Acute non-occlusive Spontaneous/Phasic FV Mid                     No        Acute occlusive FV Distal                  No        Acute occlusive Popliteal               Partial           ? Age        Spontaneous/Phasic Peroneal                Partial           ? Age PTV                     Partial           ? Age  Left                  Compress      Thrombus              Flow Distal External Iliac                              Spontaneous/Phasic CFV                     Yes           None         Spontaneous/Phasic PFV                     Yes           None FV Proximal             Yes           None         Spontaneous/Phasic FV Mid                  Yes           None FV Distal               Yes           None Popliteal             Partial  Acute non-occlusive Spontaneous/Phasic  [id number] Haresh Childs MD Electronically signed by [id number] Haresh Childs MD on 11/13/2024 at 7:49:11 AM  ** Final **     XR chest 1 view    Result Date: 11/12/2024  Interpreted By:  Alfonso Ricci, STUDY: XR CHEST 1 VIEW;  11/12/2024 9:29 am   INDICATION: Signs/Symptoms:follow up pneumonia.   COMPARISON: 11/08/2024   ACCESSION NUMBER(S): [id number]   ORDERING CLINICIAN: BIPIN SMITH   FINDINGS: There is increased opacification of the left lower lung with blunted costophrenic angle indicating a moderate effusion probably with underlying atelectasis or consolidation. There is mild prominence of the central pulmonary vasculature, and while accentuated from a shallow inspiration is probably from mild congestion. The cardiac size appears to be within normal limits considering the left lower cardiac margin is obscured.   ABDOMEN AND OTHER FINDINGS: No remarkable upper abdominal findings.   BONES: No acute osseous changes.       1.  Increased left lower lung airspace disease and probable congestion.    Signed by: Alfonso Ricci 11/12/2024 5:25 PM Dictation workstation:   [id number]    Electrocardiogram, 12-lead PRN ACS symptoms    Result Date: 11/11/2024  Undetermined rhythm Left bundle branch block Abnormal ECG When compared with ECG of 03-NOV-2024 09:20, (unconfirmed) Current undetermined rhythm precludes rhythm comparison, needs review QRS axis Shifted right Nonspecific T wave abnormality has replaced inverted T waves in Inferior leads    Electrocardiogram, 12-lead PRN ACS symptoms    Result Date: 11/11/2024  Sinus rhythm with Premature atrial complexes Left bundle branch block Abnormal ECG When compared with ECG of 08-NOV-2024 17:49, (unconfirmed) Previous ECG has undetermined rhythm, needs review    FL GI esophagram    Result Date: 11/11/2024  Interpreted By:  Zain Mcknight, STUDY: FL GI ESOPHAGRAM;  11/11/2024 9:24 am   INDICATION: Signs/Symptoms:dysphagia.   COMPARISON: CT abdomen and pelvis 11/03/2024   ACCESSION NUMBER(S): [id number]   ORDERING CLINICIAN: SYED JOSHI   TECHNIQUE: Modified single contrast barium swallow due to patient's clinical condition. Fluoro time: 15 seconds.   FINDINGS: : Small bilateral pleural effusions.   Contrast opacifies the esophagus and transit into the stomach without obstruction or extraluminal extravasation.       Modified exam due to patient's clinical condition. No esophageal obstruction.   MACRO Zain Mcknight discussed the significance and urgency of this critical finding by Epic secure chat with  SYED JOSHI on 11/11/2024 at 10:56 am.  (**-RCF-**) Findings:  See findings.   Signed by: Zain Mcknight 11/11/2024 11:02 AM Dictation workstation:   [id number]    XR chest 1 view    Result Date: 11/8/2024  Interpreted By:  Yazmin Oropeza, STUDY: XR CHEST 1 VIEW; ;  11/8/2024 10:10 am   INDICATION: Signs/Symptoms:hypoxia.     COMPARISON: Chest radiograph dated 11/03/2024.   ACCESSION NUMBER(S): [id number]   ORDERING CLINICIAN: PAULETTE CARRANZA   FINDINGS: Cardiac  silhouette is mildly enlarged but is similar compared to prior radiograph. Again noted is moderate volume right-sided pleural effusion with associated hazy airspace opacity in the right mid to lower lung zone. There is also reticular airspace opacity in the right lung base, which could represent atelectasis versus pneumonia. There is also obliteration of left costophrenic angle concerning for small left pleural effusion. There is mild prominence of interstitial markings in bilateral lungs concerning for mild interstitial edema. No large pneumothorax within limits of portable technique. No acute osseous findings.       1. Suggestion of mild interstitial edema, more pronounced on this radiograph compared to prior. Recommend correlation with fluid status. 2. Redemonstration of moderate right and small left pleural effusions. 3. Right basilar airspace opacity, which could be related to atelectasis versus pneumonia in appropriate clinical setting.   MACRO: None   Signed by: Yazmin Oropeza 11/8/2024 4:17 PM Dictation workstation:   [id number]    Transthoracic Echo (TTE) Limited    Result Date: 11/5/2024   [facility], [address] [place] [address]               Tel [phone] and Fax [phone] TRANSTHORACIC ECHOCARDIOGRAM REPORT  Patient Name:       ELIE WHITLEY          Reading Physician:    [id number] Chicho Goldman MD Study Date:         11/5/2024           Ordering Provider:    [id number] SHERITA GOYAL MRN/PID:            [id number]            Fellow: Accession#:         [id number]        Nurse: Date of Birth/Age:  1940 / 83     Sonographer:          Cathi garcia                                     RDCS Gender assigned at  F                   Additional Staff: Birth: Height:             165.10 cm           Admit Date:           11/3/2024  Weight:             72.57 kg            Admission Status:     Inpatient -                                                               Routine BSA / BMI:          1.80 m2 / 26.63     Encounter#:           [id number]                     kg/m2 Blood Pressure:     103/62 mmHg         Department Location:  [facility]                                                               Invasive Study Type:    TRANSTHORACIC ECHO (TTE) LIMITED Diagnosis/ICD: 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 Study Detail: The following Echo studies were performed: 2D, Doppler and color               flow.  PHYSICIAN INTERPRETATION: Left Ventricle: The left ventricular systolic function is normal, with a visually estimated ejection fraction of 60-65%. There is severely increased concentric left ventricular hypertrophy. There are no regional left ventricular wall motion abnormalities. The left ventricular cavity size is normal. There is moderately increased septal and moderately increased posterior left ventricular wall thickness. Spectral Doppler shows an abnormal pattern of left ventricular diastolic filling. Left Atrium: The left atrium is upper limits of normal in size. Right Ventricle: The right ventricle is normal in size. There is normal right ventricular global systolic function. Right Atrium: The right atrium is normal in size. Aortic Valve: The aortic valve is trileaflet. The aortic valve dimensionless index is 0.77. There is trace aortic valve regurgitation. The peak instantaneous gradient of the aortic valve is 13 mmHg. The mean gradient of the mitral valve is 7 mmHg. Mitral Valve: The mitral valve is normal in  structure. There is trace to mild mitral valve regurgitation. Tricuspid Valve: The tricuspid valve is structurally normal. There is trace to mild tricuspid regurgitation. Pulmonic Valve: The pulmonic valve is not well visualized. The pulmonic valve regurgitation was not well visualized. Pericardium: There is no pericardial effusion noted. Aorta: The aortic root is normal.  CONCLUSIONS:  1. The left ventricular systolic function is normal, with a visually estimated ejection fraction of 60-65%.  2. Spectral Doppler shows an abnormal pattern of left ventricular diastolic filling.  3. There is moderately increased septal and moderately increased posterior left ventricular wall thickness.  4. There is severely increased concentric left ventricular hypertrophy.  5. There is normal right ventricular global systolic function. QUANTITATIVE DATA SUMMARY:  2D MEASUREMENTS:          Normal Ranges: IVSd:            1.49 cm  (0.6-1.1cm) LVPWd:           1.26 cm  (0.6-1.1cm) LVIDd:           4.47 cm  (3.9-5.9cm) LVIDs:           2.98 cm LV Mass Index:   133 g/m2 LVEDV Index:     23 ml/m2 LV % FS          33.4 %  LV SYSTOLIC FUNCTION BY 2D PLANIMETRY (MOD):                      Normal Ranges: EF-A4C View:    62 % (>=55%) EF-A2C View:    61 % EF-Biplane:     64 % EF-Visual:      63 % LV EF Reported: 63 %  LV DIASTOLIC FUNCTION:          Normal Ranges: MV Peak E:             0.70 m/s (0.7-1.2 m/s) MV Peak A:             0.91 m/s (0.42-0.7 m/s) E/A Ratio:             0.77     (1.0-2.2)  MITRAL VALVE:          Normal Ranges: MV DT:        264 msec (150-240msec)  AORTIC VALVE:                      Normal Ranges: AoV Vmax:                1.78 m/s  (<=1.7m/s) AoV Peak P.7 mmHg (<20mmHg) AoV Mean P.0 mmHg  (1.7-11.5mmHg) LVOT Max Josiah:            1.33 m/s  (<=1.1m/s) AoV VTI:                 31.31 cm  (18-25cm) LVOT VTI:                24.11 cm LVOT Diameter:           2.04 cm   (1.8-2.4cm) AoV Area, VTI:            2.52 cm2  (2.5-5.5cm2) AoV Area,Vmax:           2.44 cm2  (2.5-4.5cm2) AoV Dimensionless Index: 0.77  TRICUSPID VALVE/RVSP:          Normal Ranges: Peak TR Velocity:     3.46 m/s  PULMONIC VALVE:          Normal Ranges: PV Max Josiah:     0.8 m/s  (0.6-0.9m/s) PV Max P.8 mmHg  [id number] Chicho Goldman MD Electronically signed on 2024 at 2:09:49 PM  ** Final **     CT abdomen pelvis wo IV contrast    Result Date: 2024  Interpreted By:  Jayshree Dias, STUDY: CT ABDOMEN PELVIS WO IV CONTRAST;  2024 3:04 pm   INDICATION: Signs/Symptoms:recurrent urosepsis, eval for retained stone urinary tract.   COMPARISON: None.   ACCESSION NUMBER(S): [id number]   ORDERING CLINICIAN: SHERITA GOYAL   TECHNIQUE: CT of the abdomen and pelvis was performed without intravenous contrast. Sagittal and coronal reconstructions were generated.   FINDINGS: LOWER CHEST: There are bilateral pleural effusions. There is a small pericardial effusion. There is bilateral basilar atelectasis or infiltrate.   ABDOMEN:   LIVER: Unremarkable   BILE DUCTS: Unremarkable   GALLBLADDER: Status post cholecystectomy   PANCREAS: Unremarkable   SPLEEN: The spleen is enlarged.   ADRENAL GLANDS: There are no adrenal masses.   KIDNEYS AND URETERS: Kidneys are normal size and not obstructed.   There are no renal calculi.   Ureters are normal caliber.   PELVIS:   BLADDER: The bladder is empty. There is a 2 centimeters bladder stone.   REPRODUCTIVE ORGANS: Patient is status post hysterectomy. There appear to be surgical clips in the pelvis.   BOWEL: There is no significant bowel distention. There is motion artifact. There are sigmoid diverticula.   VESSELS: There are atherosclerotic changes of the aorta. The cava is unremarkable   PERITONEUM/RETROPERITONEUM/LYMPH NODES: There is a moderate amount of ascites. There is presacral edema. There is no obvious free air.   There is no significant lymphadenopathy.   BONES AND ABDOMINAL WALL: There is  anasarca.   There are old left pelvic fractures peer there are degenerative changes of the spine. There is loss of height of L1.   COMPARISON OF FINDINGS:       2 centimeters stone in the urinary bladder.   Bilateral pleural effusions. Pericardial effusion.   Ascites.   Anasarca.   Presacral edema.   Enlarged spleen.   The exam is limited by motion artifact.   MACRO: none   Signed by: Jayshree Dias 11/4/2024 3:28 PM Dictation workstation:   [id number]    US renal complete    Result Date: 11/4/2024  Interpreted By:  Zain Mcknight, STUDY: US RENAL COMPLETE; 11/4/2024 9:03 am   INDICATION: Signs/Symptoms:LEVY on CKD, eval postrenal causes.   COMPARISON: None.   ACCESSION NUMBER(S): [id number]   ORDERING CLINICIAN: SHERITA GOYAL   TECHNIQUE: Sonography of the kidneys and urinary bladder was performed.   FINDINGS: Right Kidney: *Renal length: 10 cm *Parenchyma: Normal parenchymal echogenicity. Normal parenchymal thickness. *Collecting system: No hydronephrosis. *Calculus: No echogenic, shadowing calculus. *Lesion: None.   Left Kidney: *Renal length: 10 cm *Parenchyma: Normal parenchymal echogenicity. Normal parenchymal thickness. *Collecting system: No hydronephrosis. *Calculus: Nonobstructing calculus in the upper pole measuring 4 mm. *Lesion: None.   Bladder: Decompressed.   Other: Trace perihepatic, perisplenic and pelvic free fluid/ascites.       No hydronephrosis. Decompressed bladder.   MACRO: None   Signed by: Zain Mcknight 11/4/2024 11:08 AM Dictation workstation:   [id number]    CT chest wo IV contrast    Result Date: 11/3/2024  Interpreted By:  Matthias Kolb, STUDY: CT CHEST WO IV CONTRAST;  11/3/2024 11:16 am   INDICATION: Signs/Symptoms:sepsis, ? atelectasis versus PNA on CXR without classic symptoms.     COMPARISON: Portable chest earlier same day 3 November 2024 at 0957 hours; CT lumbar spine without contrast 5 July 2024   ACCESSION NUMBER(S): [id number]   ORDERING CLINICIAN: SHERITA GOYAL    TECHNIQUE: Helical CT chest from thoracic inlet through the hemidiaphragms without intravenous contrast   FINDINGS: LUNGS / AIRSPACES / AIRWAYS:   LARGE AIRWAYS Filling defect: Negative Wall thickening: Negative Bronchiectasis: Negative Other: N/A   AIRSPACES Fibrosis: Negative Emphysema: Negative Consolidation: Negative Ground glass airspace disease: Small region of ground-glass airspace change in the middle lobe and potentially in portions of the inflated right lower lobe. None in the left lung noting one segment of left lower lobe collapse Edema: Negative Nodule / Mass: Negative Other: One segment of left lower lobe collapse. Multiple segments of right lower lobe collapse   PLEURA: Effusion:  Moderate size mostly if not entirely free flowing right; small free-flowing left Pneumothorax:  Both sides negative Other:  n/a   CARDIOVASCULAR: Heart size:  Mildly enlarged Pericardial effusion:  Perhaps trace Thoracic aortic aneurysm:  Negative Pulmonary arteries:  Static Heart failure change:  Negative.  No sign of interstitial or alveolar edema. Other:  Unusual hypodensities in the right ventricular free wall for example soft tissue windows axial series 202, image 167 and the surrounding few images   NONVASCULAR MEDIASTINUM: Esophagus:  Grossly normal by CT Mediastinal Mass:  Negative Hiatal hernia:  None Other:  n/a   LYMPH NODES: No thoracic adenopathy   CHEST WALL: Soft tissues of the chest wall are unremarkable   SKELETON: No acute or contributory abnormality   UPPER ABDOMEN: Small volume ascites pooling about the included liver and spleen. Spleen probably mildly enlarged but not completely included in the field of view. Liver not overtly cirrhotic but at least borderline fatty. The only pertinent comparison exam for the upper abdomen would be CT lumbar spine without contrast 5 July 2024 at which time there was not any ascites in the field of view, but the field of view did not include the portions of the abdomen  where they ascites is presently located. It is unlikely there was ascites in July, 2024 as more of the lower abdomen was included on that exam and it did not appear that there is any ascites tracking down into (or up from) the pelvis       Abnormalities at the right lung base on portable frontal chest radiograph earlier today are due to a combination of moderate-sized, mostly if not entirely free-flowing right pleural effusion with associated multisegmental right lower lobe collapse adjacent to it   Small area of ground-glass airspace disease in the middle lobe confirmed on CT may not have been expected on the prior radiograph   Possibility of mild ground-glass airspace disease in the right lower lobe as well   No ground-glass airspace disease in the left lung   No consolidative pneumonia in any of the inflated lungs on either side   Small free-flowing left pleural effusion   Unusual hypodensities in the right ventricular free wall of uncertain etiology and significance   Perhaps trace (smallest detectable quantity) pericardial effusion   Incidental abnormalities in the included upper abdomen as detailed above   MACRO: None   Signed by: Matthias Kolb 11/3/2024 11:35 AM Dictation workstation:   [id number]    XR chest 1 view    Result Date: 11/3/2024  Interpreted By:  Kary Cardoso, STUDY: XR CHEST 1 VIEW 11/3/2024 10:06 am   INDICATION: Signs/Symptoms:cough and fever   COMPARISON: None   ACCESSION NUMBER(S): [id number]   ORDERING CLINICIAN: ESTHER ROBBINS   TECHNIQUE: AP view   FINDINGS: There is right lower lobe airspace consolidation and a right pleural effusion. There is an enlarged cardiac silhouette. Pulmonary vascularity is normal. Degenerative changes are noted in both shoulders. No sign of pneumothorax       1. Right lower lobe airspace consolidation either pneumonia or atelectasis with right pleural effusion. Follow-up to complete resolution is needed 2. Enlarged cardiac silhouette     Signed by: Kary Cardoso  11/3/2024 10:27 AM Dictation workstation:   [id number]   Results for orders placed or performed during the hospital encounter of 11/03/24 (from the past 24 hours)   Renal Function Panel   Result Value Ref Range    Glucose 96 74 - 99 mg/dL    Sodium 133 (L) 136 - 145 mmol/L    Potassium 3.4 (L) 3.5 - 5.3 mmol/L    Chloride 94 (L) 98 - 107 mmol/L    Bicarbonate 34 (H) 21 - 32 mmol/L    Anion Gap 8 (L) 10 - 20 mmol/L    Urea Nitrogen 19 6 - 23 mg/dL    Creatinine 1.17 (H) 0.50 - 1.05 mg/dL    eGFR 46 (L) >60 mL/min/1.73m*2    Calcium 7.3 (L) 8.6 - 10.3 mg/dL    Phosphorus 3.1 2.5 - 4.9 mg/dL    Albumin 2.3 (L) 3.4 - 5.0 g/dL   Magnesium   Result Value Ref Range    Magnesium 1.47 (L) 1.60 - 2.40 mg/dL   CBC and Auto Differential   Result Value Ref Range    WBC 28.1 (H) 4.4 - 11.3 x10*3/uL    nRBC 0.0 0.0 - 0.0 /100 WBCs    RBC 5.35 (H) 4.00 - 5.20 x10*6/uL    Hemoglobin 14.4 12.0 - 16.0 g/dL    Hematocrit 47.9 (H) 36.0 - 46.0 %    MCV 90 80 - 100 fL    MCH 26.9 26.0 - 34.0 pg    MCHC 30.1 (L) 32.0 - 36.0 g/dL    RDW 16.8 (H) 11.5 - 14.5 %    Platelets 278 150 - 450 x10*3/uL    Neutrophils % 86.2 40.0 - 80.0 %    Immature Granulocytes %, Automated 2.5 (H) 0.0 - 0.9 %    Lymphocytes % 4.8 13.0 - 44.0 %    Monocytes % 3.6 2.0 - 10.0 %    Eosinophils % 2.3 0.0 - 6.0 %    Basophils % 0.6 0.0 - 2.0 %    Neutrophils Absolute 24.21 (H) 1.60 - 5.50 x10*3/uL    Immature Granulocytes Absolute, Automated 0.71 (H) 0.00 - 0.50 x10*3/uL    Lymphocytes Absolute 1.34 0.80 - 3.00 x10*3/uL    Monocytes Absolute 1.02 (H) 0.05 - 0.80 x10*3/uL    Eosinophils Absolute 0.65 (H) 0.00 - 0.40 x10*3/uL    Basophils Absolute 0.18 (H) 0.00 - 0.10 x10*3/uL        Assessment/Plan      Left lower leg ulceration  Does not appear grossly infected.  Will eventually need debridement.  Santyl, adaptic, ABD, and ace bandage.  Change daily.  Keep leg elevated while sitting.    I spent 35 minutes in the professional and overall care of this  patient.

## 2024-11-17 VITALS
TEMPERATURE: 97.2 F | HEART RATE: 65 BPM | SYSTOLIC BLOOD PRESSURE: 122 MMHG | OXYGEN SATURATION: 95 % | DIASTOLIC BLOOD PRESSURE: 73 MMHG | BODY MASS INDEX: 24.99 KG/M2 | HEIGHT: 65 IN | RESPIRATION RATE: 18 BRPM | WEIGHT: 150 LBS

## 2024-11-17 LAB
ALBUMIN SERPL BCP-MCNC: 2.3 G/DL (ref 3.4–5)
ANION GAP SERPL CALC-SCNC: 8 MMOL/L (ref 10–20)
APPEARANCE UR: ABNORMAL
BACTERIA #/AREA URNS AUTO: ABNORMAL /HPF
BASOPHILS # BLD AUTO: 0.18 X10*3/UL (ref 0–0.1)
BASOPHILS NFR BLD AUTO: 0.6 %
BILIRUB UR STRIP.AUTO-MCNC: NEGATIVE MG/DL
BUN SERPL-MCNC: 19 MG/DL (ref 6–23)
CALCIUM SERPL-MCNC: 7.3 MG/DL (ref 8.6–10.3)
CHLORIDE SERPL-SCNC: 94 MMOL/L (ref 98–107)
CO2 SERPL-SCNC: 34 MMOL/L (ref 21–32)
COLOR UR: ABNORMAL
CREAT SERPL-MCNC: 1.17 MG/DL (ref 0.5–1.05)
EGFRCR SERPLBLD CKD-EPI 2021: 46 ML/MIN/1.73M*2
EOSINOPHIL # BLD AUTO: 0.65 X10*3/UL (ref 0–0.4)
EOSINOPHIL NFR BLD AUTO: 2.3 %
ERYTHROCYTE [DISTWIDTH] IN BLOOD BY AUTOMATED COUNT: 16.8 % (ref 11.5–14.5)
GLUCOSE SERPL-MCNC: 96 MG/DL (ref 74–99)
GLUCOSE UR STRIP.AUTO-MCNC: NORMAL MG/DL
HCT VFR BLD AUTO: 47.9 % (ref 36–46)
HGB BLD-MCNC: 14.4 G/DL (ref 12–16)
HYALINE CASTS #/AREA URNS AUTO: ABNORMAL /LPF
IMM GRANULOCYTES # BLD AUTO: 0.71 X10*3/UL (ref 0–0.5)
IMM GRANULOCYTES NFR BLD AUTO: 2.5 % (ref 0–0.9)
KETONES UR STRIP.AUTO-MCNC: NEGATIVE MG/DL
LEUKOCYTE ESTERASE UR QL STRIP.AUTO: ABNORMAL
LYMPHOCYTES # BLD AUTO: 1.34 X10*3/UL (ref 0.8–3)
LYMPHOCYTES NFR BLD AUTO: 4.8 %
MAGNESIUM SERPL-MCNC: 1.47 MG/DL (ref 1.6–2.4)
MCH RBC QN AUTO: 26.9 PG (ref 26–34)
MCHC RBC AUTO-ENTMCNC: 30.1 G/DL (ref 32–36)
MCV RBC AUTO: 90 FL (ref 80–100)
MONOCYTES # BLD AUTO: 1.02 X10*3/UL (ref 0.05–0.8)
MONOCYTES NFR BLD AUTO: 3.6 %
NEUTROPHILS # BLD AUTO: 24.21 X10*3/UL (ref 1.6–5.5)
NEUTROPHILS NFR BLD AUTO: 86.2 %
NITRITE UR QL STRIP.AUTO: NEGATIVE
NRBC BLD-RTO: 0 /100 WBCS (ref 0–0)
PH UR STRIP.AUTO: 5.5 [PH]
PHOSPHATE SERPL-MCNC: 3.1 MG/DL (ref 2.5–4.9)
PLATELET # BLD AUTO: 278 X10*3/UL (ref 150–450)
POTASSIUM SERPL-SCNC: 3.4 MMOL/L (ref 3.5–5.3)
PROT UR STRIP.AUTO-MCNC: ABNORMAL MG/DL
RBC # BLD AUTO: 5.35 X10*6/UL (ref 4–5.2)
RBC # UR STRIP.AUTO: ABNORMAL /UL
RBC #/AREA URNS AUTO: >20 /HPF
SODIUM SERPL-SCNC: 133 MMOL/L (ref 136–145)
SP GR UR STRIP.AUTO: 1.01
SQUAMOUS #/AREA URNS AUTO: ABNORMAL /HPF
UROBILINOGEN UR STRIP.AUTO-MCNC: NORMAL MG/DL
WBC # BLD AUTO: 28.1 X10*3/UL (ref 4.4–11.3)
WBC #/AREA URNS AUTO: >50 /HPF
WBC CLUMPS #/AREA URNS AUTO: ABNORMAL /HPF

## 2024-11-17 PROCEDURE — 2500000004 HC RX 250 GENERAL PHARMACY W/ HCPCS (ALT 636 FOR OP/ED): Performed by: INTERNAL MEDICINE

## 2024-11-17 PROCEDURE — 2500000001 HC RX 250 WO HCPCS SELF ADMINISTERED DRUGS (ALT 637 FOR MEDICARE OP): Performed by: NURSE PRACTITIONER

## 2024-11-17 PROCEDURE — 85025 COMPLETE CBC W/AUTO DIFF WBC: CPT | Performed by: INTERNAL MEDICINE

## 2024-11-17 PROCEDURE — 2500000001 HC RX 250 WO HCPCS SELF ADMINISTERED DRUGS (ALT 637 FOR MEDICARE OP): Performed by: INTERNAL MEDICINE

## 2024-11-17 PROCEDURE — 81003 URINALYSIS AUTO W/O SCOPE: CPT | Performed by: INTERNAL MEDICINE

## 2024-11-17 PROCEDURE — 1100000001 HC PRIVATE ROOM DAILY

## 2024-11-17 PROCEDURE — 99232 SBSQ HOSP IP/OBS MODERATE 35: CPT | Performed by: INTERNAL MEDICINE

## 2024-11-17 PROCEDURE — 36415 COLL VENOUS BLD VENIPUNCTURE: CPT | Performed by: INTERNAL MEDICINE

## 2024-11-17 PROCEDURE — 2500000005 HC RX 250 GENERAL PHARMACY W/O HCPCS: Performed by: INTERNAL MEDICINE

## 2024-11-17 PROCEDURE — 2500000002 HC RX 250 W HCPCS SELF ADMINISTERED DRUGS (ALT 637 FOR MEDICARE OP, ALT 636 FOR OP/ED): Performed by: INTERNAL MEDICINE

## 2024-11-17 PROCEDURE — 83735 ASSAY OF MAGNESIUM: CPT | Performed by: NURSE PRACTITIONER

## 2024-11-17 PROCEDURE — 80069 RENAL FUNCTION PANEL: CPT | Performed by: INTERNAL MEDICINE

## 2024-11-17 RX ORDER — MAGNESIUM SULFATE 1 G/100ML
1 INJECTION INTRAVENOUS ONCE
Status: COMPLETED | OUTPATIENT
Start: 2024-11-17 | End: 2024-11-17

## 2024-11-17 RX ORDER — POTASSIUM CHLORIDE 1.5 G/1.58G
40 POWDER, FOR SOLUTION ORAL ONCE
Status: COMPLETED | OUTPATIENT
Start: 2024-11-17 | End: 2024-11-17

## 2024-11-17 RX ORDER — MAGNESIUM SULFATE 1 G/100ML
1 INJECTION INTRAVENOUS ONCE
Status: DISCONTINUED | OUTPATIENT
Start: 2024-11-17 | End: 2024-11-17

## 2024-11-17 ASSESSMENT — COGNITIVE AND FUNCTIONAL STATUS - GENERAL
MOBILITY SCORE: 12
TURNING FROM BACK TO SIDE WHILE IN FLAT BAD: A LOT
MOBILITY SCORE: 9
MOVING TO AND FROM BED TO CHAIR: A LOT
DAILY ACTIVITIY SCORE: 16
MOVING FROM LYING ON BACK TO SITTING ON SIDE OF FLAT BED WITH BEDRAILS: A LOT
TURNING FROM BACK TO SIDE WHILE IN FLAT BAD: A LOT
TOILETING: A LOT
CLIMB 3 TO 5 STEPS WITH RAILING: TOTAL
CLIMB 3 TO 5 STEPS WITH RAILING: A LOT
WALKING IN HOSPITAL ROOM: A LOT
DRESSING REGULAR LOWER BODY CLOTHING: A LOT
MOVING FROM LYING ON BACK TO SITTING ON SIDE OF FLAT BED WITH BEDRAILS: A LOT
STANDING UP FROM CHAIR USING ARMS: A LOT
TOILETING: A LOT
WALKING IN HOSPITAL ROOM: TOTAL
STANDING UP FROM CHAIR USING ARMS: TOTAL
DRESSING REGULAR UPPER BODY CLOTHING: A LOT
HELP NEEDED FOR BATHING: A LOT
DRESSING REGULAR UPPER BODY CLOTHING: A LOT
MOVING TO AND FROM BED TO CHAIR: A LOT
HELP NEEDED FOR BATHING: A LOT

## 2024-11-17 ASSESSMENT — PAIN SCALES - GENERAL
PAINLEVEL_OUTOF10: 4
PAINLEVEL_OUTOF10: 0 - NO PAIN
PAINLEVEL_OUTOF10: 1

## 2024-11-17 ASSESSMENT — PAIN - FUNCTIONAL ASSESSMENT
PAIN_FUNCTIONAL_ASSESSMENT: 0-10
PAIN_FUNCTIONAL_ASSESSMENT: 0-10

## 2024-11-17 ASSESSMENT — PAIN SCALES - WONG BAKER: WONGBAKER_NUMERICALRESPONSE: HURTS WHOLE LOT

## 2024-11-17 ASSESSMENT — PAIN SCALES - PAIN ASSESSMENT IN ADVANCED DEMENTIA (PAINAD): TOTALSCORE: MEDICATION (SEE MAR)

## 2024-11-17 ASSESSMENT — PAIN DESCRIPTION - LOCATION: LOCATION: OTHER (COMMENT)

## 2024-11-17 NOTE — PROGRESS NOTES
Subjective Data:  No chest pain or dyspnea    Comprehensive 10-point review of systems negative otherwise as noted above in HPI    Overnight Events:    None     Objective Data:  Last Recorded Vitals:  Vitals:    11/17/24 0401 11/17/24 0554 11/17/24 0935 11/17/24 1230   BP: 125/81  110/66 123/78   BP Location: Right arm      Patient Position: Lying      Pulse: 74  72 73   Resp: 18      Temp: 36.1 °C (97 °F)      TempSrc: Temporal      SpO2: 92%  93%    Weight:  68 kg (150 lb)     Height:           Last Labs:  CBC - 11/17/2024:  4:46 AM  28.1 14.4 278    47.9      CMP - 11/17/2024:  4:46 AM  7.3 3.1 7 --- 0.7   3.1 2.3 11 34      PTT - 8/8/2024:  2:44 PM  2.1   24.0 36     TROPHS   Date/Time Value Ref Range Status   11/04/2024 05:13 AM 46 0 - 13 ng/L Final   11/03/2024 04:52 PM 62 0 - 13 ng/L Final     Comment:     Previous result verified on 11/3/2024 1100 on specimen/case [id number] called with component TRPHS for procedure Troponin I, High Sensitivity with value 76 ng/L.   11/03/2024 11:16 AM 76 0 - 13 ng/L Final     Comment:     Previous result verified on 11/3/2024 1100 on specimen/case [id number] called with component TRPHS for procedure Troponin I, High Sensitivity with value 76 ng/L.     BNP   Date/Time Value Ref Range Status   11/15/2024 01:52  0 - 99 pg/mL Final   11/03/2024 09:35  0 - 99 pg/mL Final     HGBA1C   Date/Time Value Ref Range Status   11/03/2024 09:35 AM 4.7 See comment % Final     LDLCALC   Date/Time Value Ref Range Status   10/29/2024 12:00 AM 39 mg/dL Final      Last I/O:  I/O last 3 completed shifts:  In: 1070 (15.7 mL/kg) [P.O.:1070]  Out: 285 (4.2 mL/kg) [Urine:235 (0.1 mL/kg/hr); Stool:50]  Weight: 68 kg     Past Cardiology Tests (Last 3 Years):  EKG:  Electrocardiogram, 12-lead PRN ACS symptoms 11/08/2024 (Preliminary)      Electrocardiogram, 12-lead PRN ACS symptoms 11/08/2024 (Preliminary)      ECG 12 lead 11/03/2024    Echo:  Transthoracic Echo (TTE) Limited  11/05/2024    Ejection Fractions:  EF   Date/Time Value Ref Range Status   11/05/2024 01:35 PM 63 %      Cath:  No results found for this or any previous visit from the past 1095 days.    Stress Test:  No results found for this or any previous visit from the past 1095 days.    Cardiac Imaging:  No results found for this or any previous visit from the past 1095 days.      Inpatient Medications:  Scheduled medications   Medication Dose Route Frequency    apixaban  5 mg oral q12h    bumetanide  1 mg oral BID    busPIRone  7.5 mg oral BID    cetirizine  10 mg oral Daily    cholecalciferol  2,000 Units oral Daily    collagenase   Topical Daily    cyanocobalamin  1,000 mcg oral Daily    ferrous sulfate (325 mg ferrous sulfate)  65 mg of iron oral Daily with breakfast    folic acid  1 mg oral Daily    gabapentin  200 mg oral BID    lactobacillus acidophilus  1 capsule oral Daily    levothyroxine  88 mcg oral Daily    loperamide  2 mg oral 4x daily    [Held by provider] magnesium oxide  400 mg oral BID    magnesium sulfate  1 g intravenous Once    metoprolol succinate XL  25 mg oral BID    midodrine  5 mg oral TID    nystatin   Topical BID    pantoprazole  40 mg oral BID AC    phenyleph-min oil-petrolatum   rectal TID    [Held by provider] psyllium  1 packet oral Daily    sertraline  25 mg oral Daily     PRN medications   Medication    acetaminophen    Or    acetaminophen    melatonin    methyl salicylate-menthol    ondansetron ODT    Or    ondansetron    oxyCODONE    oxygen     Continuous Medications   Medication Dose Last Rate       Physical Exam:  Constitutional: Well developed, awake/alert/oriented x3, no distress, alert and cooperative  Eyes: PERRL, EOMI, clear sclera  ENMT: mucous membranes moist, no apparent injury, no lesions seen  Head/Neck: Neck supple, no apparent injury, thyroid without mass or tenderness, No JVD, trachea midline, no bruits  Respiratory/Thorax: Patent airways, CTAB, normal breath sounds with  good chest expansion, thorax symmetric  Cardiovascular: Regular, rate and rhythm, no murmurs, 2+ equal pulses of the extremities, normal S 1and S 2  Gastrointestinal: Nondistended, soft, non-tender, no rebound tenderness or guarding, no masses palpable, no organomegaly, +BS, no bruits  Musculoskeletal: ROM intact, no joint swelling, normal strength  Extremities: normal extremities, no cyanosis edema, contusions or wounds, no clubbing  Neurological: alert and oriented x3, intact senses, motor, response and reflexes, normal strength  Lymphatic: No significant lymphadenopathy  Psychological: Appropriate mood and behavior  Skin: Warm and dry, no lesions, no rashes       Assessment/Plan   Principal Problem:    Sepsis with acute hypoxic respiratory failure without septic shock (Multi)  Active Problems:    Iron deficiency anemia    Chronic systolic (congestive) heart failure    Essential hypertension    History of pulmonary embolism    Anxiety    Vitamin D deficiency    Vitamin B12 deficiency    Adult hypothyroidism    Pneumonia of right lower lobe due to infectious organism    Leukocytosis    Acute kidney injury superimposed on stage 3a chronic kidney disease    Elevated troponin    Hyperbilirubinemia    Hyperglycemia    Hypokalemia    Hypomagnesemia    Hypocalcemia    Abnormality of right ventricle of heart    Impaired mobility and ADLs        Echocardiogram from June 2024:    Left Ventricle: Low normal left ventricular systolic function with a   visually estimated EF of 50 - 55%. Left ventricle size is normal. Normal   wall thickness. Ventricular mass is normal. Findings consistent with   concentric remodeling. Normal wall motion. Grade I diastolic dysfunction   with normal LAP.     Right Ventricle: Right ventricle size is normal. Normal systolic   function. TAPSE is 1.9 cm.     Aortic Valve: No stenosis.     Mitral Valve: Mild regurgitation.     Tricuspid Valve: Normal RVSP. The estimated RVSP is 24 mmHg.      Pericardium: No pericardial effusion.         Pneumonia  -CT chest reviewed  -Procal downtrending  -antibiotics completed  -bronchodilators  -oxygen support->currently on room air  -Pulmonary following, note reviewed     2. Abnormal CT finding of RV  -Unusual hypodensities in the right ventricular free wall  -Echo as above  -Repeat limited echo reviewed, no abnormality noted  -Most likely an RV wall fat pad  -Can obtain outpt cardiac MRI to further eval     3. Acute on chronic diastolic CHF with mildly reduced EF  -CT showed pleural effusion, possible parapneumonic vs cardiac  -  -I reviewed the Echocardiogram as above  -Strict I & Os  -Daily weights  -2gm na diet  -BP low most likely from infection and third spacing with low albumin  -Stop imdur for low BP  -Changed Coreg to metoprolol 25mg BID for better rate control  -CT abd/pelvis showed pleural effusions, anasarca, and ascities  -Albumin 1.9, given IV albumin and Lasix IV (11/5) now 2.2  -Cont Midodrine 5mg TID  -Bumex 1mg IV daily since admit,  then started Bumex 1mg PO daily  -Repeat CXR showed worsened effusion and congestion  -Started Bumex gtt at 0.5mg/hr->changed to Bumex 1mg PO BID  -bedside US per pulmonary showed small bilateral effusions  - May need to hold/decrease bumex if becomes hypotensive due to diarrhea, dehydrated  - BP is currently stable     4. UTI, resolved  -WBC elevated  -Urine culture showed multiple organisms, possible contamination  -antibiotics completed     5. Elevated troponins-Non MI elevation  -Troponin 76, now downtrending  -EKG showed SR LBBB->chronic LBBB  -Denies ACS symptoms  -limited echo as above, normal LVEF     6. Hx of DVT, now with bilateral acute DVT  -Venous U/S showed DVT right femoral vein, age indeterminate thrombus in right popliteal, right posterior tibial and right peroneal veins. DVT left popliteal vein   -Eliquis      7. Hypokalemia/hypomagnesemia, resolved  -supplemented     8. Hx of HTN, with  hypotension  -stopped Imdur  -Gave albumin with lasix post (11/5)  -Cont Midodrine 5mg TID  -Coreg changed to metoprolol  -Monitor     Rectal Tube/Fecal Management With balloon (Active)   Site Assessment Clean;Skin intact 11/17/24 1112   Patency Intact 11/17/24 1112   Tube Management Reposition patient;Barrier cream 11/17/24 1112   Number of days: 1       Code Status:  Full Code    I spent 15 minutes in the professional and overall care of this patient.        Mckay Irizarry MD

## 2024-11-17 NOTE — CARE PLAN
11/17/2024 1356: Precert remains pending, made aware insurance is requesting new PT notes. [facility] reached out to PT, pt is on the schedule for 11/18 AM.

## 2024-11-17 NOTE — PROGRESS NOTES
Desirae Gregg is a 83 y.o. female on day 14 of admission presenting with Sepsis with acute hypoxic respiratory failure without septic shock (Multi).    Subjective   Interval History: no fever, no new complaints        Review of Systems    Objective   Range of Vitals (last 24 hours)  Heart Rate:  [62-77]   Temp:  [35.8 °C (96.5 °F)-36.7 °C (98.1 °F)]   Resp:  [18-20]   BP: (103-125)/(64-81)   Weight:  [68 kg (150 lb)]   SpO2:  [92 %-95 %]   Daily Weight  11/17/24 : 68 kg (150 lb)    Body mass index is 24.96 kg/m².    Physical Exam  Constitutional:       Appearance: Normal appearance.   HENT:      Head: Normocephalic and atraumatic.      Mouth/Throat:      Mouth: Mucous membranes are moist.      Pharynx: Oropharynx is clear.   Eyes:      Pupils: Pupils are equal, round, and reactive to light.   Cardiovascular:      Rate and Rhythm: Normal rate and regular rhythm.      Heart sounds: Normal heart sounds.   Pulmonary:      Effort: Pulmonary effort is normal.      Breath sounds: Normal breath sounds.   Abdominal:      General: Abdomen is flat. Bowel sounds are normal.      Palpations: Abdomen is soft.   Musculoskeletal:      Cervical back: Normal range of motion.      Comments: Lt leg wound, no cellulitis    Neurological:      Mental Status: She is alert.         Antibiotics  This patient does not have an active medication from one of the medication groupers.    Relevant Results  Labs  Results from last 72 hours   Lab Units 11/17/24  0446   WBC AUTO x10*3/uL 28.1*   HEMOGLOBIN g/dL 14.4   HEMATOCRIT % 47.9*   PLATELETS AUTO x10*3/uL 278   NEUTROS PCT AUTO % 86.2   LYMPHS PCT AUTO % 4.8   MONOS PCT AUTO % 3.6   EOS PCT AUTO % 2.3     Results from last 72 hours   Lab Units 11/17/24  0446 11/16/24  0521 11/15/24  1352   SODIUM mmol/L 133* 134* 134*   POTASSIUM mmol/L 3.4* 3.3* 3.5   CHLORIDE mmol/L 94* 95* 93*   CO2 mmol/L 34* 34* 31   BUN mg/dL 19 17 18   CREATININE mg/dL 1.17* 1.04 1.05   GLUCOSE mg/dL 96 94 126*   CALCIUM  mg/dL 7.3* 7.2* 7.7*   ANION GAP mmol/L 8* 8* 14   EGFR mL/min/1.73m*2 46* 53* 52*   PHOSPHORUS mg/dL 3.1 3.2 2.9     Results from last 72 hours   Lab Units 11/17/24  0446 11/16/24  0521 11/15/24  1352   ALBUMIN g/dL 2.3* 2.1* 2.4*     Estimated Creatinine Clearance: 31.6 mL/min (A) (by C-G formula based on SCr of 1.17 mg/dL (H)).  C-Reactive Protein   Date Value Ref Range Status   11/15/2024 0.80 <1.00 mg/dL Final   11/12/2024 1.04 (H) <1.00 mg/dL Final   11/11/2024 0.86 <1.00 mg/dL Final     Microbiology  Reviewed  Imaging  Reviewed        Assessment/Plan   Leukocytosis, likely related to myelodysplasia, US with legs DVT, starting to trend down  Sepsis, treated  Respiratory failure / pneumonia  Bacteriuria  Polycythemia / myelofibrosis  Left leg wound / some stasis, unlikely to be cellulitis     Recommendations :  Supportive care  Discussed with the wound care  team     I spent minutes in the professional and overall care of this patient.      Cameron Lassiter MD

## 2024-11-17 NOTE — PROGRESS NOTES
Desirae Gregg is a 83 y.o. female on day 14 of admission presenting with Sepsis with acute hypoxic respiratory failure without septic shock (Multi).    Subjective   Having a pretty good day so far. Feels like she is 'plugged up'       Objective     Physical Exam  Constitutional:       Appearance: Normal appearance.   HENT:      Head: Normocephalic.      Nose: Nose normal.      Mouth/Throat:      Mouth: Mucous membranes are dry.   Eyes:      Extraocular Movements: Extraocular movements intact.      Pupils: Pupils are equal, round, and reactive to light.   Cardiovascular:      Rate and Rhythm: Normal rate and regular rhythm.      Pulses: Normal pulses.   Pulmonary:      Comments: Basilar crackles , left side primarily. Right side sounds improved today  Easy respirations  Abdominal:      General: Bowel sounds are normal.      Palpations: Abdomen is soft.   Genitourinary:     Comments: Rectal verma in place. Stool is orangish in nature  Musculoskeletal:      Comments: No significant edema today  Pulses heard   Skin:     General: Skin is warm and dry.   Neurological:      Mental Status: She is alert. Mental status is at baseline.   Psychiatric:         Mood and Affect: Mood normal.         Behavior: Behavior normal.       Results for orders placed or performed during the hospital encounter of 11/03/24 (from the past 24 hours)   Renal Function Panel   Result Value Ref Range    Glucose 96 74 - 99 mg/dL    Sodium 133 (L) 136 - 145 mmol/L    Potassium 3.4 (L) 3.5 - 5.3 mmol/L    Chloride 94 (L) 98 - 107 mmol/L    Bicarbonate 34 (H) 21 - 32 mmol/L    Anion Gap 8 (L) 10 - 20 mmol/L    Urea Nitrogen 19 6 - 23 mg/dL    Creatinine 1.17 (H) 0.50 - 1.05 mg/dL    eGFR 46 (L) >60 mL/min/1.73m*2    Calcium 7.3 (L) 8.6 - 10.3 mg/dL    Phosphorus 3.1 2.5 - 4.9 mg/dL    Albumin 2.3 (L) 3.4 - 5.0 g/dL   Magnesium   Result Value Ref Range    Magnesium 1.47 (L) 1.60 - 2.40 mg/dL   CBC and Auto Differential   Result Value Ref Range    WBC 28.1  "(H) 4.4 - 11.3 x10*3/uL    nRBC 0.0 0.0 - 0.0 /100 WBCs    RBC 5.35 (H) 4.00 - 5.20 x10*6/uL    Hemoglobin 14.4 12.0 - 16.0 g/dL    Hematocrit 47.9 (H) 36.0 - 46.0 %    MCV 90 80 - 100 fL    MCH 26.9 26.0 - 34.0 pg    MCHC 30.1 (L) 32.0 - 36.0 g/dL    RDW 16.8 (H) 11.5 - 14.5 %    Platelets 278 150 - 450 x10*3/uL    Neutrophils % 86.2 40.0 - 80.0 %    Immature Granulocytes %, Automated 2.5 (H) 0.0 - 0.9 %    Lymphocytes % 4.8 13.0 - 44.0 %    Monocytes % 3.6 2.0 - 10.0 %    Eosinophils % 2.3 0.0 - 6.0 %    Basophils % 0.6 0.0 - 2.0 %    Neutrophils Absolute 24.21 (H) 1.60 - 5.50 x10*3/uL    Immature Granulocytes Absolute, Automated 0.71 (H) 0.00 - 0.50 x10*3/uL    Lymphocytes Absolute 1.34 0.80 - 3.00 x10*3/uL    Monocytes Absolute 1.02 (H) 0.05 - 0.80 x10*3/uL    Eosinophils Absolute 0.65 (H) 0.00 - 0.40 x10*3/uL    Basophils Absolute 0.18 (H) 0.00 - 0.10 x10*3/uL       Last Recorded Vitals  Blood pressure 110/66, pulse 72, temperature 36.1 °C (97 °F), temperature source Temporal, resp. rate 18, height 1.651 m (5' 5\"), weight 68 kg (150 lb), SpO2 93%.  Intake/Output last 3 Shifts:  I/O last 3 completed shifts:  In: 1070 (15.7 mL/kg) [P.O.:1070]  Out: 285 (4.2 mL/kg) [Urine:235 (0.1 mL/kg/hr); Stool:50]  Weight: 68 kg     Relevant Results    Current Facility-Administered Medications:     acetaminophen (Tylenol) tablet 650 mg, 650 mg, oral, q4h PRN **OR** acetaminophen (Tylenol) oral liquid 650 mg, 650 mg, oral, q4h PRN **OR** [DISCONTINUED] acetaminophen (Tylenol) suppository 650 mg, 650 mg, rectal, q4h PRN, Eloy Hua MD    apixaban (Eliquis) tablet 5 mg, 5 mg, oral, q12h, Татьяна Agarwal MD, 5 mg at 11/17/24 0600    bumetanide (Bumex) tablet 1 mg, 1 mg, oral, BID, Sherron Jones, APRN-CNP, 1 mg at 11/17/24 0943    busPIRone (Buspar) tablet 7.5 mg, 7.5 mg, oral, BID, Eloy Hua MD, 7.5 mg at 11/17/24 0944    cetirizine (ZyrTEC) tablet 10 mg, 10 mg, oral, Daily, Eloy Hua MD, 10 mg at 11/17/24 " 0942    cholecalciferol (Vitamin D-3) tablet 2,000 Units, 2,000 Units, oral, Daily, Eloy Hua MD, 2,000 Units at 11/17/24 0942    collagenase 250 unit/gram ointment, , Topical, Daily, Yanni Mcgrath DPM, Given at 11/17/24 1013    cyanocobalamin (Vitamin B-12) tablet 1,000 mcg, 1,000 mcg, oral, Daily, Eloy Hua MD, 1,000 mcg at 11/17/24 0944    ferrous sulfate (325 mg ferrous sulfate) tablet 325 mg, 65 mg of iron, oral, Daily with breakfast, Татьяна Agarwal MD, 325 mg at 11/17/24 0942    folic acid (Folvite) tablet 1 mg, 1 mg, oral, Daily, Eloy Hua MD, 1 mg at 11/17/24 0943    gabapentin (Neurontin) capsule 200 mg, 200 mg, oral, BID, Eloy Hua MD, 200 mg at 11/17/24 0941    lactobacillus acidophilus capsule 1 capsule, 1 capsule, oral, Daily, Татьяна Agarwal MD, 1 capsule at 11/17/24 0943    levothyroxine (Synthroid, Levoxyl) tablet 88 mcg, 88 mcg, oral, Daily, Eloy Hua MD, 88 mcg at 11/17/24 0600    loperamide (Imodium) capsule 2 mg, 2 mg, oral, 4x daily, Татьяна Agarwal MD, 2 mg at 11/17/24 0600    [Held by provider] magnesium oxide (Mag-Ox) tablet 400 mg, 400 mg, oral, BID, Татьяна Agarwal MD, 400 mg at 11/16/24 0930    magnesium sulfate 1 g in dextrose 5%  mL, 1 g, intravenous, Once, Татьяна Agarwal MD    melatonin tablet 3 mg, 3 mg, oral, Nightly PRN, Eloy Hua MD    methyl salicylate-menthol (Bengay) 15-10 % greaseless cream 1 Application, 1 Application, Topical, TID PRN, Cha Ramsay DO, 1 Application at 11/12/24 1217    metoprolol succinate XL (Toprol-XL) 24 hr tablet 25 mg, 25 mg, oral, BID, Raul Domínguez MD, 25 mg at 11/17/24 0942    midodrine (Proamatine) tablet 5 mg, 5 mg, oral, TID, Cha Ramsay DO, 5 mg at 11/17/24 0943    nystatin (Mycostatin) cream, , Topical, BID, Eloy Hua MD, Given at 11/17/24 1011    ondansetron ODT (Zofran-ODT) disintegrating tablet 4 mg, 4 mg, oral, q8h PRN **OR** ondansetron (Zofran) injection 4 mg, 4 mg,  intravenous, q8h PRN, Eloy Hua MD, 4 mg at 11/15/24 1754    oxyCODONE (Roxicodone) immediate release tablet 5 mg, 5 mg, oral, q6h PRN, Eloy Hua MD, 5 mg at 11/16/24 2247    oxygen (O2) therapy, , inhalation, Continuous PRN - O2/gases, Cha Ramsay DO, 21 percent at 11/15/24 1529    pantoprazole (ProtoNix) EC tablet 40 mg, 40 mg, oral, BID AC, Eloy Hua MD, 40 mg at 11/17/24 0600    phenyleph-min oil-petrolatum (Preparation H) 0.25-14-74.9 % rectal ointment, , rectal, TID, Cha Ramsay DO, Given at 11/16/24 2145    potassium chloride (Klor-Con) packet 40 mEq, 40 mEq, oral, Once, Татьяна Agarwal MD    [Held by provider] psyllium (Metamucil) 3.4 gram packet 1 packet, 1 packet, oral, Daily, Татьяна Agarwal MD, 1 packet at 11/13/24 0948    sertraline (Zoloft) tablet 25 mg, 25 mg, oral, Daily, Eloy Hua MD, 25 mg at 11/17/24 0943                  Assessment/Plan     Hypomag-supplement iv, oral may have been exacerbating the diarrhea  Diarrhea, acute on chronic. Will leave FMS in another day, help with her excoriation hopefully. Getting scheduled imodium now also.   Hypokalemia, continue to supplement  Diastolic heart failure, appears stable at this time. Continue bumex for now. Cards on board. Check cxr in am  Acute DVT, LLE> she is on eliquis, again, but higher dosage. Therapeutic dosage  Myelofibrosis/CLL. No heme input yet. Will follow up with her usual hematologist after discharge  Hypothyroid, stable       I spent 28 minutes in the professional and overall care of this patient.      Татьяна Agarwal MD

## 2024-11-17 NOTE — PROGRESS NOTES
Occupational Therapy                 Therapy Communication Note    Patient Name: Desirae Gregg  MRN: [id number]  Department: [address]  Room: [address] [place], [place] [address]  Today's Date: 11/17/2024     Discipline: Occupational Therapy    Missed Visit Reason: Missed Visit Reason: Patient refused (Pt declined treatment, for food brought in from home. Will reattempt as able as caseload permits.)    Missed Time: Attempt @ 1114

## 2024-11-18 ENCOUNTER — APPOINTMENT (OUTPATIENT)
Dept: RADIOLOGY | Facility: HOSPITAL | Age: 84
DRG: 871 | End: 2024-11-18
Payer: MEDICARE

## 2024-11-18 LAB
ALBUMIN SERPL BCP-MCNC: 2.3 G/DL (ref 3.4–5)
ANION GAP SERPL CALC-SCNC: 9 MMOL/L (ref 10–20)
ATRIAL RATE: 65 BPM
BUN SERPL-MCNC: 18 MG/DL (ref 6–23)
CALCIUM SERPL-MCNC: 7.4 MG/DL (ref 8.6–10.3)
CHLORIDE SERPL-SCNC: 94 MMOL/L (ref 98–107)
CO2 SERPL-SCNC: 34 MMOL/L (ref 21–32)
CREAT SERPL-MCNC: 0.97 MG/DL (ref 0.5–1.05)
EGFRCR SERPLBLD CKD-EPI 2021: 57 ML/MIN/1.73M*2
GLUCOSE SERPL-MCNC: 99 MG/DL (ref 74–99)
MAGNESIUM SERPL-MCNC: 1.62 MG/DL (ref 1.6–2.4)
P AXIS: 20 DEGREES
P OFFSET: 192 MS
P ONSET: 139 MS
PHOSPHATE SERPL-MCNC: 3 MG/DL (ref 2.5–4.9)
POTASSIUM SERPL-SCNC: 3.6 MMOL/L (ref 3.5–5.3)
PR INTERVAL: 154 MS
Q ONSET: 216 MS
QRS COUNT: 11 BEATS
QRS DURATION: 132 MS
QT INTERVAL: 514 MS
QTC CALCULATION(BAZETT): 534 MS
QTC FREDERICIA: 527 MS
R AXIS: -33 DEGREES
SODIUM SERPL-SCNC: 133 MMOL/L (ref 136–145)
T AXIS: 155 DEGREES
T OFFSET: 473 MS
VENTRICULAR RATE: 65 BPM

## 2024-11-18 PROCEDURE — 97530 THERAPEUTIC ACTIVITIES: CPT | Mod: GO,CO,59

## 2024-11-18 PROCEDURE — 80069 RENAL FUNCTION PANEL: CPT | Performed by: INTERNAL MEDICINE

## 2024-11-18 PROCEDURE — 99233 SBSQ HOSP IP/OBS HIGH 50: CPT | Performed by: INTERNAL MEDICINE

## 2024-11-18 PROCEDURE — 71045 X-RAY EXAM CHEST 1 VIEW: CPT | Performed by: STUDENT IN AN ORGANIZED HEALTH CARE EDUCATION/TRAINING PROGRAM

## 2024-11-18 PROCEDURE — 71045 X-RAY EXAM CHEST 1 VIEW: CPT

## 2024-11-18 PROCEDURE — 2500000001 HC RX 250 WO HCPCS SELF ADMINISTERED DRUGS (ALT 637 FOR MEDICARE OP): Performed by: INTERNAL MEDICINE

## 2024-11-18 PROCEDURE — 1100000001 HC PRIVATE ROOM DAILY

## 2024-11-18 PROCEDURE — 36415 COLL VENOUS BLD VENIPUNCTURE: CPT | Performed by: INTERNAL MEDICINE

## 2024-11-18 PROCEDURE — 2500000002 HC RX 250 W HCPCS SELF ADMINISTERED DRUGS (ALT 637 FOR MEDICARE OP, ALT 636 FOR OP/ED): Performed by: INTERNAL MEDICINE

## 2024-11-18 PROCEDURE — 2500000005 HC RX 250 GENERAL PHARMACY W/O HCPCS: Performed by: INTERNAL MEDICINE

## 2024-11-18 PROCEDURE — 97535 SELF CARE MNGMENT TRAINING: CPT | Mod: GO,CO

## 2024-11-18 PROCEDURE — 94760 N-INVAS EAR/PLS OXIMETRY 1: CPT

## 2024-11-18 PROCEDURE — 2500000001 HC RX 250 WO HCPCS SELF ADMINISTERED DRUGS (ALT 637 FOR MEDICARE OP): Performed by: NURSE PRACTITIONER

## 2024-11-18 PROCEDURE — 83735 ASSAY OF MAGNESIUM: CPT | Performed by: INTERNAL MEDICINE

## 2024-11-18 ASSESSMENT — COGNITIVE AND FUNCTIONAL STATUS - GENERAL
DRESSING REGULAR LOWER BODY CLOTHING: A LOT
MOVING TO AND FROM BED TO CHAIR: A LOT
TOILETING: TOTAL
EATING MEALS: A LITTLE
PERSONAL GROOMING: A LITTLE
MOBILITY SCORE: 12
MOVING FROM LYING ON BACK TO SITTING ON SIDE OF FLAT BED WITH BEDRAILS: A LITTLE
WALKING IN HOSPITAL ROOM: A LOT
EATING MEALS: A LITTLE
HELP NEEDED FOR BATHING: A LOT
TOILETING: A LOT
TURNING FROM BACK TO SIDE WHILE IN FLAT BAD: A LOT
CLIMB 3 TO 5 STEPS WITH RAILING: TOTAL
DAILY ACTIVITIY SCORE: 14
DRESSING REGULAR LOWER BODY CLOTHING: A LOT
HELP NEEDED FOR BATHING: A LOT
PERSONAL GROOMING: A LITTLE
TOILETING: A LOT
MOVING TO AND FROM BED TO CHAIR: A LOT
CLIMB 3 TO 5 STEPS WITH RAILING: TOTAL
DAILY ACTIVITIY SCORE: 14
PERSONAL GROOMING: A LITTLE
MOBILITY SCORE: 12
MOVING FROM LYING ON BACK TO SITTING ON SIDE OF FLAT BED WITH BEDRAILS: A LITTLE
HELP NEEDED FOR BATHING: A LOT
DRESSING REGULAR LOWER BODY CLOTHING: TOTAL
DAILY ACTIVITIY SCORE: 14
DRESSING REGULAR UPPER BODY CLOTHING: A LOT
TURNING FROM BACK TO SIDE WHILE IN FLAT BAD: A LOT
WALKING IN HOSPITAL ROOM: A LOT
STANDING UP FROM CHAIR USING ARMS: A LOT
DRESSING REGULAR UPPER BODY CLOTHING: A LOT
STANDING UP FROM CHAIR USING ARMS: A LOT
DRESSING REGULAR UPPER BODY CLOTHING: A LITTLE

## 2024-11-18 ASSESSMENT — PAIN - FUNCTIONAL ASSESSMENT
PAIN_FUNCTIONAL_ASSESSMENT: 0-10
PAIN_FUNCTIONAL_ASSESSMENT: 0-10

## 2024-11-18 ASSESSMENT — PAIN SCALES - GENERAL
PAINLEVEL_OUTOF10: 0 - NO PAIN
PAINLEVEL_OUTOF10: 0 - NO PAIN

## 2024-11-18 ASSESSMENT — ACTIVITIES OF DAILY LIVING (ADL): HOME_MANAGEMENT_TIME_ENTRY: 14

## 2024-11-18 NOTE — PROGRESS NOTES
Physical Therapy                 Therapy Communication Note    Patient Name: Desirae Gregg  MRN: [id number]  Department: [address]  Room: [address] [place], [place] [address]  Today's Date: 11/18/2024     Discipline: Physical Therapy    Missed Visit Reason: Patient refused. Pt politely declining PT this AM, requesting to rest. Pt reports not feeling well the past few days. No treatment at this time. RN notified.     Missed Time: Attempt    Comment: [id number]

## 2024-11-18 NOTE — PROGRESS NOTES
Desirae Gregg is a 84 y.o. female on day 15 of admission presenting with Sepsis with acute hypoxic respiratory failure without septic shock (Multi).    Subjective   Interval History: no fever, no new complaints        Review of Systems    Objective   Range of Vitals (last 24 hours)  Heart Rate:  [61-73]   Temp:  [36 °C (96.8 °F)-36.6 °C (97.9 °F)]   Resp:  [16-20]   BP: (111-132)/(65-78)   Weight:  [64.5 kg (142 lb 3.2 oz)-64.6 kg (142 lb 6.7 oz)]   SpO2:  [83 %-98 %]   Daily Weight  11/18/24 : 64.5 kg (142 lb 3.2 oz)    Body mass index is 23.66 kg/m².    Physical Exam  Constitutional:       Appearance: Normal appearance.   HENT:      Head: Normocephalic and atraumatic.      Mouth/Throat:      Mouth: Mucous membranes are moist.      Pharynx: Oropharynx is clear.   Eyes:      Pupils: Pupils are equal, round, and reactive to light.   Cardiovascular:      Rate and Rhythm: Normal rate and regular rhythm.      Heart sounds: Normal heart sounds.   Pulmonary:      Effort: Pulmonary effort is normal.      Breath sounds: Normal breath sounds.   Abdominal:      General: Abdomen is flat. Bowel sounds are normal.      Palpations: Abdomen is soft.   Musculoskeletal:      Cervical back: Normal range of motion.      Comments: Lt leg wound, no cellulitis    Neurological:      Mental Status: She is alert.         Antibiotics  This patient does not have an active medication from one of the medication groupers.    Relevant Results  Labs  Results from last 72 hours   Lab Units 11/17/24  0446   WBC AUTO x10*3/uL 28.1*   HEMOGLOBIN g/dL 14.4   HEMATOCRIT % 47.9*   PLATELETS AUTO x10*3/uL 278   NEUTROS PCT AUTO % 86.2   LYMPHS PCT AUTO % 4.8   MONOS PCT AUTO % 3.6   EOS PCT AUTO % 2.3     Results from last 72 hours   Lab Units 11/18/24  0533 11/17/24 0446 11/16/24  0521   SODIUM mmol/L 133* 133* 134*   POTASSIUM mmol/L 3.6 3.4* 3.3*   CHLORIDE mmol/L 94* 94* 95*   CO2 mmol/L 34* 34* 34*   BUN mg/dL 18 19 17   CREATININE mg/dL 0.97 1.17*  1.04   GLUCOSE mg/dL 99 96 94   CALCIUM mg/dL 7.4* 7.3* 7.2*   ANION GAP mmol/L 9* 8* 8*   EGFR mL/min/1.73m*2 57* 46* 53*   PHOSPHORUS mg/dL 3.0 3.1 3.2     Results from last 72 hours   Lab Units 11/18/24  0533 11/17/24  0446 11/16/24  0521   ALBUMIN g/dL 2.3* 2.3* 2.1*     Estimated Creatinine Clearance: 37.5 mL/min (by C-G formula based on SCr of 0.97 mg/dL).  C-Reactive Protein   Date Value Ref Range Status   11/15/2024 0.80 <1.00 mg/dL Final   11/12/2024 1.04 (H) <1.00 mg/dL Final   11/11/2024 0.86 <1.00 mg/dL Final     Microbiology  Reviewed  Imaging  Reviewed        Assessment/Plan   Leukocytosis, likely related to myelodysplasia, US with legs DVT, starting to trend down  Sepsis, treated  Respiratory failure / pneumonia  Bacteriuria  Polycythemia / myelofibrosis  Left leg wound / some stasis, unlikely to be cellulitis     Recommendations :  Supportive care  Leg compression  Discussed with the wound care  team     I spent minutes in the professional and overall care of this patient.      Cameron Lassiter MD

## 2024-11-18 NOTE — PROGRESS NOTES
11/18/24 0907   Discharge Planning   Living Arrangements Children  (lives with son)   Support Systems Children   Assistance Needed Patient is A&O X2-3 (forgetful at times), requires minimal assist with ADLs (such as for transfers with assist from son using a gait belt), utilizes a walker and can ambulate around the house, uses a WC (when leaving the house). Uses a shower chair and raised toilet seat. Son is her caretaker and transports patient to all appointments. Patient's son does cooking and cleaning. Patient is active with [facility] (palliative care) and [facility] (PT/OT/SN).   Type of Residence Private residence   Number of Stairs to Enter Residence 0  (ramp)   Number of Stairs Within Residence 12  (lift chair)   Do you have animals or pets at home? Yes   Type of Animals or Pets 1 cat   Home or Post Acute Services Post acute facilities (Rehab/SNF/etc)   Type of Post Acute Facility Services Skilled nursing   Expected Discharge Disposition SNF  (Pre-cert was initiated by  direct pre-cert team on 11/15 at 0900. Per weekend TCC, updated PT notes are being requested by [facility] for pre-cert. Physical therapy made aware yesterday that patient needs seen 11/18.)   Does the patient need discharge transport arranged? Yes   RoundTrip coordination needed? Yes   Has discharge transport been arranged? No

## 2024-11-18 NOTE — PROGRESS NOTES
11/18/24 1100   Discharge Planning   Assistance Needed Patient refused PT this AM. Son is at bedside and aware, requesting PT return while son is here to assist in convincing patient to participate. Per son he would like to know if [facility] will have bed available this week, as that is his first choice not [facility]. Per [facility] they do not foresee a bed opening up this week so son was notified of this. Pre-cert continues to be pending for [facility] but updated PT notes are needed (being requested by patient's insurance) since patient has refused to work with PT since 11/12. Physical therapist aware of need and states she will attempt to re-visit patient this afternoon.

## 2024-11-18 NOTE — PROGRESS NOTES
Desirae Gregg is a 84 y.o. female on day 15 of admission presenting with Sepsis with acute hypoxic respiratory failure without septic shock (Multi).    Subjective   Having a good morning thus far. Her bottom feels much better today, less sore       Objective     Physical Exam  HENT:      Head: Normocephalic and atraumatic.      Nose: Nose normal.      Mouth/Throat:      Mouth: Mucous membranes are moist.   Eyes:      Extraocular Movements: Extraocular movements intact.      Pupils: Pupils are equal, round, and reactive to light.   Cardiovascular:      Comments: Regular, with ectopy  1/6 short systolic murmur  Pulmonary:      Comments: Few inspiratory crackles  in left base.  Easy respirations. Currently on room air.   Abdominal:      General: Bowel sounds are normal.      Palpations: Abdomen is soft.   Genitourinary:     Comments: FMS intact. Liquid brown stool  Musculoskeletal:      Comments: Dressing LLE intact. Much improved erythema.   Pulses equal. No pitting edema.   Skin:     General: Skin is warm and dry.      Comments: Multiple denuded areas on coccyx, perianal area-see pics   Neurological:      Mental Status: She is alert. Mental status is at baseline.      Motor: Weakness present.   Psychiatric:         Mood and Affect: Mood normal.         Behavior: Behavior normal.       Results for orders placed or performed during the hospital encounter of 11/03/24 (from the past 24 hours)   Urinalysis with Reflex Microscopic   Result Value Ref Range    Color, Urine Light-Orange (N) Light-Yellow, Yellow, Dark-Yellow    Appearance, Urine Ex.Turbid (N) Clear    Specific Gravity, Urine 1.009 1.005 - 1.035    pH, Urine 5.5 5.0, 5.5, 6.0, 6.5, 7.0, 7.5, 8.0    Protein, Urine 10 (TRACE) NEGATIVE, 10 (TRACE), 20 (TRACE) mg/dL    Glucose, Urine Normal Normal mg/dL    Blood, Urine 0.2 (2+) (A) NEGATIVE    Ketones, Urine NEGATIVE NEGATIVE mg/dL    Bilirubin, Urine NEGATIVE NEGATIVE    Urobilinogen, Urine Normal Normal mg/dL     "Nitrite, Urine NEGATIVE NEGATIVE    Leukocyte Esterase, Urine 500 Bartolo/µL (A) NEGATIVE   Microscopic Only, Urine   Result Value Ref Range    WBC, Urine >50 (A) 1-5, NONE /HPF    WBC Clumps, Urine OCCASIONAL Reference range not established. /HPF    RBC, Urine >20 (A) NONE, 1-2, 3-5 /HPF    Squamous Epithelial Cells, Urine 1-9 (SPARSE) Reference range not established. /HPF    Bacteria, Urine 1+ (A) NONE SEEN /HPF    Hyaline Casts, Urine 1+ (A) NONE /LPF   Renal Function Panel   Result Value Ref Range    Glucose 99 74 - 99 mg/dL    Sodium 133 (L) 136 - 145 mmol/L    Potassium 3.6 3.5 - 5.3 mmol/L    Chloride 94 (L) 98 - 107 mmol/L    Bicarbonate 34 (H) 21 - 32 mmol/L    Anion Gap 9 (L) 10 - 20 mmol/L    Urea Nitrogen 18 6 - 23 mg/dL    Creatinine 0.97 0.50 - 1.05 mg/dL    eGFR 57 (L) >60 mL/min/1.73m*2    Calcium 7.4 (L) 8.6 - 10.3 mg/dL    Phosphorus 3.0 2.5 - 4.9 mg/dL    Albumin 2.3 (L) 3.4 - 5.0 g/dL   Magnesium   Result Value Ref Range    Magnesium 1.62 1.60 - 2.40 mg/dL       Last Recorded Vitals  Blood pressure 118/70, pulse 68, temperature 36.6 °C (97.9 °F), temperature source Temporal, resp. rate 20, height 1.651 m (5' 5\"), weight 64.5 kg (142 lb 3.2 oz), SpO2 93%.  Intake/Output last 3 Shifts:  I/O last 3 completed shifts:  In: 1130 (17.5 mL/kg) [P.O.:1130]  Out: 795 (12.3 mL/kg) [Urine:695 (0.3 mL/kg/hr); Stool:100]  Weight: 64.5 kg     Relevant Results    Current Facility-Administered Medications:     acetaminophen (Tylenol) tablet 650 mg, 650 mg, oral, q4h PRN **OR** acetaminophen (Tylenol) oral liquid 650 mg, 650 mg, oral, q4h PRN **OR** [DISCONTINUED] acetaminophen (Tylenol) suppository 650 mg, 650 mg, rectal, q4h PRN, Eloy Hua MD    apixaban (Eliquis) tablet 5 mg, 5 mg, oral, q12h, Татьяна Agarwal MD, 5 mg at 11/18/24 0647    bumetanide (Bumex) tablet 1 mg, 1 mg, oral, BID, Sherron Jones, APRN-CNP, 1 mg at 11/17/24 1728    busPIRone (Buspar) tablet 7.5 mg, 7.5 mg, oral, BID, Eloy Hua, " MD, 7.5 mg at 11/17/24 2129    cetirizine (ZyrTEC) tablet 10 mg, 10 mg, oral, Daily, Eloy Hua MD, 10 mg at 11/17/24 0942    cholecalciferol (Vitamin D-3) tablet 2,000 Units, 2,000 Units, oral, Daily, Eloy Hua MD, 2,000 Units at 11/17/24 0942    collagenase 250 unit/gram ointment, , Topical, Daily, Yanni Mcgrath DPM, Given at 11/17/24 1013    cyanocobalamin (Vitamin B-12) tablet 1,000 mcg, 1,000 mcg, oral, Daily, Eloy Hua MD, 1,000 mcg at 11/17/24 0944    ferrous sulfate (325 mg ferrous sulfate) tablet 325 mg, 65 mg of iron, oral, Daily with breakfast, Татьяна Agarwal MD, 325 mg at 11/17/24 0942    folic acid (Folvite) tablet 1 mg, 1 mg, oral, Daily, Eloy Hua MD, 1 mg at 11/17/24 0943    gabapentin (Neurontin) capsule 200 mg, 200 mg, oral, BID, Eloy Hua MD, 200 mg at 11/17/24 2129    lactobacillus acidophilus capsule 1 capsule, 1 capsule, oral, Daily, Татьяна Agarwal MD, 1 capsule at 11/17/24 0943    levothyroxine (Synthroid, Levoxyl) tablet 88 mcg, 88 mcg, oral, Daily, Eloy Hua MD, 88 mcg at 11/18/24 0647    loperamide (Imodium) capsule 2 mg, 2 mg, oral, 4x daily, Татьяна Agarwal MD, 2 mg at 11/18/24 0647    [Held by provider] magnesium oxide (Mag-Ox) tablet 400 mg, 400 mg, oral, BID, Татьяна Agarwal MD, 400 mg at 11/16/24 0930    melatonin tablet 3 mg, 3 mg, oral, Nightly PRN, Eloy Hua MD    methyl salicylate-menthol (Bengay) 15-10 % greaseless cream 1 Application, 1 Application, Topical, TID PRN, Cha Ramsay DO, 1 Application at 11/12/24 1217    metoprolol succinate XL (Toprol-XL) 24 hr tablet 25 mg, 25 mg, oral, BID, Raul Domínguez MD, 25 mg at 11/17/24 2129    midodrine (Proamatine) tablet 5 mg, 5 mg, oral, TID, Cha Ramsay DO, 5 mg at 11/17/24 0943    nystatin (Mycostatin) cream, , Topical, BID, Eloy Hua MD, Given at 11/17/24 2131    ondansetron ODT (Zofran-ODT) disintegrating tablet 4 mg, 4 mg, oral, q8h PRN **OR** ondansetron  (Zofran) injection 4 mg, 4 mg, intravenous, q8h PRN, Eloy Hua MD, 4 mg at 11/15/24 1754    oxyCODONE (Roxicodone) immediate release tablet 5 mg, 5 mg, oral, q6h PRN, Eloy Hua MD, 5 mg at 11/17/24 1455    oxygen (O2) therapy, , inhalation, Continuous PRN - O2/gases, Cha Ramsay, DO, 2 L/min at 11/18/24 0748    pantoprazole (ProtoNix) EC tablet 40 mg, 40 mg, oral, BID AC, Eloy Hua MD, 40 mg at 11/18/24 0647    phenyleph-min oil-petrolatum (Preparation H) 0.25-14-74.9 % rectal ointment, , rectal, TID, Cha Ramsay, DO, Given at 11/17/24 2132    [Held by provider] psyllium (Metamucil) 3.4 gram packet 1 packet, 1 packet, oral, Daily, Татьяна Agarwal MD, 1 packet at 11/13/24 0948    sertraline (Zoloft) tablet 25 mg, 25 mg, oral, Daily, Eloy Hua MD, 25 mg at 11/17/24 0943                Assessment/Plan     Ms. Gregg was initially admitted with decompensated diastolic heart failure, hypoxia, was diuresed, on bumex drip for a couple days. She also was treated for pneumonia, now off antibiotics. She developed a dvt while here, and loc, and also worsening of her chronic diarrhea. She has myelofibrosis/cll as well  Acute diastolic heart failure, appears stable at this time. Appreciate cards input. Echo with EF of 55%. Outputs are not accurate due to incontinence. Switched to metop by cards, has some tendency to have atrial tachycardia. Was hypoxic overnight, required oxygen again. Cxr pending  Acute DVT, LLE. Chronic RLE. She had been on 2.5 of eliquis on admission, increased to 5 mg twice daily now. Edema has improved  Chronic ulcer LLE. Will need debridement at some point, currently using santyl, and adaptic on this. No antibiotics at this time.   Myelofibrosis/CLL. Has been having persistent leukocytosis. Will see usual hematologist after dc  Hypotension. While here, was started on midodrine , but has not been requiring this for last few days. Continue to monitor closely  Diarrhea,  exacerbation. Stool studies negative. On scheduled imodium and has FMS currently to improve her skin integrity, seems to be helping. May need to dc with the FMS , til stools bulk up a little more. Think the antibiotics worsened the situation. No GI available to eval right now.   Hypothyroid. Stable on supplement  cKD, stage 3, stable at this time  Depression/anxiety, has been pretty stable   Impaired mobility/deconditioning. Plan is for snf. Awaiting precert         I spent 35 minutes in the professional and overall care of this patient.      Татьяна Agarwal MD

## 2024-11-18 NOTE — PROGRESS NOTES
Occupational Therapy    Occupational Therapy Treatment    Name: Desirae Gregg  MRN: [id number]  Department: [address]  Room: [address] [place] [address]  Date: 11/18/24  Time Calculation  Start Time: 0946  Stop Time: 1012  Time Calculation (min): 26 min    Assessment:  OT Assessment: Pt making fair progress towards OT goals. Would continue to benefit from skilled services in order to maximize independence and safety prior to returning home.  Prognosis: Good  Barriers to Discharge: None  Evaluation/Treatment Tolerance: Patient tolerated treatment well, Patient limited by fatigue  Medical Staff Made Aware: Yes  End of Session Communication: Bedside nurse, PCT/NA/CTA (discussed assist needed in selfcare and bed mobility components.)  End of Session Patient Position: Bed, 3 rail up, Alarm on  Plan:  Treatment Interventions: ADL retraining, UE strengthening/ROM, Endurance training, Patient/family training, Compensatory technique education  OT Frequency: 3 times per week  OT Discharge Recommendations: Moderate intensity level of continued care  Equipment Recommended upon Discharge: Wheeled walker, Bedside commode  OT Recommended Transfer Status: Assist of 2  OT - OK to Discharge: Yes (In accord with OT POC)    Subjective   Previous Visit Info:  OT Last Visit  OT Received On: 11/18/24  General:  General  Reason for Referral: Pt is an 84 yo female who presented to [facility] on 11/3/24 with generalized weakness, vomiting, and fever. CT chest showed moderate right pleural effusion. OT consulted for impaired ADL and related mobility.  Referred By: FRANCISCA Ramsay DO  Past Medical History Relevant to Rehab: HTN, chronic systolic CHF, PE, myelofibrosis, sciatica, remote poliomyelitis (affecting LLE), JAK2 positive polycythemia vera, CKDIII  Family/Caregiver Present: No  Prior to Session Communication: Bedside nurse, PCT/NA/CTA  Patient Position Received: Bed, 3 rail up, Alarm on  Preferred Learning Style: verbal, visual  General Comment: Pt reluctantly agreeable  to EOB activities, declined transfer to chair.  Precautions:  Medical Precautions: Fall precautions (telemetry, verma catheter, Fecal management system.)    Vital Signs (Past 2hrs)        Date/Time Vitals Session Patient Position Pulse Resp SpO2 BP MAP (mmHg)    11/18/24 0944 --  --  72  --  --  114/67  83                        Pain Assessment:  Pain Assessment  Pain Assessment: 0-10  0-10 (Numeric) Pain Score: 0 - No pain     Objective   Cognition:  Overall Cognitive Status: Impaired at baseline  Orientation Level: Disoriented to situation, Disoriented to time (Pt directable and able to attend to instruction for basic tasks.)  Activities of Daily Living: Grooming  Grooming Level of Assistance: Setup, Close supervision, Contact guard  Grooming Where Assessed: Edge of bed  Grooming Comments: CGA for balance while seated EOB for hair combing and for oral care task.    UE Dressing  UE Dressing Level of Assistance: Moderate assistance, Setup, Moderate verbal cues  UE Dressing Where Assessed: Edge of bed  UE Dressing Comments: CGA for balance in task. Increased time and assist needed for threading arms through front opening fleece jacket.     Bed Mobility/Transfers: Bed Mobility  Bed Mobility: Yes  Bed Mobility 1  Bed Mobility 1: Supine to sitting  Level of Assistance 1: Moderate assistance, Moderate verbal cues, Moderate tactile cues  Bed Mobility Comments 1: Single step cues and assist needed for for motor planning effective hand placement in technique.  Bed Mobility 2  Bed Mobility  2: Rolling right, Rolling left  Level of Assistance 2: Moderate assistance  Bed Mobility Comments 2: Assisted with use of draw sheet. positioned on right side with foam wedge at wrap-up of session.    Transfers  Transfer: No (declined transfer attempts)    Sitting Balance:  Static Sitting Balance  Static Sitting-Balance Support: Feet supported  Static Sitting-Level of Assistance: Contact guard  Static Sitting-Comment/Number of Minutes: 14  minutes EOB sitting tolerated in task.    Outcome Measures:  [facility] Daily Activity  Putting on and taking off regular lower body clothing: Total  Bathing (including washing, rinsing, drying): A lot  Putting on and taking off regular upper body clothing: A little  Toileting, which includes using toilet, bedpan or urinal: Total  Taking care of personal grooming such as brushing teeth: A little  Eating Meals: None  Daily Activity - Total Score: 14    Education Documentation  Body Mechanics, taught by TRU Michelle at 11/18/2024 11:01 AM.  Learner: Patient  Readiness: Acceptance  Method: Explanation, Demonstration  Response: Verbalizes Understanding, Demonstrated Understanding, Needs Reinforcement    Precautions, taught by TRU Michelle at 11/18/2024 11:01 AM.  Learner: Patient  Readiness: Acceptance  Method: Explanation, Demonstration  Response: Verbalizes Understanding, Demonstrated Understanding, Needs Reinforcement    ADL Training, taught by TRU Michelle at 11/18/2024 11:01 AM.  Learner: Patient  Readiness: Acceptance  Method: Explanation, Demonstration  Response: Verbalizes Understanding, Demonstrated Understanding, Needs Reinforcement    Education Comments  Pt reluctantly responsive and compliant to education provided in course of session.    Goals:  Encounter Problems       Encounter Problems (Active)       OT Goals       Pt will complete [id number] transfers using LRD in preparation for ADLs with CGA (Progressing)       Start:  11/06/24    Expected End:  11/20/24            Pt will increase endurance to tolerate 15min of OOB activity with no more than 1 rest break in order to increase ability to engage in ADL completion.  (Progressing)       Start:  11/06/24    Expected End:  11/20/24            Pt will tolerate 10min stand during functional task completion with no more than 1 rest break in order to increase endurance for functional task completion.  (Progressing)       Start:  11/06/24     Expected End:  11/20/24            Pt will demo Grooming/UE ADL completion with modified independence, using adaptive strategies and energy conservation techniques as applicable.  (Progressing)       Start:  11/06/24    Expected End:  11/20/24            Pt will demo and/or verbalize 2-3 energy conservation techniques to incorporate into functional mobility or ADL to improve performance and increase independence.  (Progressing)       Start:  11/06/24    Expected End:  11/20/24

## 2024-11-18 NOTE — NURSING NOTE
1930: assumed pt care    2127: pt on RA dropped to about 83%, placed pt on 1.5 L NC, pt up to 94%, HR 65, pt is asymptomatic, let Daksha Quijano, NP and respiratory know.

## 2024-11-19 VITALS
BODY MASS INDEX: 23.62 KG/M2 | RESPIRATION RATE: 20 BRPM | TEMPERATURE: 98.5 F | SYSTOLIC BLOOD PRESSURE: 109 MMHG | HEART RATE: 87 BPM | HEIGHT: 65 IN | OXYGEN SATURATION: 96 % | WEIGHT: 141.76 LBS | DIASTOLIC BLOOD PRESSURE: 70 MMHG

## 2024-11-19 PROBLEM — E87.6 HYPOKALEMIA: Status: RESOLVED | Noted: 2024-11-04 | Resolved: 2024-11-19

## 2024-11-19 PROBLEM — R79.89 ELEVATED TROPONIN: Status: RESOLVED | Noted: 2024-11-03 | Resolved: 2024-11-19

## 2024-11-19 PROBLEM — J18.9 PNEUMONIA OF RIGHT LOWER LOBE DUE TO INFECTIOUS ORGANISM: Status: RESOLVED | Noted: 2024-11-03 | Resolved: 2024-11-19

## 2024-11-19 PROBLEM — J96.01 SEPSIS WITH ACUTE HYPOXIC RESPIRATORY FAILURE WITHOUT SEPTIC SHOCK (MULTI): Status: RESOLVED | Noted: 2024-11-03 | Resolved: 2024-11-19

## 2024-11-19 PROBLEM — E83.42 HYPOMAGNESEMIA: Status: RESOLVED | Noted: 2024-11-04 | Resolved: 2024-11-19

## 2024-11-19 PROBLEM — A41.9 SEPSIS WITH ACUTE HYPOXIC RESPIRATORY FAILURE WITHOUT SEPTIC SHOCK (MULTI): Status: RESOLVED | Noted: 2024-11-03 | Resolved: 2024-11-19

## 2024-11-19 PROBLEM — Z86.711 HISTORY OF PULMONARY EMBOLISM: Status: RESOLVED | Noted: 2024-10-29 | Resolved: 2024-11-19

## 2024-11-19 PROBLEM — R65.20 SEPSIS WITH ACUTE HYPOXIC RESPIRATORY FAILURE WITHOUT SEPTIC SHOCK (MULTI): Status: RESOLVED | Noted: 2024-11-03 | Resolved: 2024-11-19

## 2024-11-19 LAB
ALBUMIN SERPL BCP-MCNC: 2.3 G/DL (ref 3.4–5)
ANION GAP SERPL CALC-SCNC: 9 MMOL/L (ref 10–20)
BASOPHILS # BLD AUTO: 0.11 X10*3/UL (ref 0–0.1)
BASOPHILS NFR BLD AUTO: 0.5 %
BUN SERPL-MCNC: 18 MG/DL (ref 6–23)
CALCIUM SERPL-MCNC: 7.5 MG/DL (ref 8.6–10.3)
CHLORIDE SERPL-SCNC: 95 MMOL/L (ref 98–107)
CO2 SERPL-SCNC: 34 MMOL/L (ref 21–32)
CREAT SERPL-MCNC: 1.04 MG/DL (ref 0.5–1.05)
EGFRCR SERPLBLD CKD-EPI 2021: 52 ML/MIN/1.73M*2
EOSINOPHIL # BLD AUTO: 0.32 X10*3/UL (ref 0–0.4)
EOSINOPHIL NFR BLD AUTO: 1.4 %
ERYTHROCYTE [DISTWIDTH] IN BLOOD BY AUTOMATED COUNT: 17.2 % (ref 11.5–14.5)
GLUCOSE SERPL-MCNC: 92 MG/DL (ref 74–99)
HCT VFR BLD AUTO: 45.3 % (ref 36–46)
HGB BLD-MCNC: 14 G/DL (ref 12–16)
IMM GRANULOCYTES # BLD AUTO: 0.41 X10*3/UL (ref 0–0.5)
IMM GRANULOCYTES NFR BLD AUTO: 1.8 % (ref 0–0.9)
LYMPHOCYTES # BLD AUTO: 1.33 X10*3/UL (ref 0.8–3)
LYMPHOCYTES NFR BLD AUTO: 5.7 %
MAGNESIUM SERPL-MCNC: 1.57 MG/DL (ref 1.6–2.4)
MAGNESIUM SERPL-MCNC: 1.67 MG/DL (ref 1.6–2.4)
MCH RBC QN AUTO: 27.3 PG (ref 26–34)
MCHC RBC AUTO-ENTMCNC: 30.9 G/DL (ref 32–36)
MCV RBC AUTO: 89 FL (ref 80–100)
MONOCYTES # BLD AUTO: 0.85 X10*3/UL (ref 0.05–0.8)
MONOCYTES NFR BLD AUTO: 3.7 %
NEUTROPHILS # BLD AUTO: 20.21 X10*3/UL (ref 1.6–5.5)
NEUTROPHILS NFR BLD AUTO: 86.9 %
NRBC BLD-RTO: 0 /100 WBCS (ref 0–0)
O+P STL MICRO: NEGATIVE
PHOSPHATE SERPL-MCNC: 3.3 MG/DL (ref 2.5–4.9)
PLATELET # BLD AUTO: 235 X10*3/UL (ref 150–450)
POTASSIUM SERPL-SCNC: 3.5 MMOL/L (ref 3.5–5.3)
RBC # BLD AUTO: 5.12 X10*6/UL (ref 4–5.2)
SODIUM SERPL-SCNC: 134 MMOL/L (ref 136–145)
WBC # BLD AUTO: 23.2 X10*3/UL (ref 4.4–11.3)

## 2024-11-19 PROCEDURE — 97116 GAIT TRAINING THERAPY: CPT | Mod: GP,CQ

## 2024-11-19 PROCEDURE — 36415 COLL VENOUS BLD VENIPUNCTURE: CPT | Performed by: NURSE PRACTITIONER

## 2024-11-19 PROCEDURE — 83735 ASSAY OF MAGNESIUM: CPT | Performed by: NURSE PRACTITIONER

## 2024-11-19 PROCEDURE — 2500000005 HC RX 250 GENERAL PHARMACY W/O HCPCS: Performed by: INTERNAL MEDICINE

## 2024-11-19 PROCEDURE — 94760 N-INVAS EAR/PLS OXIMETRY 1: CPT

## 2024-11-19 PROCEDURE — 80069 RENAL FUNCTION PANEL: CPT | Performed by: INTERNAL MEDICINE

## 2024-11-19 PROCEDURE — 99221 1ST HOSP IP/OBS SF/LOW 40: CPT | Performed by: INTERNAL MEDICINE

## 2024-11-19 PROCEDURE — 2500000002 HC RX 250 W HCPCS SELF ADMINISTERED DRUGS (ALT 637 FOR MEDICARE OP, ALT 636 FOR OP/ED): Performed by: INTERNAL MEDICINE

## 2024-11-19 PROCEDURE — 2500000001 HC RX 250 WO HCPCS SELF ADMINISTERED DRUGS (ALT 637 FOR MEDICARE OP): Performed by: NURSE PRACTITIONER

## 2024-11-19 PROCEDURE — 99239 HOSP IP/OBS DSCHRG MGMT >30: CPT | Performed by: NURSE PRACTITIONER

## 2024-11-19 PROCEDURE — 2500000001 HC RX 250 WO HCPCS SELF ADMINISTERED DRUGS (ALT 637 FOR MEDICARE OP): Performed by: INTERNAL MEDICINE

## 2024-11-19 PROCEDURE — 36415 COLL VENOUS BLD VENIPUNCTURE: CPT | Performed by: INTERNAL MEDICINE

## 2024-11-19 PROCEDURE — 2500000004 HC RX 250 GENERAL PHARMACY W/ HCPCS (ALT 636 FOR OP/ED): Performed by: NURSE PRACTITIONER

## 2024-11-19 PROCEDURE — 97110 THERAPEUTIC EXERCISES: CPT | Mod: GP,CQ

## 2024-11-19 PROCEDURE — 85025 COMPLETE CBC W/AUTO DIFF WBC: CPT | Performed by: INTERNAL MEDICINE

## 2024-11-19 PROCEDURE — 83735 ASSAY OF MAGNESIUM: CPT | Performed by: INTERNAL MEDICINE

## 2024-11-19 RX ORDER — BUMETANIDE 1 MG/1
1 TABLET ORAL
Start: 2024-11-19

## 2024-11-19 RX ORDER — LANOLIN ALCOHOL/MO/W.PET/CERES
400 CREAM (GRAM) TOPICAL 2 TIMES DAILY
Start: 2024-11-20 | End: 2024-11-22

## 2024-11-19 RX ORDER — METOPROLOL SUCCINATE 25 MG/1
25 TABLET, EXTENDED RELEASE ORAL 2 TIMES DAILY
Qty: 60 TABLET | Refills: 0 | Status: SHIPPED | OUTPATIENT
Start: 2024-11-19 | End: 2024-11-19

## 2024-11-19 RX ORDER — LANOLIN ALCOHOL/MO/W.PET/CERES
400 CREAM (GRAM) TOPICAL 2 TIMES DAILY
Status: DISCONTINUED | OUTPATIENT
Start: 2024-11-20 | End: 2024-11-19 | Stop reason: HOSPADM

## 2024-11-19 RX ORDER — LOPERAMIDE HYDROCHLORIDE 2 MG/1
2 CAPSULE ORAL 3 TIMES DAILY PRN
Start: 2024-11-19 | End: 2024-11-29

## 2024-11-19 RX ORDER — BUMETANIDE 1 MG/1
1 TABLET ORAL
Qty: 60 TABLET | Refills: 0 | Status: SHIPPED | OUTPATIENT
Start: 2024-11-19 | End: 2024-11-19

## 2024-11-19 RX ORDER — MAGNESIUM SULFATE HEPTAHYDRATE 40 MG/ML
2 INJECTION, SOLUTION INTRAVENOUS ONCE
Status: COMPLETED | OUTPATIENT
Start: 2024-11-19 | End: 2024-11-19

## 2024-11-19 RX ORDER — METOPROLOL SUCCINATE 25 MG/1
25 TABLET, EXTENDED RELEASE ORAL 2 TIMES DAILY
Start: 2024-11-19

## 2024-11-19 RX ORDER — LANOLIN ALCOHOL/MO/W.PET/CERES
400 CREAM (GRAM) TOPICAL 2 TIMES DAILY
Qty: 3 TABLET | Refills: 0 | Status: SHIPPED | OUTPATIENT
Start: 2024-11-20 | End: 2024-11-19

## 2024-11-19 ASSESSMENT — COGNITIVE AND FUNCTIONAL STATUS - GENERAL
EATING MEALS: A LITTLE
WALKING IN HOSPITAL ROOM: A LOT
DRESSING REGULAR UPPER BODY CLOTHING: A LOT
MOVING FROM LYING ON BACK TO SITTING ON SIDE OF FLAT BED WITH BEDRAILS: A LITTLE
CLIMB 3 TO 5 STEPS WITH RAILING: TOTAL
MOVING TO AND FROM BED TO CHAIR: A LOT
STANDING UP FROM CHAIR USING ARMS: A LOT
MOBILITY SCORE: 13
PERSONAL GROOMING: A LOT
MOVING TO AND FROM BED TO CHAIR: A LITTLE
TURNING FROM BACK TO SIDE WHILE IN FLAT BAD: A LOT
MOVING FROM LYING ON BACK TO SITTING ON SIDE OF FLAT BED WITH BEDRAILS: A LOT
TURNING FROM BACK TO SIDE WHILE IN FLAT BAD: A LOT
TOILETING: A LOT
DRESSING REGULAR LOWER BODY CLOTHING: A LOT
CLIMB 3 TO 5 STEPS WITH RAILING: A LOT
STANDING UP FROM CHAIR USING ARMS: A LOT
MOBILITY SCORE: 12
HELP NEEDED FOR BATHING: A LOT
WALKING IN HOSPITAL ROOM: A LOT
DAILY ACTIVITIY SCORE: 13

## 2024-11-19 ASSESSMENT — ENCOUNTER SYMPTOMS
BLOOD IN STOOL: 0
NAUSEA: 1
DIARRHEA: 1
VOMITING: 0
CONSTIPATION: 0
WOUND: 1

## 2024-11-19 ASSESSMENT — PAIN SCALES - GENERAL
PAINLEVEL_OUTOF10: 0 - NO PAIN
PAINLEVEL_OUTOF10: 0 - NO PAIN

## 2024-11-19 ASSESSMENT — PAIN - FUNCTIONAL ASSESSMENT: PAIN_FUNCTIONAL_ASSESSMENT: 0-10

## 2024-11-19 NOTE — CARE PLAN
Problem: Skin  Goal: Decreased wound size/increased tissue granulation at next dressing change  Outcome: Progressing  Goal: Participates in plan/prevention/treatment measures  Outcome: Progressing  Goal: Promote/optimize nutrition  Outcome: Progressing  Goal: Promote skin healing  Outcome: Progressing     Problem: Pain  Goal: Takes deep breaths with improved pain control throughout the shift  Outcome: Progressing  Goal: Walks with improved pain control throughout the shift  Outcome: Progressing  Goal: Performs ADL's with improved pain control throughout shift  Outcome: Progressing  Goal: Participates in PT with improved pain control throughout the shift  Outcome: Progressing  Goal: Free from opioid side effects throughout the shift  Outcome: Progressing  Goal: Free from acute confusion related to pain meds throughout the shift  Outcome: Progressing   The patient's goals for the shift include  decrease in hiarrhea    The clinical goals for the shift include pt will have decreased diarrhea

## 2024-11-19 NOTE — NURSING NOTE
1900 Assumed care of patient,  2000 assessment as charted, VSS, patient awake in bed, alert and oriented x2 patient disoriented to time. . Patient denies pain, nausea, sob or CP. IV intact, FMS in place, dressing to LLE cdi. bed in lowest and locked position. Bed alarm on and functioning. Call light within reach, safety maintained. Plan of care ongoing

## 2024-11-19 NOTE — DISCHARGE SUMMARY
Discharge Diagnosis  Sepsis with acute hypoxic respiratory failure without septic shock (Multi)    Issues Requiring Follow-Up  Wound care    Discharge Meds     Medication List      START taking these medications     * bumetanide 1 mg tablet; Commonly known as: Bumex; Take 1 tablet (1 mg)   by mouth once daily. Do not fill before November 12, 2024.   * bumetanide 1 mg tablet; Commonly known as: Bumex; Take 1 tablet (1 mg)   by mouth 2 times daily (morning and late afternoon).   collagenase 250 unit/gram ointment; Apply topically once daily. Do not   fill before November 20, 2024.; Start taking on: November 20, 2024   lactobacillus acidophilus capsule; Take 1 capsule by mouth once daily.   Do not fill before November 20, 2024.; Start taking on: November 20, 2024   magnesium oxide 400 mg (241.3 mg magnesium) tablet; Commonly known as:   Mag-Ox; Take 1 tablet (400 mg) by mouth 2 times a day for 3 doses. Do not   fill before November 20, 2024.; Start taking on: November 20, 2024   metoprolol succinate XL 25 mg 24 hr tablet; Commonly known as:   Toprol-XL; Take 1 tablet (25 mg) by mouth 2 times a day. Do not crush or   chew.   midodrine 5 mg tablet; Commonly known as: Proamatine; Take 1 tablet (5   mg) by mouth 3 times a day as needed (Hypotension BP<110/80).   nystatin cream; Commonly known as: Mycostatin; Apply topically 2 times a   day. Apply to under right breast   phenyleph-min oil-petrolatum 0.25-14-74.9 % rectal ointment; Commonly   known as: Preparation H; Insert into the rectum 3 times a day as needed   for hemorrhoids.   psyllium 3.4 gram packet; Commonly known as: Metamucil; Take 1 packet by   mouth 2 times a day.  * This list has 2 medication(s) that are the same as other medications   prescribed for you. Read the directions carefully, and ask your doctor or   other care provider to review them with you.     CHANGE how you take these medications     * apixaban 2.5 mg tablet; Commonly known as: Eliquis; Take 1  tablet (2.5   mg) by mouth 2 times a day.; What changed: Another medication with the   same name was added. Make sure you understand how and when to take each.   * apixaban 5 mg tablet; Commonly known as: Eliquis; Take 1 tablet (5 mg)   by mouth every 12 hours.; What changed: You were already taking a   medication with the same name, and this prescription was added. Make sure   you understand how and when to take each.   carvedilol 3.125 mg tablet; Commonly known as: Coreg; Take 1 tablet   (3.125 mg) by mouth 2 times a day.; What changed: medication strength, how   much to take   * loperamide 2 mg tablet; Commonly known as: Imodium A-D; What changed:   Another medication with the same name was added. Make sure you understand   how and when to take each.   * loperamide 2 mg capsule; Commonly known as: Imodium; Take 1 capsule (2   mg) by mouth 3 times a day as needed for diarrhea for up to 10 days.; What   changed: You were already taking a medication with the same name, and this   prescription was added. Make sure you understand how and when to take   each.  * This list has 4 medication(s) that are the same as other medications   prescribed for you. Read the directions carefully, and ask your doctor or   other care provider to review them with you.     CONTINUE taking these medications     acetaminophen 500 mg tablet; Commonly known as: Tylenol   busPIRone 7.5 mg tablet; Commonly known as: Buspar; Take 1 tablet (7.5   mg) by mouth 2 times a day.   cetirizine 10 mg tablet; Commonly known as: ZyrTEC; Take 1 tablet (10   mg) by mouth once daily.   cholecalciferol 50 mcg (2,000 unit) capsule; Commonly known as: Vitamin   D-3; Take 1 capsule (50 mcg) by mouth once daily.   cyanocobalamin 1,000 mcg tablet; Commonly known as: Vitamin B-12; Take 1   tablet (1,000 mcg) by mouth once daily.   empagliflozin 25 mg; Commonly known as: Jardiance; Take 1 tablet (25 mg)   by mouth once daily.   ferrous sulfate (325 mg ferrous  sulfate) tablet; Take 1 tablet (325 mg)   by mouth once daily with breakfast.   folic acid 400 mcg tablet; Commonly known as: Folvite   gabapentin 100 mg capsule; Commonly known as: Neurontin; Take 2 capsules   (200 mg) by mouth 2 times a day.   levothyroxine 88 mcg tablet; Commonly known as: Synthroid, Levoxyl; Take   1 tablet (88 mcg) by mouth early in the morning.. Take on an empty stomach   at the same time each day, either 30 to 60 minutes prior to breakfast   lisinopril 10 mg tablet; Take 1 tablet (10 mg) by mouth once daily.   pantoprazole 40 mg EC tablet; Commonly known as: ProtoNix; Take 1 tablet   (40 mg) by mouth once daily in the morning. Take before meals. Do not   crush, chew, or split.   polyethylene glycol 17 gram packet; Commonly known as: Glycolax, Miralax   sertraline 25 mg tablet; Commonly known as: Zoloft; Take 1 tablet (25   mg) by mouth once daily.   zinc oxide 40 % ointment ointment; Apply 1 Application topically every 1   hour if needed (irritation).     STOP taking these medications     furosemide 20 mg tablet; Commonly known as: Lasix   isosorbide mononitrate ER 30 mg 24 hr tablet; Commonly known as: Imdur   oxyCODONE 5 mg immediate release tablet; Commonly known as: Roxicodone       Test Results Pending At Discharge  Pending Labs       Order Current Status    Ova and Parasite Examination In process    Ova/Para + Giardia/Cryptosporidium Antigen In process            Hospital Course   Ms. Gregg was initially admitted with decompensated diastolic heart failure, hypoxia, was diuresed, on bumex drip for a couple days. She also was treated for pneumonia, now off antibiotics. She developed a dvt while here, and loc, and also worsening of her chronic diarrhea.   Acute diastolic heart failure, appears stable at this time. Appreciate cards input. Echo with EF of 55%. Outputs are not accurate due to incontinence. Switched to metop by cards, has some tendency to have atrial tachycardia. Continue Bumex  and stopped the furosemide.  Acute DVT, LLE. Chronic RLE. She had been on 2.5 of eliquis on admission, increased to 5 mg twice daily now. Edema improved.  Chronic ulcer LLE. Will need debridement at some point. Wound care added to the discharge instructions. No additional antibiotics required.  Myelofibrosis/CLL. Has been having persistent leukocytosis. Will see usual hematologist after dc  Hypotension. While here, was started on midodrine , but has not been requiring this for last few days.   Diarrhea, exacerbation. Stool studies negative. FMS inserted to help maintain the skin integrity. GI agreed with its removal prior to discharge.  Hypothyroid. Stable on supplement  CKD, stage 3, stable at this time  Depression/anxiety   Impaired mobility/deconditioning. Discharging to SNF this afternoon.      Pertinent Physical Exam At Time of Discharge  HENT:      Head: Normocephalic and atraumatic.      Nose: Nose normal.      Mouth/Throat:      Mouth: Mucous membranes are moist.   Eyes:      Extraocular Movements: Extraocular movements intact.      Pupils: Pupils are equal, round, and reactive to light.   Cardiovascular:      Comments: Regular, with ectopy  1/6 short systolic murmur  Pulmonary:      Comments: clear  Abdominal:      General: Bowel sounds are normal.      Palpations: Abdomen is soft.   Musculoskeletal:      Comments: Dressing LLE intact. Much improved erythema.   Skin:     General: Skin is warm and dry.      Comments: Multiple denuded areas on coccyx, perianal area  Neurological:      Mental Status: She is alert. Mental status is at baseline.      Motor:Generalized weakness  Psychiatric:         Mood and Affect: Mood normal.         Behavior: Behavior normal.        Outpatient Follow-Up  Future Appointments   Date Time Provider Department Pet[facility]   11/25/2024  9:40 AM TERA [id number] [facility]   12/2/2024  2:45 PM [facility]         Jovita Mancini, APRN-CNP

## 2024-11-19 NOTE — PROGRESS NOTES
"Nutrition Progress Note  Nutrition Follow Up Note  Patient has Malnutrition Diagnosis: No  Nutrition Assessment     Pt was treated for CHF and hypoxia, diuresed; PNA, was on antibiotics, now discontinued. Pt also developed DVT during admission and LEVY 2/2 acute diarrhea, which has now improved. FMS removed today.     Nutrition History:  Food and Nutrient History:     (11/19/2024) Pt seen for follow up visit, son present. Pt anticipates discharge to SNF today. Pt eating 25-50% of meals, taking ONS at times. Weight is down, suspect d/t IV diuretics, now discontinued.     11/13/24) Nursing care in progress at time of visit, met with pts son outside of room. Pt accepting of Gelatein and Ensure ONS- taking about 50% of most. Per nursing documentation, pt eating 100% of most meals, occasionally consume 25-50%. Discharge planning in progress.     (11/6/24) Met with pt and pts son to obtain history. Son reports pt with decreased appetite for about 1 year, takes protein shakes at home, most recently premier protein. Weights stable. Pt follows low sodium diet at home, son does meal prep and grocery shopping. Pt with occasional nausea.    Energy Intake: Fair 50-75 %  Allergies   Allergen Reactions    Clonidine And Structural Analogs Anaphylaxis    Naproxen Nausea Only    Tizanidine Drowsiness    Hydroxyzine Palpitations    Methylprednisolone Palpitations      GI Symptoms: None     Oral Problems: None          Anthropometrics:  Height: 165.1 cm (5' 5\")   Weight: 64.3 kg (141 lb 12.1 oz)   BMI (Calculated): 23.59  IBW/kg (Dietitian Calculated): 56.8 kg  Percent of IBW: 141 %       Weight History:     Weight         11/16/2024  0600 11/17/2024  0554 11/18/2024  0514 11/18/2024  0543 11/19/2024  0638    Weight: 66.5 kg (146 lb 9.7 oz) 68 kg (150 lb) 64.6 kg (142 lb 6.7 oz) 64.5 kg (142 lb 3.2 oz) 64.3 kg (141 lb 12.1 oz)            Nutrition Focused Physical Exam Findings:    Edema  Edema: +1 trace  Edema Location: Bilat " LE  Physical Findings (Nutrition Deficiency/Toxicity)  Skin: Positive (R skin tear to elbow, sacral wound, L distal arm wound, L pretibial wound)    Nutrition Significant Labs:    Results from last 7 days   Lab Units 11/19/24  0834 11/19/24  0548 11/18/24  0533 11/17/24  0446   GLUCOSE mg/dL  --  92 99 96   SODIUM mmol/L  --  134* 133* 133*   POTASSIUM mmol/L  --  3.5 3.6 3.4*   CHLORIDE mmol/L  --  95* 94* 94*   CO2 mmol/L  --  34* 34* 34*   BUN mg/dL  --  18 18 19   CREATININE mg/dL  --  1.04 0.97 1.17*   EGFR mL/min/1.73m*2  --  52* 57* 46*   CALCIUM mg/dL  --  7.5* 7.4* 7.3*   PHOSPHORUS mg/dL  --  3.3 3.0 3.1   MAGNESIUM mg/dL 1.67 1.57* 1.62 1.47*     Lab Results   Component Value Date    HGBA1C 4.7 11/03/2024         Lab Results   Component Value Date    ALBUMIN 2.3 (L) 11/19/2024      Lab Results   Component Value Date    CRP 0.80 11/15/2024           Nutrition Specific Medications:   Scheduled medications:  apixaban, 5 mg, oral, q12h  bumetanide, 1 mg, oral, BID  busPIRone, 7.5 mg, oral, BID  cetirizine, 10 mg, oral, Daily  cholecalciferol, 2,000 Units, oral, Daily  collagenase, , Topical, Daily  cyanocobalamin, 1,000 mcg, oral, Daily  ferrous sulfate (325 mg ferrous sulfate), 65 mg of iron, oral, Daily with breakfast  folic acid, 1 mg, oral, Daily  gabapentin, 200 mg, oral, BID  lactobacillus acidophilus, 1 capsule, oral, Daily  levothyroxine, 88 mcg, oral, Daily  loperamide, 2 mg, oral, 4x daily  [START ON 11/20/2024] magnesium oxide, 400 mg, oral, BID  metoprolol succinate XL, 25 mg, oral, BID  [Held by provider] midodrine, 5 mg, oral, TID  nystatin, , Topical, BID  pantoprazole, 40 mg, oral, BID AC  phenyleph-min oil-petrolatum, , rectal, TID  [Held by provider] psyllium, 1 packet, oral, Daily  sertraline, 25 mg, oral, Daily      Continuous medications:     PRN medications:  PRN medications: acetaminophen **OR** acetaminophen **OR** [DISCONTINUED] acetaminophen, melatonin, methyl salicylate-menthol,  ondansetron ODT **OR** ondansetron, oxyCODONE, oxygen     Nursing Data Per flowsheet:   Stool Appearance: Loose (11/18/24 1120)  Gastrointestinal  Gastrointestinal (WDL): Exceptions to WDL  Abdomen Inspection: Soft  Bowel Sounds: All quadrants  Bowel Sounds (All Quadrants): Active  Last BM Date: 11/18/24  Bowel Incontinence: Yes (FMS in place)  Stool Appearance: Loose  Stool Color: Brown  Gastrointestinal Symptoms: Diarrhea  Feeding assistance level: Independent    Intake/Output Summary (Last 24 hours) at 11/19/2024 1210  Last data filed at 11/19/2024 0907  Gross per 24 hour   Intake 520 ml   Output 600 ml   Net -80 ml      0-10 (Numeric) Pain Score: 0 - No pain   Dietary Orders (From admission, onward)       Start     Ordered    11/06/24 1120  Oral nutritional supplements  Until discontinued        Question Answer Comment   Deliver with Breakfast chocolate   Deliver with Dinner    Select supplement: Ensure Plus High Protein        11/06/24 1119    11/06/24 1120  Oral nutritional supplements  Until discontinued        Question Answer Comment   Deliver with Lunch    Select supplement: Gelatein Plus        11/06/24 1119    11/03/24 1320  May Participate in Room Service With Assistance  ( ROOM SERVICE MAY PARTICIPATE WITH ASSISTANCE)  Once        Question:  .  Answer:  Yes    11/03/24 1319    11/03/24 1225  Adult diet 2-3 grams sodium  Diet effective now        Question:  Diet type  Answer:  2-3 grams sodium    11/03/24 1226                     Estimated Needs:   Total Energy Estimated Needs (kCal): 1704 kCal  Method for Estimating Needs: 30 kcal/kg/IBW  Total Protein Estimated Needs (g):  (68-85 g protein)  Method for Estimating Needs: 1.2 - 1.5 g/kg/IBW     Method for Estimating Needs: 1 ml/kcal       Nutrition Diagnosis   Malnutrition Diagnosis  Patient has Malnutrition Diagnosis: No    Nutrition Diagnosis  Patient has Nutrition Diagnosis: Yes  Diagnosis Status (1): Ongoing  Nutrition Diagnosis 1: Increased  nutrient needs  Related to (1): Sepsis with acute hypoxic respiratory failure without septic shock  As Evidenced by (1): increased nutrient demand for illness/infection  Additional Assessment Information (1): ONS added for additional kcal and protein       Nutrition Interventions/Recommendations   Nutrition Prescription:  diet, fluids    Nutrition Interventions:   Interventions: Medical food supplement  Goal: Ensure Plus HP 2 x/day= 700 kcal, 40 g protein; Gelatein Plus daily= 160 kcal, 20 g protein    Coordination of Care: n/a  Nutrition Education:   N/A    Recommendations:  Encourage oral intakes  Weights: daily for trends  RFP, Mg daily; replete lytes prn         Nutrition Monitoring and Evaluation   Food/Nutrient Related History Monitoring  Monitoring and Evaluation Plan: Amount of food  Criteria: Pt will consume 50-75% of meals and ONS provided    Body Composition/Growth/Weight History  Monitoring and Evaluation Plan: Weight  Weight: Measured weight  Criteria: Monitor    Biochemical Data, Medical Tests and Procedures  Monitoring and Evaluation Plan: Electrolyte/renal panel, Glucose/endocrine profile  Criteria: Monitor    Time Spent (min): 60 minutes

## 2024-11-19 NOTE — NURSING NOTE
1345: Report called to Itzel at [facility]. Questions answered. Communicated pickup time of 1700.

## 2024-11-19 NOTE — NURSING NOTE
1030: Fecal management system removed due to stool becoming more formed. GI at bedside during removal. Attending aware of plans to remove.

## 2024-11-19 NOTE — PROGRESS NOTES
Physical Therapy    Physical Therapy Treatment    Patient Name: Desirae Gregg  MRN: [id number]  Department: [address]  Room: [address] [place], [place] [address]  Today's Date: 11/19/2024  Time Calculation  Start Time: 1030  Stop Time: 1055  Time Calculation (min): 25 min         Assessment/Plan   PT Assessment  PT Assessment Results: Decreased strength, Decreased endurance, Impaired balance, Decreased mobility  Rehab Prognosis: Good  Barriers to Discharge: Comorbidities, medical management  End of Session Communication: Bedside nurse, PCT/NA/CTA  Assessment Comment: Continue to recommend Mod Intensity skilled PT  End of Session Patient Position: Up in chair, Alarm on (Up on bedside commode with son present - alarm off , son/p.t. request time  on BSC,  call bell in reach, RN aware and in agreement.  Pt. and son instructed to call for assist once ready to move to chair. pt. & son demo understanding and verbally agreed.)     PT Plan  Treatment/Interventions: Transfer training, Bed mobility, Gait training, Strengthening  PT Plan: Ongoing PT  PT Frequency: 3 times per week  PT Discharge Recommendations: Moderate intensity level of continued care, 24 hr supervision due to cognition  Equipment Recommended upon Discharge: Wheeled walker, Bedside commode  PT Recommended Transfer Status: Assist x2  PT - OK to Discharge: Yes      General Visit Information:   PT  Visit  PT Received On: 11/19/24  Response to Previous Treatment: Patient with no complaints from previous session.  General  Reason for Referral: Pt is an 84 yo female who presented to [facility] on [facility]/24 with generalized weakness, vomiting, and fever. CT chest showed moderate right pleural effusion., + UTI, then + Bilat DVT with episode of ongoing diarhea. PT consulted for impaired mobility/gait training.  Referred By: FRANCISCA Ramsay DO  Past Medical History Relevant to Rehab: HTN, chronic systolic CHF, PE, myelofibrosis, sciatica, remote poliomyelitis (affecting LLE), JAK2 positive polycythemia vera,  "CKDIII  Family/Caregiver Present: Yes  Caregiver Feedback: daughter or son present most of time and encourage p.t. and assist with care.  Prior to Session Communication: Bedside nurse, PCT/NA/CTA  Patient Position Received: Bed, 3 rail up, Alarm off, not on at start of session  Preferred Learning Style: verbal, visual  General Comment: AXOX2-3, Tribe, son at bedside, 02 at 2L, external catheter, per JC Mckenzie \"just dc'd rectal rube\"  agreeable with son encouragement, bilat LE edema and LLE reddened with dressing D&I. (Pleasant, cooperative, agreeable to therapy tx.  Pt. requesting bedside commode for possible urin/stool incontinence. Recommend briefs during OOB acitivity secondary to incontinence to nursing.)    Subjective   Precautions:  Precautions  Medical Precautions: Fall precautions    Vital Signs (Past 2hrs)        Date/Time Vitals Session Patient Position Pulse Resp SpO2 BP MAP (mmHg)    11/19/24 0955 --  --  73  --  --  110/74  85                         Objective   Pain:  Pain Assessment  Pain Assessment: 0-10  0-10 (Numeric) Pain Score: 0 - No pain (\"but remember I have a bad left shoulder\" stated patient)  Cognition:  Cognition  Overall Cognitive Status: Within Functional Limits  Orientation Level: Disoriented to time (forgetful)  Coordination:     Postural Control:  Static Sitting Balance  Static Sitting-Balance Support: Feet unsupported, No upper extremity supported  Static Sitting-Level of Assistance: Close supervision  Static Sitting-Comment/Number of Minutes: 9 minutes  Dynamic Sitting Balance  Dynamic Sitting-Balance Support: Feet supported, Bilateral upper extremity supported  Dynamic Sitting-Level of Assistance: Close supervision  Dynamic Sitting-Balance: Forward lean, Reaching for objects, Reaching across midline (LE therex,)  Static Standing Balance  Static Standing-Balance Support: Bilateral upper extremity supported  Static Standing-Level of Assistance: Contact guard  Extremity/Trunk " Assessments:    Activity Tolerance:     Treatments:  Therapeutic Exercise  Therapeutic Exercise Performed: Yes  Therapeutic Exercise Activity 1: Long sitting in bed  BLE ther ex: ankle pumps 10x1, quad sets, glute sets x10 reps each, abd vurls x5 reps.         Therapeutic Activity  Therapeutic Activity Performed: Yes                   Bed Mobility  Bed Mobility: Yes  Bed Mobility 1  Bed Mobility 1: Supine to sitting  Level of Assistance 1: Moderate assistance, Moderate verbal cues, Moderate tactile cues    Ambulation/Gait Training  Ambulation/Gait Training Performed: Yes  Ambulation/Gait Training 1  Surface 1: Level tile  Device 1: Rolling walker  Assistance 1: Moderate assistance, Minimal verbal cues, Minimal tactile cues  Quality of Gait 1: Narrow base of support, Diminished heel strike, Inconsistent stride length, Decreased step length (mildly unsteady)  Comments/Distance (ft) 1: 5ft x1  Transfers  Transfer: Yes  Transfer 1  Transfer From 1: Sit to  Technique 1: Sit to stand, Stand to sit  Transfer Device 1: Walker  Transfer Level of Assistance 1: Moderate tactile cues, Moderate verbal cues, Moderate assistance (Mod A x2 or Max A x1 with raised surface)                          Outcome Measures:  [facility] Basic Mobility  Turning from your back to your side while in a flat bed without using bedrails: A lot  Moving from lying on your back to sitting on the side of a flat bed without using bedrails: A lot  Moving to and from bed to chair (including a wheelchair): A little  Standing up from a chair using your arms (e.g. wheelchair or bedside chair): A lot  To walk in hospital room: A lot  Climbing 3-5 steps with railing: A lot  Basic Mobility - Total Score: 13    Education Documentation  Handouts, taught by Kaye Trejo PTA at 11/19/2024 11:15 AM.  Learner: Family, Patient  Readiness: Acceptance  Method: Explanation  Response: Verbalizes Understanding    Precautions, taught by Kaye Trejo PTA at 11/19/2024 11:15  AM.  Learner: Family, Patient  Readiness: Acceptance  Method: Explanation  Response: Verbalizes Understanding    Body Mechanics, taught by Kaye Trejo PTA at 11/19/2024 11:15 AM.  Learner: Family, Patient  Readiness: Acceptance  Method: Explanation  Response: Verbalizes Understanding    Home Exercise Program, taught by Kaye Trejo PTA at 11/19/2024 11:15 AM.  Learner: Family, Patient  Readiness: Acceptance  Method: Explanation  Response: Verbalizes Understanding    Mobility Training, taught by Kaye Trejo PTA at 11/19/2024 11:15 AM.  Learner: Family, Patient  Readiness: Acceptance  Method: Explanation  Response: Verbalizes Understanding    Handouts, taught by Kaye Trejo PTA at 11/19/2024 11:15 AM.  Learner: Family, Patient  Readiness: Acceptance  Method: Explanation  Response: Verbalizes Understanding    Body Mechanics, taught by Kaye Trejo PTA at 11/19/2024 11:15 AM.  Learner: Family, Patient  Readiness: Acceptance  Method: Explanation  Response: Verbalizes Understanding    Precautions, taught by Kaye Trejo PTA at 11/19/2024 11:15 AM.  Learner: Family, Patient  Readiness: Acceptance  Method: Explanation  Response: Verbalizes Understanding    Home Exercise Program, taught by Kaye Trejo PTA at 11/19/2024 11:15 AM.  Learner: Family, Patient  Readiness: Acceptance  Method: Explanation  Response: Verbalizes Understanding    ADL Training, taught by Kaye Trejo PTA at 11/19/2024 11:15 AM.  Learner: Family, Patient  Readiness: Acceptance  Method: Explanation  Response: Verbalizes Understanding    Education Comments  No comments found.        OP EDUCATION:       Encounter Problems       Encounter Problems (Active)       Mobility       Patient will ambulate bathroom distance of 15' with FWW and CGA in order to complete toileting and hygiene activities with greater ease.  (Progressing)       Start:  11/06/24    Expected End:  11/20/24               PT Transfers       Patient will transfer to and  from sit to supine with Yeimi in order to decrease caregiver burden.  (Progressing)       Start:  11/06/24    Expected End:  11/20/24            Patient will transfer sit to and from stand with FWW and Yiemi.  (Progressing)       Start:  11/06/24    Expected End:  11/20/24               Pain - Adult             Encounter Problems (Resolved)       Balance       Patient will tolerate sitting upright OOB for 30 minutes without symptoms in order to complete self-care tasks.  (Met)       Start:  11/06/24    Expected End:  11/20/24    Resolved:  11/12/24

## 2024-11-19 NOTE — PROGRESS NOTES
Desirae Gregg is a 84 y.o. female on day 16 of admission presenting with Sepsis with acute hypoxic respiratory failure without septic shock (Multi).    Subjective   Interval History: no fever, no new complaints        Review of Systems    Objective   Range of Vitals (last 24 hours)  Heart Rate:  [63-90]   Temp:  [36.6 °C (97.9 °F)-37 °C (98.6 °F)]   Resp:  [18-22]   BP: (100-125)/(65-74)   Weight:  [64.3 kg (141 lb 12.1 oz)]   SpO2:  [90 %-96 %]   Daily Weight  11/19/24 : 64.3 kg (141 lb 12.1 oz)    Body mass index is 23.59 kg/m².    Physical Exam  Constitutional:       Appearance: Normal appearance.   HENT:      Head: Normocephalic and atraumatic.      Mouth/Throat:      Mouth: Mucous membranes are moist.      Pharynx: Oropharynx is clear.   Eyes:      Pupils: Pupils are equal, round, and reactive to light.   Cardiovascular:      Rate and Rhythm: Normal rate and regular rhythm.      Heart sounds: Normal heart sounds.   Pulmonary:      Effort: Pulmonary effort is normal.      Breath sounds: Normal breath sounds.   Abdominal:      General: Abdomen is flat. Bowel sounds are normal.      Palpations: Abdomen is soft.   Musculoskeletal:      Cervical back: Normal range of motion.      Comments: Lt leg wound, no cellulitis    Neurological:      Mental Status: She is alert.         Antibiotics  This patient does not have an active medication from one of the medication groupers.    Relevant Results  Labs  Results from last 72 hours   Lab Units 11/19/24  0548 11/17/24  0446   WBC AUTO x10*3/uL 23.2* 28.1*   HEMOGLOBIN g/dL 14.0 14.4   HEMATOCRIT % 45.3 47.9*   PLATELETS AUTO x10*3/uL 235 278   NEUTROS PCT AUTO % 86.9 86.2   LYMPHS PCT AUTO % 5.7 4.8   MONOS PCT AUTO % 3.7 3.6   EOS PCT AUTO % 1.4 2.3     Results from last 72 hours   Lab Units 11/19/24  0548 11/18/24  0533 11/17/24  0446   SODIUM mmol/L 134* 133* 133*   POTASSIUM mmol/L 3.5 3.6 3.4*   CHLORIDE mmol/L 95* 94* 94*   CO2 mmol/L 34* 34* 34*   BUN mg/dL 18 18 19    CREATININE mg/dL 1.04 0.97 1.17*   GLUCOSE mg/dL 92 99 96   CALCIUM mg/dL 7.5* 7.4* 7.3*   ANION GAP mmol/L 9* 9* 8*   EGFR mL/min/1.73m*2 52* 57* 46*   PHOSPHORUS mg/dL 3.3 3.0 3.1     Results from last 72 hours   Lab Units 11/19/24  0548 11/18/24  0533 11/17/24  0446   ALBUMIN g/dL 2.3* 2.3* 2.3*     Estimated Creatinine Clearance: 34.9 mL/min (by C-G formula based on SCr of 1.04 mg/dL).  C-Reactive Protein   Date Value Ref Range Status   11/15/2024 0.80 <1.00 mg/dL Final   11/12/2024 1.04 (H) <1.00 mg/dL Final   11/11/2024 0.86 <1.00 mg/dL Final     Microbiology  Reviewed  Imaging  Reviewed        Assessment/Plan   Leukocytosis, likely related to myelodysplasia, US with legs DVT, starting to trend down  Sepsis, treated  Respiratory failure / pneumonia  Bacteriuria  Polycythemia / myelofibrosis  Left leg wound / some stasis, unlikely to be cellulitis     Recommendations :  Supportive care  Discussed with the wound care  team     I spent minutes in the professional and overall care of this patient.      Cameron Lassiter MD

## 2024-11-19 NOTE — CONSULTS
Reason For Consult  Acute on chronic diarrhea     History Of Present Illness  Desirae Gregg is a Desirae Gregg is a 83 y.o. female with a PMH of stage III chronic kidney disease, chronic systolic congestive heart failure, myelofibrosis, sciatica, remote poliomyelitis, hypertension, pulmonary embolism, anxiety disorder, vitamin D deficiency, vitamin B12 deficiency, hypothyroidism, GERD, depression, and JAK2 positive polycythemia vera previously on Jakafi and Eliquis, patient states there were stopped a few months ago. GI consulted for acute on chronic diarrhea. Patient worked up and treated for pneumonia. While she was hospitalized she developed a DVT and LEVY.  Patient seen this admission from GI for BRBPR which has resolved.   Since admission patient has had worsening diarrhea. Per previous note patient endorses that she suffers from diarrhea and constipation at home. She states that certain foods trigger her diarrhea and she takes imodium at home. Sometimes she gets constipated and takes stool softeners. Patient endorses abdominal discomfort and cramping intermittently.   No colonoscopy or EGD found in her chart.   Patient assessed and nurses removed FMS this am. She had skin excoriation on admission which has also improved significantly.   Cdiff negative 11/9  Stool patho studies negative   Ova/para stool pending at this time.        Past Medical History  She has a past medical history of Heart failure and Sciatica.    Surgical History  She has a past surgical history that includes Hysterectomy; Cholecystectomy; Tonsillectomy; and Appendectomy.     Social History  She reports that she has never smoked. She has never used smokeless tobacco. She reports that she does not drink alcohol and does not use drugs.    Family History  Family History   Problem Relation Name Age of Onset    Stroke Mother      Asthma Father      Cancer Paternal Grandmother          Allergies  Clonidine and structural analogs, Naproxen, Tizanidine,  "Hydroxyzine, and Methylprednisolone    Review of Systems  Review of Systems   Gastrointestinal:  Positive for diarrhea and nausea. Negative for blood in stool, constipation and vomiting.   Skin:  Positive for pallor and wound.   All other systems reviewed and are negative.        Physical Exam  Physical Exam  Vitals reviewed.   Constitutional:       Appearance: Normal appearance.   Eyes:      Extraocular Movements: Extraocular movements intact.   Cardiovascular:      Rate and Rhythm: Regular rhythm.      Heart sounds: Normal heart sounds.   Pulmonary:      Effort: Pulmonary effort is normal.      Breath sounds: Normal breath sounds.   Abdominal:      General: Bowel sounds are normal.      Palpations: Abdomen is soft.      Tenderness: There is abdominal tenderness.   Genitourinary:     Rectum: Normal.   Skin:     General: Skin is warm.   Neurological:      Mental Status: She is alert and oriented to person, place, and time.   Psychiatric:         Behavior: Behavior normal.           Last Recorded Vitals  Blood pressure 110/74, pulse 73, temperature 37 °C (98.6 °F), temperature source Temporal, resp. rate 22, height 1.651 m (5' 5\"), weight 64.3 kg (141 lb 12.1 oz), SpO2 96%.    Relevant Results      Results for orders placed or performed during the hospital encounter of 11/03/24 (from the past 24 hours)   Renal Function Panel   Result Value Ref Range    Glucose 92 74 - 99 mg/dL    Sodium 134 (L) 136 - 145 mmol/L    Potassium 3.5 3.5 - 5.3 mmol/L    Chloride 95 (L) 98 - 107 mmol/L    Bicarbonate 34 (H) 21 - 32 mmol/L    Anion Gap 9 (L) 10 - 20 mmol/L    Urea Nitrogen 18 6 - 23 mg/dL    Creatinine 1.04 0.50 - 1.05 mg/dL    eGFR 52 (L) >60 mL/min/1.73m*2    Calcium 7.5 (L) 8.6 - 10.3 mg/dL    Phosphorus 3.3 2.5 - 4.9 mg/dL    Albumin 2.3 (L) 3.4 - 5.0 g/dL   CBC and Auto Differential   Result Value Ref Range    WBC 23.2 (H) 4.4 - 11.3 x10*3/uL    nRBC 0.0 0.0 - 0.0 /100 WBCs    RBC 5.12 4.00 - 5.20 x10*6/uL    Hemoglobin " 14.0 12.0 - 16.0 g/dL    Hematocrit 45.3 36.0 - 46.0 %    MCV 89 80 - 100 fL    MCH 27.3 26.0 - 34.0 pg    MCHC 30.9 (L) 32.0 - 36.0 g/dL    RDW 17.2 (H) 11.5 - 14.5 %    Platelets 235 150 - 450 x10*3/uL    Neutrophils % 86.9 40.0 - 80.0 %    Immature Granulocytes %, Automated 1.8 (H) 0.0 - 0.9 %    Lymphocytes % 5.7 13.0 - 44.0 %    Monocytes % 3.7 2.0 - 10.0 %    Eosinophils % 1.4 0.0 - 6.0 %    Basophils % 0.5 0.0 - 2.0 %    Neutrophils Absolute 20.21 (H) 1.60 - 5.50 x10*3/uL    Immature Granulocytes Absolute, Automated 0.41 0.00 - 0.50 x10*3/uL    Lymphocytes Absolute 1.33 0.80 - 3.00 x10*3/uL    Monocytes Absolute 0.85 (H) 0.05 - 0.80 x10*3/uL    Eosinophils Absolute 0.32 0.00 - 0.40 x10*3/uL    Basophils Absolute 0.11 (H) 0.00 - 0.10 x10*3/uL   Magnesium   Result Value Ref Range    Magnesium 1.57 (L) 1.60 - 2.40 mg/dL   Magnesium   Result Value Ref Range    Magnesium 1.67 1.60 - 2.40 mg/dL      XR chest 1 view    Result Date: 11/18/2024  Interpreted By:  Zain Mcknight, STUDY: XR CHEST 1 VIEW; 11/18/2024 6:36 am   INDICATION: Signs/Symptoms:pneumonia   COMPARISON: Radiographs 11/12/2024   ACCESSION NUMBER(S): [id number]   ORDERING CLINICIAN: BIPIN SMITH   TECHNIQUE: Single frontal view of the chest performed.   FINDINGS: LINES AND DEVICES: None.   LUNGS: Decreased size now small left and nearly resolved right pleural effusions with atelectasis. No new focal consolidation pulmonary edema or pneumothorax.   CARDIOMEDIASTINAL SILHOUETTE: The cardiomediastinal silhouette is stable with obscured left heart border by adjacent effusion.       Decreased small left and nearly resolved right pleural effusions.   MACRO None   Signed by: Zain Mcknight 11/18/2024 2:19 PM Dictation workstation:   [id number]    ECG 12 lead    Result Date: 11/18/2024  Sinus rhythm with occasional Premature ventricular complexes and Premature atrial complexes Left axis deviation Left bundle branch block Abnormal ECG When compared with  ECG of 08-NOV-2024 17:50, (unconfirmed) Premature ventricular complexes are now Present    ECG 12 lead    Result Date: 11/14/2024  Sinus tachycardia Left axis deviation Left bundle branch block Abnormal ECG No previous ECGs available See ED provider note for full interpretation and clinical correlation Confirmed by Edwin Medley ([id number]) on 11/14/2024 3:17:28 AM    Vascular US lower extremity venous duplex bilateral    Result Date: 11/13/2024          [place] [address]  Tel [phone] and Fax [phone]  Vascular Lab Report VASC US LOWER EXTREMITY VENOUS DUPLEX BILATERAL  Patient Name:      ELIE Mccain Physician: [id number] Haresh Childs MD Study Date:        11/12/2024           Ordering           [id number] BIPIN ESPINOSA                                         Physician:         SARAH MRN/PID:           [id number]             Technologist:      Alexandra Dale RVT Accession#:        [id number]         Technologist 2: Date of Birth/Age: 1940 / 83      Encounter#:        [id number]                    years Gender:            F Admission Status:  Inpatient            Location           [facility]                                         Performed:  Diagnosis/ICD: Compression of vein-I87.1 CPT Codes:     87546 Peripheral venous duplex scan for DVT complete  **CRITICAL RESULT** Critical Result: DVT right femoral vein, age indeterminate thrombus in right popliteal, right posterior tibial and right peroneal veins. DVT left popliteal vein Notification called to Bipin Agarwal MD on 11/12/2024 at 1:28:12 PM by DVT.  CONCLUSIONS: Right Lower Venous: There is acute occlusive deep vein thrombosis visualized in the mid femoral and distal femoral veins. There is acute non-occlusive deep vein thrombosis visualized in the proximal femoral vein. There is age indeterminate deep vein thrombosis visualized in the popliteal, posterior tibial and peroneal veins. Left Lower  Venous: There is acute non-occlusive deep vein thrombosis visualized in the popliteal vein. The remainder of the left leg is negative for deep vein thrombosis. Left posterior tibial and peroneal veins poorly visualized due to edema and bandages; cannot rule out deep vein thrombosis in non-visualized vessels.  Additional Findings: Non-vascularized structure noted in right popliteal fossa measuring 7.2 cm x 1.6 cm. Technically difficult exam due to edema and patient positioning.  Imaging & Doppler Findings:  Right                 Compressible      Thrombus              Flow Distal External Iliac                                  Spontaneous/Phasic CFV                       Yes             None         Spontaneous/Phasic PFV                       Yes             None         Spontaneous/Phasic FV Proximal             Partial    Acute non-occlusive Spontaneous/Phasic FV Mid                     No        Acute occlusive FV Distal                  No        Acute occlusive Popliteal               Partial           ? Age        Spontaneous/Phasic Peroneal                Partial           ? Age PTV                     Partial           ? Age  Left                  Compress      Thrombus              Flow Distal External Iliac                              Spontaneous/Phasic CFV                     Yes           None         Spontaneous/Phasic PFV                     Yes           None FV Proximal             Yes           None         Spontaneous/Phasic FV Mid                  Yes           None FV Distal               Yes           None Popliteal             Partial  Acute non-occlusive Spontaneous/Phasic  [id number] Haresh Childs MD Electronically signed by [id number] Haresh Childs MD on 11/13/2024 at 7:49:11 AM  ** Final **     XR chest 1 view    Result Date: 11/12/2024  Interpreted By:  Alfonso Ricci, STUDY: XR CHEST 1 VIEW;  11/12/2024 9:29 am   INDICATION: Signs/Symptoms:follow up pneumonia.   COMPARISON: 11/08/2024   ACCESSION NUMBER(S):  [id number]   ORDERING CLINICIAN: BIPIN SMITH   FINDINGS: There is increased opacification of the left lower lung with blunted costophrenic angle indicating a moderate effusion probably with underlying atelectasis or consolidation. There is mild prominence of the central pulmonary vasculature, and while accentuated from a shallow inspiration is probably from mild congestion. The cardiac size appears to be within normal limits considering the left lower cardiac margin is obscured.   ABDOMEN AND OTHER FINDINGS: No remarkable upper abdominal findings.   BONES: No acute osseous changes.       1.  Increased left lower lung airspace disease and probable congestion.   Signed by: Alfonso Ricci 11/12/2024 5:25 PM Dictation workstation:   [id number]    Electrocardiogram, 12-lead PRN ACS symptoms    Result Date: 11/11/2024  Undetermined rhythm Left bundle branch block Abnormal ECG When compared with ECG of 03-NOV-2024 09:20, (unconfirmed) Current undetermined rhythm precludes rhythm comparison, needs review QRS axis Shifted right Nonspecific T wave abnormality has replaced inverted T waves in Inferior leads    Electrocardiogram, 12-lead PRN ACS symptoms    Result Date: 11/11/2024  Sinus rhythm with Premature atrial complexes Left bundle branch block Abnormal ECG When compared with ECG of 08-NOV-2024 17:49, (unconfirmed) Previous ECG has undetermined rhythm, needs review    [place] GI esophagram    Result Date: 11/11/2024  Interpreted By:  Zain Mcknight, STUDY: [place] GI ESOPHAGRAM;  11/11/2024 9:24 am   INDICATION: Signs/Symptoms:dysphagia.   COMPARISON: CT abdomen and pelvis 11/03/2024   ACCESSION NUMBER(S): [id number]   ORDERING CLINICIAN: SYED JOSHI   TECHNIQUE: Modified single contrast barium swallow due to patient's clinical condition. Fluoro time: 15 seconds.   FINDINGS: : Small bilateral pleural effusions.   Contrast opacifies the esophagus and transit into the stomach without obstruction or extraluminal  extravasation.       Modified exam due to patient's clinical condition. No esophageal obstruction.   MACRO Zain Mcknight discussed the significance and urgency of this critical finding by Epic secure chat with  SYED MARTETARA on 11/11/2024 at 10:56 am.  (**-RCF-**) Findings:  See findings.   Signed by: Zain Mcknight 11/11/2024 11:02 AM Dictation workstation:   [id number]    XR chest 1 view    Result Date: 11/8/2024  Interpreted By:  Yazmin Oropeza, STUDY: XR CHEST 1 VIEW; ;  11/8/2024 10:10 am   INDICATION: Signs/Symptoms:hypoxia.     COMPARISON: Chest radiograph dated 11/03/2024.   ACCESSION NUMBER(S): [id number]   ORDERING CLINICIAN: PAULETTE CARRANZA   FINDINGS: Cardiac silhouette is mildly enlarged but is similar compared to prior radiograph. Again noted is moderate volume right-sided pleural effusion with associated hazy airspace opacity in the right mid to lower lung zone. There is also reticular airspace opacity in the right lung base, which could represent atelectasis versus pneumonia. There is also obliteration of left costophrenic angle concerning for small left pleural effusion. There is mild prominence of interstitial markings in bilateral lungs concerning for mild interstitial edema. No large pneumothorax within limits of portable technique. No acute osseous findings.       1. Suggestion of mild interstitial edema, more pronounced on this radiograph compared to prior. Recommend correlation with fluid status. 2. Redemonstration of moderate right and small left pleural effusions. 3. Right basilar airspace opacity, which could be related to atelectasis versus pneumonia in appropriate clinical setting.   MACRO: None   Signed by: Yazmin Oropeza 11/8/2024 4:17 PM Dictation workstation:   [id number]    Transthoracic Echo (TTE) Limited    Result Date: 11/5/2024   [facility], [address] [place] [address]               Tel [phone] and Fax [phone] TRANSTHORACIC  ECHOCARDIOGRAM REPORT  Patient Name:       ELIE WHITLEY          [place] Physician:    [id number] Chicho Goldman MD Study Date:         11/5/2024           Ordering Provider:    [id number] SHERITA SCHMITZ                                                               JYOTSNA MRN/PID:            [id number]            Fellow: Accession#:         [id number]        Nurse: Date of Birth/Age:  1940 / 83     Sonographer:          Cathi garcia                                     RDYVROSE Gender assigned at  F                   Additional Staff: Birth: Height:             165.10 cm           Admit Date:           11/3/2024 Weight:             72.57 kg            Admission Status:     Inpatient -                                                               Routine BSA / BMI:          1.80 m2 / 26.63     Encounter#:           [id number]                     kg/m2 Blood Pressure:     103/62 mmHg         Department Location:  [facility] Non                                                               Invasive Study Type:    TRANSTHORACIC ECHO (TTE) LIMITED Diagnosis/ICD: 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 Study Detail: The following Echo studies were performed: 2D, Doppler and color               flow.  PHYSICIAN INTERPRETATION: Left Ventricle: The left ventricular systolic function is normal, with a visually estimated ejection fraction of 60-65%. There is severely increased concentric left ventricular hypertrophy. There are no regional left ventricular wall motion abnormalities. The left ventricular cavity size is normal. There is  moderately increased septal and moderately increased posterior left ventricular wall thickness. Spectral Doppler shows an abnormal pattern of left ventricular diastolic filling. Left Atrium: The left atrium is upper limits of normal in size. Right Ventricle: The right ventricle is normal in size. There is normal right ventricular global systolic function. Right Atrium: The right atrium is normal in size. Aortic Valve: The aortic valve is trileaflet. The aortic valve dimensionless index is 0.77. There is trace aortic valve regurgitation. The peak instantaneous gradient of the aortic valve is 13 mmHg. The mean gradient of the mitral valve is 7 mmHg. Mitral Valve: The mitral valve is normal in structure. There is trace to mild mitral valve regurgitation. Tricuspid Valve: The tricuspid valve is structurally normal. There is trace to mild tricuspid regurgitation. Pulmonic Valve: The pulmonic valve is not well visualized. The pulmonic valve regurgitation was not well visualized. Pericardium: There is no pericardial effusion noted. Aorta: The aortic root is normal.  CONCLUSIONS:  1. The left ventricular systolic function is normal, with a visually estimated ejection fraction of 60-65%.  2. Spectral Doppler shows an abnormal pattern of left ventricular diastolic filling.  3. There is moderately increased septal and moderately increased posterior left ventricular wall thickness.  4. There is severely increased concentric left ventricular hypertrophy.  5. There is normal right ventricular global systolic function. QUANTITATIVE DATA SUMMARY:  2D MEASUREMENTS:          Normal Ranges: IVSd:            1.49 cm  (0.6-1.1cm) LVPWd:           1.26 cm  (0.6-1.1cm) LVIDd:           4.47 cm  (3.9-5.9cm) LVIDs:           2.98 cm LV Mass Index:   133 g/m2 LVEDV Index:     23 ml/m2 LV % FS          33.4 %  LV SYSTOLIC FUNCTION BY 2D PLANIMETRY (MOD):                      Normal Ranges: EF-A4C View:    62 % (>=55%) EF-A2C View:    61 %  EF-Biplane:     64 % EF-Visual:      63 % LV EF Reported: 63 %  LV DIASTOLIC FUNCTION:          Normal Ranges: MV Peak E:             0.70 m/s (0.7-1.2 m/s) MV Peak A:             0.91 m/s (0.42-0.7 m/s) E/A Ratio:             0.77     (1.0-2.2)  MITRAL VALVE:          Normal Ranges: MV DT:        264 msec (150-240msec)  AORTIC VALVE:                      Normal Ranges: AoV Vmax:                1.78 m/s  (<=1.7m/s) AoV Peak P.7 mmHg (<20mmHg) AoV Mean P.0 mmHg  (1.7-11.5mmHg) LVOT Max Josiah:            1.33 m/s  (<=1.1m/s) AoV VTI:                 31.31 cm  (18-25cm) LVOT VTI:                24.11 cm LVOT Diameter:           2.04 cm   (1.8-2.4cm) AoV Area, VTI:           2.52 cm2  (2.5-5.5cm2) AoV Area,Vmax:           2.44 cm2  (2.5-4.5cm2) AoV Dimensionless Index: 0.77  TRICUSPID VALVE/RVSP:          Normal Ranges: Peak TR Velocity:     3.46 m/s  PULMONIC VALVE:          Normal Ranges: PV Max Josiah:     0.8 m/s  (0.6-0.9m/s) PV Max P.8 mmHg  [id number] Chicho Goldman MD Electronically signed on 2024 at 2:09:49 PM  ** Final **     CT abdomen pelvis wo IV contrast    Result Date: 2024  Interpreted By:  Jayshree Dias, STUDY: CT ABDOMEN PELVIS WO IV CONTRAST;  2024 3:04 pm   INDICATION: Signs/Symptoms:recurrent urosepsis, eval for retained stone urinary tract.   COMPARISON: None.   ACCESSION NUMBER(S): [id number]   ORDERING CLINICIAN: SHERITA GOYAL   TECHNIQUE: CT of the abdomen and pelvis was performed without intravenous contrast. Sagittal and coronal reconstructions were generated.   FINDINGS: LOWER CHEST: There are bilateral pleural effusions. There is a small pericardial effusion. There is bilateral basilar atelectasis or infiltrate.   ABDOMEN:   LIVER: Unremarkable   BILE DUCTS: Unremarkable   GALLBLADDER: Status post cholecystectomy   PANCREAS: Unremarkable   SPLEEN: The spleen is enlarged.   ADRENAL GLANDS: There are no adrenal masses.   KIDNEYS AND URETERS:  Kidneys are normal size and not obstructed.   There are no renal calculi.   Ureters are normal caliber.   PELVIS:   BLADDER: The bladder is empty. There is a 2 centimeters bladder stone.   REPRODUCTIVE ORGANS: Patient is status post hysterectomy. There appear to be surgical clips in the pelvis.   BOWEL: There is no significant bowel distention. There is motion artifact. There are sigmoid diverticula.   VESSELS: There are atherosclerotic changes of the aorta. The cava is unremarkable   PERITONEUM/RETROPERITONEUM/LYMPH NODES: There is a moderate amount of ascites. There is presacral edema. There is no obvious free air.   There is no significant lymphadenopathy.   BONES AND ABDOMINAL WALL: There is anasarca.   There are old left pelvic fractures peer there are degenerative changes of the spine. There is loss of height of L1.   COMPARISON OF FINDINGS:       2 centimeters stone in the urinary bladder.   Bilateral pleural effusions. Pericardial effusion.   Ascites.   Anasarca.   Presacral edema.   Enlarged spleen.   The exam is limited by motion artifact.   MACRO: none   Signed by: Jayshree Dias 11/4/2024 3:28 PM Dictation workstation:   [id number]    US renal complete    Result Date: 11/4/2024  Interpreted By:  Zain Mcknight, STUDY: US RENAL COMPLETE; 11/4/2024 9:03 am   INDICATION: Signs/Symptoms:LEVY on CKD, eval postrenal causes.   COMPARISON: None.   ACCESSION NUMBER(S): [id number]   ORDERING CLINICIAN: SHERITA GOYAL   TECHNIQUE: Sonography of the kidneys and urinary bladder was performed.   FINDINGS: Right Kidney: *Renal length: 10 cm *Parenchyma: Normal parenchymal echogenicity. Normal parenchymal thickness. *Collecting system: No hydronephrosis. *Calculus: No echogenic, shadowing calculus. *Lesion: None.   Left Kidney: *Renal length: 10 cm *Parenchyma: Normal parenchymal echogenicity. Normal parenchymal thickness. *Collecting system: No hydronephrosis. *Calculus: Nonobstructing calculus in the upper pole  measuring 4 mm. *Lesion: None.   Bladder: Decompressed.   Other: Trace perihepatic, perisplenic and pelvic free fluid/ascites.       No hydronephrosis. Decompressed bladder.   MACRO: None   Signed by: Zain Mcknight 11/4/2024 11:08 AM Dictation workstation:   [id number]    CT chest wo IV contrast    Result Date: 11/3/2024  Interpreted By:  Matthias Kolb, STUDY: CT CHEST WO IV CONTRAST;  11/3/2024 11:16 am   INDICATION: Signs/Symptoms:sepsis, ? atelectasis versus PNA on CXR without classic symptoms.     COMPARISON: Portable chest earlier same day 3 November 2024 at 0957 hours; CT lumbar spine without contrast 5 July 2024   ACCESSION NUMBER(S): [id number]   ORDERING CLINICIAN: SHERITA GOYAL   TECHNIQUE: Helical CT chest from thoracic inlet through the hemidiaphragms without intravenous contrast   FINDINGS: LUNGS / AIRSPACES / AIRWAYS:   LARGE AIRWAYS Filling defect: Negative Wall thickening: Negative Bronchiectasis: Negative Other: N/A   AIRSPACES Fibrosis: Negative Emphysema: Negative Consolidation: Negative Ground glass airspace disease: Small region of ground-glass airspace change in the middle lobe and potentially in portions of the inflated right lower lobe. None in the left lung noting one segment of left lower lobe collapse Edema: Negative Nodule / Mass: Negative Other: One segment of left lower lobe collapse. Multiple segments of right lower lobe collapse   PLEURA: Effusion:  Moderate size mostly if not entirely free flowing right; small free-flowing left Pneumothorax:  Both sides negative Other:  n/a   CARDIOVASCULAR: Heart size:  Mildly enlarged Pericardial effusion:  Perhaps trace Thoracic aortic aneurysm:  Negative Pulmonary arteries:  Static Heart failure change:  Negative.  No sign of interstitial or alveolar edema. Other:  Unusual hypodensities in the right ventricular free wall for example soft tissue windows axial series 202, image 167 and the surrounding few images   NONVASCULAR MEDIASTINUM:  Esophagus:  Grossly normal by CT Mediastinal Mass:  Negative Hiatal hernia:  None Other:  n/a   LYMPH NODES: No thoracic adenopathy   CHEST WALL: Soft tissues of the chest wall are unremarkable   SKELETON: No acute or contributory abnormality   UPPER ABDOMEN: Small volume ascites pooling about the included liver and spleen. Spleen probably mildly enlarged but not completely included in the field of view. Liver not overtly cirrhotic but at least borderline fatty. The only pertinent comparison exam for the upper abdomen would be CT lumbar spine without contrast 5 July 2024 at which time there was not any ascites in the field of view, but the field of view did not include the portions of the abdomen where they ascites is presently located. It is unlikely there was ascites in July, 2024 as more of the lower abdomen was included on that exam and it did not appear that there is any ascites tracking down into (or up from) the pelvis       Abnormalities at the right lung base on portable frontal chest radiograph earlier today are due to a combination of moderate-sized, mostly if not entirely free-flowing right pleural effusion with associated multisegmental right lower lobe collapse adjacent to it   Small area of ground-glass airspace disease in the middle lobe confirmed on CT may not have been expected on the prior radiograph   Possibility of mild ground-glass airspace disease in the right lower lobe as well   No ground-glass airspace disease in the left lung   No consolidative pneumonia in any of the inflated lungs on either side   Small free-flowing left pleural effusion   Unusual hypodensities in the right ventricular free wall of uncertain etiology and significance   Perhaps trace (smallest detectable quantity) pericardial effusion   Incidental abnormalities in the included upper abdomen as detailed above   MACRO: None   Signed by: Matthias Kolb 11/3/2024 11:35 AM Dictation workstation:   [id number]    XR chest 1  view    Result Date: 11/3/2024  Interpreted By:  Kary Cardoso, STUDY: XR CHEST 1 VIEW 11/3/2024 10:06 am   INDICATION: Signs/Symptoms:cough and fever   COMPARISON: None   ACCESSION NUMBER(S): [id number]   ORDERING CLINICIAN: ESTHER ROBBINS   TECHNIQUE: AP view   FINDINGS: There is right lower lobe airspace consolidation and a right pleural effusion. There is an enlarged cardiac silhouette. Pulmonary vascularity is normal. Degenerative changes are noted in both shoulders. No sign of pneumothorax       1. Right lower lobe airspace consolidation either pneumonia or atelectasis with right pleural effusion. Follow-up to complete resolution is needed 2. Enlarged cardiac silhouette     Signed by: Kary Cardoso 11/3/2024 10:27 AM Dictation workstation:   [id number]    Scheduled medications  apixaban, 5 mg, oral, q12h  bumetanide, 1 mg, oral, BID  busPIRone, 7.5 mg, oral, BID  cetirizine, 10 mg, oral, Daily  cholecalciferol, 2,000 Units, oral, Daily  collagenase, , Topical, Daily  cyanocobalamin, 1,000 mcg, oral, Daily  ferrous sulfate (325 mg ferrous sulfate), 65 mg of iron, oral, Daily with breakfast  folic acid, 1 mg, oral, Daily  gabapentin, 200 mg, oral, BID  lactobacillus acidophilus, 1 capsule, oral, Daily  levothyroxine, 88 mcg, oral, Daily  loperamide, 2 mg, oral, 4x daily  [START ON 11/20/2024] magnesium oxide, 400 mg, oral, BID  metoprolol succinate XL, 25 mg, oral, BID  [Held by provider] midodrine, 5 mg, oral, TID  nystatin, , Topical, BID  pantoprazole, 40 mg, oral, BID AC  phenyleph-min oil-petrolatum, , rectal, TID  [Held by provider] psyllium, 1 packet, oral, Daily  sertraline, 25 mg, oral, Daily      Continuous medications     PRN medications  PRN medications: acetaminophen **OR** acetaminophen **OR** [DISCONTINUED] acetaminophen, melatonin, methyl salicylate-menthol, ondansetron ODT **OR** ondansetron, oxyCODONE, oxygen   Assessment/Plan     Patient denies hematochezia or melena. Endorses chronic  constipation at home often triggered by certain foods.   Etiology likely IBS-D complicated by intermittent constipation. Can not rule out IBD, celiac disease, colorectal CA.     PLAN:  -Celiac panel   -fecal calprotectin   -recommend imodium and bentyl   -recommend fiber for diarrhea  -recommend Preparation H for external hemorrhoids  -continue wound care eval   -pending stool studies   -consider rechecking cdiff with no improvement of loose stools   -outpatient GI consult follow up     Discussed with lindsey     GI will continue to follow     OLI Rubio-CNP  I saw and evaluated the patient. I personally obtained the key and critical portions of the history and physical exam or was physically present for key and critical portions performed by the NP. I reviewed the NP's documentation and discussed the patient with the NP. I agree with the NP's medical decision making as documented in the note.     Patient with multiple medical comorbidities admitted with sepsis.  In Recent admission, was seen by GI due to rectal bleed which was attributed to hemorrhoid/excoriation in perianal area.  GI has been consulted in this admission due to diarrhea.  Symptom likely related to IBS-mixed pattern.  Hemoglobin 14.  CT scan-normal liver, pancreas.  C. difficile negative.  SP cholecystectomy  We recommend symptomatic management.  Given age colonoscopy will be difficult.  However we recommend to follow-up with GI clinic after DC.

## 2024-11-19 NOTE — PROGRESS NOTES
11/19/24 1200   Discharge Planning   Expected Discharge Disposition SNF  (Auth rcvd for transition to [facility]. Patient and son aware of dc via 5pm transport with [facility]. AVS and Orders sent to snf and Dariela RASCON is completing 7000 in HEN[facility])

## 2024-11-20 ENCOUNTER — LAB REQUISITION (OUTPATIENT)
Dept: LAB | Facility: HOSPITAL | Age: 84
End: 2024-11-20
Payer: MEDICARE

## 2024-11-20 DIAGNOSIS — I10 ESSENTIAL (PRIMARY) HYPERTENSION: ICD-10-CM

## 2024-11-20 DIAGNOSIS — N18.9 CHRONIC KIDNEY DISEASE, UNSPECIFIED: ICD-10-CM

## 2024-11-20 DIAGNOSIS — J96.20 ACUTE AND CHRONIC RESPIRATORY FAILURE, UNSPECIFIED WHETHER WITH HYPOXIA OR HYPERCAPNIA: ICD-10-CM

## 2024-11-20 DIAGNOSIS — I50.9 HEART FAILURE, UNSPECIFIED: ICD-10-CM

## 2024-11-20 LAB
ANION GAP SERPL CALC-SCNC: 12 MMOL/L (ref 10–20)
BUN SERPL-MCNC: 17 MG/DL (ref 6–23)
CALCIUM SERPL-MCNC: 7.2 MG/DL (ref 8.6–10.3)
CHLORIDE SERPL-SCNC: 94 MMOL/L (ref 98–107)
CO2 SERPL-SCNC: 35 MMOL/L (ref 21–32)
CREAT SERPL-MCNC: 1.02 MG/DL (ref 0.5–1.05)
EGFRCR SERPLBLD CKD-EPI 2021: 54 ML/MIN/1.73M*2
ERYTHROCYTE [DISTWIDTH] IN BLOOD BY AUTOMATED COUNT: 17.1 % (ref 11.5–14.5)
GLUCOSE SERPL-MCNC: 71 MG/DL (ref 74–99)
HCT VFR BLD AUTO: 44.4 % (ref 36–46)
HGB BLD-MCNC: 13.6 G/DL (ref 12–16)
MCH RBC QN AUTO: 27.3 PG (ref 26–34)
MCHC RBC AUTO-ENTMCNC: 30.6 G/DL (ref 32–36)
MCV RBC AUTO: 89 FL (ref 80–100)
NRBC BLD-RTO: 0 /100 WBCS (ref 0–0)
PLATELET # BLD AUTO: 213 X10*3/UL (ref 150–450)
POTASSIUM SERPL-SCNC: 3.5 MMOL/L (ref 3.5–5.3)
RBC # BLD AUTO: 4.99 X10*6/UL (ref 4–5.2)
SODIUM SERPL-SCNC: 137 MMOL/L (ref 136–145)
WBC # BLD AUTO: 17.1 X10*3/UL (ref 4.4–11.3)

## 2024-11-20 PROCEDURE — 85027 COMPLETE CBC AUTOMATED: CPT | Mod: OUT | Performed by: EMERGENCY MEDICINE

## 2024-11-20 PROCEDURE — 80048 BASIC METABOLIC PNL TOTAL CA: CPT | Mod: OUT | Performed by: EMERGENCY MEDICINE

## 2024-11-20 PROCEDURE — 36415 COLL VENOUS BLD VENIPUNCTURE: CPT | Mod: OUT | Performed by: EMERGENCY MEDICINE

## 2024-11-23 LAB
ATRIAL RATE: 90 BPM
ATRIAL RATE: 96 BPM
P AXIS: 25 DEGREES
PR INTERVAL: 168 MS
Q ONSET: 217 MS
Q ONSET: 217 MS
QRS COUNT: 15 BEATS
QRS COUNT: 16 BEATS
QRS DURATION: 130 MS
QRS DURATION: 134 MS
QT INTERVAL: 382 MS
QT INTERVAL: 418 MS
QTC CALCULATION(BAZETT): 490 MS
QTC CALCULATION(BAZETT): 511 MS
QTC FREDERICIA: 451 MS
QTC FREDERICIA: 478 MS
R AXIS: -12 DEGREES
R AXIS: -12 DEGREES
T AXIS: 150 DEGREES
T AXIS: 152 DEGREES
T OFFSET: 408 MS
T OFFSET: 426 MS
VENTRICULAR RATE: 90 BPM
VENTRICULAR RATE: 99 BPM

## 2024-11-25 ENCOUNTER — APPOINTMENT (OUTPATIENT)
Dept: UROLOGY | Facility: CLINIC | Age: 84
End: 2024-11-25
Payer: MEDICARE

## 2024-11-25 DIAGNOSIS — N21.0 BLADDER STONE: Primary | ICD-10-CM

## 2024-11-25 PROCEDURE — 1123F ACP DISCUSS/DSCN MKR DOCD: CPT

## 2024-11-25 PROCEDURE — 1111F DSCHRG MED/CURRENT MED MERGE: CPT

## 2024-11-25 PROCEDURE — 99203 OFFICE O/P NEW LOW 30 MIN: CPT

## 2024-11-25 PROCEDURE — 1157F ADVNC CARE PLAN IN RCRD: CPT

## 2024-11-25 NOTE — PROGRESS NOTES
[facility]  Outpatient Clinic Note    Patient: Desirae Gregg  Age/Sex: 84 y.o., female  MRN: [id number]  Referred by: Dr. Cedillo     Chief Complaint: Bladder stone         History of Present Illness  This is a 84 y.o. female, who presents as a new patient to the clinic for bladder stone referred by her PCP.  The patient is present with her son he is her caregiver.  The patient had a CT scan completed on 11/4/2024 showing a 2 cm bladder stone.  The patient's previous BMP on 11/20/2024 showed BUN 17, Cr 1.02, GFR 54. Dr. Hooks consulted by the patient was hospitalized he discussed cystolitholapaxy with laser on 11/5/2024.  The patient would like to move forward with this procedure. She denies dysuria, gross hematuria, flank pain, pelvic pain, vaginal bulging, fever or chills.     Past Medical & Surgical History  Past Medical History:   Diagnosis Date    Heart failure     Sciatica      Past Surgical History:   Procedure Laterality Date    APPENDECTOMY      CHOLECYSTECTOMY      HYSTERECTOMY      TONSILLECTOMY         Family History  Family History   Problem Relation Name Age of Onset    Stroke Mother      Asthma Father      Cancer Paternal Grandmother         Social History  She reports that she has never smoked. She has never used smokeless tobacco. She reports that she does not drink alcohol and does not use drugs.    Allergies  Clonidine and structural analogs, Naproxen, Tizanidine, Hydroxyzine, and Methylprednisolone    Medications:  Current Outpatient Medications on File Prior to Visit   Medication Sig Dispense Refill    acetaminophen (Tylenol) 500 mg tablet Take 2 tablets (1,000 mg) by mouth every 6 hours if needed for mild pain (1 - 3).      apixaban (Eliquis) 2.5 mg tablet Take 1 tablet (2.5 mg) by mouth 2 times a day. 180 tablet 1    apixaban (Eliquis) 5 mg tablet Take 1 tablet (5 mg) by mouth every 12 hours.      bumetanide (Bumex) 1 mg tablet Take 1 tablet (1 mg) by mouth once daily. Do not fill before  2024.      bumetanide (Bumex) 1 mg tablet Take 1 tablet (1 mg) by mouth 2 times daily (morning and late afternoon).      busPIRone (Buspar) 7.5 mg tablet Take 1 tablet (7.5 mg) by mouth 2 times a day. 180 tablet 1    carvedilol (Coreg) 3.125 mg tablet Take 1 tablet (3.125 mg) by mouth 2 times a day.      cetirizine (ZyrTEC) 10 mg tablet Take 1 tablet (10 mg) by mouth once daily. 90 tablet 1    cholecalciferol (Vitamin D-3) 50 mcg (2,000 unit) capsule Take 1 capsule (50 mcg) by mouth once daily. 90 capsule 3    collagenase 250 unit/gram ointment Apply topically once daily. Do not fill before 2024.      cyanocobalamin (Vitamin B-12) 1,000 mcg tablet Take 1 tablet (1,000 mcg) by mouth once daily. 90 tablet 3    empagliflozin (Jardiance) 25 mg Take 1 tablet (25 mg) by mouth once daily. 90 tablet 1    ferrous sulfate, 325 mg ferrous sulfate, tablet Take 1 tablet (325 mg) by mouth once daily with breakfast. 90 tablet 1    folic acid (Folvite) 400 mcg tablet Take 2 tablets (0.8 mg) by mouth once daily.      gabapentin (Neurontin) 100 mg capsule Take 2 capsules (200 mg) by mouth 2 times a day. 360 capsule 1    lactobacillus acidophilus capsule Take 1 capsule by mouth once daily. Do not fill before 2024.      levothyroxine (Synthroid, Levoxyl) 88 mcg tablet Take 1 tablet (88 mcg) by mouth early in the morning.. Take on an empty stomach at the same time each day, either 30 to 60 minutes prior to breakfast 90 tablet 1    lisinopril 10 mg tablet Take 1 tablet (10 mg) by mouth once daily. 90 tablet 1    loperamide (Imodium A-D) 2 mg tablet Take 1 tablet (2 mg) by mouth 4 times a day as needed for diarrhea.      loperamide (Imodium) 2 mg capsule Take 1 capsule (2 mg) by mouth 3 times a day as needed for diarrhea for up to 10 days.      [] magnesium oxide (Mag-Ox) 400 mg (241.3 mg magnesium) tablet Take 1 tablet (400 mg) by mouth 2 times a day for 3 doses. Do not fill before November  20, 2024.      metoprolol succinate XL (Toprol-XL) 25 mg 24 hr tablet Take 1 tablet (25 mg) by mouth 2 times a day. Do not crush or chew.      midodrine (Proamatine) 5 mg tablet Take 1 tablet (5 mg) by mouth 3 times a day as needed (Hypotension BP<110/80).      nystatin (Mycostatin) cream Apply topically 2 times a day. Apply to under right breast      pantoprazole (ProtoNix) 40 mg EC tablet Take 1 tablet (40 mg) by mouth once daily in the morning. Take before meals. Do not crush, chew, or split. 90 tablet 1    phenyleph-min oil-petrolatum (Preparation H) 0.25-14-74.9 % rectal ointment Insert into the rectum 3 times a day as needed for hemorrhoids.      polyethylene glycol (Glycolax, Miralax) 17 gram packet Take 17 g by mouth once daily as needed.      psyllium (Metamucil) 3.4 gram packet Take 1 packet by mouth 2 times a day.      sertraline (Zoloft) 25 mg tablet Take 1 tablet (25 mg) by mouth once daily. 90 tablet 1    zinc oxide 40 % ointment ointment Apply 1 Application topically every 1 hour if needed (irritation). 56 g 0    [DISCONTINUED] bumetanide (Bumex) 1 mg tablet Take 1 tablet (1 mg) by mouth 2 times daily (morning and late afternoon). 60 tablet 0    [DISCONTINUED] carvedilol (Coreg) 25 mg tablet Take 1 tablet (25 mg) by mouth 2 times a day. 180 tablet 1    [DISCONTINUED] collagenase 250 unit/gram ointment Apply topically once daily. 30 g 0    [DISCONTINUED] furosemide (Lasix) 20 mg tablet Take 1 tablet (20 mg) by mouth once daily. 90 tablet 1    [DISCONTINUED] isosorbide mononitrate ER (Imdur) 30 mg 24 hr tablet Take 1 tablet (30 mg) by mouth once daily. Do not crush or chew. 90 tablet 1    [DISCONTINUED] lactobacillus acidophilus capsule Take 1 capsule by mouth once daily. 30 capsule 0    [DISCONTINUED] magnesium oxide (Mag-Ox) 400 mg (241.3 mg magnesium) tablet Take 1 tablet (400 mg) by mouth 2 times a day for 3 doses. 3 tablet 0    [DISCONTINUED] metoprolol succinate XL (Toprol-XL) 25 mg 24 hr tablet  Take 1 tablet (25 mg) by mouth 2 times a day. Do not crush or chew. 60 tablet 0    [DISCONTINUED] oxyCODONE (Roxicodone) 5 mg immediate release tablet Take 1 tablet (5 mg) by mouth every 6 hours if needed for severe pain (7 - 10) for up to 6 days. 24 tablet 0     No current facility-administered medications on file prior to visit.      There were no vitals filed for this visit.  There is no height or weight on file to calculate BMI.    Review of Systems   A comprehensive 10+ review of systems was negative except for: see hpi          Physical Exam                                                                                                                      General: Well developed, well nourished, alert and cooperative, appears in no acute distress  Head: Normocephalic, atraumatic  Neck: supple, trachea midline  Eyes: Non-injected conjunctiva, sclera clear, no proptosis  Cardiac: Extremities are warm and well perfused. No edema, cyanosis or pallor.   Lungs: Breathing is easy, non-labored. Speaking in clear and complete sentences. Normal diaphragmatic movement.  Patient currently is using oxygen  Abdomen: soft, non-distended, non-tender, no rebound or guarding, no hernia and no CVA tenderness   MSK: assisted in a wheelchair  Neuro: alert and oriented to person, place and time  Psych: Demonstrates good judgement and reason, without hallucinations, abnormal affect or abnormal behaviors.  Skin: no obvious lesions, no rashes      PVR (by Ultrasound):    Urine dip: No results found for this or any previous visit (from the past 6 hours).    Labs   Contains abnormal data Basic Metabolic Panel  Order: [id number]   Status: Final result       Visible to patient: No (inaccessible in [facility])       Dx: Heart failure, unspecified; Chronic k...    0 Result Notes       2  Topics            Component  Ref Range & Units 5 d ago 6 d ago 7 d ago 8 d ago 9 d ago 10 d ago 11 d ago   Glucose  74 - 99 mg/dL 71 Low  92 99 96 94 126  High  110 High    Sodium  136 - 145 mmol/L 137 134 Low  133 Low  133 Low  134 Low  134 Low  132 Low    Potassium  3.5 - 5.3 mmol/L 3.5 3.5 3.6 3.4 Low  3.3 Low  3.5 3.4 Low    Chloride  98 - 107 mmol/L 94 Low  95 Low  94 Low  94 Low  95 Low  93 Low  96 Low    Bicarbonate  21 - 32 mmol/L 35 High  34 High  34 High  34 High  34 High  31 30   Anion Gap  10 - 20 mmol/L 12 9 Low  9 Low  8 Low  8 Low  14 9 Low    Urea Nitrogen  6 - 23 mg/dL 17 18 18 19 17 18 16   Creatinine  0.50 - 1.05 mg/dL 1.02 1.04 0.97 1.17 High  1.04 1.05 0.95   eGFR  >60 mL/min/1.73m*2 54 Low  52 Low  CM 57 Low  CM 46 Low  CM 53 Low  CM 52 Low  CM 59 Low  CM   Comment: Calculations of estimated GFR are performed using the 2021 CKD-EPI Study Refit equation without the race variable for the IDMS-Traceable creatinine methods.  https://jasn.asnjournals.org/content/early/2021/09/22/ASN.1435137827   Calcium  8.6 - 10.3 mg/dL 7.2 Low  7.5 Low  7.4 Low  7.3 Low  7.2 Low  7.7 Low  7.3 Low    Resulting Agency [facility]             Specimen Collected: 11/20/24 05:45 Last Resulted: 11/20/24 09:16       Imaging  CT abdomen pelvis wo IV contrast  Status: Final result     PACS Images     Show images for CT abdomen pelvis wo IV contrast  Signed by    Signed Time Phone Pager   Jayshree Dias MD 11/04/2024 15:28 [phone]     Exam Information    Status Exam Begun Exam Ended   Final 11/04/2024 14:55 11/04/2024 15:04     Study Result    Narrative & Impression   Interpreted By:  Jayshree Dias,   STUDY:  CT ABDOMEN PELVIS WO IV CONTRAST;  11/4/2024 3:04 pm      INDICATION:  Signs/Symptoms:recurrent urosepsis, eval for retained stone urinary  tract.      COMPARISON:  None.      ACCESSION NUMBER(S):  [id number]      ORDERING CLINICIAN:  SHERITA JYOTSNA      TECHNIQUE:  CT of the abdomen and pelvis was performed without intravenous  contrast. Sagittal and coronal reconstructions were generated.      FINDINGS:  LOWER  CHEST:  There are bilateral pleural effusions. There is a small pericardial  effusion. There is bilateral basilar atelectasis or infiltrate.      ABDOMEN:      LIVER:  Unremarkable      BILE DUCTS:  Unremarkable      GALLBLADDER:  Status post cholecystectomy      PANCREAS:  Unremarkable      SPLEEN:  The spleen is enlarged.      ADRENAL GLANDS:  There are no adrenal masses.      KIDNEYS AND URETERS:  Kidneys are normal size and not obstructed.      There are no renal calculi.      Ureters are normal caliber.      PELVIS:      BLADDER:  The bladder is empty. There is a 2 centimeters bladder stone.      REPRODUCTIVE ORGANS:  Patient is status post hysterectomy. There appear to be surgical  clips in the pelvis.      BOWEL:  There is no significant bowel distention. There is motion artifact.  There are sigmoid diverticula.      VESSELS:  There are atherosclerotic changes of the aorta. The cava is  unremarkable      PERITONEUM/RETROPERITONEUM/LYMPH NODES:  There is a moderate amount of ascites. There is presacral edema.  There is no obvious free air.      There is no significant lymphadenopathy.      BONES AND ABDOMINAL WALL:  There is anasarca.      There are old left pelvic fractures peer there are degenerative  changes of the spine. There is loss of height of L1.      COMPARISON OF FINDINGS:      IMPRESSION:  2 centimeters stone in the urinary bladder.      Bilateral pleural effusions. Pericardial effusion.      Ascites.      Anasarca.      Presacral edema.      Enlarged spleen.      The exam is limited by motion artifact.      MACRO:  none      Signed by: Jayshree Dias 11/4/2024 3:28 PM  Dictation workstation:   [id number]         IMPRESSION AND PLAN:  Desirae Gregg is a 84 y.o. presents with a 2 cm bladder stone    Bladder stone  -Patient previously saw Dr. Hooks in the hospital they discussed cystolitholapaxy with laser on 11/5/2024.  -Patient would like to move forward with this procedure    -Standing urine  culture in the  system    Appointment scheduled please follow-up with Dr. Hooks 12/2    All questions and concerns were answered and addressed.  The patient expressed understanding and agrees with the plan.     Reviewed and approved by FRANCISCO MATTHEWS on 11/25/24 at 7:24 AM.

## 2024-11-28 ENCOUNTER — HOSPITAL ENCOUNTER (INPATIENT)
Facility: HOSPITAL | Age: 84
End: 2024-11-28
Attending: STUDENT IN AN ORGANIZED HEALTH CARE EDUCATION/TRAINING PROGRAM | Admitting: INTERNAL MEDICINE
Payer: MEDICARE

## 2024-11-28 ENCOUNTER — APPOINTMENT (OUTPATIENT)
Dept: CARDIOLOGY | Facility: HOSPITAL | Age: 84
End: 2024-11-28
Payer: MEDICARE

## 2024-11-28 ENCOUNTER — APPOINTMENT (OUTPATIENT)
Dept: RADIOLOGY | Facility: HOSPITAL | Age: 84
End: 2024-11-28
Payer: MEDICARE

## 2024-11-28 DIAGNOSIS — N17.9 AKI (ACUTE KIDNEY INJURY) (CMS-HCC): ICD-10-CM

## 2024-11-28 DIAGNOSIS — A41.9 SEPSIS, DUE TO UNSPECIFIED ORGANISM, UNSPECIFIED WHETHER ACUTE ORGAN DYSFUNCTION PRESENT (MULTI): ICD-10-CM

## 2024-11-28 DIAGNOSIS — N39.0 COMPLICATED UTI (URINARY TRACT INFECTION): ICD-10-CM

## 2024-11-28 DIAGNOSIS — I10 ESSENTIAL HYPERTENSION: ICD-10-CM

## 2024-11-28 DIAGNOSIS — L08.9 INFECTED WOUND: ICD-10-CM

## 2024-11-28 DIAGNOSIS — T14.8XXA INFECTED WOUND: ICD-10-CM

## 2024-11-28 DIAGNOSIS — I73.9 PERIPHERAL VASCULAR DISEASE, UNSPECIFIED (CMS-HCC): ICD-10-CM

## 2024-11-28 DIAGNOSIS — I50.22 CHRONIC SYSTOLIC (CONGESTIVE) HEART FAILURE: ICD-10-CM

## 2024-11-28 DIAGNOSIS — R41.82 ALTERED MENTAL STATUS, UNSPECIFIED ALTERED MENTAL STATUS TYPE: Primary | ICD-10-CM

## 2024-11-28 PROBLEM — N30.00 ACUTE CYSTITIS: Status: ACTIVE | Noted: 2024-11-28

## 2024-11-28 PROBLEM — G93.41 ACUTE METABOLIC ENCEPHALOPATHY: Status: ACTIVE | Noted: 2024-11-28

## 2024-11-28 LAB
ALBUMIN SERPL BCP-MCNC: 2.7 G/DL (ref 3.4–5)
ALP SERPL-CCNC: 64 U/L (ref 33–136)
ALT SERPL W P-5'-P-CCNC: 9 U/L (ref 7–45)
AMMONIA PLAS-SCNC: 52 UMOL/L (ref 16–53)
ANION GAP SERPL CALC-SCNC: 15 MMOL/L (ref 10–20)
APPEARANCE UR: ABNORMAL
AST SERPL W P-5'-P-CCNC: 30 U/L (ref 9–39)
BASOPHILS # BLD AUTO: 0.19 X10*3/UL (ref 0–0.1)
BASOPHILS NFR BLD AUTO: 0.9 %
BILIRUB SERPL-MCNC: 1 MG/DL (ref 0–1.2)
BILIRUB UR STRIP.AUTO-MCNC: NEGATIVE MG/DL
BNP SERPL-MCNC: 209 PG/ML (ref 0–99)
BUN SERPL-MCNC: 33 MG/DL (ref 6–23)
CALCIUM SERPL-MCNC: 7.9 MG/DL (ref 8.6–10.3)
CARDIAC TROPONIN I PNL SERPL HS: 81 NG/L (ref 0–13)
CARDIAC TROPONIN I PNL SERPL HS: 84 NG/L (ref 0–13)
CHLORIDE SERPL-SCNC: 101 MMOL/L (ref 98–107)
CO2 SERPL-SCNC: 26 MMOL/L (ref 21–32)
COLOR UR: ABNORMAL
CREAT SERPL-MCNC: 2.69 MG/DL (ref 0.5–1.05)
EGFRCR SERPLBLD CKD-EPI 2021: 17 ML/MIN/1.73M*2
EOSINOPHIL # BLD AUTO: 0.37 X10*3/UL (ref 0–0.4)
EOSINOPHIL NFR BLD AUTO: 1.7 %
ERYTHROCYTE [DISTWIDTH] IN BLOOD BY AUTOMATED COUNT: 19 % (ref 11.5–14.5)
GLUCOSE BLD MANUAL STRIP-MCNC: 101 MG/DL (ref 74–99)
GLUCOSE BLD MANUAL STRIP-MCNC: 140 MG/DL (ref 74–99)
GLUCOSE SERPL-MCNC: 80 MG/DL (ref 74–99)
GLUCOSE UR STRIP.AUTO-MCNC: NORMAL MG/DL
HCT VFR BLD AUTO: 51.8 % (ref 36–46)
HGB BLD-MCNC: 15.4 G/DL (ref 12–16)
HOLD SPECIMEN: NORMAL
IMM GRANULOCYTES # BLD AUTO: 0.4 X10*3/UL (ref 0–0.5)
IMM GRANULOCYTES NFR BLD AUTO: 1.8 % (ref 0–0.9)
KETONES UR STRIP.AUTO-MCNC: NEGATIVE MG/DL
LACTATE SERPL-SCNC: 0.9 MMOL/L (ref 0.4–2)
LEUKOCYTE ESTERASE UR QL STRIP.AUTO: ABNORMAL
LYMPHOCYTES # BLD AUTO: 1.54 X10*3/UL (ref 0.8–3)
LYMPHOCYTES NFR BLD AUTO: 7 %
MCH RBC QN AUTO: 27.4 PG (ref 26–34)
MCHC RBC AUTO-ENTMCNC: 29.7 G/DL (ref 32–36)
MCV RBC AUTO: 92 FL (ref 80–100)
MONOCYTES # BLD AUTO: 0.71 X10*3/UL (ref 0.05–0.8)
MONOCYTES NFR BLD AUTO: 3.2 %
MUCOUS THREADS #/AREA URNS AUTO: ABNORMAL /LPF
NEUTROPHILS # BLD AUTO: 18.76 X10*3/UL (ref 1.6–5.5)
NEUTROPHILS NFR BLD AUTO: 85.4 %
NITRITE UR QL STRIP.AUTO: NEGATIVE
NRBC BLD-RTO: 0 /100 WBCS (ref 0–0)
PH UR STRIP.AUTO: 5.5 [PH]
PLATELET # BLD AUTO: 382 X10*3/UL (ref 150–450)
POTASSIUM SERPL-SCNC: 4.9 MMOL/L (ref 3.5–5.3)
PROT SERPL-MCNC: 4.4 G/DL (ref 6.4–8.2)
PROT UR STRIP.AUTO-MCNC: ABNORMAL MG/DL
RBC # BLD AUTO: 5.62 X10*6/UL (ref 4–5.2)
RBC # UR STRIP.AUTO: ABNORMAL /UL
RBC #/AREA URNS AUTO: >20 /HPF
SODIUM SERPL-SCNC: 137 MMOL/L (ref 136–145)
SP GR UR STRIP.AUTO: 1.02
T4 FREE SERPL-MCNC: 1 NG/DL (ref 0.61–1.12)
TSH SERPL-ACNC: 4.38 MIU/L (ref 0.44–3.98)
UROBILINOGEN UR STRIP.AUTO-MCNC: NORMAL MG/DL
WBC # BLD AUTO: 22 X10*3/UL (ref 4.4–11.3)
WBC #/AREA URNS AUTO: >50 /HPF
WBC CLUMPS #/AREA URNS AUTO: ABNORMAL /HPF

## 2024-11-28 PROCEDURE — 2500000005 HC RX 250 GENERAL PHARMACY W/O HCPCS: Performed by: STUDENT IN AN ORGANIZED HEALTH CARE EDUCATION/TRAINING PROGRAM

## 2024-11-28 PROCEDURE — 87086 URINE CULTURE/COLONY COUNT: CPT | Mod: GEALAB | Performed by: STUDENT IN AN ORGANIZED HEALTH CARE EDUCATION/TRAINING PROGRAM

## 2024-11-28 PROCEDURE — 99223 1ST HOSP IP/OBS HIGH 75: CPT | Performed by: NURSE PRACTITIONER

## 2024-11-28 PROCEDURE — 96365 THER/PROPH/DIAG IV INF INIT: CPT | Mod: 59

## 2024-11-28 PROCEDURE — 2500000005 HC RX 250 GENERAL PHARMACY W/O HCPCS: Performed by: NURSE PRACTITIONER

## 2024-11-28 PROCEDURE — 73590 X-RAY EXAM OF LOWER LEG: CPT | Mod: LT

## 2024-11-28 PROCEDURE — 73590 X-RAY EXAM OF LOWER LEG: CPT | Mod: LEFT SIDE | Performed by: STUDENT IN AN ORGANIZED HEALTH CARE EDUCATION/TRAINING PROGRAM

## 2024-11-28 PROCEDURE — 87040 BLOOD CULTURE FOR BACTERIA: CPT | Mod: GEALAB | Performed by: STUDENT IN AN ORGANIZED HEALTH CARE EDUCATION/TRAINING PROGRAM

## 2024-11-28 PROCEDURE — 96361 HYDRATE IV INFUSION ADD-ON: CPT | Mod: 59

## 2024-11-28 PROCEDURE — 2500000001 HC RX 250 WO HCPCS SELF ADMINISTERED DRUGS (ALT 637 FOR MEDICARE OP): Performed by: NURSE PRACTITIONER

## 2024-11-28 PROCEDURE — 71045 X-RAY EXAM CHEST 1 VIEW: CPT | Performed by: RADIOLOGY

## 2024-11-28 PROCEDURE — 82947 ASSAY GLUCOSE BLOOD QUANT: CPT

## 2024-11-28 PROCEDURE — 2500000004 HC RX 250 GENERAL PHARMACY W/ HCPCS (ALT 636 FOR OP/ED): Performed by: INTERNAL MEDICINE

## 2024-11-28 PROCEDURE — 84439 ASSAY OF FREE THYROXINE: CPT | Performed by: STUDENT IN AN ORGANIZED HEALTH CARE EDUCATION/TRAINING PROGRAM

## 2024-11-28 PROCEDURE — 71045 X-RAY EXAM CHEST 1 VIEW: CPT

## 2024-11-28 PROCEDURE — 70450 CT HEAD/BRAIN W/O DYE: CPT | Performed by: RADIOLOGY

## 2024-11-28 PROCEDURE — 87328 CRYPTOSPORIDIUM AG IA: CPT | Performed by: NURSE PRACTITIONER

## 2024-11-28 PROCEDURE — 87506 IADNA-DNA/RNA PROBE TQ 6-11: CPT | Mod: GEALAB | Performed by: NURSE PRACTITIONER

## 2024-11-28 PROCEDURE — 93005 ELECTROCARDIOGRAM TRACING: CPT

## 2024-11-28 PROCEDURE — 87329 GIARDIA AG IA: CPT | Performed by: NURSE PRACTITIONER

## 2024-11-28 PROCEDURE — 80053 COMPREHEN METABOLIC PANEL: CPT | Performed by: STUDENT IN AN ORGANIZED HEALTH CARE EDUCATION/TRAINING PROGRAM

## 2024-11-28 PROCEDURE — 2060000001 HC INTERMEDIATE ICU ROOM DAILY

## 2024-11-28 PROCEDURE — 83880 ASSAY OF NATRIURETIC PEPTIDE: CPT | Performed by: STUDENT IN AN ORGANIZED HEALTH CARE EDUCATION/TRAINING PROGRAM

## 2024-11-28 PROCEDURE — 96366 THER/PROPH/DIAG IV INF ADDON: CPT | Mod: 59

## 2024-11-28 PROCEDURE — 70450 CT HEAD/BRAIN W/O DYE: CPT

## 2024-11-28 PROCEDURE — 85025 COMPLETE CBC W/AUTO DIFF WBC: CPT | Performed by: STUDENT IN AN ORGANIZED HEALTH CARE EDUCATION/TRAINING PROGRAM

## 2024-11-28 PROCEDURE — 84484 ASSAY OF TROPONIN QUANT: CPT | Performed by: STUDENT IN AN ORGANIZED HEALTH CARE EDUCATION/TRAINING PROGRAM

## 2024-11-28 PROCEDURE — 87493 C DIFF AMPLIFIED PROBE: CPT | Mod: GEALAB | Performed by: NURSE PRACTITIONER

## 2024-11-28 PROCEDURE — 83605 ASSAY OF LACTIC ACID: CPT | Performed by: STUDENT IN AN ORGANIZED HEALTH CARE EDUCATION/TRAINING PROGRAM

## 2024-11-28 PROCEDURE — 2500000004 HC RX 250 GENERAL PHARMACY W/ HCPCS (ALT 636 FOR OP/ED): Performed by: STUDENT IN AN ORGANIZED HEALTH CARE EDUCATION/TRAINING PROGRAM

## 2024-11-28 PROCEDURE — 51702 INSERT TEMP BLADDER CATH: CPT

## 2024-11-28 PROCEDURE — 36415 COLL VENOUS BLD VENIPUNCTURE: CPT | Performed by: STUDENT IN AN ORGANIZED HEALTH CARE EDUCATION/TRAINING PROGRAM

## 2024-11-28 PROCEDURE — 82140 ASSAY OF AMMONIA: CPT | Performed by: STUDENT IN AN ORGANIZED HEALTH CARE EDUCATION/TRAINING PROGRAM

## 2024-11-28 PROCEDURE — 99221 1ST HOSP IP/OBS SF/LOW 40: CPT | Performed by: SURGERY

## 2024-11-28 PROCEDURE — 99291 CRITICAL CARE FIRST HOUR: CPT | Performed by: STUDENT IN AN ORGANIZED HEALTH CARE EDUCATION/TRAINING PROGRAM

## 2024-11-28 PROCEDURE — 84443 ASSAY THYROID STIM HORMONE: CPT | Performed by: STUDENT IN AN ORGANIZED HEALTH CARE EDUCATION/TRAINING PROGRAM

## 2024-11-28 PROCEDURE — 81001 URINALYSIS AUTO W/SCOPE: CPT | Performed by: STUDENT IN AN ORGANIZED HEALTH CARE EDUCATION/TRAINING PROGRAM

## 2024-11-28 PROCEDURE — 87077 CULTURE AEROBIC IDENTIFY: CPT | Mod: GEALAB | Performed by: NURSE PRACTITIONER

## 2024-11-28 PROCEDURE — 2500000004 HC RX 250 GENERAL PHARMACY W/ HCPCS (ALT 636 FOR OP/ED): Performed by: NURSE PRACTITIONER

## 2024-11-28 PROCEDURE — 71045 X-RAY EXAM CHEST 1 VIEW: CPT | Performed by: STUDENT IN AN ORGANIZED HEALTH CARE EDUCATION/TRAINING PROGRAM

## 2024-11-28 RX ORDER — BISACODYL 5 MG
10 TABLET, DELAYED RELEASE (ENTERIC COATED) ORAL DAILY PRN
Status: ON HOLD | COMMUNITY

## 2024-11-28 RX ORDER — VANCOMYCIN HYDROCHLORIDE 1 G/20ML
INJECTION, POWDER, LYOPHILIZED, FOR SOLUTION INTRAVENOUS DAILY PRN
Status: DISPENSED | OUTPATIENT
Start: 2024-11-28

## 2024-11-28 RX ORDER — PANTOPRAZOLE SODIUM 40 MG/1
40 TABLET, DELAYED RELEASE ORAL
Status: DISPENSED | OUTPATIENT
Start: 2024-11-29

## 2024-11-28 RX ORDER — NYSTATIN 100000 U/G
CREAM TOPICAL 2 TIMES DAILY
Status: DISPENSED | OUTPATIENT
Start: 2024-11-28

## 2024-11-28 RX ORDER — EAR PLUGS
1 EACH OTIC (EAR)
Status: DISCONTINUED | OUTPATIENT
Start: 2024-11-28 | End: 2024-11-29

## 2024-11-28 RX ORDER — DEXTROSE 50 % IN WATER (D50W) INTRAVENOUS SYRINGE
25
Status: ACTIVE | OUTPATIENT
Start: 2024-11-28

## 2024-11-28 RX ORDER — GABAPENTIN 300 MG/1
300 CAPSULE ORAL 2 TIMES DAILY
Status: ON HOLD | COMMUNITY

## 2024-11-28 RX ORDER — FOLIC ACID 1 MG/1
1 TABLET ORAL DAILY
Status: DISPENSED | OUTPATIENT
Start: 2024-11-29

## 2024-11-28 RX ORDER — ALBUTEROL SULFATE 0.83 MG/ML
2.5 SOLUTION RESPIRATORY (INHALATION) EVERY 6 HOURS PRN
Status: DISCONTINUED | OUTPATIENT
Start: 2024-11-28 | End: 2024-11-28

## 2024-11-28 RX ORDER — ALBUTEROL SULFATE 0.83 MG/ML
2.5 SOLUTION RESPIRATORY (INHALATION) EVERY 2 HOUR PRN
Status: ACTIVE | OUTPATIENT
Start: 2024-11-28

## 2024-11-28 RX ORDER — LEVOTHYROXINE SODIUM 88 UG/1
88 TABLET ORAL DAILY
Status: DISPENSED | OUTPATIENT
Start: 2024-11-29

## 2024-11-28 RX ORDER — ACETAMINOPHEN 650 MG/1
650 SUPPOSITORY RECTAL EVERY 4 HOURS PRN
Status: ACTIVE | OUTPATIENT
Start: 2024-11-28

## 2024-11-28 RX ORDER — OXYCODONE HYDROCHLORIDE 5 MG/1
5 CAPSULE ORAL EVERY 8 HOURS PRN
Status: ON HOLD | COMMUNITY

## 2024-11-28 RX ORDER — ALBUTEROL SULFATE 0.83 MG/ML
2.5 SOLUTION RESPIRATORY (INHALATION) EVERY 6 HOURS PRN
Status: ON HOLD | COMMUNITY

## 2024-11-28 RX ORDER — LOPERAMIDE HYDROCHLORIDE 2 MG/1
2 CAPSULE ORAL 4 TIMES DAILY PRN
Status: DISPENSED | OUTPATIENT
Start: 2024-11-28

## 2024-11-28 RX ORDER — DEXTROSE MONOHYDRATE AND SODIUM CHLORIDE 5; .45 G/100ML; G/100ML
100 INJECTION, SOLUTION INTRAVENOUS CONTINUOUS
Status: ACTIVE | OUTPATIENT
Start: 2024-11-28 | End: 2024-11-29

## 2024-11-28 RX ORDER — BISACODYL 5 MG
10 TABLET, DELAYED RELEASE (ENTERIC COATED) ORAL DAILY PRN
Status: ACTIVE | OUTPATIENT
Start: 2024-11-28

## 2024-11-28 RX ORDER — DEXTROSE MONOHYDRATE AND SODIUM CHLORIDE 5; .45 G/100ML; G/100ML
100 INJECTION, SOLUTION INTRAVENOUS CONTINUOUS
Status: DISCONTINUED | OUTPATIENT
Start: 2024-11-28 | End: 2024-11-28

## 2024-11-28 RX ORDER — TALC
3 POWDER (GRAM) TOPICAL NIGHTLY PRN
Status: ACTIVE | OUTPATIENT
Start: 2024-11-28

## 2024-11-28 RX ORDER — ACETAMINOPHEN 325 MG/1
650 TABLET ORAL EVERY 4 HOURS PRN
Status: ACTIVE | OUTPATIENT
Start: 2024-11-28

## 2024-11-28 RX ORDER — ACETAMINOPHEN 160 MG/5ML
650 SOLUTION ORAL EVERY 4 HOURS PRN
Status: DISPENSED | OUTPATIENT
Start: 2024-11-28

## 2024-11-28 RX ORDER — HEPARIN SODIUM 5000 [USP'U]/ML
5000 INJECTION, SOLUTION INTRAVENOUS; SUBCUTANEOUS EVERY 8 HOURS SCHEDULED
Status: CANCELLED | OUTPATIENT
Start: 2024-11-28

## 2024-11-28 RX ORDER — ONDANSETRON HYDROCHLORIDE 2 MG/ML
4 INJECTION, SOLUTION INTRAVENOUS EVERY 8 HOURS PRN
Status: ACTIVE | OUTPATIENT
Start: 2024-11-28

## 2024-11-28 RX ORDER — DEXTROSE 50 % IN WATER (D50W) INTRAVENOUS SYRINGE
12.5
Status: ACTIVE | OUTPATIENT
Start: 2024-11-28

## 2024-11-28 RX ORDER — ONDANSETRON 4 MG/1
4 TABLET, FILM COATED ORAL EVERY 8 HOURS PRN
Status: ACTIVE | OUTPATIENT
Start: 2024-11-28

## 2024-11-28 RX ORDER — ALBUTEROL SULFATE 0.83 MG/ML
2.5 SOLUTION RESPIRATORY (INHALATION)
Status: DISPENSED | OUTPATIENT
Start: 2024-11-29

## 2024-11-28 RX ORDER — MIDODRINE HYDROCHLORIDE 5 MG/1
5 TABLET ORAL
Status: DISPENSED | OUTPATIENT
Start: 2024-11-28

## 2024-11-28 ASSESSMENT — COGNITIVE AND FUNCTIONAL STATUS - GENERAL
TURNING FROM BACK TO SIDE WHILE IN FLAT BAD: A LITTLE
TURNING FROM BACK TO SIDE WHILE IN FLAT BAD: A LITTLE
MOVING TO AND FROM BED TO CHAIR: A LITTLE
DAILY ACTIVITIY SCORE: 14
DRESSING REGULAR UPPER BODY CLOTHING: A LOT
HELP NEEDED FOR BATHING: A LOT
MOVING TO AND FROM BED TO CHAIR: A LOT
DRESSING REGULAR LOWER BODY CLOTHING: A LOT
CLIMB 3 TO 5 STEPS WITH RAILING: A LOT
DAILY ACTIVITIY SCORE: 15
MOVING FROM LYING ON BACK TO SITTING ON SIDE OF FLAT BED WITH BEDRAILS: A LITTLE
EATING MEALS: A LITTLE
STANDING UP FROM CHAIR USING ARMS: A LOT
PERSONAL GROOMING: A LITTLE
TOILETING: A LOT
TOILETING: A LOT
PERSONAL GROOMING: A LITTLE
MOBILITY SCORE: 17
STANDING UP FROM CHAIR USING ARMS: A LITTLE
DRESSING REGULAR UPPER BODY CLOTHING: A LITTLE
WALKING IN HOSPITAL ROOM: A LOT
CLIMB 3 TO 5 STEPS WITH RAILING: A LOT
HELP NEEDED FOR BATHING: A LOT
MOBILITY SCORE: 14
DRESSING REGULAR LOWER BODY CLOTHING: A LOT
EATING MEALS: A LITTLE
WALKING IN HOSPITAL ROOM: A LOT

## 2024-11-28 ASSESSMENT — PAIN SCALES - GENERAL
PAINLEVEL_OUTOF10: 0 - NO PAIN

## 2024-11-28 ASSESSMENT — ENCOUNTER SYMPTOMS
NAUSEA: 0
WOUND: 1
WEAKNESS: 1
ACTIVITY CHANGE: 1
HEMATURIA: 0
CONFUSION: 1
RESPIRATORY NEGATIVE: 1
FATIGUE: 1
AGITATION: 0
EYE ITCHING: 0
CARDIOVASCULAR NEGATIVE: 1
DYSURIA: 0
FEVER: 0
APPETITE CHANGE: 1
DIFFICULTY URINATING: 1
ABDOMINAL PAIN: 0
SHORTNESS OF BREATH: 0
SORE THROAT: 0
COUGH: 0
DIARRHEA: 1
ALTERED MENTAL STATUS: 1
EYE REDNESS: 0
CHILLS: 0
VOMITING: 0

## 2024-11-28 ASSESSMENT — COLUMBIA-SUICIDE SEVERITY RATING SCALE - C-SSRS
1. IN THE PAST MONTH, HAVE YOU WISHED YOU WERE DEAD OR WISHED YOU COULD GO TO SLEEP AND NOT WAKE UP?: NO
2. HAVE YOU ACTUALLY HAD ANY THOUGHTS OF KILLING YOURSELF?: NO
6. HAVE YOU EVER DONE ANYTHING, STARTED TO DO ANYTHING, OR PREPARED TO DO ANYTHING TO END YOUR LIFE?: NO

## 2024-11-28 ASSESSMENT — LIFESTYLE VARIABLES
TOTAL SCORE: 0
HAVE YOU EVER FELT YOU SHOULD CUT DOWN ON YOUR DRINKING: NO
EVER HAD A DRINK FIRST THING IN THE MORNING TO STEADY YOUR NERVES TO GET RID OF A HANGOVER: NO
EVER FELT BAD OR GUILTY ABOUT YOUR DRINKING: NO
HAVE PEOPLE ANNOYED YOU BY CRITICIZING YOUR DRINKING: NO

## 2024-11-28 ASSESSMENT — PAIN - FUNCTIONAL ASSESSMENT
PAIN_FUNCTIONAL_ASSESSMENT: 0-10
PAIN_FUNCTIONAL_ASSESSMENT: 0-10

## 2024-11-28 NOTE — H&P
History Of Present Illness  Desirae Gregg is a 84 y.o. female (full code) with a pertinent hx of chronic kidney disease, chronic systolic congestive heart failure, remote poliomyelitis, myelofibrosis, history of PE and on Eliquis, hypertension and polycythemia vera who presented to [facility] ER from SNF (family at bedside) due to not being responsive, inability to urinate, family says has a worsening left leg wound and was found to be hypotensive on arrival. Family says that the patient has been having chronic diarrhea for months to a year and has been getting opioids and Imodium at the skilled nursing facility family says the antibiotics make it worse. Patient was noted to have significant acute kidney injury, + UTI, a purulent draining left lower extremity wound. Most information was obtained from family at bedside since the patient is confused and a poor historian. Patient denies lower back pain or CVA tenderness on exam, family denied any fevers, increased shortness of breath or worsening cough.  Patient not having any chest pain or abdominal pain.  Pertinent workup in the ER included chest x-ray without any acute findings.  X-ray of tibia-fibula of the left lower extremity showed soft tissue wound without evidence of acute bony abnormality.  CT of the head showed no acute findings, but noted evidence of small vessel ischemia. Creatinine 2.69/GFR 17 and previously 1.0, Elevated BUN at 33, hypoalbuminemia 2.7, troponin 81, TSH 4.3, free T4 WNL, + leukocytosis 22,000, with a left shift H&H 15 and 51, blood culture, wound culture, urine culture all in process, UA with 500 leuks, over 50 white blood cells, no nitrites and +1 microscopic hematuria, stool studies in process. EKG reviewed and without new ST changes in 2 contiguous leads.    Son/POA, Benigno at the bedside, aware that the patient's prognosis is is guarded and made clear that the patient could pass within the next 24 hours since patient appears toxic.  Patient  was recently discharged from [facility] on November 19th after 16-day stay. Son says she was discharged too soon. According to the family at bedside, the patient has worsened. Prior to the last hospitalization the patient was residing at home and had not been hospitalized since August. Son says that his mom has not eaten a decent meal in over a week and has not been drinking fluids on a regular basis. His mom has been taking a puréed diet and thin liquids.  He attempted to offer his mother oral intake in the emergency room and she started to choke and nursing staff took this away.  He said his mother has been sleeping 18 to 20 hours a day for the past few days.  He says that his mom has barely been responsive the past couple days.  She has been refusing physical therapy.  The patient on multiple occasions while in the ER during my assessment interview stated that she thinks she is going to die and was making comments referencing that she is tired of fighting.  Discussed CODE STATUS with family at bedside since the patient is confused and son still in favor of keeping the patient a full code.     Past Medical History  She has a past medical history of Heart failure and Sciatica, chronic kidney disease, remote poliomyelitis, myelofibrosis, hypertension, pulmonary embolism, anxiety, vitamin D deficiency, B12 deficiency, hypothyroidism, GERD, depression, Anthony 2 positive polycythemia vera.    Surgical History  She has a past surgical history that includes Hysterectomy; Cholecystectomy; Tonsillectomy; and Appendectomy.     Social History  She reports that she has never smoked. She has never used smokeless tobacco. She reports that she does not drink alcohol and does not use drugs. Currently resides at [facility] but was living at home prior to last admission. Son says she was walking 65 steps with a walker a few day ago.    Family History  Family History   Problem Relation Name Age of Onset    Stroke Mother      Asthma Father       Cancer Paternal Grandmother       Allergies  Clonidine and structural analogs, Naproxen, Tizanidine, Hydroxyzine, and Methylprednisolone    Review of Systems   Constitutional:  Positive for activity change, appetite change and fatigue. Negative for chills and fever.   HENT:  Negative for sneezing and sore throat.    Eyes:  Negative for redness and itching.   Respiratory:  Negative for cough and shortness of breath.    Cardiovascular:  Negative for chest pain.   Gastrointestinal:  Positive for diarrhea. Negative for abdominal pain, nausea and vomiting.   Genitourinary:  Positive for difficulty urinating. Negative for pelvic pain.        Denies lower back pain   Skin:  Positive for pallor, rash and wound.   Neurological:  Positive for weakness.        + generalized weakness   Psychiatric/Behavioral:  Positive for confusion. Negative for agitation and behavioral problems.      Physical Exam  Vitals reviewed.   Constitutional:       General: She is in acute distress.      Appearance: She is ill-appearing and toxic-appearing.      Comments: Pt choked on water in ER and taken away per nurse   HENT:      Head: Normocephalic and atraumatic.      Mouth/Throat:      Mouth: Mucous membranes are dry.      Pharynx: Oropharynx is clear.   Eyes:      Extraocular Movements: Extraocular movements intact.      Conjunctiva/sclera: Conjunctivae normal.   Cardiovascular:      Rate and Rhythm: Normal rate. Rhythm irregular.      Heart sounds: Murmur heard.      Comments: Distal pulses palpable and +1  Pulmonary:      Effort: Pulmonary effort is normal. No respiratory distress.      Breath sounds: No wheezing, rhonchi or rales.      Comments: Diminished breath sounds bl, on 2 L NC  Abdominal:      General: Bowel sounds are normal. There is no distension.      Palpations: Abdomen is soft.      Tenderness: There is no abdominal tenderness. There is no right CVA tenderness, left CVA tenderness, guarding or rebound.   Genitourinary:      Comments: New verma with clear yellow urine  Musculoskeletal:         General: Tenderness present. No swelling.      Right lower leg: No edema.      Left lower leg: No edema.      Comments: LLE TTP near wound   Skin:     General: Skin is warm and dry.      Coloration: Skin is pale.      Findings: Rash present.      Comments: Very pale, feet dusky and mottled, extensive purulent draining wound to LLE with erythema surrounding, + malodorous, + multiple wounds on admission-see nursing doc   Neurological:      General: No focal deficit present.      Mental Status: She is alert. She is disoriented.      Motor: Weakness present.      Comments: Only oriented x's 1 (self only) and family says baseline 2-3, easily arousable and answers questions but inappropriately, unable to lift legs off the bed, equal  strength          Last Recorded Vitals  BP (!) 84/28   Pulse 78   Temp 37.4 °C (99.3 °F)   Resp 18   Wt 73.5 kg (162 lb)   SpO2 98%     Relevant Results  Scheduled medications  piperacillin-tazobactam, 2.25 g, intravenous, q6h      Continuous medications  dextrose 5%-0.45 % sodium chloride, 75 mL/hr      PRN medications  PRN medications: vancomycin    Results for orders placed or performed during the hospital encounter of 11/28/24 (from the past 24 hours)   CBC and Auto Differential   Result Value Ref Range    WBC 22.0 (H) 4.4 - 11.3 x10*3/uL    nRBC 0.0 0.0 - 0.0 /100 WBCs    RBC 5.62 (H) 4.00 - 5.20 x10*6/uL    Hemoglobin 15.4 12.0 - 16.0 g/dL    Hematocrit 51.8 (H) 36.0 - 46.0 %    MCV 92 80 - 100 fL    MCH 27.4 26.0 - 34.0 pg    MCHC 29.7 (L) 32.0 - 36.0 g/dL    RDW 19.0 (H) 11.5 - 14.5 %    Platelets 382 150 - 450 x10*3/uL    Neutrophils % 85.4 40.0 - 80.0 %    Immature Granulocytes %, Automated 1.8 (H) 0.0 - 0.9 %    Lymphocytes % 7.0 13.0 - 44.0 %    Monocytes % 3.2 2.0 - 10.0 %    Eosinophils % 1.7 0.0 - 6.0 %    Basophils % 0.9 0.0 - 2.0 %    Neutrophils Absolute 18.76 (H) 1.60 - 5.50 x10*3/uL    Immature  Granulocytes Absolute, Automated 0.40 0.00 - 0.50 x10*3/uL    Lymphocytes Absolute 1.54 0.80 - 3.00 x10*3/uL    Monocytes Absolute 0.71 0.05 - 0.80 x10*3/uL    Eosinophils Absolute 0.37 0.00 - 0.40 x10*3/uL    Basophils Absolute 0.19 (H) 0.00 - 0.10 x10*3/uL   Comprehensive Metabolic Panel   Result Value Ref Range    Glucose 80 74 - 99 mg/dL    Sodium 137 136 - 145 mmol/L    Potassium 4.9 3.5 - 5.3 mmol/L    Chloride 101 98 - 107 mmol/L    Bicarbonate 26 21 - 32 mmol/L    Anion Gap 15 10 - 20 mmol/L    Urea Nitrogen 33 (H) 6 - 23 mg/dL    Creatinine 2.69 (H) 0.50 - 1.05 mg/dL    eGFR 17 (L) >60 mL/min/1.73m*2    Calcium 7.9 (L) 8.6 - 10.3 mg/dL    Albumin 2.7 (L) 3.4 - 5.0 g/dL    Alkaline Phosphatase 64 33 - 136 U/L    Total Protein 4.4 (L) 6.4 - 8.2 g/dL    AST 30 9 - 39 U/L    Bilirubin, Total 1.0 0.0 - 1.2 mg/dL    ALT 9 7 - 45 U/L   Ammonia   Result Value Ref Range    Ammonia 52 16 - 53 umol/L   B-Type Natriuretic Peptide   Result Value Ref Range     (H) 0 - 99 pg/mL   Lactate   Result Value Ref Range    Lactate 0.9 0.4 - 2.0 mmol/L   Urinalysis with Reflex Culture and Microscopic   Result Value Ref Range    Color, Urine Light-Orange (N) Light-Yellow, Yellow, Dark-Yellow    Appearance, Urine Ex.Turbid (N) Clear    Specific Gravity, Urine 1.016 1.005 - 1.035    pH, Urine 5.5 5.0, 5.5, 6.0, 6.5, 7.0, 7.5, 8.0    Protein, Urine 50 (1+) (A) NEGATIVE, 10 (TRACE), 20 (TRACE) mg/dL    Glucose, Urine Normal Normal mg/dL    Blood, Urine 0.1 (1+) (A) NEGATIVE    Ketones, Urine NEGATIVE NEGATIVE mg/dL    Bilirubin, Urine NEGATIVE NEGATIVE    Urobilinogen, Urine Normal Normal mg/dL    Nitrite, Urine NEGATIVE NEGATIVE    Leukocyte Esterase, Urine 500 Bartolo/µL (A) NEGATIVE   Microscopic Only, Urine   Result Value Ref Range    WBC, Urine >50 (A) 1-5, NONE /HPF    WBC Clumps, Urine MANY Reference range not established. /HPF    RBC, Urine >20 (A) NONE, 1-2, 3-5 /HPF    Mucus, Urine FEW Reference range not established. /LPF      CT head wo IV contrast    Result Date: 11/28/2024  Interpreted By:  Finkelstein, Evan, STUDY: CT HEAD WO IV CONTRAST;  11/28/2024 5:07 am   INDICATION: Signs/Symptoms:ams.     COMPARISON: None.   ACCESSION NUMBER(S): [id number]   ORDERING CLINICIAN: GAYLA RUVALCABA   TECHNIQUE: Axial noncontrast CT images of the head with coronal and sagittal reconstructions.   FINDINGS: EXTRACRANIAL SOFT TISSUES: Unremarkable.   CALVARIUM: No depressed skull fracture. No destructive osseous lesion.   PARANASAL SINUSES/MASTOIDS: Partial opacification of the maxillary sinuses. Mastoid air cells are aerated.   HEMORRHAGE: No acute intracranial hemorrhage.   BRAIN PARENCHYMA: Gray-white matter interfaces are preserved. No mass effect or midline shift. There are nonspecific scattered white matter hypodensities.   VENTRICLES and EXTRA-AXIAL SPACES: Parenchymal atrophy with prominence of the ventricles and cortical sulci.   OTHER FINDINGS: There are calcifications within the cavernous carotids       No acute intracranial hemorrhage, mass effect or midline shift.   Nonspecific scattered white matter hypodensities favored to represent sequela of small vessel ischemia.       MACRO: None.   Signed by: Evan Finkelstein 11/28/2024 5:24 AM Dictation workstation:   [id number]    XR tibia fibula left 2 views    Result Date: 11/28/2024  Interpreted By:  Jayjay Keyes, STUDY: XR TIBIA FIBULA LEFT 2 VIEWS; ;  11/28/2024 4:14 am   INDICATION: Signs/Symptoms:wound.     COMPARISON: None.   ACCESSION NUMBER(S): [id number]   ORDERING CLINICIAN: GAYLA RUVALCABA   FINDINGS: Four views of the left tibia and fibula are provided for interpretation.   Appears to be a soft tissue wound along the posterolateral aspect of the calf without evidence of radiopaque foreign body. Visualized bony structures are unremarkable in appearance without evidence of acute fracture, traumatic malalignment or aggressive osseous lesions. Visualized left knee and left  ankle joints are preserved.       Soft tissue wound along the posterolateral aspect of the calf, without evidence of acute bony abnormality.     MACRO: None   Signed by: Jayjay Keyes 11/28/2024 4:21 AM Dictation workstation:   [id number]    XR chest 1 view    Result Date: 11/28/2024  Interpreted By:  Jayjay Keyes, STUDY: XR CHEST 1 VIEW;  11/28/2024 4:14 am   INDICATION: Signs/Symptoms:AMS.     COMPARISON: Radiographs of the chest dated 11/18/2024.   ACCESSION NUMBER(S): [id number]   ORDERING CLINICIAN: GAYLA RUVALCABA   FINDINGS: AP radiograph of the chest was provided.       CARDIOMEDIASTINAL SILHOUETTE: Cardiomediastinal silhouette is mildly enlarged, similar in appearance to prior exam.   LUNGS: There are low lung volumes with bronchovascular crowding and bibasilar atelectasis. No consolidation or sizable pleural effusion is present.   ABDOMEN: No remarkable upper abdominal findings.   BONES: No acute osseous changes.       1.  Low lung volumes with bronchovascular crowding and bibasilar atelectasis. No consolidation or sizable pleural effusion is present.       MACRO: None   Signed by: Jayjay Keyes 11/28/2024 4:19 AM Dictation workstation:   [id number]     Assessment/Plan   Assessment & Plan    Severe Sepsis/Multiple Sources Infection  -CAUTI, Diarrhea, Extensive Draining Leg Wound with Cellulitis  -No PNA on chst xray but is dehydrated  -Cardona changed in ER  -Hypotension improving with IV fluids  Repeat chest xray since choked on water in ER-f/U  Zosyn and vanco  ID & podiatry consulted-appreciate recs  Blood cultures, UA and culture, wound culture, stool studies-f/U  Contact Plus Precautions  Giving 30 ml/kg fluids slower to prevent overload with underlying CHF  Dressing changes daily    Dysphagia  -NPO until can pass swallow eval; may be able to tolerate thickened liquids   -Was on a puréed diet at the facility with thin liquids but choked on thin liquids in the ER  -Son says she  has not eaten in the past week.  Accuchecks Q 6/Hypoglycemia  IV fluids with dextrose    LEVY with CKD  -Likely from dehydration and UTI & known bladder stone  -Cardona in (changed in ER) for chronic retention  Txt both and monitor  Renal and urine studies, CT abd and pelvis if not improving  Urology consulted-appreciate recs  Renal dose meds and hold nephrotoxic meds  Cardona care    Acute Metabolic Encephalopathy  -CT head neg  D/t sepsis, UTI, multiple infections, dehydration and poor nutrition    Failure to Thrive  Palliative care    Multiple Skin Wounds on Admission  -see nursing doc  Wound consult and skin prevention    Elevated Troponin  -Repeat troponin and EKG-f/U  -Suspect d/t LEVY and sepsis with hypotension    Chronic Co Morbidities  Systolic CHF-Daily wt, stop fluids when no longer hypotensive  HX Polio/myelofibrosis-Supportive care  Hx HTN, hypotensive now, holding meds  Hypothyroidism-resume meds, free t4 WNL, TSH slightly elevated, no med changes needed  Polycythemia Vera-Resume meds once verified  GERD  Sciatica  Vit deficiencies  Depression    Chronic Hypoxic Resp Failure  -on 2 L NC since last admission  Oxygen, pulse ox, tele    Full Code  Discussed with son POA and made aware guarded prognosis  Palliative consulted to discuss GOC and code status, pt keeps saying that she is near the end and is holding on for the family. Would consult ethics if patient continues to voice concerns for not wanting to continue full code status and family continues to keep full code.    DVT Px/HX PE  Donnie, SCDs    Daksha Quijano, APRN-CNP  74 min spent interviewing and assessing patient, placing admission orders, updating CODE STATUS, discussing care plan and prognosis with family. Medication reconciliation needs completed.

## 2024-11-28 NOTE — PROGRESS NOTES
11/28/24 1503   Discharge Planning   Living Arrangements Children  (home with son)   Support Systems Children;Family members   Assistance Needed recently discharged from hospital to SNF- referral sent for backgroung info on functional status- unavailable at this time   Type of Residence Skilled nursing facility   Who is requesting discharge planning? Provider   Home or Post Acute Services Post acute facilities (Rehab/SNF/etc)   Type of Post Acute Facility Services Long term care;Skilled nursing   Expected Discharge Disposition SNF  (expect continued need for SNF (likely new precert))   Does the patient need discharge transport arranged? Yes   RoundTrip coordination needed? Yes   Has discharge transport been arranged? No     11/28/2024 1538: Per SNF- patient is Skilled, A&O 2-3 with care as follows - Cleanse with NSS, pat dry, apply Anasept mixed with Collagen powder, cover with Calcium Alginate ABD pad and wrap with Kerlix daily and PRN.

## 2024-11-28 NOTE — CONSULTS
Inpatient consult to Podiatry  Consult performed by: Yanni Mcgrath DPM  Consult ordered by: OLI Diego-CNP  Reason for consult: Left lower leg ulceratin        Reason For Consult  Left lower leg ucleration    History Of Present Illness  Desirae Gregg is a 84 y.o. female presenting with an extensive ulceration to left anterior shin.  Present on admission earlier this month.  Etiology is from patient striking bedframe in an effort to get families attention.  Wound care was switched to santyl and adaptic prior to discharge with known need for eventual debridement.  She was discharged to SNF.  Obvious debridement has occurred to wound since discharge from hospital.       Past Medical History  She has a past medical history of Heart failure and Sciatica.    Surgical History  She has a past surgical history that includes Hysterectomy; Cholecystectomy; Tonsillectomy; and Appendectomy.     Social History  She reports that she has never smoked. She has never used smokeless tobacco. She reports that she does not drink alcohol and does not use drugs.    Family History  Family History   Problem Relation Name Age of Onset   • Stroke Mother     • Asthma Father     • Cancer Paternal Grandmother          Allergies  Clonidine and structural analogs, Naproxen, Tizanidine, Hydroxyzine, and Methylprednisolone    Review of Systems   Unable to perform     Physical Exam   Constitutional:       Appearance: She is ill-appearing.   HENT:      Head: Normocephalic and atraumatic.      Mouth/Throat:      Mouth: Mucous membranes are moist.      Pharynx: Oropharynx is clear.   Eyes:      Pupils: Pupils are equal, round, and reactive to light.   Cardiovascular:      Rate and Rhythm: Normal rate and regular rhythm.      Heart sounds: Normal heart sounds.   Pulmonary:      Effort: Pulmonary effort is normal.      Breath sounds: Rales present.   Abdominal:      General: Abdomen is flat. Bowel sounds are normal.      Palpations: Abdomen  "is soft.   Musculoskeletal:      Cervical back: Normal range of motion.      Comments: ulceration to left anterior shin with 50% slough - measures 8 x 6 x 0.5 cm with undermining to lateral aspect;  no obvious purulence;  small rim of erythema without ascending cellulitis;  no probe to bone;  unable to easily palpate pedal pulses  Neurological:      Mental Status: She is alert.     Last Recorded Vitals  Blood pressure 123/86, pulse 73, temperature 37.4 °C (99.3 °F), resp. rate 16, height 1.651 m (5' 5\"), weight 73.5 kg (162 lb), SpO2 96%.    Relevant Results  Susceptibility data from last 90 days.  Collected Specimen Info Organism   11/13/24 Swab from Anterior Nares Methicillin Resistant Staphylococcus aureus (MRSA)   CT head wo IV contrast    Result Date: 11/28/2024  Interpreted By:  Finkelstein, Evan, STUDY: CT HEAD WO IV CONTRAST;  11/28/2024 5:07 am   INDICATION: Signs/Symptoms:ams.     COMPARISON: None.   ACCESSION NUMBER(S): [id number]   ORDERING CLINICIAN: GAYLA RUVALCABA   TECHNIQUE: Axial noncontrast CT images of the head with coronal and sagittal reconstructions.   FINDINGS: EXTRACRANIAL SOFT TISSUES: Unremarkable.   CALVARIUM: No depressed skull fracture. No destructive osseous lesion.   PARANASAL SINUSES/MASTOIDS: Partial opacification of the maxillary sinuses. Mastoid air cells are aerated.   HEMORRHAGE: No acute intracranial hemorrhage.   BRAIN PARENCHYMA: Gray-white matter interfaces are preserved. No mass effect or midline shift. There are nonspecific scattered white matter hypodensities.   VENTRICLES and EXTRA-AXIAL SPACES: Parenchymal atrophy with prominence of the ventricles and cortical sulci.   OTHER FINDINGS: There are calcifications within the cavernous carotids       No acute intracranial hemorrhage, mass effect or midline shift.   Nonspecific scattered white matter hypodensities favored to represent sequela of small vessel ischemia.       MACRO: None.   Signed by: Evan Finkelstein 11/28/2024 " 5:24 AM Dictation workstation:   [id number]    XR tibia fibula left 2 views    Result Date: 11/28/2024  Interpreted By:  Jayjay Keyes, STUDY: XR TIBIA FIBULA LEFT 2 VIEWS; ;  11/28/2024 4:14 am   INDICATION: Signs/Symptoms:wound.     COMPARISON: None.   ACCESSION NUMBER(S): [id number]   ORDERING CLINICIAN: GAYLA RUVALCABA   FINDINGS: Four views of the left tibia and fibula are provided for interpretation.   Appears to be a soft tissue wound along the posterolateral aspect of the calf without evidence of radiopaque foreign body. Visualized bony structures are unremarkable in appearance without evidence of acute fracture, traumatic malalignment or aggressive osseous lesions. Visualized left knee and left ankle joints are preserved.       Soft tissue wound along the posterolateral aspect of the calf, without evidence of acute bony abnormality.     MACRO: None   Signed by: Jayjay Keyes 11/28/2024 4:21 AM Dictation workstation:   [id number]    XR chest 1 view    Result Date: 11/28/2024  Interpreted By:  Jayjay Keyes, STUDY: XR CHEST 1 VIEW;  11/28/2024 4:14 am   INDICATION: Signs/Symptoms:AMS.     COMPARISON: Radiographs of the chest dated 11/18/2024.   ACCESSION NUMBER(S): [id number]   ORDERING CLINICIAN: GAYLA RUVALCABA   FINDINGS: AP radiograph of the chest was provided.       CARDIOMEDIASTINAL SILHOUETTE: Cardiomediastinal silhouette is mildly enlarged, similar in appearance to prior exam.   LUNGS: There are low lung volumes with bronchovascular crowding and bibasilar atelectasis. No consolidation or sizable pleural effusion is present.   ABDOMEN: No remarkable upper abdominal findings.   BONES: No acute osseous changes.       1.  Low lung volumes with bronchovascular crowding and bibasilar atelectasis. No consolidation or sizable pleural effusion is present.       MACRO: None   Signed by: Jayjay Keyes 11/28/2024 4:19 AM Dictation workstation:   [id number]    Electrocardiogram,  12-lead PRN ACS symptoms    Result Date: 11/23/2024  Sinus rhythm with Premature atrial complexes Left bundle branch block Abnormal ECG When compared with ECG of 08-NOV-2024 17:49, (unconfirmed) No significant change was found Confirmed by Mckay Irizarry ([id number]) on 11/23/2024 9:24:22 AM    Electrocardiogram, 12-lead PRN ACS symptoms    Result Date: 11/23/2024  Normal sinus rhythm Premature atrial complexes Left bundle branch block Abnormal ECG When compared with ECG of 03-NOV-2024 09:20, (unconfirmed) QRS axis Shifted right Nonspecific T wave abnormality has replaced inverted T waves in Inferior leads Confirmed by Mckay Irizarry ([id number]) on 11/23/2024 9:22:55 AM    XR chest 1 view    Result Date: 11/18/2024  Interpreted By:  Zain Mcknight, STUDY: XR CHEST 1 VIEW; 11/18/2024 6:36 am   INDICATION: Signs/Symptoms:pneumonia   COMPARISON: Radiographs 11/12/2024   ACCESSION NUMBER(S): [id number]   ORDERING CLINICIAN: BIPIN SMITH   TECHNIQUE: Single frontal view of the chest performed.   FINDINGS: LINES AND DEVICES: None.   LUNGS: Decreased size now small left and nearly resolved right pleural effusions with atelectasis. No new focal consolidation pulmonary edema or pneumothorax.   CARDIOMEDIASTINAL SILHOUETTE: The cardiomediastinal silhouette is stable with obscured left heart border by adjacent effusion.       Decreased small left and nearly resolved right pleural effusions.   MACRO None   Signed by: Zain Mcknight 11/18/2024 2:19 PM Dictation workstation:   [id number]    ECG 12 lead    Result Date: 11/18/2024  Sinus rhythm with occasional Premature ventricular complexes and Premature atrial complexes Left axis deviation Left bundle branch block Abnormal ECG When compared with ECG of 08-NOV-2024 17:50, (unconfirmed) Premature ventricular complexes are now Present    ECG 12 lead    Result Date: 11/14/2024  Sinus tachycardia Left axis deviation Left bundle branch block Abnormal ECG No previous ECGs  available See ED provider note for full interpretation and clinical correlation Confirmed by Edwin Medley ([id number]) on 11/14/2024 3:17:28 AM    Vascular US lower extremity venous duplex bilateral    Result Date: 11/13/2024          [place] [address]  Tel [phone] and Fax [phone]  Vascular Lab Report VASC US LOWER EXTREMITY VENOUS DUPLEX BILATERAL  Patient Name:      ELIE Mccain Physician: [id number] Haresh Childs MD Study Date:        11/12/2024           Ordering           [id number] BIPIN ESPINOSA                                         Physician:         SARAH MRN/PID:           [id number]             Technologist:      Alexandra Dale RVT Accession#:        [id number]         Technologist 2: Date of Birth/Age: 1940 / 83      Encounter#:        [id number]                    years Gender:            F Admission Status:  Inpatient            Location           [facility]                                         Performed:  Diagnosis/ICD: Compression of vein-I87.1 CPT Codes:     46312 Peripheral venous duplex scan for DVT complete  **CRITICAL RESULT** Critical Result: DVT right femoral vein, age indeterminate thrombus in right popliteal, right posterior tibial and right peroneal veins. DVT left popliteal vein Notification called to Bipin Agarwal MD on 11/12/2024 at 1:28:12 PM by DVT.  CONCLUSIONS: Right Lower Venous: There is acute occlusive deep vein thrombosis visualized in the mid femoral and distal femoral veins. There is acute non-occlusive deep vein thrombosis visualized in the proximal femoral vein. There is age indeterminate deep vein thrombosis visualized in the popliteal, posterior tibial and peroneal veins. Left Lower Venous: There is acute non-occlusive deep vein thrombosis visualized in the popliteal vein. The remainder of the left leg is negative for deep vein thrombosis. Left posterior tibial and peroneal veins poorly visualized due  to edema and bandages; cannot rule out deep vein thrombosis in non-visualized vessels.  Additional Findings: Non-vascularized structure noted in right popliteal fossa measuring 7.2 cm x 1.6 cm. Technically difficult exam due to edema and patient positioning.  Imaging & Doppler Findings:  Right                 Compressible      Thrombus              Flow Distal External Iliac                                  Spontaneous/Phasic CFV                       Yes             None         Spontaneous/Phasic PFV                       Yes             None         Spontaneous/Phasic FV Proximal             Partial    Acute non-occlusive Spontaneous/Phasic FV Mid                     No        Acute occlusive FV Distal                  No        Acute occlusive Popliteal               Partial           ? Age        Spontaneous/Phasic Peroneal                Partial           ? Age PTV                     Partial           ? Age  Left                  Compress      Thrombus              Flow Distal External Iliac                              Spontaneous/Phasic CFV                     Yes           None         Spontaneous/Phasic PFV                     Yes           None FV Proximal             Yes           None         Spontaneous/Phasic FV Mid                  Yes           None FV Distal               Yes           None Popliteal             Partial  Acute non-occlusive Spontaneous/Phasic  [id number] Haresh Childs MD Electronically signed by [id number] Haresh Childs MD on 11/13/2024 at 7:49:11 AM  ** Final **     XR chest 1 view    Result Date: 11/12/2024  Interpreted By:  Alfonso Ricci, STUDY: XR CHEST 1 VIEW;  11/12/2024 9:29 am   INDICATION: Signs/Symptoms:follow up pneumonia.   COMPARISON: 11/08/2024   ACCESSION NUMBER(S): [id number]   ORDERING CLINICIAN: BIPIN SMITH   FINDINGS: There is increased opacification of the left lower lung with blunted costophrenic angle indicating a moderate effusion probably with underlying atelectasis or  consolidation. There is mild prominence of the central pulmonary vasculature, and while accentuated from a shallow inspiration is probably from mild congestion. The cardiac size appears to be within normal limits considering the left lower cardiac margin is obscured.   ABDOMEN AND OTHER FINDINGS: No remarkable upper abdominal findings.   BONES: No acute osseous changes.       1.  Increased left lower lung airspace disease and probable congestion.   Signed by: Alfonso Ricci 11/12/2024 5:25 PM Dictation workstation:   [id number]    FL GI esophagram    Result Date: 11/11/2024  Interpreted By:  Zain Mcknight, STUDY: FL GI ESOPHAGRAM;  11/11/2024 9:24 am   INDICATION: Signs/Symptoms:dysphagia.   COMPARISON: CT abdomen and pelvis 11/03/2024   ACCESSION NUMBER(S): [id number]   ORDERING CLINICIAN: SYED JOSHI   TECHNIQUE: Modified single contrast barium swallow due to patient's clinical condition. Fluoro time: 15 seconds.   FINDINGS: : Small bilateral pleural effusions.   Contrast opacifies the esophagus and transit into the stomach without obstruction or extraluminal extravasation.       Modified exam due to patient's clinical condition. No esophageal obstruction.   MACRO Zain Mcknight discussed the significance and urgency of this critical finding by Epic secure chat with  SYED JOSHI on 11/11/2024 at 10:56 am.  (**-RCF-**) Findings:  See findings.   Signed by: Zain Mcknight 11/11/2024 11:02 AM Dictation workstation:   [id number]    XR chest 1 view    Result Date: 11/8/2024  Interpreted By:  Yazmin Oropeza, STUDY: XR CHEST 1 VIEW; ;  11/8/2024 10:10 am   INDICATION: Signs/Symptoms:hypoxia.     COMPARISON: Chest radiograph dated 11/03/2024.   ACCESSION NUMBER(S): [id number]   ORDERING CLINICIAN: PAULETTE CARRANZA   FINDINGS: Cardiac silhouette is mildly enlarged but is similar compared to prior radiograph. Again noted is moderate volume right-sided pleural effusion with associated hazy airspace opacity in  the right mid to lower lung zone. There is also reticular airspace opacity in the right lung base, which could represent atelectasis versus pneumonia. There is also obliteration of left costophrenic angle concerning for small left pleural effusion. There is mild prominence of interstitial markings in bilateral lungs concerning for mild interstitial edema. No large pneumothorax within limits of portable technique. No acute osseous findings.       1. Suggestion of mild interstitial edema, more pronounced on this radiograph compared to prior. Recommend correlation with fluid status. 2. Redemonstration of moderate right and small left pleural effusions. 3. Right basilar airspace opacity, which could be related to atelectasis versus pneumonia in appropriate clinical setting.   MACRO: None   Signed by: Yazmin Oropeza 11/8/2024 4:17 PM Dictation workstation:   [id number]    Transthoracic Echo (TTE) Limited    Result Date: 11/5/2024   [facility], [address] [place] [address]               Tel [phone] and Fax [phone] TRANSTHORACIC ECHOCARDIOGRAM REPORT  Patient Name:       ELIE Mccain Physician:    [id number] Chicho Goldman MD Study Date:         11/5/2024           Ordering Provider:    [id number] SHERITA GOYAL MRN/PID:            [id number]            Fellow: Accession#:         [id number]        Nurse: Date of Birth/Age:  1940 / 83     Sonographer:          Cathi garcia RDCS Gender assigned at  F                   Additional Staff: Birth: Height:             165.10 cm           Admit Date:           11/3/2024 Weight:             72.57 kg            Admission Status:     Inpatient -                                                               Routine BSA / BMI:          1.80 m2 / 26.63      Encounter#:           [id number]                     kg/m2 Blood Pressure:     103/62 mmHg         Department Location:  [facility]                                                               Invasive Study Type:    TRANSTHORACIC ECHO (TTE) LIMITED Diagnosis/ICD: 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 Study Detail: The following Echo studies were performed: 2D, Doppler and color               flow.  PHYSICIAN INTERPRETATION: Left Ventricle: The left ventricular systolic function is normal, with a visually estimated ejection fraction of 60-65%. There is severely increased concentric left ventricular hypertrophy. There are no regional left ventricular wall motion abnormalities. The left ventricular cavity size is normal. There is moderately increased septal and moderately increased posterior left ventricular wall thickness. Spectral Doppler shows an abnormal pattern of left ventricular diastolic filling. Left Atrium: The left atrium is upper limits of normal in size. Right Ventricle: The right ventricle is normal in size. There is normal right ventricular global systolic function. Right Atrium: The right atrium is normal in size. Aortic Valve: The aortic valve is trileaflet. The aortic valve dimensionless index is 0.77. There is trace aortic valve regurgitation. The peak instantaneous gradient of the aortic valve is 13 mmHg. The mean gradient of the mitral valve is 7 mmHg. Mitral Valve: The mitral valve is normal in structure. There is trace to mild mitral valve regurgitation. Tricuspid Valve: The tricuspid valve is structurally normal. There is trace to mild tricuspid regurgitation. Pulmonic  Valve: The pulmonic valve is not well visualized. The pulmonic valve regurgitation was not well visualized. Pericardium: There is no pericardial effusion noted. Aorta: The aortic root is normal.  CONCLUSIONS:  1. The left ventricular systolic function is normal, with a visually estimated ejection fraction of 60-65%.  2. Spectral Doppler shows an abnormal pattern of left ventricular diastolic filling.  3. There is moderately increased septal and moderately increased posterior left ventricular wall thickness.  4. There is severely increased concentric left ventricular hypertrophy.  5. There is normal right ventricular global systolic function. QUANTITATIVE DATA SUMMARY:  2D MEASUREMENTS:          Normal Ranges: IVSd:            1.49 cm  (0.6-1.1cm) LVPWd:           1.26 cm  (0.6-1.1cm) LVIDd:           4.47 cm  (3.9-5.9cm) LVIDs:           2.98 cm LV Mass Index:   133 g/m2 LVEDV Index:     23 ml/m2 LV % FS          33.4 %  LV SYSTOLIC FUNCTION BY 2D PLANIMETRY (MOD):                      Normal Ranges: EF-A4C View:    62 % (>=55%) EF-A2C View:    61 % EF-Biplane:     64 % EF-Visual:      63 % LV EF Reported: 63 %  LV DIASTOLIC FUNCTION:          Normal Ranges: MV Peak E:             0.70 m/s (0.7-1.2 m/s) MV Peak A:             0.91 m/s (0.42-0.7 m/s) E/A Ratio:             0.77     (1.0-2.2)  MITRAL VALVE:          Normal Ranges: MV DT:        264 msec (150-240msec)  AORTIC VALVE:                      Normal Ranges: AoV Vmax:                1.78 m/s  (<=1.7m/s) AoV Peak P.7 mmHg (<20mmHg) AoV Mean P.0 mmHg  (1.7-11.5mmHg) LVOT Max Josiah:            1.33 m/s  (<=1.1m/s) AoV VTI:                 31.31 cm  (18-25cm) LVOT VTI:                24.11 cm LVOT Diameter:           2.04 cm   (1.8-2.4cm) AoV Area, VTI:           2.52 cm2  (2.5-5.5cm2) AoV Area,Vmax:           2.44 cm2  (2.5-4.5cm2) AoV Dimensionless Index: 0.77  TRICUSPID VALVE/RVSP:          Normal Ranges: Peak TR Velocity:      3.46 m/s  PULMONIC VALVE:          Normal Ranges: PV Max Josiah:     0.8 m/s  (0.6-0.9m/s) PV Max P.8 mmHg  [id number] Chicho Goldman MD Electronically signed on 2024 at 2:09:49 PM  ** Final **     CT abdomen pelvis wo IV contrast    Result Date: 2024  Interpreted By:  Jayshree Dias, STUDY: CT ABDOMEN PELVIS WO IV CONTRAST;  2024 3:04 pm   INDICATION: Signs/Symptoms:recurrent urosepsis, eval for retained stone urinary tract.   COMPARISON: None.   ACCESSION NUMBER(S): [id number]   ORDERING CLINICIAN: SHERITA GOYAL   TECHNIQUE: CT of the abdomen and pelvis was performed without intravenous contrast. Sagittal and coronal reconstructions were generated.   FINDINGS: LOWER CHEST: There are bilateral pleural effusions. There is a small pericardial effusion. There is bilateral basilar atelectasis or infiltrate.   ABDOMEN:   LIVER: Unremarkable   BILE DUCTS: Unremarkable   GALLBLADDER: Status post cholecystectomy   PANCREAS: Unremarkable   SPLEEN: The spleen is enlarged.   ADRENAL GLANDS: There are no adrenal masses.   KIDNEYS AND URETERS: Kidneys are normal size and not obstructed.   There are no renal calculi.   Ureters are normal caliber.   PELVIS:   BLADDER: The bladder is empty. There is a 2 centimeters bladder stone.   REPRODUCTIVE ORGANS: Patient is status post hysterectomy. There appear to be surgical clips in the pelvis.   BOWEL: There is no significant bowel distention. There is motion artifact. There are sigmoid diverticula.   VESSELS: There are atherosclerotic changes of the aorta. The cava is unremarkable   PERITONEUM/RETROPERITONEUM/LYMPH NODES: There is a moderate amount of ascites. There is presacral edema. There is no obvious free air.   There is no significant lymphadenopathy.   BONES AND ABDOMINAL WALL: There is anasarca.   There are old left pelvic fractures peer there are degenerative changes of the spine. There is loss of height of L1.   COMPARISON OF FINDINGS:       2 centimeters stone  in the urinary bladder.   Bilateral pleural effusions. Pericardial effusion.   Ascites.   Anasarca.   Presacral edema.   Enlarged spleen.   The exam is limited by motion artifact.   MACRO: none   Signed by: Jayshree Dias 11/4/2024 3:28 PM Dictation workstation:   [id number]    US renal complete    Result Date: 11/4/2024  Interpreted By:  Zain Mcknight, STUDY: US RENAL COMPLETE; 11/4/2024 9:03 am   INDICATION: Signs/Symptoms:LEVY on CKD, eval postrenal causes.   COMPARISON: None.   ACCESSION NUMBER(S): [id number]   ORDERING CLINICIAN: SHERITA GOYAL   TECHNIQUE: Sonography of the kidneys and urinary bladder was performed.   FINDINGS: Right Kidney: *Renal length: 10 cm *Parenchyma: Normal parenchymal echogenicity. Normal parenchymal thickness. *Collecting system: No hydronephrosis. *Calculus: No echogenic, shadowing calculus. *Lesion: None.   Left Kidney: *Renal length: 10 cm *Parenchyma: Normal parenchymal echogenicity. Normal parenchymal thickness. *Collecting system: No hydronephrosis. *Calculus: Nonobstructing calculus in the upper pole measuring 4 mm. *Lesion: None.   Bladder: Decompressed.   Other: Trace perihepatic, perisplenic and pelvic free fluid/ascites.       No hydronephrosis. Decompressed bladder.   MACRO: None   Signed by: Zain Mcknight 11/4/2024 11:08 AM Dictation workstation:   [id number]    CT chest wo IV contrast    Result Date: 11/3/2024  Interpreted By:  Matthias Kolb, STUDY: CT CHEST WO IV CONTRAST;  11/3/2024 11:16 am   INDICATION: Signs/Symptoms:sepsis, ? atelectasis versus PNA on CXR without classic symptoms.     COMPARISON: Portable chest earlier same day 3 November 2024 at 0957 hours; CT lumbar spine without contrast 5 July 2024   ACCESSION NUMBER(S): [id number]   ORDERING CLINICIAN: SHERITA GOYAL   TECHNIQUE: Helical CT chest from thoracic inlet through the hemidiaphragms without intravenous contrast   FINDINGS: LUNGS / AIRSPACES / AIRWAYS:   LARGE AIRWAYS Filling defect: Negative  Wall thickening: Negative Bronchiectasis: Negative Other: N/A   AIRSPACES Fibrosis: Negative Emphysema: Negative Consolidation: Negative Ground glass airspace disease: Small region of ground-glass airspace change in the middle lobe and potentially in portions of the inflated right lower lobe. None in the left lung noting one segment of left lower lobe collapse Edema: Negative Nodule / Mass: Negative Other: One segment of left lower lobe collapse. Multiple segments of right lower lobe collapse   PLEURA: Effusion:  Moderate size mostly if not entirely free flowing right; small free-flowing left Pneumothorax:  Both sides negative Other:  n/a   CARDIOVASCULAR: Heart size:  Mildly enlarged Pericardial effusion:  Perhaps trace Thoracic aortic aneurysm:  Negative Pulmonary arteries:  Static Heart failure change:  Negative.  No sign of interstitial or alveolar edema. Other:  Unusual hypodensities in the right ventricular free wall for example soft tissue windows axial series 202, image 167 and the surrounding few images   NONVASCULAR MEDIASTINUM: Esophagus:  Grossly normal by CT Mediastinal Mass:  Negative Hiatal hernia:  None Other:  n/a   LYMPH NODES: No thoracic adenopathy   CHEST WALL: Soft tissues of the chest wall are unremarkable   SKELETON: No acute or contributory abnormality   UPPER ABDOMEN: Small volume ascites pooling about the included liver and spleen. Spleen probably mildly enlarged but not completely included in the field of view. Liver not overtly cirrhotic but at least borderline fatty. The only pertinent comparison exam for the upper abdomen would be CT lumbar spine without contrast 5 July 2024 at which time there was not any ascites in the field of view, but the field of view did not include the portions of the abdomen where they ascites is presently located. It is unlikely there was ascites in July, 2024 as more of the lower abdomen was included on that exam and it did not appear that there is any  ascites tracking down into (or up from) the pelvis       Abnormalities at the right lung base on portable frontal chest radiograph earlier today are due to a combination of moderate-sized, mostly if not entirely free-flowing right pleural effusion with associated multisegmental right lower lobe collapse adjacent to it   Small area of ground-glass airspace disease in the middle lobe confirmed on CT may not have been expected on the prior radiograph   Possibility of mild ground-glass airspace disease in the right lower lobe as well   No ground-glass airspace disease in the left lung   No consolidative pneumonia in any of the inflated lungs on either side   Small free-flowing left pleural effusion   Unusual hypodensities in the right ventricular free wall of uncertain etiology and significance   Perhaps trace (smallest detectable quantity) pericardial effusion   Incidental abnormalities in the included upper abdomen as detailed above   MACRO: None   Signed by: Matthias Kolb 11/3/2024 11:35 AM Dictation workstation:   [id number]    XR chest 1 view    Result Date: 11/3/2024  Interpreted By:  Kary Cardoso, STUDY: XR CHEST 1 VIEW 11/3/2024 10:06 am   INDICATION: Signs/Symptoms:cough and fever   COMPARISON: None   ACCESSION NUMBER(S): [id number]   ORDERING CLINICIAN: ESTHER ROBBINS   TECHNIQUE: AP view   FINDINGS: There is right lower lobe airspace consolidation and a right pleural effusion. There is an enlarged cardiac silhouette. Pulmonary vascularity is normal. Degenerative changes are noted in both shoulders. No sign of pneumothorax       1. Right lower lobe airspace consolidation either pneumonia or atelectasis with right pleural effusion. Follow-up to complete resolution is needed 2. Enlarged cardiac silhouette     Signed by: Kary Cardoso 11/3/2024 10:27 AM Dictation workstation:   [id number]   Results for orders placed or performed during the hospital encounter of 11/28/24 (from the past 24 hours)   CBC and Auto  Differential   Result Value Ref Range    WBC 22.0 (H) 4.4 - 11.3 x10*3/uL    nRBC 0.0 0.0 - 0.0 /100 WBCs    RBC 5.62 (H) 4.00 - 5.20 x10*6/uL    Hemoglobin 15.4 12.0 - 16.0 g/dL    Hematocrit 51.8 (H) 36.0 - 46.0 %    MCV 92 80 - 100 fL    MCH 27.4 26.0 - 34.0 pg    MCHC 29.7 (L) 32.0 - 36.0 g/dL    RDW 19.0 (H) 11.5 - 14.5 %    Platelets 382 150 - 450 x10*3/uL    Neutrophils % 85.4 40.0 - 80.0 %    Immature Granulocytes %, Automated 1.8 (H) 0.0 - 0.9 %    Lymphocytes % 7.0 13.0 - 44.0 %    Monocytes % 3.2 2.0 - 10.0 %    Eosinophils % 1.7 0.0 - 6.0 %    Basophils % 0.9 0.0 - 2.0 %    Neutrophils Absolute 18.76 (H) 1.60 - 5.50 x10*3/uL    Immature Granulocytes Absolute, Automated 0.40 0.00 - 0.50 x10*3/uL    Lymphocytes Absolute 1.54 0.80 - 3.00 x10*3/uL    Monocytes Absolute 0.71 0.05 - 0.80 x10*3/uL    Eosinophils Absolute 0.37 0.00 - 0.40 x10*3/uL    Basophils Absolute 0.19 (H) 0.00 - 0.10 x10*3/uL   Comprehensive Metabolic Panel   Result Value Ref Range    Glucose 80 74 - 99 mg/dL    Sodium 137 136 - 145 mmol/L    Potassium 4.9 3.5 - 5.3 mmol/L    Chloride 101 98 - 107 mmol/L    Bicarbonate 26 21 - 32 mmol/L    Anion Gap 15 10 - 20 mmol/L    Urea Nitrogen 33 (H) 6 - 23 mg/dL    Creatinine 2.69 (H) 0.50 - 1.05 mg/dL    eGFR 17 (L) >60 mL/min/1.73m*2    Calcium 7.9 (L) 8.6 - 10.3 mg/dL    Albumin 2.7 (L) 3.4 - 5.0 g/dL    Alkaline Phosphatase 64 33 - 136 U/L    Total Protein 4.4 (L) 6.4 - 8.2 g/dL    AST 30 9 - 39 U/L    Bilirubin, Total 1.0 0.0 - 1.2 mg/dL    ALT 9 7 - 45 U/L   Ammonia   Result Value Ref Range    Ammonia 52 16 - 53 umol/L   B-Type Natriuretic Peptide   Result Value Ref Range     (H) 0 - 99 pg/mL   Lactate   Result Value Ref Range    Lactate 0.9 0.4 - 2.0 mmol/L   Urinalysis with Reflex Culture and Microscopic   Result Value Ref Range    Color, Urine Light-Orange (N) Light-Yellow, Yellow, Dark-Yellow    Appearance, Urine Ex.Turbid (N) Clear    Specific Gravity, Urine 1.016 1.005 - 1.035     pH, Urine 5.5 5.0, 5.5, 6.0, 6.5, 7.0, 7.5, 8.0    Protein, Urine 50 (1+) (A) NEGATIVE, 10 (TRACE), 20 (TRACE) mg/dL    Glucose, Urine Normal Normal mg/dL    Blood, Urine 0.1 (1+) (A) NEGATIVE    Ketones, Urine NEGATIVE NEGATIVE mg/dL    Bilirubin, Urine NEGATIVE NEGATIVE    Urobilinogen, Urine Normal Normal mg/dL    Nitrite, Urine NEGATIVE NEGATIVE    Leukocyte Esterase, Urine 500 Bartolo/µL (A) NEGATIVE   Microscopic Only, Urine   Result Value Ref Range    WBC, Urine >50 (A) 1-5, NONE /HPF    WBC Clumps, Urine MANY Reference range not established. /HPF    RBC, Urine >20 (A) NONE, 1-2, 3-5 /HPF    Mucus, Urine FEW Reference range not established. /LPF   TSH with reflex to Free T4 if abnormal   Result Value Ref Range    Thyroid Stimulating Hormone 4.38 (H) 0.44 - 3.98 mIU/L   Troponin I, High Sensitivity, Initial   Result Value Ref Range    Troponin I, High Sensitivity 81 (HH) 0 - 13 ng/L   Thyroxine, Free   Result Value Ref Range    Thyroxine, Free 1.00 0.61 - 1.12 ng/dL   Troponin, High Sensitivity, 1 Hour   Result Value Ref Range    Troponin I, High Sensitivity 84 (HH) 0 - 13 ng/L        Assessment/Plan     Left lower leg traumatic ulceration  Cleansed mechanically with saline soaked gauze - unable to express purulence.  Not clear what wound care products being used at facility.  Applied medihoney, alginate, ABD, and light ace bandage.    Agree with getting PVR's.    May need bedside debridement, do not anticipate OR debridement  Will follow.    I spent 40 minutes in the professional and overall care of this patient.

## 2024-11-28 NOTE — CARE PLAN
The patient's goals for the shift include      The clinical goals for the shift include patient will remain oriented to self and year at minimum      Problem: Skin  Goal: Decreased wound size/increased tissue granulation at next dressing change  Outcome: Progressing  Flowsheets (Taken 11/28/2024 1556)  Decreased wound size/increased tissue granulation at next dressing change: Promote sleep for wound healing  Goal: Participates in plan/prevention/treatment measures  Outcome: Progressing  Flowsheets (Taken 11/28/2024 1556)  Participates in plan/prevention/treatment measures:   Elevate heels   Discuss with provider PT/OT consult  Goal: Prevent/manage excess moisture  Outcome: Progressing  Flowsheets (Taken 11/28/2024 1556)  Prevent/manage excess moisture: Monitor for/manage infection if present  Goal: Prevent/minimize sheer/friction injuries  Outcome: Progressing  Flowsheets (Taken 11/28/2024 1556)  Prevent/minimize sheer/friction injuries:   Complete micro-shifts as needed if patient unable. Adjust patient position to relieve pressure points, not a full turn   Increase activity/out of bed for meals   Use pull sheet  Goal: Promote/optimize nutrition  Outcome: Progressing  Flowsheets (Taken 11/28/2024 1556)  Promote/optimize nutrition: Monitor/record intake including meals  Goal: Promote skin healing  Outcome: Progressing  Flowsheets (Taken 11/28/2024 1556)  Promote skin healing:   Assess skin/pad under line(s)/device(s)   Turn/reposition every 2 hours/use positioning/transfer devices     Problem: Fall/Injury  Goal: Not fall by end of shift  Outcome: Progressing  Goal: Be free from injury by end of the shift  Outcome: Progressing  Goal: Verbalize understanding of personal risk factors for fall in the hospital  Outcome: Progressing  Goal: Verbalize understanding of risk factor reduction measures to prevent injury from fall in the home  Outcome: Progressing  Goal: Use assistive devices by end of the shift  Outcome:  Progressing  Goal: Pace activities to prevent fatigue by end of the shift  Outcome: Progressing     Problem: Pain - Adult  Goal: Verbalizes/displays adequate comfort level or baseline comfort level  Outcome: Progressing     Problem: Safety - Adult  Goal: Free from fall injury  Outcome: Progressing     Problem: Discharge Planning  Goal: Discharge to home or other facility with appropriate resources  Outcome: Progressing     Problem: Chronic Conditions and Co-morbidities  Goal: Patient's chronic conditions and co-morbidity symptoms are monitored and maintained or improved  Outcome: Progressing

## 2024-11-28 NOTE — ED PROVIDER NOTES
History/Exam limitations: none  HPI was provided by patient    HPI:    Chief Complaint   Patient presents with    Difficulty Urinating    Wound Infection        Desirae Gregg is a 84 y.o. female presents with chief complaint of difficulty urine wound infection.  Patient actually here for altered mental status.  Began 2 days ago HPI provided by family at bedside.  States she has had UTIs in the past causing her to be altered.  She also has a large wound that is progressed over the past 3 weeks to the left shin initially started out as a small dime sized abrasion.  States not on any antibiotics currently.  States at baseline normally alert and oriented to person place and time and presently only alert to person.  She is on oxygen as needed at nursing home.  She here denies any complaints.           ROS: All other review of systems are negative except as noted above and HPI or ROS.   CONSTITUTIONAL:       fever, chills  EYES:      Blurry vision, change in vision  ENT:       sore throat, congestion, rhinorrhea  CARDIOVASCULAR:       chest pain, palpitations, swelling  RESPIRATORY:       cough, wheeze, shortness of breath  GI:       nausea, vomiting, diarrhea, abdominal pain  GENITOURINARY:       dysuria, hematuria, frequency  MUSCULOSKELETAL:       deformity, neck pain  SKIN:       rash, lesion  NEUROLOGIC:       headache, numbness, focal weakness  ENDOCRINE:      weight loss, fatigue  NOTES: All systems reviewed, negative except as described above     Physical Exam:  GENERAL: Alert, oriented to person only, cooperative,  in no acute distress.  HEAD: normocephalic, atraumatic  SKIN:  dry skin, large wound left shin see photo below for more detail.  No crepitus  EYES: PERRL, EOMs intact,  Conjunctiva pink with no erythema or exudates. No scleral icterus.   ENT: No external deformities. Nares patent, mucus membranes moist.  Pharynx clear, uvula midline.   NECK: Supple, without meningismus. Trachea at midline. No  lymphadenopathy.  PULMONARY: Clear bilaterally. No crackles, rhonchi, wheezing.  No respiratory distress.  No work of breathing.  CARDIAC: Regular rate and regular rhythm.  Pulses 2+ in radials and dorsal pedal pulses bilaterally.  No murmur, rub, gallop.  No edema.  ABDOMEN: Soft, nontender, active bowel sounds.  No palpable organomegaly.  No rebound or guarding.  No CVA tenderness.  No pulsatile masses.  MUSCULOSKELETAL: Full range of motion throughout, no deformity.   NEUROLOGICAL:  no focal neuro deficits.  Strength 5 out of 5 throughout bilateral upper and lower extremities neurovascular intact in bilateral upper and lower extremities  PSYCHIATRIC: Appropriate mood and affect. Calm.       MDM/ED COURSE:    The patient presented for evaluation of altered mental status  Differential includes but not limited to infection, sepsis, CVA, intracranial hemorrhage, arrhythmia, electrolyte abnormality, encephalopathy.  Imaging studies if performed were independently reviewed and interpreted by myself and confirmed by radiologist.  Blood cultures performed.  Was given broad-spectrum antibiotics for the infected wound.    Patient requires continued workup and management of their symptoms and will be admitted to the hospital for further evaluation and treatment.         I discussed the differential, results and plan with the patient and/or family/friend/caregiver if present. All questions answered.           Note: This note was dictated by speech recognition. Minor errors in transcription may be present.    ED Course as of 11/28/24 0533   Thu Nov 28, 2024   8394 EKG interpreted by me shows sinus rhythm with PACs left axis deviation no STEMI rate 81 compared to prior EKG similar findings present [WL]   0435 Xr shows Soft tissue wound along the posterolateral aspect of the calf,  without evidence of acute bony abnormality.       [WL]   0436 Cxr shows 1.  Low lung volumes with bronchovascular crowding and  bibasilar  atelectasis. No consolidation or sizable pleural effusion is present.   [WL]   0449 WBC(!): 22.0 [WL]   0508 WBC, Urine(!): >50 [WL]   0508 WBC Clumps, Urine: MANY [WL]   0508 RBC, Urine(!): >20 [WL]   0508 Leukocyte Esterase, Urine(!): 500 Bartolo/µL [WL]   0508 Creatinine(!): 2.69 [WL]   0509 Was given a bolus of fluid here.  Blood pressure did drop to 84/28 and I do not suspect this is right as her initial blood pressure was 167/82. [WL]   0524 Given her hypertension we will give her a sepsis bolus of fluid.  I do suspect dehydration as family states for the past 2 days she has not been eating or drinking. [WL]   0529 Ct head shows No acute intracranial hemorrhage, mass effect or midline shift.      Nonspecific scattered white matter hypodensities favored to represent  sequela of small vessel ischemia.       [WL]   0532 Case discussed with Dr. osuna who agrees on admission.  At this time patient still receiving IV fluids sepsis bolus.  Unable to perform reperfusion exam as still receiving the bolus.   [WL]      ED Course User Index  [WL] Aldo Caceres DO         Diagnoses as of 11/28/24 0533   Altered mental status, unspecified altered mental status type   Infected wound   Complicated UTI (urinary tract infection)   LEVY (acute kidney injury) ([facility])   Sepsis, due to unspecified organism, unspecified whether acute organ dysfunction present (Multi)         Past Medical History:   Diagnosis Date    Heart failure     Sciatica       Social History     Socioeconomic History    Marital status:     Number of children: 4   Tobacco Use    Smoking status: Never    Smokeless tobacco: Never   Substance and Sexual Activity    Alcohol use: Never    Drug use: Never    Sexual activity: Not Currently     Social Drivers of Health     Financial Resource Strain: Low Risk  (11/3/2024)    Overall Financial Resource Strain (CARDIA)     Difficulty of Paying Living Expenses: Not hard at all   Food Insecurity: No Food  Insecurity (11/3/2024)    Hunger Vital Sign     Worried About Running Out of Food in the Last Year: Never true     Ran Out of Food in the Last Year: Never true   Transportation Needs: No Transportation Needs (11/3/2024)    PRAPARE - Transportation     Lack of Transportation (Medical): No     Lack of Transportation (Non-Medical): No   Physical Activity: Insufficiently Active (1/10/2024)    [facility]    Exercise Vital Sign     Days of Exercise per Week: 1 day     Minutes of Exercise per Session: 30 min   Stress: Stress Concern Present (1/11/2023)    Received from [facility]    [facility] Occupational Health - Occupational Stress Questionnaire     Feeling of Stress : To some extent   Social Connections: Socially Isolated (1/11/2023)    Received from [facility]    Social Connection and Isolation Panel [NHANES]     Frequency of Communication with Friends and Family: More than three times a week     Frequency of Social Gatherings with Friends and Family: More than three times a week     Attends [employer] Services: Never     Active Member of Clubs or Organizations: No     Attends Club or Organization Meetings: Never     Marital Status:    Intimate Partner Violence: Not At Risk (11/3/2024)    Humiliation, Afraid, Rape, and Kick questionnaire     Fear of Current or Ex-Partner: No     Emotionally Abused: No     Physically Abused: No     Sexually Abused: No   Housing Stability: Low Risk  (11/3/2024)    Housing Stability Vital Sign     Unable to Pay for Housing in the Last Year: No     Number of Times Moved in the Last Year: 1     Homeless in the Last Year: No     Current Outpatient Medications   Medication Instructions    acetaminophen (TYLENOL) 1,000 mg, Every 6 hours PRN    apixaban (ELIQUIS) 2.5 mg, oral, 2 times daily     apixaban (ELIQUIS) 5 mg, oral, Every 12 hours    bumetanide (BUMEX) 1 mg, oral, Daily    bumetanide (BUMEX) 1 mg, oral, 2 times daily (morning and late afternoon)    busPIRone (BUSPAR) 7.5 mg, oral, 2 times daily    carvedilol (COREG) 3.125 mg, oral, 2 times daily    cetirizine (ZYRTEC) 10 mg, oral, Daily    cholecalciferol (VITAMIN D-3) 50 mcg, oral, Daily    collagenase 250 unit/gram ointment Topical, Daily    cyanocobalamin (VITAMIN B-12) 1,000 mcg, oral, Daily    empagliflozin (JARDIANCE) 25 mg, oral, Daily    ferrous sulfate (325 mg ferrous sulfate) 325 mg, oral, Daily with breakfast    folic acid (FOLVITE) 0.8 mg, Daily    gabapentin (NEURONTIN) 200 mg, oral, 2 times daily    lactobacillus acidophilus capsule 1 capsule, oral, Daily    levothyroxine (SYNTHROID, LEVOXYL) 88 mcg, oral, Daily, Take on an empty stomach at the same time each day, either 30 to 60 minutes prior to breakfast    lisinopril 10 mg, oral, Daily    loperamide (IMODIUM A-D) 2 mg, 4 times daily PRN    loperamide (IMODIUM) 2 mg, oral, 3 times daily PRN    metoprolol succinate XL (TOPROL-XL) 25 mg, oral, 2 times daily, Do not crush or chew.    midodrine (PROAMATINE) 5 mg, oral, 3 times daily PRN    nystatin (Mycostatin) cream Topical, 2 times daily, Apply to under right breast    pantoprazole (PROTONIX) 40 mg, oral, Daily before breakfast, Do not crush, chew, or split.    phenyleph-min oil-petrolatum (Preparation H) 0.25-14-74.9 % rectal ointment rectal, 3 times daily PRN    polyethylene glycol (GLYCOLAX, MIRALAX) 17 g, Daily PRN    psyllium (Metamucil) 3.4 gram packet 1 packet, oral, 2 times daily    sertraline (ZOLOFT) 25 mg, oral, Daily    zinc oxide 40 % ointment ointment 1 Application, Topical, Every 1 hour PRN     Allergies   Allergen Reactions    Clonidine And Structural Analogs Anaphylaxis    Naproxen Nausea Only    Tizanidine Drowsiness    Hydroxyzine Palpitations    Methylprednisolone Palpitations             ED Triage Vitals  [11/28/24 0323]   Temperature Heart Rate Respirations BP   37.4 °C (99.3 °F) 77 16 167/82      Pulse Ox Temp src Heart Rate Source Patient Position   99 % -- -- --      BP Location FiO2 (%)     -- --               Labs and Imaging  CT head wo IV contrast   Final Result   No acute intracranial hemorrhage, mass effect or midline shift.        Nonspecific scattered white matter hypodensities favored to represent   sequela of small vessel ischemia.                  MACRO:   None.        Signed by: Evan Finkelstein 11/28/2024 5:24 AM   Dictation workstation:   [id number]      XR chest 1 view   Final Result   1.  Low lung volumes with bronchovascular crowding and bibasilar   atelectasis. No consolidation or sizable pleural effusion is present.                  MACRO:   None        Signed by: Jayjay Keyes 11/28/2024 4:19 AM   Dictation workstation:   [id number]      XR tibia fibula left 2 views   Final Result   Soft tissue wound along the posterolateral aspect of the calf,   without evidence of acute bony abnormality.             MACRO:   None        Signed by: Jayjay Keyes 11/28/2024 4:21 AM   Dictation workstation:   [id number]        Labs Reviewed   CBC WITH AUTO DIFFERENTIAL - Abnormal       Result Value    WBC 22.0 (*)     nRBC 0.0      RBC 5.62 (*)     Hemoglobin 15.4      Hematocrit 51.8 (*)     MCV 92      MCH 27.4      MCHC 29.7 (*)     RDW 19.0 (*)     Platelets 382      Neutrophils % 85.4      Immature Granulocytes %, Automated 1.8 (*)     Lymphocytes % 7.0      Monocytes % 3.2      Eosinophils % 1.7      Basophils % 0.9      Neutrophils Absolute 18.76 (*)     Immature Granulocytes Absolute, Automated 0.40      Lymphocytes Absolute 1.54      Monocytes Absolute 0.71      Eosinophils Absolute 0.37      Basophils Absolute 0.19 (*)    COMPREHENSIVE METABOLIC PANEL - Abnormal    Glucose 80      Sodium 137      Potassium 4.9      Chloride 101      Bicarbonate 26      Anion Gap 15      Urea  Nitrogen 33 (*)     Creatinine 2.69 (*)     eGFR 17 (*)     Calcium 7.9 (*)     Albumin 2.7 (*)     Alkaline Phosphatase 64      Total Protein 4.4 (*)     AST 30      Bilirubin, Total 1.0      ALT 9     B-TYPE NATRIURETIC PEPTIDE - Abnormal     (*)     Narrative:        <100 pg/mL - Heart failure unlikely  100-299 pg/mL - Intermediate probability of acute heart                  failure exacerbation. Correlate with clinical                  context and patient history.    >=300 pg/mL - Heart Failure likely. Correlate with clinical                  context and patient history.    BNP testing is performed using different testing methodology at [facility] than at other [facility]. Direct result comparisons should only be made within the same method.      URINALYSIS WITH REFLEX CULTURE AND MICROSCOPIC - Abnormal    Color, Urine Light-Orange (*)     Appearance, Urine Ex.Turbid (*)     Specific Gravity, Urine 1.016      pH, Urine 5.5      Protein, Urine 50 (1+) (*)     Glucose, Urine Normal      Blood, Urine 0.1 (1+) (*)     Ketones, Urine NEGATIVE      Bilirubin, Urine NEGATIVE      Urobilinogen, Urine Normal      Nitrite, Urine NEGATIVE      Leukocyte Esterase, Urine 500 Bartolo/µL (*)    MICROSCOPIC ONLY, URINE - Abnormal    WBC, Urine >50 (*)     WBC Clumps, Urine MANY      RBC, Urine >20 (*)     Mucus, Urine FEW     AMMONIA - Normal    Ammonia 52     LACTATE - Normal    Lactate 0.9      Narrative:     Venipuncture immediately after or during the administration of Metamizole may lead to falsely low results. Testing should be performed immediately prior to Metamizole dosing.   BLOOD CULTURE   BLOOD CULTURE   URINE CULTURE   TSH WITH REFLEX TO FREE T4 IF ABNORMAL   TROPONIN SERIES- (INITIAL, 1 HR)    Narrative:     The following orders were created for panel order Troponin I Series, High Sensitivity (0, 1 HR).  Procedure                               Abnormality         Status                      ---------                               -----------         ------                     Troponin I, High Sensiti...[[id number]]                      In process                 Troponin, High Sensitivi...[[id number]]                                                   Please view results for these tests on the individual orders.   SERIAL TROPONIN-INITIAL   SERIAL TROPONIN, 1 HOUR   URINALYSIS WITH REFLEX CULTURE AND MICROSCOPIC    Narrative:     The following orders were created for panel order Urinalysis with Reflex Culture and Microscopic.  Procedure                               Abnormality         Status                     ---------                               -----------         ------                     Urinalysis with Reflex C...[[id number]]  Abnormal            Final result               Extra Urine Gray Tube[[id number]]                            In process                   Please view results for these tests on the individual orders.   EXTRA URINE GRAY TUBE           Procedure  Critical Care    Performed by: Aldo Caceres DO  Authorized by: Aldo Caceres DO    Critical care provider statement:     Critical care time (minutes):  31    Critical care time was exclusive of:  Separately billable procedures and treating other patients    Critical care was necessary to treat or prevent imminent or life-threatening deterioration of the following conditions:  Sepsis    Critical care was time spent personally by me on the following activities:  Ordering and performing treatments and interventions, ordering and review of laboratory studies, ordering and review of radiographic studies, pulse oximetry, re-evaluation of patient's condition, review of old charts, examination of patient, evaluation of patient's response to treatment, development of treatment plan with patient or surrogate and obtaining history from patient or surrogate    Care discussed with: admitting provider                      Aldo Caceres,  DO  11/28/24 0533

## 2024-11-28 NOTE — CONSULTS
"Reason For Consult  Bladder stone    History Of Present Illness  Desirae Gregg is a 84 y.o. female presenting with altered mental status.  According to son she has declined over past few days.  She has had prior bladder infections this past summer.  She recently saw Dr. Hooks for a 2 cm bladder calculus.  She has urinary incontinence.  No recent leonela hematuria.  Her urinalysis reveals pyuria, culture pending.  She denies any abdominal or flank pain.  No fevers at home. A Cardona was placed in ER.        Past Medical History  She has a past medical history of Heart failure and Sciatica.    Surgical History  She has a past surgical history that includes Hysterectomy; Cholecystectomy; Tonsillectomy; and Appendectomy.     Social History  She reports that she has never smoked. She has never used smokeless tobacco. She reports that she does not drink alcohol and does not use drugs.    Family History  Family History   Problem Relation Name Age of Onset    Stroke Mother      Asthma Father      Cancer Paternal Grandmother          Allergies  Clonidine and structural analogs, Naproxen, Tizanidine, Hydroxyzine, and Methylprednisolone    Review of Systems  Review of Systems   Constitutional: Negative for chills and fever.   Cardiovascular: Negative.    Respiratory: Negative.     Gastrointestinal:  Negative for abdominal pain, nausea and vomiting.   Genitourinary:  Positive for bladder incontinence. Negative for dysuria and hematuria.   Neurological:  Positive for weakness.   Psychiatric/Behavioral:  Positive for altered mental status.           Physical Exam  Awake, alert, no distress  Breathing comfortably  Abdomen soft, ND, NT  Cardona - urine clear       Last Recorded Vitals  Blood pressure 123/86, pulse 73, temperature 37.4 °C (99.3 °F), resp. rate 16, height 1.651 m (5' 5\"), weight 73.5 kg (162 lb), SpO2 96%.    Relevant Results      Results for orders placed or performed during the hospital encounter of 11/28/24 (from the " past 24 hours)   CBC and Auto Differential   Result Value Ref Range    WBC 22.0 (H) 4.4 - 11.3 x10*3/uL    nRBC 0.0 0.0 - 0.0 /100 WBCs    RBC 5.62 (H) 4.00 - 5.20 x10*6/uL    Hemoglobin 15.4 12.0 - 16.0 g/dL    Hematocrit 51.8 (H) 36.0 - 46.0 %    MCV 92 80 - 100 fL    MCH 27.4 26.0 - 34.0 pg    MCHC 29.7 (L) 32.0 - 36.0 g/dL    RDW 19.0 (H) 11.5 - 14.5 %    Platelets 382 150 - 450 x10*3/uL    Neutrophils % 85.4 40.0 - 80.0 %    Immature Granulocytes %, Automated 1.8 (H) 0.0 - 0.9 %    Lymphocytes % 7.0 13.0 - 44.0 %    Monocytes % 3.2 2.0 - 10.0 %    Eosinophils % 1.7 0.0 - 6.0 %    Basophils % 0.9 0.0 - 2.0 %    Neutrophils Absolute 18.76 (H) 1.60 - 5.50 x10*3/uL    Immature Granulocytes Absolute, Automated 0.40 0.00 - 0.50 x10*3/uL    Lymphocytes Absolute 1.54 0.80 - 3.00 x10*3/uL    Monocytes Absolute 0.71 0.05 - 0.80 x10*3/uL    Eosinophils Absolute 0.37 0.00 - 0.40 x10*3/uL    Basophils Absolute 0.19 (H) 0.00 - 0.10 x10*3/uL   Comprehensive Metabolic Panel   Result Value Ref Range    Glucose 80 74 - 99 mg/dL    Sodium 137 136 - 145 mmol/L    Potassium 4.9 3.5 - 5.3 mmol/L    Chloride 101 98 - 107 mmol/L    Bicarbonate 26 21 - 32 mmol/L    Anion Gap 15 10 - 20 mmol/L    Urea Nitrogen 33 (H) 6 - 23 mg/dL    Creatinine 2.69 (H) 0.50 - 1.05 mg/dL    eGFR 17 (L) >60 mL/min/1.73m*2    Calcium 7.9 (L) 8.6 - 10.3 mg/dL    Albumin 2.7 (L) 3.4 - 5.0 g/dL    Alkaline Phosphatase 64 33 - 136 U/L    Total Protein 4.4 (L) 6.4 - 8.2 g/dL    AST 30 9 - 39 U/L    Bilirubin, Total 1.0 0.0 - 1.2 mg/dL    ALT 9 7 - 45 U/L   Ammonia   Result Value Ref Range    Ammonia 52 16 - 53 umol/L   B-Type Natriuretic Peptide   Result Value Ref Range     (H) 0 - 99 pg/mL   Lactate   Result Value Ref Range    Lactate 0.9 0.4 - 2.0 mmol/L   Urinalysis with Reflex Culture and Microscopic   Result Value Ref Range    Color, Urine Light-Orange (N) Light-Yellow, Yellow, Dark-Yellow    Appearance, Urine Ex.Turbid (N) Clear    Specific  Gravity, Urine 1.016 1.005 - 1.035    pH, Urine 5.5 5.0, 5.5, 6.0, 6.5, 7.0, 7.5, 8.0    Protein, Urine 50 (1+) (A) NEGATIVE, 10 (TRACE), 20 (TRACE) mg/dL    Glucose, Urine Normal Normal mg/dL    Blood, Urine 0.1 (1+) (A) NEGATIVE    Ketones, Urine NEGATIVE NEGATIVE mg/dL    Bilirubin, Urine NEGATIVE NEGATIVE    Urobilinogen, Urine Normal Normal mg/dL    Nitrite, Urine NEGATIVE NEGATIVE    Leukocyte Esterase, Urine 500 Bartolo/µL (A) NEGATIVE   Microscopic Only, Urine   Result Value Ref Range    WBC, Urine >50 (A) 1-5, NONE /HPF    WBC Clumps, Urine MANY Reference range not established. /HPF    RBC, Urine >20 (A) NONE, 1-2, 3-5 /HPF    Mucus, Urine FEW Reference range not established. /LPF   TSH with reflex to Free T4 if abnormal   Result Value Ref Range    Thyroid Stimulating Hormone 4.38 (H) 0.44 - 3.98 mIU/L   Troponin I, High Sensitivity, Initial   Result Value Ref Range    Troponin I, High Sensitivity 81 (HH) 0 - 13 ng/L   Thyroxine, Free   Result Value Ref Range    Thyroxine, Free 1.00 0.61 - 1.12 ng/dL   Troponin, High Sensitivity, 1 Hour   Result Value Ref Range    Troponin I, High Sensitivity 84 (HH) 0 - 13 ng/L          Assessment/Plan     Acute cystitis, bladder calculus.     Continue antibiotics, follow cultures.  If culture negative, please stop antibiotics.    Bladder stone to be managed as outpatient.   Voiding trial once her condition improves.         I spent 45 minutes in the professional and overall care of this patient.      Jaren Porter MD

## 2024-11-28 NOTE — CONSULTS
Consults  Referred by ANURAG Domínguez    Primary MD: Jackie Cedillo MD    Reason For Consult  UTI / leg wound infection / sepsis    History Of Present Illness  Desirae Gregg is a 84 y.o. female, hx of polycythemia / myelofibrosis, has been previously treated with hydrea and Jakafi, hx of CKD, hx of PE, hx of GERD, hx of hypothyroidism, she was recently [place] [facility] she was treated for pneumonia, she has elevated WBC during her hospitalization, she was readmitted for altered mentation, inability to urinate and worsening Lt leg wound, she was hypotensive, the WBC and Cr were up, the ua with pyuria, the cxr with low volume / atelectasis, ct head without acute changes, she is unable to provide a hx      Past Medical History  She has a past medical history of Heart failure and Sciatica.    Surgical History  She has a past surgical history that includes Hysterectomy; Cholecystectomy; Tonsillectomy; and Appendectomy.     Social History     Occupational History    Not on file   Tobacco Use    Smoking status: Never    Smokeless tobacco: Never   Substance and Sexual Activity    Alcohol use: Never    Drug use: Never    Sexual activity: Not Currently     Travel History   Travel since 10/28/24    No documented travel since 10/28/24          Family History  Family History   Problem Relation Name Age of Onset    Stroke Mother      Asthma Father      Cancer Paternal Grandmother       Allergies  Clonidine and structural analogs, Naproxen, Tizanidine, Hydroxyzine, and Methylprednisolone     Immunization History   Administered Date(s) Administered    Flu vaccine (IIV4), preservative free *Check age/dose* 01/13/2017    Flu vaccine, trivalent, preservative free, HIGH-DOSE, age 65y+ (Fluzone) 09/07/2017, 11/20/2019    Influenza, Seasonal, Quadrivalent, Adjuvanted 10/12/2020, 10/05/2021, 11/18/2022    Influenza, injectable, quadrivalent 10/15/2015    Influenza, seasonal, injectable 09/01/2016    Moderna SARS-CoV-2 Vaccination 01/25/2021,  02/22/2021, 11/09/2021    Pneumococcal Conjugate, Unspecified 01/01/2012    Pneumococcal conjugate vaccine, 13-valent (PREVNAR 13) 12/09/2014    Pneumococcal polysaccharide vaccine, 23-valent, age 2 years and older (PNEUMOVAX 23) 10/24/2017    Tdap vaccine, age 7 year and older (BOOSTRIX, ADACEL) 12/09/2014, 10/02/2021   Pneumonia vaccine is up to date, influenza vaccine is planned  [place] fall risk 100, preventive protocol was implemented  Depression screen is negative    Medications  Home medications:  Medications Prior to Admission   Medication Sig Dispense Refill Last Dose/Taking    acetaminophen (Tylenol) 500 mg tablet Take 2 tablets (1,000 mg) by mouth every 6 hours if needed for mild pain (1 - 3).       apixaban (Eliquis) 2.5 mg tablet Take 1 tablet (2.5 mg) by mouth 2 times a day. 180 tablet 1     apixaban (Eliquis) 5 mg tablet Take 1 tablet (5 mg) by mouth every 12 hours.       bumetanide (Bumex) 1 mg tablet Take 1 tablet (1 mg) by mouth once daily. Do not fill before November 12, 2024.       bumetanide (Bumex) 1 mg tablet Take 1 tablet (1 mg) by mouth 2 times daily (morning and late afternoon).       busPIRone (Buspar) 7.5 mg tablet Take 1 tablet (7.5 mg) by mouth 2 times a day. 180 tablet 1     carvedilol (Coreg) 3.125 mg tablet Take 1 tablet (3.125 mg) by mouth 2 times a day.       cetirizine (ZyrTEC) 10 mg tablet Take 1 tablet (10 mg) by mouth once daily. 90 tablet 1     cholecalciferol (Vitamin D-3) 50 mcg (2,000 unit) capsule Take 1 capsule (50 mcg) by mouth once daily. 90 capsule 3     collagenase 250 unit/gram ointment Apply topically once daily. Do not fill before November 20, 2024.       cyanocobalamin (Vitamin B-12) 1,000 mcg tablet Take 1 tablet (1,000 mcg) by mouth once daily. 90 tablet 3     empagliflozin (Jardiance) 25 mg Take 1 tablet (25 mg) by mouth once daily. 90 tablet 1     ferrous sulfate, 325 mg ferrous sulfate, tablet Take 1 tablet (325 mg) by mouth once daily with breakfast. 90  tablet 1     folic acid (Folvite) 400 mcg tablet Take 2 tablets (0.8 mg) by mouth once daily.       gabapentin (Neurontin) 100 mg capsule Take 2 capsules (200 mg) by mouth 2 times a day. 360 capsule 1     lactobacillus acidophilus capsule Take 1 capsule by mouth once daily. Do not fill before 2024.       levothyroxine (Synthroid, Levoxyl) 88 mcg tablet Take 1 tablet (88 mcg) by mouth early in the morning.. Take on an empty stomach at the same time each day, either 30 to 60 minutes prior to breakfast 90 tablet 1     lisinopril 10 mg tablet Take 1 tablet (10 mg) by mouth once daily. 90 tablet 1     loperamide (Imodium A-D) 2 mg tablet Take 1 tablet (2 mg) by mouth 4 times a day as needed for diarrhea.       loperamide (Imodium) 2 mg capsule Take 1 capsule (2 mg) by mouth 3 times a day as needed for diarrhea for up to 10 days.       [] magnesium oxide (Mag-Ox) 400 mg (241.3 mg magnesium) tablet Take 1 tablet (400 mg) by mouth 2 times a day for 3 doses. Do not fill before 2024.       metoprolol succinate XL (Toprol-XL) 25 mg 24 hr tablet Take 1 tablet (25 mg) by mouth 2 times a day. Do not crush or chew.       midodrine (Proamatine) 5 mg tablet Take 1 tablet (5 mg) by mouth 3 times a day as needed (Hypotension BP<110/80).       nystatin (Mycostatin) cream Apply topically 2 times a day. Apply to under right breast       pantoprazole (ProtoNix) 40 mg EC tablet Take 1 tablet (40 mg) by mouth once daily in the morning. Take before meals. Do not crush, chew, or split. 90 tablet 1     phenyleph-min oil-petrolatum (Preparation H) 0.25-14-74.9 % rectal ointment Insert into the rectum 3 times a day as needed for hemorrhoids.       polyethylene glycol (Glycolax, Miralax) 17 gram packet Take 17 g by mouth once daily as needed.       psyllium (Metamucil) 3.4 gram packet Take 1 packet by mouth 2 times a day.       sertraline (Zoloft) 25 mg tablet Take 1 tablet (25 mg) by mouth once daily. 90 tablet 1  "    zinc oxide 40 % ointment ointment Apply 1 Application topically every 1 hour if needed (irritation). 56 g 0      Current medications:  Scheduled medications  apixaban, 2.5 mg, oral, BID  cefepime, 1 g, intravenous, q12h  midodrine, 5 mg, oral, TID  oxygen, , inhalation, Continuous - Inhalation      Continuous medications  dextrose 5%-0.45 % sodium chloride, 100 mL/hr, Last Rate: 100 mL/hr (11/28/24 0919)      PRN medications  PRN medications: acetaminophen **OR** acetaminophen **OR** acetaminophen, dextrose, dextrose, glucagon, glucagon, melatonin, ondansetron **OR** ondansetron, vancomycin    Review of Systems   All other systems reviewed and are negative.       Objective  Range of Vitals (last 24 hours)  Heart Rate:  [73-78]   Temp:  [37.4 °C (99.3 °F)]   Resp:  [16-18]   BP: ()/(28-86)   Height:  [165.1 cm (5' 5\")]   Weight:  [73.5 kg (162 lb)]   SpO2:  [96 %-99 %]   Daily Weight  11/28/24 : 73.5 kg (162 lb)    Body mass index is 26.96 kg/m².   Nutritional consult    Physical Exam  Constitutional:       Appearance: She is ill-appearing.   HENT:      Head: Normocephalic and atraumatic.      Mouth/Throat:      Mouth: Mucous membranes are moist.      Pharynx: Oropharynx is clear.   Eyes:      Pupils: Pupils are equal, round, and reactive to light.   Cardiovascular:      Rate and Rhythm: Normal rate and regular rhythm.      Heart sounds: Normal heart sounds.   Pulmonary:      Effort: Pulmonary effort is normal.      Breath sounds: Rales present.   Abdominal:      General: Abdomen is flat. Bowel sounds are normal.      Palpations: Abdomen is soft.   Musculoskeletal:      Cervical back: Normal range of motion.      Comments: Lt shin wound with slough and draimage, minimal surrounding rednessLt shin wound, no cellulitis   Neurological:      Mental Status: She is alert.          Relevant Results  Outside Hospital Results  Reviewed  Labs  Results from last 72 hours   Lab Units 11/28/24  0420   WBC AUTO x10*3/uL " "22.0*   HEMOGLOBIN g/dL 15.4   HEMATOCRIT % 51.8*   PLATELETS AUTO x10*3/uL 382   NEUTROS PCT AUTO % 85.4   LYMPHS PCT AUTO % 7.0   MONOS PCT AUTO % 3.2   EOS PCT AUTO % 1.7     Results from last 72 hours   Lab Units 11/28/24  0420   SODIUM mmol/L 137   POTASSIUM mmol/L 4.9   CHLORIDE mmol/L 101   CO2 mmol/L 26   BUN mg/dL 33*   CREATININE mg/dL 2.69*   GLUCOSE mg/dL 80   CALCIUM mg/dL 7.9*   ANION GAP mmol/L 15   EGFR mL/min/1.73m*2 17*     Results from last 72 hours   Lab Units 11/28/24  0420   ALK PHOS U/L 64   BILIRUBIN TOTAL mg/dL 1.0   PROTEIN TOTAL g/dL 4.4*   ALT U/L 9   AST U/L 30   ALBUMIN g/dL 2.7*     Estimated Creatinine Clearance: 15.1 mL/min (A) (by C-G formula based on SCr of 2.69 mg/dL (H)).  C-Reactive Protein   Date Value Ref Range Status   11/15/2024 0.80 <1.00 mg/dL Final   11/12/2024 1.04 (H) <1.00 mg/dL Final   11/11/2024 0.86 <1.00 mg/dL Final     No results found for: \"HIV1X2\", \"HIVCONF\", \"OQKBPY0DY\"  No results found for: \"HEPCABINIT\", \"HEPCAB\", \"HCVPCRQUANT\"  Microbiology  Reviewed  Imaging  Reviewed      Assessment/Plan   Sepsis  Left shin wound infection  Respiratory failure / atelectasis  Pyuria  Encephalopathy  Polycythemia / myelofibrosis    Recommendations :  Cefepime and Vancomycin  Cultures  PVR  Chest PT  Wound care  Will likely need some wound debridement    I spent minutes in the professional and overall care of this patient.      Cameron Lassiter MD  "

## 2024-11-28 NOTE — ED PROVIDER NOTES
History/Exam limitations: none  HPI was provided by patient    HPI:    No chief complaint on file.       Desirae Gregg is a 84 y.o. female presents with chief complaint of urinary retention.  Beginning today.  Patient arrives from nursing home via EMS.  Symptoms not made better or worse by anything.       ROS:All other review of systems are negative except as noted above and HPI or ROS.   CONSTITUTIONAL: fever, chills  CARDIOVASCULAR: chest pain, palpitations, swelling  RESPIRATORY: cough, wheeze, shortness of breath  GI: nausea, vomiting, diarrhea, abdominal pain  GENITOURINARY: dysuria, hematuria, frequency  NEUROLOGIC: headache, numbness, focal weakness  NOTES: All systems reviewed, negative except as described above       Physical Exam:  GENERAL: Alert, oriented , cooperative,  in no acute distress.    HEAD: normocephalic, atraumatic    SKIN:  dry skin, no lesions.    PULMONARY: Clear bilaterally. No crackles, rhonchi, wheezing.  No respiratory distress.  No work of breathing.    CARDIAC: Regular rate and regular rhythm.  Pulses 2+ in radials and dorsal pedal pulses bilaterally.  No murmur, rub, gallop.  No edema.    ABDOMEN: Soft, nontender, active bowel sounds.  No palpable organomegaly.  No rebound or guarding.  No CVA tenderness.  No pulsatile masses.    NEUROLOGICAL:   no focal neuro deficits.  neurovascular intact in bilateral upper and lower extremities    PSYCHIATRIC: Appropriate mood and affect. Calm.       MDM/ED COURSE:        The patient presented for evaluation of urinary retention.  Differential include not limited to obstruction, infection.  Bladder scan performed Cardona catheter placed by nursing staff.  The patient  has stable vs and will be discharge home.   The patient and caregiver are in agreement with the plan and given instructions to follow up with urology            Note: This note was dictated by speech recognition. Minor errors in transcription may be present.    Medical Decision Making:      The patient presented for evaluation for ***  Differential includes but not limited to ***    Imaging studies if performed were independently reviewed and interpreted by myself and confirmed by radiologist.        External Records Reviewed: I reviewed recent and relevant outside records      [discharge]  The patient  has stable vs and will be discharge home.   The patient and caregiver are in agreement with the plan and given instructions to follow up with their PCP.     [Admit]  Patient requires continued workup and management of their symptoms and will be admitted to the hospital for further evaluation and treatment.        I discussed the differential, results and plan with the patient and/or family/friend/caregiver if present.       I emphasized the importance of follow-up with the physician I referred them to in the timeframe recommended.  I explained reasons for the patient to return to the Emergency Department. Additional verbal discharge instructions were also given and discussed with the patient to supplement those generated by the EMR. We also discussed medications that were prescribed (if any) including common side effects and interactions. The patient was advised to abstain from driving, operating heavy machinery or making significant decisions while taking medications such as opiates and muscle relaxers that may impair this. All questions were addressed.  They understand return precautions and discharge instructions. The patient and/or family/friend/caregiver expressed understanding.     Note: This note was dictated by speech recognition. Minor errors in transcription may be present.           Past Medical History:   Diagnosis Date    Heart failure     Sciatica       Social History     Socioeconomic History    Marital status:     Number of children: 4   Tobacco Use    Smoking status: Never    Smokeless tobacco: Never   Substance and Sexual Activity    Alcohol use: Never    Drug use: Never     Sexual activity: Not Currently     Social Drivers of Health     Financial Resource Strain: Low Risk  (11/3/2024)    Overall Financial Resource Strain (CARDIA)     Difficulty of Paying Living Expenses: Not hard at all   Food Insecurity: No Food Insecurity (11/3/2024)    Hunger Vital Sign     Worried About Running Out of Food in the Last Year: Never true     Ran Out of Food in the Last Year: Never true   Transportation Needs: No Transportation Needs (11/3/2024)    PRAPARE - Transportation     Lack of Transportation (Medical): No     Lack of Transportation (Non-Medical): No   Physical Activity: Insufficiently Active (1/10/2024)    [facility]    Exercise Vital Sign     Days of Exercise per Week: 1 day     Minutes of Exercise per Session: 30 min   Stress: Stress Concern Present (1/11/2023)    Received from [facility]    [employer] [place] [employer] - Occupational Stress Questionnaire     Feeling of Stress : To some extent   Social Connections: Socially Isolated (1/11/2023)    Received from [facility]    Social Connection and Isolation Panel [NHANES]     Frequency of Communication with Friends and Family: More than three times a week     Frequency of Social Gatherings with Friends and Family: More than three times a week     Attends Gnosticism Services: Never     Active Member of Clubs or Organizations: No     Attends Club or Organization Meetings: Never     Marital Status:    Intimate Partner Violence: Not At Risk (11/3/2024)    Humiliation, Afraid, Rape, and Kick questionnaire     Fear of Current or Ex-Partner: No     Emotionally Abused: No     Physically Abused: No     Sexually Abused: No   Housing Stability: Low Risk  (11/3/2024)    Housing Stability Vital Sign     Unable to Pay for Housing in the Last Year: No      Number of Times Moved in the Last Year: 1     Homeless in the Last Year: No     Current Outpatient Medications   Medication Instructions    acetaminophen (TYLENOL) 1,000 mg, Every 6 hours PRN    apixaban (ELIQUIS) 2.5 mg, oral, 2 times daily    apixaban (ELIQUIS) 5 mg, oral, Every 12 hours    bumetanide (BUMEX) 1 mg, oral, Daily    bumetanide (BUMEX) 1 mg, oral, 2 times daily (morning and late afternoon)    busPIRone (BUSPAR) 7.5 mg, oral, 2 times daily    carvedilol (COREG) 3.125 mg, oral, 2 times daily    cetirizine (ZYRTEC) 10 mg, oral, Daily    cholecalciferol (VITAMIN D-3) 50 mcg, oral, Daily    collagenase 250 unit/gram ointment Topical, Daily    cyanocobalamin (VITAMIN B-12) 1,000 mcg, oral, Daily    empagliflozin (JARDIANCE) 25 mg, oral, Daily    ferrous sulfate (325 mg ferrous sulfate) 325 mg, oral, Daily with breakfast    folic acid (FOLVITE) 0.8 mg, Daily    gabapentin (NEURONTIN) 200 mg, oral, 2 times daily    lactobacillus acidophilus capsule 1 capsule, oral, Daily    levothyroxine (SYNTHROID, LEVOXYL) 88 mcg, oral, Daily, Take on an empty stomach at the same time each day, either 30 to 60 minutes prior to breakfast    lisinopril 10 mg, oral, Daily    loperamide (IMODIUM A-D) 2 mg, 4 times daily PRN    loperamide (IMODIUM) 2 mg, oral, 3 times daily PRN    metoprolol succinate XL (TOPROL-XL) 25 mg, oral, 2 times daily, Do not crush or chew.    midodrine (PROAMATINE) 5 mg, oral, 3 times daily PRN    nystatin (Mycostatin) cream Topical, 2 times daily, Apply to under right breast    pantoprazole (PROTONIX) 40 mg, oral, Daily before breakfast, Do not crush, chew, or split.    phenyleph-min oil-petrolatum (Preparation H) 0.25-14-74.9 % rectal ointment rectal, 3 times daily PRN    polyethylene glycol (GLYCOLAX, MIRALAX) 17 g, Daily PRN    psyllium (Metamucil) 3.4 gram packet 1 packet, oral, 2 times daily    sertraline (ZOLOFT) 25 mg, oral, Daily    zinc oxide 40 % ointment ointment 1 Application, Topical,  Every 1 hour PRN     Allergies   Allergen Reactions    Clonidine And Structural Analogs Anaphylaxis    Naproxen Nausea Only    Tizanidine Drowsiness    Hydroxyzine Palpitations    Methylprednisolone Palpitations             ED Triage Vitals   Temp Pulse Resp BP   -- -- -- --      SpO2 Temp src Heart Rate Source Patient Position   -- -- -- --      BP Location FiO2 (%)     -- --               Labs and Imaging  No orders to display     Labs Reviewed   URINALYSIS WITH REFLEX CULTURE AND MICROSCOPIC    Narrative:     The following orders were created for panel order Urinalysis with Reflex Culture and Microscopic.  Procedure                               Abnormality         Status                     ---------                               -----------         ------                     Urinalysis with Reflex C...[[id number]]                                                 Extra Urine Gray Tube[[id number]]                                                         Please view results for these tests on the individual orders.   URINALYSIS WITH REFLEX CULTURE AND MICROSCOPIC   EXTRA URINE GRAY TUBE           Procedure  Procedures

## 2024-11-28 NOTE — ED TRIAGE NOTES
Patient presents to the ED via EMS from [facility], there for rehab, with c/o lack of urination and an infected wound to her left lower leg.

## 2024-11-29 ENCOUNTER — APPOINTMENT (OUTPATIENT)
Dept: VASCULAR MEDICINE | Facility: HOSPITAL | Age: 84
End: 2024-11-29
Payer: MEDICARE

## 2024-11-29 LAB
ANION GAP SERPL CALC-SCNC: 12 MMOL/L (ref 10–20)
BUN SERPL-MCNC: 29 MG/DL (ref 6–23)
C COLI+JEJ+UPSA DNA STL QL NAA+PROBE: NOT DETECTED
C DIF TOX TCDA+TCDB STL QL NAA+PROBE: NOT DETECTED
CALCIUM SERPL-MCNC: 6.7 MG/DL (ref 8.6–10.3)
CHLORIDE SERPL-SCNC: 106 MMOL/L (ref 98–107)
CO2 SERPL-SCNC: 22 MMOL/L (ref 21–32)
CREAT SERPL-MCNC: 2.24 MG/DL (ref 0.5–1.05)
EC STX1 GENE STL QL NAA+PROBE: NOT DETECTED
EC STX2 GENE STL QL NAA+PROBE: NOT DETECTED
EGFRCR SERPLBLD CKD-EPI 2021: 21 ML/MIN/1.73M*2
ERYTHROCYTE [DISTWIDTH] IN BLOOD BY AUTOMATED COUNT: 18 % (ref 11.5–14.5)
GLUCOSE BLD MANUAL STRIP-MCNC: 109 MG/DL (ref 74–99)
GLUCOSE BLD MANUAL STRIP-MCNC: 112 MG/DL (ref 74–99)
GLUCOSE BLD MANUAL STRIP-MCNC: 112 MG/DL (ref 74–99)
GLUCOSE BLD MANUAL STRIP-MCNC: 114 MG/DL (ref 74–99)
GLUCOSE SERPL-MCNC: 125 MG/DL (ref 74–99)
HCT VFR BLD AUTO: 45.3 % (ref 36–46)
HGB BLD-MCNC: 13.1 G/DL (ref 12–16)
MCH RBC QN AUTO: 27.3 PG (ref 26–34)
MCHC RBC AUTO-ENTMCNC: 28.9 G/DL (ref 32–36)
MCV RBC AUTO: 95 FL (ref 80–100)
NOROVIRUS GI + GII RNA STL NAA+PROBE: NOT DETECTED
NRBC BLD-RTO: 0.1 /100 WBCS (ref 0–0)
PLATELET # BLD AUTO: 301 X10*3/UL (ref 150–450)
POTASSIUM SERPL-SCNC: 3.6 MMOL/L (ref 3.5–5.3)
RBC # BLD AUTO: 4.79 X10*6/UL (ref 4–5.2)
RV RNA STL NAA+PROBE: NOT DETECTED
SALMONELLA DNA STL QL NAA+PROBE: NOT DETECTED
SHIGELLA DNA SPEC QL NAA+PROBE: NOT DETECTED
SODIUM SERPL-SCNC: 136 MMOL/L (ref 136–145)
V CHOLERAE DNA STL QL NAA+PROBE: NOT DETECTED
VANCOMYCIN SERPL-MCNC: 14.5 UG/ML (ref 5–20)
WBC # BLD AUTO: 18.7 X10*3/UL (ref 4.4–11.3)
Y ENTEROCOL DNA STL QL NAA+PROBE: NOT DETECTED

## 2024-11-29 PROCEDURE — 94760 N-INVAS EAR/PLS OXIMETRY 1: CPT

## 2024-11-29 PROCEDURE — 2500000002 HC RX 250 W HCPCS SELF ADMINISTERED DRUGS (ALT 637 FOR MEDICARE OP, ALT 636 FOR OP/ED): Performed by: NURSE PRACTITIONER

## 2024-11-29 PROCEDURE — 93922 UPR/L XTREMITY ART 2 LEVELS: CPT | Performed by: SURGERY

## 2024-11-29 PROCEDURE — 85027 COMPLETE CBC AUTOMATED: CPT | Performed by: NURSE PRACTITIONER

## 2024-11-29 PROCEDURE — 99233 SBSQ HOSP IP/OBS HIGH 50: CPT | Performed by: INTERNAL MEDICINE

## 2024-11-29 PROCEDURE — 2060000001 HC INTERMEDIATE ICU ROOM DAILY

## 2024-11-29 PROCEDURE — 94640 AIRWAY INHALATION TREATMENT: CPT

## 2024-11-29 PROCEDURE — 9420000001 HC RT PATIENT EDUCATION 5 MIN

## 2024-11-29 PROCEDURE — 2500000001 HC RX 250 WO HCPCS SELF ADMINISTERED DRUGS (ALT 637 FOR MEDICARE OP): Performed by: NURSE PRACTITIONER

## 2024-11-29 PROCEDURE — 82947 ASSAY GLUCOSE BLOOD QUANT: CPT

## 2024-11-29 PROCEDURE — 2500000005 HC RX 250 GENERAL PHARMACY W/O HCPCS: Performed by: INTERNAL MEDICINE

## 2024-11-29 PROCEDURE — 94664 DEMO&/EVAL PT USE INHALER: CPT

## 2024-11-29 PROCEDURE — 2500000004 HC RX 250 GENERAL PHARMACY W/ HCPCS (ALT 636 FOR OP/ED): Performed by: INTERNAL MEDICINE

## 2024-11-29 PROCEDURE — 2500000005 HC RX 250 GENERAL PHARMACY W/O HCPCS: Performed by: NURSE PRACTITIONER

## 2024-11-29 PROCEDURE — 80202 ASSAY OF VANCOMYCIN: CPT | Performed by: NURSE PRACTITIONER

## 2024-11-29 PROCEDURE — 0JBP0ZZ EXCISION OF LEFT LOWER LEG SUBCUTANEOUS TISSUE AND FASCIA, OPEN APPROACH: ICD-10-PCS | Performed by: PODIATRIST

## 2024-11-29 PROCEDURE — 80048 BASIC METABOLIC PNL TOTAL CA: CPT | Performed by: NURSE PRACTITIONER

## 2024-11-29 PROCEDURE — 93922 UPR/L XTREMITY ART 2 LEVELS: CPT

## 2024-11-29 PROCEDURE — 36415 COLL VENOUS BLD VENIPUNCTURE: CPT | Performed by: NURSE PRACTITIONER

## 2024-11-29 RX ORDER — DEXTROSE MONOHYDRATE AND SODIUM CHLORIDE 5; .45 G/100ML; G/100ML
75 INJECTION, SOLUTION INTRAVENOUS CONTINUOUS
Status: DISCONTINUED | OUTPATIENT
Start: 2024-11-29 | End: 2024-11-30

## 2024-11-29 RX ORDER — VANCOMYCIN HYDROCHLORIDE 500 MG/100ML
500 INJECTION, SOLUTION INTRAVENOUS ONCE
Status: COMPLETED | OUTPATIENT
Start: 2024-11-29 | End: 2024-11-29

## 2024-11-29 SDOH — SOCIAL STABILITY: SOCIAL INSECURITY: DO YOU FEEL ANYONE HAS EXPLOITED OR TAKEN ADVANTAGE OF YOU FINANCIALLY OR OF YOUR PERSONAL PROPERTY?: NO

## 2024-11-29 SDOH — SOCIAL STABILITY: SOCIAL INSECURITY: DOES ANYONE TRY TO KEEP YOU FROM HAVING/CONTACTING OTHER FRIENDS OR DOING THINGS OUTSIDE YOUR HOME?: NO

## 2024-11-29 SDOH — ECONOMIC STABILITY: INCOME INSECURITY: IN THE PAST 12 MONTHS HAS THE ELECTRIC, GAS, OIL, OR WATER COMPANY THREATENED TO SHUT OFF SERVICES IN YOUR HOME?: NO

## 2024-11-29 SDOH — ECONOMIC STABILITY: FOOD INSECURITY: WITHIN THE PAST 12 MONTHS, THE FOOD YOU BOUGHT JUST DIDN'T LAST AND YOU DIDN'T HAVE MONEY TO GET MORE.: NEVER TRUE

## 2024-11-29 SDOH — SOCIAL STABILITY: SOCIAL INSECURITY: DO YOU FEEL UNSAFE GOING BACK TO THE PLACE WHERE YOU ARE LIVING?: NO

## 2024-11-29 SDOH — SOCIAL STABILITY: SOCIAL INSECURITY: WITHIN THE LAST YEAR, HAVE YOU BEEN HUMILIATED OR EMOTIONALLY ABUSED IN OTHER WAYS BY YOUR PARTNER OR EX-PARTNER?: NO

## 2024-11-29 SDOH — SOCIAL STABILITY: SOCIAL INSECURITY: HAVE YOU HAD ANY THOUGHTS OF HARMING ANYONE ELSE?: NO

## 2024-11-29 SDOH — ECONOMIC STABILITY: FOOD INSECURITY: WITHIN THE PAST 12 MONTHS, YOU WORRIED THAT YOUR FOOD WOULD RUN OUT BEFORE YOU GOT THE MONEY TO BUY MORE.: NEVER TRUE

## 2024-11-29 SDOH — SOCIAL STABILITY: SOCIAL INSECURITY: WITHIN THE LAST YEAR, HAVE YOU BEEN AFRAID OF YOUR PARTNER OR EX-PARTNER?: NO

## 2024-11-29 SDOH — SOCIAL STABILITY: SOCIAL INSECURITY: HAS ANYONE EVER THREATENED TO HURT YOUR FAMILY OR YOUR PETS?: NO

## 2024-11-29 SDOH — SOCIAL STABILITY: SOCIAL INSECURITY: HAVE YOU HAD THOUGHTS OF HARMING ANYONE ELSE?: NO

## 2024-11-29 SDOH — SOCIAL STABILITY: SOCIAL INSECURITY: WERE YOU ABLE TO COMPLETE ALL THE BEHAVIORAL HEALTH SCREENINGS?: YES

## 2024-11-29 SDOH — SOCIAL STABILITY: SOCIAL INSECURITY: ARE THERE ANY APPARENT SIGNS OF INJURIES/BEHAVIORS THAT COULD BE RELATED TO ABUSE/NEGLECT?: NO

## 2024-11-29 SDOH — SOCIAL STABILITY: SOCIAL INSECURITY: ARE YOU OR HAVE YOU BEEN THREATENED OR ABUSED PHYSICALLY, EMOTIONALLY, OR SEXUALLY BY ANYONE?: NO

## 2024-11-29 SDOH — SOCIAL STABILITY: SOCIAL INSECURITY: ABUSE: ADULT

## 2024-11-29 ASSESSMENT — COGNITIVE AND FUNCTIONAL STATUS - GENERAL
CLIMB 3 TO 5 STEPS WITH RAILING: A LOT
STANDING UP FROM CHAIR USING ARMS: A LOT
EATING MEALS: A LITTLE
EATING MEALS: A LITTLE
STANDING UP FROM CHAIR USING ARMS: A LOT
PERSONAL GROOMING: A LITTLE
TOILETING: A LOT
WALKING IN HOSPITAL ROOM: A LOT
TURNING FROM BACK TO SIDE WHILE IN FLAT BAD: A LITTLE
MOBILITY SCORE: 14
DRESSING REGULAR UPPER BODY CLOTHING: A LOT
DRESSING REGULAR UPPER BODY CLOTHING: A LITTLE
PERSONAL GROOMING: A LITTLE
MOVING TO AND FROM BED TO CHAIR: A LOT
HELP NEEDED FOR BATHING: A LOT
DAILY ACTIVITIY SCORE: 15
HELP NEEDED FOR BATHING: A LOT
MOVING TO AND FROM BED TO CHAIR: A LOT
WALKING IN HOSPITAL ROOM: A LOT
MOBILITY SCORE: 14
MOVING FROM LYING ON BACK TO SITTING ON SIDE OF FLAT BED WITH BEDRAILS: A LITTLE
PATIENT BASELINE BEDBOUND: NO
DRESSING REGULAR LOWER BODY CLOTHING: A LOT
DRESSING REGULAR LOWER BODY CLOTHING: A LOT
CLIMB 3 TO 5 STEPS WITH RAILING: A LOT
MOVING FROM LYING ON BACK TO SITTING ON SIDE OF FLAT BED WITH BEDRAILS: A LITTLE
DAILY ACTIVITIY SCORE: 14
TOILETING: A LOT
TURNING FROM BACK TO SIDE WHILE IN FLAT BAD: A LITTLE

## 2024-11-29 ASSESSMENT — LIFESTYLE VARIABLES
SKIP TO QUESTIONS 9-10: 1
HOW OFTEN DO YOU HAVE 6 OR MORE DRINKS ON ONE OCCASION: NEVER
HOW MANY STANDARD DRINKS CONTAINING ALCOHOL DO YOU HAVE ON A TYPICAL DAY: PATIENT DOES NOT DRINK
AUDIT-C TOTAL SCORE: 0
AUDIT-C TOTAL SCORE: 0
HOW OFTEN DO YOU HAVE A DRINK CONTAINING ALCOHOL: NEVER

## 2024-11-29 ASSESSMENT — ACTIVITIES OF DAILY LIVING (ADL)
FEEDING YOURSELF: NEEDS ASSISTANCE
HEARING - RIGHT EAR: FUNCTIONAL
HEARING - LEFT EAR: FUNCTIONAL
TOILETING: NEEDS ASSISTANCE
PATIENT'S MEMORY ADEQUATE TO SAFELY COMPLETE DAILY ACTIVITIES?: YES
GROOMING: NEEDS ASSISTANCE
ADEQUATE_TO_COMPLETE_ADL: YES
JUDGMENT_ADEQUATE_SAFELY_COMPLETE_DAILY_ACTIVITIES: YES
ASSISTIVE_DEVICE: WHEELCHAIR;WALKER
WALKS IN HOME: NEEDS ASSISTANCE
DRESSING YOURSELF: NEEDS ASSISTANCE
LACK_OF_TRANSPORTATION: NO
BATHING: NEEDS ASSISTANCE

## 2024-11-29 ASSESSMENT — PAIN - FUNCTIONAL ASSESSMENT: PAIN_FUNCTIONAL_ASSESSMENT: 0-10

## 2024-11-29 ASSESSMENT — PAIN SCALES - GENERAL
PAINLEVEL_OUTOF10: 0 - NO PAIN
PAINLEVEL_OUTOF10: 0 - NO PAIN

## 2024-11-29 ASSESSMENT — PATIENT HEALTH QUESTIONNAIRE - PHQ9
1. LITTLE INTEREST OR PLEASURE IN DOING THINGS: NOT AT ALL
SUM OF ALL RESPONSES TO PHQ9 QUESTIONS 1 & 2: 0
2. FEELING DOWN, DEPRESSED OR HOPELESS: NOT AT ALL

## 2024-11-29 NOTE — PROGRESS NOTES
Speech-Language Pathology                 Therapy Communication Note    Patient Name: Desirae Gregg  MRN: [id number]  Department: [address]  Room: 249/[facility]  Today's Date: 11/29/2024     Discipline: Speech Language Pathology    Missed Visit Reason: Missed Time Reason: Patient fatigued and unarousable in bed.     Missed Time: Attempt    Comment: Pt currently on full mildly-thick liquid diet; coughing on thin liquids in ED per chart review. Pt fully asleep in bed and unarousable. Family at bedside and asking to defer evaluation until later; states pt did eat breakfast late and wants to sleep now. Discussed above with nurse Delcid. Nurse states pt did take pills ok this am crushed in puree. ST to follow up as able.

## 2024-11-29 NOTE — PROGRESS NOTES
Vancomycin Dosing by Pharmacy- FOLLOW UP    Desirae Gregg is a 84 y.o. year old female who Pharmacy has been consulted for vancomycin dosing for cellulitis, skin and soft tissue. Based on the patient's indication and renal status this patient is being dosed based on a goal trough/random level of 10-15.     Renal function is currently declining.    Current vancomycin dose: dose by level    Most recent random level: 14.5 mcg/mL    Visit Vitals  /59 (BP Location: Right arm, Patient Position: Lying)   Pulse 69   Temp 36.1 °C (96.9 °F)   Resp 20        Lab Results   Component Value Date    CREATININE 2.24 (H) 2024    CREATININE 2.69 (H) 2024    CREATININE 1.02 2024    CREATININE 1.04 2024        Patient weight is as follows:   Vitals:    24 0323   Weight: 73.5 kg (162 lb)       Cultures:  No results found for the encounter in last 14 days.       I/O last 3 completed shifts:  In: 6008.3 (81.8 mL/kg) [P.O.:240; I.V.:2068.3 (28.1 mL/kg); IV Piggyback:3700]  Out: 280 (3.8 mL/kg) [Urine:280 (0.1 mL/kg/hr)]  Weight: 73.5 kg   I/O during current shift:  No intake/output data recorded.    Temp (24hrs), Av.3 °C (97.4 °F), Min:36.1 °C (96.9 °F), Max:36.6 °C (97.9 °F)      Assessment/Plan    Within goal random/trough level  Send 500mg x1 dose.  The next level will be obtained on  at 5a. May be obtained sooner if clinically indicated.   Will continue to monitor renal function daily while on vancomycin and order serum creatinine at least every 48 hours if not already ordered.  Follow for continued vancomycin needs, clinical response, and signs/symptoms of toxicity.       Manuel Mancini, PharmD

## 2024-11-29 NOTE — PROGRESS NOTES
Desirae Gregg is a 84 y.o. female on day 1 of admission presenting with Altered mental status, unspecified altered mental status type.    Subjective   Interval History: no fever, no new complaints        Review of Systems    Objective   Range of Vitals (last 24 hours)  Heart Rate:  [68-75]   Temp:  [35.4 °C (95.8 °F)-36.6 °C (97.9 °F)]   Resp:  [15-20]   BP: ()/(53-79)   Weight:  [72.9 kg (160 lb 11.5 oz)]   SpO2:  [91 %-94 %]   Daily Weight  11/29/24 : 72.9 kg (160 lb 11.5 oz)    Body mass index is 26.74 kg/m².    Physical Exam  Constitutional:       Appearance: Normal appearance.   HENT:      Head: Normocephalic and atraumatic.      Mouth/Throat:      Mouth: Mucous membranes are moist.      Pharynx: Oropharynx is clear.   Eyes:      Pupils: Pupils are equal, round, and reactive to light.   Cardiovascular:      Rate and Rhythm: Normal rate and regular rhythm.      Heart sounds: Normal heart sounds.   Pulmonary:      Effort: Pulmonary effort is normal.      Breath sounds: Normal breath sounds.   Abdominal:      General: Abdomen is flat. Bowel sounds are normal.      Palpations: Abdomen is soft.   Musculoskeletal:      Cervical back: Normal range of motion.         Antibiotics  cefepime - 1 gram/50 mL  vancomycin    Relevant Results  Labs  Results from last 72 hours   Lab Units 11/29/24  0533 11/28/24  0420   WBC AUTO x10*3/uL 18.7* 22.0*   HEMOGLOBIN g/dL 13.1 15.4   HEMATOCRIT % 45.3 51.8*   PLATELETS AUTO x10*3/uL 301 382   NEUTROS PCT AUTO %  --  85.4   LYMPHS PCT AUTO %  --  7.0   MONOS PCT AUTO %  --  3.2   EOS PCT AUTO %  --  1.7     Results from last 72 hours   Lab Units 11/29/24  0533 11/28/24  0420   SODIUM mmol/L 136 137   POTASSIUM mmol/L 3.6 4.9   CHLORIDE mmol/L 106 101   CO2 mmol/L 22 26   BUN mg/dL 29* 33*   CREATININE mg/dL 2.24* 2.69*   GLUCOSE mg/dL 125* 80   CALCIUM mg/dL 6.7* 7.9*   ANION GAP mmol/L 12 15   EGFR mL/min/1.73m*2 21* 17*     Results from last 72 hours   Lab Units 11/28/24  0275    ALK PHOS U/L 64   BILIRUBIN TOTAL mg/dL 1.0   PROTEIN TOTAL g/dL 4.4*   ALT U/L 9   AST U/L 30   ALBUMIN g/dL 2.7*     Estimated Creatinine Clearance: 18 mL/min (A) (by C-G formula based on SCr of 2.24 mg/dL (H)).  C-Reactive Protein   Date Value Ref Range Status   11/15/2024 0.80 <1.00 mg/dL Final   11/12/2024 1.04 (H) <1.00 mg/dL Final   11/11/2024 0.86 <1.00 mg/dL Final     Microbiology  Reviewed  Imaging  Reviewed        Assessment/Plan   Sepsis, 1 BC with g+ve cocci  Left shin wound infection  Respiratory failure / atelectasis  Pyuria  Encephalopathy  Polycythemia / myelofibrosis     Recommendations :  Continue Cefepime and Vancomycin  Discussed with the wound care team      I spent minutes in the professional and overall care of this patient.      Cameron Lassiter MD

## 2024-11-29 NOTE — PROGRESS NOTES
11/29/24 1448   Discharge Planning   Living Arrangements Other (Comment)  (From [facility])   Support Systems Children;Family members   Assistance Needed Per SNF, At baseline:, Patient is A&O 2-3(forgetful at times), requires assistance with ADLs, wound care as follows - Cleanse with NSS, pat dry, apply Anasept mixed with Collagen powder, cover with Calcium Alginate ABD pad and wrap with Kerlix daily and PRN. Patient admitted to [facility] on 11-19. Prior to SNF patient was from home with son.   Type of Residence Skilled nursing facility   Do you have animals or pets at home? No   Who is requesting discharge planning? Provider   Home or Post Acute Services Post acute facilities (Rehab/SNF/etc)   Type of Post Acute Facility Services Skilled nursing   Expected Discharge Disposition SNF  (From [facility], dc plan needs discussed with POA/patient after discussion with palliative care team. [facility] not [facility] bed.)   Does the patient need discharge transport arranged? Yes   RoundTrip coordination needed? Yes   Has discharge transport been arranged? No   Intensity of Service   Intensity of Service 0-30 min

## 2024-11-29 NOTE — ASSESSMENT & PLAN NOTE
Management as noted above  Etiology possible bacteremia.    Bacteremia: culture growing gram positive cocci. Unsure of source of infection  Will await for final culture and sensitivity.    Left Lower leg wound:  abx as ordered. ID following. Podiatry following.  Appreciate wound care and surgical recommendation per podiatry team    Dysphagia: speech therapy consult. Diet consistency per speech recommendation.     Debility/Deconditioning:  out of bed to chair as able and as tolerated    Acute on chronic Kidney disease:  renal function is gradually improving. Continue IV fluid at a lower rate    Hypothyroidism:  on synthroid.     GERD: on PPI

## 2024-11-29 NOTE — PROGRESS NOTES
Desirae Gregg is a 84 y.o. female on day 1 of admission presenting with Altered mental status, unspecified altered mental status type.      Subjective     Mrs. Gregg was asleep, but her family is at bedside. Explained treatment plans    Objective     Last Recorded Vitals  /59 (BP Location: Right arm, Patient Position: Lying)   Pulse 69   Temp 36.1 °C (96.9 °F)   Resp 20   Wt 73.5 kg (162 lb)   SpO2 94%   Intake/Output last 3 Shifts:    Intake/Output Summary (Last 24 hours) at 11/29/2024 0816  Last data filed at 11/29/2024 0600  Gross per 24 hour   Intake 2408.33 ml   Output 200 ml   Net 2208.33 ml       Admission Weight  Weight: 73.5 kg (162 lb) (11/28/24 0323)    Daily Weight  11/28/24 : 73.5 kg (162 lb)    Image Results  CT head wo IV contrast  Narrative: Interpreted By:  Finkelstein, Evan,   STUDY:  CT HEAD WO IV CONTRAST;  11/28/2024 5:07 am      INDICATION:  Signs/Symptoms:ams.          COMPARISON:  None.      ACCESSION NUMBER(S):  [id number]      ORDERING CLINICIAN:  GAYLA RUVALCABA      TECHNIQUE:  Axial noncontrast CT images of the head with coronal and sagittal  reconstructions.      FINDINGS:  EXTRACRANIAL SOFT TISSUES: Unremarkable.      CALVARIUM: No depressed skull fracture. No destructive osseous lesion.      PARANASAL SINUSES/MASTOIDS: Partial opacification of the maxillary  sinuses. Mastoid air cells are aerated.      HEMORRHAGE: No acute intracranial hemorrhage.      BRAIN PARENCHYMA: Gray-white matter interfaces are preserved. No mass  effect or midline shift. There are nonspecific scattered white matter  hypodensities.      VENTRICLES and EXTRA-AXIAL SPACES: Parenchymal atrophy with  prominence of the ventricles and cortical sulci.      OTHER FINDINGS: There are calcifications within the cavernous carotids      Impression: No acute intracranial hemorrhage, mass effect or midline shift.      Nonspecific scattered white matter hypodensities favored to represent  sequela of small vessel  ischemia.              MACRO:  None.      Signed by: Evan Finkelstein 11/28/2024 5:24 AM  Dictation workstation:   [id number]  XR tibia fibula left 2 views  Narrative: Interpreted By:  Jayjay Keyes,   STUDY:  XR TIBIA FIBULA LEFT 2 VIEWS; ;  11/28/2024 4:14 am      INDICATION:  Signs/Symptoms:wound.          COMPARISON:  None.      ACCESSION NUMBER(S):  [id number]      ORDERING CLINICIAN:  GAYLA RUVALCABA      FINDINGS:  Four views of the left tibia and fibula are provided for  interpretation.      Appears to be a soft tissue wound along the posterolateral aspect of  the calf without evidence of radiopaque foreign body. Visualized bony  structures are unremarkable in appearance without evidence of acute  fracture, traumatic malalignment or aggressive osseous lesions.  Visualized left knee and left ankle joints are preserved.      Impression: Soft tissue wound along the posterolateral aspect of the calf,  without evidence of acute bony abnormality.          MACRO:  None      Signed by: Jayjay Keyes 11/28/2024 4:21 AM  Dictation workstation:   [id number]  XR chest 1 view  Narrative: Interpreted By:  Jayjay Keyes,   STUDY:  XR CHEST 1 VIEW;  11/28/2024 4:14 am      INDICATION:  Signs/Symptoms:AMS.          COMPARISON:  Radiographs of the chest dated 11/18/2024.      ACCESSION NUMBER(S):  [id number]      ORDERING CLINICIAN:  GAYLA RUVALCABA      FINDINGS:  AP radiograph of the chest was provided.              CARDIOMEDIASTINAL SILHOUETTE:  Cardiomediastinal silhouette is mildly enlarged, similar in  appearance to prior exam.      LUNGS:  There are low lung volumes with bronchovascular crowding and  bibasilar atelectasis. No consolidation or sizable pleural effusion  is present.      ABDOMEN:  No remarkable upper abdominal findings.      BONES:  No acute osseous changes.      Impression: 1.  Low lung volumes with bronchovascular crowding and bibasilar  atelectasis. No consolidation or sizable  pleural effusion is present.              MACRO:  None      Signed by: Jayjay Pascualmanpreet 11/28/2024 4:19 AM  Dictation workstation:   [id number]      Physical Exam  Constitutional:       Appearance: She is obese.   HENT:      Head: Normocephalic.      Nose: Nose normal.      Mouth/Throat:      Mouth: Mucous membranes are dry.      Pharynx: Oropharynx is clear.   Eyes:      Conjunctiva/sclera: Conjunctivae normal.      Pupils: Pupils are equal, round, and reactive to light.   Cardiovascular:      Rate and Rhythm: Normal rate and regular rhythm.   Pulmonary:      Effort: Pulmonary effort is normal.      Breath sounds: Normal breath sounds.   Abdominal:      General: Bowel sounds are normal.      Palpations: Abdomen is soft.   Skin:     General: Skin is warm.      Capillary Refill: Capillary refill takes more than 3 seconds.   Neurological:      Mental Status: She is alert and oriented to person, place, and time. Mental status is at baseline.   Psychiatric:         Mood and Affect: Mood normal.         Behavior: Behavior normal.         Relevant Results               Assessment/Plan          This patient has a urinary catheter   Reason for the urinary catheter remaining today? urinary retention/bladder outlet obstruction, acute or chronic      Assessment & Plan  Altered mental status, unspecified altered mental status type  Improved mentation  Continue current treatment.   Suspected infection is the etiology.     Sepsis without acute organ dysfunction (Multi)  Noted gram negative bacteremia  ID consult  Acute metabolic encephalopathy  Management as noted above  Etiology possible bacteremia.    Bacteremia: culture growing gram positive cocci. Unsure of source of infection  Will await for final culture and sensitivity.    Left Lower leg wound:  abx as ordered. ID following. Podiatry following.  Appreciate wound care and surgical recommendation per podiatry team    Dysphagia: speech therapy consult. Diet consistency  per speech recommendation.     Debility/Deconditioning:  out of bed to chair as able and as tolerated    Acute on chronic Kidney disease:  renal function is gradually improving. Continue IV fluid at a lower rate    Hypothyroidism:  on synthroid.     GERD: on PPI      Michael De Guzman MD

## 2024-11-29 NOTE — PROGRESS NOTES
Desirae Gregg is a 84 y.o. female on day 1 of admission presenting with Altered mental status, unspecified altered mental status type.    Subjective   Much more alert this AM.  Not complaining of pain to left lower leg.     Meds:  Scheduled medications  albuterol, 2.5 mg, nebulization, TID  apixaban, 2.5 mg, oral, BID  cefepime, 1 g, intravenous, q12h  folic acid, 1 mg, oral, Daily  lactobacillus acidophilus, 1 capsule, oral, Daily  levothyroxine, 88 mcg, oral, Daily  midodrine, 5 mg, oral, TID  nystatin, , Topical, BID  oxygen, , inhalation, Continuous - Inhalation  pantoprazole, 40 mg, oral, Daily before breakfast  psyllium, 1 packet, oral, BID      Continuous medications  dextrose 5%-0.45 % sodium chloride, 75 mL/hr      PRN medications  PRN medications: acetaminophen **OR** acetaminophen **OR** acetaminophen, albuterol, bisacodyl, dextrose, dextrose, glucagon, glucagon, loperamide, melatonin, ondansetron **OR** ondansetron, vancomycin, zinc oxide       Physical Exam   Constitutional:       Appearance: She is ill-appearing.   HENT:      Head: Normocephalic and atraumatic.      Mouth/Throat:      Mouth: Mucous membranes are moist.      Pharynx: Oropharynx is clear.   Eyes:      Pupils: Pupils are equal, round, and reactive to light.   Cardiovascular:      Rate and Rhythm: Normal rate and regular rhythm.      Heart sounds: Normal heart sounds.   Pulmonary:      Effort: Pulmonary effort is normal.      Breath sounds: Rales present.   Abdominal:      General: Abdomen is flat. Bowel sounds are normal.      Palpations: Abdomen is soft.   Musculoskeletal:      Cervical back: Normal range of motion.      Comments: ulceration to left anterior shin with 50% slough - measures 8 x 6 x 0.5 cm with undermining to lateral aspect;  no obvious purulence;  small rim of erythema without ascending cellulitis;  no probe to bone;  unable to easily palpate pedal pulses  Neurological:      Mental Status: She is alert.     Last Recorded  "Vitals  Blood pressure 106/79, pulse 75, temperature 36.1 °C (96.9 °F), resp. rate 15, height 1.651 m (5' 5\"), weight 72.9 kg (160 lb 11.5 oz), SpO2 93%.    Intake/Output last 3 Shifts:  I/O last 3 completed shifts:  In: 6008.3 (81.8 mL/kg) [P.O.:240; I.V.:2068.3 (28.1 mL/kg); IV Piggyback:3700]  Out: 280 (3.8 mL/kg) [Urine:280 (0.1 mL/kg/hr)]  Weight: 73.5 kg     Relevant Results   Susceptibility data from last 90 days.  Collected Specimen Info Organism   11/13/24 Swab from Anterior Nares Methicillin Resistant Staphylococcus aureus (MRSA)   CT head wo IV contrast    Result Date: 11/28/2024  Interpreted By:  Finkelstein, Evan, STUDY: CT HEAD WO IV CONTRAST;  11/28/2024 5:07 am   INDICATION: Signs/Symptoms:ams.     COMPARISON: None.   ACCESSION NUMBER(S): [id number]   ORDERING CLINICIAN: GAYLA RUVALCABA   TECHNIQUE: Axial noncontrast CT images of the head with coronal and sagittal reconstructions.   FINDINGS: EXTRACRANIAL SOFT TISSUES: Unremarkable.   CALVARIUM: No depressed skull fracture. No destructive osseous lesion.   PARANASAL SINUSES/MASTOIDS: Partial opacification of the maxillary sinuses. Mastoid air cells are aerated.   HEMORRHAGE: No acute intracranial hemorrhage.   BRAIN PARENCHYMA: Gray-white matter interfaces are preserved. No mass effect or midline shift. There are nonspecific scattered white matter hypodensities.   VENTRICLES and EXTRA-AXIAL SPACES: Parenchymal atrophy with prominence of the ventricles and cortical sulci.   OTHER FINDINGS: There are calcifications within the cavernous carotids       No acute intracranial hemorrhage, mass effect or midline shift.   Nonspecific scattered white matter hypodensities favored to represent sequela of small vessel ischemia.       MACRO: None.   Signed by: Evan Finkelstein 11/28/2024 5:24 AM Dictation workstation:   [id number]    XR tibia fibula left 2 views    Result Date: 11/28/2024  Interpreted By:  Jayjay Keyes, STUDY: XR TIBIA FIBULA LEFT 2 " VIEWS; ;  11/28/2024 4:14 am   INDICATION: Signs/Symptoms:wound.     COMPARISON: None.   ACCESSION NUMBER(S): [id number]   ORDERING CLINICIAN: GAYLA RUVALCABA   FINDINGS: Four views of the left tibia and fibula are provided for interpretation.   Appears to be a soft tissue wound along the posterolateral aspect of the calf without evidence of radiopaque foreign body. Visualized bony structures are unremarkable in appearance without evidence of acute fracture, traumatic malalignment or aggressive osseous lesions. Visualized left knee and left ankle joints are preserved.       Soft tissue wound along the posterolateral aspect of the calf, without evidence of acute bony abnormality.     MACRO: None   Signed by: Jayjay Keyes 11/28/2024 4:21 AM Dictation workstation:   [id number]    XR chest 1 view    Result Date: 11/28/2024  Interpreted By:  Jayjay Keyes, STUDY: XR CHEST 1 VIEW;  11/28/2024 4:14 am   INDICATION: Signs/Symptoms:AMS.     COMPARISON: Radiographs of the chest dated 11/18/2024.   ACCESSION NUMBER(S): [id number]   ORDERING CLINICIAN: GAYLA RUVALCABA   FINDINGS: AP radiograph of the chest was provided.       CARDIOMEDIASTINAL SILHOUETTE: Cardiomediastinal silhouette is mildly enlarged, similar in appearance to prior exam.   LUNGS: There are low lung volumes with bronchovascular crowding and bibasilar atelectasis. No consolidation or sizable pleural effusion is present.   ABDOMEN: No remarkable upper abdominal findings.   BONES: No acute osseous changes.       1.  Low lung volumes with bronchovascular crowding and bibasilar atelectasis. No consolidation or sizable pleural effusion is present.       MACRO: None   Signed by: Jayjay Keyes 11/28/2024 4:19 AM Dictation workstation:   [id number]    Electrocardiogram, 12-lead PRN ACS symptoms    Result Date: 11/23/2024  Sinus rhythm with Premature atrial complexes Left bundle branch block Abnormal ECG When compared with ECG of 08-NOV-2024  17:49, (unconfirmed) No significant change was found Confirmed by Mckay Iriazrry ([id number]) on 11/23/2024 9:24:22 AM    Electrocardiogram, 12-lead PRN ACS symptoms    Result Date: 11/23/2024  Normal sinus rhythm Premature atrial complexes Left bundle branch block Abnormal ECG When compared with ECG of 03-NOV-2024 09:20, (unconfirmed) QRS axis Shifted right Nonspecific T wave abnormality has replaced inverted T waves in Inferior leads Confirmed by Mckay Irizarry ([id number]) on 11/23/2024 9:22:55 AM    XR chest 1 view    Result Date: 11/18/2024  Interpreted By:  Zain Mcknight, STUDY: XR CHEST 1 VIEW; 11/18/2024 6:36 am   INDICATION: Signs/Symptoms:pneumonia   COMPARISON: Radiographs 11/12/2024   ACCESSION NUMBER(S): [id number]   ORDERING CLINICIAN: BIPIN SMITH   TECHNIQUE: Single frontal view of the chest performed.   FINDINGS: LINES AND DEVICES: None.   LUNGS: Decreased size now small left and nearly resolved right pleural effusions with atelectasis. No new focal consolidation pulmonary edema or pneumothorax.   CARDIOMEDIASTINAL SILHOUETTE: The cardiomediastinal silhouette is stable with obscured left heart border by adjacent effusion.       Decreased small left and nearly resolved right pleural effusions.   MACRO None   Signed by: Zain Mcknight 11/18/2024 2:19 PM Dictation workstation:   [id number]    ECG 12 lead    Result Date: 11/18/2024  Sinus rhythm with occasional Premature ventricular complexes and Premature atrial complexes Left axis deviation Left bundle branch block Abnormal ECG When compared with ECG of 08-NOV-2024 17:50, (unconfirmed) Premature ventricular complexes are now Present    ECG 12 lead    Result Date: 11/14/2024  Sinus tachycardia Left axis deviation Left bundle branch block Abnormal ECG No previous ECGs available See ED provider note for full interpretation and clinical correlation Confirmed by Edwin Medley ([id number]) on 11/14/2024 3:17:28 AM    Vascular US lower extremity  venous duplex bilateral    Result Date: 11/13/2024          [facility] [address] [address]  Tel [phone] and Fax [phone]  Vascular Lab Report [facility] LOWER EXTREMITY VENOUS DUPLEX BILATERAL  Patient Name:      ELIE KING Kalyn Physician: [id number] Haresh Childs MD Study Date:        11/12/2024           Ordering           [id number] BIPIN ESPINOSA                                         Physician:         SARAH MRN/PID:           [id number]             Technologist:      Alexandra Dale RVT Accession#:        [id number]         Technologist 2: Date of Birth/Age: 1940 / 83      Encounter#:        [id number]                    years Gender:            F Admission Status:  Inpatient            Location           [facility]                                         Performed:  Diagnosis/ICD: Compression of vein-I87.1 CPT Codes:     48151 Peripheral venous duplex scan for DVT complete  **CRITICAL RESULT** Critical Result: DVT right femoral vein, age indeterminate thrombus in right popliteal, right posterior tibial and right peroneal veins. DVT left popliteal vein Notification called to Bipin Agarwal MD on 11/12/2024 at 1:28:12 PM by DVT.  CONCLUSIONS: Right Lower Venous: There is acute occlusive deep vein thrombosis visualized in the mid femoral and distal femoral veins. There is acute non-occlusive deep vein thrombosis visualized in the proximal femoral vein. There is age indeterminate deep vein thrombosis visualized in the popliteal, posterior tibial and peroneal veins. Left Lower Venous: There is acute non-occlusive deep vein thrombosis visualized in the popliteal vein. The remainder of the left leg is negative for deep vein thrombosis. Left posterior tibial and peroneal veins poorly visualized due to edema and bandages; cannot rule out deep vein thrombosis in non-visualized vessels.  Additional Findings: Non-vascularized structure noted in right popliteal fossa  measuring 7.2 cm x 1.6 cm. Technically difficult exam due to edema and patient positioning.  Imaging & Doppler Findings:  Right                 Compressible      Thrombus              Flow Distal External Iliac                                  Spontaneous/Phasic CFV                       Yes             None         Spontaneous/Phasic PFV                       Yes             None         Spontaneous/Phasic FV Proximal             Partial    Acute non-occlusive Spontaneous/Phasic FV Mid                     No        Acute occlusive FV Distal                  No        Acute occlusive Popliteal               Partial           ? Age        Spontaneous/Phasic Peroneal                Partial           ? Age PTV                     Partial           ? Age  Left                  Compress      Thrombus              Flow Distal External Iliac                              Spontaneous/Phasic CFV                     Yes           None         Spontaneous/Phasic PFV                     Yes           None FV Proximal             Yes           None         Spontaneous/Phasic FV Mid                  Yes           None FV Distal               Yes           None Popliteal             Partial  Acute non-occlusive Spontaneous/Phasic  [id number] Haresh Childs MD Electronically signed by [id number] Haresh Childs MD on 11/13/2024 at 7:49:11 AM  ** Final **     XR chest 1 view    Result Date: 11/12/2024  Interpreted By:  Alfonso Ricci, STUDY: XR CHEST 1 VIEW;  11/12/2024 9:29 am   INDICATION: Signs/Symptoms:follow up pneumonia.   COMPARISON: 11/08/2024   ACCESSION NUMBER(S): [id number]   ORDERING CLINICIAN: BIPIN SMITH   FINDINGS: There is increased opacification of the left lower lung with blunted costophrenic angle indicating a moderate effusion probably with underlying atelectasis or consolidation. There is mild prominence of the central pulmonary vasculature, and while accentuated from a shallow inspiration is probably from mild congestion. The cardiac  size appears to be within normal limits considering the left lower cardiac margin is obscured.   ABDOMEN AND OTHER FINDINGS: No remarkable upper abdominal findings.   BONES: No acute osseous changes.       1.  Increased left lower lung airspace disease and probable congestion.   Signed by: Alfonso Ricci 11/12/2024 5:25 PM Dictation workstation:   [id number]    FL GI esophagram    Result Date: 11/11/2024  Interpreted By:  Zain Mcknight, STUDY: FL GI ESOPHAGRAM;  11/11/2024 9:24 am   INDICATION: Signs/Symptoms:dysphagia.   COMPARISON: CT abdomen and pelvis 11/03/2024   ACCESSION NUMBER(S): [id number]   ORDERING CLINICIAN: SYED JOSHI   TECHNIQUE: Modified single contrast barium swallow due to patient's clinical condition. Fluoro time: 15 seconds.   FINDINGS: : Small bilateral pleural effusions.   Contrast opacifies the esophagus and transit into the stomach without obstruction or extraluminal extravasation.       Modified exam due to patient's clinical condition. No esophageal obstruction.   LETHA Mcknight discussed the significance and urgency of this critical finding by Epic secure chat with  SYED JOSHI on 11/11/2024 at 10:56 am.  (**-RCF-**) Findings:  See findings.   Signed by: Zain Mcknight 11/11/2024 11:02 AM Dictation workstation:   [id number]    XR chest 1 view    Result Date: 11/8/2024  Interpreted By:  Yazmin Oropeza, STUDY: XR CHEST 1 VIEW; ;  11/8/2024 10:10 am   INDICATION: Signs/Symptoms:hypoxia.     COMPARISON: Chest radiograph dated 11/03/2024.   ACCESSION NUMBER(S): [id number]   ORDERING CLINICIAN: PAULETTE CARRANZA   FINDINGS: Cardiac silhouette is mildly enlarged but is similar compared to prior radiograph. Again noted is moderate volume right-sided pleural effusion with associated hazy airspace opacity in the right mid to lower lung zone. There is also reticular airspace opacity in the right lung base, which could represent atelectasis versus pneumonia. There is also  obliteration of left costophrenic angle concerning for small left pleural effusion. There is mild prominence of interstitial markings in bilateral lungs concerning for mild interstitial edema. No large pneumothorax within limits of portable technique. No acute osseous findings.       1. Suggestion of mild interstitial edema, more pronounced on this radiograph compared to prior. Recommend correlation with fluid status. 2. Redemonstration of moderate right and small left pleural effusions. 3. Right basilar airspace opacity, which could be related to atelectasis versus pneumonia in appropriate clinical setting.   MACRO: None   Signed by: Yazmin Oropeza 11/8/2024 4:17 PM Dictation workstation:   [id number]    Transthoracic Echo (TTE) Limited    Result Date: 11/5/2024   [facility], [address] [place] [address]               Tel [phone] and Fax [phone] TRANSTHORACIC ECHOCARDIOGRAM REPORT  Patient Name:       ELIE Mccain Physician:    [id number] Chicho Goldman MD Study Date:         11/5/2024           Ordering Provider:    [id number] SHERITA GOYAL MRN/PID:            [id number]            Fellow: Accession#:         [id number]        Nurse: Date of Birth/Age:  1940 / 83     Sonographer:          Cathi garcia RDCS Gender assigned at  F                   Additional Staff: Birth: Height:             165.10 cm           Admit Date:           11/3/2024 Weight:             72.57 kg            Admission Status:     Inpatient -                                                               Routine BSA / BMI:          1.80 m2 / 26.63     Encounter#:           [id number]                     kg/m2 Blood Pressure:     103/62 mmHg         Department Location:  [facility]                                                                Invasive Study Type:    TRANSTHORACIC ECHO (TTE) LIMITED Diagnosis/ICD: 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 Study Detail: The following Echo studies were performed: 2D, Doppler and color               flow.  PHYSICIAN INTERPRETATION: Left Ventricle: The left ventricular systolic function is normal, with a visually estimated ejection fraction of 60-65%. There is severely increased concentric left ventricular hypertrophy. There are no regional left ventricular wall motion abnormalities. The left ventricular cavity size is normal. There is moderately increased septal and moderately increased posterior left ventricular wall thickness. Spectral Doppler shows an abnormal pattern of left ventricular diastolic filling. Left Atrium: The left atrium is upper limits of normal in size. Right Ventricle: The right ventricle is normal in size. There is normal right ventricular global systolic function. Right Atrium: The right atrium is normal in size. Aortic Valve: The aortic valve is trileaflet. The aortic valve dimensionless index is 0.77. There is trace aortic valve regurgitation. The peak instantaneous gradient of the aortic valve is 13 mmHg. The mean gradient of the mitral valve is 7 mmHg. Mitral Valve: The mitral valve is normal in structure. There is trace to mild mitral valve regurgitation. Tricuspid Valve: The tricuspid valve is structurally normal. There is trace to mild tricuspid regurgitation. Pulmonic Valve: The pulmonic valve is not well visualized. The pulmonic valve regurgitation was not well visualized. Pericardium: There is no pericardial effusion noted. Aorta:  The aortic root is normal.  CONCLUSIONS:  1. The left ventricular systolic function is normal, with a visually estimated ejection fraction of 60-65%.  2. Spectral Doppler shows an abnormal pattern of left ventricular diastolic filling.  3. There is moderately increased septal and moderately increased posterior left ventricular wall thickness.  4. There is severely increased concentric left ventricular hypertrophy.  5. There is normal right ventricular global systolic function. QUANTITATIVE DATA SUMMARY:  2D MEASUREMENTS:          Normal Ranges: IVSd:            1.49 cm  (0.6-1.1cm) LVPWd:           1.26 cm  (0.6-1.1cm) LVIDd:           4.47 cm  (3.9-5.9cm) LVIDs:           2.98 cm LV Mass Index:   133 g/m2 LVEDV Index:     23 ml/m2 LV % FS          33.4 %  LV SYSTOLIC FUNCTION BY 2D PLANIMETRY (MOD):                      Normal Ranges: EF-A4C View:    62 % (>=55%) EF-A2C View:    61 % EF-Biplane:     64 % EF-Visual:      63 % LV EF Reported: 63 %  LV DIASTOLIC FUNCTION:          Normal Ranges: MV Peak E:             0.70 m/s (0.7-1.2 m/s) MV Peak A:             0.91 m/s (0.42-0.7 m/s) E/A Ratio:             0.77     (1.0-2.2)  MITRAL VALVE:          Normal Ranges: MV DT:        264 msec (150-240msec)  AORTIC VALVE:                      Normal Ranges: AoV Vmax:                1.78 m/s  (<=1.7m/s) AoV Peak P.7 mmHg (<20mmHg) AoV Mean P.0 mmHg  (1.7-11.5mmHg) LVOT Max Josiah:            1.33 m/s  (<=1.1m/s) AoV VTI:                 31.31 cm  (18-25cm) LVOT VTI:                24.11 cm LVOT Diameter:           2.04 cm   (1.8-2.4cm) AoV Area, VTI:           2.52 cm2  (2.5-5.5cm2) AoV Area,Vmax:           2.44 cm2  (2.5-4.5cm2) AoV Dimensionless Index: 0.77  TRICUSPID VALVE/RVSP:          Normal Ranges: Peak TR Velocity:     3.46 m/s  PULMONIC VALVE:          Normal Ranges: PV Max Josiah:     0.8 m/s  (0.6-0.9m/s) PV Max P.8 mmHg  [id number] Chicho Goldman MD Electronically signed on 2024  at 2:09:49 PM  ** Final **     CT abdomen pelvis wo IV contrast    Result Date: 11/4/2024  Interpreted By:  Jayshree Dias, STUDY: CT ABDOMEN PELVIS WO IV CONTRAST;  11/4/2024 3:04 pm   INDICATION: Signs/Symptoms:recurrent urosepsis, eval for retained stone urinary tract.   COMPARISON: None.   ACCESSION NUMBER(S): [id number]   ORDERING CLINICIAN: SHERITA GOYAL   TECHNIQUE: CT of the abdomen and pelvis was performed without intravenous contrast. Sagittal and coronal reconstructions were generated.   FINDINGS: LOWER CHEST: There are bilateral pleural effusions. There is a small pericardial effusion. There is bilateral basilar atelectasis or infiltrate.   ABDOMEN:   LIVER: Unremarkable   BILE DUCTS: Unremarkable   GALLBLADDER: Status post cholecystectomy   PANCREAS: Unremarkable   SPLEEN: The spleen is enlarged.   ADRENAL GLANDS: There are no adrenal masses.   KIDNEYS AND URETERS: Kidneys are normal size and not obstructed.   There are no renal calculi.   Ureters are normal caliber.   PELVIS:   BLADDER: The bladder is empty. There is a 2 centimeters bladder stone.   REPRODUCTIVE ORGANS: Patient is status post hysterectomy. There appear to be surgical clips in the pelvis.   BOWEL: There is no significant bowel distention. There is motion artifact. There are sigmoid diverticula.   VESSELS: There are atherosclerotic changes of the aorta. The cava is unremarkable   PERITONEUM/RETROPERITONEUM/LYMPH NODES: There is a moderate amount of ascites. There is presacral edema. There is no obvious free air.   There is no significant lymphadenopathy.   BONES AND ABDOMINAL WALL: There is anasarca.   There are old left pelvic fractures peer there are degenerative changes of the spine. There is loss of height of L1.   COMPARISON OF FINDINGS:       2 centimeters stone in the urinary bladder.   Bilateral pleural effusions. Pericardial effusion.   Ascites.   Anasarca.   Presacral edema.   Enlarged spleen.   The exam is limited by motion  artifact.   MACRO: none   Signed by: Jayshree Dias 11/4/2024 3:28 PM Dictation workstation:   [id number]    US renal complete    Result Date: 11/4/2024  Interpreted By:  Zain Mcknight, STUDY: US RENAL COMPLETE; 11/4/2024 9:03 am   INDICATION: Signs/Symptoms:LEVY on CKD, eval postrenal causes.   COMPARISON: None.   ACCESSION NUMBER(S): [id number]   ORDERING CLINICIAN: SHERITA GOYAL   TECHNIQUE: Sonography of the kidneys and urinary bladder was performed.   FINDINGS: Right Kidney: *Renal length: 10 cm *Parenchyma: Normal parenchymal echogenicity. Normal parenchymal thickness. *Collecting system: No hydronephrosis. *Calculus: No echogenic, shadowing calculus. *Lesion: None.   Left Kidney: *Renal length: 10 cm *Parenchyma: Normal parenchymal echogenicity. Normal parenchymal thickness. *Collecting system: No hydronephrosis. *Calculus: Nonobstructing calculus in the upper pole measuring 4 mm. *Lesion: None.   Bladder: Decompressed.   Other: Trace perihepatic, perisplenic and pelvic free fluid/ascites.       No hydronephrosis. Decompressed bladder.   MACRO: None   Signed by: Zain Mcknight 11/4/2024 11:08 AM Dictation workstation:   [id number]    CT chest wo IV contrast    Result Date: 11/3/2024  Interpreted By:  Matthias Kolb, STUDY: CT CHEST WO IV CONTRAST;  11/3/2024 11:16 am   INDICATION: Signs/Symptoms:sepsis, ? atelectasis versus PNA on CXR without classic symptoms.     COMPARISON: Portable chest earlier same day 3 November 2024 at 0957 hours; CT lumbar spine without contrast 5 July 2024   ACCESSION NUMBER(S): [id number]   ORDERING CLINICIAN: SHERITA GOYAL   TECHNIQUE: Helical CT chest from thoracic inlet through the hemidiaphragms without intravenous contrast   FINDINGS: LUNGS / AIRSPACES / AIRWAYS:   LARGE AIRWAYS Filling defect: Negative Wall thickening: Negative Bronchiectasis: Negative Other: N/A   AIRSPACES Fibrosis: Negative Emphysema: Negative Consolidation: Negative Ground glass airspace disease: Small  region of ground-glass airspace change in the middle lobe and potentially in portions of the inflated right lower lobe. None in the left lung noting one segment of left lower lobe collapse Edema: Negative Nodule / Mass: Negative Other: One segment of left lower lobe collapse. Multiple segments of right lower lobe collapse   PLEURA: Effusion:  Moderate size mostly if not entirely free flowing right; small free-flowing left Pneumothorax:  Both sides negative Other:  n/a   CARDIOVASCULAR: Heart size:  Mildly enlarged Pericardial effusion:  Perhaps trace Thoracic aortic aneurysm:  Negative Pulmonary arteries:  Static Heart failure change:  Negative.  No sign of interstitial or alveolar edema. Other:  Unusual hypodensities in the right ventricular free wall for example soft tissue windows axial series 202, image 167 and the surrounding few images   NONVASCULAR MEDIASTINUM: Esophagus:  Grossly normal by CT Mediastinal Mass:  Negative Hiatal hernia:  None Other:  n/a   LYMPH NODES: No thoracic adenopathy   CHEST WALL: Soft tissues of the chest wall are unremarkable   SKELETON: No acute or contributory abnormality   UPPER ABDOMEN: Small volume ascites pooling about the included liver and spleen. Spleen probably mildly enlarged but not completely included in the field of view. Liver not overtly cirrhotic but at least borderline fatty. The only pertinent comparison exam for the upper abdomen would be CT lumbar spine without contrast 5 July 2024 at which time there was not any ascites in the field of view, but the field of view did not include the portions of the abdomen where they ascites is presently located. It is unlikely there was ascites in July, 2024 as more of the lower abdomen was included on that exam and it did not appear that there is any ascites tracking down into (or up from) the pelvis       Abnormalities at the right lung base on portable frontal chest radiograph earlier today are due to a combination of  moderate-sized, mostly if not entirely free-flowing right pleural effusion with associated multisegmental right lower lobe collapse adjacent to it   Small area of ground-glass airspace disease in the middle lobe confirmed on CT may not have been expected on the prior radiograph   Possibility of mild ground-glass airspace disease in the right lower lobe as well   No ground-glass airspace disease in the left lung   No consolidative pneumonia in any of the inflated lungs on either side   Small free-flowing left pleural effusion   Unusual hypodensities in the right ventricular free wall of uncertain etiology and significance   Perhaps trace (smallest detectable quantity) pericardial effusion   Incidental abnormalities in the included upper abdomen as detailed above   MACRO: None   Signed by: Matthias Kolb 11/3/2024 11:35 AM Dictation workstation:   [id number]    XR chest 1 view    Result Date: 11/3/2024  Interpreted By:  Kary Cardoso, STUDY: XR CHEST 1 VIEW 11/3/2024 10:06 am   INDICATION: Signs/Symptoms:cough and fever   COMPARISON: None   ACCESSION NUMBER(S): [id number]   ORDERING CLINICIAN: ESTHER ROBBINS   TECHNIQUE: AP view   FINDINGS: There is right lower lobe airspace consolidation and a right pleural effusion. There is an enlarged cardiac silhouette. Pulmonary vascularity is normal. Degenerative changes are noted in both shoulders. No sign of pneumothorax       1. Right lower lobe airspace consolidation either pneumonia or atelectasis with right pleural effusion. Follow-up to complete resolution is needed 2. Enlarged cardiac silhouette     Signed by: Kary Cardoso 11/3/2024 10:27 AM Dictation workstation:   [id number]   Results for orders placed or performed during the hospital encounter of 11/28/24 (from the past 24 hours)   POCT GLUCOSE   Result Value Ref Range    POCT Glucose 101 (H) 74 - 99 mg/dL   POCT GLUCOSE   Result Value Ref Range    POCT Glucose 140 (H) 74 - 99 mg/dL   POCT GLUCOSE   Result Value Ref  Range    POCT Glucose 112 (H) 74 - 99 mg/dL   CBC   Result Value Ref Range    WBC 18.7 (H) 4.4 - 11.3 x10*3/uL    nRBC 0.1 (H) 0.0 - 0.0 /100 WBCs    RBC 4.79 4.00 - 5.20 x10*6/uL    Hemoglobin 13.1 12.0 - 16.0 g/dL    Hematocrit 45.3 36.0 - 46.0 %    MCV 95 80 - 100 fL    MCH 27.3 26.0 - 34.0 pg    MCHC 28.9 (L) 32.0 - 36.0 g/dL    RDW 18.0 (H) 11.5 - 14.5 %    Platelets 301 150 - 450 x10*3/uL   Basic metabolic panel   Result Value Ref Range    Glucose 125 (H) 74 - 99 mg/dL    Sodium 136 136 - 145 mmol/L    Potassium 3.6 3.5 - 5.3 mmol/L    Chloride 106 98 - 107 mmol/L    Bicarbonate 22 21 - 32 mmol/L    Anion Gap 12 10 - 20 mmol/L    Urea Nitrogen 29 (H) 6 - 23 mg/dL    Creatinine 2.24 (H) 0.50 - 1.05 mg/dL    eGFR 21 (L) >60 mL/min/1.73m*2    Calcium 6.7 (L) 8.6 - 10.3 mg/dL   Vancomycin   Result Value Ref Range    Vancomycin 14.5 5.0 - 20.0 ug/mL   POCT GLUCOSE   Result Value Ref Range    POCT Glucose 114 (H) 74 - 99 mg/dL          Assessment/Plan   Assessment & Plan  Altered mental status, unspecified altered mental status type    Sepsis without acute organ dysfunction (Multi)    Acute metabolic encephalopathy    Acute cystitis    Left lower leg traumatic ulceration  Excisional debridement of slough with forceps and scissors.  Cleansed with saline.  Applied medihoney, aquacel AG, ABD, and light ace bandage.    Agree with getting PVR's - ordered today..     do not anticipate OR debridement  Will follow.       Yanni Mcgrath DPM

## 2024-11-29 NOTE — CONSULTS
"  Wound Care Consult     Visit Date: 11/29/2024      Patient Name: Desirae Gregg         MRN: [id number]           YOB: 1940     Reason for Consult: wound order in nursing communication       Wound History: Patient being seen by Podiatry for LE wound, patient has history of frequent stools with excoriation.     Pertinent Labs:   Albumin   Date Value Ref Range Status   11/28/2024 2.7 (L) 3.4 - 5.0 g/dL Final       Wound Assessment:  Wound 08/09/24 Sacrum (Active)   Wound Image   11/28/24 0831   Drainage Description None 11/29/24 1200   Drainage Amount None 11/29/24 1200   Dressing Changed Changed 11/29/24 1200   Dressing Status Clean;Dry 11/29/24 1200       Wound 11/03/24 Pretibial Left (Active)   Wound Image   11/28/24 0830   Dressing Changed Changed 11/29/24 0820   Dressing Status Clean;Dry 11/29/24 0820       Wound Team Summary Assessment: Patient in bed, daughter at bedside, admitted for infected wound and seen by podiatry for LE wound. Cardona catheter in place with cream color chunky shred noted in urinary catheter tubing.  Skin cleansed of stool with soap and water prior to TRIAD.  Asked for Lexie GUILLAUME to assist patient with brushing gums to freshen mouth and wash face.  LLE wound bandaged by podiatry.  Buttocks, gluteal cleft, groin folds- reddened, excoriated.  Bilateral heels, reddened, blanching boggy. Patient states, \"My heels are so tender\".     Wound Team Plan:   -- Podiatry treating leg wound, will follow in periphery if needed.  -- Waffle boots placed to offload heels.  -- Triad dressing ointment to heal and protect skin.  -- Daily a.m. care, bathing and oral care, clean linens.  -- Q 2 hour turns.     Nisha Loera RN ,[facility]  11/29/2024  6:37 PM        "

## 2024-11-30 PROBLEM — N30.00 ACUTE CYSTITIS: Status: RESOLVED | Noted: 2024-11-28 | Resolved: 2024-11-30

## 2024-11-30 LAB
ANION GAP SERPL CALC-SCNC: 11 MMOL/L (ref 10–20)
BACTERIA UR CULT: NORMAL
BUN SERPL-MCNC: 26 MG/DL (ref 6–23)
CALCIUM SERPL-MCNC: 7.1 MG/DL (ref 8.6–10.3)
CHLORIDE SERPL-SCNC: 107 MMOL/L (ref 98–107)
CO2 SERPL-SCNC: 22 MMOL/L (ref 21–32)
CREAT SERPL-MCNC: 1.94 MG/DL (ref 0.5–1.05)
EGFRCR SERPLBLD CKD-EPI 2021: 25 ML/MIN/1.73M*2
ERYTHROCYTE [DISTWIDTH] IN BLOOD BY AUTOMATED COUNT: 17.8 % (ref 11.5–14.5)
GLUCOSE BLD MANUAL STRIP-MCNC: 94 MG/DL (ref 74–99)
GLUCOSE SERPL-MCNC: 98 MG/DL (ref 74–99)
HCT VFR BLD AUTO: 44.6 % (ref 36–46)
HGB BLD-MCNC: 13.3 G/DL (ref 12–16)
MCH RBC QN AUTO: 27.3 PG (ref 26–34)
MCHC RBC AUTO-ENTMCNC: 29.8 G/DL (ref 32–36)
MCV RBC AUTO: 92 FL (ref 80–100)
NRBC BLD-RTO: 0 /100 WBCS (ref 0–0)
PLATELET # BLD AUTO: 321 X10*3/UL (ref 150–450)
POTASSIUM SERPL-SCNC: 3.3 MMOL/L (ref 3.5–5.3)
RBC # BLD AUTO: 4.87 X10*6/UL (ref 4–5.2)
SODIUM SERPL-SCNC: 137 MMOL/L (ref 136–145)
VANCOMYCIN SERPL-MCNC: 15 UG/ML (ref 5–20)
WBC # BLD AUTO: 18.3 X10*3/UL (ref 4.4–11.3)

## 2024-11-30 PROCEDURE — 80048 BASIC METABOLIC PNL TOTAL CA: CPT | Performed by: INTERNAL MEDICINE

## 2024-11-30 PROCEDURE — 2500000001 HC RX 250 WO HCPCS SELF ADMINISTERED DRUGS (ALT 637 FOR MEDICARE OP): Performed by: NURSE PRACTITIONER

## 2024-11-30 PROCEDURE — 2500000005 HC RX 250 GENERAL PHARMACY W/O HCPCS: Performed by: INTERNAL MEDICINE

## 2024-11-30 PROCEDURE — 2500000001 HC RX 250 WO HCPCS SELF ADMINISTERED DRUGS (ALT 637 FOR MEDICARE OP): Performed by: INTERNAL MEDICINE

## 2024-11-30 PROCEDURE — 80202 ASSAY OF VANCOMYCIN: CPT | Performed by: INTERNAL MEDICINE

## 2024-11-30 PROCEDURE — 82947 ASSAY GLUCOSE BLOOD QUANT: CPT

## 2024-11-30 PROCEDURE — 94760 N-INVAS EAR/PLS OXIMETRY 1: CPT

## 2024-11-30 PROCEDURE — 94664 DEMO&/EVAL PT USE INHALER: CPT

## 2024-11-30 PROCEDURE — 2500000004 HC RX 250 GENERAL PHARMACY W/ HCPCS (ALT 636 FOR OP/ED): Performed by: INTERNAL MEDICINE

## 2024-11-30 PROCEDURE — 99232 SBSQ HOSP IP/OBS MODERATE 35: CPT | Performed by: INTERNAL MEDICINE

## 2024-11-30 PROCEDURE — 85027 COMPLETE CBC AUTOMATED: CPT | Performed by: INTERNAL MEDICINE

## 2024-11-30 PROCEDURE — 36415 COLL VENOUS BLD VENIPUNCTURE: CPT | Performed by: INTERNAL MEDICINE

## 2024-11-30 PROCEDURE — 2500000002 HC RX 250 W HCPCS SELF ADMINISTERED DRUGS (ALT 637 FOR MEDICARE OP, ALT 636 FOR OP/ED): Performed by: NURSE PRACTITIONER

## 2024-11-30 PROCEDURE — 94640 AIRWAY INHALATION TREATMENT: CPT

## 2024-11-30 PROCEDURE — 2060000001 HC INTERMEDIATE ICU ROOM DAILY

## 2024-11-30 RX ORDER — EAR PLUGS
1 EACH OTIC (EAR) EVERY 8 HOURS PRN
Status: ACTIVE | OUTPATIENT
Start: 2024-11-30

## 2024-11-30 RX ORDER — VANCOMYCIN HYDROCHLORIDE 500 MG/100ML
500 INJECTION, SOLUTION INTRAVENOUS ONCE
Status: COMPLETED | OUTPATIENT
Start: 2024-11-30 | End: 2024-11-30

## 2024-11-30 RX ORDER — POTASSIUM CHLORIDE 1.5 G/1.58G
40 POWDER, FOR SOLUTION ORAL ONCE
Status: COMPLETED | OUTPATIENT
Start: 2024-11-30 | End: 2024-11-30

## 2024-11-30 ASSESSMENT — COGNITIVE AND FUNCTIONAL STATUS - GENERAL
CLIMB 3 TO 5 STEPS WITH RAILING: A LOT
WALKING IN HOSPITAL ROOM: A LOT
STANDING UP FROM CHAIR USING ARMS: A LITTLE
TOILETING: A LOT
PERSONAL GROOMING: A LITTLE
MOBILITY SCORE: 16
HELP NEEDED FOR BATHING: A LITTLE
DRESSING REGULAR LOWER BODY CLOTHING: A LITTLE
TURNING FROM BACK TO SIDE WHILE IN FLAT BAD: A LITTLE
MOVING TO AND FROM BED TO CHAIR: A LITTLE
EATING MEALS: A LITTLE
MOVING FROM LYING ON BACK TO SITTING ON SIDE OF FLAT BED WITH BEDRAILS: A LITTLE

## 2024-11-30 ASSESSMENT — PAIN SCALES - GENERAL
PAINLEVEL_OUTOF10: 8
PAINLEVEL_OUTOF10: 0 - NO PAIN
PAINLEVEL_OUTOF10: 8

## 2024-11-30 ASSESSMENT — PAIN - FUNCTIONAL ASSESSMENT: PAIN_FUNCTIONAL_ASSESSMENT: 0-10

## 2024-11-30 NOTE — PROGRESS NOTES
Desirae Gregg is a 84 y.o. female on day 2 of admission presenting with Altered mental status, unspecified altered mental status type.      Subjective   Patient sleepy at the time of evaluation. Patient's two sons at bed side. I did meet patient's daughter after I evaluated patient. She noted that patient didn't eat much yesterday because she doesn't like cream of wheat and also patient has been requesting for regular water instead of the thick liquid she is on.  Patient daughter fully understood that patient has dysphagia and we are worried about aspiration. But I did tell her that to look into hospital menu to see if there is anything that patient will like.     Objective     Last Recorded Vitals  /60 (BP Location: Left arm, Patient Position: Lying)   Pulse 77   Temp 36.2 °C (97.1 °F) (Temporal)   Resp 13   Wt 72.9 kg (160 lb 11.5 oz)   SpO2 95%   Intake/Output last 3 Shifts:    Intake/Output Summary (Last 24 hours) at 11/30/2024 0756  Last data filed at 11/30/2024 0602  Gross per 24 hour   Intake 1455 ml   Output 600 ml   Net 855 ml       Admission Weight  Weight: 73.5 kg (162 lb) (11/28/24 0323)    Daily Weight  11/29/24 : 72.9 kg (160 lb 11.5 oz)    Image Results  XR chest 1 view  Narrative: Interpreted By:  Cedric Mitchell,   STUDY:  Chest dated  11/28/2024.      INDICATION:  Signs/Symptoms:choking on water, aspiration, assess for PNA      COMPARISON:  Chest dated 11/28/2024.      ACCESSION NUMBER(S):  [id number]      ORDERING CLINICIAN:  BONITA TIDWELL      TECHNIQUE:  One view of the chest.      FINDINGS:  There is retrocardiac opacity with minimal blunting of the  costophrenic angle.  No pneumothorax  is evident. The  cardiomediastinal silhouette is  not enlarged.Degenerative change is  seen of the spine and shoulders.      Impression: Retrocardiac opacity which may represent atelectasis and/or  pneumonitis. Trace left pleural effusion. Given ordering indication,  consultation with speech pathology may  be helpful.      MACRO:  None      Signed by: Cedric Mitchell 11/29/2024 1:34 PM  Dictation workstation:   [id number]  Vascular US Ankle Brachial Index (SOILA) Without Exercise            [place] [id number]   Tel [phone] and Fax [phone]       Vascular Lab Report  VASC US ANKLE BRACHIAL INDEX (SOILA) WITHOUT EXERCISE       Patient Name:      ELIE Mccain Physician:  [id number] Jade Muse MD  Study Date:        11/29/2024            Ordering Physician: [id number] KESHA KOEHLER  MRN/PID:           [id number]              Technologist:       Alexandra Dale T  Accession#:        [id number]          Technologist 2:  Date of Birth/Age: 1940 / 84 years Encounter#:         [id number]  Gender:            F  Admission Status:  Inpatient             Location Performed: [facility]       Diagnosis/ICD: Peripheral vascular disease, unspecified-I73.9  CPT Codes:     69037 Peripheral artery SOILA Only       CONCLUSIONS:  Right Lower PVR: Decreased digital perfusion noted. Multiphasic flow is noted in the right common femoral artery, right posterior tibial artery and right dorsalis pedis artery.  Left Lower PVR: Decreased digital perfusion noted. Multiphasic flow is noted in the left common femoral artery, left posterior tibial artery and left dorsalis pedis artery.     Imaging & Doppler Findings:     RIGHT Lower PVR                Pressures Ratios  Right Posterior Tibial (Ankle) 171 mmHg  1.66  Right Dorsalis Pedis (Ankle)   124 mmHg  1.20  Right Digit (Great Toe)        57 mmHg   0.55          LEFT Lower PVR                Pressures Ratios  Left Posterior Tibial (Ankle) 130 mmHg  1.26  Left Dorsalis Pedis (Ankle)   111 mmHg  1.08  Left Digit (Great Toe)        0 mmHg    0.00                          Right    Left  Brachial  Pressure 98 mmHg 103 mmHg          [id number] Jade Muse MD  Electronically signed by [id number] Jade Muse MD on 11/29/2024 at 10:28:21 AM       ** Final **      Physical Exam  Constitutional:       Appearance: Normal appearance.   HENT:      Head: Normocephalic and atraumatic.      Nose: Nose normal.      Mouth/Throat:      Mouth: Mucous membranes are moist.   Eyes:      Extraocular Movements: Extraocular movements intact.      Pupils: Pupils are equal, round, and reactive to light.   Cardiovascular:      Rate and Rhythm: Normal rate and regular rhythm.   Pulmonary:      Effort: Pulmonary effort is normal.      Breath sounds: Normal breath sounds.   Abdominal:      General: Bowel sounds are normal.      Palpations: Abdomen is soft.   Skin:     General: Skin is warm.      Capillary Refill: Capillary refill takes more than 3 seconds.   Neurological:      General: No focal deficit present.      Mental Status: She is alert.         Relevant Results               Assessment/Plan        Assessment & Plan  Altered mental status, unspecified altered mental status type  Possibly due to UTI and bacteremia.  Continue current management.  On IV abx    Sepsis without acute organ dysfunction (Multi)  ID following.  Continue to renal function  Acute metabolic encephalopathy  Continue to monitor    Bacteremia: 1/2 positive gram positive cocci.  Possibly due to contamination  Defer additional management to ID.    Left Lower leg wound:    S/p bed side I and D per podiatry  Continue wound care  Wound culture growing staph aureus.  Vanco can cover organism    Dysphagia:   Speech following  Continue to monitor  Diet per speech recommendation  Will need official speech evaluation     Debility/Deconditioning:    Out of bed to chair if able today  PT/OT    Acute on chronic Kidney disease:    Repeat BMP today  If continues to improve, will adjust IV fluid    Hypothyroidism:  on synthroid.     GERD: on PPI  Acute cystitis (Resolved:  11/30/2024)  Urine culture noted possible contaminant  No need to repeat UA since current abx will cover      Michael De Guzman MD

## 2024-11-30 NOTE — CARE PLAN
The patient's goals for the shift include drink more fluids    The clinical goals for the shift include patient will get up to chair      Problem: Skin  Goal: Decreased wound size/increased tissue granulation at next dressing change  Outcome: Progressing  Flowsheets (Taken 11/28/2024 1556)  Decreased wound size/increased tissue granulation at next dressing change: Promote sleep for wound healing  Goal: Participates in plan/prevention/treatment measures  Outcome: Progressing  Flowsheets (Taken 11/29/2024 1115 by Farhana Newell, RN)  Participates in plan/prevention/treatment measures: Elevate heels  Goal: Prevent/manage excess moisture  Outcome: Progressing  Flowsheets (Taken 11/29/2024 1115 by Farhana Newell, RN)  Prevent/manage excess moisture: Cleanse incontinence/protect with barrier cream  Goal: Prevent/minimize sheer/friction injuries  Outcome: Progressing  Flowsheets (Taken 11/28/2024 1556)  Prevent/minimize sheer/friction injuries:   Complete micro-shifts as needed if patient unable. Adjust patient position to relieve pressure points, not a full turn   Increase activity/out of bed for meals   Use pull sheet  Goal: Promote/optimize nutrition  Outcome: Progressing  Flowsheets (Taken 11/28/2024 1556)  Promote/optimize nutrition: Monitor/record intake including meals  Goal: Promote skin healing  Outcome: Progressing  Flowsheets (Taken 11/28/2024 1556)  Promote skin healing:   Assess skin/pad under line(s)/device(s)   Turn/reposition every 2 hours/use positioning/transfer devices     Problem: Fall/Injury  Goal: Not fall by end of shift  Outcome: Progressing  Goal: Be free from injury by end of the shift  Outcome: Progressing  Goal: Verbalize understanding of personal risk factors for fall in the hospital  Outcome: Progressing  Goal: Verbalize understanding of risk factor reduction measures to prevent injury from fall in the home  Outcome: Progressing  Goal: Use assistive devices by end of the  shift  Outcome: Progressing  Goal: Pace activities to prevent fatigue by end of the shift  Outcome: Progressing     Problem: Pain - Adult  Goal: Verbalizes/displays adequate comfort level or baseline comfort level  Outcome: Progressing     Problem: Safety - Adult  Goal: Free from fall injury  Outcome: Progressing     Problem: Discharge Planning  Goal: Discharge to home or other facility with appropriate resources  Outcome: Progressing     Problem: Chronic Conditions and Co-morbidities  Goal: Patient's chronic conditions and co-morbidity symptoms are monitored and maintained or improved  Outcome: Progressing

## 2024-11-30 NOTE — PROGRESS NOTES
Speech-Language Pathology Clinical Swallow Evaluation    Patient Name: Desirae Gregg  MRN: [id number]  : 1940  Today's Date: 24  Start time:  12:00pm  Stop time:  12:30pm  Time calculation (min) :  30       ASSESSMENT  Impressions:  Pt's family present and provided with educational information regarding dysphagia.  Pt presents with swish or bolus during oral phase despite cues to increase timeliness of swallow.         PLAN  Recommendations:  MBSS recommended: Yes; MBSS is recommended in order to objectively assess for aspiration risk, safest/least restrictive diet, and any effective compensatory strategies.  Solid consistency: Pureed (IDDSI level 4)  Liquid consistency: Mildly thick (IDDSI 2) / Nectar-thick  Medication administration: in puree  Compensatory swallow strategies: - Upright positioning for all PO intake  - Remain upright for >30 min after meals  - Supervision recommended during meals    Recommended frequency/duration:  Skilled SLP services recommended: Yes  Frequency: 3x/week  Duration: 2 weeks  Discharge recommendation: moderate speech therapy  Strengths: Family/caregiver support  Barriers to participation in tx: Cognition and Comorbidities    Goals (start date 24):  Pt will participate in MBSS to assess for safest/least restrictive diet and any effective compensatory strategies.   Status: Goal initiated this date     SUBJECTIVE    PMHx relevant to rehab: 22 mbs with no penetration or aspiration   Respiratory failure, pyuria, encephalopathy, polycythemia/melofibrosis  Chief complaint: Pt was admitted on 24 sepsis  due to wound infection    Relevant imaging results:  23  mbs  ORDERING SYSTEM PROVIDED HISTORY:  Oral phase dysphagia.     FINDINGS:   Different consistencies of barium were administered to the patient.     There is no evidence of aspiration or penetration throughout the examination.     There is significant pooling within the valleculae and pyriform sinuses.      Question is raised of a soft tissue fold seen within the larynx.  This soft   tissue density may represent overlapping of soft tissues.  No prevertebral   soft tissue mass is noted.      Ordering Physician:   Reason for Referral: coughing on thin liquids  Prior to Session Communication: Bedside nurse    RN cleared pt to participate in session and reported Pt has been upgraded from honey thick on Thursday    Pt reported dislikes thickened liquids      Pain Assessment  Pain Assessment: 0-10  0-10 (Numeric) Pain Score: 8    Behavior/Cognition: Confused  Orientation: Oriented to self  Ability to follow functional commands: Follows simple commands  Nutritional status: Decreased appetite (acute)     Baseline Vocal Quality: Normal        Patient positioning: Upright in bed      OBJECTIVE  Clinical swallow evaluation completed and consisted of interview, oral motor assessment, and PO trials (nectar, thin, puree).  ORAL PHASE: Edentulous. Oral mucosa were pink, moist, and free of obvious lesions. Lingual strength and ROM were decreased. Labial strength/ROM were decreased. Labial seal was adequate.  A/P transit was extended and perseverative. Nectar wash to decrease oral residuals  PHARYNGEAL PHASE: Laryngeal elevation was visualized or palpated with all trials, however adequacy of hyolaryngeal elevation/excursion cannot be determined at bedside. Immediate or delayed s/sx aspiration/penetration were observed with thin x 1.      Treatment/Education:  Results and recommendations were relayed to: Patient, Family, and Bedside nurse  Education provided: Yes   Learner: Patient and Family   Barriers to learning: Cognitive limitations barrier   Method of teaching: Verbal   Topic: Role of ST, results of assessment, risk for aspiration, recommended diet modifications, recommendation for MBSS, recommended safe swallow strategies, and recommendation for dysphagia follow-up   Outcome of teaching: Pt/family demonstrated good  understanding  Treatment provided: No  Next Treatment Priority: mbs, level 6/level 2

## 2024-11-30 NOTE — ASSESSMENT & PLAN NOTE
Continue to monitor    Bacteremia: 1/2 positive gram positive cocci.  Possibly due to contamination  Defer additional management to ID.    Left Lower leg wound:    S/p bed side I and D per podiatry  Continue wound care  Wound culture growing staph aureus.  Vanco can cover organism    Dysphagia:   Speech following  Continue to monitor  Diet per speech recommendation  Will need official speech evaluation     Debility/Deconditioning:    Out of bed to chair if able today  PT/OT    Acute on chronic Kidney disease:    Repeat BMP today  If continues to improve, will adjust IV fluid    Hypothyroidism:  on synthroid.     GERD: on PPI

## 2024-11-30 NOTE — PROGRESS NOTES
Desirae Gregg is a 84 y.o. female on day 2 of admission presenting with Altered mental status, unspecified altered mental status type.    Subjective   Interval History: no fever, no new complaints        Review of Systems    Objective   Range of Vitals (last 24 hours)  Heart Rate:  [75-82]   Temp:  [36 °C (96.8 °F)-36.6 °C (97.8 °F)]   Resp:  [13-26]   BP: ()/(54-60)   SpO2:  [94 %-99 %]   Daily Weight  11/29/24 : 72.9 kg (160 lb 11.5 oz)    Body mass index is 26.74 kg/m².    Physical Exam  Constitutional:       Appearance: Normal appearance.   HENT:      Head: Normocephalic and atraumatic.      Mouth/Throat:      Mouth: Mucous membranes are moist.      Pharynx: Oropharynx is clear.   Eyes:      Pupils: Pupils are equal, round, and reactive to light.   Cardiovascular:      Rate and Rhythm: Normal rate and regular rhythm.      Heart sounds: Normal heart sounds.   Pulmonary:      Effort: Pulmonary effort is normal.      Breath sounds: Normal breath sounds.   Abdominal:      General: Abdomen is flat. Bowel sounds are normal.      Palpations: Abdomen is soft.   Musculoskeletal:      Cervical back: Normal range of motion.         Antibiotics  cefepime - 1 gram/50 mL  vancomycin - 500 mg/100 mL    Relevant Results  Labs  Results from last 72 hours   Lab Units 11/30/24 0630 11/29/24 0533 11/28/24  0420   WBC AUTO x10*3/uL 18.3* 18.7* 22.0*   HEMOGLOBIN g/dL 13.3 13.1 15.4   HEMATOCRIT % 44.6 45.3 51.8*   PLATELETS AUTO x10*3/uL 321 301 382   NEUTROS PCT AUTO %  --   --  85.4   LYMPHS PCT AUTO %  --   --  7.0   MONOS PCT AUTO %  --   --  3.2   EOS PCT AUTO %  --   --  1.7     Results from last 72 hours   Lab Units 11/30/24 0630 11/29/24  0533 11/28/24  0420   SODIUM mmol/L 137 136 137   POTASSIUM mmol/L 3.3* 3.6 4.9   CHLORIDE mmol/L 107 106 101   CO2 mmol/L 22 22 26   BUN mg/dL 26* 29* 33*   CREATININE mg/dL 1.94* 2.24* 2.69*   GLUCOSE mg/dL 98 125* 80   CALCIUM mg/dL 7.1* 6.7* 7.9*   ANION GAP mmol/L 11 12 15   EGFR  mL/min/1.73m*2 25* 21* 17*     Results from last 72 hours   Lab Units 11/28/24  0420   ALK PHOS U/L 64   BILIRUBIN TOTAL mg/dL 1.0   PROTEIN TOTAL g/dL 4.4*   ALT U/L 9   AST U/L 30   ALBUMIN g/dL 2.7*     Estimated Creatinine Clearance: 20.8 mL/min (A) (by C-G formula based on SCr of 1.94 mg/dL (H)).  C-Reactive Protein   Date Value Ref Range Status   11/15/2024 0.80 <1.00 mg/dL Final   11/12/2024 1.04 (H) <1.00 mg/dL Final   11/11/2024 0.86 <1.00 mg/dL Final     Microbiology  Reviewed  Imaging  Reviewed        Assessment/Plan   Sepsis, 1 BC with g+ve cocci  Left shin wound infection, wounfd culture with Staph aureus (s) pending  Respiratory failure / atelectasis  Pyuria, the culture with mixed ortiz  Encephalopathy  Polycythemia / myelofibrosis     Recommendations :  Continue Cefepime and Vancomycin  Discussed with the wound care team      I spent minutes in the professional and overall care of this patient.      Cameron Lassiter MD

## 2024-11-30 NOTE — CARE PLAN
The patient's goals for the shift include drink more fluids    The clinical goals for the shift include patient will get up to chair      Problem: Skin  Goal: Decreased wound size/increased tissue granulation at next dressing change  11/30/2024 1728 by Bre Merida RN  Outcome: Progressing  Flowsheets (Taken 11/28/2024 1556)  Decreased wound size/increased tissue granulation at next dressing change: Promote sleep for wound healing  11/30/2024 1630 by Bre Merida RN  Outcome: Progressing  Flowsheets (Taken 11/28/2024 1556)  Decreased wound size/increased tissue granulation at next dressing change: Promote sleep for wound healing  Goal: Participates in plan/prevention/treatment measures  11/30/2024 1728 by Bre Merida RN  Outcome: Progressing  Flowsheets (Taken 11/29/2024 1115 by Farhana Newell RN)  Participates in plan/prevention/treatment measures: Elevate heels  11/30/2024 1630 by Bre Merida RN  Outcome: Progressing  Flowsheets (Taken 11/29/2024 1115 by Farhana Newell, RN)  Participates in plan/prevention/treatment measures: Elevate heels  Goal: Prevent/manage excess moisture  11/30/2024 1728 by Bre Merida RN  Outcome: Progressing  Flowsheets (Taken 11/29/2024 1115 by Farhana Newell RN)  Prevent/manage excess moisture: Cleanse incontinence/protect with barrier cream  11/30/2024 1630 by Bre Merida RN  Outcome: Progressing  Flowsheets (Taken 11/29/2024 1115 by Farhana Newell RN)  Prevent/manage excess moisture: Cleanse incontinence/protect with barrier cream  Goal: Prevent/minimize sheer/friction injuries  11/30/2024 1728 by Bre Merida RN  Outcome: Progressing  Flowsheets (Taken 11/28/2024 1556)  Prevent/minimize sheer/friction injuries:   Complete micro-shifts as needed if patient unable. Adjust patient position to relieve pressure points, not a full turn   Increase activity/out of bed for meals   Use pull sheet  11/30/2024 1630 by Bre Merida RN  Outcome:  Progressing  Flowsheets (Taken 11/28/2024 1556)  Prevent/minimize sheer/friction injuries:   Complete micro-shifts as needed if patient unable. Adjust patient position to relieve pressure points, not a full turn   Increase activity/out of bed for meals   Use pull sheet  Goal: Promote/optimize nutrition  11/30/2024 1728 by Bre Merida RN  Outcome: Progressing  Flowsheets (Taken 11/28/2024 1556)  Promote/optimize nutrition: Monitor/record intake including meals  11/30/2024 1630 by Bre Merida RN  Outcome: Progressing  Flowsheets (Taken 11/28/2024 1556)  Promote/optimize nutrition: Monitor/record intake including meals  Goal: Promote skin healing  11/30/2024 1728 by Bre Merida RN  Outcome: Progressing  Flowsheets (Taken 11/28/2024 1556)  Promote skin healing:   Assess skin/pad under line(s)/device(s)   Turn/reposition every 2 hours/use positioning/transfer devices  11/30/2024 1630 by Bre Merida RN  Outcome: Progressing  Flowsheets (Taken 11/28/2024 1556)  Promote skin healing:   Assess skin/pad under line(s)/device(s)   Turn/reposition every 2 hours/use positioning/transfer devices     Problem: Fall/Injury  Goal: Not fall by end of shift  11/30/2024 1728 by Bre Mreida RN  Outcome: Progressing  11/30/2024 1630 by Bre Merida RN  Outcome: Progressing  Goal: Be free from injury by end of the shift  11/30/2024 1728 by Bre Merida RN  Outcome: Progressing  11/30/2024 1630 by Bre Merida RN  Outcome: Progressing  Goal: Verbalize understanding of personal risk factors for fall in the hospital  11/30/2024 1728 by Bre Merida RN  Outcome: Progressing  11/30/2024 1630 by Bre Merida RN  Outcome: Progressing  Goal: Verbalize understanding of risk factor reduction measures to prevent injury from fall in the home  11/30/2024 1728 by Bre Merida RN  Outcome: Progressing  11/30/2024 1630 by Bre Merida RN  Outcome: Progressing  Goal: Use assistive devices by end of the shift  11/30/2024 1728 by  Bre Merida RN  Outcome: Progressing  11/30/2024 1630 by Bre Merida RN  Outcome: Progressing  Goal: Pace activities to prevent fatigue by end of the shift  11/30/2024 1728 by Bre Merida RN  Outcome: Progressing  11/30/2024 1630 by Bre Merida RN  Outcome: Progressing     Problem: Pain - Adult  Goal: Verbalizes/displays adequate comfort level or baseline comfort level  11/30/2024 1728 by Bre Merida RN  Outcome: Progressing  11/30/2024 1630 by Bre Merida RN  Outcome: Progressing     Problem: Safety - Adult  Goal: Free from fall injury  11/30/2024 1728 by Bre Merida RN  Outcome: Progressing  11/30/2024 1630 by Bre Merida RN  Outcome: Progressing     Problem: Discharge Planning  Goal: Discharge to home or other facility with appropriate resources  11/30/2024 1728 by Bre Merida RN  Outcome: Progressing  11/30/2024 1630 by Bre Merida RN  Outcome: Progressing     Problem: Chronic Conditions and Co-morbidities  Goal: Patient's chronic conditions and co-morbidity symptoms are monitored and maintained or improved  11/30/2024 1728 by Bre Merida RN  Outcome: Progressing  11/30/2024 1630 by Bre Merida RN  Outcome: Progressing

## 2024-11-30 NOTE — PROGRESS NOTES
Vancomycin Dosing by Pharmacy- FOLLOW UP    Desirae Gregg is a 84 y.o. year old female who Pharmacy has been consulted for vancomycin dosing for cellulitis, skin and soft tissue. Based on the patient's indication and renal status this patient is being dosed based on a goal trough/random level of 10-15.     Renal function is currently improving, SCR 1.94, CRCL 20.8.    Current vancomycin dose: 500 mg once.      Most recent random level: 15.0 mcg/mL    Visit Vitals  /60 (BP Location: Left arm, Patient Position: Lying)   Pulse 77   Temp 36.2 °C (97.1 °F) (Temporal)   Resp 13        Lab Results   Component Value Date    CREATININE 1.94 (H) 2024    CREATININE 2.24 (H) 2024    CREATININE 2.69 (H) 2024    CREATININE 1.02 2024        Patient weight is as follows:   Vitals:    24 0903   Weight: 72.9 kg (160 lb 11.5 oz)       Cultures:  Susceptibility data for the encounter in last 14 days.  Collected Specimen Info Organism   24 Tissue/Biopsy from Wound/Tissue Staphylococcus aureus        I/O last 3 completed shifts:  In: 3813.3 (52.3 mL/kg) [P.O.:430; I.V.:3133.3 (43 mL/kg); IV Piggyback:250]  Out: 800 (11 mL/kg) [Urine:800 (0.3 mL/kg/hr)]  Weight: 72.9 kg   I/O during current shift:  No intake/output data recorded.    Temp (24hrs), Av.1 °C (96.9 °F), Min:35.4 °C (95.8 °F), Max:36.6 °C (97.8 °F)      Assessment/Plan    Within goal random/trough level  The next level will be obtained on  at 0500hrs. May be obtained sooner if clinically indicated.   Will continue to monitor renal function daily while on vancomycin and order serum creatinine at least every 48 hours if not already ordered.  Follow for continued vancomycin needs, clinical response, and signs/symptoms of toxicity.       Tray Mcnamara, PharmD

## 2024-12-01 VITALS
OXYGEN SATURATION: 100 % | SYSTOLIC BLOOD PRESSURE: 121 MMHG | TEMPERATURE: 97.3 F | HEART RATE: 108 BPM | RESPIRATION RATE: 25 BRPM | DIASTOLIC BLOOD PRESSURE: 83 MMHG | WEIGHT: 156.53 LBS | HEIGHT: 65 IN | BODY MASS INDEX: 26.08 KG/M2

## 2024-12-01 LAB
BACTERIA BLD AEROBE CULT: ABNORMAL
BACTERIA BLD CULT: ABNORMAL
BACTERIA BLD CULT: NORMAL
BACTERIA SPEC CULT: ABNORMAL
BACTERIA SPEC CULT: ABNORMAL
CRYPTOSP AG STL QL IA: NEGATIVE
G LAMBLIA AG STL QL IA: NEGATIVE
GRAM STN SPEC: ABNORMAL
VANCOMYCIN SERPL-MCNC: 17.1 UG/ML (ref 5–20)

## 2024-12-01 PROCEDURE — 2500000002 HC RX 250 W HCPCS SELF ADMINISTERED DRUGS (ALT 637 FOR MEDICARE OP, ALT 636 FOR OP/ED): Performed by: NURSE PRACTITIONER

## 2024-12-01 PROCEDURE — 2060000001 HC INTERMEDIATE ICU ROOM DAILY

## 2024-12-01 PROCEDURE — 2500000001 HC RX 250 WO HCPCS SELF ADMINISTERED DRUGS (ALT 637 FOR MEDICARE OP): Performed by: NURSE PRACTITIONER

## 2024-12-01 PROCEDURE — 2500000004 HC RX 250 GENERAL PHARMACY W/ HCPCS (ALT 636 FOR OP/ED): Performed by: INTERNAL MEDICINE

## 2024-12-01 PROCEDURE — 99232 SBSQ HOSP IP/OBS MODERATE 35: CPT | Performed by: INTERNAL MEDICINE

## 2024-12-01 PROCEDURE — 2500000005 HC RX 250 GENERAL PHARMACY W/O HCPCS: Performed by: INTERNAL MEDICINE

## 2024-12-01 PROCEDURE — 94640 AIRWAY INHALATION TREATMENT: CPT

## 2024-12-01 PROCEDURE — 36415 COLL VENOUS BLD VENIPUNCTURE: CPT | Performed by: INTERNAL MEDICINE

## 2024-12-01 PROCEDURE — 80202 ASSAY OF VANCOMYCIN: CPT | Performed by: INTERNAL MEDICINE

## 2024-12-01 PROCEDURE — 94760 N-INVAS EAR/PLS OXIMETRY 1: CPT

## 2024-12-01 RX ORDER — VANCOMYCIN HYDROCHLORIDE 500 MG/100ML
500 INJECTION, SOLUTION INTRAVENOUS ONCE
Status: COMPLETED | OUTPATIENT
Start: 2024-12-01 | End: 2024-12-01

## 2024-12-01 ASSESSMENT — COGNITIVE AND FUNCTIONAL STATUS - GENERAL
STANDING UP FROM CHAIR USING ARMS: A LITTLE
DRESSING REGULAR LOWER BODY CLOTHING: A LITTLE
PERSONAL GROOMING: A LITTLE
TURNING FROM BACK TO SIDE WHILE IN FLAT BAD: A LITTLE
TOILETING: A LOT
DAILY ACTIVITIY SCORE: 17
CLIMB 3 TO 5 STEPS WITH RAILING: A LOT
DRESSING REGULAR UPPER BODY CLOTHING: A LITTLE
WALKING IN HOSPITAL ROOM: A LOT
EATING MEALS: A LITTLE
MOVING TO AND FROM BED TO CHAIR: A LITTLE
HELP NEEDED FOR BATHING: A LITTLE
MOBILITY SCORE: 16
MOVING FROM LYING ON BACK TO SITTING ON SIDE OF FLAT BED WITH BEDRAILS: A LITTLE

## 2024-12-01 ASSESSMENT — PAIN SCALES - GENERAL
PAINLEVEL_OUTOF10: 0 - NO PAIN
PAINLEVEL_OUTOF10: 0 - NO PAIN

## 2024-12-01 ASSESSMENT — PAIN - FUNCTIONAL ASSESSMENT: PAIN_FUNCTIONAL_ASSESSMENT: 0-10

## 2024-12-01 NOTE — CARE PLAN
The patient's goals for the shift include eat tomato soup    The clinical goals for the shift include pt will get up to chair for one hour or more at lunch      Problem: Skin  Goal: Decreased wound size/increased tissue granulation at next dressing change  Outcome: Progressing  Flowsheets (Taken 12/1/2024 1326)  Decreased wound size/increased tissue granulation at next dressing change:   Protective dressings over bony prominences   Promote sleep for wound healing  Goal: Participates in plan/prevention/treatment measures  Outcome: Progressing  Flowsheets (Taken 12/1/2024 1326)  Participates in plan/prevention/treatment measures:   Increase activity/out of bed for meals   Elevate heels   Discuss with provider PT/OT consult  Goal: Prevent/manage excess moisture  Outcome: Progressing  Flowsheets (Taken 12/1/2024 1326)  Prevent/manage excess moisture:   Cleanse incontinence/protect with barrier cream   Monitor for/manage infection if present   Follow provider orders for dressing changes  Goal: Prevent/minimize sheer/friction injuries  Outcome: Progressing  Flowsheets (Taken 12/1/2024 1326)  Prevent/minimize sheer/friction injuries:   Increase activity/out of bed for meals   Use pull sheet   HOB 30 degrees or less   Turn/reposition every 2 hours/use positioning/transfer devices  Goal: Promote/optimize nutrition  Outcome: Progressing  Flowsheets (Taken 12/1/2024 1326)  Promote/optimize nutrition:   Consume > 50% meals/supplements   Monitor/record intake including meals   Offer water/supplements/favorite foods  Goal: Promote skin healing  Outcome: Progressing  Flowsheets (Taken 12/1/2024 0597 by Jennifer Hodgkinson, RN)  Promote skin healing:   Assess skin/pad under line(s)/device(s)   Rotate device position/do not position patient on device     Problem: Fall/Injury  Goal: Not fall by end of shift  Outcome: Progressing  Goal: Be free from injury by end of the shift  Outcome: Progressing  Goal: Verbalize understanding of  personal risk factors for fall in the hospital  Outcome: Progressing  Goal: Verbalize understanding of risk factor reduction measures to prevent injury from fall in the home  Outcome: Progressing  Goal: Use assistive devices by end of the shift  Outcome: Progressing  Goal: Pace activities to prevent fatigue by end of the shift  Outcome: Progressing     Problem: Pain - Adult  Goal: Verbalizes/displays adequate comfort level or baseline comfort level  Outcome: Progressing     Problem: Safety - Adult  Goal: Free from fall injury  Outcome: Progressing     Problem: Discharge Planning  Goal: Discharge to home or other facility with appropriate resources  Outcome: Progressing     Problem: Chronic Conditions and Co-morbidities  Goal: Patient's chronic conditions and co-morbidity symptoms are monitored and maintained or improved  Outcome: Progressing     Problem: Pain  Goal: Takes deep breaths with improved pain control throughout the shift  Outcome: Progressing  Goal: Turns in bed with improved pain control throughout the shift  Outcome: Progressing  Goal: Walks with improved pain control throughout the shift  Outcome: Progressing  Goal: Performs ADL's with improved pain control throughout shift  Outcome: Progressing  Goal: Participates in PT with improved pain control throughout the shift  Outcome: Progressing  Goal: Free from opioid side effects throughout the shift  Outcome: Progressing  Goal: Free from acute confusion related to pain meds throughout the shift  Outcome: Progressing

## 2024-12-01 NOTE — CARE PLAN
The patient's goals for the shift include drink more fluids    The clinical goals for the shift include Pt will remain HDS through end of shift.    Pt was able to achieve goals by end of shift.

## 2024-12-01 NOTE — ASSESSMENT & PLAN NOTE
resolved    Bacteremia:   Management as noted above    Left Lower leg wound:    Defer additional recommendation to Podiatry  Wound culture now positive for MRSA    Dysphagia:   Diet as tolerated until seen by Speech    Debility/Deconditioning:    Recommend out of bed to chair today  Await additional evaluation from PT    Acute on chronic Kidney disease:    Gradual improvement of patient's renal function  BMP in am    Hypothyroidism:  on synthroid.     GERD: on PPI    Diarrhea:   Unknown etiology  C. Diff is negative  Stool ova and parasite pending  Cryptosporidium pending  Will start loperamide if all test is negative  Possible GI consult

## 2024-12-01 NOTE — PROGRESS NOTES
Desirae Gregg is a 84 y.o. female on day 3 of admission presenting with Altered mental status, unspecified altered mental status type.      Subjective   Mrs. Gregg was seen and evaluated. She was mostly concern about constant diarrhea. She noted that this diarrhea has been going on for a long time. She denied fever and chills. She denied prior hx of home oxygen use.     Objective     Last Recorded Vitals  /65 (BP Location: Left arm, Patient Position: Lying)   Pulse 96   Temp 37.2 °C (98.9 °F) (Temporal)   Resp 22   Wt 71 kg (156 lb 8.4 oz)   SpO2 93%   Intake/Output last 3 Shifts:    Intake/Output Summary (Last 24 hours) at 12/1/2024 0900  Last data filed at 12/1/2024 0519  Gross per 24 hour   Intake 320 ml   Output 700 ml   Net -380 ml       Admission Weight  Weight: 73.5 kg (162 lb) (11/28/24 0323)    Daily Weight  12/01/24 : 71 kg (156 lb 8.4 oz)    Image Results  XR chest 1 view  Narrative: Interpreted By:  Cedric Mitchell,   STUDY:  Chest dated  11/28/2024.      INDICATION:  Signs/Symptoms:choking on water, aspiration, assess for PNA      COMPARISON:  Chest dated 11/28/2024.      ACCESSION NUMBER(S):  [id number]      ORDERING CLINICIAN:  BONITA TIDWELL      TECHNIQUE:  One view of the chest.      FINDINGS:  There is retrocardiac opacity with minimal blunting of the  costophrenic angle.  No pneumothorax  is evident. The  cardiomediastinal silhouette is  not enlarged.Degenerative change is  seen of the spine and shoulders.      Impression: Retrocardiac opacity which may represent atelectasis and/or  pneumonitis. Trace left pleural effusion. Given ordering indication,  consultation with speech pathology may be helpful.      MACRO:  None      Signed by: Cedric Mitchell 11/29/2024 1:34 PM  Dictation workstation:   [id number]  Vascular US Ankle Brachial Index (SOILA) Without Exercise            [place] [address]   Tel [phone] and Fax [phone]       Vascular  Lab Report  VASC US ANKLE BRACHIAL INDEX (SOILA) WITHOUT EXERCISE       Patient Name:      ELIE WHITLEY            [place] Physician:  [id number] Jade Muse MD  Study Date:        11/29/2024            Ordering Physician: [id number] KESHA KOEHLER  MRN/PID:           [id number]              Technologist:       Alexandra Dale RVT  Accession#:        [id number]          Technologist 2:  Date of Birth/Age: 1940 / 84 years Encounter#:         [id number]  Gender:            F  Admission Status:  Inpatient             Location Performed: [facility]       Diagnosis/ICD: Peripheral vascular disease, unspecified-I73.9  CPT Codes:     97805 Peripheral artery SOILA Only       CONCLUSIONS:  Right Lower PVR: Decreased digital perfusion noted. Multiphasic flow is noted in the right common femoral artery, right posterior tibial artery and right dorsalis pedis artery.  Left Lower PVR: Decreased digital perfusion noted. Multiphasic flow is noted in the left common femoral artery, left posterior tibial artery and left dorsalis pedis artery.     Imaging & Doppler Findings:     RIGHT Lower PVR                Pressures Ratios  Right Posterior Tibial (Ankle) 171 mmHg  1.66  Right Dorsalis Pedis (Ankle)   124 mmHg  1.20  Right Digit (Great Toe)        57 mmHg   0.55          LEFT Lower PVR                Pressures Ratios  Left Posterior Tibial (Ankle) 130 mmHg  1.26  Left Dorsalis Pedis (Ankle)   111 mmHg  1.08  Left Digit (Great Toe)        0 mmHg    0.00                          Right    Left  Brachial Pressure 98 mmHg 103 mmHg          [id number] Jade Muse MD  Electronically signed by [id number] Jade Muse MD on 11/29/2024 at 10:28:21 AM       ** Final **      Physical Exam  Constitutional:       Appearance: Normal appearance.   HENT:      Head: Normocephalic.      Mouth/Throat:      Mouth: Mucous membranes are  moist.   Eyes:      Extraocular Movements: Extraocular movements intact.      Pupils: Pupils are equal, round, and reactive to light.   Cardiovascular:      Rate and Rhythm: Normal rate and regular rhythm.   Pulmonary:      Effort: Pulmonary effort is normal.      Breath sounds: Normal breath sounds.   Abdominal:      General: Bowel sounds are normal.      Palpations: Abdomen is soft.   Musculoskeletal:         General: Normal range of motion.   Neurological:      Mental Status: She is alert.         Relevant Results               Assessment/Plan          This patient has a urinary catheter   Reason for the urinary catheter remaining today? critically ill patient who need accurate urinary output measurements          Assessment & Plan  Altered mental status, unspecified altered mental status type  Resolved.  Etiology is suspected to be due to recent infection.   Continue to monitor    Sepsis without acute organ dysfunction (Multi)  Appreciate ID recommendation  Noted 1/2 staph epidermis in blood.   Possibly due to contamination  Continue to monitor  Defer additional recommendation to ID  Acute metabolic encephalopathy  resolved    Bacteremia:   Management as noted above    Left Lower leg wound:    Defer additional recommendation to Podiatry  Wound culture now positive for MRSA    Dysphagia:   Diet as tolerated until seen by Speech    Debility/Deconditioning:    Recommend out of bed to chair today  Await additional evaluation from PT    Acute on chronic Kidney disease:    Gradual improvement of patient's renal function  BMP in am    Hypothyroidism:  on synthroid.     GERD: on PPI    Diarrhea:   Unknown etiology  C. Diff is negative  Stool ova and parasite pending  Cryptosporidium pending  Will start loperamide if all test is negative  Possible GI consult                Michael De Gzuman MD

## 2024-12-01 NOTE — ASSESSMENT & PLAN NOTE
Appreciate ID recommendation  Noted 1/2 staph epidermis in blood.   Possibly due to contamination  Continue to monitor  Defer additional recommendation to ID

## 2024-12-01 NOTE — PROGRESS NOTES
Desirae Gregg is a 84 y.o. female on day 3 of admission presenting with Altered mental status, unspecified altered mental status type.    Subjective   Interval History: no fever, no new complaints        Review of Systems    Objective   Range of Vitals (last 24 hours)  Heart Rate:  [95-98]   Temp:  [36.2 °C (97.2 °F)-37.2 °C (98.9 °F)]   Resp:  [18-22]   BP: (112-136)/(65-81)   Weight:  [71 kg (156 lb 8.4 oz)]   SpO2:  [85 %-97 %]   Daily Weight  12/01/24 : 71 kg (156 lb 8.4 oz)    Body mass index is 26.05 kg/m².    Physical Exam  Constitutional:       Appearance: Normal appearance.   HENT:      Head: Normocephalic and atraumatic.      Mouth/Throat:      Mouth: Mucous membranes are moist.      Pharynx: Oropharynx is clear.   Eyes:      Pupils: Pupils are equal, round, and reactive to light.   Cardiovascular:      Rate and Rhythm: Normal rate and regular rhythm.      Heart sounds: Normal heart sounds.   Pulmonary:      Effort: Pulmonary effort is normal.      Breath sounds: Normal breath sounds.   Abdominal:      General: Abdomen is flat. Bowel sounds are normal.      Palpations: Abdomen is soft.   Musculoskeletal:      Cervical back: Normal range of motion.         Antibiotics  cefepime - 1 gram/50 mL  vancomycin    Relevant Results  Labs  Results from last 72 hours   Lab Units 11/30/24  0630 11/29/24  0533   WBC AUTO x10*3/uL 18.3* 18.7*   HEMOGLOBIN g/dL 13.3 13.1   HEMATOCRIT % 44.6 45.3   PLATELETS AUTO x10*3/uL 321 301     Results from last 72 hours   Lab Units 11/30/24  0630 11/29/24  0533   SODIUM mmol/L 137 136   POTASSIUM mmol/L 3.3* 3.6   CHLORIDE mmol/L 107 106   CO2 mmol/L 22 22   BUN mg/dL 26* 29*   CREATININE mg/dL 1.94* 2.24*   GLUCOSE mg/dL 98 125*   CALCIUM mg/dL 7.1* 6.7*   ANION GAP mmol/L 11 12   EGFR mL/min/1.73m*2 25* 21*           Estimated Creatinine Clearance: 20.6 mL/min (A) (by C-G formula based on SCr of 1.94 mg/dL (H)).  C-Reactive Protein   Date Value Ref Range Status   11/15/2024 0.80  <1.00 mg/dL Final   11/12/2024 1.04 (H) <1.00 mg/dL Final   11/11/2024 0.86 <1.00 mg/dL Final     Microbiology  Reviewed  Imaging  Reviewed        Assessment/Plan   Sepsis, 1 BC with CNS, ? Significance   Left shin wound infection, wounfd culture with MRSA  Respiratory failure / atelectasis  Pyuria, the culture with mixed ortiz  Encephalopathy, likely metabolic  Polycythemia / myelofibrosis     Recommendations :  Continue Cefepime and Vancomycin  Discussed with the medical team      I spent minutes in the professional and overall care of this patient.      Cameron Lassiter MD

## 2024-12-01 NOTE — PROGRESS NOTES
Desirae Gregg is a 84 y.o. female on day 3 of admission presenting with Altered mental status, unspecified altered mental status type.    Subjective   Son at bedside.  Mild soreness to left lower leg.    Scheduled medications  albuterol, 2.5 mg, nebulization, TID  apixaban, 2.5 mg, oral, BID  cefepime, 1 g, intravenous, q12h  folic acid, 1 mg, oral, Daily  lactobacillus acidophilus, 1 capsule, oral, Daily  levothyroxine, 88 mcg, oral, Daily  midodrine, 5 mg, oral, TID  nystatin, , Topical, BID  oxygen, , inhalation, Continuous - Inhalation  pantoprazole, 40 mg, oral, Daily before breakfast  psyllium, 1 packet, oral, BID      Continuous medications     PRN medications  PRN medications: acetaminophen **OR** acetaminophen **OR** acetaminophen, albuterol, bisacodyl, dextrose, dextrose, glucagon, glucagon, loperamide, melatonin, ondansetron **OR** ondansetron, vancomycin, zinc oxide       Physical Exam   Constitutional:       Appearance: NAD  HENT:      Head: Normocephalic and atraumatic.      Mouth/Throat:      Mouth: Mucous membranes are moist.      Pharynx: Oropharynx is clear.   Eyes:      Pupils: Pupils are equal, round, and reactive to light.   Cardiovascular:      Rate and Rhythm: Normal rate and regular rhythm.      Heart sounds: Normal heart sounds.   Pulmonary:      Effort: Pulmonary effort is normal.      Breath sounds: Rales present.   Abdominal:      General: Abdomen is flat. Bowel sounds are normal.      Palpations: Abdomen is soft.   Musculoskeletal:      Cervical back: Normal range of motion.      Comments: ulceration to left anterior shin with 40% slough - measures 8 x 5.5 x 0.3 cm with undermining to lateral aspect;  no obvious purulence;  small rim of erythema without ascending cellulitis;  no probe to bone;  unable to easily palpate pedal pulses  Neurological:      Mental Status: She is alert.        Last Recorded Vitals  Blood pressure 127/65, pulse 96, temperature 37.2 °C (98.9 °F), temperature source  "Temporal, resp. rate 22, height 1.651 m (5' 5\"), weight 71 kg (156 lb 8.4 oz), SpO2 93%.    Intake/Output last 3 Shifts:  I/O last 3 completed shifts:  In: 1321.3 (18.6 mL/kg) [P.O.:290; I.V.:781.3 (11 mL/kg); IV Piggyback:250]  Out: 950 (13.4 mL/kg) [Urine:950 (0.4 mL/kg/hr)]  Weight: 71 kg     Relevant Results   Susceptibility data from last 90 days.  Collected Specimen Info Organism Clindamycin Erythromycin Oxacillin Tetracycline Trimethoprim/Sulfamethoxazole Vancomycin   11/28/24 Tissue/Biopsy from Wound/Tissue Methicillin Resistant Staphylococcus aureus (MRSA)  R  R  R  R  S  S     Proteus mirabilis         11/28/24 Blood culture from Peripheral Venipuncture Staphylococcus epidermidis         11/13/24 Swab from Anterior Nares Methicillin Resistant Staphylococcus aureus (MRSA)         XR chest 1 view    Result Date: 11/29/2024  Interpreted By:  Cedric Mitchell, STUDY: Chest dated  11/28/2024.   INDICATION: Signs/Symptoms:choking on water, aspiration, assess for PNA   COMPARISON: Chest dated 11/28/2024.   ACCESSION NUMBER(S): [id number]   ORDERING CLINICIAN: BONITA TIDWELL   TECHNIQUE: One view of the chest.   FINDINGS: There is retrocardiac opacity with minimal blunting of the costophrenic angle.  No pneumothorax  is evident. The cardiomediastinal silhouette is  not enlarged.Degenerative change is seen of the spine and shoulders.       Retrocardiac opacity which may represent atelectasis and/or pneumonitis. Trace left pleural effusion. Given ordering indication, consultation with speech pathology may be helpful.   MACRO: None   Signed by: Cedric Mitchell 11/29/2024 1:34 PM Dictation workstation:   [id number]    Vascular US Ankle Brachial Index (SOILA) Without Exercise    Result Date: 11/29/2024          [place] [address]  Tel [phone] and Fax [phone]  Vascular Lab Report [facility] US ANKLE BRACHIAL INDEX (SOILA) WITHOUT EXERCISE  Patient Name:      ELIE WHITLEY"   Reading Physician:  [id number] Jade Muse MD Study Date:        11/29/2024            Ordering Physician: [id number] KESHA KOEHLER MRN/PID:           [id number]              Technologist:       Alexandra Dale RVNICKOLAS Accession#:        [id number]          Technologist 2: Date of Birth/Age: 1940 / 84 years Encounter#:         [id number] Gender:            F Admission Status:  Inpatient             Location Performed: [facility]  Diagnosis/ICD: Peripheral vascular disease, unspecified-I73.9 CPT Codes:     37767 Peripheral artery SOILA Only  CONCLUSIONS: Right Lower PVR: Decreased digital perfusion noted. Multiphasic flow is noted in the right common femoral artery, right posterior tibial artery and right dorsalis pedis artery. Left Lower PVR: Decreased digital perfusion noted. Multiphasic flow is noted in the left common femoral artery, left posterior tibial artery and left dorsalis pedis artery.  Imaging & Doppler Findings:  RIGHT Lower PVR                Pressures Ratios Right Posterior Tibial (Ankle) 171 mmHg  1.66 Right Dorsalis Pedis (Ankle)   124 mmHg  1.20 Right Digit (Great Toe)        57 mmHg   0.55   LEFT Lower PVR                Pressures Ratios Left Posterior Tibial (Ankle) 130 mmHg  1.26 Left Dorsalis Pedis (Ankle)   111 mmHg  1.08 Left Digit (Great Toe)        0 mmHg    0.00                     Right    Left Brachial Pressure 98 mmHg 103 mmHg   [id number] Jade Muse MD Electronically signed by [id number] Jade Muse MD on 11/29/2024 at 10:28:21 AM  ** Final **     CT head wo IV contrast    Result Date: 11/28/2024  Interpreted By:  Finkelstein, Evan, STUDY: CT HEAD WO IV CONTRAST;  11/28/2024 5:07 am   INDICATION: Signs/Symptoms:ams.     COMPARISON: None.   ACCESSION NUMBER(S): [id number]   ORDERING CLINICIAN: GAYLA RUVALCABA   TECHNIQUE: Axial noncontrast CT images of the head with  coronal and sagittal reconstructions.   FINDINGS: EXTRACRANIAL SOFT TISSUES: Unremarkable.   CALVARIUM: No depressed skull fracture. No destructive osseous lesion.   PARANASAL SINUSES/MASTOIDS: Partial opacification of the maxillary sinuses. Mastoid air cells are aerated.   HEMORRHAGE: No acute intracranial hemorrhage.   BRAIN PARENCHYMA: Gray-white matter interfaces are preserved. No mass effect or midline shift. There are nonspecific scattered white matter hypodensities.   VENTRICLES and EXTRA-AXIAL SPACES: Parenchymal atrophy with prominence of the ventricles and cortical sulci.   OTHER FINDINGS: There are calcifications within the cavernous carotids       No acute intracranial hemorrhage, mass effect or midline shift.   Nonspecific scattered white matter hypodensities favored to represent sequela of small vessel ischemia.       MACRO: None.   Signed by: Evan Finkelstein 11/28/2024 5:24 AM Dictation workstation:   [id number]    XR tibia fibula left 2 views    Result Date: 11/28/2024  Interpreted By:  Jayjay Keyes, STUDY: XR TIBIA FIBULA LEFT 2 VIEWS; ;  11/28/2024 4:14 am   INDICATION: Signs/Symptoms:wound.     COMPARISON: None.   ACCESSION NUMBER(S): [id number]   ORDERING CLINICIAN: GAYLA RUVALCABA   FINDINGS: Four views of the left tibia and fibula are provided for interpretation.   Appears to be a soft tissue wound along the posterolateral aspect of the calf without evidence of radiopaque foreign body. Visualized bony structures are unremarkable in appearance without evidence of acute fracture, traumatic malalignment or aggressive osseous lesions. Visualized left knee and left ankle joints are preserved.       Soft tissue wound along the posterolateral aspect of the calf, without evidence of acute bony abnormality.     MACRO: None   Signed by: Jayjay Keyes 11/28/2024 4:21 AM Dictation workstation:   [id number]    XR chest 1 view    Result Date: 11/28/2024  Interpreted By:  Stephy  Jayjay, STUDY: XR CHEST 1 VIEW;  11/28/2024 4:14 am   INDICATION: Signs/Symptoms:AMS.     COMPARISON: Radiographs of the chest dated 11/18/2024.   ACCESSION NUMBER(S): [id number]   ORDERING CLINICIAN: GAYLA RUVALCABA   FINDINGS: AP radiograph of the chest was provided.       CARDIOMEDIASTINAL SILHOUETTE: Cardiomediastinal silhouette is mildly enlarged, similar in appearance to prior exam.   LUNGS: There are low lung volumes with bronchovascular crowding and bibasilar atelectasis. No consolidation or sizable pleural effusion is present.   ABDOMEN: No remarkable upper abdominal findings.   BONES: No acute osseous changes.       1.  Low lung volumes with bronchovascular crowding and bibasilar atelectasis. No consolidation or sizable pleural effusion is present.       MACRO: None   Signed by: Jayjay Keyes 11/28/2024 4:19 AM Dictation workstation:   [id number]    Electrocardiogram, 12-lead PRN ACS symptoms    Result Date: 11/23/2024  Sinus rhythm with Premature atrial complexes Left bundle branch block Abnormal ECG When compared with ECG of 08-NOV-2024 17:49, (unconfirmed) No significant change was found Confirmed by Mckay Irizarry ([id number]) on 11/23/2024 9:24:22 AM    Electrocardiogram, 12-lead PRN ACS symptoms    Result Date: 11/23/2024  Normal sinus rhythm Premature atrial complexes Left bundle branch block Abnormal ECG When compared with ECG of 03-NOV-2024 09:20, (unconfirmed) QRS axis Shifted right Nonspecific T wave abnormality has replaced inverted T waves in Inferior leads Confirmed by Mckay Irizarry ([id number]) on 11/23/2024 9:22:55 AM    XR chest 1 view    Result Date: 11/18/2024  Interpreted By:  Zain Mcknight, STUDY: XR CHEST 1 VIEW; 11/18/2024 6:36 am   INDICATION: Signs/Symptoms:pneumonia   COMPARISON: Radiographs 11/12/2024   ACCESSION NUMBER(S): [id number]   ORDERING CLINICIAN: BIPIN SMITH   TECHNIQUE: Single frontal view of the chest performed.   FINDINGS: LINES AND DEVICES:  None.   LUNGS: Decreased size now small left and nearly resolved right pleural effusions with atelectasis. No new focal consolidation pulmonary edema or pneumothorax.   CARDIOMEDIASTINAL SILHOUETTE: The cardiomediastinal silhouette is stable with obscured left heart border by adjacent effusion.       Decreased small left and nearly resolved right pleural effusions.   MACRO None   Signed by: Zain Mcknight 11/18/2024 2:19 PM Dictation workstation:   [id number]    ECG 12 lead    Result Date: 11/18/2024  Sinus rhythm with occasional Premature ventricular complexes and Premature atrial complexes Left axis deviation Left bundle branch block Abnormal ECG When compared with ECG of 08-NOV-2024 17:50, (unconfirmed) Premature ventricular complexes are now Present    ECG 12 lead    Result Date: 11/14/2024  Sinus tachycardia Left axis deviation Left bundle branch block Abnormal ECG No previous ECGs available See ED provider note for full interpretation and clinical correlation Confirmed by Edwin Medley ([id number]) on 11/14/2024 3:17:28 AM    Vascular US lower extremity venous duplex bilateral    Result Date: 11/13/2024          [place] [address]  Tel [phone] and Fax [phone]  Vascular Lab Report VASC US LOWER EXTREMITY VENOUS DUPLEX BILATERAL  Patient Name:      ELIE Mccain Physician: [id number] Haresh Childs MD Study Date:        11/12/2024           Ordering           [id number] BIPIN ESPINOSA                                         Physician:         SARAH MRN/PID:           [id number]             Technologist:      Alexandra Dale RVNICKOLAS Accession#:        [id number]         Technologist 2: Date of Birth/Age: 1940 / 83      Encounter#:        [id number]                    years Gender:            F Admission Status:  Inpatient            Location           [facility]                                         Performed:  Diagnosis/ICD: Compression of vein-I87.1  CPT Codes:     01168 Peripheral venous duplex scan for DVT complete  **CRITICAL RESULT** Critical Result: DVT right femoral vein, age indeterminate thrombus in right popliteal, right posterior tibial and right peroneal veins. DVT left popliteal vein Notification called to Татьяна Agarwal MD on 11/12/2024 at 1:28:12 PM by DVT.  CONCLUSIONS: Right Lower Venous: There is acute occlusive deep vein thrombosis visualized in the mid femoral and distal femoral veins. There is acute non-occlusive deep vein thrombosis visualized in the proximal femoral vein. There is age indeterminate deep vein thrombosis visualized in the popliteal, posterior tibial and peroneal veins. Left Lower Venous: There is acute non-occlusive deep vein thrombosis visualized in the popliteal vein. The remainder of the left leg is negative for deep vein thrombosis. Left posterior tibial and peroneal veins poorly visualized due to edema and bandages; cannot rule out deep vein thrombosis in non-visualized vessels.  Additional Findings: Non-vascularized structure noted in right popliteal fossa measuring 7.2 cm x 1.6 cm. Technically difficult exam due to edema and patient positioning.  Imaging & Doppler Findings:  Right                 Compressible      Thrombus              Flow Distal External Iliac                                  Spontaneous/Phasic CFV                       Yes             None         Spontaneous/Phasic PFV                       Yes             None         Spontaneous/Phasic FV Proximal             Partial    Acute non-occlusive Spontaneous/Phasic FV Mid                     No        Acute occlusive FV Distal                  No        Acute occlusive Popliteal               Partial           ? Age        Spontaneous/Phasic Peroneal                Partial           ? Age PTV                     Partial           ? Age  Left                  Compress      Thrombus              Flow Distal External Iliac                               Spontaneous/Phasic CFV                     Yes           None         Spontaneous/Phasic PFV                     Yes           None FV Proximal             Yes           None         Spontaneous/Phasic FV Mid                  Yes           None FV Distal               Yes           None Popliteal             Partial  Acute non-occlusive Spontaneous/Phasic  [id number] Haresh Childs MD Electronically signed by [id number] Haresh Childs MD on 11/13/2024 at 7:49:11 AM  ** Final **     XR chest 1 view    Result Date: 11/12/2024  Interpreted By:  Alfonso Ricci, STUDY: XR CHEST 1 VIEW;  11/12/2024 9:29 am   INDICATION: Signs/Symptoms:follow up pneumonia.   COMPARISON: 11/08/2024   ACCESSION NUMBER(S): [id number]   ORDERING CLINICIAN: BIPIN SMITH   FINDINGS: There is increased opacification of the left lower lung with blunted costophrenic angle indicating a moderate effusion probably with underlying atelectasis or consolidation. There is mild prominence of the central pulmonary vasculature, and while accentuated from a shallow inspiration is probably from mild congestion. The cardiac size appears to be within normal limits considering the left lower cardiac margin is obscured.   ABDOMEN AND OTHER FINDINGS: No remarkable upper abdominal findings.   BONES: No acute osseous changes.       1.  Increased left lower lung airspace disease and probable congestion.   Signed by: Alfonso Ricci 11/12/2024 5:25 PM Dictation workstation:   [id number]    FL GI esophagram    Result Date: 11/11/2024  Interpreted By:  Zain Mcknight, STUDY: FL GI ESOPHAGRAM;  11/11/2024 9:24 am   INDICATION: Signs/Symptoms:dysphagia.   COMPARISON: CT abdomen and pelvis 11/03/2024   ACCESSION NUMBER(S): [id number]   ORDERING CLINICIAN: SYED JOSHI   TECHNIQUE: Modified single contrast barium swallow due to patient's clinical condition. Fluoro time: 15 seconds.   FINDINGS: : Small bilateral pleural effusions.   Contrast opacifies the esophagus and transit into the stomach  without obstruction or extraluminal extravasation.       Modified exam due to patient's clinical condition. No esophageal obstruction.   MACRO Zain Mcknight discussed the significance and urgency of this critical finding by Epic secure chat with  TERESAJEANA MADELYN on 11/11/2024 at 10:56 am.  (**-RCF-**) Findings:  See findings.   Signed by: Zain Mcknight 11/11/2024 11:02 AM Dictation workstation:   [id number]    XR chest 1 view    Result Date: 11/8/2024  Interpreted By:  Yazmin Oropeza, STUDY: XR CHEST 1 VIEW; ;  11/8/2024 10:10 am   INDICATION: Signs/Symptoms:hypoxia.     COMPARISON: Chest radiograph dated 11/03/2024.   ACCESSION NUMBER(S): [id number]   ORDERING CLINICIAN: PAULETTE CARRANZA   FINDINGS: Cardiac silhouette is mildly enlarged but is similar compared to prior radiograph. Again noted is moderate volume right-sided pleural effusion with associated hazy airspace opacity in the right mid to lower lung zone. There is also reticular airspace opacity in the right lung base, which could represent atelectasis versus pneumonia. There is also obliteration of left costophrenic angle concerning for small left pleural effusion. There is mild prominence of interstitial markings in bilateral lungs concerning for mild interstitial edema. No large pneumothorax within limits of portable technique. No acute osseous findings.       1. Suggestion of mild interstitial edema, more pronounced on this radiograph compared to prior. Recommend correlation with fluid status. 2. Redemonstration of moderate right and small left pleural effusions. 3. Right basilar airspace opacity, which could be related to atelectasis versus pneumonia in appropriate clinical setting.   MACRO: None   Signed by: Yazmin Oropeza 11/8/2024 4:17 PM Dictation workstation:   [id number]    Transthoracic Echo (TTE) Limited    Result Date: 11/5/2024   [facility], [address] [place] [address]               Tel [phone] and Fax  [phone] TRANSTHORACIC ECHOCARDIOGRAM REPORT  Patient Name:       ELIE WHITLEY          [place] Physician:    [id number] Chicho Goldman MD Study Date:         11/5/2024           Ordering Provider:    [id number] SHERITA GOYAL MRN/PID:            [id number]            Fellow: Accession#:         [id number]        Nurse: Date of Birth/Age:  1940 / 83     Sonographer:          Cathi garcia                                     RDYVROSE Gender assigned at  F                   Additional Staff: Birth: Height:             165.10 cm           Admit Date:           11/3/2024 Weight:             72.57 kg            Admission Status:     Inpatient -                                                               Routine BSA / BMI:          1.80 m2 / 26.63     Encounter#:           [id number]                     kg/m2 Blood Pressure:     103/62 mmHg         Department Location:  [facility] Non                                                               Invasive Study Type:    TRANSTHORACIC ECHO (TTE) LIMITED Diagnosis/ICD: 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 Study Detail: The following Echo studies were performed: 2D, Doppler and color               flow.  PHYSICIAN INTERPRETATION: Left Ventricle: The left ventricular systolic function is normal, with a visually estimated ejection fraction of 60-65%. There is severely increased concentric left ventricular hypertrophy. There are no regional left ventricular wall motion abnormalities. The left ventricular cavity size is  normal. There is moderately increased septal and moderately increased posterior left ventricular wall thickness. Spectral Doppler shows an abnormal pattern of left ventricular diastolic filling. Left Atrium: The left atrium is upper limits of normal in size. Right Ventricle: The right ventricle is normal in size. There is normal right ventricular global systolic function. Right Atrium: The right atrium is normal in size. Aortic Valve: The aortic valve is trileaflet. The aortic valve dimensionless index is 0.77. There is trace aortic valve regurgitation. The peak instantaneous gradient of the aortic valve is 13 mmHg. The mean gradient of the mitral valve is 7 mmHg. Mitral Valve: The mitral valve is normal in structure. There is trace to mild mitral valve regurgitation. Tricuspid Valve: The tricuspid valve is structurally normal. There is trace to mild tricuspid regurgitation. Pulmonic Valve: The pulmonic valve is not well visualized. The pulmonic valve regurgitation was not well visualized. Pericardium: There is no pericardial effusion noted. Aorta: The aortic root is normal.  CONCLUSIONS:  1. The left ventricular systolic function is normal, with a visually estimated ejection fraction of 60-65%.  2. Spectral Doppler shows an abnormal pattern of left ventricular diastolic filling.  3. There is moderately increased septal and moderately increased posterior left ventricular wall thickness.  4. There is severely increased concentric left ventricular hypertrophy.  5. There is normal right ventricular global systolic function. QUANTITATIVE DATA SUMMARY:  2D MEASUREMENTS:          Normal Ranges: IVSd:            1.49 cm  (0.6-1.1cm) LVPWd:           1.26 cm  (0.6-1.1cm) LVIDd:           4.47 cm  (3.9-5.9cm) LVIDs:           2.98 cm LV Mass Index:   133 g/m2 LVEDV Index:     23 ml/m2 LV % FS          33.4 %  LV SYSTOLIC FUNCTION BY 2D PLANIMETRY (MOD):                      Normal Ranges: EF-A4C View:    62 % (>=55%) EF-A2C  View:    61 % EF-Biplane:     64 % EF-Visual:      63 % LV EF Reported: 63 %  LV DIASTOLIC FUNCTION:          Normal Ranges: MV Peak E:             0.70 m/s (0.7-1.2 m/s) MV Peak A:             0.91 m/s (0.42-0.7 m/s) E/A Ratio:             0.77     (1.0-2.2)  MITRAL VALVE:          Normal Ranges: MV DT:        264 msec (150-240msec)  AORTIC VALVE:                      Normal Ranges: AoV Vmax:                1.78 m/s  (<=1.7m/s) AoV Peak P.7 mmHg (<20mmHg) AoV Mean P.0 mmHg  (1.7-11.5mmHg) LVOT Max Josiah:            1.33 m/s  (<=1.1m/s) AoV VTI:                 31.31 cm  (18-25cm) LVOT VTI:                24.11 cm LVOT Diameter:           2.04 cm   (1.8-2.4cm) AoV Area, VTI:           2.52 cm2  (2.5-5.5cm2) AoV Area,Vmax:           2.44 cm2  (2.5-4.5cm2) AoV Dimensionless Index: 0.77  TRICUSPID VALVE/RVSP:          Normal Ranges: Peak TR Velocity:     3.46 m/s  PULMONIC VALVE:          Normal Ranges: PV Max Josiah:     0.8 m/s  (0.6-0.9m/s) PV Max P.8 mmHg  [id number] Chicho Goldman MD Electronically signed on 2024 at 2:09:49 PM  ** Final **     CT abdomen pelvis wo IV contrast    Result Date: 2024  Interpreted By:  Jayshree Dias, STUDY: CT ABDOMEN PELVIS WO IV CONTRAST;  2024 3:04 pm   INDICATION: Signs/Symptoms:recurrent urosepsis, eval for retained stone urinary tract.   COMPARISON: None.   ACCESSION NUMBER(S): [id number]   ORDERING CLINICIAN: SHERITA GOYAL   TECHNIQUE: CT of the abdomen and pelvis was performed without intravenous contrast. Sagittal and coronal reconstructions were generated.   FINDINGS: LOWER CHEST: There are bilateral pleural effusions. There is a small pericardial effusion. There is bilateral basilar atelectasis or infiltrate.   ABDOMEN:   LIVER: Unremarkable   BILE DUCTS: Unremarkable   GALLBLADDER: Status post cholecystectomy   PANCREAS: Unremarkable   SPLEEN: The spleen is enlarged.   ADRENAL GLANDS: There are no adrenal masses.   KIDNEYS AND  URETERS: Kidneys are normal size and not obstructed.   There are no renal calculi.   Ureters are normal caliber.   PELVIS:   BLADDER: The bladder is empty. There is a 2 centimeters bladder stone.   REPRODUCTIVE ORGANS: Patient is status post hysterectomy. There appear to be surgical clips in the pelvis.   BOWEL: There is no significant bowel distention. There is motion artifact. There are sigmoid diverticula.   VESSELS: There are atherosclerotic changes of the aorta. The cava is unremarkable   PERITONEUM/RETROPERITONEUM/LYMPH NODES: There is a moderate amount of ascites. There is presacral edema. There is no obvious free air.   There is no significant lymphadenopathy.   BONES AND ABDOMINAL WALL: There is anasarca.   There are old left pelvic fractures peer there are degenerative changes of the spine. There is loss of height of L1.   COMPARISON OF FINDINGS:       2 centimeters stone in the urinary bladder.   Bilateral pleural effusions. Pericardial effusion.   Ascites.   Anasarca.   Presacral edema.   Enlarged spleen.   The exam is limited by motion artifact.   MACRO: none   Signed by: Jayshree Dias 11/4/2024 3:28 PM Dictation workstation:   [id number]    US renal complete    Result Date: 11/4/2024  Interpreted By:  Zain Mcknight, STUDY: US RENAL COMPLETE; 11/4/2024 9:03 am   INDICATION: Signs/Symptoms:LEVY on CKD, eval postrenal causes.   COMPARISON: None.   ACCESSION NUMBER(S): [id number]   ORDERING CLINICIAN: SHERITA GOYAL   TECHNIQUE: Sonography of the kidneys and urinary bladder was performed.   FINDINGS: Right Kidney: *Renal length: 10 cm *Parenchyma: Normal parenchymal echogenicity. Normal parenchymal thickness. *Collecting system: No hydronephrosis. *Calculus: No echogenic, shadowing calculus. *Lesion: None.   Left Kidney: *Renal length: 10 cm *Parenchyma: Normal parenchymal echogenicity. Normal parenchymal thickness. *Collecting system: No hydronephrosis. *Calculus: Nonobstructing calculus in the upper  pole measuring 4 mm. *Lesion: None.   Bladder: Decompressed.   Other: Trace perihepatic, perisplenic and pelvic free fluid/ascites.       No hydronephrosis. Decompressed bladder.   MACRO: None   Signed by: Zain Mcknight 11/4/2024 11:08 AM Dictation workstation:   [id number]    CT chest wo IV contrast    Result Date: 11/3/2024  Interpreted By:  Matthias Kolb, STUDY: CT CHEST WO IV CONTRAST;  11/3/2024 11:16 am   INDICATION: Signs/Symptoms:sepsis, ? atelectasis versus PNA on CXR without classic symptoms.     COMPARISON: Portable chest earlier same day 3 November 2024 at 0957 hours; CT lumbar spine without contrast 5 July 2024   ACCESSION NUMBER(S): [id number]   ORDERING CLINICIAN: SHERITA GOYAL   TECHNIQUE: Helical CT chest from thoracic inlet through the hemidiaphragms without intravenous contrast   FINDINGS: LUNGS / AIRSPACES / AIRWAYS:   LARGE AIRWAYS Filling defect: Negative Wall thickening: Negative Bronchiectasis: Negative Other: N/A   AIRSPACES Fibrosis: Negative Emphysema: Negative Consolidation: Negative Ground glass airspace disease: Small region of ground-glass airspace change in the middle lobe and potentially in portions of the inflated right lower lobe. None in the left lung noting one segment of left lower lobe collapse Edema: Negative Nodule / Mass: Negative Other: One segment of left lower lobe collapse. Multiple segments of right lower lobe collapse   PLEURA: Effusion:  Moderate size mostly if not entirely free flowing right; small free-flowing left Pneumothorax:  Both sides negative Other:  n/a   CARDIOVASCULAR: Heart size:  Mildly enlarged Pericardial effusion:  Perhaps trace Thoracic aortic aneurysm:  Negative Pulmonary arteries:  Static Heart failure change:  Negative.  No sign of interstitial or alveolar edema. Other:  Unusual hypodensities in the right ventricular free wall for example soft tissue windows axial series 202, image 167 and the surrounding few images   NONVASCULAR MEDIASTINUM:  Esophagus:  Grossly normal by CT Mediastinal Mass:  Negative Hiatal hernia:  None Other:  n/a   LYMPH NODES: No thoracic adenopathy   CHEST WALL: Soft tissues of the chest wall are unremarkable   SKELETON: No acute or contributory abnormality   UPPER ABDOMEN: Small volume ascites pooling about the included liver and spleen. Spleen probably mildly enlarged but not completely included in the field of view. Liver not overtly cirrhotic but at least borderline fatty. The only pertinent comparison exam for the upper abdomen would be CT lumbar spine without contrast 5 July 2024 at which time there was not any ascites in the field of view, but the field of view did not include the portions of the abdomen where they ascites is presently located. It is unlikely there was ascites in July, 2024 as more of the lower abdomen was included on that exam and it did not appear that there is any ascites tracking down into (or up from) the pelvis       Abnormalities at the right lung base on portable frontal chest radiograph earlier today are due to a combination of moderate-sized, mostly if not entirely free-flowing right pleural effusion with associated multisegmental right lower lobe collapse adjacent to it   Small area of ground-glass airspace disease in the middle lobe confirmed on CT may not have been expected on the prior radiograph   Possibility of mild ground-glass airspace disease in the right lower lobe as well   No ground-glass airspace disease in the left lung   No consolidative pneumonia in any of the inflated lungs on either side   Small free-flowing left pleural effusion   Unusual hypodensities in the right ventricular free wall of uncertain etiology and significance   Perhaps trace (smallest detectable quantity) pericardial effusion   Incidental abnormalities in the included upper abdomen as detailed above   MACRO: None   Signed by: Matthias Kolb 11/3/2024 11:35 AM Dictation workstation:   [id number]    XR chest 1  view    Result Date: 11/3/2024  Interpreted By:  Kary Cardoso, STUDY: XR CHEST 1 VIEW 11/3/2024 10:06 am   INDICATION: Signs/Symptoms:cough and fever   COMPARISON: None   ACCESSION NUMBER(S): [id number]   ORDERING CLINICIAN: ESTHER ROBBINS   TECHNIQUE: AP view   FINDINGS: There is right lower lobe airspace consolidation and a right pleural effusion. There is an enlarged cardiac silhouette. Pulmonary vascularity is normal. Degenerative changes are noted in both shoulders. No sign of pneumothorax       1. Right lower lobe airspace consolidation either pneumonia or atelectasis with right pleural effusion. Follow-up to complete resolution is needed 2. Enlarged cardiac silhouette     Signed by: Kary Cardoso 11/3/2024 10:27 AM Dictation workstation:   [id number]   Results for orders placed or performed during the hospital encounter of 11/28/24 (from the past 24 hours)   Vancomycin   Result Value Ref Range    Vancomycin 17.1 5.0 - 20.0 ug/mL            Assessment/Plan   Assessment & Plan  Altered mental status, unspecified altered mental status type    Sepsis without acute organ dysfunction (Multi)    Acute metabolic encephalopathy      Left lower leg traumatic ulceration  Cleansed with saline.  Applied medihoney, aquacel AG, ABD, and light ace bandage.  Improving.  Vascular studies reviewed.   do not anticipate OR debridement  Will follow.       Yanni Mcgrath DPM

## 2024-12-01 NOTE — PROGRESS NOTES
Vancomycin Dosing by Pharmacy- FOLLOW UP    Desirae Gregg is a 84 y.o. year old female who Pharmacy has been consulted for vancomycin dosing for cellulitis, skin and soft tissue. Based on the patient's indication and renal status this patient is being dosed based on a goal trough/random level of 15-20.     Renal function is currently declining.    Current vancomycin dose: dose by level    Most recent random level: 17.1 mcg/mL    Visit Vitals  /65 (BP Location: Left arm, Patient Position: Lying)   Pulse 96   Temp 37.2 °C (98.9 °F) (Temporal)   Resp 22        Lab Results   Component Value Date    CREATININE 1.94 (H) 2024    CREATININE 2.24 (H) 2024    CREATININE 2.69 (H) 2024    CREATININE 1.02 2024        Patient weight is as follows:   Vitals:    24 0519   Weight: 71 kg (156 lb 8.4 oz)       Cultures:  Susceptibility data for the encounter in last 14 days.  Collected Specimen Info Organism   24 Tissue/Biopsy from Wound/Tissue Staphylococcus aureus   24 Blood culture from Peripheral Venipuncture Staphylococcus epidermidis        I/O last 3 completed shifts:  In: 1675 (23 mL/kg) [P.O.:310; I.V.:1065 (14.6 mL/kg); IV Piggyback:300]  Out: 900 (12.3 mL/kg) [Urine:900 (0.3 mL/kg/hr)]  Weight: 72.9 kg   I/O during current shift:  I/O this shift:  In: 100 [IV Piggyback:100]  Out: 400 [Urine:400]    Temp (24hrs), Av.6 °C (97.9 °F), Min:36.2 °C (97.2 °F), Max:37.2 °C (98.9 °F)      Assessment/Plan    Within goal random/trough level  Repeat 500mg dose. The next level will be obtained on  at 5a. May be obtained sooner if clinically indicated.   Will continue to monitor renal function daily while on vancomycin and order serum creatinine at least every 48 hours if not already ordered.  Follow for continued vancomycin needs, clinical response, and signs/symptoms of toxicity.       Manuel Mancini, PharmD

## 2024-12-02 ENCOUNTER — APPOINTMENT (OUTPATIENT)
Dept: CARDIOLOGY | Facility: HOSPITAL | Age: 84
End: 2024-12-02
Payer: MEDICARE

## 2024-12-02 ENCOUNTER — APPOINTMENT (OUTPATIENT)
Dept: UROLOGY | Facility: CLINIC | Age: 84
End: 2024-12-02
Payer: MEDICARE

## 2024-12-02 ENCOUNTER — APPOINTMENT (OUTPATIENT)
Dept: PRIMARY CARE | Facility: CLINIC | Age: 84
End: 2024-12-02
Payer: MEDICARE

## 2024-12-02 LAB
ANION GAP SERPL CALC-SCNC: 11 MMOL/L (ref 10–20)
ATRIAL RATE: 107 BPM
BACTERIA BLD AEROBE CULT: ABNORMAL
BACTERIA BLD CULT: ABNORMAL
BACTERIA BLD CULT: NORMAL
BACTERIA SPEC CULT: ABNORMAL
BACTERIA SPEC CULT: ABNORMAL
BUN SERPL-MCNC: 22 MG/DL (ref 6–23)
CALCIUM SERPL-MCNC: 7.6 MG/DL (ref 8.6–10.3)
CHLORIDE SERPL-SCNC: 109 MMOL/L (ref 98–107)
CO2 SERPL-SCNC: 22 MMOL/L (ref 21–32)
CREAT SERPL-MCNC: 1.42 MG/DL (ref 0.5–1.05)
EGFRCR SERPLBLD CKD-EPI 2021: 36 ML/MIN/1.73M*2
ERYTHROCYTE [DISTWIDTH] IN BLOOD BY AUTOMATED COUNT: 17.8 % (ref 11.5–14.5)
GLUCOSE SERPL-MCNC: 87 MG/DL (ref 74–99)
GRAM STN SPEC: ABNORMAL
HCT VFR BLD AUTO: 44.2 % (ref 36–46)
HGB BLD-MCNC: 13.4 G/DL (ref 12–16)
MCH RBC QN AUTO: 27.5 PG (ref 26–34)
MCHC RBC AUTO-ENTMCNC: 30.3 G/DL (ref 32–36)
MCV RBC AUTO: 91 FL (ref 80–100)
NRBC BLD-RTO: 0 /100 WBCS (ref 0–0)
P AXIS: 51 DEGREES
P OFFSET: 194 MS
P ONSET: 138 MS
PLATELET # BLD AUTO: 277 X10*3/UL (ref 150–450)
POTASSIUM SERPL-SCNC: 3.3 MMOL/L (ref 3.5–5.3)
PR INTERVAL: 158 MS
Q ONSET: 217 MS
QRS COUNT: 18 BEATS
QRS DURATION: 130 MS
QT INTERVAL: 358 MS
QTC CALCULATION(BAZETT): 477 MS
QTC FREDERICIA: 434 MS
R AXIS: -40 DEGREES
RBC # BLD AUTO: 4.87 X10*6/UL (ref 4–5.2)
SODIUM SERPL-SCNC: 139 MMOL/L (ref 136–145)
T AXIS: 144 DEGREES
T OFFSET: 396 MS
VANCOMYCIN SERPL-MCNC: 17.6 UG/ML (ref 5–20)
VENTRICULAR RATE: 107 BPM
WBC # BLD AUTO: 20.1 X10*3/UL (ref 4.4–11.3)

## 2024-12-02 PROCEDURE — 2500000002 HC RX 250 W HCPCS SELF ADMINISTERED DRUGS (ALT 637 FOR MEDICARE OP, ALT 636 FOR OP/ED): Performed by: NURSE PRACTITIONER

## 2024-12-02 PROCEDURE — 97530 THERAPEUTIC ACTIVITIES: CPT | Mod: GO | Performed by: OCCUPATIONAL THERAPIST

## 2024-12-02 PROCEDURE — 80048 BASIC METABOLIC PNL TOTAL CA: CPT | Performed by: INTERNAL MEDICINE

## 2024-12-02 PROCEDURE — 2500000001 HC RX 250 WO HCPCS SELF ADMINISTERED DRUGS (ALT 637 FOR MEDICARE OP): Performed by: NURSE PRACTITIONER

## 2024-12-02 PROCEDURE — 2060000001 HC INTERMEDIATE ICU ROOM DAILY

## 2024-12-02 PROCEDURE — 85027 COMPLETE CBC AUTOMATED: CPT | Performed by: INTERNAL MEDICINE

## 2024-12-02 PROCEDURE — 97161 PT EVAL LOW COMPLEX 20 MIN: CPT | Mod: GP | Performed by: PHYSICAL THERAPIST

## 2024-12-02 PROCEDURE — 99233 SBSQ HOSP IP/OBS HIGH 50: CPT | Performed by: FAMILY MEDICINE

## 2024-12-02 PROCEDURE — 94640 AIRWAY INHALATION TREATMENT: CPT

## 2024-12-02 PROCEDURE — 36415 COLL VENOUS BLD VENIPUNCTURE: CPT | Performed by: INTERNAL MEDICINE

## 2024-12-02 PROCEDURE — 94760 N-INVAS EAR/PLS OXIMETRY 1: CPT

## 2024-12-02 PROCEDURE — 2500000004 HC RX 250 GENERAL PHARMACY W/ HCPCS (ALT 636 FOR OP/ED): Performed by: INTERNAL MEDICINE

## 2024-12-02 PROCEDURE — 2500000004 HC RX 250 GENERAL PHARMACY W/ HCPCS (ALT 636 FOR OP/ED): Performed by: NURSE PRACTITIONER

## 2024-12-02 PROCEDURE — 93005 ELECTROCARDIOGRAM TRACING: CPT

## 2024-12-02 PROCEDURE — 97165 OT EVAL LOW COMPLEX 30 MIN: CPT | Mod: GO | Performed by: OCCUPATIONAL THERAPIST

## 2024-12-02 PROCEDURE — 80202 ASSAY OF VANCOMYCIN: CPT | Performed by: INTERNAL MEDICINE

## 2024-12-02 RX ORDER — VANCOMYCIN HYDROCHLORIDE 500 MG/100ML
500 INJECTION, SOLUTION INTRAVENOUS ONCE
Status: COMPLETED | OUTPATIENT
Start: 2024-12-02 | End: 2024-12-02

## 2024-12-02 ASSESSMENT — COGNITIVE AND FUNCTIONAL STATUS - GENERAL
WALKING IN HOSPITAL ROOM: A LOT
MOVING TO AND FROM BED TO CHAIR: A LITTLE
CLIMB 3 TO 5 STEPS WITH RAILING: A LOT
MOBILITY SCORE: 14
DAILY ACTIVITIY SCORE: 18
EATING MEALS: A LITTLE
HELP NEEDED FOR BATHING: A LOT
DRESSING REGULAR LOWER BODY CLOTHING: TOTAL
DAILY ACTIVITIY SCORE: 12
TURNING FROM BACK TO SIDE WHILE IN FLAT BAD: A LITTLE
EATING MEALS: A LITTLE
TURNING FROM BACK TO SIDE WHILE IN FLAT BAD: A LITTLE
DRESSING REGULAR LOWER BODY CLOTHING: A LITTLE
TOILETING: TOTAL
STANDING UP FROM CHAIR USING ARMS: A LITTLE
MOBILITY SCORE: 16
HELP NEEDED FOR BATHING: A LITTLE
TOILETING: A LOT
CLIMB 3 TO 5 STEPS WITH RAILING: A LOT
MOVING FROM LYING ON BACK TO SITTING ON SIDE OF FLAT BED WITH BEDRAILS: A LITTLE
TURNING FROM BACK TO SIDE WHILE IN FLAT BAD: A LITTLE
TOILETING: A LOT
HELP NEEDED FOR BATHING: A LITTLE
EATING MEALS: A LITTLE
MOBILITY SCORE: 16
WALKING IN HOSPITAL ROOM: A LOT
MOVING TO AND FROM BED TO CHAIR: A LITTLE
DRESSING REGULAR UPPER BODY CLOTHING: A LOT
DRESSING REGULAR LOWER BODY CLOTHING: A LITTLE
DRESSING REGULAR UPPER BODY CLOTHING: A LITTLE
STANDING UP FROM CHAIR USING ARMS: A LOT
CLIMB 3 TO 5 STEPS WITH RAILING: TOTAL
MOVING FROM LYING ON BACK TO SITTING ON SIDE OF FLAT BED WITH BEDRAILS: A LITTLE
DAILY ACTIVITIY SCORE: 18
MOVING TO AND FROM BED TO CHAIR: A LOT
PERSONAL GROOMING: A LITTLE
DRESSING REGULAR UPPER BODY CLOTHING: A LITTLE
STANDING UP FROM CHAIR USING ARMS: A LITTLE
WALKING IN HOSPITAL ROOM: A LOT

## 2024-12-02 ASSESSMENT — PAIN - FUNCTIONAL ASSESSMENT
PAIN_FUNCTIONAL_ASSESSMENT: 0-10

## 2024-12-02 ASSESSMENT — PAIN SCALES - GENERAL
PAINLEVEL_OUTOF10: 0 - NO PAIN

## 2024-12-02 ASSESSMENT — ACTIVITIES OF DAILY LIVING (ADL)
ADL_ASSISTANCE: NEEDS ASSISTANCE
BATHING_ASSISTANCE: MODERATE
ADL_ASSISTANCE: NEEDS ASSISTANCE

## 2024-12-02 NOTE — PROGRESS NOTES
Desirae Gregg is a 84 y.o. female on day 4 of admission presenting with Altered mental status, unspecified altered mental status type.    Subjective   Interval History: no fever, no new complaints        Review of Systems    Objective   Range of Vitals (last 24 hours)  Heart Rate:  []   Temp:  [35.9 °C (96.7 °F)-37 °C (98.6 °F)]   Resp:  [18-25]   BP: (105-136)/(63-96)   Weight:  [68.8 kg (151 lb 10.8 oz)]   SpO2:  [85 %-100 %]   Daily Weight  12/02/24 : 68.8 kg (151 lb 10.8 oz)    Body mass index is 25.24 kg/m².    Physical Exam  Constitutional:       Appearance: Normal appearance.   HENT:      Head: Normocephalic and atraumatic.      Mouth/Throat:      Mouth: Mucous membranes are moist.      Pharynx: Oropharynx is clear.   Eyes:      Pupils: Pupils are equal, round, and reactive to light.   Cardiovascular:      Rate and Rhythm: Normal rate and regular rhythm.      Heart sounds: Normal heart sounds.   Pulmonary:      Effort: Pulmonary effort is normal.      Breath sounds: Normal breath sounds.   Abdominal:      General: Abdomen is flat. Bowel sounds are normal.      Palpations: Abdomen is soft.   Musculoskeletal:      Cervical back: Normal range of motion.         Antibiotics  cefepime - 1 gram/50 mL  vancomycin    Relevant Results  Labs  Results from last 72 hours   Lab Units 12/02/24  0517 11/30/24  0630   WBC AUTO x10*3/uL 20.1* 18.3*   HEMOGLOBIN g/dL 13.4 13.3   HEMATOCRIT % 44.2 44.6   PLATELETS AUTO x10*3/uL 277 321     Results from last 72 hours   Lab Units 12/02/24  0517 11/30/24  0630   SODIUM mmol/L 139 137   POTASSIUM mmol/L 3.3* 3.3*   CHLORIDE mmol/L 109* 107   CO2 mmol/L 22 22   BUN mg/dL 22 26*   CREATININE mg/dL 1.42* 1.94*   GLUCOSE mg/dL 87 98   CALCIUM mg/dL 7.6* 7.1*   ANION GAP mmol/L 11 11   EGFR mL/min/1.73m*2 36* 25*           Estimated Creatinine Clearance: 27.7 mL/min (A) (by C-G formula based on SCr of 1.42 mg/dL (H)).  C-Reactive Protein   Date Value Ref Range Status   11/15/2024  0.80 <1.00 mg/dL Final   11/12/2024 1.04 (H) <1.00 mg/dL Final   11/11/2024 0.86 <1.00 mg/dL Final     Microbiology  Reviewed  Imaging  Reviewed        Assessment/Plan   Sepsis, 1 BC with CNS, ? Significance   Left shin wound infection, wounfd culture with MRSA  Respiratory failure / atelectasis  Pyuria, the culture with mixed ortiz  Encephalopathy, likely metabolic  Polycythemia / myelofibrosis     Recommendations :  Discontinue Cefepime   Continue Vancomycin  Discussed with the medical team      I spent minutes in the professional and overall care of this patient.      Cameron Lassiter MD

## 2024-12-02 NOTE — PROGRESS NOTES
12/02/24 0922   Discharge Planning   Living Arrangements Other (Comment)  (From [facility])   Support Systems Children;Family members   Assistance Needed Per SNF, At baseline:, Patient is A&O 2-3(forgetful at times), requires assistance with ADLs, wound care as follows - Cleanse with NSS, pat dry, apply Anasept mixed with Collagen powder, cover with Calcium Alginate ABD pad and wrap with Kerlix daily and PRN. Patient admitted to [facility] on 11-19. Prior to SNF patient was from home with son.   Type of Residence Skilled nursing facility   Do you have animals or pets at home? No   Who is requesting discharge planning? Provider   Home or Post Acute Services Post acute facilities (Rehab/SNF/etc)   Type of Post Acute Facility Services Skilled nursing   Expected Discharge Disposition SNF  (From [facility], dc plan needs discussed with POA/patient after discussion with palliative care team. [facility] not have a bed.)   Does the patient need discharge transport arranged? Yes   RoundTrip coordination needed? Yes   Has discharge transport been arranged? No   Intensity of Service   Intensity of Service 0-30 min

## 2024-12-02 NOTE — CARE PLAN
The patient's goals for the shift include eat tomato soup    The clinical goals for the shift include Remain HDS throughout shift      Problem: Skin  Goal: Decreased wound size/increased tissue granulation at next dressing change  Outcome: Progressing  Flowsheets (Taken 12/2/2024 0510 by Ally Josue, RN)  Decreased wound size/increased tissue granulation at next dressing change: Protective dressings over bony prominences  Goal: Participates in plan/prevention/treatment measures  Outcome: Progressing  Flowsheets (Taken 12/2/2024 0510 by Ally Josue, RN)  Participates in plan/prevention/treatment measures: Increase activity/out of bed for meals  Goal: Prevent/manage excess moisture  Outcome: Progressing  Flowsheets (Taken 12/2/2024 0510 by Ally Josue, RN)  Prevent/manage excess moisture:   Follow provider orders for dressing changes   Cleanse incontinence/protect with barrier cream  Goal: Prevent/minimize sheer/friction injuries  Outcome: Progressing  Flowsheets (Taken 12/2/2024 0510 by Ally Josue, RN)  Prevent/minimize sheer/friction injuries:   HOB 30 degrees or less   Turn/reposition every 2 hours/use positioning/transfer devices  Goal: Promote/optimize nutrition  Outcome: Progressing  Flowsheets (Taken 12/2/2024 0510 by Ally Josue, RN)  Promote/optimize nutrition: Consume > 50% meals/supplements  Goal: Promote skin healing  Outcome: Progressing  Flowsheets (Taken 12/2/2024 0510 by Ally Josue, RN)  Promote skin healing:   Assess skin/pad under line(s)/device(s)   Protective dressings over bony prominences   Turn/reposition every 2 hours/use positioning/transfer devices     Problem: Fall/Injury  Goal: Not fall by end of shift  Outcome: Progressing  Goal: Be free from injury by end of the shift  Outcome: Progressing  Goal: Verbalize understanding of personal risk factors for fall in the hospital  Outcome: Progressing  Goal: Verbalize understanding of risk factor reduction  measures to prevent injury from fall in the home  Outcome: Progressing  Goal: Use assistive devices by end of the shift  Outcome: Progressing  Goal: Pace activities to prevent fatigue by end of the shift  Outcome: Progressing     Problem: Pain - Adult  Goal: Verbalizes/displays adequate comfort level or baseline comfort level  Outcome: Progressing     Problem: Safety - Adult  Goal: Free from fall injury  Outcome: Progressing     Problem: Discharge Planning  Goal: Discharge to home or other facility with appropriate resources  Outcome: Progressing     Problem: Chronic Conditions and Co-morbidities  Goal: Patient's chronic conditions and co-morbidity symptoms are monitored and maintained or improved  Outcome: Progressing     Problem: Pain  Goal: Takes deep breaths with improved pain control throughout the shift  Outcome: Progressing  Goal: Turns in bed with improved pain control throughout the shift  Outcome: Progressing  Goal: Walks with improved pain control throughout the shift  Outcome: Progressing  Goal: Performs ADL's with improved pain control throughout shift  Outcome: Progressing  Goal: Participates in PT with improved pain control throughout the shift  Outcome: Progressing  Goal: Free from opioid side effects throughout the shift  Outcome: Progressing  Goal: Free from acute confusion related to pain meds throughout the shift  Outcome: Progressing

## 2024-12-02 NOTE — PROGRESS NOTES
Physical Therapy    Physical Therapy Evaluation    Patient Name: Desirae Gregg  MRN: [id number]  Department: [address]  Room: [address] [place] [address]  Today's Date: 12/2/2024   Time Calculation  Start Time: 1035  Stop Time: 1100  Time Calculation (min): 25 min    Assessment/Plan   PT Assessment  PT Assessment Results: Decreased strength, Decreased endurance, Decreased mobility, Decreased skin integrity  Evaluation/Treatment Tolerance: Patient tolerated treatment well, Treatment limited secondary to medical complications (Comment) (Deferred components of mobility due to significant fluctuations in HR)  Medical Staff Made Aware: Yes  Barriers to Participation: Comorbidities  End of Session Communication: Bedside nurse, PCT/NA/CTA  Assessment Comment: Pt is a pleasant 84 y.o. female admitted for change in mental status, found to have UTI and infection in LE wound.  The patient was at [facility], requiring assistance for ADLs and ambulating with RW with Assistance for short distances in PT, which per pt's son is a decline from baseline just 4-6 weeks ago. This evaluation was limited by her fluctuating HR. Anticipate that patient will necessitate moderate intensity therapy at discharge to  return to PLOF.  End of Session Patient Position: Bed, 3 rail up, Alarm off, caregiver present  IP OR SWING BED PT PLAN  Inpatient or Swing Bed: Inpatient  PT Plan  Treatment/Interventions: Bed mobility, Transfer training, Gait training, Strengthening, Endurance training, Therapeutic exercise, Therapeutic activity  PT Plan: Ongoing PT  PT Frequency: 3 times per week  PT Discharge Recommendations: Moderate intensity level of continued care  Equipment Recommended upon Discharge: Wheeled walker  PT Recommended Transfer Status: Assist x2, Assistive device  PT - OK to Discharge: Yes (Per PT POC)    Subjective   General Visit Information:  General  Reason for Referral: Pt is an 84 y.o. female admitted with dx of altered mental status, UTI and infection in left LE  wound. Pt had a recent admission at [facility] and was dischrged on 11/19/24 to SNF ([facility]).  Referred By: Michael De Guzman MD  Past Medical History Relevant to Rehab: Per EMR: hx of chronic kidney disease, chronic systolic congestive heart failure, remote poliomyelitis, myelofibrosis, history of PE and on Eliquis, hypertension and polycythemia vera  Family/Caregiver Present: Yes (Pt's son was present for end of evaluation)  Caregiver Feedback: Pt's son states that the patient has declined over last 4-6 weeks, requiring more assistance with ADLS.  He states that he would like her to return to SNF for rehab in order to be able to transfer herself.  Co-Treatment: OT  Co-Treatment Reason: To maximize patient safety and performance.  Prior to Session Communication: Bedside nurse (RN states that the patient was up to the chair yesterday with Assist x 2)  Patient Position Received: Bed, 3 rail up, Alarm off, caregiver present (Patient was receiving pericare upon entering room.)  General Comment: Patient was on room air, IV, and telemetry  Home Living:  Home Living  Type of Home: Skilled Nursing facility  Lives With: Adult children (Prior to admission to SNF, the patient lived with her son)  Home Adaptive Equipment: Walker rolling or standard, Wheelchair-manual, Other (Comment) (Bed side commode)  Bathroom Toilet: Adaptive toilet seating  Bathroom Equipment: Grab bars in shower, Shower chair with back  Home Living Comments: Details of home set up unknown.  Prior Level of Function:  Prior Function Per Pt/Caregiver Report  Level of [place]: Needs assistance with ADLs, Needs assistance with functional transfers, Needs assistance with homemaking  Receives Help From: Family (son at  home, staff at SNF)  ADL Assistance: Needs assistance  Homemaking Assistance: Needs assistance  Ambulatory Assistance: Needs assistance  Transfers: Assistive device  Gait: Assistive device, Other (Comment) (Per pt's son, at recent SNF  stay, she was able to ambulate approximately 30 feet with a RW with assistance of PT. Level of assistance not specified.)  Precautions:  Precautions  Medical Precautions: Fall precautions, Other (comment) (Skin care precautions - wound left anterior shin)  Precautions Comment: Skin     Vital Signs (Past 2hrs)        Date/Time Vitals Session Patient Position Pulse Resp SpO2 BP MAP (mmHg)    12/02/24 1035 During PT  --  123  --  --  --  --                         Objective   Pain:  Pain Assessment  Pain Assessment: 0-10  0-10 (Numeric) Pain Score:  (pt did not rate her pain, but states that she has chronic pain in left shoulder.)  Cognition:  Cognition  Orientation Level: Disoriented to place, Disoriented to time    General Assessments:       Activity Tolerance  Endurance: Tolerates less than 10 min exercise with changes in vital signs (Although asymtomatic, patient's HR was extremely variable from 110 bpm up to 143 at max, on average stayed in 120's.)         Strength  Strength Comments: Not formally tested, anticipate decreased LE strength based on functional mobility.  Static Sitting Balance  Static Sitting-Balance Support: Bilateral upper extremity supported, Feet supported  Static Sitting-Level of Assistance: Close supervision  Static Sitting-Comment/Number of Minutes: > or = 5 minutes    Static Standing Balance  Static Standing-Comment/Number of Minutes: Not tested  Functional Assessments:  Bed Mobility  Bed Mobility: Yes  Bed Mobility 1  Bed Mobility 1: Supine to sitting, Sitting to supine  Level of Assistance 1: Minimum assistance, Minimal verbal cues  Bed Mobility Comments 1: HOB was elevated, pt required min A at trunk  Bed Mobility 2  Bed Mobility  2: Sitting to supine  Level of Assistance 2: Minimum assistance  Bed Mobility Comments 2: min A only at right LE to lift back into bed  Bed Mobility 3  Bed Mobility 3: Rolling right, Rolling left  Level of Assistance 3: Modified independent  Bed Mobility 4  Bed  Mobility 4: Scooting  Level of Assistance 4: Maximum assistance  Bed Mobility Comments 4: Max A toA to scoot up in bed,    Transfers  Transfer: No (Transfer deferred due to significant fluctuations in HR)    Ambulation/Gait Training  Ambulation/Gait Training Performed: No (deferred due to significant fluctuations in HR)  Extremity/Trunk Assessments:  RLE   RLE : Within Functional Limits  LLE   LLE :  (Patient with wound on anterior left shin, wrapped in elastic bandage)  Outcome Measures:  [facility] Basic Mobility  Turning from your back to your side while in a flat bed without using bedrails: None  Moving from lying on your back to sitting on the side of a flat bed without using bedrails: A little  Moving to and from bed to chair (including a wheelchair): A lot  Standing up from a chair using your arms (e.g. wheelchair or bedside chair): A lot  To walk in hospital room: A lot  Climbing 3-5 steps with railing: Total  Basic Mobility - Total Score: 14    Encounter Problems       Encounter Problems (Active)       PT Problem       Pt will perform bed mobility with use of bedrail with supervision.        Start:  12/02/24    Expected End:  12/16/24            Pt will perform sit to stand transfer with RW with CGA or better.       Start:  12/02/24    Expected End:  12/16/24            Pt will perform bed to chair transfer with RW with CGA or better.        Start:  12/02/24    Expected End:  12/16/24            Pt will ambulate with RW for 30 feet or more with CGA or better.        Start:  12/02/24    Expected End:  12/16/24            Pt will tolerate 15 minutes or more of exercise and functional activity with stable vital signs.        Start:  12/02/24    Expected End:  12/16/24               Pain - Adult          Safety       LTG - Patient will adhere to hip precautions during ADL's and transfers       Start:  11/28/24            LTG - Patient will demonstrate safety requirements appropriate to situation/environment        Start:  11/28/24            LTG - Patient will utilize safety techniques       Start:  11/28/24            STG - Patient locks brakes on wheelchair       Start:  11/28/24            STG - Patient uses call light consistently to request assistance with transfers       Start:  11/28/24            STG - Patient uses gait belt during all transfers       Start:  11/28/24            Goal 1       Start:  11/28/24            Goal 2       Start:  11/28/24            Goal 3       Start:  11/28/24                   Education Documentation  Precautions, taught by Mima Dacosta, PT at 12/2/2024 11:40 AM.  Learner: Patient  Readiness: Acceptance  Method: Explanation  Response: Verbalizes Understanding, Needs Reinforcement    Body Mechanics, taught by Mima Dacosta, PT at 12/2/2024 11:40 AM.  Learner: Patient  Readiness: Acceptance  Method: Explanation  Response: Verbalizes Understanding, Needs Reinforcement    Mobility Training, taught by Mima Dacosta, PT at 12/2/2024 11:40 AM.  Learner: Patient  Readiness: Acceptance  Method: Explanation  Response: Verbalizes Understanding, Needs Reinforcement    Education Comments  No comments found.

## 2024-12-02 NOTE — CARE PLAN
The patient's goals for the shift include eat tomato soup    The clinical goals for the shift include Remain HDS throughout shift    Over the shift, the patient did not make progress toward the following goals. Barriers to progression include Recommendations to address these barriers include     Problem: Skin  Goal: Decreased wound size/increased tissue granulation at next dressing change  Outcome: Not Progressing  Flowsheets (Taken 12/2/2024 1362)  Decreased wound size/increased tissue granulation at next dressing change: Protective dressings over bony prominences     Problem: Pain  Goal: Participates in PT with improved pain control throughout the shift  Outcome: Not Progressing

## 2024-12-02 NOTE — SIGNIFICANT EVENT
Discussed patient case with Dr. De Jesus, goals are clear and patient code status has been addressed. Will discontinue consult at this time. Please re-consult if needs arise.

## 2024-12-02 NOTE — PROGRESS NOTES
12/02/24 1512   Discharge Planning   Expected Discharge Disposition SNF  (Spoke to patient and son at bedside. They would like patient to go to [facility] and not return to [place] if they have a bed, referral was sent and [facility] able to accept. Patient will need a direct precert, initiated 12/2.)   Patient Choice   Provider Choice list and CMS website (https://medicare.gov/care-compare#search) for post-acute Quality and Resource Measure Data were provided and reviewed with: Family;Patient   Patient / Family choosing to utilize agency / facility established prior to hospitalization No   Intensity of Service   Intensity of Service 0-30 min

## 2024-12-02 NOTE — PROGRESS NOTES
Occupational Therapy    Evaluation    Patient Name: Desirae Gregg  MRN: [id number]  Department: [address]  Room: [address] [place] [address]  Today's Date: 12/2/2024  Time Calculation  Start Time: 1036  Stop Time: 1104  Time Calculation (min): 28 min    Assessment  IP OT Assessment  Prognosis: Good  Evaluation/Treatment Tolerance: Patient tolerated treatment well  Medical Staff Made Aware: Yes  End of Session Communication: Bedside nurse, PCT/NA/CTA (son)  End of Session Patient Position: Bed, 3 rail up, Alarm off, caregiver present  Plan:  Treatment Interventions: ADL retraining, Functional transfer training, UE strengthening/ROM, Endurance training, Patient/family training, Equipment evaluation/education, Neuromuscular reeducation, Compensatory technique education  OT Frequency: 3 times per week  OT Discharge Recommendations: Moderate intensity level of continued care  OT - OK to Discharge: Yes    Subjective   Current Problem:  1. Altered mental status, unspecified altered mental status type        2. Infected wound  Vascular US Ankle Brachial Index (SOILA) Without Exercise    Vascular US Ankle Brachial Index (SOILA) Without Exercise    CANCELED: Vascular US PVR Without Exercise    CANCELED: Vascular US PVR Without Exercise      3. Complicated UTI (urinary tract infection)        4. LEVY (acute kidney injury) (CMS-[facility])        5. Sepsis, due to unspecified organism, unspecified whether acute organ dysfunction present (Multi)        6. Peripheral vascular disease, unspecified (CMS-[facility])  Vascular US Ankle Brachial Index (SOILA) Without Exercise    Vascular US Ankle Brachial Index (SOILA) Without Exercise    CANCELED: Vascular US PVR Without Exercise    CANCELED: Vascular US PVR Without Exercise        General:  General  Reason for Referral: OT for ADL assessment  Referred By: Michael De Guzman MD  Past Medical History Relevant to Rehab: chronic kidney disease, chronic systolic congestive heart failure, remote poliomyelitis, myelofibrosis, history  of PE and on Eliquis, hypertension and polycythemia vera  Family/Caregiver Present: Yes  Caregiver Feedback: supportive and able to verify pt PLOF, home setup and overall goals of care  Co-Treatment: PT  Co-Treatment Reason: To maximize patient safety and performance.  Prior to Session Communication: Bedside nurse  Patient Position Received: Bed, 3 rail up, Alarm off, caregiver present  Preferred Learning Style: verbal, visual  General Comment: OT orders received and chart reviewed. Pt educated on reason for OT consultation and acute services. Pt agreeable to participate.  Precautions:  Hearing/Visual Limitations: WFL  UE Weight Bearing Status: Weight Bearing as Tolerated  LE Weight Bearing Status: Weight Bearing as Tolerated  Medical Precautions: Fall precautions, Oxygen therapy device and L/min  Precautions Comment: wound care for LLE wound on shin           Pain:  Pain Assessment  Pain Assessment: 0-10  0-10 (Numeric) Pain Score: 0 - No pain    Objective   Cognition:  Overall Cognitive Status: Within Functional Limits (able to participate and follow commands)  Orientation Level: Disoriented to place, Disoriented to time  Attention: Within Functional Limits  Memory:  (mild impairment)  Insight: Mild  Impulsive: Within functional limits  Processing Speed: Within funtional limits     Confusion Assessment Method (CAM)  Acute Onset and Fluctuating Course (1A): No  Herman Agitation Sedation Scale  Herman Agitation Sedation Scale (RASS): Alert and calm  Home Living:  Type of Home: Skilled Nursing facility  Lives With: Adult children  Home Adaptive Equipment: Walker rolling or standard, Wheelchair-manual, Other (Comment)  Bathroom Toilet: Adaptive toilet seating  Bathroom Equipment: Grab bars in shower, Shower chair with back   Prior Function:  Level of [place]: Needs assistance with ADLs, Needs assistance with functional transfers, Needs assistance with homemaking  Receives Help From: Family  ADL Assistance:  Needs assistance  Ambulatory Assistance: Needs assistance  Transfers: Assistive device  Gait:  (Per pt's son, at recent SNF stay, she was able to ambulate approximately 30 feet with a RW with assistance of PT. Level of assistance not specified.)  IADL History:     ADL:  Eating Assistance: Stand by  Eating Deficit:  (modified diet)  Grooming Assistance: Stand by  Bathing Assistance: Moderate  UE Dressing Assistance: Moderate  LE Dressing Assistance: Total  Toileting Assistance with Device: Total  Activity Tolerance:  Endurance: Tolerates less than 10 min exercise with changes in vital signs (Although asymtomatic, patient's HR was extremely variable from 110 bpm up to 143 at max, on average stayed in 120's.)  Activity Tolerance Comments: sitting EOB  Bed Mobility/Transfers: Bed Mobility  Bed Mobility: Yes  Bed Mobility 1  Bed Mobility 1: Supine to sitting, Sitting to supine  Level of Assistance 1: Minimum assistance, Minimal verbal cues  Bed Mobility 2  Bed Mobility  2: Sitting to supine  Level of Assistance 2: Minimum assistance  Bed Mobility 3  Bed Mobility 3: Rolling right, Rolling left  Level of Assistance 3: Modified independent  Bed Mobility 4  Bed Mobility 4: Scooting  Level of Assistance 4: Maximum assistance    Transfers  Transfer: No      Functional Mobility:  Functional Mobility  Functional Mobility Performed: No  Sitting Balance:  Static Sitting Balance  Static Sitting-Balance Support: Feet supported  Static Sitting-Level of Assistance: Independent  Dynamic Sitting Balance  Dynamic Sitting-Balance Support: Feet supported, Bilateral upper extremity supported  Dynamic Sitting-Level of Assistance: Moderate assistance  Dynamic Sitting-Balance: Trunk control activities  Dynamic Sitting-Comments: attempt to scoot along edge  Standing Balance:     Modalities:  Modalities Used: No     Strength:  Strength Comments: Not formally tested, anticipate decreased BUE strength based on functional  activities  Perception:  Perseveration: Not present  Coordination:  Movements are Fluid and Coordinated: Yes   Hand Function:  Hand Function  Gross Grasp: Functional  Coordination: Functional  Extremities: RUE   RUE : Within Functional Limits and LUE   LUE: Within Functional Limits      Outcome Measures: [facility] Daily Activity  Putting on and taking off regular lower body clothing: Total  Bathing (including washing, rinsing, drying): A lot  Putting on and taking off regular upper body clothing: A lot  Toileting, which includes using toilet, bedpan or urinal: Total  Taking care of personal grooming such as brushing teeth: A little  Eating Meals: A little  Daily Activity - Total Score: 12      Education Documentation  No documentation found.  Education Comments  No comments found.      Goals:   Encounter Problems       Encounter Problems (Active)       ADLs       Patient will perform UB bathing  with stand by assist level of assistance in sitting.       Start:  12/02/24    Expected End:  12/16/24            Patient with complete upper body dressing with stand by assist level of assistance donning and doffing all UE clothes with PRN adaptive equipment while edge of bed        Start:  12/02/24    Expected End:  12/16/24            Patient will feed self with modified independent level of assistance and verbal cues using PRN adaptive equipment.       Start:  12/02/24    Expected End:  12/16/24            Patient will complete daily grooming tasks brushing teeth and washing face/hair with stand by assist level of assistance and PRN adaptive equipment while edge of bed .       Start:  12/02/24    Expected End:  12/16/24            Patient will complete toileting including hygiene clothing management/hygiene with minimal assist  level of assistance and bedside commode.       Start:  12/02/24    Expected End:  12/16/24               TRANSFERS       Patient will complete functional transfer to bedside commode and chair with  least restrictive device with contact guard assist level of assistance.       Start:  12/02/24    Expected End:  12/16/24

## 2024-12-03 LAB
ANION GAP SERPL CALC-SCNC: 11 MMOL/L (ref 10–20)
BUN SERPL-MCNC: 19 MG/DL (ref 6–23)
CALCIUM SERPL-MCNC: 7.6 MG/DL (ref 8.6–10.3)
CHLORIDE SERPL-SCNC: 111 MMOL/L (ref 98–107)
CO2 SERPL-SCNC: 21 MMOL/L (ref 21–32)
CREAT SERPL-MCNC: 1.28 MG/DL (ref 0.5–1.05)
EGFRCR SERPLBLD CKD-EPI 2021: 41 ML/MIN/1.73M*2
ERYTHROCYTE [DISTWIDTH] IN BLOOD BY AUTOMATED COUNT: 17.8 % (ref 11.5–14.5)
GLUCOSE SERPL-MCNC: 94 MG/DL (ref 74–99)
HCT VFR BLD AUTO: 44.1 % (ref 36–46)
HGB BLD-MCNC: 13.7 G/DL (ref 12–16)
MAGNESIUM SERPL-MCNC: 1.61 MG/DL (ref 1.6–2.4)
MCH RBC QN AUTO: 27.6 PG (ref 26–34)
MCHC RBC AUTO-ENTMCNC: 31.1 G/DL (ref 32–36)
MCV RBC AUTO: 89 FL (ref 80–100)
NRBC BLD-RTO: 0 /100 WBCS (ref 0–0)
PLATELET # BLD AUTO: 296 X10*3/UL (ref 150–450)
POTASSIUM SERPL-SCNC: 3.5 MMOL/L (ref 3.5–5.3)
RBC # BLD AUTO: 4.97 X10*6/UL (ref 4–5.2)
SODIUM SERPL-SCNC: 139 MMOL/L (ref 136–145)
VANCOMYCIN SERPL-MCNC: 17.2 UG/ML (ref 5–20)
WBC # BLD AUTO: 25.3 X10*3/UL (ref 4.4–11.3)

## 2024-12-03 PROCEDURE — 2500000004 HC RX 250 GENERAL PHARMACY W/ HCPCS (ALT 636 FOR OP/ED): Performed by: FAMILY MEDICINE

## 2024-12-03 PROCEDURE — 94760 N-INVAS EAR/PLS OXIMETRY 1: CPT

## 2024-12-03 PROCEDURE — 85027 COMPLETE CBC AUTOMATED: CPT | Performed by: FAMILY MEDICINE

## 2024-12-03 PROCEDURE — 2500000002 HC RX 250 W HCPCS SELF ADMINISTERED DRUGS (ALT 637 FOR MEDICARE OP, ALT 636 FOR OP/ED): Performed by: NURSE PRACTITIONER

## 2024-12-03 PROCEDURE — 2060000001 HC INTERMEDIATE ICU ROOM DAILY

## 2024-12-03 PROCEDURE — 2500000001 HC RX 250 WO HCPCS SELF ADMINISTERED DRUGS (ALT 637 FOR MEDICARE OP): Performed by: NURSE PRACTITIONER

## 2024-12-03 PROCEDURE — 80048 BASIC METABOLIC PNL TOTAL CA: CPT | Performed by: FAMILY MEDICINE

## 2024-12-03 PROCEDURE — 83735 ASSAY OF MAGNESIUM: CPT | Performed by: FAMILY MEDICINE

## 2024-12-03 PROCEDURE — 80202 ASSAY OF VANCOMYCIN: CPT | Performed by: INTERNAL MEDICINE

## 2024-12-03 PROCEDURE — 36415 COLL VENOUS BLD VENIPUNCTURE: CPT | Performed by: FAMILY MEDICINE

## 2024-12-03 PROCEDURE — 99233 SBSQ HOSP IP/OBS HIGH 50: CPT | Performed by: FAMILY MEDICINE

## 2024-12-03 PROCEDURE — 92526 ORAL FUNCTION THERAPY: CPT | Mod: GN

## 2024-12-03 RX ORDER — VANCOMYCIN HYDROCHLORIDE 750 MG/150ML
750 INJECTION, SOLUTION INTRAVENOUS ONCE
Status: COMPLETED | OUTPATIENT
Start: 2024-12-03 | End: 2024-12-03

## 2024-12-03 ASSESSMENT — PAIN SCALES - GENERAL
PAINLEVEL_OUTOF10: 0 - NO PAIN

## 2024-12-03 ASSESSMENT — COGNITIVE AND FUNCTIONAL STATUS - GENERAL
MOVING TO AND FROM BED TO CHAIR: A LITTLE
STANDING UP FROM CHAIR USING ARMS: A LITTLE
DAILY ACTIVITIY SCORE: 15
TOILETING: A LOT
HELP NEEDED FOR BATHING: A LOT
MOBILITY SCORE: 17
EATING MEALS: A LITTLE
WALKING IN HOSPITAL ROOM: A LOT
DRESSING REGULAR LOWER BODY CLOTHING: A LOT
TURNING FROM BACK TO SIDE WHILE IN FLAT BAD: A LITTLE
PERSONAL GROOMING: A LITTLE
CLIMB 3 TO 5 STEPS WITH RAILING: A LOT
DRESSING REGULAR UPPER BODY CLOTHING: A LITTLE

## 2024-12-03 ASSESSMENT — PAIN - FUNCTIONAL ASSESSMENT
PAIN_FUNCTIONAL_ASSESSMENT: 0-10
PAIN_FUNCTIONAL_ASSESSMENT: 0-10

## 2024-12-03 NOTE — PROGRESS NOTES
Desirae Gregg is a 84 y.o. female on day 5 of admission presenting with Altered mental status, unspecified altered mental status type.    Subjective   Interval History: no fever, no new complaints        Review of Systems    Objective   Range of Vitals (last 24 hours)  Heart Rate:  [103-123]   Temp:  [35.9 °C (96.7 °F)-36.9 °C (98.5 °F)]   Resp:  [15-23]   BP: (119-140)/(70-93)   Weight:  [69.3 kg (152 lb 12.5 oz)]   SpO2:  [94 %-95 %]   Daily Weight  12/03/24 : 69.3 kg (152 lb 12.5 oz)    Body mass index is 25.42 kg/m².    Physical Exam  Constitutional:       Appearance: Normal appearance.   HENT:      Head: Normocephalic and atraumatic.      Mouth/Throat:      Mouth: Mucous membranes are moist.      Pharynx: Oropharynx is clear.   Eyes:      Pupils: Pupils are equal, round, and reactive to light.   Cardiovascular:      Rate and Rhythm: Normal rate and regular rhythm.      Heart sounds: Normal heart sounds.   Pulmonary:      Effort: Pulmonary effort is normal.      Breath sounds: Normal breath sounds.   Abdominal:      General: Abdomen is flat. Bowel sounds are normal.      Palpations: Abdomen is soft.   Musculoskeletal:      Cervical back: Normal range of motion.      Comments: Lt shin wound, less slough and redness         Antibiotics  vancomycin - 750 mg/150 mL    Relevant Results  Labs  Results from last 72 hours   Lab Units 12/03/24  0540 12/02/24  0517   WBC AUTO x10*3/uL 25.3* 20.1*   HEMOGLOBIN g/dL 13.7 13.4   HEMATOCRIT % 44.1 44.2   PLATELETS AUTO x10*3/uL 296 277     Results from last 72 hours   Lab Units 12/03/24  0540 12/02/24  0517   SODIUM mmol/L 139 139   POTASSIUM mmol/L 3.5 3.3*   CHLORIDE mmol/L 111* 109*   CO2 mmol/L 21 22   BUN mg/dL 19 22   CREATININE mg/dL 1.28* 1.42*   GLUCOSE mg/dL 94 87   CALCIUM mg/dL 7.6* 7.6*   ANION GAP mmol/L 11 11   EGFR mL/min/1.73m*2 41* 36*           Estimated Creatinine Clearance: 30.8 mL/min (A) (by C-G formula based on SCr of 1.28 mg/dL (H)).  C-Reactive Protein    Date Value Ref Range Status   11/15/2024 0.80 <1.00 mg/dL Final   11/12/2024 1.04 (H) <1.00 mg/dL Final   11/11/2024 0.86 <1.00 mg/dL Final     Microbiology  Reviewed  Imaging  Reviewed        Assessment/Plan   Sepsis, 1 BC with CNS, ? Significance   Left shin wound infection, wounfd culture with MRSA / proteus, likely colonization  Respiratory failure / atelectasis  Pyuria, the culture with mixed ortiz  Encephalopathy, likely metabolic  Polycythemia / myelofibrosis     Recommendations :  Continue Vancomycin, we should be able to stop the antibiotics with discharge   Discussed with the wound care team      I spent minutes in the professional and overall care of this patient.      Cameron Lassiter MD

## 2024-12-03 NOTE — PROGRESS NOTES
Desirae Gregg is a 84 y.o. female on day 5 of admission presenting with Altered mental status, unspecified altered mental status type.      Subjective   Patient resting in bed, denies any current complaints. Son at bedside says she looks very well compared to a week ago.      Objective     Last Recorded Vitals  BP (!) 127/98 (BP Location: Left arm, Patient Position: Lying)   Pulse 105   Temp 36.4 °C (97.5 °F) (Temporal)   Resp 23   Wt 69.3 kg (152 lb 12.5 oz)   SpO2 94%   Intake/Output last 3 Shifts:    Intake/Output Summary (Last 24 hours) at 12/3/2024 1612  Last data filed at 12/3/2024 1209  Gross per 24 hour   Intake 600 ml   Output 350 ml   Net 250 ml       Admission Weight  Weight: 73.5 kg (162 lb) (11/28/24 0323)    Daily Weight  12/03/24 : 69.3 kg (152 lb 12.5 oz)      Physical Exam  Constitutional: alert and oriented x 3, awake, cooperative, no acute distress  Skin: warm and dry, left shin in wound dressing no signs of bleeding or purulent discharge  Head/Neck: Normocephalic, atraumatic  Eyes: clear sclera  ENMT: mucous membranes moist  Cardio: Regular rate and rhythm  Resp: CTA bilaterally, good respiratory effort  Gastrointestinal: Soft, nontender, nondistended  Musculoskeletal: generalized weakness  Neuro: alert and oriented x 3  Psychological: Appropriate mood and behavior    Relevant Results  Scheduled medications  apixaban, 2.5 mg, oral, BID  folic acid, 1 mg, oral, Daily  lactobacillus acidophilus, 1 capsule, oral, Daily  levothyroxine, 88 mcg, oral, Daily  midodrine, 5 mg, oral, TID  nystatin, , Topical, BID  pantoprazole, 40 mg, oral, Daily before breakfast  psyllium, 1 packet, oral, BID      Continuous medications     PRN medications  PRN medications: acetaminophen **OR** acetaminophen **OR** acetaminophen, albuterol, bisacodyl, dextrose, dextrose, glucagon, glucagon, loperamide, melatonin, ondansetron **OR** ondansetron, vancomycin, zinc oxide    Results for orders placed or performed during the  hospital encounter of 11/28/24 (from the past 24 hours)   Vancomycin   Result Value Ref Range    Vancomycin 17.2 5.0 - 20.0 ug/mL   CBC   Result Value Ref Range    WBC 25.3 (H) 4.4 - 11.3 x10*3/uL    nRBC 0.0 0.0 - 0.0 /100 WBCs    RBC 4.97 4.00 - 5.20 x10*6/uL    Hemoglobin 13.7 12.0 - 16.0 g/dL    Hematocrit 44.1 36.0 - 46.0 %    MCV 89 80 - 100 fL    MCH 27.6 26.0 - 34.0 pg    MCHC 31.1 (L) 32.0 - 36.0 g/dL    RDW 17.8 (H) 11.5 - 14.5 %    Platelets 296 150 - 450 x10*3/uL   Basic Metabolic Panel   Result Value Ref Range    Glucose 94 74 - 99 mg/dL    Sodium 139 136 - 145 mmol/L    Potassium 3.5 3.5 - 5.3 mmol/L    Chloride 111 (H) 98 - 107 mmol/L    Bicarbonate 21 21 - 32 mmol/L    Anion Gap 11 10 - 20 mmol/L    Urea Nitrogen 19 6 - 23 mg/dL    Creatinine 1.28 (H) 0.50 - 1.05 mg/dL    eGFR 41 (L) >60 mL/min/1.73m*2    Calcium 7.6 (L) 8.6 - 10.3 mg/dL   Magnesium   Result Value Ref Range    Magnesium 1.61 1.60 - 2.40 mg/dL             Assessment & Plan    85yo CF with PMH of 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    LLE Cellulitis with ulcer, improved  - sepsis resolved  - wound culture growing MRSA, proteus  - blood cultures 1/2 positive but likely contaminate as other is NGTD x3days  - urine cultures contaminated, no urinary symptoms so will not repeat at this time  - ID following, continue IV vancomycin for one more day then stop at discharge  - podiatry following, no surgical debridement needed but continue with wound care    Diarrhea, improved  - unknown etiology  - Cdiff, pathogens negative  - ova and parasite negative from 11/9  - continue PRN imodium    LEVY on CKD, improved  - likely secondary to above  - continue to monitor    Dysphagia  - being followed by speech therapy with  modified pureed diet  - continue to monitor    Acute metabolic encephalopathy, resolved  - likely secondary to infection as above    PE  - continue home eliquis    Hypothyroidism  - continue home levothyroxine    DVT Proph: eliquis    Dispo: Patient requires close inpatient monitoring in setting of cellulitis  requiring IV antibiotics, discharge likely tomorrow pending final podiatry/ID recs.    Leena De Jesus, DO

## 2024-12-03 NOTE — PROGRESS NOTES
Vancomycin Dosing by Pharmacy- FOLLOW UP    Desirae Gregg is a 84 y.o. year old female who Pharmacy has been consulted for vancomycin dosing for cellulitis, skin and soft tissue. Based on the patient's indication and renal status this patient is being dosed based on a goal trough/random level of 15-20.     Renal function is currently improving.    Current vancomycin dose: dose by level    Most recent random level: 17.2 mcg/mL    Visit Vitals  /72 (BP Location: Left arm, Patient Position: Lying)   Pulse 105   Temp 35.9 °C (96.7 °F) (Temporal)   Resp 23        Lab Results   Component Value Date    CREATININE 1.28 (H) 12/03/2024    CREATININE 1.42 (H) 12/02/2024    CREATININE 1.94 (H) 11/30/2024    CREATININE 2.24 (H) 11/29/2024        Patient weight is as follows:   Vitals:    12/03/24 0544   Weight: 69.3 kg (152 lb 12.5 oz)       Cultures:  Susceptibility data for the encounter in last 14 days.  Collected Specimen Info Organism Amoxicillin/Clavulanate Ampicillin Ampicillin/Sulbactam Cefazolin Cefepime Cefotaxime Ceftazidime Ceftriaxone Ciprofloxacin Clindamycin Erythromycin Gentamicin   11/28/24 Tissue/Biopsy from Wound/Tissue Proteus mirabilis  R  R  R  R  SDD  R  S  R  R    S     Methicillin Resistant Staphylococcus aureus (MRSA)           R  R    11/28/24 Blood culture from Peripheral Venipuncture Staphylococcus epidermidis                 Collected Specimen Info Organism Levofloxacin Oxacillin Piperacillin/Tazobactam Tetracycline Trimethoprim/Sulfamethoxazole Vancomycin   11/28/24 Tissue/Biopsy from Wound/Tissue Proteus mirabilis  R   S  R  R      Methicillin Resistant Staphylococcus aureus (MRSA)   R   R  S  S   11/28/24 Blood culture from Peripheral Venipuncture Staphylococcus epidermidis              I/O last 3 completed shifts:  In: 1460 (21.2 mL/kg) [P.O.:1140; I.V.:20 (0.3 mL/kg); IV Piggyback:300]  Out: 350 (5.1 mL/kg) [Urine:350 (0.1 mL/kg/hr)]  Weight: 68.8 kg   I/O during current shift:  I/O this  shift:  In: -   Out: 350 [Urine:350]    Temp (24hrs), Av.4 °C (97.6 °F), Min:35.9 °C (96.7 °F), Max:36.9 °C (98.5 °F)      Assessment/Plan    Within goal random/trough level  Increase to 750mg x1dose.  The next level will be obtained on  at 5a. May be obtained sooner if clinically indicated.   Will continue to monitor renal function daily while on vancomycin and order serum creatinine at least every 48 hours if not already ordered.  Follow for continued vancomycin needs, clinical response, and signs/symptoms of toxicity.       Manuel Mancini, PharmD

## 2024-12-03 NOTE — PROGRESS NOTES
Speech-Language Pathology    Speech-Language Pathology Clinical Swallow Treatment    Patient Name: Desirae Gregg  MRN: [id number]  : 1940  Today's Date: 24  Start Time: 1115  Stop Time: 1125  Time Calculation (min): 10 min    ASSESSMENT  SLP TX Intervention Outcome: Making Progress Towards Goals  Treatment Tolerance: Patient tolerated treatment well    Impressions: Further evaluation revealed improving ability to tolerate thin liquids without dysphagia. Pt would benefit from ongoing skilled ST to minimize aspiration risk and ensure ongoing safety with the least restrictive diet.    PLAN  Is MBSS recommended? No; no pharyngeal dysphagia suspected.  Recommended solid consistency: Pureed (IDDSI level 4)  Recommended liquid consistency: Thin (IDDSI 0)  Recommended medication administration: Whole, in thin liquid, in puree  Safe swallow strategies:  - Upright positioning for all PO intake  - Remain upright for >30 min after meals  - Slow rate of intake    Discharge recommendation: Home with no further SLP  Inpatient/Swing Bed or Outpatient: Inpatient  SLP TX Plan: Continue Plan of Care  SLP Plan: Skilled SLP  SLP Frequency: 2x per week  Duration: 2 weeks  Next Treatment Priority: mbs, level 6/level 2  Discussed POC: Patient  Patient/Caregiver Agreeable: Yes    SUBJECTIVE  Prior to Session Communication: Bedside nurse  RN cleared pt to participate in session and reported that her son was wondering if the patient could tolerate liquid consistency upgrade.   Respiratory status: Room air  Positioning: Upright in bed  Pt was alert, pleasant, and cooperative for session.    Pain Assessment  Pain Assessment: 0-10  0-10 (Numeric) Pain Score: 0 - No pain  Orientation: Oriented to self  Ability to follow functional commands: WFL    OBJECTIVE  Therapeutic Swallow  Therapeutic Swallow Intervention : PO Trials, Caregiver Education  Solid Diet Recommendations: Pureed/extremely thick  (IDDSI Level 4)  Liquid Diet  Recommendations: Thin (IDDSI Level 0)  Swallow Comments: Encourage attention to eating, sit up to eat and drink    Treatment/Education:  Results and recommendations were relayed to: Patient and Bedside nurse  Education provided: Yes   Learner: Patient and Child   Barriers to learning: None and Acuteness of illness barrier   Method of teaching: Verbal and Demonstration   Topic: results of assessment, recommended diet modifications, and recommended safe swallow strategies   Outcome of teaching: Pt/family demonstrated good understanding    Goals:  (start date 11/30/24):   Pt will participate in MBSS to assess for safest/least restrictive diet and any effective compensatory strategies.              Status: with improved performance on bedside swallowing assessment, Modified barium swallow may not be indicated. SLP to continue to monitor.    Start date 12/3/24 Pt will tolerate a pureed diet with thin liquids without dysphagia on >95% of observed trials.    Status: Pt given 7 trials / sips of thin liquids for testing. Good management with cues to take small sips and sit up to eat and drink. No coughing/ choking/ change in vocal quality after swallowing.     Start date 12/3/24 Pt's caregiver/ son will demonstrate good comprehension of instruction re: safest swallowing independently.   Status: Instruction and review provided for the patient's son/ caregiver who was present at the bedside. He demonstrated good comprehension of instruction provided.

## 2024-12-03 NOTE — DOCUMENTATION CLARIFICATION NOTE
"    PATIENT:               ELIE WHITLEY  ACCT #:                  [id number]  MRN:                       [id number]  :                       1940  ADMIT DATE:       2024 3:10 AM  DISCH DATE:  RESPONDING PROVIDER #:        [id number]          PROVIDER RESPONSE TEXT:    Sepsis with multi-system organ dysfunction of LEVY, myocardial injury, and metabolic encephalopathy    CDI QUERY TEXT:    Clarification    Instruction:    Based on your assessment of the patient and the clinical information, please provide the requested documentation by clicking on the appropriate radio button and enter any additional information if prompted.    Question: Please further clarify if a relationship exists between the Sepsis and acute organ dysfunction    When answering this query, please exercise your independent professional judgment. The fact that a question is being asked, does not imply that any particular answer is desired or expected.    The patient's clinical indicators include:  Clinical Information: 84 year old female, BMI 26.74, presenting with sepsis, LLE infected ulcer, CAUTI r/t chronic Cardona, metabolic encephalopathy, LEVY on CKD, and failure to thrive.    Clinical Indicators:  Per VS (-):  HR 98, 96, 95, 96, 106, 108, 98, 123  RR 21, 26, 22, 23, 22, 25    Per Labs (-):  WBC 22.0, 18.7, 18.3, 20.1  Creat 2.69, 2.24, 1.94, 1.42  BUN 33, 29, 26, 22  GFR 17, 21, 25, 36  Trop 81, 84 ()    Per  BC: \"Staphylococcus epidermidis...Gram positive cocci, clusters\"    Per  Wound Culture: \"Abundant Methicillin Resistant Staphylococcus aureus...Few Proteus mirabilis\"    Per  UC: \"Growth indicates contamination with Gram positive ortiz. Repeat culture if clinically indicated\"    Per  UA:  Blood 0.1 (1+)  Protein 50 (1+)  Leukocytes 500  Appearance Ex. Turbid  Color Light-Orange  RBC >20  WBC >50    Per  H/P: \"Severe Sepsis/Multiple Sources Infection...CAUTI...Extensive Draining Leg Wound " "with Cellulitis...LEVY with CKD...Acute Metabolic Encephalopathy...Elevated Troponin...-Suspect d/t LEVY and sepsis with hypotension\"    Treatment:  ID consult, Podiatry consult, wound consult, Urology consult, oxygen therapy, Vanco 2g IV x1 (11/28), Vanco 500mg IV x4 (11/29-12/2), Cefepime 1g IV q12hrs (11/28-12/1), Zosyn 4.5g IV x1 (11/28), NS IV bolus 1000ml x3 (11/28), repeated VS, repeated labs, supportive care.    Risk Factors: 84 year old female, being treated for sepsis, CAUTI, LLE wound infection with cellulitis, metabolic encephalopathy, LEVY on CKD, and failure to thrive.  Options provided:  -- Sepsis with renal organ dysfunction of LEVY  -- Sepsis with cardiac organ dysfunction of myocardial injury  -- Sepsis with neurological organ dysfunction of metabolic encephalopathy  -- Sepsis with multi-system organ dysfunction of LEVY, myocardial injury, and metabolic encephalopathy  -- Sepsis with other organ dysfunction- Please Specify, Please specify sepsis associated organ dysfunction below  -- Other - I will add my own diagnosis  -- Refer to Clinical Documentation Reviewer    Query created by: Denae Cummings on 12/2/2024 12:43 PM      Electronically signed by:  SHAKIRA WAYNE DO 12/3/2024 4:13 PM          "

## 2024-12-03 NOTE — DOCUMENTATION CLARIFICATION NOTE
"    PATIENT:               ELIE WHITLEY  ACCT #:                  [id number]  MRN:                       [id number]  :                       1940  ADMIT DATE:       2024 3:10 AM  DISCH DATE:  RESPONDING PROVIDER #:        [id number]          PROVIDER RESPONSE TEXT:    Acute kidney injury without acute tubular necrosis    CDI QUERY TEXT:    Clarification    Instruction:    Based on your assessment of the patient and the clinical information, please provide the requested documentation by clicking on the appropriate radio button and enter any additional information if prompted.    Question: Please further clarify the diagnosis of acute kidney injury as    When answering this query, please exercise your independent professional judgment. The fact that a question is being asked, does not imply that any particular answer is desired or expected.    The patient's clinical indicators include:  Clinical Information: 83 yo female, BMI 26.74, being treated for sepsis, LEVY, infected wound, and UTI.    Clinical Indicators:  Per Labs (-12/3):  Creat 2.69, 2.24, 1.94, 1.42, 1.28  BUN 33, 29, 26, 22, 19  GFR 17, 21, 25, 36, 41    Per 12/3 IM Note: \"LEVY on CKD\"    Treatment:  NS IV bolus 1000ml x3 (), Potassium chloride 40meq po x1 (), Repeated labs, repeated VS, supportive care.    Risk Factors: 84 year old female being treated for LEVY in the setting of sepsis.  Options provided:  -- Acute kidney injury with acute tubular necrosis  -- Acute kidney injury without acute tubular necrosis  -- Other - I will add my own diagnosis  -- Refer to Clinical Documentation Reviewer    Query created by: Denae Cummings on 12/3/2024 5:06 PM      Electronically signed by:  SHAKIRA WAYNE DO 12/3/2024 5:08 PM          "

## 2024-12-03 NOTE — CARE PLAN
The patient's goals for the shift include eat tomato soup    The clinical goals for the shift include Pt will have controlled HD this shift.    Over the shift, the patient did not make progress toward the following goals. Pt remained HDS this shift. Monitored and medicated as ordered this shift.

## 2024-12-04 LAB
ANION GAP SERPL CALC-SCNC: 12 MMOL/L (ref 10–20)
BASOPHILS # BLD AUTO: 0.32 X10*3/UL (ref 0–0.1)
BASOPHILS NFR BLD AUTO: 1 %
BUN SERPL-MCNC: 20 MG/DL (ref 6–23)
CALCIUM SERPL-MCNC: 8 MG/DL (ref 8.6–10.3)
CHLORIDE SERPL-SCNC: 110 MMOL/L (ref 98–107)
CO2 SERPL-SCNC: 22 MMOL/L (ref 21–32)
CREAT SERPL-MCNC: 1.54 MG/DL (ref 0.5–1.05)
EGFRCR SERPLBLD CKD-EPI 2021: 33 ML/MIN/1.73M*2
EOSINOPHIL # BLD AUTO: 0.61 X10*3/UL (ref 0–0.4)
EOSINOPHIL NFR BLD AUTO: 2 %
ERYTHROCYTE [DISTWIDTH] IN BLOOD BY AUTOMATED COUNT: 18.3 % (ref 11.5–14.5)
ERYTHROCYTE [DISTWIDTH] IN BLOOD BY AUTOMATED COUNT: 18.3 % (ref 11.5–14.5)
GLUCOSE SERPL-MCNC: 86 MG/DL (ref 74–99)
HCT VFR BLD AUTO: 47.9 % (ref 36–46)
HCT VFR BLD AUTO: 47.9 % (ref 36–46)
HGB BLD-MCNC: 14.8 G/DL (ref 12–16)
HGB BLD-MCNC: 14.8 G/DL (ref 12–16)
IMM GRANULOCYTES # BLD AUTO: 0.88 X10*3/UL (ref 0–0.5)
IMM GRANULOCYTES NFR BLD AUTO: 2.8 % (ref 0–0.9)
LYMPHOCYTES # BLD AUTO: 1.56 X10*3/UL (ref 0.8–3)
LYMPHOCYTES NFR BLD AUTO: 5 %
MAGNESIUM SERPL-MCNC: 1.62 MG/DL (ref 1.6–2.4)
MCH RBC QN AUTO: 27.3 PG (ref 26–34)
MCH RBC QN AUTO: 27.3 PG (ref 26–34)
MCHC RBC AUTO-ENTMCNC: 30.9 G/DL (ref 32–36)
MCHC RBC AUTO-ENTMCNC: 30.9 G/DL (ref 32–36)
MCV RBC AUTO: 88 FL (ref 80–100)
MCV RBC AUTO: 88 FL (ref 80–100)
MONOCYTES # BLD AUTO: 0.72 X10*3/UL (ref 0.05–0.8)
MONOCYTES NFR BLD AUTO: 2.3 %
NEUTROPHILS # BLD AUTO: 27.17 X10*3/UL (ref 1.6–5.5)
NEUTROPHILS NFR BLD AUTO: 86.9 %
NRBC BLD-RTO: 0 /100 WBCS (ref 0–0)
NRBC BLD-RTO: 0 /100 WBCS (ref 0–0)
PLATELET # BLD AUTO: 340 X10*3/UL (ref 150–450)
PLATELET # BLD AUTO: 340 X10*3/UL (ref 150–450)
POTASSIUM SERPL-SCNC: 3.5 MMOL/L (ref 3.5–5.3)
RBC # BLD AUTO: 5.43 X10*6/UL (ref 4–5.2)
RBC # BLD AUTO: 5.43 X10*6/UL (ref 4–5.2)
SODIUM SERPL-SCNC: 140 MMOL/L (ref 136–145)
VANCOMYCIN SERPL-MCNC: 19.7 UG/ML (ref 5–20)
WBC # BLD AUTO: 30.2 X10*3/UL (ref 4.4–11.3)
WBC # BLD AUTO: 30.2 X10*3/UL (ref 4.4–11.3)

## 2024-12-04 PROCEDURE — 99232 SBSQ HOSP IP/OBS MODERATE 35: CPT | Performed by: FAMILY MEDICINE

## 2024-12-04 PROCEDURE — 2500000001 HC RX 250 WO HCPCS SELF ADMINISTERED DRUGS (ALT 637 FOR MEDICARE OP): Performed by: NURSE PRACTITIONER

## 2024-12-04 PROCEDURE — 36415 COLL VENOUS BLD VENIPUNCTURE: CPT | Performed by: FAMILY MEDICINE

## 2024-12-04 PROCEDURE — 97116 GAIT TRAINING THERAPY: CPT | Mod: GP,CQ

## 2024-12-04 PROCEDURE — 83735 ASSAY OF MAGNESIUM: CPT | Performed by: FAMILY MEDICINE

## 2024-12-04 PROCEDURE — 80048 BASIC METABOLIC PNL TOTAL CA: CPT | Performed by: FAMILY MEDICINE

## 2024-12-04 PROCEDURE — 97535 SELF CARE MNGMENT TRAINING: CPT | Mod: GO,CO

## 2024-12-04 PROCEDURE — 2500000004 HC RX 250 GENERAL PHARMACY W/ HCPCS (ALT 636 FOR OP/ED): Performed by: FAMILY MEDICINE

## 2024-12-04 PROCEDURE — 97530 THERAPEUTIC ACTIVITIES: CPT | Mod: GO,CO,59

## 2024-12-04 PROCEDURE — 2060000001 HC INTERMEDIATE ICU ROOM DAILY

## 2024-12-04 PROCEDURE — 2500000002 HC RX 250 W HCPCS SELF ADMINISTERED DRUGS (ALT 637 FOR MEDICARE OP, ALT 636 FOR OP/ED): Performed by: NURSE PRACTITIONER

## 2024-12-04 PROCEDURE — 80202 ASSAY OF VANCOMYCIN: CPT | Performed by: FAMILY MEDICINE

## 2024-12-04 PROCEDURE — 0JBP0ZZ EXCISION OF LEFT LOWER LEG SUBCUTANEOUS TISSUE AND FASCIA, OPEN APPROACH: ICD-10-PCS | Performed by: PODIATRIST

## 2024-12-04 PROCEDURE — 85025 COMPLETE CBC W/AUTO DIFF WBC: CPT | Performed by: FAMILY MEDICINE

## 2024-12-04 RX ORDER — VANCOMYCIN HYDROCHLORIDE 500 MG/100ML
500 INJECTION, SOLUTION INTRAVENOUS EVERY 24 HOURS
Status: DISCONTINUED | OUTPATIENT
Start: 2024-12-04 | End: 2024-12-05

## 2024-12-04 ASSESSMENT — COGNITIVE AND FUNCTIONAL STATUS - GENERAL
HELP NEEDED FOR BATHING: A LOT
MOVING FROM LYING ON BACK TO SITTING ON SIDE OF FLAT BED WITH BEDRAILS: A LITTLE
EATING MEALS: A LITTLE
DAILY ACTIVITIY SCORE: 15
MOBILITY SCORE: 17
EATING MEALS: A LITTLE
TOILETING: A LOT
TURNING FROM BACK TO SIDE WHILE IN FLAT BAD: A LITTLE
MOVING TO AND FROM BED TO CHAIR: A LITTLE
TURNING FROM BACK TO SIDE WHILE IN FLAT BAD: A LITTLE
CLIMB 3 TO 5 STEPS WITH RAILING: A LOT
MOVING TO AND FROM BED TO CHAIR: A LITTLE
WALKING IN HOSPITAL ROOM: A LOT
TOILETING: TOTAL
PERSONAL GROOMING: A LITTLE
WALKING IN HOSPITAL ROOM: A LOT
DRESSING REGULAR LOWER BODY CLOTHING: A LOT
MOBILITY SCORE: 16
DAILY ACTIVITIY SCORE: 13
STANDING UP FROM CHAIR USING ARMS: A LITTLE
DRESSING REGULAR UPPER BODY CLOTHING: A LOT
DRESSING REGULAR LOWER BODY CLOTHING: A LOT
PERSONAL GROOMING: A LITTLE
DRESSING REGULAR UPPER BODY CLOTHING: A LITTLE
STANDING UP FROM CHAIR USING ARMS: A LITTLE
CLIMB 3 TO 5 STEPS WITH RAILING: A LOT
HELP NEEDED FOR BATHING: A LOT

## 2024-12-04 ASSESSMENT — PAIN - FUNCTIONAL ASSESSMENT
PAIN_FUNCTIONAL_ASSESSMENT: 0-10

## 2024-12-04 ASSESSMENT — PAIN SCALES - GENERAL
PAINLEVEL_OUTOF10: 0 - NO PAIN
PAINLEVEL_OUTOF10: 5 - MODERATE PAIN

## 2024-12-04 ASSESSMENT — ACTIVITIES OF DAILY LIVING (ADL): HOME_MANAGEMENT_TIME_ENTRY: 13

## 2024-12-04 NOTE — PROGRESS NOTES
Desirae Gregg is a 84 y.o. female on day 6 of admission presenting with Altered mental status, unspecified altered mental status type.      Subjective   Patient resting in bed, denies any current complaints. Son at bedside awaiting precert to facility.      Objective     Last Recorded Vitals  /80 (BP Location: Left arm, Patient Position: Lying)   Pulse 90   Temp 36.4 °C (97.6 °F) (Temporal)   Resp 17   Wt 69.7 kg (153 lb 10.6 oz)   SpO2 92%   Intake/Output last 3 Shifts:    Intake/Output Summary (Last 24 hours) at 12/4/2024 1645  Last data filed at 12/4/2024 1357  Gross per 24 hour   Intake 820 ml   Output 250 ml   Net 570 ml       Admission Weight  Weight: 73.5 kg (162 lb) (11/28/24 0323)    Daily Weight  12/04/24 : 69.7 kg (153 lb 10.6 oz)      Physical Exam  Constitutional: alert and oriented x 3, awake, cooperative, no acute distress  Skin: warm and dry, left shin in new wound dressing after bedside debridement  Head/Neck: Normocephalic, atraumatic  Eyes: clear sclera  ENMT: mucous membranes moist  Cardio: Regular rate and rhythm  Resp: CTA bilaterally, good respiratory effort  Gastrointestinal: Soft, nontender, nondistended  Musculoskeletal: generalized weakness  Neuro: alert and oriented x 3  Psychological: Appropriate mood and behavior    Relevant Results  Scheduled medications  apixaban, 2.5 mg, oral, BID  folic acid, 1 mg, oral, Daily  lactobacillus acidophilus, 1 capsule, oral, Daily  levothyroxine, 88 mcg, oral, Daily  midodrine, 5 mg, oral, TID  nystatin, , Topical, BID  pantoprazole, 40 mg, oral, Daily before breakfast  psyllium, 1 packet, oral, BID  vancomycin, 500 mg, intravenous, q24h      Continuous medications     PRN medications  PRN medications: acetaminophen **OR** acetaminophen **OR** acetaminophen, albuterol, bisacodyl, dextrose, dextrose, glucagon, glucagon, loperamide, melatonin, ondansetron **OR** ondansetron, vancomycin, zinc oxide    Results for orders placed or performed during  the hospital encounter of 11/28/24 (from the past 24 hours)   CBC   Result Value Ref Range    WBC 30.2 (H) 4.4 - 11.3 x10*3/uL    nRBC 0.0 0.0 - 0.0 /100 WBCs    RBC 5.43 (H) 4.00 - 5.20 x10*6/uL    Hemoglobin 14.8 12.0 - 16.0 g/dL    Hematocrit 47.9 (H) 36.0 - 46.0 %    MCV 88 80 - 100 fL    MCH 27.3 26.0 - 34.0 pg    MCHC 30.9 (L) 32.0 - 36.0 g/dL    RDW 18.3 (H) 11.5 - 14.5 %    Platelets 340 150 - 450 x10*3/uL   Basic Metabolic Panel   Result Value Ref Range    Glucose 86 74 - 99 mg/dL    Sodium 140 136 - 145 mmol/L    Potassium 3.5 3.5 - 5.3 mmol/L    Chloride 110 (H) 98 - 107 mmol/L    Bicarbonate 22 21 - 32 mmol/L    Anion Gap 12 10 - 20 mmol/L    Urea Nitrogen 20 6 - 23 mg/dL    Creatinine 1.54 (H) 0.50 - 1.05 mg/dL    eGFR 33 (L) >60 mL/min/1.73m*2    Calcium 8.0 (L) 8.6 - 10.3 mg/dL   Magnesium   Result Value Ref Range    Magnesium 1.62 1.60 - 2.40 mg/dL   Vancomycin   Result Value Ref Range    Vancomycin 19.7 5.0 - 20.0 ug/mL   CBC and Auto Differential   Result Value Ref Range    WBC 30.2 (H) 4.4 - 11.3 x10*3/uL    nRBC 0.0 0.0 - 0.0 /100 WBCs    RBC 5.43 (H) 4.00 - 5.20 x10*6/uL    Hemoglobin 14.8 12.0 - 16.0 g/dL    Hematocrit 47.9 (H) 36.0 - 46.0 %    MCV 88 80 - 100 fL    MCH 27.3 26.0 - 34.0 pg    MCHC 30.9 (L) 32.0 - 36.0 g/dL    RDW 18.3 (H) 11.5 - 14.5 %    Platelets 340 150 - 450 x10*3/uL    Neutrophils % 86.9 40.0 - 80.0 %    Immature Granulocytes %, Automated 2.8 (H) 0.0 - 0.9 %    Lymphocytes % 5.0 13.0 - 44.0 %    Monocytes % 2.3 2.0 - 10.0 %    Eosinophils % 2.0 0.0 - 6.0 %    Basophils % 1.0 0.0 - 2.0 %    Neutrophils Absolute 27.17 (H) 1.60 - 5.50 x10*3/uL    Immature Granulocytes Absolute, Automated 0.88 (H) 0.00 - 0.50 x10*3/uL    Lymphocytes Absolute 1.56 0.80 - 3.00 x10*3/uL    Monocytes Absolute 0.72 0.05 - 0.80 x10*3/uL    Eosinophils Absolute 0.61 (H) 0.00 - 0.40 x10*3/uL    Basophils Absolute 0.32 (H) 0.00 - 0.10 x10*3/uL             Assessment & Plan    83yo CF with PMH of  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    LLE Cellulitis with ulcer, improved  - sepsis resolved  - wound culture growing MRSA and proteus  - blood cultures 1/2 positive but likely contaminate as other is NGTD  - urine cultures contaminated, no urinary symptoms so will not repeat at this time  - ID following, continue IV vancomycin while admitted stop at discharge did discuss leukocytosis but feels this is myelofibrosis and not worsening infxn due to clinical improvement  - podiatry following, bedside debridement today and continue with wound care    Diarrhea, improved  - unknown etiology  - Cdiff, pathogens negative  - ova and parasite negative from 11/9  - continue PRN imodium    LEVY on CKD, improved  - likely secondary to above  - continue to monitor    Dysphagia  - being followed by speech therapy with modified pureed diet  - continue to monitor    Acute metabolic encephalopathy, resolved  - likely secondary to infection as above    PE  - continue home eliquis    Hypothyroidism  - continue home levothyroxine    DVT Proph: eliquis    Dispo: Patient requires close inpatient monitoring in setting of cellulitis  requiring IV antibiotics, discharge likely tomorrow pending precert after P2P got preliminary approval today.    Leena De Jesus DO

## 2024-12-04 NOTE — PROGRESS NOTES
Physical Therapy    Physical Therapy Treatment    Patient Name: Desirae Gregg  MRN: [id number]  Department: [address]  Room: [address] [place] [address]  Today's Date: 12/4/2024  Time Calculation  Start Time: 1038  Stop Time: 1050  Time Calculation (min): 12 min         Assessment/Plan   PT Assessment  PT Assessment Results: Decreased strength, Decreased endurance, Decreased mobility, Decreased skin integrity  End of Session Communication: Bedside nurse, PCT/NA/CTA  Assessment Comment: .recommend return to [facility] skilled PT at NH for endurance, gait,,transfers and dynmaic balance training to return to PLOF, decrease falls risk  End of Session Patient Position: Bed, 3 rail up, Alarm off, caregiver present     PT Plan  Treatment/Interventions: Bed mobility, Transfer training, Gait training, Strengthening, Endurance training, Therapeutic exercise, Therapeutic activity  PT Plan: Ongoing PT  PT Frequency: 3 times per week  PT Discharge Recommendations: Moderate intensity level of continued care  Equipment Recommended upon Discharge: Wheeled walker  PT Recommended Transfer Status: Assist x2, Assistive device  PT - OK to Discharge: Yes      General Visit Information:   PT  Visit  PT Received On: 12/04/24  General  Reason for Referral: Pt is an 84 y.o. female admitted with dx of altered mental status, UTI and infection in left LE wound. Pt had a recent admission at [facility] and was dischrged on 11/19/24 to SNF ([facility]).  Referred By: Michael De Guzman MD  Past Medical History Relevant to Rehab: Per EMR: hx of chronic kidney disease, chronic systolic congestive heart failure, remote poliomyelitis, myelofibrosis, history of PE and on Eliquis, hypertension and polycythemia vera  Family/Caregiver Present: Yes (Pt's son was present for)  Prior to Session Communication: Bedside nurse (RN states that the patient was up to the chair yesterday with Assist x 2)  Patient Position Received: Bed, 3 rail up, Alarm off, caregiver present (Patient was  "receiving pericare upon entering room.)  General Comment: AXOX2-3, on bedpan, dependent for personal care with PTA and nursing assistant to remove from bedpan. Edema luci VALDES to cd, tele, on RA.    Subjective   Precautions:  Precautions  Medical Precautions: Fall precautions, Other (comment) (Skin care precautions - wound left anterior shin)  Precautions Comment: Skin    Vital Signs (Past 2hrs)                 Objective   Pain:  Pain Assessment  Pain Assessment: 0-10  Cognition:  Cognition  Orientation Level: Disoriented to time  Coordination:  Movements are Fluid and Coordinated: Yes  Postural Control:  Static Sitting Balance  Static Sitting-Balance Support: Bilateral upper extremity supported, Feet supported  Static Sitting-Level of Assistance: Close supervision  Extremity/Trunk Assessments:    Activity Tolerance:  Activity Tolerance  Endurance: Tolerates less than 10 min exercise with changes in vital signs, Tolerates 10 - 20 min exercise with multiple rests (Although asymtomatic, patient's HR was extremely variable from 110 bpm up to 143 at max, on average stayed in 120's.)  Treatments:  Therapeutic Exercise  Therapeutic Exercise Performed: Yes  Therapeutic Exercise Activity 1: sitting heel toe raises, long arc quads x7 reps patietn did not stay engaged and stated \"that is good, let me get to the chair\"                             Bed Mobility  Bed Mobility: Yes  Bed Mobility 1  Bed Mobility 1: Supine to sitting, Rolling right, Log roll  Level of Assistance 1: Minimum assistance, Minimal verbal cues    Ambulation/Gait Training  Ambulation/Gait Training Performed: Yes  Ambulation/Gait Training 1  Surface 1: Level tile  Device 1: Rolling walker  Assistance 1: Contact guard, Minimal verbal cues  Quality of Gait 1: Diminished heel strike, Decreased step length, Inconsistent stride length, Forward flexed posture  Comments/Distance (ft) 1: 15ft bed to chair (declined any further gait)  Transfers  Transfer: " Yes  Transfer 1  Transfer From 1: Sit to, Stand to  Transfer to 1: Stand, Sit  Technique 1: Sit to stand, Stand to sit  Transfer Device 1: Walker  Transfer Level of Assistance 1: Minimum assistance, Contact guard, Minimal verbal cues                     Other Activity  Other Activity Performed:  (patient declining any furhter PT and OT endtered and f/u with patient,)    Outcome Measures:  [facility] Basic Mobility  Turning from your back to your side while in a flat bed without using bedrails: A little  Moving from lying on your back to sitting on the side of a flat bed without using bedrails: A little  Moving to and from bed to chair (including a wheelchair): A little  Standing up from a chair using your arms (e.g. wheelchair or bedside chair): A little  To walk in hospital room: A lot  Climbing 3-5 steps with railing: A lot  Basic Mobility - Total Score: 16    Education Documentation  Precautions, taught by Kaye Trejo PTA at 12/4/2024 12:48 PM.  Learner: Family, Patient  Readiness: Acceptance  Method: Explanation  Response: Verbalizes Understanding    Body Mechanics, taught by Kaye Trejo PTA at 12/4/2024 12:48 PM.  Learner: Family, Patient  Readiness: Acceptance  Method: Explanation  Response: Verbalizes Understanding    Mobility Training, taught by Kaye Trejo PTA at 12/4/2024 12:48 PM.  Learner: Family, Patient  Readiness: Acceptance  Method: Explanation  Response: Verbalizes Understanding    Precautions, taught by Kaye Trejo PTA at 12/4/2024 12:48 PM.  Learner: Family, Patient  Readiness: Acceptance  Method: Explanation  Response: Verbalizes Understanding    Home Exercise Program, taught by Kaye Trejo PTA at 12/4/2024 12:48 PM.  Learner: Family, Patient  Readiness: Acceptance  Method: Explanation  Response: Verbalizes Understanding    ADL Training, taught by Kaye Trejo PTA at 12/4/2024 12:48 PM.  Learner: Family, Patient  Readiness: Acceptance  Method: Explanation  Response: Verbalizes  Understanding    Education Comments  No comments found.        OP EDUCATION:       Encounter Problems       Encounter Problems (Active)       PT Problem       Pt will perform bed mobility with use of bedrail with supervision.  (Progressing)       Start:  12/02/24    Expected End:  12/16/24            Pt will perform sit to stand transfer with RW with CGA or better. (Progressing)       Start:  12/02/24    Expected End:  12/16/24            Pt will perform bed to chair transfer with RW with CGA or better.  (Progressing)       Start:  12/02/24    Expected End:  12/16/24            Pt will ambulate with RW for 30 feet or more with CGA or better.  (Progressing)       Start:  12/02/24    Expected End:  12/16/24            Pt will tolerate 15 minutes or more of exercise and functional activity with stable vital signs.  (Progressing)       Start:  12/02/24    Expected End:  12/16/24               Pain - Adult          Safety       LTG - Patient will adhere to hip precautions during ADL's and transfers       Start:  11/28/24            LTG - Patient will demonstrate safety requirements appropriate to situation/environment       Start:  11/28/24            LTG - Patient will utilize safety techniques       Start:  11/28/24            STG - Patient locks brakes on wheelchair       Start:  11/28/24            STG - Patient uses call light consistently to request assistance with transfers       Start:  11/28/24            STG - Patient uses gait belt during all transfers       Start:  11/28/24            Goal 1       Start:  11/28/24            Goal 2       Start:  11/28/24            Goal 3       Start:  11/28/24

## 2024-12-04 NOTE — CARE PLAN
The patient's goals for the shift include eat tomato soup    The clinical goals for the shift include patient will remain hds    Over the shift, the patient did not make progress toward the following goals. Barriers to progression include none continue to monitor vital signs and promote sleep for wound healing. Recommendations to address these barriers include continue with plan of care.

## 2024-12-04 NOTE — CARE PLAN
The patient's goals for the shift include eat tomato soup    The clinical goals for the shift include Pt will work with therapy this shift.    Over the shift, the patient did make progress toward the following goals. VS stable, pt worked with therapy, was up to chair an bsc in room this shift.

## 2024-12-04 NOTE — PROGRESS NOTES
"Desirae Gregg is a 84 y.o. female on day 6 of admission presenting with Altered mental status, unspecified altered mental status type.    Subjective   Feeling okay.  Anxious to be discharged.    Meds:   Scheduled medications  apixaban, 2.5 mg, oral, BID  folic acid, 1 mg, oral, Daily  lactobacillus acidophilus, 1 capsule, oral, Daily  levothyroxine, 88 mcg, oral, Daily  midodrine, 5 mg, oral, TID  nystatin, , Topical, BID  pantoprazole, 40 mg, oral, Daily before breakfast  psyllium, 1 packet, oral, BID  vancomycin, 500 mg, intravenous, q24h      Continuous medications     PRN medications  PRN medications: acetaminophen **OR** acetaminophen **OR** acetaminophen, albuterol, bisacodyl, dextrose, dextrose, glucagon, glucagon, loperamide, melatonin, ondansetron **OR** ondansetron, vancomycin, zinc oxide    Physical Exam   Constitutional:       Appearance: NAD  HENT:      Head: Normocephalic and atraumatic.      Mouth/Throat:      Mouth: Mucous membranes are moist.      Pharynx: Oropharynx is clear.   Eyes:      Pupils: Pupils are equal, round, and reactive to light.   Cardiovascular:      Rate and Rhythm: Normal rate and regular rhythm.      Heart sounds: Normal heart sounds.   Pulmonary:      Effort: Pulmonary effort is normal.      Breath sounds: Rales present.   Abdominal:      General: Abdomen is flat. Bowel sounds are normal.      Palpations: Abdomen is soft.   Musculoskeletal:      Cervical back: Normal range of motion.      Comments: ulceration to left anterior shin with 30% slough - measures 8 x 5.5 x 0.3 cm with undermining to lateral aspect;  no obvious purulence;  small rim of erythema without ascending cellulitis;  no probe to bone;  unable to easily palpate pedal pulses  Neurological:      Mental Status: She is alert.        Last Recorded Vitals  Blood pressure 111/63, pulse 106, temperature 35.8 °C (96.4 °F), temperature source Temporal, resp. rate 19, height 1.651 m (5' 5\"), weight 69.7 kg (153 lb 10.6 oz), " SpO2 95%.    Intake/Output last 3 Shifts:  I/O last 3 completed shifts:  In: 630 (9 mL/kg) [P.O.:480; IV Piggyback:150]  Out: 350 (5 mL/kg) [Urine:350 (0.1 mL/kg/hr)]  Weight: 69.7 kg     Relevant Results   Susceptibility data from last 90 days.  Collected Specimen Info Organism Amoxicillin/Clavulanate Ampicillin Ampicillin/Sulbactam Cefazolin Cefepime Cefotaxime Ceftazidime Ceftriaxone Ciprofloxacin Clindamycin Erythromycin Gentamicin   11/28/24 Tissue/Biopsy from Wound/Tissue Proteus mirabilis  R  R  R  R  SDD  R  S  R  R    S     Methicillin Resistant Staphylococcus aureus (MRSA)           R  R    11/28/24 Blood culture from Peripheral Venipuncture Staphylococcus epidermidis               11/13/24 Swab from Anterior Nares Methicillin Resistant Staphylococcus aureus (MRSA)                 Collected Specimen Info Organism Levofloxacin Oxacillin Piperacillin/Tazobactam Tetracycline Trimethoprim/Sulfamethoxazole Vancomycin   11/28/24 Tissue/Biopsy from Wound/Tissue Proteus mirabilis  R   S  R  R      Methicillin Resistant Staphylococcus aureus (MRSA)   R   R  S  S   11/28/24 Blood culture from Peripheral Venipuncture Staphylococcus epidermidis         11/13/24 Swab from Anterior Nares Methicillin Resistant Staphylococcus aureus (MRSA)         ECG 12 Lead    Result Date: 12/2/2024  Sinus rhythm with Premature supraventricular complexes Left axis deviation Nonspecific intraventricular block Minimal voltage criteria for LVH, may be normal variant ( [place] product ) Inferior infarct , age undetermined Cannot rule out Anterior infarct , age undetermined T wave abnormality, consider lateral ischemia Abnormal ECG When compared with ECG of 16-NOV-2024 00:46, (unconfirmed) Premature ventricular complexes are no longer Present Nonspecific intraventricular block has replaced Left bundle branch block Minimal criteria for Anterior infarct are now Present Inferior infarct is now Present    Electrocardiogram, 12-lead PRN ACS  symptoms    Result Date: 12/2/2024  Sinus tachycardia with Premature atrial complexes Left axis deviation Left bundle branch block Abnormal ECG When compared with ECG of 28-NOV-2024 04:11, (unconfirmed) Left bundle branch block has replaced Nonspecific intraventricular block T wave inversion less evident in Anterolateral leads    XR chest 1 view    Result Date: 11/29/2024  Interpreted By:  Cedric Mitchell, STUDY: Chest dated  11/28/2024.   INDICATION: Signs/Symptoms:choking on water, aspiration, assess for PNA   COMPARISON: Chest dated 11/28/2024.   ACCESSION NUMBER(S): [id number]   ORDERING CLINICIAN: BONITA TIDWELL   TECHNIQUE: One view of the chest.   FINDINGS: There is retrocardiac opacity with minimal blunting of the costophrenic angle.  No pneumothorax  is evident. The cardiomediastinal silhouette is  not enlarged.Degenerative change is seen of the spine and shoulders.       Retrocardiac opacity which may represent atelectasis and/or pneumonitis. Trace left pleural effusion. Given ordering indication, consultation with speech pathology may be helpful.   MACRO: None   Signed by: Cedric Mitchell 11/29/2024 1:34 PM Dictation workstation:   [id number]    Vascular US Ankle Brachial Index (SOILA) Without Exercise    Result Date: 11/29/2024          [place] [address]  Tel [phone] and Fax [phone]  Vascular Lab Report [facility] US ANKLE BRACHIAL INDEX (SOILA) WITHOUT EXERCISE  Patient Name:      ELIE Mccain Physician:  [id number] Jade Muse MD Study Date:        11/29/2024            Ordering Physician: [id number] KESHA KOEHLER MRN/PID:           [id number]              Technologist:       Alexandra Dale RVT Accession#:        [id number]          Technologist 2: Date of Birth/Age: 1940 / 84 years Encounter#:         [id number] Gender:            F Admission Status:  Inpatient              Location Performed: [facility]  Diagnosis/ICD: Peripheral vascular disease, unspecified-I73.9 CPT Codes:     28769 Peripheral artery SOILA Only  CONCLUSIONS: Right Lower PVR: Decreased digital perfusion noted. Multiphasic flow is noted in the right common femoral artery, right posterior tibial artery and right dorsalis pedis artery. Left Lower PVR: Decreased digital perfusion noted. Multiphasic flow is noted in the left common femoral artery, left posterior tibial artery and left dorsalis pedis artery.  Imaging & Doppler Findings:  RIGHT Lower PVR                Pressures Ratios Right Posterior Tibial (Ankle) 171 mmHg  1.66 Right Dorsalis Pedis (Ankle)   124 mmHg  1.20 Right Digit (Great Toe)        57 mmHg   0.55   LEFT Lower PVR                Pressures Ratios Left Posterior Tibial (Ankle) 130 mmHg  1.26 Left Dorsalis Pedis (Ankle)   111 mmHg  1.08 Left Digit (Great Toe)        0 mmHg    0.00                     Right    Left Brachial Pressure 98 mmHg 103 mmHg   [id number] Jade Muse MD Electronically signed by [id number] Jade Muse MD on 11/29/2024 at 10:28:21 AM  ** Final **     CT head wo IV contrast    Result Date: 11/28/2024  Interpreted By:  Finkelstein, Evan, STUDY: CT HEAD WO IV CONTRAST;  11/28/2024 5:07 am   INDICATION: Signs/Symptoms:ams.     COMPARISON: None.   ACCESSION NUMBER(S): [id number]   ORDERING CLINICIAN: GAYLA RUVALCABA   TECHNIQUE: Axial noncontrast CT images of the head with coronal and sagittal reconstructions.   FINDINGS: EXTRACRANIAL SOFT TISSUES: Unremarkable.   CALVARIUM: No depressed skull fracture. No destructive osseous lesion.   PARANASAL SINUSES/MASTOIDS: Partial opacification of the maxillary sinuses. Mastoid air cells are aerated.   HEMORRHAGE: No acute intracranial hemorrhage.   BRAIN PARENCHYMA: Gray-white matter interfaces are preserved. No mass effect or midline shift. There are nonspecific scattered white matter  hypodensities.   VENTRICLES and EXTRA-AXIAL SPACES: Parenchymal atrophy with prominence of the ventricles and cortical sulci.   OTHER FINDINGS: There are calcifications within the cavernous carotids       No acute intracranial hemorrhage, mass effect or midline shift.   Nonspecific scattered white matter hypodensities favored to represent sequela of small vessel ischemia.       MACRO: None.   Signed by: Evan Finkelstein 11/28/2024 5:24 AM Dictation workstation:   [id number]    XR tibia fibula left 2 views    Result Date: 11/28/2024  Interpreted By:  Jayjay Keyes, STUDY: XR TIBIA FIBULA LEFT 2 VIEWS; ;  11/28/2024 4:14 am   INDICATION: Signs/Symptoms:wound.     COMPARISON: None.   ACCESSION NUMBER(S): [id number]   ORDERING CLINICIAN: GAYLA RUVALCABA   FINDINGS: Four views of the left tibia and fibula are provided for interpretation.   Appears to be a soft tissue wound along the posterolateral aspect of the calf without evidence of radiopaque foreign body. Visualized bony structures are unremarkable in appearance without evidence of acute fracture, traumatic malalignment or aggressive osseous lesions. Visualized left knee and left ankle joints are preserved.       Soft tissue wound along the posterolateral aspect of the calf, without evidence of acute bony abnormality.     MACRO: None   Signed by: Jayjay Keyes 11/28/2024 4:21 AM Dictation workstation:   [id number]    XR chest 1 view    Result Date: 11/28/2024  Interpreted By:  Jayjay Keyes, STUDY: XR CHEST 1 VIEW;  11/28/2024 4:14 am   INDICATION: Signs/Symptoms:AMS.     COMPARISON: Radiographs of the chest dated 11/18/2024.   ACCESSION NUMBER(S): [id number]   ORDERING CLINICIAN: GAYLA RUVALCABA   FINDINGS: AP radiograph of the chest was provided.       CARDIOMEDIASTINAL SILHOUETTE: Cardiomediastinal silhouette is mildly enlarged, similar in appearance to prior exam.   LUNGS: There are low lung volumes with bronchovascular crowding and  bibasilar atelectasis. No consolidation or sizable pleural effusion is present.   ABDOMEN: No remarkable upper abdominal findings.   BONES: No acute osseous changes.       1.  Low lung volumes with bronchovascular crowding and bibasilar atelectasis. No consolidation or sizable pleural effusion is present.       MACRO: None   Signed by: Jayjay Keyes 11/28/2024 4:19 AM Dictation workstation:   [id number]    Electrocardiogram, 12-lead PRN ACS symptoms    Result Date: 11/23/2024  Sinus rhythm with Premature atrial complexes Left bundle branch block Abnormal ECG When compared with ECG of 08-NOV-2024 17:49, (unconfirmed) No significant change was found Confirmed by Mckay Irizarry ([id number]) on 11/23/2024 9:24:22 AM    Electrocardiogram, 12-lead PRN ACS symptoms    Result Date: 11/23/2024  Normal sinus rhythm Premature atrial complexes Left bundle branch block Abnormal ECG When compared with ECG of 03-NOV-2024 09:20, (unconfirmed) QRS axis Shifted right Nonspecific T wave abnormality has replaced inverted T waves in Inferior leads Confirmed by Mckay Irizarry ([id number]) on 11/23/2024 9:22:55 AM    XR chest 1 view    Result Date: 11/18/2024  Interpreted By:  Zain Mcknight, STUDY: XR CHEST 1 VIEW; 11/18/2024 6:36 am   INDICATION: Signs/Symptoms:pneumonia   COMPARISON: Radiographs 11/12/2024   ACCESSION NUMBER(S): [id number]   ORDERING CLINICIAN: BIPIN SMITH   TECHNIQUE: Single frontal view of the chest performed.   FINDINGS: LINES AND DEVICES: None.   LUNGS: Decreased size now small left and nearly resolved right pleural effusions with atelectasis. No new focal consolidation pulmonary edema or pneumothorax.   CARDIOMEDIASTINAL SILHOUETTE: The cardiomediastinal silhouette is stable with obscured left heart border by adjacent effusion.       Decreased small left and nearly resolved right pleural effusions.   MACRO None   Signed by: Zain Mcknight 11/18/2024 2:19 PM Dictation workstation:    [id number]    ECG 12 lead    Result Date: 11/18/2024  Sinus rhythm with occasional Premature ventricular complexes and Premature atrial complexes Left axis deviation Left bundle branch block Abnormal ECG When compared with ECG of 08-NOV-2024 17:50, (unconfirmed) Premature ventricular complexes are now Present    Vascular US lower extremity venous duplex bilateral    Result Date: 11/13/2024          [facility] [address] [place] [address]  Tel [phone] and Fax [phone]  Vascular Lab Report [facility] US LOWER EXTREMITY VENOUS DUPLEX BILATERAL  Patient Name:      ELIE Mccain Physician: [id number] Haresh Childs MD Study Date:        11/12/2024           Ordering           [id number] BIPIN ESPINOSA                                         Physician:         SARAH MRN/PID:           [id number]             Technologist:      Alexandra Dale RVT Accession#:        [id number]         Technologist 2: Date of Birth/Age: 1940 / 83      Encounter#:        [id number]                    years Gender:            F Admission Status:  Inpatient            Location           [facility]                                         Performed:  Diagnosis/ICD: Compression of vein-I87.1 CPT Codes:     22862 Peripheral venous duplex scan for DVT complete  **CRITICAL RESULT** Critical Result: DVT right femoral vein, age indeterminate thrombus in right popliteal, right posterior tibial and right peroneal veins. DVT left popliteal vein Notification called to Bipin Agarwal MD on 11/12/2024 at 1:28:12 PM by DVT.  CONCLUSIONS: Right Lower Venous: There is acute occlusive deep vein thrombosis visualized in the mid femoral and distal femoral veins. There is acute non-occlusive deep vein thrombosis visualized in the proximal femoral vein. There is age indeterminate deep vein thrombosis visualized in the popliteal, posterior tibial and peroneal veins. Left Lower Venous: There is acute non-occlusive deep vein  thrombosis visualized in the popliteal vein. The remainder of the left leg is negative for deep vein thrombosis. Left posterior tibial and peroneal veins poorly visualized due to edema and bandages; cannot rule out deep vein thrombosis in non-visualized vessels.  Additional Findings: Non-vascularized structure noted in right popliteal fossa measuring 7.2 cm x 1.6 cm. Technically difficult exam due to edema and patient positioning.  Imaging & Doppler Findings:  Right                 Compressible      Thrombus              Flow Distal External Iliac                                  Spontaneous/Phasic CFV                       Yes             None         Spontaneous/Phasic PFV                       Yes             None         Spontaneous/Phasic FV Proximal             Partial    Acute non-occlusive Spontaneous/Phasic FV Mid                     No        Acute occlusive FV Distal                  No        Acute occlusive Popliteal               Partial           ? Age        Spontaneous/Phasic Peroneal                Partial           ? Age PTV                     Partial           ? Age  Left                  Compress      Thrombus              Flow Distal External Iliac                              Spontaneous/Phasic CFV                     Yes           None         Spontaneous/Phasic PFV                     Yes           None FV Proximal             Yes           None         Spontaneous/Phasic FV Mid                  Yes           None FV Distal               Yes           None Popliteal             Partial  Acute non-occlusive Spontaneous/Phasic  [id number] Haresh Childs MD Electronically signed by [id number] Haresh Childs MD on 11/13/2024 at 7:49:11 AM  ** Final **     XR chest 1 view    Result Date: 11/12/2024  Interpreted By:  Alfonso Ricci, STUDY: XR CHEST 1 VIEW;  11/12/2024 9:29 am   INDICATION: Signs/Symptoms:follow up pneumonia.   COMPARISON: 11/08/2024   ACCESSION NUMBER(S): [id number]   ORDERING CLINICIAN: BIPIN SMITH    FINDINGS: There is increased opacification of the left lower lung with blunted costophrenic angle indicating a moderate effusion probably with underlying atelectasis or consolidation. There is mild prominence of the central pulmonary vasculature, and while accentuated from a shallow inspiration is probably from mild congestion. The cardiac size appears to be within normal limits considering the left lower cardiac margin is obscured.   ABDOMEN AND OTHER FINDINGS: No remarkable upper abdominal findings.   BONES: No acute osseous changes.       1.  Increased left lower lung airspace disease and probable congestion.   Signed by: Alfonso Ricci 11/12/2024 5:25 PM Dictation workstation:   [id number]    FL GI esophagram    Result Date: 11/11/2024  Interpreted By:  Zain Mcknight, STUDY: FL GI ESOPHAGRAM;  11/11/2024 9:24 am   INDICATION: Signs/Symptoms:dysphagia.   COMPARISON: CT abdomen and pelvis 11/03/2024   ACCESSION NUMBER(S): [id number]   ORDERING CLINICIAN: SYED JOSHI   TECHNIQUE: Modified single contrast barium swallow due to patient's clinical condition. Fluoro time: 15 seconds.   FINDINGS: : Small bilateral pleural effusions.   Contrast opacifies the esophagus and transit into the stomach without obstruction or extraluminal extravasation.       Modified exam due to patient's clinical condition. No esophageal obstruction.   MACRO Zain Mcknight discussed the significance and urgency of this critical finding by Epic secure chat with  SYED JOSHI on 11/11/2024 at 10:56 am.  (**-RCF-**) Findings:  See findings.   Signed by: Zain Mcknight 11/11/2024 11:02 AM Dictation workstation:   [id number]    XR chest 1 view    Result Date: 11/8/2024  Interpreted By:  Yazmin Oropeza, STUDY: XR CHEST 1 VIEW; ;  11/8/2024 10:10 am   INDICATION: Signs/Symptoms:hypoxia.     COMPARISON: Chest radiograph dated 11/03/2024.   ACCESSION NUMBER(S): [id number]   ORDERING CLINICIAN: PAULETTE CARRANZA   FINDINGS: Cardiac  silhouette is mildly enlarged but is similar compared to prior radiograph. Again noted is moderate volume right-sided pleural effusion with associated hazy airspace opacity in the right mid to lower lung zone. There is also reticular airspace opacity in the right lung base, which could represent atelectasis versus pneumonia. There is also obliteration of left costophrenic angle concerning for small left pleural effusion. There is mild prominence of interstitial markings in bilateral lungs concerning for mild interstitial edema. No large pneumothorax within limits of portable technique. No acute osseous findings.       1. Suggestion of mild interstitial edema, more pronounced on this radiograph compared to prior. Recommend correlation with fluid status. 2. Redemonstration of moderate right and small left pleural effusions. 3. Right basilar airspace opacity, which could be related to atelectasis versus pneumonia in appropriate clinical setting.   MACRO: None   Signed by: Yazmin Oropeza 11/8/2024 4:17 PM Dictation workstation:   [id number]    Transthoracic Echo (TTE) Limited    Result Date: 11/5/2024   [facility], [address] [place] [address]               Tel [phone] and Fax [phone] TRANSTHORACIC ECHOCARDIOGRAM REPORT  Patient Name:       ELIE WHITLEY          Reading Physician:    [id number] Chicho Goldman MD Study Date:         11/5/2024           Ordering Provider:    [id number] SHERITA GOYAL MRN/PID:            [id number]            Fellow: Accession#:         [id number]        Nurse: Date of Birth/Age:  1940 / 83     Sonographer:          Cathi garcia                                     RDCS Gender assigned at  F                   Additional Staff: Birth: Height:             165.10 cm           Admit Date:           11/3/2024  Weight:             72.57 kg            Admission Status:     Inpatient -                                                               Routine BSA / BMI:          1.80 m2 / 26.63     Encounter#:           [id number]                     kg/m2 Blood Pressure:     103/62 mmHg         Department Location:  [facility] Non                                                               Invasive Study Type:    TRANSTHORACIC ECHO (TTE) LIMITED Diagnosis/ICD: 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 Study Detail: The following Echo studies were performed: 2D, Doppler and color               flow.  PHYSICIAN INTERPRETATION: Left Ventricle: The left ventricular systolic function is normal, with a visually estimated ejection fraction of 60-65%. There is severely increased concentric left ventricular hypertrophy. There are no regional left ventricular wall motion abnormalities. The left ventricular cavity size is normal. There is moderately increased septal and moderately increased posterior left ventricular wall thickness. Spectral Doppler shows an abnormal pattern of left ventricular diastolic filling. Left Atrium: The left atrium is upper limits of normal in size. Right Ventricle: The right ventricle is normal in size. There is normal right ventricular global systolic function. Right Atrium: The right atrium is normal in size. Aortic Valve: The aortic valve is trileaflet. The aortic valve dimensionless index is 0.77. There is trace aortic valve regurgitation. The peak instantaneous gradient of the aortic valve is 13 mmHg. The mean gradient of the mitral valve is 7 mmHg. Mitral Valve: The mitral valve is normal in  structure. There is trace to mild mitral valve regurgitation. Tricuspid Valve: The tricuspid valve is structurally normal. There is trace to mild tricuspid regurgitation. Pulmonic Valve: The pulmonic valve is not well visualized. The pulmonic valve regurgitation was not well visualized. Pericardium: There is no pericardial effusion noted. Aorta: The aortic root is normal.  CONCLUSIONS:  1. The left ventricular systolic function is normal, with a visually estimated ejection fraction of 60-65%.  2. Spectral Doppler shows an abnormal pattern of left ventricular diastolic filling.  3. There is moderately increased septal and moderately increased posterior left ventricular wall thickness.  4. There is severely increased concentric left ventricular hypertrophy.  5. There is normal right ventricular global systolic function. QUANTITATIVE DATA SUMMARY:  2D MEASUREMENTS:          Normal Ranges: IVSd:            1.49 cm  (0.6-1.1cm) LVPWd:           1.26 cm  (0.6-1.1cm) LVIDd:           4.47 cm  (3.9-5.9cm) LVIDs:           2.98 cm LV Mass Index:   133 g/m2 LVEDV Index:     23 ml/m2 LV % FS          33.4 %  LV SYSTOLIC FUNCTION BY 2D PLANIMETRY (MOD):                      Normal Ranges: EF-A4C View:    62 % (>=55%) EF-A2C View:    61 % EF-Biplane:     64 % EF-Visual:      63 % LV EF Reported: 63 %  LV DIASTOLIC FUNCTION:          Normal Ranges: MV Peak E:             0.70 m/s (0.7-1.2 m/s) MV Peak A:             0.91 m/s (0.42-0.7 m/s) E/A Ratio:             0.77     (1.0-2.2)  MITRAL VALVE:          Normal Ranges: MV DT:        264 msec (150-240msec)  AORTIC VALVE:                      Normal Ranges: AoV Vmax:                1.78 m/s  (<=1.7m/s) AoV Peak P.7 mmHg (<20mmHg) AoV Mean P.0 mmHg  (1.7-11.5mmHg) LVOT Max Josiah:            1.33 m/s  (<=1.1m/s) AoV VTI:                 31.31 cm  (18-25cm) LVOT VTI:                24.11 cm LVOT Diameter:           2.04 cm   (1.8-2.4cm) AoV Area, VTI:            2.52 cm2  (2.5-5.5cm2) AoV Area,Vmax:           2.44 cm2  (2.5-4.5cm2) AoV Dimensionless Index: 0.77  TRICUSPID VALVE/RVSP:          Normal Ranges: Peak TR Velocity:     3.46 m/s  PULMONIC VALVE:          Normal Ranges: PV Max Josiah:     0.8 m/s  (0.6-0.9m/s) PV Max P.8 mmHg  [id number] Chicho Goldman MD Electronically signed on 2024 at 2:09:49 PM  ** Final **     CT abdomen pelvis wo IV contrast    Result Date: 2024  Interpreted By:  Jayshree Dias, STUDY: CT ABDOMEN PELVIS WO IV CONTRAST;  2024 3:04 pm   INDICATION: Signs/Symptoms:recurrent urosepsis, eval for retained stone urinary tract.   COMPARISON: None.   ACCESSION NUMBER(S): [id number]   ORDERING CLINICIAN: SHERITA GOYAL   TECHNIQUE: CT of the abdomen and pelvis was performed without intravenous contrast. Sagittal and coronal reconstructions were generated.   FINDINGS: LOWER CHEST: There are bilateral pleural effusions. There is a small pericardial effusion. There is bilateral basilar atelectasis or infiltrate.   ABDOMEN:   LIVER: Unremarkable   BILE DUCTS: Unremarkable   GALLBLADDER: Status post cholecystectomy   PANCREAS: Unremarkable   SPLEEN: The spleen is enlarged.   ADRENAL GLANDS: There are no adrenal masses.   KIDNEYS AND URETERS: Kidneys are normal size and not obstructed.   There are no renal calculi.   Ureters are normal caliber.   PELVIS:   BLADDER: The bladder is empty. There is a 2 centimeters bladder stone.   REPRODUCTIVE ORGANS: Patient is status post hysterectomy. There appear to be surgical clips in the pelvis.   BOWEL: There is no significant bowel distention. There is motion artifact. There are sigmoid diverticula.   VESSELS: There are atherosclerotic changes of the aorta. The cava is unremarkable   PERITONEUM/RETROPERITONEUM/LYMPH NODES: There is a moderate amount of ascites. There is presacral edema. There is no obvious free air.   There is no significant lymphadenopathy.   BONES AND ABDOMINAL WALL: There is  anasarca.   There are old left pelvic fractures peer there are degenerative changes of the spine. There is loss of height of L1.   COMPARISON OF FINDINGS:       2 centimeters stone in the urinary bladder.   Bilateral pleural effusions. Pericardial effusion.   Ascites.   Anasarca.   Presacral edema.   Enlarged spleen.   The exam is limited by motion artifact.   MACRO: none   Signed by: Jayshree Dias 11/4/2024 3:28 PM Dictation workstation:   [id number]    US renal complete    Result Date: 11/4/2024  Interpreted By:  Zain Mcknight, STUDY: US RENAL COMPLETE; 11/4/2024 9:03 am   INDICATION: Signs/Symptoms:LEVY on CKD, eval postrenal causes.   COMPARISON: None.   ACCESSION NUMBER(S): [id number]   ORDERING CLINICIAN: SHERITA GOYAL   TECHNIQUE: Sonography of the kidneys and urinary bladder was performed.   FINDINGS: Right Kidney: *Renal length: 10 cm *Parenchyma: Normal parenchymal echogenicity. Normal parenchymal thickness. *Collecting system: No hydronephrosis. *Calculus: No echogenic, shadowing calculus. *Lesion: None.   Left Kidney: *Renal length: 10 cm *Parenchyma: Normal parenchymal echogenicity. Normal parenchymal thickness. *Collecting system: No hydronephrosis. *Calculus: Nonobstructing calculus in the upper pole measuring 4 mm. *Lesion: None.   Bladder: Decompressed.   Other: Trace perihepatic, perisplenic and pelvic free fluid/ascites.       No hydronephrosis. Decompressed bladder.   MACRO: None   Signed by: Zain Mcknight 11/4/2024 11:08 AM Dictation workstation:   [id number]   Results for orders placed or performed during the hospital encounter of 11/28/24 (from the past 24 hours)   CBC   Result Value Ref Range    WBC 30.2 (H) 4.4 - 11.3 x10*3/uL    nRBC 0.0 0.0 - 0.0 /100 WBCs    RBC 5.43 (H) 4.00 - 5.20 x10*6/uL    Hemoglobin 14.8 12.0 - 16.0 g/dL    Hematocrit 47.9 (H) 36.0 - 46.0 %    MCV 88 80 - 100 fL    MCH 27.3 26.0 - 34.0 pg    MCHC 30.9 (L) 32.0 - 36.0 g/dL    RDW 18.3 (H) 11.5 - 14.5 %     Platelets 340 150 - 450 x10*3/uL   Basic Metabolic Panel   Result Value Ref Range    Glucose 86 74 - 99 mg/dL    Sodium 140 136 - 145 mmol/L    Potassium 3.5 3.5 - 5.3 mmol/L    Chloride 110 (H) 98 - 107 mmol/L    Bicarbonate 22 21 - 32 mmol/L    Anion Gap 12 10 - 20 mmol/L    Urea Nitrogen 20 6 - 23 mg/dL    Creatinine 1.54 (H) 0.50 - 1.05 mg/dL    eGFR 33 (L) >60 mL/min/1.73m*2    Calcium 8.0 (L) 8.6 - 10.3 mg/dL   Magnesium   Result Value Ref Range    Magnesium 1.62 1.60 - 2.40 mg/dL   Vancomycin   Result Value Ref Range    Vancomycin 19.7 5.0 - 20.0 ug/mL         Assessment/Plan   Assessment & Plan  Altered mental status, unspecified altered mental status type    Sepsis without acute organ dysfunction (Multi)    Acute metabolic encephalopathy    1.Left lower leg traumatic ulceration  Cleansed with saline.  Sharp full thickness excisional debridement of ulceration with forceps and scissors down to and including subcutaneous tissue.  Applied medihoney, aquacel AG, ABD, and light ace bandage.  Improving.   Will follow.  Highly recommend follow-up at wound care center upon discharge.       Yanni Mcgrath DPM

## 2024-12-04 NOTE — PROGRESS NOTES
Vancomycin Dosing by Pharmacy- FOLLOW UP    Desirae Gregg is a 84 y.o. year old female who Pharmacy has been consulted for vancomycin dosing for cellulitis, skin and soft tissue. Based on the patient's indication and renal status this patient is being dosed based on a goal AUC of 400-600.     Renal function is currently unstable.    Current vancomycin dose: dose by level    Most recent random level: 19.7 mcg/mL    Visit Vitals  /63 (BP Location: Left arm, Patient Position: Lying)   Pulse 106   Temp 35.8 °C (96.4 °F) (Temporal)   Resp 19        Lab Results   Component Value Date    CREATININE 1.54 (H) 12/04/2024    CREATININE 1.28 (H) 12/03/2024    CREATININE 1.42 (H) 12/02/2024    CREATININE 1.94 (H) 11/30/2024        Patient weight is as follows:   Vitals:    12/04/24 0418   Weight: 69.7 kg (153 lb 10.6 oz)       Cultures:  Susceptibility data for the encounter in last 14 days.  Collected Specimen Info Organism Amoxicillin/Clavulanate Ampicillin Ampicillin/Sulbactam Cefazolin Cefepime Cefotaxime Ceftazidime Ceftriaxone Ciprofloxacin Clindamycin Erythromycin Gentamicin   11/28/24 Tissue/Biopsy from Wound/Tissue Proteus mirabilis  R  R  R  R  SDD  R  S  R  R    S     Methicillin Resistant Staphylococcus aureus (MRSA)           R  R    11/28/24 Blood culture from Peripheral Venipuncture Staphylococcus epidermidis                 Collected Specimen Info Organism Levofloxacin Oxacillin Piperacillin/Tazobactam Tetracycline Trimethoprim/Sulfamethoxazole Vancomycin   11/28/24 Tissue/Biopsy from Wound/Tissue Proteus mirabilis  R   S  R  R      Methicillin Resistant Staphylococcus aureus (MRSA)   R   R  S  S   11/28/24 Blood culture from Peripheral Venipuncture Staphylococcus epidermidis              I/O last 3 completed shifts:  In: 630 (9 mL/kg) [P.O.:480; IV Piggyback:150]  Out: 350 (5 mL/kg) [Urine:350 (0.1 mL/kg/hr)]  Weight: 69.7 kg   I/O during current shift:  No intake/output data recorded.    Temp (24hrs),  Av.2 °C (97.2 °F), Min:35.8 °C (96.4 °F), Max:36.6 °C (97.9 °F)      Assessment/Plan    Within goal random/trough level.  Change to auc dosing given consistent vanco levels and lower scr levels    This dosing regimen is predicted by [employer] to result in the following pharmacokinetic parameters:    Regimen: 500 mg IV every 24 hours.  Start time: 09:03 on 2024  Exposure target: AUC24 (range)400-600 mg/L.hr   ORO58-17: 472 mg/L.hr  AUC24,ss: 455 mg/L.hr  Probability of AUC24 > 400: 87 %  Ctrough,ss: 16.4 mg/L  Probability of Ctrough,ss > 20: 8 %    The next level will be obtained on  at 5a. May be obtained sooner if clinically indicated.   Will continue to monitor renal function daily while on vancomycin and order serum creatinine at least every 48 hours if not already ordered.  Follow for continued vancomycin needs, clinical response, and signs/symptoms of toxicity.       Manuel Mancini, PharmD

## 2024-12-04 NOTE — PROGRESS NOTES
Desirae Gregg is a 84 y.o. female on day 6 of admission presenting with Altered mental status, unspecified altered mental status type.    Subjective   Interval History: no fever, no new complaints        Review of Systems    Objective   Range of Vitals (last 24 hours)  Heart Rate:  []   Temp:  [35.8 °C (96.4 °F)-36.7 °C (98 °F)]   Resp:  [19-24]   BP: (111-142)/(63-98)   Weight:  [69.7 kg (153 lb 10.6 oz)]   SpO2:  [92 %-95 %]   Daily Weight  12/04/24 : 69.7 kg (153 lb 10.6 oz)    Body mass index is 25.57 kg/m².    Physical Exam  Constitutional:       Appearance: Normal appearance.   HENT:      Head: Normocephalic and atraumatic.      Mouth/Throat:      Mouth: Mucous membranes are moist.      Pharynx: Oropharynx is clear.   Eyes:      Pupils: Pupils are equal, round, and reactive to light.   Cardiovascular:      Rate and Rhythm: Normal rate and regular rhythm.      Heart sounds: Normal heart sounds.   Pulmonary:      Effort: Pulmonary effort is normal.      Breath sounds: Normal breath sounds.   Abdominal:      General: Abdomen is flat. Bowel sounds are normal.      Palpations: Abdomen is soft.   Musculoskeletal:      Cervical back: Normal range of motion.      Comments: Lt shin wound, less slough and redness         Antibiotics  vancomycin - 500 mg/100 mL    Relevant Results  Labs  Results from last 72 hours   Lab Units 12/04/24  0549 12/03/24  0540 12/02/24  0517   WBC AUTO x10*3/uL 30.2*  30.2* 25.3* 20.1*   HEMOGLOBIN g/dL 14.8  14.8 13.7 13.4   HEMATOCRIT % 47.9*  47.9* 44.1 44.2   PLATELETS AUTO x10*3/uL 340  340 296 277   NEUTROS PCT AUTO % 86.9  --   --    LYMPHS PCT AUTO % 5.0  --   --    MONOS PCT AUTO % 2.3  --   --    EOS PCT AUTO % 2.0  --   --      Results from last 72 hours   Lab Units 12/04/24  0549 12/03/24  0540 12/02/24  0517   SODIUM mmol/L 140 139 139   POTASSIUM mmol/L 3.5 3.5 3.3*   CHLORIDE mmol/L 110* 111* 109*   CO2 mmol/L 22 21 22   BUN mg/dL 20 19 22   CREATININE mg/dL 1.54* 1.28*  1.42*   GLUCOSE mg/dL 86 94 87   CALCIUM mg/dL 8.0* 7.6* 7.6*   ANION GAP mmol/L 12 11 11   EGFR mL/min/1.73m*2 33* 41* 36*           Estimated Creatinine Clearance: 25.7 mL/min (A) (by C-G formula based on SCr of 1.54 mg/dL (H)).  C-Reactive Protein   Date Value Ref Range Status   11/15/2024 0.80 <1.00 mg/dL Final   11/12/2024 1.04 (H) <1.00 mg/dL Final   11/11/2024 0.86 <1.00 mg/dL Final     Microbiology  Reviewed  Imaging  Reviewed        Assessment/Plan   Sepsis, 1 BC with CNS, ? Significance   Left shin wound infection, wounfd culture with MRSA / proteus, likely colonization  Respiratory failure / atelectasis  Pyuria, the culture with mixed ortiz  Encephalopathy, likely metabolic  Polycythemia / myelofibrosis, WBC are trending up     Recommendations :  Continue Vancomycin, we should be able to stop the antibiotics with discharge   Discussed with the wound care team      I spent minutes in the professional and overall care of this patient.      Cameron Lassiter MD

## 2024-12-04 NOTE — DISCHARGE INSTRUCTIONS
Left lower leg ulceration -   Cleanse with saline.   medihoney, aquacel, ABD, and light ace bandage.  Change daily.

## 2024-12-04 NOTE — CONSULTS
"Patient has Malnutrition Diagnosis: No  Nutrition Assessment    Reason for Assessment: Dietitian discretion    Patient is a 84 y.o. female presenting with: Altered mental status, unspecified altered mental status type,   Pt with infected shin wound, on antibiotics. Pt had diarrhea, now resolved, c-diff and pathogens negative. SLP recs Puree diet for dysphagia. Encephalopathy resolved per MD progress notes- likely d/t infection. Plan for discharge to SNF when medically ready.   Past Medical History:   Diagnosis Date    Heart failure     Sciatica       Past Surgical History:   Procedure Laterality Date    APPENDECTOMY      CHOLECYSTECTOMY      HYSTERECTOMY      TONSILLECTOMY        Nutrition History:  Food and Nutrient History: Familiar with pt from previous hospital admission. Pt on a puree diet- feeding self lunch at time of visit- puree beef meal- pt took about 25%, does not like puree texture. Pt agrees to try Magic cup and Gelatein MCT- once daily- alternating.     Allergies   Allergen Reactions    Clonidine And Structural Analogs Anaphylaxis    Naproxen Nausea Only    Tizanidine Drowsiness    Hydroxyzine Palpitations    Methylprednisolone Palpitations      GI Symptoms: None     Oral Problems: Chewing difficulty     Previously prescribed diets: Previous modified diet  Previous Diet / Nutrition Education / Counseling: Puree, 2 gram Na diet, thin liquids; Boost Plus with meals; prostat 1 x/day    Anthropometrics:  Height: 165.1 cm (5' 5\")   Weight: 69.7 kg (153 lb 10.6 oz)   BMI (Calculated): 25.57  IBW/kg (Dietitian Calculated): 56.8 kg  Percent of IBW: 123 %       Weight History:     Daily Weight  12/04/24 : 69.7 kg (153 lb 10.6 oz)  11/19/24 : 64.3 kg (141 lb 12.1 oz)  10/29/24 : 73.5 kg (162 lb)  08/08/24 : 75.5 kg (166 lb 7.2 oz)  07/04/24 : 71.7 kg (158 lb)    Weight         12/1/2024  0519 12/2/2024  0515 12/2/2024  0600 12/3/2024  0544 12/4/2024  0418    Weight: 71 kg (156 lb 8.4 oz) 68.8 kg (151 lb 10.8 " oz) 68.8 kg (151 lb 10.8 oz) 69.3 kg (152 lb 12.5 oz) 69.7 kg (153 lb 10.6 oz)            Weight Change %:     Significant Weight Loss: No          Nutrition Focused Physical Exam Findings:     Edema  Edema: +1 trace  Edema Location: LUE  Physical Findings (Nutrition Deficiency/Toxicity)  Skin: Positive (traumatic ulceration L pretibial area- followed with podiatry)    Nutrition Significant Labs:    Results from last 7 days   Lab Units 12/04/24  0549 12/03/24  0540 12/02/24  0517   GLUCOSE mg/dL 86 94 87   SODIUM mmol/L 140 139 139   POTASSIUM mmol/L 3.5 3.5 3.3*   CHLORIDE mmol/L 110* 111* 109*   CO2 mmol/L 22 21 22   BUN mg/dL 20 19 22   CREATININE mg/dL 1.54* 1.28* 1.42*   EGFR mL/min/1.73m*2 33* 41* 36*   CALCIUM mg/dL 8.0* 7.6* 7.6*   MAGNESIUM mg/dL 1.62 1.61  --      Lab Results   Component Value Date    HGBA1C 4.7 11/03/2024     Results from last 7 days   Lab Units 11/30/24  0007 11/29/24  1806 11/29/24  1146 11/29/24  0602 11/29/24  0018 11/28/24  1809 11/28/24  1249   POCT GLUCOSE mg/dL 94 109* 112* 114* 112* 140* 101*     Lab Results   Component Value Date    ALBUMIN 2.7 (L) 11/28/2024      Lab Results   Component Value Date    CRP 0.80 11/15/2024       Nutrition Specific Medications:   Scheduled medications:  apixaban, 2.5 mg, oral, BID  folic acid, 1 mg, oral, Daily  lactobacillus acidophilus, 1 capsule, oral, Daily  levothyroxine, 88 mcg, oral, Daily  midodrine, 5 mg, oral, TID  nystatin, , Topical, BID  pantoprazole, 40 mg, oral, Daily before breakfast  psyllium, 1 packet, oral, BID  vancomycin, 500 mg, intravenous, q24h      Continuous medications:     PRN medications:  PRN medications: acetaminophen **OR** acetaminophen **OR** acetaminophen, albuterol, bisacodyl, dextrose, dextrose, glucagon, glucagon, loperamide, melatonin, ondansetron **OR** ondansetron, vancomycin, zinc oxide     Nursing Data Per flowsheet:   Stool Appearance: Loose (12/04/24 1230)  Gastrointestinal  Gastrointestinal (WDL):  Exceptions to WDL  Abdomen Inspection: Soft, Rounded  Last BM Date: 12/03/24  Bowel Incontinence: No  Stool Appearance: Loose  Stool Color: Brown  Feeding assistance level: Independent after set-up    Intake/Output Summary (Last 24 hours) at 12/4/2024 1318  Last data filed at 12/4/2024 1230  Gross per 24 hour   Intake 580 ml   Output 200 ml   Net 380 ml      0-10 (Numeric) Pain Score: 0 - No pain   Dietary Orders (From admission, onward)       Start     Ordered    12/04/24 1308  Oral nutritional supplements  Until discontinued        Comments: Please send either a berry magic cup or gelatein MCT at dinner- not both   Question Answer Comment   Deliver with Dinner    Select supplement: Gelatein MCT        12/04/24 1308    12/03/24 1124  Adult diet Regular; Pureed 4; Thin 0  Diet effective now        Comments: Ok to have tomato soup   Question Answer Comment   Diet type Regular    Texture Pureed 4    Fluid consistency Thin 0        12/03/24 1123    11/29/24 2249  May Participate in Room Service With Assistance  ( ROOM SERVICE MAY PARTICIPATE WITH ASSISTANCE)  Once        Question:  .  Answer:  Yes    11/29/24 2248                     Estimated Needs:   Total Energy Estimated Needs (kCal): 1742 kCal  Method for Estimating Needs: 25 kcal/kg  Total Protein Estimated Needs (g):  (68-85 g protein)  Method for Estimating Needs: 1.2 - 1.5 g/kg/IBW     Method for Estimating Needs: 1 ml/kcal       Nutrition Diagnosis   Malnutrition Diagnosis  Patient has Malnutrition Diagnosis: No    Nutrition Diagnosis  Patient has Nutrition Diagnosis: Yes  Diagnosis Status (1): New  Nutrition Diagnosis 1: Increased nutrient needs  Related to (1): L LE wound, sepsis  As Evidenced by (1): increased nutrient demand for illness/infection  Additional Assessment Information (1): ONS added for additional kcal and protein       Nutrition Interventions/Recommendations   Nutrition Prescription:  diet, fluids    Nutrition Interventions:    Interventions: Medical food supplement  Goal: Gelatein MCT= 260 kcal, 20 g protein; Magic cup = 290 kcal, 9 g protein    Coordination of Care: nursing assistant  Nutrition Education:   N/A  Recommendations:  Encourage oral intakes  Diet consistency per SLP  Weights: 2-3 x/week         Nutrition Monitoring and Evaluation   Food/Nutrient Related History Monitoring  Monitoring and Evaluation Plan: Amount of food  Criteria: Pt will consume 50-75% of meals and ONS provided    Body Composition/Growth/Weight History  Monitoring and Evaluation Plan: Weight  Weight: Measured weight  Criteria: Monitor    Biochemical Data, Medical Tests and Procedures  Monitoring and Evaluation Plan: Electrolyte/renal panel, Glucose/endocrine profile  Criteria: Monitor      Time Spent (min): 60 minutes

## 2024-12-04 NOTE — PROGRESS NOTES
12/04/24 1212   Discharge Planning   Living Arrangements Other (Comment)  (Arrived from [facility], prior was home with son)   Support Systems Children;Family members   Assistance Needed Per SNF, At baseline:, Patient is A&O 2-3(forgetful at times), requires assistance with ADLs, wound care as follows - Cleanse with NSS, pat dry, apply Anasept mixed with Collagen powder, cover with Calcium Alginate ABD pad and wrap with Kerlix daily and PRN. Patient admitted to [facility] on 11-19. Prior to SNF patient was from home with son.   Type of Residence Nursing home/residential care   Do you have animals or pets at home? No   Who is requesting discharge planning? Provider   Home or Post Acute Services Post acute facilities (Rehab/SNF/etc)   Type of Post Acute Facility Services Skilled nursing   Expected Discharge Disposition SNF  (Patient doesn't want to return to [facility], new preference is [facility], facility willing to accept, precert initiated and pending)   Does the patient need discharge transport arranged? Yes   RoundTrip coordination needed? Yes   Has discharge transport been arranged? No   Patient Choice   Provider Choice list and CMS website (https://medicare.gov/care-compare#search) for post-acute Quality and Resource Measure Data were provided and reviewed with: Family;Patient   Patient / Family choosing to utilize agency / facility established prior to hospitalization No   Stroke Family Assessment   Stroke Family Assessment Needed No   Intensity of Service   Intensity of Service 0-30 min     12/4/24 @ 1214: Patient precert denied, P2P information provided to hospitalist. Needs completed by 4pm today    12/4/24 @ 1610: Peer to peer completed and per provider decision was overturned , await to hear confirmation of that from [facility].     12/5/24 @ 1323: precert approved, good 12/5-12/9, not medially ready today, anticipate discharge 12/5, [facility] updated.

## 2024-12-04 NOTE — PROGRESS NOTES
Speech-Language Pathology                 Therapy Communication Note    Patient Name: Desirae Gregg  MRN: [id number]  Department: [address]  Room: [address] [place] [address]  Today's Date: 12/4/2024   Time: 13:15    Discipline: Speech Language Pathology    Comment: The patient's son and nurse reported that she is managing the liquids consistency diet upgrade without apparent dysphagia. She continues to require assist and cues to eat. Her son reported that her appetite has been poor for a while.      Diet regular with thin liquids.  Upright posture for oral intake  NO STRAWS

## 2024-12-04 NOTE — PROGRESS NOTES
Occupational Therapy    Occupational Therapy Treatment    Name: Desirae Gregg  MRN: [id number]  Department: [address]  Room: [address] [place] [address]  Date: 12/04/24  Time Calculation  Start Time: 1051  Stop Time: 1114  Time Calculation (min): 23 min    Assessment:  OT Assessment: Pt demonstrates gradual improvement, though remains limited by fatugue, weakness and decreased balnce. Pt demonstrating good benefit from skilled OT. Continue to beleive that patient would benefit from continued OT in her post acute discharge setting.  Prognosis: Good  Barriers to Discharge: None  Evaluation/Treatment Tolerance: Patient tolerated treatment well, Patient limited by fatigue  Medical Staff Made Aware: Yes  End of Session Communication: Bedside nurse, PCT/NA/CTA, Care Coordinator (Discussed level of assist needed in selfcare and transfers.)  End of Session Patient Position: Up in chair, Alarm on  Plan:  Treatment Interventions: ADL retraining, Functional transfer training, UE strengthening/ROM, Endurance training, Patient/family training, Equipment evaluation/education, Neuromuscular reeducation, Compensatory technique education  OT Frequency: 3 times per week  OT Discharge Recommendations: Moderate intensity level of continued care  Equipment Recommended upon Discharge: Wheeled walker  OT Recommended Transfer Status: Assist of 1  OT - OK to Discharge: Yes    Subjective   Previous Visit Info:  OT Last Visit  OT Received On: 12/04/24  General:  General  Reason for Referral: OT reffered for impaired ADL and mobility  Referred By: Michael De Guzman MD  Past Medical History Relevant to Rehab: chronic kidney disease, chronic systolic congestive heart failure, remote poliomyelitis, myelofibrosis, history of PE and on Eliquis, hypertension and polycythemia vera  Family/Caregiver Present: Yes  Caregiver Feedback: Son present, appropriately encouraging patient interaction and participation  Prior to Session Communication: Bedside nurse  Patient Position  Received: Up in chair, Alarm on  Preferred Learning Style: verbal, visual  General Comment: Pt seen immediately following PT session. Fatigued quickly withon course of task.  Precautions:  Medical Precautions: Fall precautions (ICU telemetry, IV, left calf in ace covered dressing.)    Vital Signs (Past 2hrs)        Date/Time Vitals Session Patient Position Pulse Resp SpO2 BP MAP (mmHg)    12/04/24 1025 --  --  94  24  92 %  142/86  99                    Pain Assessment:  Pain Assessment  Pain Assessment: 0-10  0-10 (Numeric) Pain Score: 0 - No pain     Objective   Cognition:  Overall Cognitive Status: Within Functional Limits  Orientation Level: Disoriented to situation  Insight: Mild  Impulsive: Mildly  Activities of Daily Living: UE Dressing  UE Dressing Level of Assistance: Setup  UE Dressing Where Assessed: Edge of bed  UE Dressing Comments: Pt donned gown seated at EOB with assist to thread left arm through sleeve.    LE Dressing  LE Dressing: Yes  LE Dressing Adaptive Equipment: Reacher, Sock aide  Sock Level of Assistance: Maximum verbal cues, Maximum assistance  Adult Briefs Level of Assistance: Maximum assistance, Maximum verbal cues  LE Dressing Where Assessed: Chair  LE Dressing Comments: Asssited to start breif over feet. Pt stood with front wheel walker to pull breif up over hisp from below knees.     Bed Mobility/Transfers:      Transfers  Transfer: Yes  Transfer 1  Transfer From 1: Chair with arms to  Transfer to 1: Stand  Technique 1: Sit to stand, Stand to sit  Transfer Device 1: Walker (Front wheel walker.)  Transfer Level of Assistance 1: Moderate assistance, Moderate verbal cues  Trials/Comments 1: (x2 trials)    Outcome Measures:  [facility] Daily Activity  Putting on and taking off regular lower body clothing: A lot  Bathing (including washing, rinsing, drying): A lot  Putting on and taking off regular upper body clothing: A lot  Toileting, which includes using toilet, bedpan or urinal:  Total  Taking care of personal grooming such as brushing teeth: A little  Eating Meals: A little  Daily Activity - Total Score: 13    Education Documentation  Precautions, taught by TRU Michelle at 12/4/2024 11:46 AM.  Learner: Patient  Readiness: Acceptance  Method: Explanation, Demonstration  Response: Verbalizes Understanding, Demonstrated Understanding, Needs Reinforcement    Home Exercise Program, taught by TRU Michelle at 12/4/2024 11:46 AM.  Learner: Patient  Readiness: Acceptance  Method: Explanation, Demonstration  Response: Verbalizes Understanding, Demonstrated Understanding, Needs Reinforcement    ADL Training, taught by TRU Michelle at 12/4/2024 11:46 AM.  Learner: Patient  Readiness: Acceptance  Method: Explanation, Demonstration  Response: Verbalizes Understanding, Demonstrated Understanding, Needs Reinforcement    Education Comments  Pt responsive and receptive to instruction provided in course of session.    Goals:  Encounter Problems       Encounter Problems (Active)       ADLs       Patient will perform UB bathing  with stand by assist level of assistance in sitting. (Progressing)       Start:  12/02/24    Expected End:  12/16/24            Patient with complete upper body dressing with stand by assist level of assistance donning and doffing all UE clothes with PRN adaptive equipment while edge of bed  (Progressing)       Start:  12/02/24    Expected End:  12/16/24            Patient will feed self with modified independent level of assistance and verbal cues using PRN adaptive equipment. (Progressing)       Start:  12/02/24    Expected End:  12/16/24            Patient will complete daily grooming tasks brushing teeth and washing face/hair with stand by assist level of assistance and PRN adaptive equipment while edge of bed . (Progressing)       Start:  12/02/24    Expected End:  12/16/24            Patient will complete toileting including hygiene clothing management/hygiene  with minimal assist  level of assistance and bedside commode. (Progressing)       Start:  12/02/24    Expected End:  12/16/24               TRANSFERS       Patient will complete functional transfer to bedside commode and chair with least restrictive device with contact guard assist level of assistance. (Progressing)       Start:  12/02/24    Expected End:  12/16/24

## 2024-12-05 LAB
ANION GAP SERPL CALC-SCNC: 14 MMOL/L (ref 10–20)
BUN SERPL-MCNC: 21 MG/DL (ref 6–23)
CALCIUM SERPL-MCNC: 7.9 MG/DL (ref 8.6–10.3)
CHLORIDE SERPL-SCNC: 109 MMOL/L (ref 98–107)
CO2 SERPL-SCNC: 19 MMOL/L (ref 21–32)
CREAT SERPL-MCNC: 1.42 MG/DL (ref 0.5–1.05)
EGFRCR SERPLBLD CKD-EPI 2021: 36 ML/MIN/1.73M*2
ERYTHROCYTE [DISTWIDTH] IN BLOOD BY AUTOMATED COUNT: 18.5 % (ref 11.5–14.5)
GLUCOSE SERPL-MCNC: 79 MG/DL (ref 74–99)
HCT VFR BLD AUTO: 47.9 % (ref 36–46)
HGB BLD-MCNC: 14.4 G/DL (ref 12–16)
MAGNESIUM SERPL-MCNC: 1.78 MG/DL (ref 1.6–2.4)
MCH RBC QN AUTO: 27.4 PG (ref 26–34)
MCHC RBC AUTO-ENTMCNC: 30.1 G/DL (ref 32–36)
MCV RBC AUTO: 91 FL (ref 80–100)
NRBC BLD-RTO: 0 /100 WBCS (ref 0–0)
PLATELET # BLD AUTO: 316 X10*3/UL (ref 150–450)
POTASSIUM SERPL-SCNC: 3.4 MMOL/L (ref 3.5–5.3)
RBC # BLD AUTO: 5.26 X10*6/UL (ref 4–5.2)
SODIUM SERPL-SCNC: 139 MMOL/L (ref 136–145)
VANCOMYCIN SERPL-MCNC: 20.8 UG/ML (ref 5–20)
WBC # BLD AUTO: 34.1 X10*3/UL (ref 4.4–11.3)

## 2024-12-05 PROCEDURE — 36415 COLL VENOUS BLD VENIPUNCTURE: CPT | Performed by: FAMILY MEDICINE

## 2024-12-05 PROCEDURE — 2500000001 HC RX 250 WO HCPCS SELF ADMINISTERED DRUGS (ALT 637 FOR MEDICARE OP): Performed by: NURSE PRACTITIONER

## 2024-12-05 PROCEDURE — 2500000004 HC RX 250 GENERAL PHARMACY W/ HCPCS (ALT 636 FOR OP/ED): Performed by: FAMILY MEDICINE

## 2024-12-05 PROCEDURE — 83735 ASSAY OF MAGNESIUM: CPT | Performed by: FAMILY MEDICINE

## 2024-12-05 PROCEDURE — 80202 ASSAY OF VANCOMYCIN: CPT | Performed by: FAMILY MEDICINE

## 2024-12-05 PROCEDURE — 99232 SBSQ HOSP IP/OBS MODERATE 35: CPT | Performed by: FAMILY MEDICINE

## 2024-12-05 PROCEDURE — 2500000002 HC RX 250 W HCPCS SELF ADMINISTERED DRUGS (ALT 637 FOR MEDICARE OP, ALT 636 FOR OP/ED): Performed by: NURSE PRACTITIONER

## 2024-12-05 PROCEDURE — 97535 SELF CARE MNGMENT TRAINING: CPT | Mod: GO,CO

## 2024-12-05 PROCEDURE — 97530 THERAPEUTIC ACTIVITIES: CPT | Mod: GO,CO,59

## 2024-12-05 PROCEDURE — 2060000001 HC INTERMEDIATE ICU ROOM DAILY

## 2024-12-05 PROCEDURE — 2500000001 HC RX 250 WO HCPCS SELF ADMINISTERED DRUGS (ALT 637 FOR MEDICARE OP): Performed by: FAMILY MEDICINE

## 2024-12-05 PROCEDURE — 85027 COMPLETE CBC AUTOMATED: CPT | Performed by: FAMILY MEDICINE

## 2024-12-05 PROCEDURE — 82310 ASSAY OF CALCIUM: CPT | Performed by: FAMILY MEDICINE

## 2024-12-05 RX ORDER — POTASSIUM CHLORIDE 20 MEQ/1
40 TABLET, EXTENDED RELEASE ORAL ONCE
Status: DISCONTINUED | OUTPATIENT
Start: 2024-12-05 | End: 2024-12-05

## 2024-12-05 RX ORDER — POTASSIUM CHLORIDE 1.5 G/1.58G
40 POWDER, FOR SOLUTION ORAL ONCE
Status: COMPLETED | OUTPATIENT
Start: 2024-12-05 | End: 2024-12-05

## 2024-12-05 ASSESSMENT — COGNITIVE AND FUNCTIONAL STATUS - GENERAL
TURNING FROM BACK TO SIDE WHILE IN FLAT BAD: A LITTLE
MOBILITY SCORE: 17
WALKING IN HOSPITAL ROOM: A LOT
HELP NEEDED FOR BATHING: A LOT
DRESSING REGULAR UPPER BODY CLOTHING: A LITTLE
STANDING UP FROM CHAIR USING ARMS: A LITTLE
EATING MEALS: A LITTLE
HELP NEEDED FOR BATHING: A LOT
MOVING TO AND FROM BED TO CHAIR: A LITTLE
TURNING FROM BACK TO SIDE WHILE IN FLAT BAD: A LITTLE
TOILETING: A LOT
CLIMB 3 TO 5 STEPS WITH RAILING: A LOT
DAILY ACTIVITIY SCORE: 15
PERSONAL GROOMING: A LITTLE
TOILETING: TOTAL
DRESSING REGULAR LOWER BODY CLOTHING: A LOT
MOBILITY SCORE: 16
DAILY ACTIVITIY SCORE: 15
DRESSING REGULAR UPPER BODY CLOTHING: A LITTLE
DRESSING REGULAR LOWER BODY CLOTHING: A LOT
MOVING FROM LYING ON BACK TO SITTING ON SIDE OF FLAT BED WITH BEDRAILS: A LITTLE
PERSONAL GROOMING: A LITTLE
DRESSING REGULAR LOWER BODY CLOTHING: A LOT
PERSONAL GROOMING: A LITTLE
HELP NEEDED FOR BATHING: A LOT
TOILETING: A LOT
STANDING UP FROM CHAIR USING ARMS: A LITTLE
EATING MEALS: A LITTLE
CLIMB 3 TO 5 STEPS WITH RAILING: A LOT
WALKING IN HOSPITAL ROOM: A LOT
DAILY ACTIVITIY SCORE: 13
MOVING TO AND FROM BED TO CHAIR: A LITTLE
EATING MEALS: A LITTLE
DRESSING REGULAR UPPER BODY CLOTHING: A LOT

## 2024-12-05 ASSESSMENT — PAIN - FUNCTIONAL ASSESSMENT
PAIN_FUNCTIONAL_ASSESSMENT: 0-10

## 2024-12-05 ASSESSMENT — ACTIVITIES OF DAILY LIVING (ADL): HOME_MANAGEMENT_TIME_ENTRY: 13

## 2024-12-05 ASSESSMENT — PAIN SCALES - GENERAL
PAINLEVEL_OUTOF10: 0 - NO PAIN

## 2024-12-05 NOTE — PROGRESS NOTES
Desirae Gregg is a 84 y.o. female on day 7 of admission presenting with Altered mental status, unspecified altered mental status type.    Subjective   Interval History: no fever, no new complaints        Review of Systems    Objective   Range of Vitals (last 24 hours)  Heart Rate:  []   Temp:  [36.4 °C (97.6 °F)-36.9 °C (98.4 °F)]   Resp:  [12-25]   BP: (110-157)/()   Weight:  [73.1 kg (161 lb 2.5 oz)]   SpO2:  [92 %-94 %]   Daily Weight  12/05/24 : 73.1 kg (161 lb 2.5 oz)    Body mass index is 26.82 kg/m².    Physical Exam  Constitutional:       Appearance: Normal appearance.   HENT:      Head: Normocephalic and atraumatic.      Mouth/Throat:      Mouth: Mucous membranes are moist.      Pharynx: Oropharynx is clear.   Eyes:      Pupils: Pupils are equal, round, and reactive to light.   Cardiovascular:      Rate and Rhythm: Normal rate and regular rhythm.      Heart sounds: Normal heart sounds.   Pulmonary:      Effort: Pulmonary effort is normal.      Breath sounds: Normal breath sounds.   Abdominal:      General: Abdomen is flat. Bowel sounds are normal.      Palpations: Abdomen is soft.   Musculoskeletal:      Cervical back: Normal range of motion.      Comments: Lt shin wound, less slough and redness         Antibiotics  vancomycin - 500 mg/100 mL    Relevant Results  Labs  Results from last 72 hours   Lab Units 12/05/24  0556 12/04/24  0549 12/03/24  0540   WBC AUTO x10*3/uL 34.1* 30.2*  30.2* 25.3*   HEMOGLOBIN g/dL 14.4 14.8  14.8 13.7   HEMATOCRIT % 47.9* 47.9*  47.9* 44.1   PLATELETS AUTO x10*3/uL 316 340  340 296   NEUTROS PCT AUTO %  --  86.9  --    LYMPHS PCT AUTO %  --  5.0  --    MONOS PCT AUTO %  --  2.3  --    EOS PCT AUTO %  --  2.0  --      Results from last 72 hours   Lab Units 12/05/24  0556 12/04/24  0549 12/03/24  0540   SODIUM mmol/L 139 140 139   POTASSIUM mmol/L 3.4* 3.5 3.5   CHLORIDE mmol/L 109* 110* 111*   CO2 mmol/L 19* 22 21   BUN mg/dL 21 20 19   CREATININE mg/dL 1.42*  1.54* 1.28*   GLUCOSE mg/dL 79 86 94   CALCIUM mg/dL 7.9* 8.0* 7.6*   ANION GAP mmol/L 14 12 11   EGFR mL/min/1.73m*2 36* 33* 41*           Estimated Creatinine Clearance: 28.5 mL/min (A) (by C-G formula based on SCr of 1.42 mg/dL (H)).  C-Reactive Protein   Date Value Ref Range Status   11/15/2024 0.80 <1.00 mg/dL Final   11/12/2024 1.04 (H) <1.00 mg/dL Final   11/11/2024 0.86 <1.00 mg/dL Final     Microbiology  Reviewed  Imaging  Reviewed        Assessment/Plan   Sepsis, 1 BC with CNS, ? Significance   Left shin wound infection, wounfd culture with MRSA / proteus, likely colonization  Respiratory failure / atelectasis  Pyuria, the culture with mixed ortiz  Encephalopathy, likely metabolic  Polycythemia / myelofibrosis, WBC are trending up, clinnically seems to be improving     Recommendations :  Would watch her off antibiotics  Discussed with the wound care team      I spent minutes in the professional and overall care of this patient.      Cameron Lassiter MD

## 2024-12-05 NOTE — PROGRESS NOTES
Vancomycin Dosing by Pharmacy- Cessation of Therapy    Consult to pharmacy for vancomycin dosing has been discontinued by the prescriber, pharmacy will sign off at this time.    Please call pharmacy if there are further questions or re-enter a consult if vancomycin is resumed.     Shayla Gracia, PharmD

## 2024-12-05 NOTE — PROGRESS NOTES
Occupational Therapy    Occupational Therapy Treatment    Name: Desirae Gregg  MRN: [id number]  Department: [address]  Room: [address] [place] [address]  Date: 12/05/24  Time Calculation  Start Time: 1108  Stop Time: 1131  Time Calculation (min): 23 min    Assessment:  OT Assessment: Pt demonstrates gradual improvement, though remains limited by fatugue, weakness and decreased balnce. Pt demonstrating good benefit from skilled OT. Continue to beleive that patient would benefit from continued OT in her post acute discharge setting.  Prognosis: Good  Barriers to Discharge: None  Evaluation/Treatment Tolerance: Patient tolerated treatment well, Patient limited by fatigue  Medical Staff Made Aware: Yes  End of Session Communication: Bedside nurse, PCT/NA/CTA, Care Coordinator (Discussed pending discharge and level of assist needed for selfcare and transfer.)  End of Session Patient Position: Up in chair, Alarm on  Plan:  Treatment Interventions: ADL retraining, Functional transfer training, UE strengthening/ROM, Endurance training, Patient/family training, Equipment evaluation/education, Neuromuscular reeducation, Compensatory technique education  OT Frequency: 3 times per week  OT Discharge Recommendations: Moderate intensity level of continued care  Equipment Recommended upon Discharge: Wheeled walker  OT Recommended Transfer Status: Assist of 1  OT - OK to Discharge: Yes (In accord with OT POC)    Subjective   Previous Visit Info:  OT Last Visit  OT Received On: 12/05/24  General:  General  Reason for Referral: OT referred for impaired ADL and mobility  Referred By: Michael De Guzman MD  Past Medical History Relevant to Rehab: chronic kidney disease, chronic systolic congestive heart failure, remote poliomyelitis, myelofibrosis, history of PE and on Eliquis, hypertension and polycythemia vera  Family/Caregiver Present: Yes  Caregiver Feedback: Son present, appropriately encouraging patient interaction and participation  Prior to Session  Communication: Bedside nurse, Care Coordinator (Possible discharge to SNF today pending lab values and arrangement of transport. Pt appropriate for treatment without new limitations.)  Patient Position Received: Bed, 3 rail up, Alarm on  Preferred Learning Style: verbal, visual  General Comment: Pt pleasnt, though requiring encouragement an on-task cues for participation in treatment.  Precautions:  Medical Precautions: Fall precautions (IV, Cardona catheter, telemetry.)    Vital Signs (Past 2hrs)            Pain Assessment:  Pain Assessment  Pain Assessment: 0-10  0-10 (Numeric) Pain Score: 0 - No pain     Objective   Cognition:  Overall Cognitive Status: Impaired  Orientation Level: Disoriented to place, Disoriented to time  Insight: Mild  Impulsive: Mildly  Activities of Daily Living: LE Dressing  LE Dressing: Yes  LE Dressing Adaptive Equipment: Reacher  Adult Briefs Level of Assistance: Maximum assistance, Moderate verbal cues  LE Dressing Where Assessed: Edge of bed  LE Dressing Comments: Assisted to start breif over feet and accomodate Cardnoa catheter. Stood with FWW and required Asssits to pull breif up over hips from knee level..     Bed Mobility/Transfers: Bed Mobility  Bed Mobility: Yes  Bed Mobility 1  Bed Mobility 1: Supine to sitting  Level of Assistance 1: Moderate assistance, Maximum verbal cues, Maximum tactile cues  Bed Mobility Comments 1: HOB near flat using bed rail.    Transfers  Transfer: Yes  Transfer 1  Transfer From 1: Chair with arms to  Transfer to 1: Stand  Technique 1: Sit to stand, Stand to sit  Transfer Device 1: Walker (front wheel walker)  Transfer Level of Assistance 1: Moderate assistance, Moderate verbal cues  Trials/Comments 1: Pt required direction and redirection for on-task focus.    Outcome Measures:  [facility] Daily Activity  Putting on and taking off regular lower body clothing: A lot  Bathing (including washing, rinsing, drying): A lot  Putting on and taking off regular upper  body clothing: A lot  Toileting, which includes using toilet, bedpan or urinal: Total  Taking care of personal grooming such as brushing teeth: A little  Eating Meals: A little  Daily Activity - Total Score: 13    Education Documentation  Precautions, taught by TRU Michelle at 12/5/2024 12:22 PM.  Learner: Patient  Readiness: Acceptance  Method: Explanation, Demonstration  Response: Verbalizes Understanding, Demonstrated Understanding, Needs Reinforcement  Comment: Encouragement with tasks and activities required with direction and redirection for on-task behaviors in course of session.    Home Exercise Program, taught by TRU Michelle at 12/5/2024 12:22 PM.  Learner: Patient  Readiness: Acceptance  Method: Explanation, Demonstration  Response: Verbalizes Understanding, Demonstrated Understanding, Needs Reinforcement  Comment: Encouragement with tasks and activities required with direction and redirection for on-task behaviors in course of session.    ADL Training, taught by TRU Michelle at 12/5/2024 12:22 PM.  Learner: Patient  Readiness: Acceptance  Method: Explanation, Demonstration  Response: Verbalizes Understanding, Demonstrated Understanding, Needs Reinforcement  Comment: Encouragement with tasks and activities required with direction and redirection for on-task behaviors in course of session.    Education Comments  Pt requiring direction and redirection, compliant to education provided in course of session.    Goals:  Encounter Problems       Encounter Problems (Active)       ADLs       Patient will perform UB bathing  with stand by assist level of assistance in sitting. (Progressing)       Start:  12/02/24    Expected End:  12/16/24            Patient with complete upper body dressing with stand by assist level of assistance donning and doffing all UE clothes with PRN adaptive equipment while edge of bed  (Progressing)       Start:  12/02/24    Expected End:  12/16/24            Patient  will feed self with modified independent level of assistance and verbal cues using PRN adaptive equipment. (Progressing)       Start:  12/02/24    Expected End:  12/16/24            Patient will complete daily grooming tasks brushing teeth and washing face/hair with stand by assist level of assistance and PRN adaptive equipment while edge of bed . (Progressing)       Start:  12/02/24    Expected End:  12/16/24            Patient will complete toileting including hygiene clothing management/hygiene with minimal assist  level of assistance and bedside commode. (Progressing)       Start:  12/02/24    Expected End:  12/16/24               TRANSFERS       Patient will complete functional transfer to bedside commode and chair with least restrictive device with contact guard assist level of assistance. (Progressing)       Start:  12/02/24    Expected End:  12/16/24

## 2024-12-05 NOTE — CARE PLAN
The clinical goals for the shift include swelling in arms will decrease      Problem: Skin  Goal: Decreased wound size/increased tissue granulation at next dressing change  Outcome: Progressing  Flowsheets (Taken 12/4/2024 2248)  Decreased wound size/increased tissue granulation at next dressing change:   Promote sleep for wound healing   Protective dressings over bony prominences     Problem: Skin  Goal: Participates in plan/prevention/treatment measures  Outcome: Progressing  Flowsheets (Taken 12/4/2024 2248)  Participates in plan/prevention/treatment measures:   Discuss with provider PT/OT consult   Elevate heels   Increase activity/out of bed for meals     Problem: Fall/Injury  Goal: Not fall by end of shift  Outcome: Progressing     Problem: Fall/Injury  Goal: Use assistive devices by end of the shift  Outcome: Progressing

## 2024-12-05 NOTE — CARE PLAN
The patient's goals for the shift include eat tomato soup    The clinical goals for the shift include patient remain safe during shift      Problem: Skin  Goal: Participates in plan/prevention/treatment measures  Outcome: Progressing  Flowsheets (Taken 12/5/2024 1313)  Participates in plan/prevention/treatment measures: Discuss with provider PT/OT consult     Problem: Skin  Goal: Prevent/manage excess moisture  Outcome: Progressing  Flowsheets (Taken 12/5/2024 1313)  Prevent/manage excess moisture: Cleanse incontinence/protect with barrier cream     Problem: Skin  Goal: Prevent/minimize sheer/friction injuries  Outcome: Progressing  Flowsheets (Taken 12/5/2024 1313)  Prevent/minimize sheer/friction injuries: Increase activity/out of bed for meals     Problem: Skin  Goal: Promote/optimize nutrition  Outcome: Progressing  Flowsheets (Taken 12/5/2024 1313)  Promote/optimize nutrition: Monitor/record intake including meals     Problem: Skin  Goal: Promote skin healing  Outcome: Progressing  Flowsheets (Taken 12/5/2024 1313)  Promote skin healing: Turn/reposition every 2 hours/use positioning/transfer devices

## 2024-12-05 NOTE — PROGRESS NOTES
Desirae Gregg is a 84 y.o. female on day 7 of admission presenting with Altered mental status, unspecified altered mental status type.      Subjective   Patient resting in bed, denies any current complaints. Will hold off on antibiotics and monitor with plans to send to facility tomorrow.       Objective     Last Recorded Vitals  BP (!) 133/120   Pulse 86   Temp 36.5 °C (97.7 °F) (Temporal)   Resp 24   Wt 73.1 kg (161 lb 2.5 oz)   SpO2 94%   Intake/Output last 3 Shifts:    Intake/Output Summary (Last 24 hours) at 12/5/2024 1252  Last data filed at 12/5/2024 1014  Gross per 24 hour   Intake 840 ml   Output 270 ml   Net 570 ml       Admission Weight  Weight: 73.5 kg (162 lb) (11/28/24 0323)    Daily Weight  12/05/24 : 73.1 kg (161 lb 2.5 oz)      Physical Exam  Constitutional: alert and oriented x 3, awake, cooperative, no acute distress  Skin: warm and dry, left shin in new wound dressing after bedside debridement  Head/Neck: Normocephalic, atraumatic  Eyes: clear sclera  ENMT: mucous membranes moist  Cardio: Regular rate and rhythm  Resp: CTA bilaterally, good respiratory effort  Gastrointestinal: Soft, nontender, nondistended  Musculoskeletal: generalized weakness  Neuro: alert and oriented x 3  Psychological: Appropriate mood and behavior    Relevant Results  Scheduled medications  apixaban, 2.5 mg, oral, BID  folic acid, 1 mg, oral, Daily  lactobacillus acidophilus, 1 capsule, oral, Daily  levothyroxine, 88 mcg, oral, Daily  midodrine, 5 mg, oral, TID  nystatin, , Topical, BID  pantoprazole, 40 mg, oral, Daily before breakfast  potassium chloride, 40 mEq, oral, Once  psyllium, 1 packet, oral, BID      Continuous medications     PRN medications  PRN medications: acetaminophen **OR** acetaminophen **OR** acetaminophen, albuterol, bisacodyl, dextrose, dextrose, glucagon, glucagon, loperamide, melatonin, ondansetron **OR** ondansetron, zinc oxide    Results for orders placed or performed during the hospital  encounter of 11/28/24 (from the past 24 hours)   CBC   Result Value Ref Range    WBC 34.1 (H) 4.4 - 11.3 x10*3/uL    nRBC 0.0 0.0 - 0.0 /100 WBCs    RBC 5.26 (H) 4.00 - 5.20 x10*6/uL    Hemoglobin 14.4 12.0 - 16.0 g/dL    Hematocrit 47.9 (H) 36.0 - 46.0 %    MCV 91 80 - 100 fL    MCH 27.4 26.0 - 34.0 pg    MCHC 30.1 (L) 32.0 - 36.0 g/dL    RDW 18.5 (H) 11.5 - 14.5 %    Platelets 316 150 - 450 x10*3/uL   Basic Metabolic Panel   Result Value Ref Range    Glucose 79 74 - 99 mg/dL    Sodium 139 136 - 145 mmol/L    Potassium 3.4 (L) 3.5 - 5.3 mmol/L    Chloride 109 (H) 98 - 107 mmol/L    Bicarbonate 19 (L) 21 - 32 mmol/L    Anion Gap 14 10 - 20 mmol/L    Urea Nitrogen 21 6 - 23 mg/dL    Creatinine 1.42 (H) 0.50 - 1.05 mg/dL    eGFR 36 (L) >60 mL/min/1.73m*2    Calcium 7.9 (L) 8.6 - 10.3 mg/dL   Magnesium   Result Value Ref Range    Magnesium 1.78 1.60 - 2.40 mg/dL   Vancomycin   Result Value Ref Range    Vancomycin 20.8 (H) 5.0 - 20.0 ug/mL             Assessment & Plan    83yo CF with PMH of 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    LLE Cellulitis with ulcer, improved  - sepsis resolved  - wound culture growing MRSA and proteus  - blood cultures 1/2 positive but likely contaminate as other is NGTD  - urine cultures contaminated, no urinary symptoms so will not repeat at this time  - ID following, monitor off vancomycin since completed 7days  - podiatry following, bedside debridement today and continue with wound care    Diarrhea, improved  - unknown etiology  - Cdiff, pathogens negative  - ova and parasite negative from 11/9  - continue PRN imodium    LEVY on CKD, improved  - likely secondary to above  - continue to monitor    Dysphagia  - being followed by speech therapy with modified pureed  diet  - continue to monitor    Acute metabolic encephalopathy, resolved  - likely secondary to infection as above    PE  - continue home eliquis    Hypothyroidism  - continue home levothyroxine    DVT Proph: eliquis    Dispo: Patient requires close inpatient monitoring in setting of cellulitis  requiring IV antibiotics, discharge  tomorrow pending leukocytosis downtrend as infectious workup negative.    Leena De Jesus, DO

## 2024-12-06 VITALS
RESPIRATION RATE: 23 BRPM | HEART RATE: 101 BPM | DIASTOLIC BLOOD PRESSURE: 78 MMHG | TEMPERATURE: 97.9 F | HEIGHT: 65 IN | BODY MASS INDEX: 25.97 KG/M2 | SYSTOLIC BLOOD PRESSURE: 143 MMHG | OXYGEN SATURATION: 94 % | WEIGHT: 155.87 LBS

## 2024-12-06 LAB
ANION GAP SERPL CALC-SCNC: 12 MMOL/L (ref 10–20)
BASOPHILS # BLD AUTO: 0.22 X10*3/UL (ref 0–0.1)
BASOPHILS NFR BLD AUTO: 0.7 %
BUN SERPL-MCNC: 21 MG/DL (ref 6–23)
CALCIUM SERPL-MCNC: 8 MG/DL (ref 8.6–10.3)
CHLORIDE SERPL-SCNC: 111 MMOL/L (ref 98–107)
CO2 SERPL-SCNC: 22 MMOL/L (ref 21–32)
CREAT SERPL-MCNC: 1.49 MG/DL (ref 0.5–1.05)
EGFRCR SERPLBLD CKD-EPI 2021: 34 ML/MIN/1.73M*2
EOSINOPHIL # BLD AUTO: 0.38 X10*3/UL (ref 0–0.4)
EOSINOPHIL NFR BLD AUTO: 1.2 %
ERYTHROCYTE [DISTWIDTH] IN BLOOD BY AUTOMATED COUNT: 18.6 % (ref 11.5–14.5)
GLUCOSE SERPL-MCNC: 67 MG/DL (ref 74–99)
HCT VFR BLD AUTO: 47 % (ref 36–46)
HGB BLD-MCNC: 14.4 G/DL (ref 12–16)
IMM GRANULOCYTES # BLD AUTO: 0.98 X10*3/UL (ref 0–0.5)
IMM GRANULOCYTES NFR BLD AUTO: 3.1 % (ref 0–0.9)
LYMPHOCYTES # BLD AUTO: 1.33 X10*3/UL (ref 0.8–3)
LYMPHOCYTES NFR BLD AUTO: 4.2 %
MCH RBC QN AUTO: 27.3 PG (ref 26–34)
MCHC RBC AUTO-ENTMCNC: 30.6 G/DL (ref 32–36)
MCV RBC AUTO: 89 FL (ref 80–100)
MONOCYTES # BLD AUTO: 0.83 X10*3/UL (ref 0.05–0.8)
MONOCYTES NFR BLD AUTO: 2.6 %
NEUTROPHILS # BLD AUTO: 27.64 X10*3/UL (ref 1.6–5.5)
NEUTROPHILS NFR BLD AUTO: 88.2 %
NRBC BLD-RTO: 0 /100 WBCS (ref 0–0)
PLATELET # BLD AUTO: 276 X10*3/UL (ref 150–450)
POTASSIUM SERPL-SCNC: 3.9 MMOL/L (ref 3.5–5.3)
RBC # BLD AUTO: 5.28 X10*6/UL (ref 4–5.2)
SODIUM SERPL-SCNC: 141 MMOL/L (ref 136–145)
WBC # BLD AUTO: 31.4 X10*3/UL (ref 4.4–11.3)

## 2024-12-06 PROCEDURE — 2500000001 HC RX 250 WO HCPCS SELF ADMINISTERED DRUGS (ALT 637 FOR MEDICARE OP): Performed by: NURSE PRACTITIONER

## 2024-12-06 PROCEDURE — 85025 COMPLETE CBC W/AUTO DIFF WBC: CPT | Performed by: FAMILY MEDICINE

## 2024-12-06 PROCEDURE — 99239 HOSP IP/OBS DSCHRG MGMT >30: CPT | Performed by: FAMILY MEDICINE

## 2024-12-06 PROCEDURE — 80048 BASIC METABOLIC PNL TOTAL CA: CPT | Performed by: FAMILY MEDICINE

## 2024-12-06 PROCEDURE — 36415 COLL VENOUS BLD VENIPUNCTURE: CPT | Performed by: FAMILY MEDICINE

## 2024-12-06 PROCEDURE — 2500000002 HC RX 250 W HCPCS SELF ADMINISTERED DRUGS (ALT 637 FOR MEDICARE OP, ALT 636 FOR OP/ED): Performed by: NURSE PRACTITIONER

## 2024-12-06 PROCEDURE — 2500000001 HC RX 250 WO HCPCS SELF ADMINISTERED DRUGS (ALT 637 FOR MEDICARE OP): Performed by: FAMILY MEDICINE

## 2024-12-06 RX ORDER — CARVEDILOL 3.12 MG/1
3.12 TABLET ORAL 2 TIMES DAILY
Status: DISCONTINUED | OUTPATIENT
Start: 2024-12-06 | End: 2024-12-06 | Stop reason: HOSPADM

## 2024-12-06 RX ORDER — LISINOPRIL 10 MG/1
10 TABLET ORAL DAILY
Start: 2024-12-06

## 2024-12-06 ASSESSMENT — COGNITIVE AND FUNCTIONAL STATUS - GENERAL
DRESSING REGULAR LOWER BODY CLOTHING: A LOT
STANDING UP FROM CHAIR USING ARMS: A LITTLE
MOBILITY SCORE: 16
DAILY ACTIVITIY SCORE: 15
DRESSING REGULAR UPPER BODY CLOTHING: A LITTLE
WALKING IN HOSPITAL ROOM: A LOT
CLIMB 3 TO 5 STEPS WITH RAILING: A LOT
TURNING FROM BACK TO SIDE WHILE IN FLAT BAD: A LITTLE
EATING MEALS: A LITTLE
MOVING FROM LYING ON BACK TO SITTING ON SIDE OF FLAT BED WITH BEDRAILS: A LITTLE
MOVING TO AND FROM BED TO CHAIR: A LITTLE
PERSONAL GROOMING: A LITTLE
HELP NEEDED FOR BATHING: A LOT
TOILETING: A LOT

## 2024-12-06 ASSESSMENT — PAIN SCALES - GENERAL: PAINLEVEL_OUTOF10: 0 - NO PAIN

## 2024-12-06 NOTE — DISCHARGE SUMMARY
Discharge Diagnosis  Altered mental status, unspecified altered mental status type    Issues Requiring Follow-Up  Wound care follow up in one week  PCP at SNF    Discharge Meds     Medication List      CHANGE how you take these medications     empagliflozin 10 mg; Commonly known as: Jardiance; Take 1 tablet (10 mg)   by mouth once daily.; What changed: medication strength, how much to take   lisinopril 10 mg tablet; Take 1 tablet (10 mg) by mouth once daily. HOLD   IF SBP<110; What changed: additional instructions     CONTINUE taking these medications     albuterol 2.5 mg /3 mL (0.083 %) nebulizer solution   apixaban 2.5 mg tablet; Commonly known as: Eliquis; Take 1 tablet (2.5   mg) by mouth 2 times a day.   bisacodyl 5 mg EC tablet; Commonly known as: Dulcolax   carvedilol 3.125 mg tablet; Commonly known as: Coreg; Take 1 tablet   (3.125 mg) by mouth 2 times a day.   cetirizine 10 mg tablet; Commonly known as: ZyrTEC; Take 1 tablet (10   mg) by mouth once daily.   cholecalciferol 50 mcg (2,000 unit) capsule; Commonly known as: Vitamin   D-3; Take 1 capsule (50 mcg) by mouth once daily.   cyanocobalamin 1,000 mcg tablet; Commonly known as: Vitamin B-12; Take 1   tablet (1,000 mcg) by mouth once daily.   ferrous sulfate (325 mg ferrous sulfate) tablet; Take 1 tablet (325 mg)   by mouth once daily with breakfast.   folic acid 400 mcg tablet; Commonly known as: Folvite   gabapentin 300 mg capsule; Commonly known as: Neurontin   lactobacillus acidophilus capsule; Take 1 capsule by mouth once daily.   Do not fill before November 20, 2024.   levothyroxine 88 mcg tablet; Commonly known as: Synthroid, Levoxyl; Take   1 tablet (88 mcg) by mouth early in the morning.. Take on an empty stomach   at the same time each day, either 30 to 60 minutes prior to breakfast   loperamide 2 mg tablet; Commonly known as: Imodium A-D   metoprolol succinate XL 25 mg 24 hr tablet; Commonly known as:   Toprol-XL; Take 1 tablet (25 mg) by  mouth 2 times a day. Do not crush or   chew.   midodrine 5 mg tablet; Commonly known as: Proamatine; Take 1 tablet (5   mg) by mouth 3 times a day as needed (Hypotension BP<110/80).   nystatin cream; Commonly known as: Mycostatin; Apply topically 2 times a   day. Apply to under right breast   pantoprazole 40 mg EC tablet; Commonly known as: ProtoNix; Take 1 tablet   (40 mg) by mouth once daily in the morning. Take before meals. Do not   crush, chew, or split.   phenyleph-min oil-petrolatum 0.25-14-74.9 % rectal ointment; Commonly   known as: Preparation H; Insert into the rectum 3 times a day as needed   for hemorrhoids.   polyethylene glycol 17 gram packet; Commonly known as: Glycolax, Miralax   psyllium 3.4 gram packet; Commonly known as: Metamucil; Take 1 packet by   mouth 2 times a day.   zinc oxide 40 % ointment ointment; Apply 1 Application topically every 1   hour if needed (irritation).     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 Patient was seen by podiatry and ID, she was treated per wound culture that grew MRSA/Proteus with IV vancomycin and bedside debridement of wound on 12/4. Patient also had diet modified for aspiration risk with dysphagia. Patient  completed antibiotics and leukocytosis started downtrending so will discharge to facility after precert obtained. Patient appears medically stable for discharge.    Medically cleared for discharge. Medications reviewed and reconciled. Scripts prepared as needed.  Discussed with the family, , and . Questions answered. Understanding verbalized. Overall time spent on discharge was longer than 35 min.        Pertinent Physical Exam At Time of Discharge  Constitutional: alert and oriented x 3, awake, cooperative, no acute distress  Skin: warm and dry, left shin in wound dressing after bedside debridement  Head/Neck: Normocephalic, atraumatic  Eyes: clear sclera  ENMT: mucous membranes moist  Cardio: Regular rate and rhythm  Resp: CTA bilaterally, good respiratory effort  Gastrointestinal: Soft, nontender, nondistended  Musculoskeletal: generalized weakness  Neuro: alert and oriented x 3  Psychological: Appropriate mood and behavior    Outpatient Follow-Up  No future appointments.      Leena De Jesus DO

## 2024-12-06 NOTE — PROGRESS NOTES
12/06/24 1059   Discharge Planning   Living Arrangements Other (Comment)  (arrived from  [facility], prior was home with son)   Support Systems Children;Family members   Assistance Needed Per SNF, At baseline:, Patient is A&O 2-3(forgetful at times), requires assistance with ADLs, wound care as follows - Cleanse with NSS, pat dry, apply Anasept mixed with Collagen powder, cover with Calcium Alginate ABD pad and wrap with Kerlix daily and PRN. Patient admitted to [facility] on 11-19. Prior to SNF patient was from home with son.   Type of Residence Nursing home/residential care;Skilled nursing facility   Do you have animals or pets at home? No   Who is requesting discharge planning? Provider   Home or Post Acute Services Post acute facilities (Rehab/SNF/etc)   Type of Post Acute Facility Services Skilled nursing   Expected Discharge Disposition SNF  ([facility], has precert, medically ready for d/c, orders sent to facility in [facility], transport requested, await confirmation,)   Does the patient need discharge transport arranged? Yes   RoundTrip coordination needed? Yes   Has discharge transport been arranged? No   Patient Choice   Provider Choice list and CMS website (https://medicare.gov/care-compare#search) for post-acute Quality and Resource Measure Data were provided and reviewed with: Family;Patient   Patient / Family choosing to utilize agency / facility established prior to hospitalization No   Stroke Family Assessment   Stroke Family Assessment Needed No   Intensity of Service   Intensity of Service 0-30 min     12/6/24 @ 1130: Transport confirmed for 1530 pickup. Patient, son, facility , RN, notified of discharge time. Number for nurse to nurse report provided to Juliet GREENE.

## 2024-12-06 NOTE — CARE PLAN
The patient's goals for the shift include getting at least 4 hours of sleep.    The clinical goals for the shift include Pt will remain HDS through end of shift.    Pt was able to obtain goals

## 2024-12-06 NOTE — PROGRESS NOTES
Desirae Gregg is a 84 y.o. female on day 8 of admission presenting with Altered mental status, unspecified altered mental status type.    Subjective   Interval History: no fever, no new complaints        Review of Systems    Objective   Range of Vitals (last 24 hours)  Heart Rate:  []   Temp:  [35.9 °C (96.6 °F)-36.4 °C (97.5 °F)]   Resp:  [11-29]   BP: ()/(69-96)   Weight:  [70.7 kg (155 lb 13.8 oz)]   SpO2:  [93 %-97 %]   Daily Weight  12/06/24 : 70.7 kg (155 lb 13.8 oz)    Body mass index is 25.94 kg/m².    Physical Exam  Constitutional:       Appearance: Normal appearance.   HENT:      Head: Normocephalic and atraumatic.      Mouth/Throat:      Mouth: Mucous membranes are moist.      Pharynx: Oropharynx is clear.   Eyes:      Pupils: Pupils are equal, round, and reactive to light.   Cardiovascular:      Rate and Rhythm: Normal rate and regular rhythm.      Heart sounds: Normal heart sounds.   Pulmonary:      Effort: Pulmonary effort is normal.      Breath sounds: Normal breath sounds.   Abdominal:      General: Abdomen is flat. Bowel sounds are normal.      Palpations: Abdomen is soft.   Musculoskeletal:      Cervical back: Normal range of motion.      Comments: Lt shin wound, less slough and redness         Antibiotics  This patient does not have an active medication from one of the medication groupers.    Relevant Results  Labs  Results from last 72 hours   Lab Units 12/06/24 0539 12/05/24  0556 12/04/24  0549   WBC AUTO x10*3/uL 31.4* 34.1* 30.2*  30.2*   HEMOGLOBIN g/dL 14.4 14.4 14.8  14.8   HEMATOCRIT % 47.0* 47.9* 47.9*  47.9*   PLATELETS AUTO x10*3/uL 276 316 340  340   NEUTROS PCT AUTO % 88.2  --  86.9   LYMPHS PCT AUTO % 4.2  --  5.0   MONOS PCT AUTO % 2.6  --  2.3   EOS PCT AUTO % 1.2  --  2.0     Results from last 72 hours   Lab Units 12/06/24  0539 12/05/24  0556 12/04/24  0549   SODIUM mmol/L 141 139 140   POTASSIUM mmol/L 3.9 3.4* 3.5   CHLORIDE mmol/L 111* 109* 110*   CO2 mmol/L 22  19* 22   BUN mg/dL 21 21 20   CREATININE mg/dL 1.49* 1.42* 1.54*   GLUCOSE mg/dL 67* 79 86   CALCIUM mg/dL 8.0* 7.9* 8.0*   ANION GAP mmol/L 12 14 12   EGFR mL/min/1.73m*2 34* 36* 33*           Estimated Creatinine Clearance: 26.7 mL/min (A) (by C-G formula based on SCr of 1.49 mg/dL (H)).  C-Reactive Protein   Date Value Ref Range Status   11/15/2024 0.80 <1.00 mg/dL Final   11/12/2024 1.04 (H) <1.00 mg/dL Final   11/11/2024 0.86 <1.00 mg/dL Final     Microbiology  Reviewed  Imaging  Reviewed        Assessment/Plan   Sepsis, 1 BC with CNS, ? Significance   Left shin wound infection, wounfd culture with MRSA / proteus, likely colonization  Respiratory failure / atelectasis  Pyuria, the culture with mixed ortiz  Encephalopathy, likely metabolic  Polycythemia / myelofibrosis, WBC may have peaked and turned around, clinically seems to be improving     Recommendations :  Supportive care  Discussed with the wound care team      I spent minutes in the professional and overall care of this patient.      Cameron Lassiter MD

## 2024-12-08 LAB
ATRIAL RATE: 81 BPM
P AXIS: 39 DEGREES
P OFFSET: 187 MS
P ONSET: 134 MS
PR INTERVAL: 164 MS
Q ONSET: 216 MS
QRS COUNT: 13 BEATS
QRS DURATION: 130 MS
QT INTERVAL: 416 MS
QTC CALCULATION(BAZETT): 483 MS
QTC FREDERICIA: 459 MS
R AXIS: -42 DEGREES
T AXIS: 150 DEGREES
T OFFSET: 424 MS
VENTRICULAR RATE: 81 BPM

## 2024-12-09 ENCOUNTER — LAB REQUISITION (OUTPATIENT)
Dept: LAB | Facility: HOSPITAL | Age: 84
End: 2024-12-09
Payer: MEDICARE

## 2024-12-09 DIAGNOSIS — R53.81 OTHER MALAISE: ICD-10-CM

## 2024-12-09 LAB
ALBUMIN SERPL BCP-MCNC: 2.2 G/DL (ref 3.4–5)
ALP SERPL-CCNC: 86 U/L (ref 33–136)
ALT SERPL W P-5'-P-CCNC: 9 U/L (ref 7–45)
ANION GAP SERPL CALC-SCNC: 13 MMOL/L (ref 10–20)
AST SERPL W P-5'-P-CCNC: 9 U/L (ref 9–39)
BILIRUB SERPL-MCNC: 0.9 MG/DL (ref 0–1.2)
BUN SERPL-MCNC: 23 MG/DL (ref 6–23)
CALCIUM SERPL-MCNC: 7.8 MG/DL (ref 8.6–10.3)
CHLORIDE SERPL-SCNC: 111 MMOL/L (ref 98–107)
CO2 SERPL-SCNC: 17 MMOL/L (ref 21–32)
CREAT SERPL-MCNC: 1.63 MG/DL (ref 0.5–1.05)
EGFRCR SERPLBLD CKD-EPI 2021: 31 ML/MIN/1.73M*2
ERYTHROCYTE [DISTWIDTH] IN BLOOD BY AUTOMATED COUNT: 19 % (ref 11.5–14.5)
GLUCOSE SERPL-MCNC: 60 MG/DL (ref 74–99)
HCT VFR BLD AUTO: 48.9 % (ref 36–46)
HGB BLD-MCNC: 14.9 G/DL (ref 12–16)
MCH RBC QN AUTO: 27.1 PG (ref 26–34)
MCHC RBC AUTO-ENTMCNC: 30.5 G/DL (ref 32–36)
MCV RBC AUTO: 89 FL (ref 80–100)
NRBC BLD-RTO: 0 /100 WBCS (ref 0–0)
PLATELET # BLD AUTO: 284 X10*3/UL (ref 150–450)
POTASSIUM SERPL-SCNC: 3.6 MMOL/L (ref 3.5–5.3)
PROT SERPL-MCNC: 3.5 G/DL (ref 6.4–8.2)
RBC # BLD AUTO: 5.5 X10*6/UL (ref 4–5.2)
SODIUM SERPL-SCNC: 137 MMOL/L (ref 136–145)
WBC # BLD AUTO: 30.6 X10*3/UL (ref 4.4–11.3)

## 2024-12-09 PROCEDURE — 85027 COMPLETE CBC AUTOMATED: CPT | Mod: OUT | Performed by: FAMILY MEDICINE

## 2024-12-09 PROCEDURE — 80053 COMPREHEN METABOLIC PANEL: CPT | Mod: OUT | Performed by: FAMILY MEDICINE

## 2024-12-09 PROCEDURE — 36415 COLL VENOUS BLD VENIPUNCTURE: CPT | Mod: OUT | Performed by: FAMILY MEDICINE

## 2024-12-12 ENCOUNTER — APPOINTMENT (OUTPATIENT)
Dept: WOUND CARE | Facility: HOSPITAL | Age: 84
End: 2024-12-12
Payer: MEDICARE

## 2024-12-13 ENCOUNTER — LAB REQUISITION (OUTPATIENT)
Dept: LAB | Facility: HOSPITAL | Age: 84
End: 2024-12-13
Payer: MEDICARE

## 2024-12-13 DIAGNOSIS — R30.0 DYSURIA: ICD-10-CM

## 2024-12-13 LAB
ALBUMIN SERPL BCP-MCNC: 2.1 G/DL (ref 3.4–5)
ALP SERPL-CCNC: 100 U/L (ref 33–136)
ALT SERPL W P-5'-P-CCNC: 10 U/L (ref 7–45)
ANION GAP SERPL CALC-SCNC: 16 MMOL/L
APPEARANCE UR: ABNORMAL
AST SERPL W P-5'-P-CCNC: 15 U/L (ref 9–39)
BASOPHILS # BLD AUTO: 0.44 X10*3/UL (ref 0–0.1)
BASOPHILS NFR BLD AUTO: 1.3 %
BILIRUB SERPL-MCNC: 0.8 MG/DL (ref 0–1.2)
BILIRUB UR STRIP.AUTO-MCNC: NEGATIVE MG/DL
BUN SERPL-MCNC: 32 MG/DL (ref 6–23)
CALCIUM SERPL-MCNC: 7.7 MG/DL (ref 8.6–10.3)
CAOX CRY #/AREA UR COMP ASSIST: ABNORMAL /HPF
CHLORIDE SERPL-SCNC: 111 MMOL/L (ref 98–107)
CO2 SERPL-SCNC: 15 MMOL/L (ref 21–32)
COLOR UR: ABNORMAL
CREAT SERPL-MCNC: 1.59 MG/DL (ref 0.5–1.05)
EGFRCR SERPLBLD CKD-EPI 2021: 32 ML/MIN/1.73M*2
EOSINOPHIL # BLD AUTO: 0.84 X10*3/UL (ref 0–0.4)
EOSINOPHIL NFR BLD AUTO: 2.5 %
ERYTHROCYTE [DISTWIDTH] IN BLOOD BY AUTOMATED COUNT: 19.9 % (ref 11.5–14.5)
GLUCOSE SERPL-MCNC: 57 MG/DL (ref 74–99)
GLUCOSE UR STRIP.AUTO-MCNC: ABNORMAL MG/DL
HCT VFR BLD AUTO: 54.2 % (ref 36–46)
HGB BLD-MCNC: 15.5 G/DL (ref 12–16)
HYALINE CASTS #/AREA URNS AUTO: ABNORMAL /LPF
IMM GRANULOCYTES # BLD AUTO: 1.46 X10*3/UL (ref 0–0.5)
IMM GRANULOCYTES NFR BLD AUTO: 4.4 % (ref 0–0.9)
KETONES UR STRIP.AUTO-MCNC: NEGATIVE MG/DL
LEUKOCYTE ESTERASE UR QL STRIP.AUTO: ABNORMAL
LYMPHOCYTES # BLD AUTO: 2.06 X10*3/UL (ref 0.8–3)
LYMPHOCYTES NFR BLD AUTO: 6.2 %
MCH RBC QN AUTO: 27.2 PG (ref 26–34)
MCHC RBC AUTO-ENTMCNC: 28.6 G/DL (ref 32–36)
MCV RBC AUTO: 95 FL (ref 80–100)
MONOCYTES # BLD AUTO: 0.91 X10*3/UL (ref 0.05–0.8)
MONOCYTES NFR BLD AUTO: 2.7 %
NEUTROPHILS # BLD AUTO: 27.68 X10*3/UL (ref 1.6–5.5)
NEUTROPHILS NFR BLD AUTO: 82.9 %
NITRITE UR QL STRIP.AUTO: NEGATIVE
NRBC BLD-RTO: 0 /100 WBCS (ref 0–0)
PH UR STRIP.AUTO: 6 [PH]
PLATELET # BLD AUTO: 289 X10*3/UL (ref 150–450)
POTASSIUM SERPL-SCNC: 4.1 MMOL/L (ref 3.5–5.3)
PROT SERPL-MCNC: 3.5 G/DL (ref 6.4–8.2)
PROT UR STRIP.AUTO-MCNC: ABNORMAL MG/DL
RBC # BLD AUTO: 5.7 X10*6/UL (ref 4–5.2)
RBC # UR STRIP.AUTO: ABNORMAL /UL
RBC #/AREA URNS AUTO: >20 /HPF
SODIUM SERPL-SCNC: 138 MMOL/L (ref 136–145)
SP GR UR STRIP.AUTO: 1.01
UROBILINOGEN UR STRIP.AUTO-MCNC: NORMAL MG/DL
WBC # BLD AUTO: 33.4 X10*3/UL (ref 4.4–11.3)
WBC #/AREA URNS AUTO: >50 /HPF
WBC CLUMPS #/AREA URNS AUTO: ABNORMAL /HPF

## 2024-12-13 PROCEDURE — 87086 URINE CULTURE/COLONY COUNT: CPT | Mod: OUT,GEALAB | Performed by: EMERGENCY MEDICINE

## 2024-12-13 PROCEDURE — 80053 COMPREHEN METABOLIC PANEL: CPT | Mod: OUT | Performed by: EMERGENCY MEDICINE

## 2024-12-13 PROCEDURE — 81001 URINALYSIS AUTO W/SCOPE: CPT | Mod: OUT | Performed by: EMERGENCY MEDICINE

## 2024-12-13 PROCEDURE — 36415 COLL VENOUS BLD VENIPUNCTURE: CPT | Mod: OUT | Performed by: EMERGENCY MEDICINE

## 2024-12-13 PROCEDURE — 85025 COMPLETE CBC W/AUTO DIFF WBC: CPT | Mod: OUT | Performed by: EMERGENCY MEDICINE

## 2024-12-14 LAB — BACTERIA UR CULT: NORMAL

## 2024-12-20 ENCOUNTER — TELEPHONE (OUTPATIENT)
Dept: PRIMARY CARE | Facility: CLINIC | Age: 84
End: 2024-12-20
Payer: MEDICARE

## 2024-12-27 DIAGNOSIS — N18.31 CKD STAGE 3A, GFR 45-59 ML/MIN (MULTI): ICD-10-CM

## 2024-12-27 RX ORDER — BUMETANIDE 1 MG/1
TABLET ORAL
Qty: 60 TABLET | Refills: 11 | OUTPATIENT
Start: 2024-12-27

## 2025-01-20 LAB
ATRIAL RATE: 107 BPM
ATRIAL RATE: 65 BPM
P AXIS: 20 DEGREES
P AXIS: 51 DEGREES
P OFFSET: 192 MS
P OFFSET: 194 MS
P ONSET: 138 MS
P ONSET: 139 MS
PR INTERVAL: 154 MS
PR INTERVAL: 158 MS
Q ONSET: 216 MS
Q ONSET: 217 MS
QRS COUNT: 11 BEATS
QRS COUNT: 18 BEATS
QRS DURATION: 130 MS
QRS DURATION: 132 MS
QT INTERVAL: 358 MS
QT INTERVAL: 514 MS
QTC CALCULATION(BAZETT): 477 MS
QTC CALCULATION(BAZETT): 534 MS
QTC FREDERICIA: 434 MS
QTC FREDERICIA: 527 MS
R AXIS: -33 DEGREES
R AXIS: -40 DEGREES
T AXIS: 144 DEGREES
T AXIS: 155 DEGREES
T OFFSET: 396 MS
T OFFSET: 473 MS
VENTRICULAR RATE: 107 BPM
VENTRICULAR RATE: 65 BPM

## 2025-02-13 ENCOUNTER — APPOINTMENT (OUTPATIENT)
Dept: PRIMARY CARE | Facility: CLINIC | Age: 85
End: 2025-02-13
Payer: MEDICARE

## 2025-06-11 NOTE — PROGRESS NOTES
Patient Seen in: [facility] Emergency Department    History     Chief Complaint   Patient presents with    Hip Pain       HPI        History is provided by patient/independent historian: patient  89 year old female with history of thyroid disease, factor V Leiden mutation, sciatica, here with complaints of right-sided lower back pain radiating into the leg.  For the last week, she has been suffering and having difficulty walking.  She has been using a walker but also not making it to her meals secondary to the pain.  She has been taking Tylenol without improvement of her symptoms.  She has seen a pain doctor, spine surgeon, Ortho, had numerous injections without improvement of her symptoms. No incontinence, fevers, history of IV drug use or cancer.  No trauma.    History reviewed. Past Medical History[1]      History reviewed. Past Surgical History[2]      Home Medications reviewed :  Prescriptions Prior to Admission[3]      History reviewed. Social Hx on file[4]      ROS  Review of Systems   Respiratory:  Negative for shortness of breath.    Cardiovascular:  Negative for chest pain.   Musculoskeletal:  Positive for back pain.   All other systems reviewed and are negative.     All other pertinent organ systems are reviewed and are negative.      Physical Exam     ED Triage Vitals [06/11/25 0834]   BP (!) 175/69   Pulse 79   Resp 18   Temp 98.1 °F (36.7 °C)   Temp src Temporal   SpO2 98 %   O2 Device None (Room air)     Vital signs reviewed.      Physical Exam  Vitals and nursing note reviewed.   Cardiovascular:      Pulses: Normal pulses.   Pulmonary:      Effort: No respiratory distress.   Abdominal:      General: There is no distension.   Musculoskeletal:      Comments: Able to range R hip, 2+ dp pulse, able to wiggle toes, R knee and ankle unremarkable, no spinal tenderness, pelvis stable   Neurological:      Mental Status: She is alert.      Comments: 5/5 strength in b/l LE, normal sensation in b/l LEs  Desirae Gregg is a 84 y.o. female on day 4 of admission presenting with Altered mental status, unspecified altered mental status type.      Subjective   Patient resting in bed, denies any current complaints.      Objective     Last Recorded Vitals  /65 (BP Location: Right arm, Patient Position: Lying)   Pulse 98   Temp 35.9 °C (96.7 °F) (Temporal)   Resp 18   Wt 68.8 kg (151 lb 10.8 oz)   SpO2 90%   Intake/Output last 3 Shifts:    Intake/Output Summary (Last 24 hours) at 12/2/2024 1023  Last data filed at 12/2/2024 0515  Gross per 24 hour   Intake 100 ml   Output 350 ml   Net -250 ml       Admission Weight  Weight: 73.5 kg (162 lb) (11/28/24 0323)    Daily Weight  12/02/24 : 68.8 kg (151 lb 10.8 oz)      Physical Exam  Constitutional: alert and oriented x 2-3, awake, cooperative, no acute distress  Skin: warm and dry, left shin in wound dressing no signs of bleeding or purulent discharge  Head/Neck: Normocephalic, atraumatic  Eyes: clear sclera  ENMT: mucous membranes moist  Cardio: Regular rate and rhythm  Resp: CTA bilaterally, good respiratory effort  Gastrointestinal: Soft, nontender, nondistended  Musculoskeletal: generalized weakness  Neuro: alert and oriented x 2-3  Psychological: Appropriate mood and behavior    Relevant Results  Scheduled medications  albuterol, 2.5 mg, nebulization, TID  apixaban, 2.5 mg, oral, BID  folic acid, 1 mg, oral, Daily  lactobacillus acidophilus, 1 capsule, oral, Daily  levothyroxine, 88 mcg, oral, Daily  midodrine, 5 mg, oral, TID  nystatin, , Topical, BID  oxygen, , inhalation, Continuous - Inhalation  pantoprazole, 40 mg, oral, Daily before breakfast  psyllium, 1 packet, oral, BID      Continuous medications     PRN medications  PRN medications: acetaminophen **OR** acetaminophen **OR** acetaminophen, albuterol, bisacodyl, dextrose, dextrose, glucagon, glucagon, loperamide, melatonin, ondansetron **OR** ondansetron, vancomycin, zinc oxide    Results for orders placed              ED Course       Labs:   Labs Reviewed - No data to display      My EKG Interpretation:   As reviewed and Interpreted by me      Imaging Results Available and Reviewed while in ED:   XR HIP W OR WO PELVIS 2 OR 3 VIEWS, RIGHT (CPT=73502)  Result Date: 6/11/2025  CONCLUSION:   Moderate right hip osteoarthritis without acute fracture or dislocation.     Dictated by (CST): Jesus Villalpando MD on 6/11/2025 at 9:53 AM     Finalized by (CST): Jesus Villalpando MD on 6/11/2025 at 9:57 AM          XR LUMBAR SPINE (MIN 2 VIEWS) (CPT=72100)  Result Date: 6/11/2025  CONCLUSION:   Degenerative disc and facet disease throughout the lumbar spine, most prominent at L5-S1.  Kyphoplasty at T12 and L2.  Chronic fracture of L5 which is unchanged.  No acute appearing fracture or vertebral body height loss.    Dictated by (CST): Jesus Villalpando MD on 6/11/2025 at 9:49 AM     Finalized by (CST): Jesus Villalpando MD on 6/11/2025 at 9:51 AM          My review and independent interpretation of XR images: no fracture. Radiology report corroborates this in addition to other details as reported by them.      Decision rules/scores evaluated: none      Diagnostic labs/tests considered but not ordered: CBC, BMp, UA    ED Medications Administered:   Medications   HYDROcodone-acetaminophen (Norco) 5-325 MG per tab 1 tablet (1 tablet Oral Given 6/11/25 0906)                MDM       Medical Decision Making      Differential Diagnosis: After obtaining the patient's history, performing the physical exam and reviewing the diagnostics, multiple initial diagnoses were considered based on the presenting problem including pain, sciatica, cauda equina, UTI, compression fracture    External document review: I personally reviewed available external medical records for any recent pertinent discharge summaries, testing, and procedures - the findings are as follows: 1/17/24 admission for compression fracture    Complicating Factors: The patient already  has a past  or performed during the hospital encounter of 11/28/24 (from the past 24 hours)   Vancomycin   Result Value Ref Range    Vancomycin 17.6 5.0 - 20.0 ug/mL   Basic metabolic panel   Result Value Ref Range    Glucose 87 74 - 99 mg/dL    Sodium 139 136 - 145 mmol/L    Potassium 3.3 (L) 3.5 - 5.3 mmol/L    Chloride 109 (H) 98 - 107 mmol/L    Bicarbonate 22 21 - 32 mmol/L    Anion Gap 11 10 - 20 mmol/L    Urea Nitrogen 22 6 - 23 mg/dL    Creatinine 1.42 (H) 0.50 - 1.05 mg/dL    eGFR 36 (L) >60 mL/min/1.73m*2    Calcium 7.6 (L) 8.6 - 10.3 mg/dL   CBC   Result Value Ref Range    WBC 20.1 (H) 4.4 - 11.3 x10*3/uL    nRBC 0.0 0.0 - 0.0 /100 WBCs    RBC 4.87 4.00 - 5.20 x10*6/uL    Hemoglobin 13.4 12.0 - 16.0 g/dL    Hematocrit 44.2 36.0 - 46.0 %    MCV 91 80 - 100 fL    MCH 27.5 26.0 - 34.0 pg    MCHC 30.3 (L) 32.0 - 36.0 g/dL    RDW 17.8 (H) 11.5 - 14.5 %    Platelets 277 150 - 450 x10*3/uL             Assessment & Plan    83yo CF with PMH of 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    LLE Cellulitis with ulcer  - sepsis resolved  - wound culture growing MRSA, proteus  - blood cultures 1/2 positive but likely contaminate as other is NGTD x3days  - urine cultures contaminated, no urinary symptoms so will not repeat at this time  - ID following, discontinue cefepime but keep vancomycin  - podiatry following, no surgical debridement needed but continue with wound care  - plan to discharge on PO antibiotics pending cultures    Diarrhea, improved  - unknown etiology  - Cdiff, pathogens negative  - ova and parasite snegative from 11/9  - continue PRN imodium    LEVY on CKD, improved  - likely secondary to above  - continue to monitor    Dysphagia  - being followed by speech therapy with modified pureed diet  -  continue to monitor    Acute metabolic encephalopathy, resolved  - likely secondary to infection as above    PE  - continue home eliquis    Hypothyroidism  - continue home levothyroxine    DVT Proph: eliquis    Dispo: Patient requires close inpatient monitoring in setting of cellulitis  requiring IV antibiotics, discharge planning pending culture/sensitivities.    Leena De Jesus DO       medical history of Blood disorder, Cataract, Disorder of thyroid, Factor V Leiden mutation (HCC), High cholesterol, Osteopenia, OTHER DISEASES, Problems with swallowing, and Recurrent urinary tract infection. to contribute to the complexity of this ED evaluation.    Procedures performed: none    Discussed management with physician/appropriate source: none    Considered admission/deescalation of care for: none    Social determinants of health affecting patient care: none    Prescription medications considered: norco, discussed continuing current medication regimen    The patient requires continuous monitoring for: back pain    Shared decision making: discussed possible admission            Disposition and Plan     Clinical Impression:  1. Right hip pain        Disposition:  Discharge    Follow-up:  Marce Smith MD    Follow up        Medications Prescribed:  Current Discharge Medication List        START taking these medications    Details   HYDROcodone-acetaminophen 5-325 MG Oral Tab Take 1 tablet by mouth every 6 (six) hours as needed.  Qty: 10 tablet, Refills: 0    Associated Diagnoses: Right hip pain                            [1]   Past Medical History:   Blood disorder    Cataract    Disorder of thyroid    Factor V Leiden mutation (HCC)    High cholesterol    Osteopenia    OTHER DISEASES    pancreatitis X 2    Problems with swallowing    Recurrent urinary tract infection   [2]   Past Surgical History:  Procedure Laterality Date    Cataract      Cholecystectomy      because of pancreatic pseudocyst which was also removed.    Colonoscopy       - had upper and lower endoscopy; ; Dr Lagunas because of anemia - normal.    Excisional biospy right  1973    Hysterectomy      ORA/BSO in 's    Ir vertebroplasty            Other surgical history  11    cysto-dr. gerard   [3] (Not in a hospital admission)   [4]   Social History  Socioeconomic History    Marital status:    Tobacco Use     Smoking status: Never    Smokeless tobacco: Never   Vaping Use    Vaping status: Never Used   Substance and Sexual Activity    Alcohol use: No     Alcohol/week: 0.0 standard drinks of alcohol    Drug use: No   Other Topics Concern    Caffeine Concern Yes     Comment: 1 cup of coffee daily    Exercise Yes